# Patient Record
Sex: MALE | Race: WHITE | Employment: OTHER | URBAN - NONMETROPOLITAN AREA
[De-identification: names, ages, dates, MRNs, and addresses within clinical notes are randomized per-mention and may not be internally consistent; named-entity substitution may affect disease eponyms.]

---

## 2017-10-09 ENCOUNTER — OFFICE VISIT (OUTPATIENT)
Dept: CARDIOLOGY CLINIC | Age: 47
End: 2017-10-09

## 2017-10-09 VITALS
SYSTOLIC BLOOD PRESSURE: 134 MMHG | HEIGHT: 71 IN | BODY MASS INDEX: 23.38 KG/M2 | HEART RATE: 76 BPM | DIASTOLIC BLOOD PRESSURE: 80 MMHG | WEIGHT: 167 LBS | RESPIRATION RATE: 16 BRPM

## 2017-10-09 DIAGNOSIS — I10 ESSENTIAL HYPERTENSION: ICD-10-CM

## 2017-10-09 DIAGNOSIS — E78.2 MIXED HYPERLIPIDEMIA: Primary | Chronic | ICD-10-CM

## 2017-10-09 DIAGNOSIS — I25.810 CORONARY ARTERY DISEASE INVOLVING CORONARY BYPASS GRAFT OF NATIVE HEART WITHOUT ANGINA PECTORIS: Chronic | ICD-10-CM

## 2017-10-09 DIAGNOSIS — Z72.0 TOBACCO ABUSE: ICD-10-CM

## 2017-10-09 DIAGNOSIS — I25.2 HISTORY OF MI (MYOCARDIAL INFARCTION): ICD-10-CM

## 2017-10-09 DIAGNOSIS — Z95.1 HISTORY OF CORONARY ARTERY BYPASS GRAFT: ICD-10-CM

## 2017-10-09 DIAGNOSIS — J41.0 SIMPLE CHRONIC BRONCHITIS (HCC): Chronic | ICD-10-CM

## 2017-10-09 PROCEDURE — 99214 OFFICE O/P EST MOD 30 MIN: CPT | Performed by: INTERNAL MEDICINE

## 2017-10-09 RX ORDER — NITROGLYCERIN 0.4 MG/1
0.4 TABLET SUBLINGUAL EVERY 5 MIN PRN
Qty: 25 TABLET | Refills: 3 | Status: ON HOLD | OUTPATIENT
Start: 2017-10-09 | End: 2018-03-05

## 2017-10-09 RX ORDER — ATORVASTATIN CALCIUM 40 MG/1
40 TABLET, FILM COATED ORAL DAILY
Qty: 30 TABLET | Refills: 2 | Status: ON HOLD | OUTPATIENT
Start: 2017-10-09 | End: 2018-03-05

## 2017-10-09 RX ORDER — ACETAMINOPHEN 500 MG
500 TABLET ORAL EVERY 6 HOURS PRN
Status: ON HOLD | COMMUNITY
End: 2018-03-05 | Stop reason: HOSPADM

## 2017-10-09 RX ORDER — ASPIRIN 81 MG/1
81 TABLET ORAL DAILY
Qty: 30 TABLET | Refills: 5 | Status: ON HOLD | OUTPATIENT
Start: 2017-10-09 | End: 2018-03-05 | Stop reason: HOSPADM

## 2017-10-09 NOTE — ASSESSMENT & PLAN NOTE
Counseled to stop smoking. He is not able to afford Chantix and apparently he is medicaid does not cover it.

## 2017-10-09 NOTE — PROGRESS NOTES
20. 00     Types: Cigarettes    Smokeless tobacco: Former User      Comment: Reviewed 10/9/17    Alcohol use No     Family history: family history includes Cancer in his father; Diabetes in his mother; Heart Failure in his father. ALLERGIES:  Review of patient's allergies indicates no known allergies. Prior to Admission medications    Medication Sig Start Date End Date Taking? Authorizing Provider   acetaminophen (TYLENOL) 500 MG tablet Take 500 mg by mouth every 6 hours as needed for Pain   Yes Historical Provider, MD   atorvastatin (LIPITOR) 40 MG tablet Take 1 tablet by mouth daily 10/9/17  Yes Neftali Draper MD   aspirin 81 MG EC tablet Take 1 tablet by mouth daily 10/9/17 11/8/17 Yes Neftali Draper MD   nitroGLYCERIN (NITROSTAT) 0.4 MG SL tablet Place 1 tablet under the tongue every 5 minutes as needed for Chest pain 10/9/17  Yes Neftali Draper MD   budesonide-formoterol [facility]) 80-4.5 MCG/ACT AERO Inhale 2 puffs into the lungs 2 times daily 10/16/16   Kristen Gant MD   albuterol sulfate HFA (VENTOLIN HFA) 108 (90 BASE) MCG/ACT inhaler Inhale 2 puffs into the lungs every 4 hours as needed for Wheezing 7/19/16   Rosalind Silence, CNP     Constitutional:  /80 (Site: Right Arm, Position: Sitting, Cuff Size: Medium Adult)   Pulse 76   Resp 16   Ht 5' 11\" (1.803 m)   Wt 167 lb (75.8 kg)   BMI 23.29 kg/m²    Body mass index is 23.29 kg/m². Wt Readings from Last 3 Encounters:   10/09/17 167 lb (75.8 kg)   08/26/17 180 lb (81.6 kg)   04/17/17 177 lb (80.3 kg)     /80 (Site: Right Arm, Position: Sitting, Cuff Size: Medium Adult)   Pulse 76   Resp 16   Ht 5' 11\" (1.803 m)   Wt 167 lb (75.8 kg)   BMI 23.29 kg/m²     HEENT:  Pupils are equal and react to light and accomodation    Neck:  No jugular veinous distenstion or carotid bruits.     Chest:  Normal in shape and excursion    Cardiac:  Normal first and second heart sounds     Respiratory:  Normal breath sounds without wheezing or exposure. I have offered multiple approaches and support to accomplish this. Appropriate prescriptions if needed on this visit are addressed. After visit summery is provided. Questions answered and patient verbalizes understanding. Follow up in office in 6 months, sooner if needed. Khadijah George MD, 10/9/2017 4:29 PM     Please note this report has been produced using speech recognition software and may contain errors related to that system including errors in grammar, punctuation, and spelling, as well as words and phrases that may be inappropriate.  If there are any questions or concerns please feel free to contact the dictating provider for clarification

## 2017-10-09 NOTE — ASSESSMENT & PLAN NOTE
He has lost significant amount of weight since his last visit. His blood pressure is doing well, I will not resume any of the medications he was on for blood pressure at this point.

## 2017-12-28 ENCOUNTER — HOSPITAL ENCOUNTER (OUTPATIENT)
Dept: OTHER | Age: 47
Discharge: HOME | End: 2017-12-31
Attending: PREVENTIVE MEDICINE | Admitting: PREVENTIVE MEDICINE

## 2017-12-28 PROBLEM — F11.10 OPIATE ABUSE, CONTINUOUS (HCC): Status: ACTIVE | Noted: 2017-12-28

## 2018-01-01 ENCOUNTER — HOSPITAL ENCOUNTER (OUTPATIENT)
Dept: OTHER | Age: 48
Discharge: HOME | End: 2018-01-31
Attending: PREVENTIVE MEDICINE | Admitting: PREVENTIVE MEDICINE

## 2018-02-27 ENCOUNTER — HOSPITAL ENCOUNTER (OUTPATIENT)
Dept: PSYCHIATRY | Age: 48
Discharge: HOME | End: 2018-02-28
Attending: PREVENTIVE MEDICINE | Admitting: PREVENTIVE MEDICINE

## 2018-02-27 ASSESSMENT — LIFESTYLE VARIABLES: HISTORY_ALCOHOL_USE: NO

## 2018-03-01 ENCOUNTER — HOSPITAL ENCOUNTER (OUTPATIENT)
Dept: OTHER | Age: 48
Discharge: HOME | End: 2018-03-31
Attending: PREVENTIVE MEDICINE | Admitting: PREVENTIVE MEDICINE

## 2018-03-02 ENCOUNTER — HOSPITAL ENCOUNTER (OUTPATIENT)
Dept: OTHER | Age: 48
Discharge: HOME | End: 2018-03-02
Attending: INTERNAL MEDICINE | Admitting: INTERNAL MEDICINE

## 2018-03-03 PROBLEM — F11.20 HEROIN DEPENDENCE (HCC): Status: ACTIVE | Noted: 2018-03-03

## 2018-04-09 ENCOUNTER — TELEPHONE (OUTPATIENT)
Dept: CARDIOLOGY CLINIC | Age: 48
End: 2018-04-09

## 2018-07-02 PROBLEM — I21.21 ST ELEVATION MYOCARDIAL INFARCTION INVOLVING LEFT CIRCUMFLEX CORONARY ARTERY (HCC): Status: ACTIVE | Noted: 2018-07-02

## 2018-07-02 PROBLEM — I21.3 STEMI (ST ELEVATION MYOCARDIAL INFARCTION) (HCC): Status: ACTIVE | Noted: 2018-07-02

## 2018-07-06 ENCOUNTER — CARE COORDINATION (OUTPATIENT)
Dept: CASE MANAGEMENT | Age: 48
End: 2018-07-06

## 2018-07-06 ENCOUNTER — OFFICE VISIT (OUTPATIENT)
Dept: CARDIOLOGY CLINIC | Age: 48
End: 2018-07-06

## 2018-07-06 VITALS
SYSTOLIC BLOOD PRESSURE: 118 MMHG | WEIGHT: 173.2 LBS | BODY MASS INDEX: 24.25 KG/M2 | HEART RATE: 80 BPM | HEIGHT: 71 IN | DIASTOLIC BLOOD PRESSURE: 60 MMHG

## 2018-07-06 DIAGNOSIS — F11.10 OPIATE ABUSE, CONTINUOUS (HCC): ICD-10-CM

## 2018-07-06 DIAGNOSIS — F11.20 HEROIN DEPENDENCE (HCC): ICD-10-CM

## 2018-07-06 DIAGNOSIS — E78.2 MIXED HYPERLIPIDEMIA: Chronic | ICD-10-CM

## 2018-07-06 DIAGNOSIS — K21.9 GASTROESOPHAGEAL REFLUX DISEASE WITHOUT ESOPHAGITIS: ICD-10-CM

## 2018-07-06 DIAGNOSIS — I21.21 ST ELEVATION MYOCARDIAL INFARCTION INVOLVING LEFT CIRCUMFLEX CORONARY ARTERY (HCC): ICD-10-CM

## 2018-07-06 DIAGNOSIS — I21.21 ST ELEVATION MYOCARDIAL INFARCTION INVOLVING LEFT CIRCUMFLEX CORONARY ARTERY (HCC): Primary | ICD-10-CM

## 2018-07-06 DIAGNOSIS — I25.810 CORONARY ARTERY DISEASE INVOLVING CORONARY BYPASS GRAFT OF NATIVE HEART WITHOUT ANGINA PECTORIS: Primary | Chronic | ICD-10-CM

## 2018-07-06 DIAGNOSIS — I10 ESSENTIAL HYPERTENSION: ICD-10-CM

## 2018-07-06 DIAGNOSIS — Z72.0 TOBACCO ABUSE: ICD-10-CM

## 2018-07-06 DIAGNOSIS — J41.0 SIMPLE CHRONIC BRONCHITIS (HCC): Chronic | ICD-10-CM

## 2018-07-06 PROCEDURE — 99214 OFFICE O/P EST MOD 30 MIN: CPT | Performed by: INTERNAL MEDICINE

## 2018-07-06 PROCEDURE — G8926 SPIRO NO PERF OR DOC: HCPCS | Performed by: INTERNAL MEDICINE

## 2018-07-06 PROCEDURE — 3023F SPIROM DOC REV: CPT | Performed by: INTERNAL MEDICINE

## 2018-07-06 PROCEDURE — 1111F DSCHRG MED/CURRENT MED MERGE: CPT | Performed by: INTERNAL MEDICINE

## 2018-07-06 PROCEDURE — G8427 DOCREV CUR MEDS BY ELIG CLIN: HCPCS | Performed by: INTERNAL MEDICINE

## 2018-07-06 PROCEDURE — 1111F DSCHRG MED/CURRENT MED MERGE: CPT | Performed by: PREVENTIVE MEDICINE

## 2018-07-06 PROCEDURE — G8598 ASA/ANTIPLAT THER USED: HCPCS | Performed by: INTERNAL MEDICINE

## 2018-07-06 PROCEDURE — G8420 CALC BMI NORM PARAMETERS: HCPCS | Performed by: INTERNAL MEDICINE

## 2018-07-06 PROCEDURE — 4004F PT TOBACCO SCREEN RCVD TLK: CPT | Performed by: INTERNAL MEDICINE

## 2018-07-06 RX ORDER — CLOPIDOGREL BISULFATE 75 MG/1
75 TABLET ORAL DAILY
Qty: 30 TABLET | Refills: 5 | Status: SHIPPED | OUTPATIENT
Start: 2018-07-06 | End: 2018-07-11 | Stop reason: SDUPTHER

## 2018-07-06 RX ORDER — ALBUTEROL SULFATE 90 UG/1
2 AEROSOL, METERED RESPIRATORY (INHALATION) EVERY 4 HOURS PRN
Qty: 1 INHALER | Refills: 0 | Status: ON HOLD | OUTPATIENT
Start: 2018-07-06 | End: 2018-12-12

## 2018-07-06 RX ORDER — FUROSEMIDE 20 MG/1
20 TABLET ORAL DAILY
Qty: 30 TABLET | Refills: 5 | Status: ON HOLD | OUTPATIENT
Start: 2018-07-06 | End: 2018-07-13

## 2018-07-06 NOTE — PROGRESS NOTES
OFFICE PROGRESS NOTES      Dilan Colon is a 52 y.o. male who has    CHIEF COMPLAINT AS FOLLOWS:  CHEST PAIN: Patient denies any C/O chest pains at this time. SOB: Has SOB with exertion. SOB is worsening. C/O PND episodes too. LEG EDEMA: No leg edema   PALPITATIONS: Denies any C/O Palpitations                                  DIZZINESS: No C/O Dizziness                           SYNCOPE: None   OTHER:                                     HPI: Patient is here for F/U on his CAD, HTN & Dyslipidemia problems.                   Sundeep Tirado has the following history recorded in care path:  Patient Active Problem List    Diagnosis Date Noted    ST elevation myocardial infarction involving left circumflex coronary artery ([facility] 07/02/2018     Priority: High    Heroin dependence ([facility] 03/03/2018     Priority: Low    Opiate abuse, continuous 12/28/2017     Priority: Low    Gastroesophageal reflux disease without esophagitis 12/29/2016     Priority: Low    Coronary artery disease involving coronary bypass graft of native heart without angina pectoris 07/26/2016     Priority: Low    Mixed hyperlipidemia 07/26/2016     Priority: Low    Depression 07/26/2016     Priority: Low    Chronic midline low back pain without sciatica 07/26/2016     Priority: Low    Tobacco abuse 07/26/2016     Priority: Low    Midline low back pain without sciatica 09/10/2015     Priority: Low    History of MI (myocardial infarction) 09/10/2015     Priority: Low    Left shoulder pain 08/19/2015     Priority: Low    Crushing injury of right hand 08/19/2015     Priority: Low    Rotator cuff tendinitis 08/19/2015     Priority: Low    Abdominal hernia 06/21/2015     Priority: Low    Paresthesia of left leg 02/20/2015     Priority: Low    Essential hypertension 08/10/2012     Priority: Low    Osteoarthritis 08/10/2012     Priority: Low    COPD (chronic obstructive pulmonary disease) ([facility] 08/10/2012     Priority: Low    STEMI (ST elevation myocardial infarction) ([facility] 07/02/2018     Current Outpatient Prescriptions   Medication Sig Dispense Refill    carvedilol (COREG) 3.125 MG tablet Take 1 tablet by mouth 2 times daily (with meals) 60 tablet 0    lisinopril (PRINIVIL;ZESTRIL) 5 MG tablet Take 1 tablet by mouth daily 30 tablet 3    dicyclomine (BENTYL) 20 MG tablet Take 1 tablet by mouth every 6 hours as needed (Abdominal Cramping) 120 tablet 3    nitroGLYCERIN (NITRODUR) 0.4 MG/HR Place 1 patch onto the skin daily 30 patch 3    naproxen (NAPROSYN) 500 MG tablet Take 1 tablet by mouth 2 times daily as needed for Pain 30 tablet 0    QUEtiapine (SEROQUEL) 50 MG tablet Take 1 tablet by mouth nightly 30 tablet 0    albuterol sulfate HFA (VENTOLIN HFA) 108 (90 Base) MCG/ACT inhaler Inhale 2 puffs into the lungs every 4 hours as needed for Wheezing 1 Inhaler 2    nitroGLYCERIN (NITROSTAT) 0.4 MG SL tablet Place 1 tablet under the tongue every 5 minutes as needed for Chest pain 25 tablet 0    aspirin 81 MG EC tablet Take 1 tablet by mouth daily 30 tablet 3    atorvastatin (LIPITOR) 40 MG tablet Take 1 tablet by mouth daily 30 tablet 0    clopidogrel (PLAVIX) 75 MG tablet Take 1 tablet by mouth daily 30 tablet 0     No current facility-administered medications for this visit. Allergies: Patient has no known allergies.   Past Medical History:   Diagnosis Date    CAD (coronary artery disease)     COPD (chronic obstructive pulmonary disease) ([facility]     Depression     HIGH CHOLESTEROL     Hypertension     MI (myocardial infarction) 2011     Past Surgical History:   Procedure Laterality Date    BYPASS GRAFT  4/10/11    CABG x 4    CARDIAC SURGERY  11/2010     4 stents    LEG SURGERY        As reviewed   Family History   Problem Relation Age of Onset    Cancer Father     Heart Failure Father     Diabetes Mother      Social History   Substance Use Topics    Smoking status: Current Refer to Cardiac Rehab   Office f/u in two months. Primary/secondary prevention is the goal by aggressive risk modification, healthy and therapeutic life style changes for cardiovascular risk reduction. Various goals are discussed and multiple questions answered.

## 2018-07-06 NOTE — CARE COORDINATION
declined. Instructed patient to contact his PCP for any change in condition or worsening symptoms. No further outreach planned.         Future Appointments  Date Time Provider Karey Marks   7/6/2018 2:50 PM Paty Arvizu MD [facility]       Elizabeth Chavez RN

## 2018-07-11 PROBLEM — I50.43 ACUTE ON CHRONIC COMBINED SYSTOLIC AND DIASTOLIC CONGESTIVE HEART FAILURE (HCC): Status: ACTIVE | Noted: 2018-07-11

## 2018-07-11 PROBLEM — I50.23 ACUTE ON CHRONIC SYSTOLIC CONGESTIVE HEART FAILURE (HCC): Status: ACTIVE | Noted: 2018-07-11

## 2018-07-11 PROBLEM — I21.4 NSTEMI (NON-ST ELEVATED MYOCARDIAL INFARCTION) (HCC): Status: ACTIVE | Noted: 2018-07-11

## 2018-07-11 PROBLEM — I50.40 SYSTOLIC AND DIASTOLIC CHF W/REDUCED LV FUNCTION, NYHA CLASS 4 (HCC): Status: ACTIVE | Noted: 2018-07-11

## 2018-07-14 ENCOUNTER — CARE COORDINATION (OUTPATIENT)
Dept: CASE MANAGEMENT | Age: 48
End: 2018-07-14

## 2018-07-14 DIAGNOSIS — I21.21 ST ELEVATION MYOCARDIAL INFARCTION INVOLVING LEFT CIRCUMFLEX CORONARY ARTERY (HCC): Primary | ICD-10-CM

## 2018-07-14 NOTE — CARE COORDINATION
[facility] Transitions Initial Follow Up Call    Call within 2 business days of discharge: Yes    Patient: Jeff Mcghee Patient : 1970   MRN: <[id number]>  Reason for Admission: CHF  Discharge Date: 18 RARS: Readmission Risk Score: 24     Facility: [facility]    1st attempt to reach patient for post 24h discharge care transition call. Message left stating purpose of call, contact information and request for return call.      Future Appointments  Date Time Provider Karey Marks   2018 9:00 AM NANCY Flood - CNP [facility]   2018 3:10 PM Mounika Mcclure MD [facility]       Gaby Casiano RN

## 2018-07-19 ENCOUNTER — CARE COORDINATION (OUTPATIENT)
Dept: CASE MANAGEMENT | Age: 48
End: 2018-07-19

## 2018-07-23 ENCOUNTER — CARE COORDINATION (OUTPATIENT)
Dept: CASE MANAGEMENT | Age: 48
End: 2018-07-23

## 2018-07-23 NOTE — CARE COORDINATION
[facility] Transitions Follow Up Call    2018    Patient: Joni Her  Patient : 1970   MRN: [id number]  Reason for Admission: There are no discharge diagnoses documented for the most recent discharge. Discharge Date: 18 RARS: Readmission Risk Score: 18           Care Transitions Subsequent and Final Call    Subsequent and Final Calls  Care Transitions Interventions  Other Interventions: Follow Up: Attempted to reach patient for Care Transition. No answer to phone. Message left on voicemail with CTC contact information given with request for call back.   .   Future Appointments  Date Time Provider Karey Marks   2018 3:10 PM Paty Arvizu MD [facility]       Elizabeth Chavez RN

## 2018-07-25 ENCOUNTER — CARE COORDINATION (OUTPATIENT)
Dept: CASE MANAGEMENT | Age: 48
End: 2018-07-25

## 2018-08-01 ENCOUNTER — CARE COORDINATION (OUTPATIENT)
Dept: CASE MANAGEMENT | Age: 48
End: 2018-08-01

## 2018-09-11 ENCOUNTER — TELEPHONE (OUTPATIENT)
Dept: CARDIOLOGY CLINIC | Age: 48
End: 2018-09-11

## 2018-09-14 PROBLEM — Z91.199 NONCOMPLIANCE: Status: ACTIVE | Noted: 2018-09-14

## 2018-09-14 PROBLEM — I50.9 HEART FAILURE (HCC): Status: ACTIVE | Noted: 2018-09-14

## 2018-12-07 ENCOUNTER — APPOINTMENT (OUTPATIENT)
Dept: GENERAL RADIOLOGY | Age: 48
End: 2018-12-07
Payer: MEDICARE

## 2018-12-07 ENCOUNTER — HOSPITAL ENCOUNTER (EMERGENCY)
Age: 48
Discharge: ANOTHER HOSPITAL | End: 2018-12-08
Attending: EMERGENCY MEDICINE
Payer: MEDICARE

## 2018-12-07 DIAGNOSIS — J96.01 ACUTE RESPIRATORY FAILURE WITH HYPOXIA AND HYPERCAPNIA (HCC): Primary | ICD-10-CM

## 2018-12-07 DIAGNOSIS — I16.1 HYPERTENSIVE EMERGENCY: ICD-10-CM

## 2018-12-07 DIAGNOSIS — I50.41 ACUTE COMBINED SYSTOLIC AND DIASTOLIC HEART FAILURE (HCC): ICD-10-CM

## 2018-12-07 DIAGNOSIS — J96.02 ACUTE RESPIRATORY FAILURE WITH HYPOXIA AND HYPERCAPNIA (HCC): Primary | ICD-10-CM

## 2018-12-07 LAB
BASE EXCESS MIXED: 9 (ref 0–1.2)
BASE EXCESS: ABNORMAL (ref 0–3.3)
BASOPHILS ABSOLUTE: 0.2 K/CU MM
BASOPHILS RELATIVE PERCENT: 1.2 % (ref 0–1)
CHP ED QC CHECK: YES
CO2: 23 MMOL/L (ref 21–32)
DIFFERENTIAL TYPE: ABNORMAL
EOSINOPHILS ABSOLUTE: 0.5 K/CU MM
EOSINOPHILS RELATIVE PERCENT: 3.4 % (ref 0–3)
GLUCOSE BLD-MCNC: 249 MG/DL
GLUCOSE BLD-MCNC: 249 MG/DL (ref 70–99)
GLUCOSE BLD-MCNC: 284 MG/DL (ref 70–99)
HCO3 ARTERIAL: 20.9 MMOL/L (ref 18–23)
HCT VFR BLD CALC: 45.7 % (ref 42–52)
HCT VFR BLD CALC: 50 % (ref 42–52)
HEMOGLOBIN: 14 GM/DL (ref 13.5–18)
HEMOGLOBIN: 17.1 GM/DL (ref 13.5–18)
IMMATURE NEUTROPHIL %: 0.7 % (ref 0–0.43)
LYMPHOCYTES ABSOLUTE: 4.6 K/CU MM
LYMPHOCYTES RELATIVE PERCENT: 33.5 % (ref 24–44)
MCH RBC QN AUTO: 27.8 PG (ref 27–31)
MCHC RBC AUTO-ENTMCNC: 30.6 % (ref 32–36)
MCV RBC AUTO: 90.7 FL (ref 78–100)
MONOCYTES ABSOLUTE: 0.8 K/CU MM
MONOCYTES RELATIVE PERCENT: 5.9 % (ref 0–4)
O2 SATURATION: 89.7 % (ref 96–97)
PCO2 ARTERIAL: 59.2 MMHG (ref 32–45)
PDW BLD-RTO: 13.9 % (ref 11.7–14.9)
PH BLOOD: 7.16 (ref 7.34–7.45)
PLATELET # BLD: 364 K/CU MM (ref 140–440)
PMV BLD AUTO: 10.3 FL (ref 7.5–11.1)
PO2 ARTERIAL: 75.1 MMHG (ref 75–100)
POC CALCIUM: 1.18 MMOL/L (ref 1.12–1.32)
POC CHLORIDE: 108 MMOL/L (ref 98–109)
POC CREATININE: 1.5 MG/DL (ref 0.9–1.3)
POTASSIUM SERPL-SCNC: 4.1 MMOL/L (ref 3.5–4.5)
RBC # BLD: 5.04 M/CU MM (ref 4.6–6.2)
SEGMENTED NEUTROPHILS ABSOLUTE COUNT: 7.6 K/CU MM
SEGMENTED NEUTROPHILS RELATIVE PERCENT: 55.3 % (ref 36–66)
SODIUM BLD-SCNC: 136 MMOL/L (ref 138–146)
SOURCE, BLOOD GAS: ABNORMAL
TOTAL IMMATURE NEUTOROPHIL: 0.09 K/CU MM
WBC # BLD: 13.7 K/CU MM (ref 4–10.5)

## 2018-12-07 PROCEDURE — 71045 X-RAY EXAM CHEST 1 VIEW: CPT

## 2018-12-07 PROCEDURE — 4500000030 HC CRITICAL CARE PROCEDURE

## 2018-12-07 PROCEDURE — 96374 THER/PROPH/DIAG INJ IV PUSH: CPT

## 2018-12-07 PROCEDURE — 84484 ASSAY OF TROPONIN QUANT: CPT

## 2018-12-07 PROCEDURE — 82962 GLUCOSE BLOOD TEST: CPT

## 2018-12-07 PROCEDURE — 85025 COMPLETE CBC W/AUTO DIFF WBC: CPT

## 2018-12-07 PROCEDURE — 2500000003 HC RX 250 WO HCPCS

## 2018-12-07 PROCEDURE — 99291 CRITICAL CARE FIRST HOUR: CPT

## 2018-12-07 PROCEDURE — 93005 ELECTROCARDIOGRAM TRACING: CPT | Performed by: EMERGENCY MEDICINE

## 2018-12-07 PROCEDURE — 2580000003 HC RX 258: Performed by: EMERGENCY MEDICINE

## 2018-12-07 PROCEDURE — 99292 CRITICAL CARE ADDL 30 MIN: CPT

## 2018-12-07 PROCEDURE — 83605 ASSAY OF LACTIC ACID: CPT

## 2018-12-07 PROCEDURE — 83880 ASSAY OF NATRIURETIC PEPTIDE: CPT

## 2018-12-07 PROCEDURE — 94660 CPAP INITIATION&MGMT: CPT

## 2018-12-07 PROCEDURE — 6370000000 HC RX 637 (ALT 250 FOR IP): Performed by: EMERGENCY MEDICINE

## 2018-12-07 PROCEDURE — 2580000003 HC RX 258: Performed by: FAMILY MEDICINE

## 2018-12-07 RX ORDER — NITROGLYCERIN 20 MG/100ML
150 INJECTION INTRAVENOUS CONTINUOUS
Status: DISCONTINUED | OUTPATIENT
Start: 2018-12-08 | End: 2018-12-08 | Stop reason: HOSPADM

## 2018-12-07 RX ORDER — NITROGLYCERIN 0.4 MG/1
0.8 TABLET SUBLINGUAL ONCE
Status: COMPLETED | OUTPATIENT
Start: 2018-12-08 | End: 2018-12-07

## 2018-12-07 RX ORDER — SODIUM CHLORIDE 0.9 % (FLUSH) 0.9 %
10 SYRINGE (ML) INJECTION PRN
Status: DISCONTINUED | OUTPATIENT
Start: 2018-12-07 | End: 2018-12-08

## 2018-12-07 RX ORDER — KETAMINE HYDROCHLORIDE 10 MG/ML
INJECTION, SOLUTION INTRAMUSCULAR; INTRAVENOUS
Status: COMPLETED
Start: 2018-12-07 | End: 2018-12-07

## 2018-12-07 RX ORDER — KETAMINE HYDROCHLORIDE 10 MG/ML
100 INJECTION, SOLUTION INTRAMUSCULAR; INTRAVENOUS ONCE
Status: COMPLETED | OUTPATIENT
Start: 2018-12-08 | End: 2018-12-07

## 2018-12-07 RX ORDER — NITROGLYCERIN 20 MG/100ML
INJECTION INTRAVENOUS
Status: COMPLETED
Start: 2018-12-07 | End: 2018-12-07

## 2018-12-07 RX ADMIN — KETAMINE HYDROCHLORIDE 100 MG: 10 INJECTION, SOLUTION INTRAMUSCULAR; INTRAVENOUS at 23:24

## 2018-12-07 RX ADMIN — KETAMINE HYDROCHLORIDE 100 MG: 10 INJECTION INTRAMUSCULAR; INTRAVENOUS at 23:24

## 2018-12-07 RX ADMIN — SODIUM CHLORIDE, PRESERVATIVE FREE 10 ML: 5 INJECTION INTRAVENOUS at 23:50

## 2018-12-07 RX ADMIN — NITROGLYCERIN 150 MCG/MIN: 20 INJECTION INTRAVENOUS at 23:38

## 2018-12-07 RX ADMIN — NITROGLYCERIN 0.8 MG: 0.4 TABLET SUBLINGUAL at 23:24

## 2018-12-07 RX ADMIN — SODIUM CHLORIDE, PRESERVATIVE FREE 10 ML: 5 INJECTION INTRAVENOUS at 23:40

## 2018-12-07 ASSESSMENT — PAIN SCALES - GENERAL: PAINLEVEL_OUTOF10: 0

## 2018-12-08 ENCOUNTER — APPOINTMENT (OUTPATIENT)
Dept: GENERAL RADIOLOGY | Age: 48
End: 2018-12-08
Payer: MEDICARE

## 2018-12-08 ENCOUNTER — HOSPITAL ENCOUNTER (INPATIENT)
Age: 48
LOS: 4 days | Discharge: HOME | DRG: 291 | End: 2018-12-12
Attending: INTERNAL MEDICINE | Admitting: INTERNAL MEDICINE
Payer: MEDICARE

## 2018-12-08 ENCOUNTER — APPOINTMENT (OUTPATIENT)
Dept: GENERAL RADIOLOGY | Age: 48
DRG: 291 | End: 2018-12-08
Attending: INTERNAL MEDICINE
Payer: MEDICARE

## 2018-12-08 VITALS
HEIGHT: 71 IN | RESPIRATION RATE: 28 BRPM | WEIGHT: 165 LBS | TEMPERATURE: 96.6 F | DIASTOLIC BLOOD PRESSURE: 95 MMHG | SYSTOLIC BLOOD PRESSURE: 114 MMHG | BODY MASS INDEX: 23.1 KG/M2 | OXYGEN SATURATION: 98 % | HEART RATE: 113 BPM

## 2018-12-08 DIAGNOSIS — J41.0 SIMPLE CHRONIC BRONCHITIS (HCC): Chronic | ICD-10-CM

## 2018-12-08 DIAGNOSIS — Z95.1 HISTORY OF CORONARY ARTERY BYPASS GRAFT: ICD-10-CM

## 2018-12-08 DIAGNOSIS — I25.2 HISTORY OF MI (MYOCARDIAL INFARCTION): ICD-10-CM

## 2018-12-08 DIAGNOSIS — E78.2 MIXED HYPERLIPIDEMIA: Chronic | ICD-10-CM

## 2018-12-08 DIAGNOSIS — I25.810 CORONARY ARTERY DISEASE INVOLVING CORONARY BYPASS GRAFT OF NATIVE HEART WITHOUT ANGINA PECTORIS: Chronic | ICD-10-CM

## 2018-12-08 PROBLEM — J96.01 ACUTE RESPIRATORY FAILURE WITH HYPOXIA AND HYPERCAPNIA (HCC): Status: ACTIVE | Noted: 2018-12-08

## 2018-12-08 PROBLEM — J81.0 PULMONARY EDEMA, ACUTE (HCC): Status: ACTIVE | Noted: 2018-12-08

## 2018-12-08 PROBLEM — N17.9 ACUTE KIDNEY INJURY (HCC): Status: ACTIVE | Noted: 2018-12-08

## 2018-12-08 PROBLEM — J96.02 ACUTE RESPIRATORY FAILURE WITH HYPOXIA AND HYPERCAPNIA (HCC): Status: ACTIVE | Noted: 2018-12-08

## 2018-12-08 PROBLEM — I16.1 HYPERTENSIVE EMERGENCY: Status: ACTIVE | Noted: 2018-12-08

## 2018-12-08 LAB
ALBUMIN SERPL-MCNC: 4.4 GM/DL (ref 3.4–5)
ALP BLD-CCNC: 83 IU/L (ref 40–129)
ALT SERPL-CCNC: 11 U/L (ref 10–40)
ANION GAP SERPL CALCULATED.3IONS-SCNC: 13 MMOL/L (ref 4–16)
ANION GAP SERPL CALCULATED.3IONS-SCNC: 15 MMOL/L (ref 4–16)
AST SERPL-CCNC: 15 IU/L (ref 15–37)
BACTERIA: ABNORMAL /HPF
BASE EXCESS MIXED: 3.1 (ref 0–1.2)
BASE EXCESS MIXED: 3.3 (ref 0–1.2)
BASE EXCESS: 2 (ref 0–3.3)
BASE EXCESS: ABNORMAL (ref 0–3.3)
BASE EXCESS: ABNORMAL (ref 0–3.3)
BILIRUB SERPL-MCNC: 0.3 MG/DL (ref 0–1)
BILIRUBIN URINE: NEGATIVE MG/DL
BLOOD, URINE: NEGATIVE
BUN BLDV-MCNC: 20 MG/DL (ref 6–23)
BUN BLDV-MCNC: 21 MG/DL (ref 6–23)
CALCIUM SERPL-MCNC: 8.5 MG/DL (ref 8.3–10.6)
CALCIUM SERPL-MCNC: 9.1 MG/DL (ref 8.3–10.6)
CARBON MONOXIDE, BLOOD: 1.3 % (ref 0–5)
CAST TYPE: ABNORMAL /HPF
CHLORIDE BLD-SCNC: 100 MMOL/L (ref 99–110)
CHLORIDE BLD-SCNC: 102 MMOL/L (ref 99–110)
CLARITY: CLEAR
CO2 CONTENT: 26.2 MMOL/L (ref 19–24)
CO2 CONTENT: 28.2 MMOL/L (ref 19–24)
CO2 CONTENT: 32.3 MMOL/L (ref 19–24)
CO2: 22 MMOL/L (ref 21–32)
CO2: 22 MMOL/L (ref 21–32)
COLOR: YELLOW
CREAT SERPL-MCNC: 1.4 MG/DL (ref 0.9–1.3)
CREAT SERPL-MCNC: 1.5 MG/DL (ref 0.9–1.3)
CRYSTAL TYPE: ABNORMAL /HPF
EPITHELIAL CELLS, UA: ABNORMAL /HPF
GFR AFRICAN AMERICAN: >60 ML/MIN/1.73M2
GFR AFRICAN AMERICAN: >60 ML/MIN/1.73M2
GFR NON-AFRICAN AMERICAN: 50 ML/MIN/1.73M2
GFR NON-AFRICAN AMERICAN: 54 ML/MIN/1.73M2
GLUCOSE BLD-MCNC: 108 MG/DL (ref 70–99)
GLUCOSE BLD-MCNC: 283 MG/DL (ref 70–99)
GLUCOSE, URINE: NEGATIVE MG/DL
HCO3 ARTERIAL: 24.8 MMOL/L (ref 18–23)
HCO3 ARTERIAL: 26.2 MMOL/L (ref 18–23)
HCO3 ARTERIAL: 29.3 MMOL/L (ref 18–23)
KETONES, URINE: NEGATIVE MG/DL
LACTATE: 3.3 MMOL/L (ref 0.4–2)
LEUKOCYTE ESTERASE, URINE: NEGATIVE
MAGNESIUM: 1.8 MG/DL (ref 1.8–2.4)
METHEMOGLOBIN ARTERIAL: 0.9 %
MUCUS: NEGATIVE HPF
NITRITE URINE, QUANTITATIVE: NEGATIVE
O2 SATURATION: 94.7 % (ref 96–97)
O2 SATURATION: 97.7 % (ref 96–97)
O2 SATURATION: 99.8 % (ref 96–97)
PCO2 ARTERIAL: 47 MMHG (ref 32–45)
PCO2 ARTERIAL: 65.1 MMHG (ref 32–45)
PCO2 ARTERIAL: 95.6 MMHG (ref 32–45)
PH BLOOD: 7.1 (ref 7.34–7.45)
PH BLOOD: 7.21 (ref 7.34–7.45)
PH BLOOD: 7.33 (ref 7.34–7.45)
PH, URINE: 5.5 (ref 5–8)
PO2 ARTERIAL: 217 MMHG (ref 75–100)
PO2 ARTERIAL: 347.2 MMHG (ref 75–100)
PO2 ARTERIAL: 91.2 MMHG (ref 75–100)
POTASSIUM SERPL-SCNC: 4.5 MMOL/L (ref 3.5–5.1)
POTASSIUM SERPL-SCNC: 4.7 MMOL/L (ref 3.5–5.1)
PREALBUMIN: 22 MG/DL (ref 20–40)
PRO-BNP: 3700 PG/ML
PROTEIN UA: 100 MG/DL
RBC URINE: ABNORMAL /HPF (ref 0–3)
SODIUM BLD-SCNC: 137 MMOL/L (ref 135–145)
SODIUM BLD-SCNC: 137 MMOL/L (ref 135–145)
SOURCE, BLOOD GAS: ABNORMAL
SOURCE, BLOOD GAS: ABNORMAL
SPECIFIC GRAVITY UA: 1.03 (ref 1–1.03)
TOTAL PROTEIN: 6.7 GM/DL (ref 6.4–8.2)
TROPONIN T: 0.02 NG/ML
TROPONIN T: <0.01 NG/ML
TROPONIN T: <0.01 NG/ML
TSH HIGH SENSITIVITY: 2.12 UIU/ML (ref 0.27–4.2)
UROBILINOGEN, URINE: 0.2 MG/DL (ref 0.2–1)
VOLUME, (UVOL): 12 ML (ref 10–12)
WBC UA: ABNORMAL /HPF (ref 0–2)

## 2018-12-08 PROCEDURE — C9113 INJ PANTOPRAZOLE SODIUM, VIA: HCPCS | Performed by: INTERNAL MEDICINE

## 2018-12-08 PROCEDURE — 2700000000 HC OXYGEN THERAPY PER DAY

## 2018-12-08 PROCEDURE — 71045 X-RAY EXAM CHEST 1 VIEW: CPT

## 2018-12-08 PROCEDURE — 99291 CRITICAL CARE FIRST HOUR: CPT | Performed by: INTERNAL MEDICINE

## 2018-12-08 PROCEDURE — 6370000000 HC RX 637 (ALT 250 FOR IP): Performed by: INTERNAL MEDICINE

## 2018-12-08 PROCEDURE — 6360000002 HC RX W HCPCS: Performed by: EMERGENCY MEDICINE

## 2018-12-08 PROCEDURE — 84443 ASSAY THYROID STIM HORMONE: CPT

## 2018-12-08 PROCEDURE — 0BH17EZ INSERTION OF ENDOTRACHEAL AIRWAY INTO TRACHEA, VIA NATURAL OR ARTIFICIAL OPENING: ICD-10-PCS | Performed by: INTERNAL MEDICINE

## 2018-12-08 PROCEDURE — 81001 URINALYSIS AUTO W/SCOPE: CPT

## 2018-12-08 PROCEDURE — 89220 SPUTUM SPECIMEN COLLECTION: CPT

## 2018-12-08 PROCEDURE — 99221 1ST HOSP IP/OBS SF/LOW 40: CPT | Performed by: INTERNAL MEDICINE

## 2018-12-08 PROCEDURE — 83735 ASSAY OF MAGNESIUM: CPT

## 2018-12-08 PROCEDURE — 94750 HC PULMONARY COMPLIANCE STUDY: CPT

## 2018-12-08 PROCEDURE — 87070 CULTURE OTHR SPECIMN AEROBIC: CPT

## 2018-12-08 PROCEDURE — 84484 ASSAY OF TROPONIN QUANT: CPT

## 2018-12-08 PROCEDURE — 94640 AIRWAY INHALATION TREATMENT: CPT

## 2018-12-08 PROCEDURE — 93010 ELECTROCARDIOGRAM REPORT: CPT | Performed by: INTERNAL MEDICINE

## 2018-12-08 PROCEDURE — 6360000002 HC RX W HCPCS: Performed by: INTERNAL MEDICINE

## 2018-12-08 PROCEDURE — 2580000003 HC RX 258: Performed by: INTERNAL MEDICINE

## 2018-12-08 PROCEDURE — 2500000003 HC RX 250 WO HCPCS: Performed by: FAMILY MEDICINE

## 2018-12-08 PROCEDURE — 82803 BLOOD GASES ANY COMBINATION: CPT

## 2018-12-08 PROCEDURE — 94761 N-INVAS EAR/PLS OXIMETRY MLT: CPT

## 2018-12-08 PROCEDURE — 80048 BASIC METABOLIC PNL TOTAL CA: CPT

## 2018-12-08 PROCEDURE — 6370000000 HC RX 637 (ALT 250 FOR IP): Performed by: EMERGENCY MEDICINE

## 2018-12-08 PROCEDURE — 2500000003 HC RX 250 WO HCPCS: Performed by: EMERGENCY MEDICINE

## 2018-12-08 PROCEDURE — 36600 WITHDRAWAL OF ARTERIAL BLOOD: CPT

## 2018-12-08 PROCEDURE — 80053 COMPREHEN METABOLIC PANEL: CPT

## 2018-12-08 PROCEDURE — 2500000003 HC RX 250 WO HCPCS: Performed by: INTERNAL MEDICINE

## 2018-12-08 PROCEDURE — 2000000000 HC ICU R&B

## 2018-12-08 PROCEDURE — 6360000002 HC RX W HCPCS: Performed by: FAMILY MEDICINE

## 2018-12-08 PROCEDURE — 2580000003 HC RX 258

## 2018-12-08 PROCEDURE — 94003 VENT MGMT INPAT SUBQ DAY: CPT

## 2018-12-08 PROCEDURE — 94002 VENT MGMT INPAT INIT DAY: CPT

## 2018-12-08 PROCEDURE — 87077 CULTURE AEROBIC IDENTIFY: CPT

## 2018-12-08 PROCEDURE — 84134 ASSAY OF PREALBUMIN: CPT

## 2018-12-08 PROCEDURE — 36415 COLL VENOUS BLD VENIPUNCTURE: CPT

## 2018-12-08 PROCEDURE — 87205 SMEAR GRAM STAIN: CPT

## 2018-12-08 PROCEDURE — 87798 DETECT AGENT NOS DNA AMP: CPT

## 2018-12-08 PROCEDURE — 5A1935Z RESPIRATORY VENTILATION, LESS THAN 24 CONSECUTIVE HOURS: ICD-10-PCS | Performed by: INTERNAL MEDICINE

## 2018-12-08 RX ORDER — ATORVASTATIN CALCIUM 40 MG/1
40 TABLET, FILM COATED ORAL DAILY
Status: DISCONTINUED | OUTPATIENT
Start: 2018-12-08 | End: 2018-12-11

## 2018-12-08 RX ORDER — PANTOPRAZOLE SODIUM 40 MG/10ML
40 INJECTION, POWDER, LYOPHILIZED, FOR SOLUTION INTRAVENOUS DAILY
Status: DISCONTINUED | OUTPATIENT
Start: 2018-12-08 | End: 2018-12-12 | Stop reason: HOSPADM

## 2018-12-08 RX ORDER — SODIUM CHLORIDE 0.9 % (FLUSH) 0.9 %
10 SYRINGE (ML) INJECTION PRN
Status: DISCONTINUED | OUTPATIENT
Start: 2018-12-08 | End: 2018-12-12 | Stop reason: HOSPADM

## 2018-12-08 RX ORDER — IPRATROPIUM BROMIDE AND ALBUTEROL SULFATE 2.5; .5 MG/3ML; MG/3ML
1 SOLUTION RESPIRATORY (INHALATION) EVERY 4 HOURS
Status: DISCONTINUED | OUTPATIENT
Start: 2018-12-08 | End: 2018-12-12 | Stop reason: HOSPADM

## 2018-12-08 RX ORDER — PROPOFOL 10 MG/ML
INJECTION, EMULSION INTRAVENOUS
Status: DISCONTINUED
Start: 2018-12-08 | End: 2018-12-08 | Stop reason: HOSPADM

## 2018-12-08 RX ORDER — POTASSIUM CHLORIDE 7.45 MG/ML
10 INJECTION INTRAVENOUS PRN
Status: DISCONTINUED | OUTPATIENT
Start: 2018-12-08 | End: 2018-12-12 | Stop reason: HOSPADM

## 2018-12-08 RX ORDER — CLOPIDOGREL BISULFATE 75 MG/1
75 TABLET ORAL DAILY
Status: DISCONTINUED | OUTPATIENT
Start: 2018-12-08 | End: 2018-12-12 | Stop reason: HOSPADM

## 2018-12-08 RX ORDER — ASPIRIN 81 MG/1
81 TABLET ORAL DAILY
Status: DISCONTINUED | OUTPATIENT
Start: 2018-12-08 | End: 2018-12-12 | Stop reason: HOSPADM

## 2018-12-08 RX ORDER — PROPOFOL 10 MG/ML
10 INJECTION, EMULSION INTRAVENOUS
Status: DISCONTINUED | OUTPATIENT
Start: 2018-12-08 | End: 2018-12-11

## 2018-12-08 RX ORDER — PROPOFOL 10 MG/ML
10 INJECTION, EMULSION INTRAVENOUS ONCE
Status: COMPLETED | OUTPATIENT
Start: 2018-12-08 | End: 2018-12-08

## 2018-12-08 RX ORDER — MAGNESIUM SULFATE 1 G/100ML
1 INJECTION INTRAVENOUS PRN
Status: DISCONTINUED | OUTPATIENT
Start: 2018-12-08 | End: 2018-12-12 | Stop reason: HOSPADM

## 2018-12-08 RX ORDER — 0.9 % SODIUM CHLORIDE 0.9 %
500 INTRAVENOUS SOLUTION INTRAVENOUS ONCE
Status: COMPLETED | OUTPATIENT
Start: 2018-12-08 | End: 2018-12-08

## 2018-12-08 RX ORDER — ETOMIDATE 2 MG/ML
INJECTION INTRAVENOUS DAILY PRN
Status: DISCONTINUED | OUTPATIENT
Start: 2018-12-08 | End: 2018-12-08

## 2018-12-08 RX ORDER — FENTANYL CITRATE 50 UG/ML
1000 INJECTION, SOLUTION INTRAMUSCULAR; INTRAVENOUS ONCE
Status: DISCONTINUED | OUTPATIENT
Start: 2018-12-08 | End: 2018-12-08 | Stop reason: HOSPADM

## 2018-12-08 RX ORDER — FENTANYL CITRATE 50 UG/ML
100 INJECTION, SOLUTION INTRAMUSCULAR; INTRAVENOUS ONCE
Status: COMPLETED | OUTPATIENT
Start: 2018-12-08 | End: 2018-12-08

## 2018-12-08 RX ORDER — FUROSEMIDE 10 MG/ML
40 INJECTION INTRAMUSCULAR; INTRAVENOUS 2 TIMES DAILY
Status: DISCONTINUED | OUTPATIENT
Start: 2018-12-08 | End: 2018-12-12 | Stop reason: HOSPADM

## 2018-12-08 RX ORDER — PROPOFOL 10 MG/ML
50 INJECTION, EMULSION INTRAVENOUS ONCE
Status: COMPLETED | OUTPATIENT
Start: 2018-12-08 | End: 2018-12-08

## 2018-12-08 RX ORDER — SODIUM CHLORIDE 9 MG/ML
INJECTION, SOLUTION INTRAVENOUS
Status: DISCONTINUED
Start: 2018-12-08 | End: 2018-12-08 | Stop reason: HOSPADM

## 2018-12-08 RX ORDER — ISOSORBIDE DINITRATE 20 MG/1
20 TABLET ORAL 3 TIMES DAILY
Status: DISCONTINUED | OUTPATIENT
Start: 2018-12-08 | End: 2018-12-12 | Stop reason: HOSPADM

## 2018-12-08 RX ORDER — SODIUM CHLORIDE 9 MG/ML
INJECTION, SOLUTION INTRAVENOUS
Status: COMPLETED
Start: 2018-12-08 | End: 2018-12-08

## 2018-12-08 RX ORDER — SUCCINYLCHOLINE CHLORIDE 20 MG/ML
INJECTION INTRAMUSCULAR; INTRAVENOUS DAILY PRN
Status: DISCONTINUED | OUTPATIENT
Start: 2018-12-08 | End: 2018-12-08

## 2018-12-08 RX ORDER — CARVEDILOL 3.12 MG/1
3.12 TABLET ORAL 2 TIMES DAILY WITH MEALS
Status: DISCONTINUED | OUTPATIENT
Start: 2018-12-08 | End: 2018-12-11

## 2018-12-08 RX ORDER — SODIUM CHLORIDE 9 MG/ML
INJECTION, SOLUTION INTRAVENOUS
Status: DISCONTINUED
Start: 2018-12-08 | End: 2018-12-08 | Stop reason: WASHOUT

## 2018-12-08 RX ORDER — IPRATROPIUM BROMIDE AND ALBUTEROL SULFATE 2.5; .5 MG/3ML; MG/3ML
1 SOLUTION RESPIRATORY (INHALATION) ONCE
Status: COMPLETED | OUTPATIENT
Start: 2018-12-08 | End: 2018-12-08

## 2018-12-08 RX ORDER — ONDANSETRON 2 MG/ML
4 INJECTION INTRAMUSCULAR; INTRAVENOUS EVERY 6 HOURS PRN
Status: DISCONTINUED | OUTPATIENT
Start: 2018-12-08 | End: 2018-12-12 | Stop reason: HOSPADM

## 2018-12-08 RX ORDER — SUCCINYLCHOLINE CHLORIDE 20 MG/ML
INJECTION INTRAMUSCULAR; INTRAVENOUS DAILY PRN
Status: COMPLETED | OUTPATIENT
Start: 2018-12-08 | End: 2018-12-08

## 2018-12-08 RX ORDER — ETOMIDATE 2 MG/ML
INJECTION INTRAVENOUS DAILY PRN
Status: COMPLETED | OUTPATIENT
Start: 2018-12-08 | End: 2018-12-08

## 2018-12-08 RX ORDER — SODIUM CHLORIDE 0.9 % (FLUSH) 0.9 %
10 SYRINGE (ML) INJECTION PRN
Status: DISCONTINUED | OUTPATIENT
Start: 2018-12-08 | End: 2018-12-08 | Stop reason: HOSPADM

## 2018-12-08 RX ORDER — METHYLPREDNISOLONE SODIUM SUCCINATE 125 MG/2ML
125 INJECTION, POWDER, LYOPHILIZED, FOR SOLUTION INTRAMUSCULAR; INTRAVENOUS ONCE
Status: COMPLETED | OUTPATIENT
Start: 2018-12-08 | End: 2018-12-08

## 2018-12-08 RX ORDER — SODIUM CHLORIDE 0.9 % (FLUSH) 0.9 %
10 SYRINGE (ML) INJECTION EVERY 12 HOURS SCHEDULED
Status: DISCONTINUED | OUTPATIENT
Start: 2018-12-08 | End: 2018-12-12 | Stop reason: HOSPADM

## 2018-12-08 RX ADMIN — METHYLPREDNISOLONE SODIUM SUCCINATE 125 MG: 125 INJECTION, POWDER, FOR SOLUTION INTRAMUSCULAR; INTRAVENOUS at 00:35

## 2018-12-08 RX ADMIN — Medication 500 ML: at 01:56

## 2018-12-08 RX ADMIN — PROPOFOL 40 MCG/KG/MIN: 10 INJECTION, EMULSION INTRAVENOUS at 18:08

## 2018-12-08 RX ADMIN — PROPOFOL 40 MCG/KG/MIN: 10 INJECTION, EMULSION INTRAVENOUS at 12:39

## 2018-12-08 RX ADMIN — PROPOFOL 40 MCG/KG/MIN: 10 INJECTION, EMULSION INTRAVENOUS at 23:24

## 2018-12-08 RX ADMIN — ISOSORBIDE DINITRATE 20 MG: 20 TABLET ORAL at 15:47

## 2018-12-08 RX ADMIN — ISOSORBIDE DINITRATE 20 MG: 20 TABLET ORAL at 08:16

## 2018-12-08 RX ADMIN — PROPOFOL 10 MCG/KG/MIN: 10 INJECTION, EMULSION INTRAVENOUS at 02:39

## 2018-12-08 RX ADMIN — PROPOFOL 50 MG: 10 INJECTION, EMULSION INTRAVENOUS at 00:36

## 2018-12-08 RX ADMIN — Medication 200 MCG/HR: at 22:36

## 2018-12-08 RX ADMIN — FENTANYL CITRATE 50 MCG: 50 INJECTION, SOLUTION INTRAMUSCULAR; INTRAVENOUS at 01:10

## 2018-12-08 RX ADMIN — Medication 200 MCG/HR: at 16:42

## 2018-12-08 RX ADMIN — ASPIRIN 81 MG: 81 TABLET, COATED ORAL at 08:16

## 2018-12-08 RX ADMIN — SODIUM CHLORIDE 500 ML: 9 INJECTION, SOLUTION INTRAVENOUS at 01:56

## 2018-12-08 RX ADMIN — ISOSORBIDE DINITRATE 20 MG: 20 TABLET ORAL at 20:34

## 2018-12-08 RX ADMIN — FUROSEMIDE 40 MG: 10 INJECTION, SOLUTION INTRAMUSCULAR; INTRAVENOUS at 08:16

## 2018-12-08 RX ADMIN — CARVEDILOL 3.12 MG: 3.12 TABLET, FILM COATED ORAL at 18:08

## 2018-12-08 RX ADMIN — ETOMIDATE 20 MG: 2 INJECTION, SOLUTION INTRAVENOUS at 00:20

## 2018-12-08 RX ADMIN — IPRATROPIUM BROMIDE AND ALBUTEROL SULFATE 1 AMPULE: .5; 3 SOLUTION RESPIRATORY (INHALATION) at 01:35

## 2018-12-08 RX ADMIN — SUCCINYLCHOLINE CHLORIDE 100 MG: 20 INJECTION, SOLUTION INTRAMUSCULAR; INTRAVENOUS at 00:19

## 2018-12-08 RX ADMIN — CARVEDILOL 3.12 MG: 3.12 TABLET, FILM COATED ORAL at 08:16

## 2018-12-08 RX ADMIN — Medication 200 MCG/HR: at 05:01

## 2018-12-08 RX ADMIN — SODIUM CHLORIDE, PRESERVATIVE FREE 10 ML: 5 INJECTION INTRAVENOUS at 08:17

## 2018-12-08 RX ADMIN — IPRATROPIUM BROMIDE AND ALBUTEROL SULFATE 3 ML: .5; 3 SOLUTION RESPIRATORY (INHALATION) at 12:05

## 2018-12-08 RX ADMIN — ENOXAPARIN SODIUM 40 MG: 40 INJECTION SUBCUTANEOUS at 08:17

## 2018-12-08 RX ADMIN — IPRATROPIUM BROMIDE AND ALBUTEROL SULFATE 3 ML: .5; 3 SOLUTION RESPIRATORY (INHALATION) at 23:47

## 2018-12-08 RX ADMIN — Medication 50 MCG/HR: at 01:00

## 2018-12-08 RX ADMIN — IPRATROPIUM BROMIDE AND ALBUTEROL SULFATE 3 ML: .5; 3 SOLUTION RESPIRATORY (INHALATION) at 19:40

## 2018-12-08 RX ADMIN — ATORVASTATIN CALCIUM 40 MG: 40 TABLET, FILM COATED ORAL at 08:16

## 2018-12-08 RX ADMIN — PROPOFOL 50 MCG/KG/MIN: 10 INJECTION, EMULSION INTRAVENOUS at 08:15

## 2018-12-08 RX ADMIN — IPRATROPIUM BROMIDE AND ALBUTEROL SULFATE 3 ML: .5; 3 SOLUTION RESPIRATORY (INHALATION) at 15:31

## 2018-12-08 RX ADMIN — FUROSEMIDE 40 MG: 10 INJECTION, SOLUTION INTRAMUSCULAR; INTRAVENOUS at 20:34

## 2018-12-08 RX ADMIN — PROPOFOL 10 MCG/KG/MIN: 10 INJECTION, EMULSION INTRAVENOUS at 00:35

## 2018-12-08 RX ADMIN — PROPOFOL 50 MCG/KG/MIN: 10 INJECTION, EMULSION INTRAVENOUS at 04:52

## 2018-12-08 RX ADMIN — IPRATROPIUM BROMIDE AND ALBUTEROL SULFATE 3 ML: .5; 3 SOLUTION RESPIRATORY (INHALATION) at 07:52

## 2018-12-08 RX ADMIN — CLOPIDOGREL BISULFATE 75 MG: 75 TABLET ORAL at 08:16

## 2018-12-08 RX ADMIN — PANTOPRAZOLE SODIUM 40 MG: 40 INJECTION, POWDER, FOR SOLUTION INTRAVENOUS at 08:16

## 2018-12-08 RX ADMIN — SODIUM CHLORIDE, PRESERVATIVE FREE 10 ML: 5 INJECTION INTRAVENOUS at 20:34

## 2018-12-08 RX ADMIN — Medication 200 MCG/HR: at 11:13

## 2018-12-08 ASSESSMENT — PULMONARY FUNCTION TESTS
PIF_VALUE: 21
PIF_VALUE: 13
PIF_VALUE: 21
PIF_VALUE: 14
PIF_VALUE: 20
PIF_VALUE: 15
PIF_VALUE: 11
PIF_VALUE: 22
PIF_VALUE: 20
PIF_VALUE: 20
PIF_VALUE: 17
PIF_VALUE: 15

## 2018-12-08 ASSESSMENT — PAIN SCALES - GENERAL
PAINLEVEL_OUTOF10: 0
PAINLEVEL_OUTOF10: 0

## 2018-12-08 NOTE — ED PROVIDER NOTES
Triage Chief Complaint:   Respiratory Distress (arrives via EMS; SOB onset at [phone] at home; arrives with c-pap in place PTA)    [place]:  Chucho Vincent is a 50 y.o. male that presents via EMS for acute onset respiratory distress. This reportedly started around 11 PM all he was sitting at home. EMS reports that his oxygen saturation was in the 70s on room air. He was placed on a nonrebreather than moved to CPAP for respiratory distress. Patient did not tolerate CPAP was switched back to a nonrebreather prior to arrival.  DuoNeb treatment was given by EMS. On arrival, the patient is extremely dyspneic and has a difficult time answering questions. From what can be gathered, the patient had acute onset shortness of breath which she states similar to prior heart failure exacerbations. Denies any fever, cough, chest pain. ROS:  Unable to fully obtain. Past Medical History:   Diagnosis Date    CAD (coronary artery disease)     COPD (chronic obstructive [facility] disease) ([facility]     Depression     HIGH CHOLESTEROL     Hypertension     MI (myocardial infarction) ([facility] 2011     Past Surgical History:   Procedure Laterality Date    BYPASS GRAFT  4/10/11    CABG x 4    CARDIAC SURGERY  11/2010     4 stents    LEG SURGERY       Family History   Problem Relation Age of Onset    Cancer Father     Heart Failure Father     Diabetes Mother      Social History     Social History    Marital status:      Spouse name: N/A    Number of children: N/A    Years of education: N/A     Occupational History    Not on file.      Social History Main Topics    Smoking status: Current Every Day Smoker     Packs/day: 1.00     Years: 20.00     Types: Cigarettes    Smokeless tobacco: Former User      Comment: Reviewed 10/9/17    Alcohol use No    Drug use: No      Comment: march 2018 states he quit    Sexual activity: Not Currently     Other Topics Concern    Not on file     Social History Narrative    No narrative failure, the patient is intubated. Following intubation, the patient's nitroglycerin drip was held as the patient's blood pressure settled around 120/80. Patient's FiO2 was able to be titrated down to 60%. Repeat blood gas is much improved. Patient transferred to [facility] for further care at this time. Procedure Note - Intubation: The benefits, risks, and alternatives of intubation were discussed with the patient and Verbal consent was obtained (or implied if emergent situation dictated) for the procedure. Pre-oxygenation was administered and the appropriate equipment and staff were made available at the bedside. Arcelia Merrill was sedated/paralyzed; please see the chart for the drugs and dosages administered. A Mimi 4 laryngoscope blade was used for a grade 1 view and a 7.5mm endotracheal tube was viewed to pass through the cords on the first attempt. The tube was secured at 24cm to the teeth. Tracheal position was confirmed using a colorimetric end-tidal CO2 detector, tube condensation and chest auscultation/visualization. Respiratory therapy is at the bedside and is assisting with ventilatory management. I directly delivered medical care to this critically ill patient. Timely evaluation and treatment was necessary to address the significant organ system dysfunction present in this patient. The vital organ system(s) involved included: CV, resp    I was involved in the stabilization of this critical patient for 85 minutes. During this time I was physically present at the bedside during my initial exam and for re-examinations at intervals coordinating this patient's care with other physicians, examining radiographs, interpreting electrocardiograms and rhythm strips, reviewing laboratory results, discussing the patient's condition and management with the patient/family. Time billed does not include time for procedures. Clinical Impression:  1.  Acute respiratory failure with hypoxia

## 2018-12-08 NOTE — FLOWSHEET NOTE
Pt arrived from MICU. Skin assessment done at this time with [facility]. Small abrasion found on top of left hand. No other areas noted at this time. Propofol infusing at 30mcg/kg/min and Fentanyl infusing at 200 mcg/h. IV in Left AC infiltrated and new IV to left FA established. Pt able to follow commands, but not tolerating vent. Will increase sedation for per orders. NG tube inserted as well. Will continue to monitor closely.

## 2018-12-08 NOTE — H&P
Cigarettes. He has a 20.00 pack-year smoking history. He has quit using smokeless tobacco. He reports that he does not drink alcohol or use drugs. ?     FAMILY HISTORY:   Family History   Problem Relation Age of Onset    Cancer Father     Heart Failure Father     Diabetes Mother       ?     MEDICATIONS:   Current Facility-Administered Medications   Medication Dose Route Frequency Provider Last Rate Last Dose    aspirin EC tablet 81 mg  81 mg Oral Daily Carly Cohen MD        atorvastatin (LIPITOR) tablet 40 mg  40 mg Oral Daily Carly Cohen MD        carvedilol (COREG) tablet 3.125 mg  3.125 mg Oral BID  Carly Cohen MD        clopidogrel (PLAVIX) tablet 75 mg  75 mg Oral Daily Carly Cohen MD        ipratropium-albuterol (DUONEB) nebulizer solution 3 mL  1 vial Inhalation Q4H Carly Cohen MD        sodium chloride flush 0.9 % injection 10 mL  10 mL Intravenous 2 times per day Carly Cohen MD        sodium chloride flush 0.9 % injection 10 mL  10 mL Intravenous PRN Carly Cohen MD        magnesium hydroxide (MILK OF MAGNESIA) 400 MG/5ML suspension 30 mL  30 mL Oral Daily PRN Carly Cohen MD        ondansetron [facility]) injection 4 mg  4 mg Intravenous Q6H PRN Carly Cohen MD        enoxaparin (LOVENOX) injection 40 mg  40 mg Subcutaneous Daily Carly Cohen MD        potassium chloride 10 mEq/100 mL IVPB (Peripheral Line)  10 mEq Intravenous PRN Carly Cohen MD        magnesium sulfate 1 g in dextrose 5% 100 mL IVPB  1 g Intravenous PRN Carly Cohen MD        furosemide (LASIX) injection 40 mg  40 mg Intravenous BID Carly Cohen MD        isosorbide dinitrate (ISORDIL) tablet 20 mg  20 mg Oral TID Carly Cohen MD        propofol 1000 MG/100ML injection  10 mcg/kg/min Intravenous Titrated Carly Cohen MD 24.5 mL/hr at 12/08/18 0452 50 mcg/kg/min at 12/08/18 0452    pantoprazole (PROTONIX) injection 40 mg  40 mg Intravenous Daily Cristela Perkins

## 2018-12-09 ENCOUNTER — APPOINTMENT (OUTPATIENT)
Dept: GENERAL RADIOLOGY | Age: 48
DRG: 291 | End: 2018-12-09
Attending: INTERNAL MEDICINE
Payer: MEDICARE

## 2018-12-09 LAB
ADENOVIRUS DETECTION BY PCR: NOT DETECTED
ANION GAP SERPL CALCULATED.3IONS-SCNC: 13 MMOL/L (ref 4–16)
BASE EXCESS MIXED: 3.2 (ref 0–1.2)
BORDETELLA PERTUSSIS PCR: NOT DETECTED
BUN BLDV-MCNC: 26 MG/DL (ref 6–23)
CALCIUM SERPL-MCNC: 8.9 MG/DL (ref 8.3–10.6)
CARBON MONOXIDE, BLOOD: 2.1 % (ref 0–5)
CHLAMYDOPHILA PNEUMONIA PCR: NOT DETECTED
CHLORIDE BLD-SCNC: 100 MMOL/L (ref 99–110)
CO2 CONTENT: 31.5 MMOL/L (ref 19–24)
CO2: 28 MMOL/L (ref 21–32)
CORONAVIRUS 229E PCR: NOT DETECTED
CORONAVIRUS HKU1 PCR: NOT DETECTED
CORONAVIRUS NL63 PCR: NOT DETECTED
CORONAVIRUS OC43 PCR: NOT DETECTED
CREAT SERPL-MCNC: 1.2 MG/DL (ref 0.9–1.3)
GFR AFRICAN AMERICAN: >60 ML/MIN/1.73M2
GFR NON-AFRICAN AMERICAN: >60 ML/MIN/1.73M2
GLUCOSE BLD-MCNC: 95 MG/DL (ref 70–99)
HCO3 ARTERIAL: 29.9 MMOL/L (ref 18–23)
HCT VFR BLD CALC: 41.7 % (ref 42–52)
HEMOGLOBIN: 12.2 GM/DL (ref 13.5–18)
HUMAN METAPNEUMOVIRUS PCR: NOT DETECTED
INFLUENZA A BY PCR: NOT DETECTED
INFLUENZA A H1 (2009) PCR: NOT DETECTED
INFLUENZA A H1 PANDEMIC PCR: NOT DETECTED
INFLUENZA A H3 PCR: NOT DETECTED
INFLUENZA B BY PCR: NOT DETECTED
MAGNESIUM: 2.3 MG/DL (ref 1.8–2.4)
MCH RBC QN AUTO: 26.9 PG (ref 27–31)
MCHC RBC AUTO-ENTMCNC: 29.3 % (ref 32–36)
MCV RBC AUTO: 91.9 FL (ref 78–100)
METHEMOGLOBIN ARTERIAL: 0.5 %
MYCOPLASMA PNEUMONIAE PCR: NOT DETECTED
O2 SATURATION: 92.2 % (ref 96–97)
PARAINFLUENZA 1 PCR: NOT DETECTED
PARAINFLUENZA 2 PCR: NOT DETECTED
PARAINFLUENZA 3 PCR: NOT DETECTED
PARAINFLUENZA 4 PCR: NOT DETECTED
PCO2 ARTERIAL: 53 MMHG (ref 32–45)
PDW BLD-RTO: 13.8 % (ref 11.7–14.9)
PH BLOOD: 7.36 (ref 7.34–7.45)
PLATELET # BLD: 276 K/CU MM (ref 140–440)
PMV BLD AUTO: 10.4 FL (ref 7.5–11.1)
PO2 ARTERIAL: 61 MMHG (ref 75–100)
POTASSIUM SERPL-SCNC: 4.7 MMOL/L (ref 3.5–5.1)
RBC # BLD: 4.54 M/CU MM (ref 4.6–6.2)
RHINOVIRUS ENTEROVIRUS PCR: NOT DETECTED
RSV PCR: NOT DETECTED
SODIUM BLD-SCNC: 141 MMOL/L (ref 135–145)
WBC # BLD: 12 K/CU MM (ref 4–10.5)

## 2018-12-09 PROCEDURE — 6370000000 HC RX 637 (ALT 250 FOR IP): Performed by: INTERNAL MEDICINE

## 2018-12-09 PROCEDURE — C9113 INJ PANTOPRAZOLE SODIUM, VIA: HCPCS | Performed by: INTERNAL MEDICINE

## 2018-12-09 PROCEDURE — 94003 VENT MGMT INPAT SUBQ DAY: CPT

## 2018-12-09 PROCEDURE — 6360000002 HC RX W HCPCS: Performed by: INTERNAL MEDICINE

## 2018-12-09 PROCEDURE — 94750 HC PULMONARY COMPLIANCE STUDY: CPT

## 2018-12-09 PROCEDURE — 2580000003 HC RX 258: Performed by: INTERNAL MEDICINE

## 2018-12-09 PROCEDURE — 82803 BLOOD GASES ANY COMBINATION: CPT

## 2018-12-09 PROCEDURE — 71045 X-RAY EXAM CHEST 1 VIEW: CPT

## 2018-12-09 PROCEDURE — 2500000003 HC RX 250 WO HCPCS: Performed by: INTERNAL MEDICINE

## 2018-12-09 PROCEDURE — 2000000000 HC ICU R&B

## 2018-12-09 PROCEDURE — 80048 BASIC METABOLIC PNL TOTAL CA: CPT

## 2018-12-09 PROCEDURE — 83735 ASSAY OF MAGNESIUM: CPT

## 2018-12-09 PROCEDURE — 99291 CRITICAL CARE FIRST HOUR: CPT | Performed by: INTERNAL MEDICINE

## 2018-12-09 PROCEDURE — 99233 SBSQ HOSP IP/OBS HIGH 50: CPT | Performed by: INTERNAL MEDICINE

## 2018-12-09 PROCEDURE — 94761 N-INVAS EAR/PLS OXIMETRY MLT: CPT

## 2018-12-09 PROCEDURE — 85027 COMPLETE CBC AUTOMATED: CPT

## 2018-12-09 PROCEDURE — 2700000000 HC OXYGEN THERAPY PER DAY

## 2018-12-09 PROCEDURE — 36600 WITHDRAWAL OF ARTERIAL BLOOD: CPT

## 2018-12-09 PROCEDURE — 94640 AIRWAY INHALATION TREATMENT: CPT

## 2018-12-09 RX ORDER — DOBUTAMINE HYDROCHLORIDE 200 MG/100ML
5 INJECTION INTRAVENOUS CONTINUOUS
Status: DISCONTINUED | OUTPATIENT
Start: 2018-12-09 | End: 2018-12-11

## 2018-12-09 RX ORDER — ACETAMINOPHEN 500 MG
500 TABLET ORAL EVERY 6 HOURS PRN
Status: DISCONTINUED | OUTPATIENT
Start: 2018-12-09 | End: 2018-12-12 | Stop reason: HOSPADM

## 2018-12-09 RX ADMIN — PROPOFOL 50 MCG/KG/MIN: 10 INJECTION, EMULSION INTRAVENOUS at 11:21

## 2018-12-09 RX ADMIN — ATORVASTATIN CALCIUM 40 MG: 40 TABLET, FILM COATED ORAL at 08:25

## 2018-12-09 RX ADMIN — IPRATROPIUM BROMIDE AND ALBUTEROL SULFATE 3 ML: .5; 3 SOLUTION RESPIRATORY (INHALATION) at 03:39

## 2018-12-09 RX ADMIN — IPRATROPIUM BROMIDE AND ALBUTEROL SULFATE 3 ML: .5; 3 SOLUTION RESPIRATORY (INHALATION) at 08:00

## 2018-12-09 RX ADMIN — ENOXAPARIN SODIUM 40 MG: 40 INJECTION SUBCUTANEOUS at 08:26

## 2018-12-09 RX ADMIN — CLOPIDOGREL BISULFATE 75 MG: 75 TABLET ORAL at 08:25

## 2018-12-09 RX ADMIN — ASPIRIN 81 MG: 81 TABLET, COATED ORAL at 08:25

## 2018-12-09 RX ADMIN — DEXMEDETOMIDINE HYDROCHLORIDE 0.2 MCG/KG/HR: 100 INJECTION, SOLUTION INTRAVENOUS at 16:06

## 2018-12-09 RX ADMIN — PROPOFOL 40 MCG/KG/MIN: 10 INJECTION, EMULSION INTRAVENOUS at 02:45

## 2018-12-09 RX ADMIN — FUROSEMIDE 40 MG: 10 INJECTION, SOLUTION INTRAMUSCULAR; INTRAVENOUS at 21:33

## 2018-12-09 RX ADMIN — Medication 200 MCG/HR: at 02:45

## 2018-12-09 RX ADMIN — Medication 200 MCG/HR: at 15:17

## 2018-12-09 RX ADMIN — PROPOFOL 50 MCG/KG/MIN: 10 INJECTION, EMULSION INTRAVENOUS at 15:20

## 2018-12-09 RX ADMIN — CARVEDILOL 3.12 MG: 3.12 TABLET, FILM COATED ORAL at 16:41

## 2018-12-09 RX ADMIN — IPRATROPIUM BROMIDE AND ALBUTEROL SULFATE 3 ML: .5; 3 SOLUTION RESPIRATORY (INHALATION) at 20:34

## 2018-12-09 RX ADMIN — IPRATROPIUM BROMIDE AND ALBUTEROL SULFATE 3 ML: .5; 3 SOLUTION RESPIRATORY (INHALATION) at 15:45

## 2018-12-09 RX ADMIN — PROPOFOL 50 MCG/KG/MIN: 10 INJECTION, EMULSION INTRAVENOUS at 06:48

## 2018-12-09 RX ADMIN — PANTOPRAZOLE SODIUM 40 MG: 40 INJECTION, POWDER, FOR SOLUTION INTRAVENOUS at 08:27

## 2018-12-09 RX ADMIN — ISOSORBIDE DINITRATE 20 MG: 20 TABLET ORAL at 21:33

## 2018-12-09 RX ADMIN — CARVEDILOL 3.12 MG: 3.12 TABLET, FILM COATED ORAL at 08:25

## 2018-12-09 RX ADMIN — ISOSORBIDE DINITRATE 20 MG: 20 TABLET ORAL at 16:41

## 2018-12-09 RX ADMIN — FUROSEMIDE 40 MG: 10 INJECTION, SOLUTION INTRAMUSCULAR; INTRAVENOUS at 08:27

## 2018-12-09 RX ADMIN — IPRATROPIUM BROMIDE AND ALBUTEROL SULFATE 3 ML: .5; 3 SOLUTION RESPIRATORY (INHALATION) at 12:52

## 2018-12-09 RX ADMIN — DOBUTAMINE IN DEXTROSE 5 MCG/KG/MIN: 200 INJECTION, SOLUTION INTRAVENOUS at 17:23

## 2018-12-09 RX ADMIN — ISOSORBIDE DINITRATE 20 MG: 20 TABLET ORAL at 08:25

## 2018-12-09 RX ADMIN — SODIUM CHLORIDE, PRESERVATIVE FREE 10 ML: 5 INJECTION INTRAVENOUS at 08:27

## 2018-12-09 RX ADMIN — SODIUM CHLORIDE, PRESERVATIVE FREE 10 ML: 5 INJECTION INTRAVENOUS at 21:30

## 2018-12-09 ASSESSMENT — PULMONARY FUNCTION TESTS
PIF_VALUE: 17
PIF_VALUE: 21
PIF_VALUE: 18
PIF_VALUE: 21
PIF_VALUE: 21
PIF_VALUE: 17
PIF_VALUE: 20
PIF_VALUE: 17
PIF_VALUE: 21

## 2018-12-09 ASSESSMENT — PAIN SCALES - GENERAL: PAINLEVEL_OUTOF10: 0

## 2018-12-09 NOTE — PROGRESS NOTES
Hospitalist progress Note:   Date of Service: 12/9/2018   ? CHIEF COMPLAINT:   Shortness of breath    Subjective:   No acute overnight events. Continue to be on the ventilator    On presentation, Blood pressure 105/60, pulse 87, temperature 100.8 °F (38.2 °C), temperature source Rectal, resp. rate 12, height 5' 11\" (1.803 m), weight 175 lb 0.7 oz (79.4 kg), SpO2 93 %.      ?    MEDICATIONS:   Current Facility-Administered Medications   Medication Dose Route Frequency Provider Last Rate Last Dose    dexmedetomidine (PRECEDEX) 400 mcg in sodium chloride 0.9 % 100 mL infusion  0.2 mcg/kg/hr Intravenous Continuous Nandini Guerrier MD 4 mL/hr at 12/09/18 1725 0.2 mcg/kg/hr at 12/09/18 1725    DOBUTamine (DOBUTREX) 500 mg in dextrose 5 % 250 mL infusion  5 mcg/kg/min Intravenous Continuous Wyatt MD Franny 11.9 mL/hr at 12/09/18 1723 5 mcg/kg/min at 12/09/18 1723    aspirin EC tablet 81 mg  81 mg Oral Daily Beth Benoit MD   81 mg at 12/09/18 0825    atorvastatin (LIPITOR) tablet 40 mg  40 mg Oral Daily Beth Benoit MD   40 mg at 12/09/18 0825    carvedilol (COREG) tablet 3.125 mg  3.125 mg Oral BID WC Beth Benoit MD   3.125 mg at 12/09/18 1641    clopidogrel (PLAVIX) tablet 75 mg  75 mg Oral Daily Beth Benoit MD   75 mg at 12/09/18 0825    ipratropium-albuterol (DUONEB) nebulizer solution 3 mL  1 vial Inhalation Q4H Beth Benoit MD   3 mL at 12/09/18 1545    sodium chloride flush 0.9 % injection 10 mL  10 mL Intravenous 2 times per day Beth Benoit MD   10 mL at 12/09/18 0827    sodium chloride flush 0.9 % injection 10 mL  10 mL Intravenous PRN Beth Benoit MD        magnesium hydroxide (MILK OF MAGNESIA) 400 MG/5ML suspension 30 mL  30 mL Oral Daily PRN Beth Benoit MD        [facility]ansetron [facility]) injection 4 mg  4 mg Intravenous Q6H PRN Beth Benoit MD        enoxaparin (LOVENOX) injection 40 mg  40 mg Subcutaneous Daily Beth Benoit MD   40 mg at 12/09/18 6350    potassium chloride 10 mEq/100 mL IVPB (Peripheral Line)  10 mEq Intravenous PRN Erich Weiss MD        magnesium sulfate 1 g in dextrose 5% 100 mL IVPB  1 g Intravenous PRN Erich Weiss MD        furosemide (LASIX) injection 40 mg  40 mg Intravenous BID Erich Weiss MD   40 mg at 12/09/18 0827    isosorbide dinitrate (ISORDIL) tablet 20 mg  20 mg Oral TID Erich Weiss MD   20 mg at 12/09/18 1641    propofol 1000 MG/100ML injection  10 mcg/kg/min Intravenous Titrated Erich Weiss MD   Stopped at 12/09/18 1740    pantoprazole (PROTONIX) injection 40 mg  40 mg Intravenous Daily Erich Weiss MD   40 mg at 12/09/18 0827    fentanyl (SUBLIMAZE) 1,000 mcg in sodium chloride 0.9% 100 mL infusion  25 mcg/hr Intravenous Continuous Erich Weiss MD   Stopped at 12/09/18 1743        PHYSICAL EXAM:   Blood pressure 105/60, pulse 87, temperature 100.8 °F (38.2 °C), temperature source Rectal, resp. rate 12, height 5' 11\" (1.803 m), weight 175 lb 0.7 oz (79.4 kg), SpO2 93 %. . Body mass index is 24.41 kg/m². CONSTITUTIONAL: Sedated and intubated  HENT: NC/AT Ear: normal, patent without effusion Nose: no deformities, nares patent  EYES:Conjunctiva normal. No discharge. NECK: Neck supple,No JVD /Thyromegaly/LAD   RESP: Occasional wheezing and intermittent basilar crackles+  CVS: Regular rate and rhythm. S1 and S2 normal, no murmurs, clicks, gallops or rubs  GI: Soft, ND/NT,No guarding/rebound/mass/organomegaly. Bowel sounds are normal.    MUSCULAR/EXT:  no pedal edema, no clubbing or cyanosis,Pulses 2+ B/L  CNS: Unable to assess while on vent. MOOD/PSYCH: Unable to assess  SKIN: Warm and dry,No rashes    Lab results:   Laboratory and diagnostic data  reviewed      ASSESSMENT/IMPRESSION:      Pulmonary edema, acute (HCC)/Hypertensive emergency/Acute on chronic combined systolic and diastolic CHF:    Currently on ventilator.   Continue heart failure management including Lasix IV, NTG,

## 2018-12-09 NOTE — PROGRESS NOTES
NIF -26, RSBI 50. Extubated per order of Dr Suma Olivia. Extubation went without problem. Placed pt on nasal cannula at 2 lpm O2. No apparent acute respiratory distress to report. BiPAP at bedside if pt needs it / or will wear it. HR 93, RR 18, O2 sat 96%.

## 2018-12-10 LAB
ANION GAP SERPL CALCULATED.3IONS-SCNC: 15 MMOL/L (ref 4–16)
BUN BLDV-MCNC: 25 MG/DL (ref 6–23)
CALCIUM SERPL-MCNC: 8.7 MG/DL (ref 8.3–10.6)
CHLORIDE BLD-SCNC: 94 MMOL/L (ref 99–110)
CO2: 28 MMOL/L (ref 21–32)
CREAT SERPL-MCNC: 1.2 MG/DL (ref 0.9–1.3)
GFR AFRICAN AMERICAN: >60 ML/MIN/1.73M2
GFR NON-AFRICAN AMERICAN: >60 ML/MIN/1.73M2
GLUCOSE BLD-MCNC: 99 MG/DL (ref 70–99)
LV EF: 15 %
LVEF MODALITY: NORMAL
MAGNESIUM: 2.4 MG/DL (ref 1.8–2.4)
POTASSIUM SERPL-SCNC: 3.7 MMOL/L (ref 3.5–5.1)
SODIUM BLD-SCNC: 137 MMOL/L (ref 135–145)

## 2018-12-10 PROCEDURE — 6370000000 HC RX 637 (ALT 250 FOR IP): Performed by: INTERNAL MEDICINE

## 2018-12-10 PROCEDURE — 94664 DEMO&/EVAL PT USE INHALER: CPT

## 2018-12-10 PROCEDURE — 2140000000 HC CCU INTERMEDIATE R&B

## 2018-12-10 PROCEDURE — 80048 BASIC METABOLIC PNL TOTAL CA: CPT

## 2018-12-10 PROCEDURE — 99233 SBSQ HOSP IP/OBS HIGH 50: CPT | Performed by: INTERNAL MEDICINE

## 2018-12-10 PROCEDURE — 93306 TTE W/DOPPLER COMPLETE: CPT

## 2018-12-10 PROCEDURE — 6360000002 HC RX W HCPCS: Performed by: INTERNAL MEDICINE

## 2018-12-10 PROCEDURE — 2580000003 HC RX 258: Performed by: INTERNAL MEDICINE

## 2018-12-10 PROCEDURE — 94640 AIRWAY INHALATION TREATMENT: CPT

## 2018-12-10 PROCEDURE — 94761 N-INVAS EAR/PLS OXIMETRY MLT: CPT

## 2018-12-10 PROCEDURE — 94660 CPAP INITIATION&MGMT: CPT

## 2018-12-10 PROCEDURE — 87081 CULTURE SCREEN ONLY: CPT

## 2018-12-10 PROCEDURE — 2700000000 HC OXYGEN THERAPY PER DAY

## 2018-12-10 PROCEDURE — C9113 INJ PANTOPRAZOLE SODIUM, VIA: HCPCS | Performed by: INTERNAL MEDICINE

## 2018-12-10 PROCEDURE — 6370000000 HC RX 637 (ALT 250 FOR IP): Performed by: HOSPITALIST

## 2018-12-10 PROCEDURE — 83735 ASSAY OF MAGNESIUM: CPT

## 2018-12-10 PROCEDURE — 87040 BLOOD CULTURE FOR BACTERIA: CPT

## 2018-12-10 RX ORDER — LISINOPRIL 2.5 MG/1
2.5 TABLET ORAL DAILY
Status: DISCONTINUED | OUTPATIENT
Start: 2018-12-10 | End: 2018-12-12 | Stop reason: HOSPADM

## 2018-12-10 RX ADMIN — IPRATROPIUM BROMIDE AND ALBUTEROL SULFATE 3 ML: .5; 3 SOLUTION RESPIRATORY (INHALATION) at 11:29

## 2018-12-10 RX ADMIN — IPRATROPIUM BROMIDE AND ALBUTEROL SULFATE 3 ML: .5; 3 SOLUTION RESPIRATORY (INHALATION) at 04:35

## 2018-12-10 RX ADMIN — CARVEDILOL 3.12 MG: 3.12 TABLET, FILM COATED ORAL at 18:11

## 2018-12-10 RX ADMIN — CLOPIDOGREL BISULFATE 75 MG: 75 TABLET ORAL at 07:39

## 2018-12-10 RX ADMIN — IPRATROPIUM BROMIDE AND ALBUTEROL SULFATE 3 ML: .5; 3 SOLUTION RESPIRATORY (INHALATION) at 15:23

## 2018-12-10 RX ADMIN — DOBUTAMINE IN DEXTROSE 5 MCG/KG/MIN: 200 INJECTION, SOLUTION INTRAVENOUS at 15:44

## 2018-12-10 RX ADMIN — ASPIRIN 81 MG: 81 TABLET, COATED ORAL at 07:39

## 2018-12-10 RX ADMIN — IPRATROPIUM BROMIDE AND ALBUTEROL SULFATE 3 ML: .5; 3 SOLUTION RESPIRATORY (INHALATION) at 19:08

## 2018-12-10 RX ADMIN — FUROSEMIDE 40 MG: 10 INJECTION, SOLUTION INTRAMUSCULAR; INTRAVENOUS at 07:39

## 2018-12-10 RX ADMIN — PANTOPRAZOLE SODIUM 40 MG: 40 INJECTION, POWDER, FOR SOLUTION INTRAVENOUS at 07:40

## 2018-12-10 RX ADMIN — CARVEDILOL 3.12 MG: 3.12 TABLET, FILM COATED ORAL at 07:39

## 2018-12-10 RX ADMIN — ISOSORBIDE DINITRATE 20 MG: 20 TABLET ORAL at 14:23

## 2018-12-10 RX ADMIN — ISOSORBIDE DINITRATE 20 MG: 20 TABLET ORAL at 07:39

## 2018-12-10 RX ADMIN — ATORVASTATIN CALCIUM 40 MG: 40 TABLET, FILM COATED ORAL at 07:39

## 2018-12-10 RX ADMIN — LISINOPRIL 2.5 MG: 2.5 TABLET ORAL at 18:12

## 2018-12-10 RX ADMIN — SODIUM CHLORIDE, PRESERVATIVE FREE 10 ML: 5 INJECTION INTRAVENOUS at 21:03

## 2018-12-10 RX ADMIN — SODIUM CHLORIDE, PRESERVATIVE FREE 10 ML: 5 INJECTION INTRAVENOUS at 14:24

## 2018-12-10 RX ADMIN — ENOXAPARIN SODIUM 40 MG: 40 INJECTION SUBCUTANEOUS at 07:40

## 2018-12-10 RX ADMIN — IPRATROPIUM BROMIDE AND ALBUTEROL SULFATE 3 ML: .5; 3 SOLUTION RESPIRATORY (INHALATION) at 00:08

## 2018-12-10 RX ADMIN — FUROSEMIDE 40 MG: 10 INJECTION, SOLUTION INTRAMUSCULAR; INTRAVENOUS at 21:03

## 2018-12-10 RX ADMIN — IPRATROPIUM BROMIDE AND ALBUTEROL SULFATE 3 ML: .5; 3 SOLUTION RESPIRATORY (INHALATION) at 07:08

## 2018-12-10 RX ADMIN — ACETAMINOPHEN 500 MG: 500 TABLET, FILM COATED ORAL at 00:26

## 2018-12-10 RX ADMIN — ISOSORBIDE DINITRATE 20 MG: 20 TABLET ORAL at 21:03

## 2018-12-10 ASSESSMENT — PAIN SCALES - GENERAL
PAINLEVEL_OUTOF10: 0

## 2018-12-10 NOTE — PROGRESS NOTES
[facility] 12/08/2018    Hypertensive emergency 12/08/2018    Heart failure ([facility] 09/14/2018    Noncompliance 09/14/2018    Acute on chronic combined systolic and diastolic congestive heart failure ([facility] 92/99/3280    Systolic and diastolic CHF w/reduced LV function, NYHA class 4 ([facility] 07/11/2018    NSTEMI (non-ST elevated myocardial infarction) ([facility] 07/11/2018    STEMI (ST elevation myocardial infarction) ([facility] 07/02/2018    Heroin dependence ([facility] 03/03/2018    Opiate abuse, continuous ([facility] 12/28/2017    Gastroesophageal reflux disease without esophagitis 12/29/2016    Coronary artery disease involving coronary bypass graft of native heart without angina pectoris 07/26/2016    Mixed hyperlipidemia 07/26/2016    Depression 07/26/2016    Chronic midline low back pain without sciatica 07/26/2016    Tobacco abuse 07/26/2016    Midline low back pain without sciatica 09/10/2015     Overview Note:     Long hx back pain, sees chiropractor was 6'1\" now 7'6\"      History of MI (myocardial infarction) 09/10/2015     Overview Note:     2011 with x CABG and Stents      Left shoulder pain 08/19/2015     Overview Note:     Was R hand 1995 and now does everything with L hand.  Crushing injury of right hand 08/19/2015     Overview Note:     Hx injury hand crushed in gate. Finger tips jus wobble, decreased ROM injury 1995. States he did not seek medical attention.  Rotator cuff tendinitis 08/19/2015     Overview Note:     Left      Abdominal hernia 06/21/2015     Overview Note:     Large hernia slightly R of midline, likely incisonal at end of bypass incision but could be  rectus. - growing non tender. (06/2015)      Paresthesia of left leg 02/20/2015     Overview Note:     For last week waking at night with L leg tingling. Resolves if gets up. Sleeps on L side. Doesn't bother him during day. No pain.  (02/2015)      Essential hypertension 08/10/2012     Overview Note:

## 2018-12-10 NOTE — PROGRESS NOTES
Cardiology Progress Note       Admit Date:  12/8/2018    Consult reason/ Seen today for: CHF- SOB- CAD    Subjective and  Overnight Events: HE got extubated   He is resting in bed. Voice is gruff. He notes he is breathing easier. There is no chest pain. Telemetry  SR. Assessment / Plan / Recommendation:     ASCVD:  Elevated troponin-Not rising- known CAD h/o CABG; recent cath showing only 2 grafts patent;  Continue aspirin plavix statin Imdur and b blocker- aggressive risk factor modification. HFrEF/ HFpEF :Acute on chronic decompensated heart failure. He clinically doing better- negative fluid balance of 4.6 Liters. Continue with IV diuresis-  Continue with dobutamine gtt:  Daily weights,strict Is and Os  Ischemic Cardiomyopathy-EF 10-20% per ECHO  maximize medical regimen- once stable will need BIVICD  Dyslipidemia on statin   DVT prophylaxis if not contraindicated. History of Presenting Illness:    Chief complain on admission : 50 y. o.year old who is admitted forNo chief complaint on file. Past medical history:    has a past medical history of CAD (coronary artery disease); COPD (chronic obstructive pulmonary disease) ([address] Depression; HIGH CHOLESTEROL; Hypertension; and MI (myocardial infarction) ([facility] [address] Past surgical history:   has a past surgical history that includes Cardiac surgery (11/2010); Bypass Graft (4/10/11); and Leg Surgery. Social History:   reports that he has been smoking Cigarettes. He has a 20.00 pack-year smoking history. He has quit using smokeless tobacco. He reports that he does not drink alcohol or use drugs. Family history:  family history includes Cancer in his father; Diabetes in his mother; Heart Failure in his father.     No Known Allergies    Review of Systems:   All 14 systems were reviewed and are negative  Except for the positive findings  which as documented     /63 sulfate    Lab Data:  CBC: Recent Labs      12/07/18   2345  12/07/18   2347  12/09/18   0527   WBC  13.7*   --   12.0*   HGB  14.0  17.1  12.2*   HCT  45.7  50.0  41.7*   MCV  90.7   --   91.9   PLT  364   --   276     BMP: Recent Labs      12/08/18   0420  12/09/18   0527  12/10/18   0613   NA  137  141  137   K  4.7  4.7  3.7   CL  102  100  94*   CO2  22  28  28   BUN  20  26*  25*   CREATININE  1.4*  1.2  1.2     PT/INR: No results for input(s): PROTIME, INR in the last 72 hours. BNP:    Recent Labs      12/07/18 2345   PROBNP  3,700*     TROPONIN:   Recent Labs      12/07/18   2345 12/08/18   0420  12/08/18   1719   TROPONINT  <0.010  0.017*  <0.010        ECHO :   Echocardiogram 12/10/2018  Left ventricular systolic function is abnormal.   Ejection fraction is visually estimated at 10-20%   Left ventricle is dilated.   Inferior wall is akinetic   Mildly dilated left atrium.   Moderate mitral regurgitation is present.   No evidence of any pericardial effusion. [facility]  07/02/2018  1. Three vessel CAD. LAD & Ramus are occluded. Mild to moderate   disease of RCA & Circumflex noted.   2. LIMA is patent.   3. SVBG to Ramus occluded. Ramus receives right to left   Collaterals.   4. SVBG to OM is atretic, receives small collateral from RCA.   5. SVBG to Diagonal is small but patent.   6. [employer] WMA seen with severely reduced LV Systolic   function. LVEF is about 25 to 30 %.  7. Aortic Root angiography Helped in understanding the graft lay   out.     Impression:  Active Problems:    Acute on chronic combined systolic and diastolic congestive heart failure (HCC)    Pulmonary edema, acute (HCC)    Acute kidney injury ([address]    Acute respiratory failure with hypoxia and hypercapnia ([address]    Hypertensive emergency  Resolved Problems:    * No resolved hospital problems.  *       All labs, medications and tests reviewed by myself, continue all other medications of all above medical condition listed as is except for changes mentioned above. Thank you   Please call with questions. Electronically signed by NANCY Boss CNP on 12/10/2018 at 11:37 AM     I have seen ,spoken to  and examined this patient personally, independently of the nurse practitioner. I have reviewed the hospital care given to date and reviewed all pertinent labs and imaging. The plan was developed mutually at the time of the visit with the patient,  Ollen Child  and myself. I have spoken with patient, nursing staff and provided written and verbal instructions . The above note has been reviewed and I agree with the assessment, diagnosis, and treatment plan with changes made by me as follows     CARDIOLOGY ATTENDING ADDENDUM    HPI:  I have reviewed the above HPI  And agree with above   Araceli Oconnor is a 50 y. o.year old who and presents with had no chief complaint listed for this encounter. No chief complaint on file. Interval history:  Got extubated and moved out of ICU    Physical Exam:  General:  Awake, alert, NAD  Head:normal  Eye:normal  Neck:  No JVD   Chest:  Clear to auscultation, respiration easy  Cardiovascular:  RRR S1S2  Abdomen:   nontender  Extremities:  positive edema  Pulses; palpable  Neuro: grossly normal      MEDICAL DECISION MAKING;  SOB   Still being diuresed   I agree with the above plan, which was planned by myself and discussed with NP.           Tiffany Stein MD [facility]

## 2018-12-10 NOTE — PLAN OF CARE
Problem: Risk for Impaired Skin Integrity  Goal: Tissue integrity - skin and mucous membranes  Structural intactness and normal physiological function of skin and  mucous membranes. Outcome: Ongoing      Problem:  Activity:  Goal: Fatigue will decrease  Fatigue will decrease   Outcome: Ongoing      Problem: Cardiac:  Goal: Hemodynamic stability will improve  Hemodynamic stability will improve   Outcome: Ongoing      Problem: Respiratory:  Goal: Ability to maintain a clear airway will improve  Ability to maintain a clear airway will improve   Outcome: Ongoing    Goal: Ability to maintain adequate ventilation will improve  Ability to maintain adequate ventilation will improve   Outcome: Ongoing    Goal: Complications related to the disease process, condition or treatment will be avoided or minimized  Complications related to the disease process, condition or treatment will be avoided or minimized   Outcome: Ongoing      Problem: Skin Integrity:  Goal: Risk for impaired skin integrity will decrease  Risk for impaired skin integrity will decrease   Outcome: Ongoing      Problem: Falls - Risk of:  Goal: Will remain free from falls  Will remain free from falls   Outcome: Ongoing      Problem: Gas Exchange - Impaired:  Goal: Levels of oxygenation will improve  Levels of oxygenation will improve   Outcome: Ongoing      Problem: Discharge Planning:  Goal: Discharged to appropriate level of care  Discharged to appropriate level of care   Outcome: Ongoing

## 2018-12-11 ENCOUNTER — APPOINTMENT (OUTPATIENT)
Dept: GENERAL RADIOLOGY | Age: 48
DRG: 291 | End: 2018-12-11
Attending: INTERNAL MEDICINE
Payer: MEDICARE

## 2018-12-11 LAB
ANION GAP SERPL CALCULATED.3IONS-SCNC: 14 MMOL/L (ref 4–16)
BUN BLDV-MCNC: 24 MG/DL (ref 6–23)
CALCIUM SERPL-MCNC: 9.5 MG/DL (ref 8.3–10.6)
CHLORIDE BLD-SCNC: 93 MMOL/L (ref 99–110)
CO2: 29 MMOL/L (ref 21–32)
CREAT SERPL-MCNC: 1.2 MG/DL (ref 0.9–1.3)
CULTURE: NORMAL
EKG ATRIAL RATE: 138 BPM
EKG DIAGNOSIS: NORMAL
EKG P AXIS: 66 DEGREES
EKG P-R INTERVAL: 138 MS
EKG Q-T INTERVAL: 292 MS
EKG QRS DURATION: 106 MS
EKG QTC CALCULATION (BAZETT): 442 MS
EKG R AXIS: 58 DEGREES
EKG T AXIS: 110 DEGREES
EKG VENTRICULAR RATE: 138 BPM
GFR AFRICAN AMERICAN: >60 ML/MIN/1.73M2
GFR NON-AFRICAN AMERICAN: >60 ML/MIN/1.73M2
GLUCOSE BLD-MCNC: 106 MG/DL (ref 70–99)
GRAM SMEAR: NORMAL
Lab: NORMAL
Lab: NORMAL
MAGNESIUM: 2.3 MG/DL (ref 1.8–2.4)
POTASSIUM SERPL-SCNC: 3.9 MMOL/L (ref 3.5–5.1)
REPORT STATUS: NORMAL
REPORT STATUS: NORMAL
SODIUM BLD-SCNC: 136 MMOL/L (ref 135–145)
SPECIMEN: NORMAL
SPECIMEN: NORMAL

## 2018-12-11 PROCEDURE — 36415 COLL VENOUS BLD VENIPUNCTURE: CPT

## 2018-12-11 PROCEDURE — 71045 X-RAY EXAM CHEST 1 VIEW: CPT

## 2018-12-11 PROCEDURE — 2140000000 HC CCU INTERMEDIATE R&B

## 2018-12-11 PROCEDURE — 83735 ASSAY OF MAGNESIUM: CPT

## 2018-12-11 PROCEDURE — 90670 PCV13 VACCINE IM: CPT | Performed by: INTERNAL MEDICINE

## 2018-12-11 PROCEDURE — 6370000000 HC RX 637 (ALT 250 FOR IP): Performed by: INTERNAL MEDICINE

## 2018-12-11 PROCEDURE — 94640 AIRWAY INHALATION TREATMENT: CPT

## 2018-12-11 PROCEDURE — G0009 ADMIN PNEUMOCOCCAL VACCINE: HCPCS | Performed by: INTERNAL MEDICINE

## 2018-12-11 PROCEDURE — 6360000002 HC RX W HCPCS: Performed by: INTERNAL MEDICINE

## 2018-12-11 PROCEDURE — 6370000000 HC RX 637 (ALT 250 FOR IP): Performed by: NURSE PRACTITIONER

## 2018-12-11 PROCEDURE — 94761 N-INVAS EAR/PLS OXIMETRY MLT: CPT

## 2018-12-11 PROCEDURE — C9113 INJ PANTOPRAZOLE SODIUM, VIA: HCPCS | Performed by: INTERNAL MEDICINE

## 2018-12-11 PROCEDURE — 2580000003 HC RX 258: Performed by: INTERNAL MEDICINE

## 2018-12-11 PROCEDURE — 99233 SBSQ HOSP IP/OBS HIGH 50: CPT | Performed by: INTERNAL MEDICINE

## 2018-12-11 PROCEDURE — 2700000000 HC OXYGEN THERAPY PER DAY

## 2018-12-11 PROCEDURE — 80048 BASIC METABOLIC PNL TOTAL CA: CPT

## 2018-12-11 PROCEDURE — 99232 SBSQ HOSP IP/OBS MODERATE 35: CPT | Performed by: INTERNAL MEDICINE

## 2018-12-11 RX ORDER — CARVEDILOL 6.25 MG/1
6.25 TABLET ORAL 2 TIMES DAILY WITH MEALS
Status: DISCONTINUED | OUTPATIENT
Start: 2018-12-11 | End: 2018-12-12 | Stop reason: HOSPADM

## 2018-12-11 RX ORDER — ATORVASTATIN CALCIUM 40 MG/1
40 TABLET, FILM COATED ORAL NIGHTLY
Status: DISCONTINUED | OUTPATIENT
Start: 2018-12-11 | End: 2018-12-12 | Stop reason: HOSPADM

## 2018-12-11 RX ADMIN — IPRATROPIUM BROMIDE AND ALBUTEROL SULFATE 3 ML: .5; 3 SOLUTION RESPIRATORY (INHALATION) at 08:04

## 2018-12-11 RX ADMIN — LISINOPRIL 2.5 MG: 2.5 TABLET ORAL at 09:32

## 2018-12-11 RX ADMIN — PNEUMOCOCCAL 13-VALENT CONJUGATE VACCINE 0.5 ML: 2.2; 2.2; 2.2; 2.2; 2.2; 4.4; 2.2; 2.2; 2.2; 2.2; 2.2; 2.2; 2.2 INJECTION, SUSPENSION INTRAMUSCULAR at 19:08

## 2018-12-11 RX ADMIN — PANTOPRAZOLE SODIUM 40 MG: 40 INJECTION, POWDER, FOR SOLUTION INTRAVENOUS at 10:08

## 2018-12-11 RX ADMIN — ENOXAPARIN SODIUM 40 MG: 40 INJECTION SUBCUTANEOUS at 09:31

## 2018-12-11 RX ADMIN — ISOSORBIDE DINITRATE 20 MG: 20 TABLET ORAL at 09:32

## 2018-12-11 RX ADMIN — ASPIRIN 81 MG: 81 TABLET, COATED ORAL at 09:32

## 2018-12-11 RX ADMIN — CARVEDILOL 3.12 MG: 3.12 TABLET, FILM COATED ORAL at 09:31

## 2018-12-11 RX ADMIN — FUROSEMIDE 40 MG: 10 INJECTION, SOLUTION INTRAMUSCULAR; INTRAVENOUS at 10:08

## 2018-12-11 RX ADMIN — CARVEDILOL 6.25 MG: 6.25 TABLET, FILM COATED ORAL at 20:13

## 2018-12-11 RX ADMIN — IPRATROPIUM BROMIDE AND ALBUTEROL SULFATE 3 ML: .5; 3 SOLUTION RESPIRATORY (INHALATION) at 00:58

## 2018-12-11 RX ADMIN — SODIUM CHLORIDE, PRESERVATIVE FREE 10 ML: 5 INJECTION INTRAVENOUS at 10:10

## 2018-12-11 RX ADMIN — CLOPIDOGREL BISULFATE 75 MG: 75 TABLET ORAL at 09:32

## 2018-12-11 RX ADMIN — ATORVASTATIN CALCIUM 40 MG: 40 TABLET, FILM COATED ORAL at 20:11

## 2018-12-11 RX ADMIN — SODIUM CHLORIDE, PRESERVATIVE FREE 10 ML: 5 INJECTION INTRAVENOUS at 20:13

## 2018-12-11 RX ADMIN — FUROSEMIDE 40 MG: 10 INJECTION, SOLUTION INTRAMUSCULAR; INTRAVENOUS at 20:11

## 2018-12-11 RX ADMIN — IPRATROPIUM BROMIDE AND ALBUTEROL SULFATE 3 ML: .5; 3 SOLUTION RESPIRATORY (INHALATION) at 17:19

## 2018-12-11 RX ADMIN — IPRATROPIUM BROMIDE AND ALBUTEROL SULFATE 3 ML: .5; 3 SOLUTION RESPIRATORY (INHALATION) at 12:45

## 2018-12-11 RX ADMIN — ISOSORBIDE DINITRATE 20 MG: 20 TABLET ORAL at 14:41

## 2018-12-11 RX ADMIN — IPRATROPIUM BROMIDE AND ALBUTEROL SULFATE 3 ML: .5; 3 SOLUTION RESPIRATORY (INHALATION) at 19:11

## 2018-12-11 RX ADMIN — IPRATROPIUM BROMIDE AND ALBUTEROL SULFATE 3 ML: .5; 3 SOLUTION RESPIRATORY (INHALATION) at 04:00

## 2018-12-11 ASSESSMENT — PAIN SCALES - GENERAL
PAINLEVEL_OUTOF10: 0
PAINLEVEL_OUTOF10: 0

## 2018-12-11 NOTE — PROGRESS NOTES
Hospitalist progress Note:   Date of Service: 12/11/2018   ? CHIEF COMPLAINT:   Shortness of breath    Subjective:   Shelley Flatness denied any new complaints. Improving shortness of breath  No acute overnight events. On presentation, Blood pressure 118/70, pulse 82, temperature 98 °F (36.7 °C), temperature source Oral, resp. rate 23, height 5' 11\" (1.803 m), weight 169 lb 8 oz (76.9 kg), SpO2 93 %.      ?    MEDICATIONS:   Current Facility-Administered Medications   Medication Dose Route Frequency Provider Last Rate Last Dose    atorvastatin (LIPITOR) tablet 40 mg  40 mg Oral Nightly Tanner Monzon MD        carvedilol (COREG) tablet 6.25 mg  6.25 mg Oral BID  NANCY Botello - MESHA        pneumococcal 13-valent conjugate (PREVNAR) injection 0.5 mL  0.5 mL Intramuscular Once Tanner Monzon MD        lisinopril (PRINIVIL;ZESTRIL) tablet 2.5 mg  2.5 mg Oral Daily Tanner Monzon MD   2.5 mg at 12/11/18 0932    DOBUTamine (DOBUTREX) 500 mg in dextrose 5 % 250 mL infusion  5 mcg/kg/min Intravenous Continuous Trav Osorio MD   Stopped at 12/11/18 1506    acetaminophen (TYLENOL) tablet 500 mg  500 mg Oral Q6H PRN Lina Liu MD   500 mg at 12/10/18 0026    aspirin EC tablet 81 mg  81 mg Oral Daily Tanner Monzon MD   81 mg at 12/11/18 0932    clopidogrel (PLAVIX) tablet 75 mg  75 mg Oral Daily Tanner Monzon MD   75 mg at 12/11/18 0932    ipratropium-albuterol (DUONEB) nebulizer solution 3 mL  1 vial Inhalation Q4H Tanner Monzon MD   3 mL at 12/11/18 1245    sodium chloride flush 0.9 % injection 10 mL  10 mL Intravenous 2 times per day Tanner Monzon MD   10 mL at 12/11/18 1010    sodium chloride flush 0.9 % injection 10 mL  10 mL Intravenous PRN Tanner Monzon MD        magnesium hydroxide (MILK OF MAGNESIA) 400 MG/5ML suspension 30 mL  30 mL Oral Daily PRN Tanner Monzon MD        ondansetron [facility]) injection 4 mg  4 mg Intravenous Q6H PRN Tanner Monzon MD       [facility] enoxaparin (LOVENOX) injection 40 mg  40 mg Subcutaneous Daily Natacha Galvan MD   40 mg at 12/11/18 0931    potassium chloride 10 mEq/100 mL IVPB (Peripheral Line)  10 mEq Intravenous PRN Natacha Galvan MD        magnesium sulfate 1 g in dextrose 5% 100 mL IVPB  1 g Intravenous PRN Natacha Galvan MD        furosemide (LASIX) injection 40 mg  40 mg Intravenous BID Natacha Galvan MD   40 mg at 12/11/18 1008    isosorbide dinitrate (ISORDIL) tablet 20 mg  20 mg Oral TID Natacha Galvan MD   20 mg at 12/11/18 1441    pantoprazole (PROTONIX) injection 40 mg  40 mg Intravenous Daily Natacha Galvan MD   40 mg at 12/11/18 1008        PHYSICAL EXAM:   Blood pressure 118/70, pulse 82, temperature 98 °F (36.7 °C), temperature source Oral, resp. rate 23, height 5' 11\" (1.803 m), weight 169 lb 8 oz (76.9 kg), SpO2 93 %. . Body mass index is 23.64 kg/m². CONSTITUTIONAL: Awake and alert  HENT: NC/AT Ear: normal, patent without effusion Nose: no deformities, nares patent  EYES:Conjunctiva normal. No discharge. NECK: Neck supple,No JVD /Thyromegaly/LAD   RESP: Occasional wheezing and intermittent basilar crackles+  CVS: Regular rate and rhythm. S1 and S2 normal, no murmurs, clicks, gallops or rubs  GI: Soft, ND/NT,No guarding/rebound/mass/organomegaly. Bowel sounds are normal.    MUSCULAR/EXT:  no pedal edema, no clubbing or cyanosis,Pulses 2+ B/L  CNS: No focal deficit   SKIN: Warm and dry,No rashes    Lab results:   Laboratory and diagnostic data  reviewed      ASSESSMENT/IMPRESSION:      Pulmonary edema, acute (HCC)/Hypertensive emergency/Acute on chronic combined systolic and diastolic CHF:   Extubated 12/9/2018. Continue heart failure management including Lasix IV, dobutamine, Coreg  And lisinopril. Current echo EF 10% from ischemic cardiomyopathy. Start Aldactone when able. Possibly he may need BIVICD later.  Acute kidney injury [facility]): Creatinine improved with good urine output. Follow BMP.   

## 2018-12-12 VITALS
WEIGHT: 165 LBS | HEIGHT: 71 IN | OXYGEN SATURATION: 93 % | TEMPERATURE: 98.1 F | DIASTOLIC BLOOD PRESSURE: 55 MMHG | BODY MASS INDEX: 23.1 KG/M2 | RESPIRATION RATE: 17 BRPM | SYSTOLIC BLOOD PRESSURE: 100 MMHG | HEART RATE: 65 BPM

## 2018-12-12 PROCEDURE — 6370000000 HC RX 637 (ALT 250 FOR IP): Performed by: INTERNAL MEDICINE

## 2018-12-12 PROCEDURE — 6370000000 HC RX 637 (ALT 250 FOR IP): Performed by: NURSE PRACTITIONER

## 2018-12-12 PROCEDURE — 94761 N-INVAS EAR/PLS OXIMETRY MLT: CPT

## 2018-12-12 PROCEDURE — C9113 INJ PANTOPRAZOLE SODIUM, VIA: HCPCS | Performed by: INTERNAL MEDICINE

## 2018-12-12 PROCEDURE — 99232 SBSQ HOSP IP/OBS MODERATE 35: CPT | Performed by: INTERNAL MEDICINE

## 2018-12-12 PROCEDURE — 6360000002 HC RX W HCPCS: Performed by: INTERNAL MEDICINE

## 2018-12-12 PROCEDURE — 94640 AIRWAY INHALATION TREATMENT: CPT

## 2018-12-12 RX ORDER — SPIRONOLACTONE 25 MG/1
25 TABLET ORAL DAILY
Qty: 30 TABLET | Refills: 3 | Status: ON HOLD | OUTPATIENT
Start: 2018-12-12 | End: 2019-02-06

## 2018-12-12 RX ORDER — CLOPIDOGREL BISULFATE 75 MG/1
75 TABLET ORAL DAILY
Qty: 30 TABLET | Refills: 0 | Status: ON HOLD | OUTPATIENT
Start: 2018-12-12 | End: 2019-02-06

## 2018-12-12 RX ORDER — IPRATROPIUM BROMIDE AND ALBUTEROL SULFATE 2.5; .5 MG/3ML; MG/3ML
1 SOLUTION RESPIRATORY (INHALATION) EVERY 4 HOURS
Qty: 360 ML | Refills: 0 | Status: ON HOLD | OUTPATIENT
Start: 2018-12-12 | End: 2019-02-06

## 2018-12-12 RX ORDER — ALBUTEROL SULFATE 90 UG/1
2 AEROSOL, METERED RESPIRATORY (INHALATION) EVERY 4 HOURS PRN
Qty: 1 INHALER | Refills: 0 | Status: ON HOLD | OUTPATIENT
Start: 2018-12-12 | End: 2019-02-06

## 2018-12-12 RX ORDER — CARVEDILOL 6.25 MG/1
6.25 TABLET ORAL 2 TIMES DAILY WITH MEALS
Qty: 60 TABLET | Refills: 3 | Status: ON HOLD | OUTPATIENT
Start: 2018-12-12 | End: 2019-02-06

## 2018-12-12 RX ORDER — ASPIRIN 81 MG/1
81 TABLET ORAL DAILY
Qty: 30 TABLET | Refills: 3 | Status: ON HOLD | OUTPATIENT
Start: 2018-12-12 | End: 2019-02-06

## 2018-12-12 RX ORDER — LISINOPRIL 2.5 MG/1
2.5 TABLET ORAL DAILY
Qty: 30 TABLET | Refills: 3 | Status: ON HOLD | OUTPATIENT
Start: 2018-12-13 | End: 2019-02-06

## 2018-12-12 RX ORDER — ATORVASTATIN CALCIUM 40 MG/1
40 TABLET, FILM COATED ORAL DAILY
Qty: 30 TABLET | Refills: 0 | Status: ON HOLD | OUTPATIENT
Start: 2018-12-12 | End: 2019-02-06

## 2018-12-12 RX ORDER — DICYCLOMINE HCL 20 MG
20 TABLET ORAL EVERY 6 HOURS PRN
Qty: 120 TABLET | Refills: 3 | Status: ON HOLD | OUTPATIENT
Start: 2018-12-12 | End: 2019-09-17 | Stop reason: SDUPTHER

## 2018-12-12 RX ORDER — NITROGLYCERIN 0.4 MG/1
0.4 TABLET SUBLINGUAL EVERY 5 MIN PRN
Qty: 25 TABLET | Refills: 0 | Status: ON HOLD | OUTPATIENT
Start: 2018-12-12 | End: 2019-02-06

## 2018-12-12 RX ORDER — FUROSEMIDE 40 MG/1
40 TABLET ORAL DAILY
Qty: 30 TABLET | Refills: 0 | Status: ON HOLD | OUTPATIENT
Start: 2018-12-12 | End: 2019-02-06

## 2018-12-12 RX ADMIN — LISINOPRIL 2.5 MG: 2.5 TABLET ORAL at 08:00

## 2018-12-12 RX ADMIN — IPRATROPIUM BROMIDE AND ALBUTEROL SULFATE 3 ML: .5; 3 SOLUTION RESPIRATORY (INHALATION) at 07:20

## 2018-12-12 RX ADMIN — ASPIRIN 81 MG: 81 TABLET, COATED ORAL at 08:00

## 2018-12-12 RX ADMIN — ISOSORBIDE DINITRATE 20 MG: 20 TABLET ORAL at 08:00

## 2018-12-12 RX ADMIN — FUROSEMIDE 40 MG: 10 INJECTION, SOLUTION INTRAMUSCULAR; INTRAVENOUS at 07:59

## 2018-12-12 RX ADMIN — CARVEDILOL 6.25 MG: 6.25 TABLET, FILM COATED ORAL at 08:00

## 2018-12-12 RX ADMIN — IPRATROPIUM BROMIDE AND ALBUTEROL SULFATE 3 ML: .5; 3 SOLUTION RESPIRATORY (INHALATION) at 00:02

## 2018-12-12 RX ADMIN — CLOPIDOGREL BISULFATE 75 MG: 75 TABLET ORAL at 07:59

## 2018-12-12 RX ADMIN — PANTOPRAZOLE SODIUM 40 MG: 40 INJECTION, POWDER, FOR SOLUTION INTRAVENOUS at 07:59

## 2018-12-12 NOTE — PROGRESS NOTES
Cardiology Progress Note       Admit Date:  12/8/2018    Consult reason/ Seen today for: CHF- SOB- CAD    Subjective and  Overnight Events:  He is resting in the bed. He notes he is feeling much better. His SOB is significantly improved    No further NSVT-   Telemetry  SR. Assessment / Plan / Recommendation:     ASCVD:  Elevated troponin-Not rising- known CAD h/o CABG; recent cath showing only 2 grafts patent;  Continue aspirin plavix statin Imdur and b blocker- aggressive risk factor modification. HFrEF/ HFpEF :Acute on chronic decompensated heart failure. He clinically doing better- negative fluid balance of 8 Liters. Continue with po diuresis-   Daily weights,strict Is and Os  Ischemic Cardiomyopathy-EF 10-20% per ECHO  maximize medical regimen- once stable will need BIVICD-  ACEI- coreg- aldactone- lasix   EP consult for BIV ICD   Dyslipidemia on statin   DVT prophylaxis if not contraindicated. NSVT- no further episodes  On blocker       Patient is being discharged home- to follow up in the office- stressed compliance with medications and treatment. There is a consult for EP however patient is not wanting to wait for the evaluation and is asking to be seen as an outpatient. History of Presenting Illness:    Chief complain on admission : 50 y. o.year old who is admitted forNo chief complaint on file. Past medical history:    has a past medical history of CAD (coronary artery disease); COPD (chronic obstructive pulmonary disease) ([address] Depression; HIGH CHOLESTEROL; Hypertension; and MI (myocardial infarction) ([facility] [address] Past surgical history:   has a past surgical history that includes Cardiac surgery (11/2010); Bypass Graft (4/10/11); and Leg Surgery. Social History:   reports that he has been smoking Cigarettes. He has a 20.00 pack-year smoking history.  He has quit using smokeless tobacco. He reports that he does not drink alcohol or use drugs. Family history:  family history includes Cancer in his father; Diabetes in his mother; Heart Failure in his father. No Known Allergies    Review of Systems:   All 14 systems were reviewed and are negative  Except for the positive findings  which as documented     BP (!) 100/55   Pulse 65   Temp 98.1 °F (36.7 °C) (Oral)   Resp 17   Ht 5' 11\" (1.803 m)   Wt 165 lb (74.8 kg)   SpO2 93%   BMI 23.01 kg/m²       Intake/Output Summary (Last 24 hours) at 12/12/18 1040  Last data filed at 12/12/18 0449   Gross per 24 hour   Intake              300 ml   Output             2225 ml   Net            -1925 ml       Physical Exam:  Constitutional:  Well developed, Well nourished, No acute distress  HENT:  Normocephalic, Atraumatic, Bilateral external ears normal, Oropharynx moist, No oral exudates, Nose normal.   Neck-  No tenderness, Supple, No stridor. Eyes:  PERRL, EOMI, Conjunctiva normal, No discharge. Respiratory:  Decreased breath sounds, No respiratory distress, No wheezing, No chest tenderness. Cardiovascular:  Normal heart rate, Normal rhythm, no murmurs appreciated, No rubs appreciated, No gallops appreciated, JVP not elevated  Abdomen/GI:  Bowel sounds normal, Soft, No tenderness, No masses, No pulsatile masses. Musculoskeletal:  Intact distal pulses, no edema, No tenderness, No cyanosis, No clubbing. Integument:  Warm, Dry, No erythema, No rash. Lymphatic:  No lymphadenopathy noted.    Neurologic:  Alert & oriented x 3,   Psychiatric:  Affect and Mood :pleasant     Medications:    atorvastatin  40 mg Oral Nightly    carvedilol  6.25 mg Oral BID WC    lisinopril  2.5 mg Oral Daily    aspirin  81 mg Oral Daily    clopidogrel  75 mg Oral Daily    ipratropium-albuterol  1 vial Inhalation Q4H    sodium chloride flush  10 mL Intravenous 2 times per day    enoxaparin  40 mg Subcutaneous Daily    furosemide  40 mg Intravenous BID    isosorbide dinitrate  20

## 2018-12-12 NOTE — DISCHARGE SUMMARY
Yovana Prude 1970 [id number]  PCP:  Foster Vigil date: 12/8/2018  Admitting Physician: Herminia Villalobos MD    Discharge date: 12/12/2018 Discharge Physician: Herminia Villalobos MD         Discharge Diagnoses. As per below    Hospital Course:  History of present illness at admission: As per H&P  Subsequent Hospital Course:      Pulmonary edema, acute (HCC)/Hypertensive emergency/Acute on chronic combined systolic and diastolic CHF:   Extubated 12/9/2018. Improved on Lasix IV, dobutamine, Coreg  And lisinopril. Current echo EF 10% from ischemic cardiomyopathy. Aldactone upon discharge. Possibly he may need BIVICD but patient would not like to wait in the hospital until he is being seen by Dr. Seble Cobos . I advised him to see and follow-up with Dr. Seble Cobos OP in 2-3 days   Acute kidney injury [facility]): Creatinine improved with good urine output. Follow BMP.  Acute respiratory failure with hypoxia and hypercapnia ([address]): Extubated. Currently doing okay   Tobacco abuse Smoking cessation counseling needs to be given. Nicoderm patch to prevent craving.     ?       DVT prophylaxis: Lovenox    On the day of discharge, pt felt better. No new complaints.     Pertinent Exam Findings on Day of Discharge:  General Appearance:    Alert, cooperative, no distress, appears stated age  Head:    Normocephalic, without obvious abnormality, atraumatic  Eyes:    PERRL, conjunctiva/corneas clear, EOM's intact  Lungs:    Clear to auscultation bilaterally, respirations unlabored   Heart:    Regular rate and rhythm, S1 and S2 normal, no murmur,   rub or gallop  Abdomen:     Soft, non-tender, bowel sounds active, no masses, no organomegaly  Extremities:   Extremities normal, atraumatic, no cyanosis or edema    Consults:  IP CONSULT TO HEART FAILURE NURSE/COORDINATOR  IP CONSULT TO DIETITIAN  IP CONSULT TO CARDIOLOGY  IP CONSULT TO PULMONOLOGY  IP CONSULT TO SMOKING CESSATION PROGRAM  IP CONSULT TO

## 2018-12-14 ENCOUNTER — TELEPHONE (OUTPATIENT)
Dept: CARDIOLOGY CLINIC | Age: 48
End: 2018-12-14

## 2018-12-15 LAB
CULTURE: NORMAL
CULTURE: NORMAL
Lab: NORMAL
Lab: NORMAL
REPORT STATUS: NORMAL
REPORT STATUS: NORMAL
SPECIMEN: NORMAL
SPECIMEN: NORMAL

## 2018-12-17 ENCOUNTER — TELEPHONE (OUTPATIENT)
Dept: FAMILY MEDICINE CLINIC | Age: 48
End: 2018-12-17

## 2018-12-18 ENCOUNTER — TELEPHONE (OUTPATIENT)
Dept: FAMILY MEDICINE CLINIC | Age: 48
End: 2018-12-18

## 2018-12-18 NOTE — TELEPHONE ENCOUNTER
[facility] Transitions Initial Follow Up Call    Outreach made within 2 business days of discharge: yes    Patient: Cassie Sue Patient : 1970   MRN: [id number]  Reason for Admission: There are no discharge diagnoses documented for the most recent discharge.   Discharge Date: 18       Spoke with: Left VM for pt to call the office to schedule TC visit    Discharge department/facility: [place] [id number]

## 2019-02-04 ENCOUNTER — APPOINTMENT (OUTPATIENT)
Dept: GENERAL RADIOLOGY | Age: 49
DRG: 293 | End: 2019-02-04
Payer: MEDICARE

## 2019-02-04 ENCOUNTER — HOSPITAL ENCOUNTER (INPATIENT)
Age: 49
LOS: 2 days | Discharge: HOME | DRG: 293 | End: 2019-02-06
Attending: EMERGENCY MEDICINE | Admitting: HOSPITALIST
Payer: MEDICARE

## 2019-02-04 DIAGNOSIS — E78.2 MIXED HYPERLIPIDEMIA: Chronic | ICD-10-CM

## 2019-02-04 DIAGNOSIS — Z72.0 TOBACCO ABUSE: ICD-10-CM

## 2019-02-04 DIAGNOSIS — I25.810 CORONARY ARTERY DISEASE INVOLVING CORONARY BYPASS GRAFT OF NATIVE HEART WITHOUT ANGINA PECTORIS: Chronic | ICD-10-CM

## 2019-02-04 DIAGNOSIS — J43.1 PANLOBULAR EMPHYSEMA (HCC): Chronic | ICD-10-CM

## 2019-02-04 DIAGNOSIS — Z95.1 HISTORY OF CORONARY ARTERY BYPASS GRAFT: ICD-10-CM

## 2019-02-04 DIAGNOSIS — I50.9 ACUTE ON CHRONIC CONGESTIVE HEART FAILURE, UNSPECIFIED HEART FAILURE TYPE (HCC): Primary | ICD-10-CM

## 2019-02-04 DIAGNOSIS — I25.2 HISTORY OF MI (MYOCARDIAL INFARCTION): ICD-10-CM

## 2019-02-04 DIAGNOSIS — J41.0 SIMPLE CHRONIC BRONCHITIS (HCC): Chronic | ICD-10-CM

## 2019-02-04 PROBLEM — I50.1 ACUTE LEFT-SIDED CHF (CONGESTIVE HEART FAILURE) (HCC): Status: ACTIVE | Noted: 2019-02-04

## 2019-02-04 LAB
ANION GAP SERPL CALCULATED.3IONS-SCNC: 19 MMOL/L (ref 4–16)
BASE EXCESS MIXED: 1.6 (ref 0–1.2)
BASE EXCESS: ABNORMAL (ref 0–3.3)
BASOPHILS ABSOLUTE: 0.1 K/CU MM
BASOPHILS RELATIVE PERCENT: 0.9 % (ref 0–1)
BUN BLDV-MCNC: 15 MG/DL (ref 6–23)
CALCIUM SERPL-MCNC: 9 MG/DL (ref 8.3–10.6)
CHLORIDE BLD-SCNC: 103 MMOL/L (ref 99–110)
CO2 CONTENT: 22.5 MMOL/L (ref 19–24)
CO2: 19 MMOL/L (ref 21–32)
CREAT SERPL-MCNC: 0.9 MG/DL (ref 0.9–1.3)
DIFFERENTIAL TYPE: ABNORMAL
EOSINOPHILS ABSOLUTE: 0.2 K/CU MM
EOSINOPHILS RELATIVE PERCENT: 2.4 % (ref 0–3)
GFR AFRICAN AMERICAN: >60 ML/MIN/1.73M2
GFR NON-AFRICAN AMERICAN: >60 ML/MIN/1.73M2
GLUCOSE BLD-MCNC: 169 MG/DL (ref 70–99)
HCO3 VENOUS: 21.6 MMOL/L (ref 19–25)
HCT VFR BLD CALC: 40.5 % (ref 42–52)
HEMOGLOBIN: 12.9 GM/DL (ref 13.5–18)
IMMATURE NEUTROPHIL %: 0.2 % (ref 0–0.43)
LYMPHOCYTES ABSOLUTE: 2.1 K/CU MM
LYMPHOCYTES RELATIVE PERCENT: 23.7 % (ref 24–44)
MCH RBC QN AUTO: 26.7 PG (ref 27–31)
MCHC RBC AUTO-ENTMCNC: 31.9 % (ref 32–36)
MCV RBC AUTO: 83.9 FL (ref 78–100)
MONOCYTES ABSOLUTE: 0.5 K/CU MM
MONOCYTES RELATIVE PERCENT: 5.3 % (ref 0–4)
O2 SAT, VEN: 94.8 % (ref 50–70)
PCO2, VEN: 31.3 MMHG (ref 38–52)
PDW BLD-RTO: 13.7 % (ref 11.7–14.9)
PH VENOUS: 7.45 (ref 7.32–7.42)
PLATELET # BLD: 314 K/CU MM (ref 140–440)
PMV BLD AUTO: 9.4 FL (ref 7.5–11.1)
PO2, VEN: 70.4 MMHG (ref 28–48)
POTASSIUM SERPL-SCNC: 3.7 MMOL/L (ref 3.5–5.1)
PRO-BNP: 4371 PG/ML
RBC # BLD: 4.83 M/CU MM (ref 4.6–6.2)
SEGMENTED NEUTROPHILS ABSOLUTE COUNT: 6 K/CU MM
SEGMENTED NEUTROPHILS RELATIVE PERCENT: 67.5 % (ref 36–66)
SODIUM BLD-SCNC: 141 MMOL/L (ref 135–145)
SOURCE, BLOOD GAS: ABNORMAL
TOTAL IMMATURE NEUTOROPHIL: 0.02 K/CU MM
TROPONIN T: <0.01 NG/ML
WBC # BLD: 8.9 K/CU MM (ref 4–10.5)

## 2019-02-04 PROCEDURE — 85025 COMPLETE CBC W/AUTO DIFF WBC: CPT

## 2019-02-04 PROCEDURE — 94640 AIRWAY INHALATION TREATMENT: CPT

## 2019-02-04 PROCEDURE — 36415 COLL VENOUS BLD VENIPUNCTURE: CPT

## 2019-02-04 PROCEDURE — 6370000000 HC RX 637 (ALT 250 FOR IP): Performed by: EMERGENCY MEDICINE

## 2019-02-04 PROCEDURE — 2140000000 HC CCU INTERMEDIATE R&B

## 2019-02-04 PROCEDURE — 6360000002 HC RX W HCPCS: Performed by: HOSPITALIST

## 2019-02-04 PROCEDURE — 80048 BASIC METABOLIC PNL TOTAL CA: CPT

## 2019-02-04 PROCEDURE — 84484 ASSAY OF TROPONIN QUANT: CPT

## 2019-02-04 PROCEDURE — 71045 X-RAY EXAM CHEST 1 VIEW: CPT

## 2019-02-04 PROCEDURE — 83880 ASSAY OF NATRIURETIC PEPTIDE: CPT

## 2019-02-04 PROCEDURE — 82805 BLOOD GASES W/O2 SATURATION: CPT

## 2019-02-04 PROCEDURE — 6370000000 HC RX 637 (ALT 250 FOR IP): Performed by: HOSPITALIST

## 2019-02-04 PROCEDURE — 99285 EMERGENCY DEPT VISIT HI MDM: CPT

## 2019-02-04 PROCEDURE — 93005 ELECTROCARDIOGRAM TRACING: CPT | Performed by: EMERGENCY MEDICINE

## 2019-02-04 PROCEDURE — 6360000002 HC RX W HCPCS: Performed by: EMERGENCY MEDICINE

## 2019-02-04 PROCEDURE — 93010 ELECTROCARDIOGRAM REPORT: CPT | Performed by: INTERNAL MEDICINE

## 2019-02-04 PROCEDURE — 2580000003 HC RX 258: Performed by: HOSPITALIST

## 2019-02-04 PROCEDURE — 96374 THER/PROPH/DIAG INJ IV PUSH: CPT

## 2019-02-04 PROCEDURE — 1200000000 HC SEMI PRIVATE

## 2019-02-04 RX ORDER — CLOPIDOGREL BISULFATE 75 MG/1
75 TABLET ORAL DAILY
Status: DISCONTINUED | OUTPATIENT
Start: 2019-02-05 | End: 2019-02-06 | Stop reason: HOSPADM

## 2019-02-04 RX ORDER — ASPIRIN 81 MG/1
81 TABLET ORAL DAILY
Status: DISCONTINUED | OUTPATIENT
Start: 2019-02-05 | End: 2019-02-06 | Stop reason: HOSPADM

## 2019-02-04 RX ORDER — ASPIRIN 325 MG
325 TABLET ORAL ONCE
Status: COMPLETED | OUTPATIENT
Start: 2019-02-04 | End: 2019-02-04

## 2019-02-04 RX ORDER — SODIUM CHLORIDE 0.9 % (FLUSH) 0.9 %
10 SYRINGE (ML) INJECTION EVERY 12 HOURS SCHEDULED
Status: DISCONTINUED | OUTPATIENT
Start: 2019-02-04 | End: 2019-02-06 | Stop reason: HOSPADM

## 2019-02-04 RX ORDER — SODIUM CHLORIDE 0.9 % (FLUSH) 0.9 %
10 SYRINGE (ML) INJECTION PRN
Status: DISCONTINUED | OUTPATIENT
Start: 2019-02-04 | End: 2019-02-06 | Stop reason: HOSPADM

## 2019-02-04 RX ORDER — FUROSEMIDE 10 MG/ML
40 INJECTION INTRAMUSCULAR; INTRAVENOUS ONCE
Status: COMPLETED | OUTPATIENT
Start: 2019-02-04 | End: 2019-02-04

## 2019-02-04 RX ORDER — LISINOPRIL 10 MG/1
5 TABLET ORAL ONCE
Status: COMPLETED | OUTPATIENT
Start: 2019-02-04 | End: 2019-02-04

## 2019-02-04 RX ORDER — SODIUM CHLORIDE 0.9 % (FLUSH) 0.9 %
10 SYRINGE (ML) INJECTION 2 TIMES DAILY
Status: DISCONTINUED | OUTPATIENT
Start: 2019-02-04 | End: 2019-02-04 | Stop reason: SDUPTHER

## 2019-02-04 RX ORDER — DICYCLOMINE HCL 20 MG
20 TABLET ORAL EVERY 6 HOURS PRN
Status: DISCONTINUED | OUTPATIENT
Start: 2019-02-04 | End: 2019-02-06 | Stop reason: HOSPADM

## 2019-02-04 RX ORDER — ONDANSETRON 2 MG/ML
4 INJECTION INTRAMUSCULAR; INTRAVENOUS EVERY 6 HOURS PRN
Status: DISCONTINUED | OUTPATIENT
Start: 2019-02-04 | End: 2019-02-06 | Stop reason: HOSPADM

## 2019-02-04 RX ORDER — LISINOPRIL 2.5 MG/1
2.5 TABLET ORAL DAILY
Status: DISCONTINUED | OUTPATIENT
Start: 2019-02-05 | End: 2019-02-06 | Stop reason: HOSPADM

## 2019-02-04 RX ORDER — ATORVASTATIN CALCIUM 40 MG/1
40 TABLET, FILM COATED ORAL NIGHTLY
Status: DISCONTINUED | OUTPATIENT
Start: 2019-02-04 | End: 2019-02-06 | Stop reason: HOSPADM

## 2019-02-04 RX ORDER — SPIRONOLACTONE 25 MG/1
25 TABLET ORAL DAILY
Status: DISCONTINUED | OUTPATIENT
Start: 2019-02-05 | End: 2019-02-06 | Stop reason: HOSPADM

## 2019-02-04 RX ORDER — IPRATROPIUM BROMIDE AND ALBUTEROL SULFATE 2.5; .5 MG/3ML; MG/3ML
1 SOLUTION RESPIRATORY (INHALATION) EVERY 4 HOURS
Status: DISCONTINUED | OUTPATIENT
Start: 2019-02-04 | End: 2019-02-04 | Stop reason: ALTCHOICE

## 2019-02-04 RX ORDER — NICOTINE 21 MG/24HR
1 PATCH, TRANSDERMAL 24 HOURS TRANSDERMAL DAILY
Status: DISCONTINUED | OUTPATIENT
Start: 2019-02-05 | End: 2019-02-06 | Stop reason: HOSPADM

## 2019-02-04 RX ORDER — CARVEDILOL 6.25 MG/1
6.25 TABLET ORAL 2 TIMES DAILY WITH MEALS
Status: DISCONTINUED | OUTPATIENT
Start: 2019-02-04 | End: 2019-02-06 | Stop reason: HOSPADM

## 2019-02-04 RX ORDER — IPRATROPIUM BROMIDE AND ALBUTEROL SULFATE 2.5; .5 MG/3ML; MG/3ML
1 SOLUTION RESPIRATORY (INHALATION) ONCE
Status: COMPLETED | OUTPATIENT
Start: 2019-02-04 | End: 2019-02-04

## 2019-02-04 RX ORDER — FUROSEMIDE 10 MG/ML
40 INJECTION INTRAMUSCULAR; INTRAVENOUS 2 TIMES DAILY
Status: DISCONTINUED | OUTPATIENT
Start: 2019-02-05 | End: 2019-02-06 | Stop reason: HOSPADM

## 2019-02-04 RX ORDER — IPRATROPIUM BROMIDE AND ALBUTEROL SULFATE 2.5; .5 MG/3ML; MG/3ML
1 SOLUTION RESPIRATORY (INHALATION) EVERY 4 HOURS
Status: DISCONTINUED | OUTPATIENT
Start: 2019-02-04 | End: 2019-02-06 | Stop reason: HOSPADM

## 2019-02-04 RX ORDER — CLOPIDOGREL BISULFATE 75 MG/1
75 TABLET ORAL ONCE
Status: COMPLETED | OUTPATIENT
Start: 2019-02-04 | End: 2019-02-04

## 2019-02-04 RX ORDER — CARVEDILOL 6.25 MG/1
6.25 TABLET ORAL 2 TIMES DAILY WITH MEALS
Status: DISCONTINUED | OUTPATIENT
Start: 2019-02-04 | End: 2019-02-04 | Stop reason: SDUPTHER

## 2019-02-04 RX ORDER — NITROGLYCERIN 0.4 MG/1
0.4 TABLET SUBLINGUAL EVERY 5 MIN PRN
Status: DISCONTINUED | OUTPATIENT
Start: 2019-02-04 | End: 2019-02-06 | Stop reason: HOSPADM

## 2019-02-04 RX ADMIN — LISINOPRIL 5 MG: 10 TABLET ORAL at 14:44

## 2019-02-04 RX ADMIN — IPRATROPIUM BROMIDE AND ALBUTEROL SULFATE 1 AMPULE: .5; 3 SOLUTION RESPIRATORY (INHALATION) at 14:47

## 2019-02-04 RX ADMIN — SODIUM CHLORIDE, PRESERVATIVE FREE 10 ML: 5 INJECTION INTRAVENOUS at 20:56

## 2019-02-04 RX ADMIN — FUROSEMIDE 40 MG: 10 INJECTION, SOLUTION INTRAMUSCULAR; INTRAVENOUS at 14:49

## 2019-02-04 RX ADMIN — ENOXAPARIN SODIUM 40 MG: 40 INJECTION SUBCUTANEOUS at 18:01

## 2019-02-04 RX ADMIN — CARVEDILOL 6.25 MG: 6.25 TABLET, FILM COATED ORAL at 18:01

## 2019-02-04 RX ADMIN — IPRATROPIUM BROMIDE AND ALBUTEROL SULFATE 3 ML: .5; 3 SOLUTION RESPIRATORY (INHALATION) at 19:18

## 2019-02-04 RX ADMIN — ATORVASTATIN CALCIUM 40 MG: 40 TABLET, FILM COATED ORAL at 20:56

## 2019-02-04 RX ADMIN — CLOPIDOGREL BISULFATE 75 MG: 75 TABLET ORAL at 14:44

## 2019-02-04 RX ADMIN — ASPIRIN 325 MG: 325 TABLET ORAL at 14:44

## 2019-02-04 RX ADMIN — IPRATROPIUM BROMIDE AND ALBUTEROL SULFATE 3 ML: .5; 3 SOLUTION RESPIRATORY (INHALATION) at 16:39

## 2019-02-04 ASSESSMENT — PAIN SCALES - GENERAL: PAINLEVEL_OUTOF10: 0

## 2019-02-04 ASSESSMENT — ENCOUNTER SYMPTOMS
SHORTNESS OF BREATH: 1
WHEEZING: 1
COUGH: 1

## 2019-02-05 ENCOUNTER — TELEPHONE (OUTPATIENT)
Dept: CARDIOLOGY CLINIC | Age: 49
End: 2019-02-05

## 2019-02-05 PROBLEM — I25.5 ISCHEMIC CARDIOMYOPATHY: Status: ACTIVE | Noted: 2019-02-05

## 2019-02-05 LAB
ANION GAP SERPL CALCULATED.3IONS-SCNC: 9 MMOL/L (ref 4–16)
BUN BLDV-MCNC: 16 MG/DL (ref 6–23)
CALCIUM SERPL-MCNC: 8.9 MG/DL (ref 8.3–10.6)
CHLORIDE BLD-SCNC: 101 MMOL/L (ref 99–110)
CHOLESTEROL: 174 MG/DL
CO2: 31 MMOL/L (ref 21–32)
CREAT SERPL-MCNC: 1.1 MG/DL (ref 0.9–1.3)
GFR AFRICAN AMERICAN: >60 ML/MIN/1.73M2
GFR NON-AFRICAN AMERICAN: >60 ML/MIN/1.73M2
GLUCOSE BLD-MCNC: 105 MG/DL (ref 70–99)
HCT VFR BLD CALC: 41.3 % (ref 42–52)
HDLC SERPL-MCNC: 38 MG/DL
HEMOGLOBIN: 13.3 GM/DL (ref 13.5–18)
LDL CHOLESTEROL DIRECT: 130 MG/DL
MAGNESIUM: 2 MG/DL (ref 1.8–2.4)
MCH RBC QN AUTO: 26.9 PG (ref 27–31)
MCHC RBC AUTO-ENTMCNC: 32.2 % (ref 32–36)
MCV RBC AUTO: 83.6 FL (ref 78–100)
PDW BLD-RTO: 13.9 % (ref 11.7–14.9)
PLATELET # BLD: 317 K/CU MM (ref 140–440)
PMV BLD AUTO: 9.4 FL (ref 7.5–11.1)
POTASSIUM SERPL-SCNC: 3.6 MMOL/L (ref 3.5–5.1)
RBC # BLD: 4.94 M/CU MM (ref 4.6–6.2)
SODIUM BLD-SCNC: 141 MMOL/L (ref 135–145)
TRIGL SERPL-MCNC: 114 MG/DL
WBC # BLD: 7.1 K/CU MM (ref 4–10.5)

## 2019-02-05 PROCEDURE — 99222 1ST HOSP IP/OBS MODERATE 55: CPT | Performed by: INTERNAL MEDICINE

## 2019-02-05 PROCEDURE — 6370000000 HC RX 637 (ALT 250 FOR IP): Performed by: HOSPITALIST

## 2019-02-05 PROCEDURE — 2580000003 HC RX 258: Performed by: HOSPITALIST

## 2019-02-05 PROCEDURE — 83721 ASSAY OF BLOOD LIPOPROTEIN: CPT

## 2019-02-05 PROCEDURE — 6360000002 HC RX W HCPCS: Performed by: HOSPITALIST

## 2019-02-05 PROCEDURE — 94640 AIRWAY INHALATION TREATMENT: CPT

## 2019-02-05 PROCEDURE — 83735 ASSAY OF MAGNESIUM: CPT

## 2019-02-05 PROCEDURE — 85027 COMPLETE CBC AUTOMATED: CPT

## 2019-02-05 PROCEDURE — 80048 BASIC METABOLIC PNL TOTAL CA: CPT

## 2019-02-05 PROCEDURE — 80061 LIPID PANEL: CPT

## 2019-02-05 PROCEDURE — 2140000000 HC CCU INTERMEDIATE R&B

## 2019-02-05 PROCEDURE — 36415 COLL VENOUS BLD VENIPUNCTURE: CPT

## 2019-02-05 RX ADMIN — SODIUM CHLORIDE, PRESERVATIVE FREE 10 ML: 5 INJECTION INTRAVENOUS at 09:52

## 2019-02-05 RX ADMIN — FUROSEMIDE 40 MG: 10 INJECTION, SOLUTION INTRAMUSCULAR; INTRAVENOUS at 09:54

## 2019-02-05 RX ADMIN — SPIRONOLACTONE 25 MG: 25 TABLET, FILM COATED ORAL at 09:53

## 2019-02-05 RX ADMIN — CARVEDILOL 6.25 MG: 6.25 TABLET, FILM COATED ORAL at 09:53

## 2019-02-05 RX ADMIN — CARVEDILOL 6.25 MG: 6.25 TABLET, FILM COATED ORAL at 17:55

## 2019-02-05 RX ADMIN — ATORVASTATIN CALCIUM 40 MG: 40 TABLET, FILM COATED ORAL at 20:40

## 2019-02-05 RX ADMIN — IPRATROPIUM BROMIDE AND ALBUTEROL SULFATE 3 ML: .5; 3 SOLUTION RESPIRATORY (INHALATION) at 20:49

## 2019-02-05 RX ADMIN — IPRATROPIUM BROMIDE AND ALBUTEROL SULFATE 3 ML: .5; 3 SOLUTION RESPIRATORY (INHALATION) at 11:29

## 2019-02-05 RX ADMIN — IPRATROPIUM BROMIDE AND ALBUTEROL SULFATE 3 ML: .5; 3 SOLUTION RESPIRATORY (INHALATION) at 07:45

## 2019-02-05 RX ADMIN — LISINOPRIL 2.5 MG: 2.5 TABLET ORAL at 09:53

## 2019-02-05 RX ADMIN — ASPIRIN 81 MG: 81 TABLET, COATED ORAL at 09:53

## 2019-02-05 RX ADMIN — CLOPIDOGREL BISULFATE 75 MG: 75 TABLET ORAL at 09:53

## 2019-02-05 RX ADMIN — SODIUM CHLORIDE, PRESERVATIVE FREE 10 ML: 5 INJECTION INTRAVENOUS at 20:39

## 2019-02-05 RX ADMIN — FUROSEMIDE 40 MG: 10 INJECTION, SOLUTION INTRAMUSCULAR; INTRAVENOUS at 17:55

## 2019-02-05 ASSESSMENT — PAIN SCALES - GENERAL
PAINLEVEL_OUTOF10: 10
PAINLEVEL_OUTOF10: 0

## 2019-02-06 VITALS
RESPIRATION RATE: 18 BRPM | HEART RATE: 77 BPM | DIASTOLIC BLOOD PRESSURE: 80 MMHG | TEMPERATURE: 98.3 F | WEIGHT: 166.8 LBS | HEIGHT: 70 IN | OXYGEN SATURATION: 95 % | BODY MASS INDEX: 23.88 KG/M2 | SYSTOLIC BLOOD PRESSURE: 118 MMHG

## 2019-02-06 PROBLEM — I50.1 ACUTE LEFT-SIDED CHF (CONGESTIVE HEART FAILURE) (HCC): Status: RESOLVED | Noted: 2019-02-04 | Resolved: 2019-02-06

## 2019-02-06 LAB
ANION GAP SERPL CALCULATED.3IONS-SCNC: 11 MMOL/L (ref 4–16)
BUN BLDV-MCNC: 17 MG/DL (ref 6–23)
CALCIUM SERPL-MCNC: 9.4 MG/DL (ref 8.3–10.6)
CHLORIDE BLD-SCNC: 99 MMOL/L (ref 99–110)
CO2: 28 MMOL/L (ref 21–32)
CREAT SERPL-MCNC: 1.1 MG/DL (ref 0.9–1.3)
GFR AFRICAN AMERICAN: >60 ML/MIN/1.73M2
GFR NON-AFRICAN AMERICAN: >60 ML/MIN/1.73M2
GLUCOSE BLD-MCNC: 118 MG/DL (ref 70–99)
POTASSIUM SERPL-SCNC: 3.9 MMOL/L (ref 3.5–5.1)
PRO-BNP: 698.6 PG/ML
SODIUM BLD-SCNC: 138 MMOL/L (ref 135–145)

## 2019-02-06 PROCEDURE — 94640 AIRWAY INHALATION TREATMENT: CPT

## 2019-02-06 PROCEDURE — 6370000000 HC RX 637 (ALT 250 FOR IP): Performed by: HOSPITALIST

## 2019-02-06 PROCEDURE — 6360000002 HC RX W HCPCS: Performed by: HOSPITALIST

## 2019-02-06 PROCEDURE — 80048 BASIC METABOLIC PNL TOTAL CA: CPT

## 2019-02-06 PROCEDURE — 36415 COLL VENOUS BLD VENIPUNCTURE: CPT

## 2019-02-06 PROCEDURE — 2580000003 HC RX 258: Performed by: HOSPITALIST

## 2019-02-06 PROCEDURE — 83880 ASSAY OF NATRIURETIC PEPTIDE: CPT

## 2019-02-06 RX ORDER — CARVEDILOL 6.25 MG/1
6.25 TABLET ORAL 2 TIMES DAILY WITH MEALS
Qty: 180 TABLET | Refills: 0 | Status: ON HOLD | OUTPATIENT
Start: 2019-02-06 | End: 2019-09-17 | Stop reason: SDUPTHER

## 2019-02-06 RX ORDER — NITROGLYCERIN 0.4 MG/1
0.4 TABLET SUBLINGUAL EVERY 5 MIN PRN
Qty: 25 TABLET | Refills: 0 | Status: ON HOLD | OUTPATIENT
Start: 2019-02-06 | End: 2019-09-17 | Stop reason: SDUPTHER

## 2019-02-06 RX ORDER — LISINOPRIL 2.5 MG/1
2.5 TABLET ORAL DAILY
Qty: 90 TABLET | Refills: 0 | Status: ON HOLD | OUTPATIENT
Start: 2019-02-06 | End: 2019-09-17 | Stop reason: SDUPTHER

## 2019-02-06 RX ORDER — FUROSEMIDE 40 MG/1
40 TABLET ORAL 2 TIMES DAILY
Qty: 180 TABLET | Refills: 0 | Status: ON HOLD | OUTPATIENT
Start: 2019-02-06 | End: 2019-09-17

## 2019-02-06 RX ORDER — IPRATROPIUM BROMIDE AND ALBUTEROL SULFATE 2.5; .5 MG/3ML; MG/3ML
1 SOLUTION RESPIRATORY (INHALATION) 4 TIMES DAILY
Qty: 360 ML | Refills: 0 | Status: SHIPPED | OUTPATIENT
Start: 2019-02-06 | End: 2019-02-10

## 2019-02-06 RX ORDER — ASPIRIN 81 MG/1
81 TABLET ORAL DAILY
Qty: 90 TABLET | Refills: 0 | Status: ON HOLD | OUTPATIENT
Start: 2019-02-06 | End: 2019-09-17 | Stop reason: SDUPTHER

## 2019-02-06 RX ORDER — ATORVASTATIN CALCIUM 40 MG/1
40 TABLET, FILM COATED ORAL DAILY
Qty: 90 TABLET | Refills: 0 | Status: ON HOLD | OUTPATIENT
Start: 2019-02-06 | End: 2019-09-17 | Stop reason: SDUPTHER

## 2019-02-06 RX ORDER — ALBUTEROL SULFATE 90 UG/1
2 AEROSOL, METERED RESPIRATORY (INHALATION) EVERY 4 HOURS PRN
Qty: 1 INHALER | Refills: 0 | Status: ON HOLD | OUTPATIENT
Start: 2019-02-06 | End: 2019-09-17 | Stop reason: SDUPTHER

## 2019-02-06 RX ORDER — SPIRONOLACTONE 25 MG/1
25 TABLET ORAL DAILY
Qty: 90 TABLET | Refills: 0 | Status: ON HOLD | OUTPATIENT
Start: 2019-02-06 | End: 2019-09-17 | Stop reason: SDUPTHER

## 2019-02-06 RX ORDER — CLOPIDOGREL BISULFATE 75 MG/1
75 TABLET ORAL DAILY
Qty: 90 TABLET | Refills: 0 | Status: ON HOLD | OUTPATIENT
Start: 2019-02-06 | End: 2019-09-17 | Stop reason: SDUPTHER

## 2019-02-06 RX ADMIN — CLOPIDOGREL BISULFATE 75 MG: 75 TABLET ORAL at 08:26

## 2019-02-06 RX ADMIN — ASPIRIN 81 MG: 81 TABLET, COATED ORAL at 08:26

## 2019-02-06 RX ADMIN — CARVEDILOL 6.25 MG: 6.25 TABLET, FILM COATED ORAL at 08:26

## 2019-02-06 RX ADMIN — IPRATROPIUM BROMIDE AND ALBUTEROL SULFATE 3 ML: .5; 3 SOLUTION RESPIRATORY (INHALATION) at 07:30

## 2019-02-06 RX ADMIN — LISINOPRIL 2.5 MG: 2.5 TABLET ORAL at 08:26

## 2019-02-06 RX ADMIN — FUROSEMIDE 40 MG: 10 INJECTION, SOLUTION INTRAMUSCULAR; INTRAVENOUS at 08:25

## 2019-02-06 RX ADMIN — SODIUM CHLORIDE, PRESERVATIVE FREE 10 ML: 5 INJECTION INTRAVENOUS at 08:26

## 2019-02-06 RX ADMIN — SPIRONOLACTONE 25 MG: 25 TABLET, FILM COATED ORAL at 08:26

## 2019-02-06 ASSESSMENT — PAIN SCALES - GENERAL: PAINLEVEL_OUTOF10: 0

## 2019-02-07 LAB
EKG ATRIAL RATE: 106 BPM
EKG DIAGNOSIS: NORMAL
EKG P AXIS: 71 DEGREES
EKG P-R INTERVAL: 138 MS
EKG Q-T INTERVAL: 356 MS
EKG QRS DURATION: 112 MS
EKG QTC CALCULATION (BAZETT): 472 MS
EKG R AXIS: 71 DEGREES
EKG T AXIS: 113 DEGREES
EKG VENTRICULAR RATE: 106 BPM

## 2019-02-10 RX ORDER — IPRATROPIUM BROMIDE AND ALBUTEROL SULFATE 2.5; .5 MG/3ML; MG/3ML
1 SOLUTION RESPIRATORY (INHALATION) 4 TIMES DAILY
Qty: 360 ML | Refills: 0 | Status: ON HOLD | OUTPATIENT
Start: 2019-02-10 | End: 2019-09-17 | Stop reason: SDUPTHER

## 2019-04-18 ENCOUNTER — HOSPITAL ENCOUNTER (EMERGENCY)
Age: 49
Discharge: HOME | End: 2019-04-18
Attending: EMERGENCY MEDICINE
Payer: MEDICARE

## 2019-04-18 ENCOUNTER — APPOINTMENT (OUTPATIENT)
Dept: GENERAL RADIOLOGY | Age: 49
End: 2019-04-18
Payer: MEDICARE

## 2019-04-18 VITALS
RESPIRATION RATE: 21 BRPM | WEIGHT: 165 LBS | HEART RATE: 98 BPM | HEIGHT: 68 IN | DIASTOLIC BLOOD PRESSURE: 96 MMHG | SYSTOLIC BLOOD PRESSURE: 132 MMHG | TEMPERATURE: 96.2 F | BODY MASS INDEX: 25.01 KG/M2 | OXYGEN SATURATION: 91 %

## 2019-04-18 DIAGNOSIS — J96.21 ACUTE ON CHRONIC RESPIRATORY FAILURE WITH HYPOXIA (HCC): Primary | ICD-10-CM

## 2019-04-18 DIAGNOSIS — J44.1 COPD EXACERBATION (HCC): ICD-10-CM

## 2019-04-18 DIAGNOSIS — I50.33 ACUTE ON CHRONIC DIASTOLIC CONGESTIVE HEART FAILURE (HCC): ICD-10-CM

## 2019-04-18 LAB
ALBUMIN SERPL-MCNC: 4.7 GM/DL (ref 3.4–5)
ALP BLD-CCNC: 83 IU/L (ref 40–129)
ALT SERPL-CCNC: 12 U/L (ref 10–40)
ANION GAP SERPL CALCULATED.3IONS-SCNC: 17 MMOL/L (ref 4–16)
AST SERPL-CCNC: 17 IU/L (ref 15–37)
BASE EXCESS MIXED: 2.5 (ref 0–1.2)
BASE EXCESS: ABNORMAL (ref 0–3.3)
BASOPHILS ABSOLUTE: 0.1 K/CU MM
BASOPHILS RELATIVE PERCENT: 1.1 % (ref 0–1)
BILIRUB SERPL-MCNC: 0.3 MG/DL (ref 0–1)
BUN BLDV-MCNC: 11 MG/DL (ref 6–23)
CALCIUM SERPL-MCNC: 9.3 MG/DL (ref 8.3–10.6)
CHLORIDE BLD-SCNC: 102 MMOL/L (ref 99–110)
CO2 CONTENT: 25.7 MMOL/L (ref 19–24)
CO2: 23 MMOL/L (ref 21–32)
CREAT SERPL-MCNC: 1.4 MG/DL (ref 0.9–1.3)
DIFFERENTIAL TYPE: ABNORMAL
EKG ATRIAL RATE: 128 BPM
EKG DIAGNOSIS: NORMAL
EKG P AXIS: 67 DEGREES
EKG P-R INTERVAL: 140 MS
EKG Q-T INTERVAL: 332 MS
EKG QRS DURATION: 110 MS
EKG QTC CALCULATION (BAZETT): 484 MS
EKG R AXIS: 69 DEGREES
EKG T AXIS: 100 DEGREES
EKG VENTRICULAR RATE: 128 BPM
EOSINOPHILS ABSOLUTE: 0.3 K/CU MM
EOSINOPHILS RELATIVE PERCENT: 3.4 % (ref 0–3)
GFR AFRICAN AMERICAN: >60 ML/MIN/1.73M2
GFR NON-AFRICAN AMERICAN: 54 ML/MIN/1.73M2
GLUCOSE BLD-MCNC: 240 MG/DL (ref 70–99)
HCO3 ARTERIAL: 24.2 MMOL/L (ref 18–23)
HCT VFR BLD CALC: 45.6 % (ref 42–52)
HEMOGLOBIN: 14.4 GM/DL (ref 13.5–18)
IMMATURE NEUTROPHIL %: 0.7 % (ref 0–0.43)
LACTATE: ABNORMAL MMOL/L (ref 0.4–2)
LYMPHOCYTES ABSOLUTE: 3.8 K/CU MM
LYMPHOCYTES RELATIVE PERCENT: 39.4 % (ref 24–44)
MCH RBC QN AUTO: 27 PG (ref 27–31)
MCHC RBC AUTO-ENTMCNC: 31.6 % (ref 32–36)
MCV RBC AUTO: 85.6 FL (ref 78–100)
MONOCYTES ABSOLUTE: 0.5 K/CU MM
MONOCYTES RELATIVE PERCENT: 5.6 % (ref 0–4)
O2 SATURATION: 99.9 % (ref 96–97)
PCO2 ARTERIAL: 47.8 MMHG (ref 32–45)
PDW BLD-RTO: 14.6 % (ref 11.7–14.9)
PH BLOOD: 7.31 (ref 7.34–7.45)
PLATELET # BLD: 354 K/CU MM (ref 140–440)
PMV BLD AUTO: 10.2 FL (ref 7.5–11.1)
PO2 ARTERIAL: 303.5 MMHG (ref 75–100)
POTASSIUM SERPL-SCNC: 4.1 MMOL/L (ref 3.5–5.1)
PRO-BNP: 3351 PG/ML
RBC # BLD: 5.33 M/CU MM (ref 4.6–6.2)
SEGMENTED NEUTROPHILS ABSOLUTE COUNT: 4.8 K/CU MM
SEGMENTED NEUTROPHILS RELATIVE PERCENT: 49.8 % (ref 36–66)
SODIUM BLD-SCNC: 142 MMOL/L (ref 135–145)
SOURCE, BLOOD GAS: ABNORMAL
TOTAL IMMATURE NEUTOROPHIL: 0.07 K/CU MM
TOTAL PROTEIN: 8.1 GM/DL (ref 6.4–8.2)
TROPONIN T: <0.01 NG/ML
WBC # BLD: 9.6 K/CU MM (ref 4–10.5)

## 2019-04-18 PROCEDURE — 71045 X-RAY EXAM CHEST 1 VIEW: CPT

## 2019-04-18 PROCEDURE — 96375 TX/PRO/DX INJ NEW DRUG ADDON: CPT

## 2019-04-18 PROCEDURE — 80053 COMPREHEN METABOLIC PANEL: CPT

## 2019-04-18 PROCEDURE — 85025 COMPLETE CBC W/AUTO DIFF WBC: CPT

## 2019-04-18 PROCEDURE — 6360000002 HC RX W HCPCS: Performed by: EMERGENCY MEDICINE

## 2019-04-18 PROCEDURE — 82803 BLOOD GASES ANY COMBINATION: CPT

## 2019-04-18 PROCEDURE — 93005 ELECTROCARDIOGRAM TRACING: CPT | Performed by: EMERGENCY MEDICINE

## 2019-04-18 PROCEDURE — 84484 ASSAY OF TROPONIN QUANT: CPT

## 2019-04-18 PROCEDURE — 94660 CPAP INITIATION&MGMT: CPT

## 2019-04-18 PROCEDURE — 83605 ASSAY OF LACTIC ACID: CPT

## 2019-04-18 PROCEDURE — 96374 THER/PROPH/DIAG INJ IV PUSH: CPT

## 2019-04-18 PROCEDURE — 93010 ELECTROCARDIOGRAM REPORT: CPT | Performed by: INTERNAL MEDICINE

## 2019-04-18 PROCEDURE — 6370000000 HC RX 637 (ALT 250 FOR IP): Performed by: EMERGENCY MEDICINE

## 2019-04-18 PROCEDURE — 94640 AIRWAY INHALATION TREATMENT: CPT

## 2019-04-18 PROCEDURE — 83880 ASSAY OF NATRIURETIC PEPTIDE: CPT

## 2019-04-18 PROCEDURE — 36600 WITHDRAWAL OF ARTERIAL BLOOD: CPT

## 2019-04-18 PROCEDURE — 99285 EMERGENCY DEPT VISIT HI MDM: CPT

## 2019-04-18 RX ORDER — PREDNISONE 50 MG/1
50 TABLET ORAL DAILY
Qty: 5 TABLET | Refills: 0 | Status: SHIPPED | OUTPATIENT
Start: 2019-04-18 | End: 2019-04-23

## 2019-04-18 RX ORDER — FUROSEMIDE 10 MG/ML
80 INJECTION INTRAMUSCULAR; INTRAVENOUS ONCE
Status: COMPLETED | OUTPATIENT
Start: 2019-04-18 | End: 2019-04-18

## 2019-04-18 RX ORDER — IPRATROPIUM BROMIDE AND ALBUTEROL SULFATE 2.5; .5 MG/3ML; MG/3ML
1 SOLUTION RESPIRATORY (INHALATION) ONCE
Status: COMPLETED | OUTPATIENT
Start: 2019-04-18 | End: 2019-04-18

## 2019-04-18 RX ORDER — AZITHROMYCIN 250 MG/1
TABLET, FILM COATED ORAL
Qty: 1 PACKET | Refills: 0 | Status: SHIPPED | OUTPATIENT
Start: 2019-04-18 | End: 2019-04-28

## 2019-04-18 RX ORDER — METHYLPREDNISOLONE SODIUM SUCCINATE 125 MG/2ML
125 INJECTION, POWDER, LYOPHILIZED, FOR SOLUTION INTRAMUSCULAR; INTRAVENOUS ONCE
Status: COMPLETED | OUTPATIENT
Start: 2019-04-18 | End: 2019-04-18

## 2019-04-18 RX ADMIN — FUROSEMIDE 80 MG: 10 INJECTION, SOLUTION INTRAVENOUS at 01:29

## 2019-04-18 RX ADMIN — IPRATROPIUM BROMIDE AND ALBUTEROL SULFATE 1 AMPULE: .5; 3 SOLUTION RESPIRATORY (INHALATION) at 00:34

## 2019-04-18 RX ADMIN — METHYLPREDNISOLONE SODIUM SUCCINATE 125 MG: 125 INJECTION, POWDER, FOR SOLUTION INTRAMUSCULAR; INTRAVENOUS at 00:30

## 2019-04-18 NOTE — ED PROVIDER NOTES
Triage Chief Complaint:   Shortness of Breath (Sudden onset of SOB without relief from breathing treatment at home. Arrives via EMS on cpap and 20 iv lock)    [place]:  Brynn Burkett is a 50 y.o. male that presents to the emergency Department today with shortness of breath which he believes is most likely from a COPD exacerbation. This patient does have advanced COPD as well as CHF. The patient states he tried to take a breathing treatment at home but it did not help. She has been admitted for these concerns multiple times. He denies any chest pains today. He denies any fevers or chills. He states he has not had any other upper respiratory symptoms that would suggest he is exhibiting any kind of infection at this time.     ROS:  General:  No fevers, no chills, no weakness  Eyes:  No recent vison changes, no discharge  ENT:  No sore throat, no nasal congestion, no hearing changes  Cardiovascular:  No chest pain, no palpitations  Respiratory:  + shortness of breath, no cough, no wheezing  Gastrointestinal:  No pain, no nausea, no vomiting, no diarrhea  Musculoskeletal:  No muscle pain, no joint pain  Skin:  No rash, no pruritis, no easy bruising  Neurologic:  No speech problems, no headache, no extremity numbness, no extremity tingling, no extremity weakness  Psychiatric:  No anxiety  Genitourinary:  No dysuria, no hematuria  Endocrine:  No unexpected weight gain, no unexpected weight loss  Extremities:  no edema, no pain    Past Medical History:   Diagnosis Date    CAD (coronary artery disease)     [facility] [address]     Depression     HIGH CHOLESTEROL     Hypertension     MI (myocardial infarction) ([facility] [address] 2011    S/P coronary artery bypass graft x 4 2011    CABG x 4 Dr Xiomy Tiwari     Past Surgical History:   Procedure Laterality Date    BYPASS GRAFT  04/10/2011    CABG x 4 Dr Kaitlin Rod  11/2010     4 stents    LEG SURGERY       Family History   Problem Relation Age of Onset    Cancer Father     Heart Failure Father     Diabetes Mother      Social History     Socioeconomic History    Marital status:      Spouse name: Not on file    Number of children: Not on file    Years of education: Not on file    Highest education level: Not on file   Occupational History    Not on file   Social Needs    Financial resource strain: Not on file    Food insecurity:     Worry: Not on file     Inability: Not on file    Transportation needs:     Medical: Not on file     Non-medical: Not on file   Tobacco Use    Smoking status: Current Every Day Smoker     Packs/day: 1.00     Years: 20.00     Pack years: 20.00     Types: Cigarettes    Smokeless tobacco: Former User    Tobacco comment: Reviewed 10/9/17   Substance and Sexual Activity    Alcohol use: No     Alcohol/week: 0.0 oz    Drug use: No     Comment: march 2018 states he quit    Sexual activity: Not Currently   Lifestyle    Physical activity:     Days per week: Not on file     Minutes per session: Not on file    Stress: Not on file   Relationships    Social connections:     Talks on phone: Not on file     Gets together: Not on file     Attends Muslim service: Not on file     Active member of club or organization: Not on file     Attends meetings of clubs or organizations: Not on file     Relationship status: Not on file    Intimate partner violence:     Fear of current or ex partner: Not on file     Emotionally abused: Not on file     Physically abused: Not on file     Forced sexual activity: Not on file   Other Topics Concern    Not on file   Social History Narrative    Not on file     No current facility-administered medications for this encounter.       Current Outpatient Medications   Medication Sig Dispense Refill    predniSONE (DELTASONE) 50 MG tablet Take 1 tablet by mouth daily for 5 days 5 tablet 0    azithromycin (ZITHROMAX) 250 MG tablet Take 2 tablets (500 mg) on Day 1, followed by 1 tablet (250 mg) once daily on Days 2 through 5. 1 packet 0    ipratropium-albuterol (DUONEB) 0.5-2.5 (3) MG/3ML SOLN nebulizer solution Inhale 3 mLs into the lungs 4 times daily 360 mL 0    aspirin 81 MG EC tablet Take 1 tablet by mouth daily 90 tablet 0    nitroGLYCERIN (NITROSTAT) 0.4 MG SL tablet Place 1 tablet under the tongue every 5 minutes as needed for Chest pain 25 tablet 0    albuterol sulfate HFA (VENTOLIN HFA) 108 (90 Base) MCG/ACT inhaler Inhale 2 puffs into the lungs every 4 hours as needed for Wheezing 1 Inhaler 0    atorvastatin (LIPITOR) 40 MG tablet Take 1 tablet by mouth daily 90 tablet 0    lisinopril (PRINIVIL;ZESTRIL) 2.5 MG tablet Take 1 tablet by mouth daily 90 tablet 0    carvedilol (COREG) 6.25 MG tablet Take 1 tablet by mouth 2 times daily (with meals) 180 tablet 0    furosemide (LASIX) 40 MG tablet Take 1 tablet by mouth 2 times daily 180 tablet 0    spironolactone (ALDACTONE) 25 MG tablet Take 1 tablet by mouth daily 90 tablet 0    clopidogrel (PLAVIX) 75 MG tablet Take 1 tablet by mouth daily 90 tablet 0    dicyclomine (BENTYL) 20 MG tablet Take 1 tablet by mouth every 6 hours as needed (Abdominal Cramping) 120 tablet 3    Respiratory Therapy Supplies (NEBULIZER COMPRESSOR) KIT 1 kit by Does not apply route once for 1 dose Diagnosis: COPD 1 kit 0     No Known Allergies    Nursing Notes Reviewed    Physical Exam:  ED Triage Vitals   Enc Vitals Group      BP       Pulse       Resp       Temp       Temp src       SpO2       Weight       Height       Head Circumference       Peak Flow       Pain Score       Pain Loc       Pain Edu? Excl. in [address]? My pulse ox interpretation is - normal    General appearance: Mild respiratory distress. Skin:  Warm. Dry. Eye:  Extraocular movements intact. Ears, nose, mouth and throat:  Oral mucosa moist   Neck:  Trachea midline. Extremity:  No swelling.   Normal ROM     Heart:  tachycardic, normal S1 & S2, no extra heart Natriuretic Peptide   Result Value Ref Range    Pro-BNP 3,351 (H) <300 PG/ML   Troponin   Result Value Ref Range    Troponin T <0.010 <0.01 NG/ML   Lactic Acid, Plasma   Result Value Ref Range    Lactate (HH) 0.4 - 2.0 mMOL/L     2.5  CALLED TO EDWARD MAYO RN ER AT 0123 04/18/19 REYES MLT RB     POCT blood gases   Result Value Ref Range    pH, Bld 7.31 (L) 7.34 - 7.45    pCO2, Arterial 47.8 (H) 32 - 45 MMHG    pO2, Arterial 303.5 (H) 75 - 100 MMHG    Base Exc, Mixed 2.5 (H) 0 - 1.2    Base Excess MINUS 0 - 3.3    HCO3, Arterial 24.2 (H) 18 - 23 MMOL/L    CO2 Content 25.7 (H) 19 - 24 MMOL/L    O2 Sat 99.9 (H) 96 - 97 %    Source: Arterial       Radiographs (if obtained):  [] The following radiograph was interpreted by myself in the absence of a radiologist:   [] Radiologist's Report Reviewed:  XR CHEST PORTABLE   Final Result   1. Interstitial opacities suspected to represent edema. Interstitial   pneumonia could appear similar. 2. Patchy bibasilar airspace opacities most likely representing atelectasis   or alveolar edema. Pneumonia could appear similar. 3. Trace partially loculated right pleural effusion. EKG (if obtained): (All EKG's are interpreted by myself in the absence of a cardiologist)    EKG today shows sinus tachycardia with some PACs. There is biatrial enlargement. This is an abnormal EKG and does appear to have some Q waves in the anterolateral leads. Ventricular rate is 128  QTc 44  Chart review shows recent radiographs:  No results found. MDM:  Patient was seen and examined. The patient was given breathing treatments and was on CPAP upon arrival.  He was placed on the BiPAP after he was transitioned to the bed. EKG was performed. X-ray was performed. Medications were given. Patient was treated here in the emergency department.     I estimate there is LOW risk for (including but not limited to) ACUTE CORONARY SYNDROME, PULMONARY EMBOLISM, PNEUMOTHORAX, RUPTURED ESOPHAGUS OR THORACIC AORTIC DISSECTION, thus I consider the discharge disposition reasonable. Pema Oliveira (or their surrogate) and I have discussed the diagnosis and risks, and we agree with discharging home with close follow-up. We also discussed returning to the Emergency Department immediately if new or worsening symptoms occur. We have discussed the symptoms which are most concerning that necessitate immediate return. Total critical care time today provided was at least 60 minutes. This excludes seperately billable procedure. Critical care time provided for respiratory failure with hypoxia that required close evaluation and/or intervention with concern for patient decompensation. Sent does have findings consistent with possible CHF exacerbation given his elevated BNP and chest x-ray. He was quickly migrated from BiPAP to nasal cannula and then to room air and did well. He was given some Lasix here in the emergency department. We will start him on steroids and antibiotics. Patient was adamant that he did not want to stay in the hospital.  I advised that I was concerned given his findings that he should stay but he states that he will not stay. We did discuss warning signs and symptoms that would indicate his need to return to the emergency department. He did not have any additional questions or concerns today and he will be discharged to home at his own request.  We shared and this decision and he states that he will return if he needs. Clinical Impression:  1. Acute on chronic respiratory failure with hypoxia (HCC)    2. COPD exacerbation ([facility] [address]    3. Acute on chronic diastolic congestive heart failure ([facility] [address]      Disposition referral (if applicable):   [address] [place] [address]  [phone]    Call in 1 day  For reevaluation    [facility] Emergency Department  [place] [facility]  [address] [facility]  [phone]  Go to   As needed, If symptoms worsen    Disposition medications (if applicable):  New Prescriptions    AZITHROMYCIN (ZITHROMAX) 250 MG TABLET    Take 2 tablets (500 mg) on Day 1, followed by 1 tablet (250 mg) once daily on Days 2 through 5. PREDNISONE (DELTASONE) 50 MG TABLET    Take 1 tablet by mouth daily for 5 days       Comment: Please note this report has been produced using speech recognition software and may contain errors related to that system including errors in grammar, punctuation, and spelling, as well as words and phrases that may be inappropriate. If there are any questions or concerns please feel free to contact the dictating provider for clarification.      Johan Hill,   04/18/19 0086

## 2019-04-18 NOTE — ED NOTES
Patient ambulated in hallway on room air without difficulty.  O2 sat 95%     Bard Adria RN  04/18/19 9545

## 2019-04-18 NOTE — ED TRIAGE NOTES
Sudden onset of SOB without relief from breathing treatment at home.  Arrives via EMS on cpap and 20 iv lock

## 2019-09-15 ENCOUNTER — APPOINTMENT (OUTPATIENT)
Dept: GENERAL RADIOLOGY | Age: 49
DRG: 291 | End: 2019-09-15
Payer: MEDICARE

## 2019-09-15 ENCOUNTER — HOSPITAL ENCOUNTER (INPATIENT)
Age: 49
LOS: 2 days | Discharge: HOME | DRG: 291 | End: 2019-09-17
Attending: EMERGENCY MEDICINE | Admitting: HOSPITALIST
Payer: MEDICARE

## 2019-09-15 DIAGNOSIS — J41.0 SIMPLE CHRONIC BRONCHITIS (HCC): Chronic | ICD-10-CM

## 2019-09-15 DIAGNOSIS — J43.1 PANLOBULAR EMPHYSEMA (HCC): Chronic | ICD-10-CM

## 2019-09-15 DIAGNOSIS — R06.02 SOB (SHORTNESS OF BREATH): ICD-10-CM

## 2019-09-15 DIAGNOSIS — E78.2 MIXED HYPERLIPIDEMIA: Chronic | ICD-10-CM

## 2019-09-15 DIAGNOSIS — I25.810 CORONARY ARTERY DISEASE INVOLVING CORONARY BYPASS GRAFT OF NATIVE HEART WITHOUT ANGINA PECTORIS: Chronic | ICD-10-CM

## 2019-09-15 DIAGNOSIS — Z95.1 HISTORY OF CORONARY ARTERY BYPASS GRAFT: ICD-10-CM

## 2019-09-15 DIAGNOSIS — I25.2 HISTORY OF MI (MYOCARDIAL INFARCTION): ICD-10-CM

## 2019-09-15 DIAGNOSIS — Z72.0 TOBACCO ABUSE: ICD-10-CM

## 2019-09-15 DIAGNOSIS — J44.1 COPD WITH ACUTE EXACERBATION (HCC): Primary | ICD-10-CM

## 2019-09-15 DIAGNOSIS — I50.9 ACUTE CONGESTIVE HEART FAILURE, UNSPECIFIED HEART FAILURE TYPE (HCC): ICD-10-CM

## 2019-09-15 LAB
ALBUMIN SERPL-MCNC: 4.4 GM/DL (ref 3.4–5)
ALP BLD-CCNC: 92 IU/L (ref 40–129)
ALT SERPL-CCNC: 25 U/L (ref 10–40)
ANION GAP SERPL CALCULATED.3IONS-SCNC: 18 MMOL/L (ref 4–16)
AST SERPL-CCNC: 33 IU/L (ref 15–37)
BASE EXCESS MIXED: 2.9 (ref 0–1.2)
BASE EXCESS: ABNORMAL (ref 0–3.3)
BASOPHILS ABSOLUTE: 0.1 K/CU MM
BASOPHILS RELATIVE PERCENT: 0.8 % (ref 0–1)
BILIRUB SERPL-MCNC: 0.4 MG/DL (ref 0–1)
BILIRUBIN DIRECT: 0.2 MG/DL (ref 0–0.3)
BILIRUBIN, INDIRECT: 0.2 MG/DL (ref 0–0.7)
BUN BLDV-MCNC: 15 MG/DL (ref 6–23)
CALCIUM SERPL-MCNC: 9.4 MG/DL (ref 8.3–10.6)
CHLORIDE BLD-SCNC: 103 MMOL/L (ref 99–110)
CO2 CONTENT: 23.5 MMOL/L (ref 19–24)
CO2: 19 MMOL/L (ref 21–32)
CREAT SERPL-MCNC: 1.3 MG/DL (ref 0.9–1.3)
DIFFERENTIAL TYPE: ABNORMAL
EKG ATRIAL RATE: 104 BPM
EKG ATRIAL RATE: 112 BPM
EKG DIAGNOSIS: NORMAL
EKG DIAGNOSIS: NORMAL
EKG P AXIS: 65 DEGREES
EKG P AXIS: 72 DEGREES
EKG P-R INTERVAL: 146 MS
EKG P-R INTERVAL: 150 MS
EKG Q-T INTERVAL: 344 MS
EKG Q-T INTERVAL: 360 MS
EKG QRS DURATION: 112 MS
EKG QRS DURATION: 114 MS
EKG QTC CALCULATION (BAZETT): 469 MS
EKG QTC CALCULATION (BAZETT): 473 MS
EKG R AXIS: 68 DEGREES
EKG R AXIS: 83 DEGREES
EKG T AXIS: 109 DEGREES
EKG T AXIS: 96 DEGREES
EKG VENTRICULAR RATE: 104 BPM
EKG VENTRICULAR RATE: 112 BPM
EOSINOPHILS ABSOLUTE: 0.5 K/CU MM
EOSINOPHILS RELATIVE PERCENT: 7.1 % (ref 0–3)
GFR AFRICAN AMERICAN: >60 ML/MIN/1.73M2
GFR NON-AFRICAN AMERICAN: 59 ML/MIN/1.73M2
GLUCOSE BLD-MCNC: 205 MG/DL (ref 70–99)
HCO3 VENOUS: 22.3 MMOL/L (ref 19–25)
HCT VFR BLD CALC: 38.7 % (ref 42–52)
HEMOGLOBIN: 12 GM/DL (ref 13.5–18)
IMMATURE NEUTROPHIL %: 0.4 % (ref 0–0.43)
LACTATE: 1.6 MMOL/L (ref 0.4–2)
LYMPHOCYTES ABSOLUTE: 2.3 K/CU MM
LYMPHOCYTES RELATIVE PERCENT: 30.5 % (ref 24–44)
MCH RBC QN AUTO: 26.2 PG (ref 27–31)
MCHC RBC AUTO-ENTMCNC: 31 % (ref 32–36)
MCV RBC AUTO: 84.5 FL (ref 78–100)
MONOCYTES ABSOLUTE: 0.4 K/CU MM
MONOCYTES RELATIVE PERCENT: 5.8 % (ref 0–4)
O2 SAT, VEN: 92.2 % (ref 50–70)
PCO2, VEN: 39.6 MMHG (ref 38–52)
PDW BLD-RTO: 15.9 % (ref 11.7–14.9)
PH VENOUS: 7.36 (ref 7.32–7.42)
PLATELET # BLD: 266 K/CU MM (ref 140–440)
PMV BLD AUTO: 10.1 FL (ref 7.5–11.1)
PO2, VEN: 66.7 MMHG (ref 28–48)
POTASSIUM SERPL-SCNC: 4.6 MMOL/L (ref 3.5–5.1)
PROCALCITONIN: 0.05
RBC # BLD: 4.58 M/CU MM (ref 4.6–6.2)
SEGMENTED NEUTROPHILS ABSOLUTE COUNT: 4.1 K/CU MM
SEGMENTED NEUTROPHILS RELATIVE PERCENT: 55.4 % (ref 36–66)
SODIUM BLD-SCNC: 140 MMOL/L (ref 135–145)
SOURCE, BLOOD GAS: ABNORMAL
TOTAL IMMATURE NEUTOROPHIL: 0.03 K/CU MM
TOTAL PROTEIN: 7.6 GM/DL (ref 6.4–8.2)
TOTAL RETICULOCYTE COUNT: 0.09 K/CU MM
TROPONIN T: <0.01 NG/ML
TROPONIN T: <0.01 NG/ML
WBC # BLD: 7.5 K/CU MM (ref 4–10.5)

## 2019-09-15 PROCEDURE — 99285 EMERGENCY DEPT VISIT HI MDM: CPT

## 2019-09-15 PROCEDURE — 87040 BLOOD CULTURE FOR BACTERIA: CPT

## 2019-09-15 PROCEDURE — 94660 CPAP INITIATION&MGMT: CPT

## 2019-09-15 PROCEDURE — 84484 ASSAY OF TROPONIN QUANT: CPT

## 2019-09-15 PROCEDURE — 93010 ELECTROCARDIOGRAM REPORT: CPT | Performed by: INTERNAL MEDICINE

## 2019-09-15 PROCEDURE — 82248 BILIRUBIN DIRECT: CPT

## 2019-09-15 PROCEDURE — 93005 ELECTROCARDIOGRAM TRACING: CPT | Performed by: EMERGENCY MEDICINE

## 2019-09-15 PROCEDURE — 6370000000 HC RX 637 (ALT 250 FOR IP): Performed by: HOSPITALIST

## 2019-09-15 PROCEDURE — 71046 X-RAY EXAM CHEST 2 VIEWS: CPT

## 2019-09-15 PROCEDURE — 87449 NOS EACH ORGANISM AG IA: CPT

## 2019-09-15 PROCEDURE — 83605 ASSAY OF LACTIC ACID: CPT

## 2019-09-15 PROCEDURE — 84145 PROCALCITONIN (PCT): CPT

## 2019-09-15 PROCEDURE — 87486 CHLMYD PNEUM DNA AMP PROBE: CPT

## 2019-09-15 PROCEDURE — 87581 M.PNEUMON DNA AMP PROBE: CPT

## 2019-09-15 PROCEDURE — 94640 AIRWAY INHALATION TREATMENT: CPT

## 2019-09-15 PROCEDURE — 6360000002 HC RX W HCPCS: Performed by: HOSPITALIST

## 2019-09-15 PROCEDURE — 2140000000 HC CCU INTERMEDIATE R&B

## 2019-09-15 PROCEDURE — 2580000003 HC RX 258: Performed by: HOSPITALIST

## 2019-09-15 PROCEDURE — 36415 COLL VENOUS BLD VENIPUNCTURE: CPT

## 2019-09-15 PROCEDURE — 2700000000 HC OXYGEN THERAPY PER DAY

## 2019-09-15 PROCEDURE — 87633 RESP VIRUS 12-25 TARGETS: CPT

## 2019-09-15 PROCEDURE — 85025 COMPLETE CBC W/AUTO DIFF WBC: CPT

## 2019-09-15 PROCEDURE — 87798 DETECT AGENT NOS DNA AMP: CPT

## 2019-09-15 PROCEDURE — 80053 COMPREHEN METABOLIC PANEL: CPT

## 2019-09-15 PROCEDURE — 87899 AGENT NOS ASSAY W/OPTIC: CPT

## 2019-09-15 RX ORDER — SODIUM CHLORIDE 0.9 % (FLUSH) 0.9 %
10 SYRINGE (ML) INJECTION PRN
Status: DISCONTINUED | OUTPATIENT
Start: 2019-09-15 | End: 2019-09-17 | Stop reason: HOSPADM

## 2019-09-15 RX ORDER — ACETAMINOPHEN 325 MG/1
650 TABLET ORAL EVERY 4 HOURS PRN
Status: DISCONTINUED | OUTPATIENT
Start: 2019-09-15 | End: 2019-09-17 | Stop reason: HOSPADM

## 2019-09-15 RX ORDER — SODIUM CHLORIDE 0.9 % (FLUSH) 0.9 %
10 SYRINGE (ML) INJECTION EVERY 12 HOURS SCHEDULED
Status: DISCONTINUED | OUTPATIENT
Start: 2019-09-15 | End: 2019-09-17 | Stop reason: HOSPADM

## 2019-09-15 RX ORDER — IPRATROPIUM BROMIDE AND ALBUTEROL SULFATE 2.5; .5 MG/3ML; MG/3ML
1 SOLUTION RESPIRATORY (INHALATION)
Status: DISCONTINUED | OUTPATIENT
Start: 2019-09-15 | End: 2019-09-17 | Stop reason: HOSPADM

## 2019-09-15 RX ORDER — CARVEDILOL 6.25 MG/1
6.25 TABLET ORAL 2 TIMES DAILY WITH MEALS
Status: DISCONTINUED | OUTPATIENT
Start: 2019-09-15 | End: 2019-09-17 | Stop reason: HOSPADM

## 2019-09-15 RX ORDER — PREDNISONE 20 MG/1
40 TABLET ORAL DAILY
Status: DISCONTINUED | OUTPATIENT
Start: 2019-09-17 | End: 2019-09-17 | Stop reason: HOSPADM

## 2019-09-15 RX ORDER — ATORVASTATIN CALCIUM 40 MG/1
40 TABLET, FILM COATED ORAL DAILY
Status: DISCONTINUED | OUTPATIENT
Start: 2019-09-15 | End: 2019-09-17 | Stop reason: HOSPADM

## 2019-09-15 RX ORDER — ONDANSETRON 2 MG/ML
4 INJECTION INTRAMUSCULAR; INTRAVENOUS EVERY 6 HOURS PRN
Status: DISCONTINUED | OUTPATIENT
Start: 2019-09-15 | End: 2019-09-17 | Stop reason: HOSPADM

## 2019-09-15 RX ORDER — LISINOPRIL 2.5 MG/1
2.5 TABLET ORAL DAILY
Status: DISCONTINUED | OUTPATIENT
Start: 2019-09-15 | End: 2019-09-17 | Stop reason: HOSPADM

## 2019-09-15 RX ORDER — METHYLPREDNISOLONE SODIUM SUCCINATE 40 MG/ML
40 INJECTION, POWDER, LYOPHILIZED, FOR SOLUTION INTRAMUSCULAR; INTRAVENOUS EVERY 6 HOURS
Status: COMPLETED | OUTPATIENT
Start: 2019-09-15 | End: 2019-09-17

## 2019-09-15 RX ORDER — ASPIRIN 81 MG/1
81 TABLET ORAL DAILY
Status: DISCONTINUED | OUTPATIENT
Start: 2019-09-15 | End: 2019-09-17 | Stop reason: HOSPADM

## 2019-09-15 RX ORDER — FUROSEMIDE 10 MG/ML
40 INJECTION INTRAMUSCULAR; INTRAVENOUS 2 TIMES DAILY
Status: DISCONTINUED | OUTPATIENT
Start: 2019-09-15 | End: 2019-09-17 | Stop reason: HOSPADM

## 2019-09-15 RX ORDER — CLOPIDOGREL BISULFATE 75 MG/1
75 TABLET ORAL DAILY
Status: DISCONTINUED | OUTPATIENT
Start: 2019-09-15 | End: 2019-09-17 | Stop reason: HOSPADM

## 2019-09-15 RX ADMIN — IPRATROPIUM BROMIDE AND ALBUTEROL SULFATE 1 AMPULE: .5; 3 SOLUTION RESPIRATORY (INHALATION) at 11:04

## 2019-09-15 RX ADMIN — FUROSEMIDE 40 MG: 10 INJECTION, SOLUTION INTRAMUSCULAR; INTRAVENOUS at 17:19

## 2019-09-15 RX ADMIN — SODIUM CHLORIDE, PRESERVATIVE FREE 10 ML: 5 INJECTION INTRAVENOUS at 23:48

## 2019-09-15 RX ADMIN — CARVEDILOL 6.25 MG: 6.25 TABLET, FILM COATED ORAL at 12:57

## 2019-09-15 RX ADMIN — LISINOPRIL 2.5 MG: 2.5 TABLET ORAL at 12:57

## 2019-09-15 RX ADMIN — ATORVASTATIN CALCIUM 40 MG: 40 TABLET, FILM COATED ORAL at 12:57

## 2019-09-15 RX ADMIN — IPRATROPIUM BROMIDE AND ALBUTEROL SULFATE 1 AMPULE: .5; 3 SOLUTION RESPIRATORY (INHALATION) at 19:11

## 2019-09-15 RX ADMIN — CLOPIDOGREL BISULFATE 75 MG: 75 TABLET ORAL at 12:57

## 2019-09-15 RX ADMIN — METHYLPREDNISOLONE SODIUM SUCCINATE 40 MG: 40 INJECTION, POWDER, FOR SOLUTION INTRAMUSCULAR; INTRAVENOUS at 17:18

## 2019-09-15 RX ADMIN — AZITHROMYCIN DIHYDRATE 500 MG: 500 INJECTION, POWDER, LYOPHILIZED, FOR SOLUTION INTRAVENOUS at 11:45

## 2019-09-15 RX ADMIN — CARVEDILOL 6.25 MG: 6.25 TABLET, FILM COATED ORAL at 17:19

## 2019-09-15 RX ADMIN — METHYLPREDNISOLONE SODIUM SUCCINATE 40 MG: 40 INJECTION, POWDER, FOR SOLUTION INTRAMUSCULAR; INTRAVENOUS at 23:48

## 2019-09-15 RX ADMIN — SODIUM CHLORIDE, PRESERVATIVE FREE 10 ML: 5 INJECTION INTRAVENOUS at 11:46

## 2019-09-15 RX ADMIN — ASPIRIN 81 MG: 81 TABLET, COATED ORAL at 12:57

## 2019-09-15 RX ADMIN — METHYLPREDNISOLONE SODIUM SUCCINATE 40 MG: 40 INJECTION, POWDER, FOR SOLUTION INTRAMUSCULAR; INTRAVENOUS at 11:45

## 2019-09-15 RX ADMIN — CEFTRIAXONE SODIUM 1 G: 1 INJECTION, POWDER, FOR SOLUTION INTRAMUSCULAR; INTRAVENOUS at 12:57

## 2019-09-15 RX ADMIN — IPRATROPIUM BROMIDE AND ALBUTEROL SULFATE 1 AMPULE: .5; 3 SOLUTION RESPIRATORY (INHALATION) at 14:41

## 2019-09-15 RX ADMIN — ENOXAPARIN SODIUM 40 MG: 40 INJECTION SUBCUTANEOUS at 11:45

## 2019-09-15 ASSESSMENT — ENCOUNTER SYMPTOMS
EYE ITCHING: 0
DIARRHEA: 0
VOMITING: 0
RHINORRHEA: 0
SORE THROAT: 0
EYE DISCHARGE: 0
ABDOMINAL PAIN: 0
SHORTNESS OF BREATH: 1
WHEEZING: 1
COUGH: 1

## 2019-09-15 ASSESSMENT — PAIN SCALES - GENERAL
PAINLEVEL_OUTOF10: 0

## 2019-09-15 NOTE — H&P
intubation and mechanical ventilation.  Patient does not want any resuscitation  POA: Stephon Murphy MD

## 2019-09-15 NOTE — PROGRESS NOTES
Skin assessment completed with Mark Merrill RN from ED. No skin issues noted. Pt has shortened fingers on right hand from old crush injury.

## 2019-09-16 LAB
ADENOVIRUS DETECTION BY PCR: NOT DETECTED
ANION GAP SERPL CALCULATED.3IONS-SCNC: 15 MMOL/L (ref 4–16)
BORDETELLA PERTUSSIS PCR: NOT DETECTED
BUN BLDV-MCNC: 19 MG/DL (ref 6–23)
CALCIUM SERPL-MCNC: 9.7 MG/DL (ref 8.3–10.6)
CHLAMYDOPHILA PNEUMONIA PCR: NOT DETECTED
CHLORIDE BLD-SCNC: 101 MMOL/L (ref 99–110)
CO2: 22 MMOL/L (ref 21–32)
CORONAVIRUS 229E PCR: NOT DETECTED
CORONAVIRUS HKU1 PCR: NOT DETECTED
CORONAVIRUS NL63 PCR: NOT DETECTED
CORONAVIRUS OC43 PCR: NOT DETECTED
CREAT SERPL-MCNC: 1.2 MG/DL (ref 0.9–1.3)
GFR AFRICAN AMERICAN: >60 ML/MIN/1.73M2
GFR NON-AFRICAN AMERICAN: >60 ML/MIN/1.73M2
GLUCOSE BLD-MCNC: 170 MG/DL (ref 70–99)
HCT VFR BLD CALC: 38.1 % (ref 42–52)
HEMOGLOBIN: 11.9 GM/DL (ref 13.5–18)
HUMAN METAPNEUMOVIRUS PCR: NOT DETECTED
INFLUENZA A BY PCR: NOT DETECTED
INFLUENZA A H1 (2009) PCR: NOT DETECTED
INFLUENZA A H1 PANDEMIC PCR: NOT DETECTED
INFLUENZA A H3 PCR: NOT DETECTED
INFLUENZA B BY PCR: NOT DETECTED
LEGIONELLA URINARY AG: NEGATIVE
MCH RBC QN AUTO: 26.1 PG (ref 27–31)
MCHC RBC AUTO-ENTMCNC: 31.2 % (ref 32–36)
MCV RBC AUTO: 83.6 FL (ref 78–100)
MYCOPLASMA PNEUMONIAE PCR: NOT DETECTED
PARAINFLUENZA 1 PCR: NOT DETECTED
PARAINFLUENZA 2 PCR: NOT DETECTED
PARAINFLUENZA 3 PCR: NOT DETECTED
PARAINFLUENZA 4 PCR: NOT DETECTED
PDW BLD-RTO: 15.8 % (ref 11.7–14.9)
PLATELET # BLD: 270 K/CU MM (ref 140–440)
PMV BLD AUTO: 10.1 FL (ref 7.5–11.1)
POTASSIUM SERPL-SCNC: 4.5 MMOL/L (ref 3.5–5.1)
PRO-BNP: 3364 PG/ML
RBC # BLD: 4.56 M/CU MM (ref 4.6–6.2)
RHINOVIRUS ENTEROVIRUS PCR: NOT DETECTED
RSV PCR: NOT DETECTED
SODIUM BLD-SCNC: 138 MMOL/L (ref 135–145)
STREP PNEUMONIAE ANTIGEN: NORMAL
WBC # BLD: 14.2 K/CU MM (ref 4–10.5)

## 2019-09-16 PROCEDURE — 2580000003 HC RX 258: Performed by: HOSPITALIST

## 2019-09-16 PROCEDURE — 85027 COMPLETE CBC AUTOMATED: CPT

## 2019-09-16 PROCEDURE — 2140000000 HC CCU INTERMEDIATE R&B

## 2019-09-16 PROCEDURE — 80048 BASIC METABOLIC PNL TOTAL CA: CPT

## 2019-09-16 PROCEDURE — 2700000000 HC OXYGEN THERAPY PER DAY

## 2019-09-16 PROCEDURE — 6370000000 HC RX 637 (ALT 250 FOR IP): Performed by: HOSPITALIST

## 2019-09-16 PROCEDURE — 83880 ASSAY OF NATRIURETIC PEPTIDE: CPT

## 2019-09-16 PROCEDURE — 6360000002 HC RX W HCPCS: Performed by: HOSPITALIST

## 2019-09-16 PROCEDURE — 94640 AIRWAY INHALATION TREATMENT: CPT

## 2019-09-16 PROCEDURE — 36415 COLL VENOUS BLD VENIPUNCTURE: CPT

## 2019-09-16 RX ADMIN — FUROSEMIDE 40 MG: 10 INJECTION, SOLUTION INTRAMUSCULAR; INTRAVENOUS at 09:46

## 2019-09-16 RX ADMIN — IPRATROPIUM BROMIDE AND ALBUTEROL SULFATE 1 AMPULE: .5; 3 SOLUTION RESPIRATORY (INHALATION) at 10:51

## 2019-09-16 RX ADMIN — METHYLPREDNISOLONE SODIUM SUCCINATE 40 MG: 40 INJECTION, POWDER, FOR SOLUTION INTRAMUSCULAR; INTRAVENOUS at 18:12

## 2019-09-16 RX ADMIN — CEFTRIAXONE SODIUM 1 G: 1 INJECTION, POWDER, FOR SOLUTION INTRAMUSCULAR; INTRAVENOUS at 13:16

## 2019-09-16 RX ADMIN — SODIUM CHLORIDE, PRESERVATIVE FREE 10 ML: 5 INJECTION INTRAVENOUS at 23:28

## 2019-09-16 RX ADMIN — CARVEDILOL 6.25 MG: 6.25 TABLET, FILM COATED ORAL at 18:12

## 2019-09-16 RX ADMIN — IPRATROPIUM BROMIDE AND ALBUTEROL SULFATE 1 AMPULE: .5; 3 SOLUTION RESPIRATORY (INHALATION) at 14:21

## 2019-09-16 RX ADMIN — SODIUM CHLORIDE, PRESERVATIVE FREE 10 ML: 5 INJECTION INTRAVENOUS at 06:17

## 2019-09-16 RX ADMIN — SODIUM CHLORIDE, PRESERVATIVE FREE 10 ML: 5 INJECTION INTRAVENOUS at 13:16

## 2019-09-16 RX ADMIN — ENOXAPARIN SODIUM 40 MG: 40 INJECTION SUBCUTANEOUS at 09:47

## 2019-09-16 RX ADMIN — SODIUM CHLORIDE, PRESERVATIVE FREE 10 ML: 5 INJECTION INTRAVENOUS at 09:48

## 2019-09-16 RX ADMIN — CARVEDILOL 6.25 MG: 6.25 TABLET, FILM COATED ORAL at 09:46

## 2019-09-16 RX ADMIN — IPRATROPIUM BROMIDE AND ALBUTEROL SULFATE 1 AMPULE: .5; 3 SOLUTION RESPIRATORY (INHALATION) at 07:05

## 2019-09-16 RX ADMIN — ASPIRIN 81 MG: 81 TABLET, COATED ORAL at 09:46

## 2019-09-16 RX ADMIN — CLOPIDOGREL BISULFATE 75 MG: 75 TABLET ORAL at 09:46

## 2019-09-16 RX ADMIN — METHYLPREDNISOLONE SODIUM SUCCINATE 40 MG: 40 INJECTION, POWDER, FOR SOLUTION INTRAMUSCULAR; INTRAVENOUS at 13:16

## 2019-09-16 RX ADMIN — IPRATROPIUM BROMIDE AND ALBUTEROL SULFATE 1 AMPULE: .5; 3 SOLUTION RESPIRATORY (INHALATION) at 19:12

## 2019-09-16 RX ADMIN — METHYLPREDNISOLONE SODIUM SUCCINATE 40 MG: 40 INJECTION, POWDER, FOR SOLUTION INTRAMUSCULAR; INTRAVENOUS at 06:17

## 2019-09-16 RX ADMIN — SODIUM CHLORIDE, PRESERVATIVE FREE 10 ML: 5 INJECTION INTRAVENOUS at 18:12

## 2019-09-16 RX ADMIN — SODIUM CHLORIDE, PRESERVATIVE FREE 10 ML: 5 INJECTION INTRAVENOUS at 13:49

## 2019-09-16 RX ADMIN — LISINOPRIL 2.5 MG: 2.5 TABLET ORAL at 09:45

## 2019-09-16 RX ADMIN — AZITHROMYCIN DIHYDRATE 500 MG: 500 INJECTION, POWDER, LYOPHILIZED, FOR SOLUTION INTRAVENOUS at 09:47

## 2019-09-16 RX ADMIN — METHYLPREDNISOLONE SODIUM SUCCINATE 40 MG: 40 INJECTION, POWDER, FOR SOLUTION INTRAMUSCULAR; INTRAVENOUS at 23:28

## 2019-09-16 RX ADMIN — FUROSEMIDE 40 MG: 10 INJECTION, SOLUTION INTRAMUSCULAR; INTRAVENOUS at 18:12

## 2019-09-16 RX ADMIN — ATORVASTATIN CALCIUM 40 MG: 40 TABLET, FILM COATED ORAL at 09:45

## 2019-09-16 ASSESSMENT — PAIN SCALES - GENERAL
PAINLEVEL_OUTOF10: 0

## 2019-09-16 NOTE — PLAN OF CARE
RN  Outcome: Ongoing  9/15/2019 1600 by Margy Cote RN  Outcome: Met This Shift     Problem: Discharge Planning:  Goal: Discharged to appropriate level of care  Description  Discharged to appropriate level of care  9/16/2019 0336 by June Epps RN  Outcome: Ongoing  9/15/2019 1600 by Margy Cote RN  Outcome: Ongoing

## 2019-09-17 VITALS
OXYGEN SATURATION: 95 % | WEIGHT: 174 LBS | RESPIRATION RATE: 22 BRPM | BODY MASS INDEX: 26.37 KG/M2 | SYSTOLIC BLOOD PRESSURE: 95 MMHG | DIASTOLIC BLOOD PRESSURE: 62 MMHG | TEMPERATURE: 97.6 F | HEART RATE: 91 BPM | HEIGHT: 68 IN

## 2019-09-17 PROBLEM — J44.1 COPD EXACERBATION (HCC): Status: RESOLVED | Noted: 2019-09-15 | Resolved: 2019-09-17

## 2019-09-17 LAB
ANION GAP SERPL CALCULATED.3IONS-SCNC: 12 MMOL/L (ref 4–16)
BUN BLDV-MCNC: 22 MG/DL (ref 6–23)
CALCIUM SERPL-MCNC: 9.5 MG/DL (ref 8.3–10.6)
CHLORIDE BLD-SCNC: 99 MMOL/L (ref 99–110)
CO2: 28 MMOL/L (ref 21–32)
CREAT SERPL-MCNC: 1.1 MG/DL (ref 0.9–1.3)
ESTIMATED AVERAGE GLUCOSE: 131 MG/DL
GFR AFRICAN AMERICAN: >60 ML/MIN/1.73M2
GFR NON-AFRICAN AMERICAN: >60 ML/MIN/1.73M2
GLUCOSE BLD-MCNC: 169 MG/DL (ref 70–99)
HBA1C MFR BLD: 6.2 % (ref 4.2–6.3)
HCT VFR BLD CALC: 37.7 % (ref 42–52)
HEMOGLOBIN: 11.6 GM/DL (ref 13.5–18)
MCH RBC QN AUTO: 25.5 PG (ref 27–31)
MCHC RBC AUTO-ENTMCNC: 30.8 % (ref 32–36)
MCV RBC AUTO: 82.9 FL (ref 78–100)
PDW BLD-RTO: 16 % (ref 11.7–14.9)
PLATELET # BLD: 285 K/CU MM (ref 140–440)
PMV BLD AUTO: 10 FL (ref 7.5–11.1)
POTASSIUM SERPL-SCNC: 4.2 MMOL/L (ref 3.5–5.1)
RBC # BLD: 4.55 M/CU MM (ref 4.6–6.2)
SODIUM BLD-SCNC: 139 MMOL/L (ref 135–145)
WBC # BLD: 17.5 K/CU MM (ref 4–10.5)

## 2019-09-17 PROCEDURE — 83036 HEMOGLOBIN GLYCOSYLATED A1C: CPT

## 2019-09-17 PROCEDURE — 2580000003 HC RX 258: Performed by: HOSPITALIST

## 2019-09-17 PROCEDURE — 6360000002 HC RX W HCPCS: Performed by: HOSPITALIST

## 2019-09-17 PROCEDURE — 6370000000 HC RX 637 (ALT 250 FOR IP): Performed by: HOSPITALIST

## 2019-09-17 PROCEDURE — 94640 AIRWAY INHALATION TREATMENT: CPT

## 2019-09-17 PROCEDURE — 85027 COMPLETE CBC AUTOMATED: CPT

## 2019-09-17 PROCEDURE — 36415 COLL VENOUS BLD VENIPUNCTURE: CPT

## 2019-09-17 PROCEDURE — 80048 BASIC METABOLIC PNL TOTAL CA: CPT

## 2019-09-17 RX ORDER — IPRATROPIUM BROMIDE AND ALBUTEROL SULFATE 2.5; .5 MG/3ML; MG/3ML
1 SOLUTION RESPIRATORY (INHALATION) 4 TIMES DAILY
Qty: 360 ML | Refills: 2 | Status: ON HOLD | OUTPATIENT
Start: 2019-09-17 | End: 2020-01-28 | Stop reason: SDUPTHER

## 2019-09-17 RX ORDER — ATORVASTATIN CALCIUM 40 MG/1
40 TABLET, FILM COATED ORAL DAILY
Qty: 90 TABLET | Refills: 0 | Status: ON HOLD | OUTPATIENT
Start: 2019-09-17 | End: 2020-01-28 | Stop reason: SDUPTHER

## 2019-09-17 RX ORDER — CARVEDILOL 6.25 MG/1
6.25 TABLET ORAL 2 TIMES DAILY WITH MEALS
Qty: 180 TABLET | Refills: 0 | Status: ON HOLD | OUTPATIENT
Start: 2019-09-17 | End: 2020-01-28 | Stop reason: HOSPADM

## 2019-09-17 RX ORDER — NITROGLYCERIN 0.4 MG/1
0.4 TABLET SUBLINGUAL EVERY 5 MIN PRN
Qty: 25 TABLET | Refills: 0 | Status: ON HOLD | OUTPATIENT
Start: 2019-09-17 | End: 2020-05-22 | Stop reason: HOSPADM

## 2019-09-17 RX ORDER — SPIRONOLACTONE 25 MG/1
25 TABLET ORAL DAILY
Qty: 90 TABLET | Refills: 0 | Status: ON HOLD | OUTPATIENT
Start: 2019-09-17 | End: 2020-01-28 | Stop reason: SDUPTHER

## 2019-09-17 RX ORDER — DICYCLOMINE HCL 20 MG
20 TABLET ORAL EVERY 6 HOURS PRN
Qty: 120 TABLET | Refills: 0 | Status: SHIPPED | OUTPATIENT
Start: 2019-09-17 | End: 2020-01-23

## 2019-09-17 RX ORDER — ASPIRIN 81 MG/1
81 TABLET ORAL DAILY
Qty: 90 TABLET | Refills: 0 | Status: ON HOLD | OUTPATIENT
Start: 2019-09-17 | End: 2020-01-28 | Stop reason: SDUPTHER

## 2019-09-17 RX ORDER — ALBUTEROL SULFATE 90 UG/1
2 AEROSOL, METERED RESPIRATORY (INHALATION) EVERY 4 HOURS PRN
Qty: 1 INHALER | Refills: 2 | Status: ON HOLD | OUTPATIENT
Start: 2019-09-17 | End: 2020-01-28 | Stop reason: SDUPTHER

## 2019-09-17 RX ORDER — FUROSEMIDE 40 MG/1
40 TABLET ORAL 2 TIMES DAILY
Qty: 180 TABLET | Refills: 0 | Status: ON HOLD | OUTPATIENT
Start: 2019-09-17 | End: 2020-01-28

## 2019-09-17 RX ORDER — PREDNISONE 20 MG/1
40 TABLET ORAL DAILY
Qty: 6 TABLET | Refills: 0 | Status: SHIPPED | OUTPATIENT
Start: 2019-09-17 | End: 2019-09-20

## 2019-09-17 RX ORDER — CLOPIDOGREL BISULFATE 75 MG/1
75 TABLET ORAL DAILY
Qty: 90 TABLET | Refills: 0 | Status: ON HOLD | OUTPATIENT
Start: 2019-09-17 | End: 2020-01-28 | Stop reason: HOSPADM

## 2019-09-17 RX ORDER — LISINOPRIL 2.5 MG/1
2.5 TABLET ORAL DAILY
Qty: 90 TABLET | Refills: 0 | Status: ON HOLD | OUTPATIENT
Start: 2019-09-17 | End: 2020-01-28 | Stop reason: HOSPADM

## 2019-09-17 RX ADMIN — ATORVASTATIN CALCIUM 40 MG: 40 TABLET, FILM COATED ORAL at 08:50

## 2019-09-17 RX ADMIN — SODIUM CHLORIDE, PRESERVATIVE FREE 10 ML: 5 INJECTION INTRAVENOUS at 08:48

## 2019-09-17 RX ADMIN — METHYLPREDNISOLONE SODIUM SUCCINATE 40 MG: 40 INJECTION, POWDER, FOR SOLUTION INTRAMUSCULAR; INTRAVENOUS at 06:26

## 2019-09-17 RX ADMIN — LISINOPRIL 2.5 MG: 2.5 TABLET ORAL at 08:48

## 2019-09-17 RX ADMIN — ASPIRIN 81 MG: 81 TABLET, COATED ORAL at 08:48

## 2019-09-17 RX ADMIN — IPRATROPIUM BROMIDE AND ALBUTEROL SULFATE 1 AMPULE: .5; 3 SOLUTION RESPIRATORY (INHALATION) at 07:09

## 2019-09-17 RX ADMIN — ENOXAPARIN SODIUM 40 MG: 40 INJECTION SUBCUTANEOUS at 08:47

## 2019-09-17 RX ADMIN — FUROSEMIDE 40 MG: 10 INJECTION, SOLUTION INTRAMUSCULAR; INTRAVENOUS at 08:47

## 2019-09-17 RX ADMIN — CLOPIDOGREL BISULFATE 75 MG: 75 TABLET ORAL at 08:48

## 2019-09-17 RX ADMIN — CARVEDILOL 6.25 MG: 6.25 TABLET, FILM COATED ORAL at 08:49

## 2019-09-17 ASSESSMENT — PAIN SCALES - GENERAL
PAINLEVEL_OUTOF10: 0

## 2019-09-17 NOTE — PROGRESS NOTES
Skin assessment completed by this RN and My Sinclair RN during shift handoff, patient has not skin issues noted at this time.

## 2019-09-17 NOTE — PLAN OF CARE
Noma Fleischer, RN  Outcome: Ongoing     Problem: Discharge Planning:  Goal: Discharged to appropriate level of care  Description  Discharged to appropriate level of care  9/16/2019 2159 by Cathryn Shirley RN  Outcome: Ongoing  9/16/2019 1614 by Bren Grimm RN  Outcome: Ongoing

## 2019-09-20 LAB
CULTURE: NORMAL
CULTURE: NORMAL
Lab: NORMAL
Lab: NORMAL
SPECIMEN: NORMAL
SPECIMEN: NORMAL

## 2019-09-23 NOTE — ED PROVIDER NOTES
180 tablet 0    ipratropium-albuterol (DUONEB) 0.5-2.5 (3) MG/3ML SOLN nebulizer solution Inhale 3 mLs into the lungs 4 times daily 360 mL 2    lisinopril (PRINIVIL;ZESTRIL) 2.5 MG tablet Take 1 tablet by mouth daily 90 tablet 0    nitroGLYCERIN (NITROSTAT) 0.4 MG SL tablet Place 1 tablet under the tongue every 5 minutes as needed for Chest pain 25 tablet 0    Respiratory Therapy Supplies (NEBULIZER COMPRESSOR) KIT 1 kit by Does not apply route once for 1 dose Diagnosis: COPD 1 kit 0    spironolactone (ALDACTONE) 25 MG tablet Take 1 tablet by mouth daily 90 tablet 0     No Known Allergies    Nursing Notes Reviewed    Physical Exam:  ED Triage Vitals   Enc Vitals Group      BP 09/15/19 0947 (!) 132/92      Pulse 09/15/19 0935 82      Resp 09/15/19 0330 28      Temp 09/15/19 1101 97.5 °F (36.4 °C)      Temp Source 09/15/19 1101 Temporal      SpO2 09/15/19 0330 99 %      Weight 09/15/19 1114 164 lb 14.5 oz (74.8 kg)      Height 09/15/19 1114 5' 7.99\" (1.727 m)      Head Circumference --       Peak Flow --       Pain Score --       Pain Loc --       Pain Edu? --       Excl. in [address]? --      GENERAL APPEARANCE: Awake and alert. Cooperative. Appears a bit uncomfortable. HEAD: Normocephalic. Atraumatic. EYES: EOM's grossly intact. Sclera anicteric. ENT: Tolerates saliva. No trismus. NECK: Supple. Trachea midline. CARDIO: RRR. Radial pulse 2+. LUNGS: Respirations mildly labored. Wheezing throughout. Diminished in the bases. ABDOMEN: Soft. Non-distended. Non-tender. EXTREMITIES: No acute deformities. Mild lower extremity edema. Symmetric and nontender. SKIN: Warm and dry. NEUROLOGICAL: No gross facial drooping. Moves all 4 extremities spontaneously. PSYCHIATRIC: Normal mood.      Labs:  Results for orders placed or performed during the hospital encounter of 09/15/19   Legionella antigen, urine   Result Value Ref Range    Legionella Urinary Ag NEGATIVE NEGATIVE   Strep Pneumoniae Antigen 78 - 100 FL    MCH 26.1 (L) 27 - 31 PG    MCHC 31.2 (L) 32.0 - 36.0 %    RDW 15.8 (H) 11.7 - 14.9 %    Platelets 385 182 - 696 K/CU MM    MPV 10.1 7.5 - 11.1 FL   Basic Metabolic Panel w/ Reflex to MG   Result Value Ref Range    Sodium 138 135 - 145 MMOL/L    Potassium 4.5 3.5 - 5.1 MMOL/L    Chloride 101 99 - 110 mMol/L    CO2 22 21 - 32 MMOL/L    Anion Gap 15 4 - 16    BUN 19 6 - 23 MG/DL    CREATININE 1.2 0.9 - 1.3 MG/DL    Glucose 170 (H) 70 - 99 MG/DL    Calcium 9.7 8.3 - 10.6 MG/DL    GFR Non-African American >60 >60 mL/min/1.73m2    GFR African American >60 >60 mL/min/1.73m2   Brain Natriuretic Peptide   Result Value Ref Range    Pro-BNP 3,364 (H) <300 PG/ML   Basic Metabolic Panel   Result Value Ref Range    Sodium 139 135 - 145 MMOL/L    Potassium 4.2 3.5 - 5.1 MMOL/L    Chloride 99 99 - 110 mMol/L    CO2 28 21 - 32 MMOL/L    Anion Gap 12 4 - 16    BUN 22 6 - 23 MG/DL    CREATININE 1.1 0.9 - 1.3 MG/DL    Glucose 169 (H) 70 - 99 MG/DL    Calcium 9.5 8.3 - 10.6 MG/DL    GFR Non-African American >60 >60 mL/min/1.73m2    GFR African American >60 >60 mL/min/1.73m2   CBC   Result Value Ref Range    WBC 17.5 (H) 4.0 - 10.5 K/CU MM    RBC 4.55 (L) 4.6 - 6.2 M/CU MM    Hemoglobin 11.6 (L) 13.5 - 18.0 GM/DL    Hematocrit 37.7 (L) 42 - 52 %    MCV 82.9 78 - 100 FL    MCH 25.5 (L) 27 - 31 PG    MCHC 30.8 (L) 32.0 - 36.0 %    RDW 16.0 (H) 11.7 - 14.9 %    Platelets 576 021 - 542 K/CU MM    MPV 10.0 7.5 - 11.1 FL   Hemoglobin A1c   Result Value Ref Range    Hemoglobin A1C 6.2 4.2 - 6.3 %    eAG 131 mg/dL   POCT Venous   Result Value Ref Range    pH, Sukumar 7.36 7.32 - 7.42    pCO2, Sukumar 39.6 38 - 52 mmHG    pO2, Sukumar 66.7 (H) 28 - 48 mmHG    Base Exc, Mixed 2.9 (H) 0 - 1.2    Base Excess MINUS 0 - 3.3    HCO3, Venous 22.3 19 - 25 MMOL/L    O2 Sat, Sukumar 92.2 (H) 50 - 70 %    CO2 Content 23.5 19 - 24 MMOL/L    Source: Venous    EKG 12 Lead   Result Value Ref Range    Ventricular Rate 104 BPM    Atrial Rate 104 BPM    P-R

## 2020-01-23 ENCOUNTER — APPOINTMENT (OUTPATIENT)
Dept: ULTRASOUND IMAGING | Age: 50
DRG: 291 | End: 2020-01-23
Attending: INTERNAL MEDICINE
Payer: MEDICARE

## 2020-01-23 ENCOUNTER — APPOINTMENT (OUTPATIENT)
Dept: GENERAL RADIOLOGY | Age: 50
End: 2020-01-23
Payer: MEDICARE

## 2020-01-23 ENCOUNTER — HOSPITAL ENCOUNTER (INPATIENT)
Age: 50
LOS: 5 days | Discharge: HOME | DRG: 291 | End: 2020-01-28
Attending: INTERNAL MEDICINE | Admitting: INTERNAL MEDICINE
Payer: MEDICARE

## 2020-01-23 ENCOUNTER — HOSPITAL ENCOUNTER (EMERGENCY)
Age: 50
Discharge: ANOTHER HOSPITAL | End: 2020-01-23
Attending: EMERGENCY MEDICINE
Payer: MEDICARE

## 2020-01-23 VITALS
SYSTOLIC BLOOD PRESSURE: 108 MMHG | HEART RATE: 104 BPM | RESPIRATION RATE: 29 BRPM | DIASTOLIC BLOOD PRESSURE: 81 MMHG | HEIGHT: 71 IN | TEMPERATURE: 97.9 F | WEIGHT: 174 LBS | OXYGEN SATURATION: 100 % | BODY MASS INDEX: 24.36 KG/M2

## 2020-01-23 PROBLEM — I50.9 HEART FAILURE (HCC): Status: ACTIVE | Noted: 2020-01-23

## 2020-01-23 LAB
ALBUMIN SERPL-MCNC: 4.4 GM/DL (ref 3.4–5)
ALP BLD-CCNC: 140 IU/L (ref 40–129)
ALT SERPL-CCNC: 321 U/L (ref 10–40)
ANION GAP SERPL CALCULATED.3IONS-SCNC: 14 MMOL/L (ref 4–16)
AST SERPL-CCNC: 193 IU/L (ref 15–37)
BASE EXCESS: ABNORMAL (ref 0–3.3)
BASOPHILS ABSOLUTE: 0.1 K/CU MM
BASOPHILS RELATIVE PERCENT: 1.2 % (ref 0–1)
BILIRUB SERPL-MCNC: 1.1 MG/DL (ref 0–1)
BUN BLDV-MCNC: 34 MG/DL (ref 6–23)
CALCIUM SERPL-MCNC: 9.3 MG/DL (ref 8.3–10.6)
CARBON MONOXIDE, BLOOD: 2.1 % (ref 0–5)
CHLORIDE BLD-SCNC: 96 MMOL/L (ref 99–110)
CO2 CONTENT: 21.5 MMOL/L (ref 19–24)
CO2: 25 MMOL/L (ref 21–32)
COMMENT: ABNORMAL
CREAT SERPL-MCNC: 1.5 MG/DL (ref 0.9–1.3)
DIFFERENTIAL TYPE: ABNORMAL
EOSINOPHILS ABSOLUTE: 0.3 K/CU MM
EOSINOPHILS RELATIVE PERCENT: 3.3 % (ref 0–3)
GFR AFRICAN AMERICAN: >60 ML/MIN/1.73M2
GFR NON-AFRICAN AMERICAN: 50 ML/MIN/1.73M2
GLUCOSE BLD-MCNC: 107 MG/DL (ref 70–99)
GLUCOSE BLD-MCNC: 118 MG/DL (ref 70–99)
HCO3 ARTERIAL: 20.4 MMOL/L (ref 18–23)
HCT VFR BLD CALC: 39.1 % (ref 42–52)
HEMOGLOBIN: 11.6 GM/DL (ref 13.5–18)
IMMATURE NEUTROPHIL %: 0.7 % (ref 0–0.43)
LIPASE: 19 IU/L (ref 13–60)
LYMPHOCYTES ABSOLUTE: 1.5 K/CU MM
LYMPHOCYTES RELATIVE PERCENT: 20.2 % (ref 24–44)
MCH RBC QN AUTO: 24.3 PG (ref 27–31)
MCHC RBC AUTO-ENTMCNC: 29.7 % (ref 32–36)
MCV RBC AUTO: 82 FL (ref 78–100)
METHEMOGLOBIN ARTERIAL: 0.9 %
MONOCYTES ABSOLUTE: 0.6 K/CU MM
MONOCYTES RELATIVE PERCENT: 7.4 % (ref 0–4)
O2 SATURATION: 97.4 % (ref 96–97)
PCO2 ARTERIAL: 37 MMHG (ref 32–45)
PDW BLD-RTO: 16.9 % (ref 11.7–14.9)
PH BLOOD: 7.35 (ref 7.34–7.45)
PLATELET # BLD: 332 K/CU MM (ref 140–440)
PMV BLD AUTO: 10.4 FL (ref 7.5–11.1)
PO2 ARTERIAL: 538 MMHG (ref 75–100)
POTASSIUM SERPL-SCNC: 4.8 MMOL/L (ref 3.5–5.1)
PRO-BNP: 5394 PG/ML
PROCALCITONIN: 0.15
RAPID INFLUENZA  B AGN: NEGATIVE
RAPID INFLUENZA A AGN: NEGATIVE
RBC # BLD: 4.77 M/CU MM (ref 4.6–6.2)
SEGMENTED NEUTROPHILS ABSOLUTE COUNT: 5.1 K/CU MM
SEGMENTED NEUTROPHILS RELATIVE PERCENT: 67.2 % (ref 36–66)
SODIUM BLD-SCNC: 135 MMOL/L (ref 135–145)
TOTAL IMMATURE NEUTOROPHIL: 0.05 K/CU MM
TOTAL PROTEIN: 7.3 GM/DL (ref 6.4–8.2)
TROPONIN T: 0.07 NG/ML
TROPONIN T: 0.12 NG/ML
TSH HIGH SENSITIVITY: 2.81 UIU/ML (ref 0.27–4.2)
WBC # BLD: 7.6 K/CU MM (ref 4–10.5)

## 2020-01-23 PROCEDURE — 71046 X-RAY EXAM CHEST 2 VIEWS: CPT

## 2020-01-23 PROCEDURE — 6370000000 HC RX 637 (ALT 250 FOR IP): Performed by: INTERNAL MEDICINE

## 2020-01-23 PROCEDURE — 85025 COMPLETE CBC W/AUTO DIFF WBC: CPT

## 2020-01-23 PROCEDURE — 2140000000 HC CCU INTERMEDIATE R&B

## 2020-01-23 PROCEDURE — 94660 CPAP INITIATION&MGMT: CPT

## 2020-01-23 PROCEDURE — 99222 1ST HOSP IP/OBS MODERATE 55: CPT | Performed by: INTERNAL MEDICINE

## 2020-01-23 PROCEDURE — 96374 THER/PROPH/DIAG INJ IV PUSH: CPT

## 2020-01-23 PROCEDURE — 36600 WITHDRAWAL OF ARTERIAL BLOOD: CPT

## 2020-01-23 PROCEDURE — 82962 GLUCOSE BLOOD TEST: CPT

## 2020-01-23 PROCEDURE — 76705 ECHO EXAM OF ABDOMEN: CPT

## 2020-01-23 PROCEDURE — 6360000002 HC RX W HCPCS: Performed by: PHYSICIAN ASSISTANT

## 2020-01-23 PROCEDURE — 84484 ASSAY OF TROPONIN QUANT: CPT

## 2020-01-23 PROCEDURE — 6360000002 HC RX W HCPCS: Performed by: EMERGENCY MEDICINE

## 2020-01-23 PROCEDURE — 93005 ELECTROCARDIOGRAM TRACING: CPT | Performed by: EMERGENCY MEDICINE

## 2020-01-23 PROCEDURE — 82803 BLOOD GASES ANY COMBINATION: CPT

## 2020-01-23 PROCEDURE — 6360000002 HC RX W HCPCS: Performed by: INTERNAL MEDICINE

## 2020-01-23 PROCEDURE — 83690 ASSAY OF LIPASE: CPT

## 2020-01-23 PROCEDURE — 84145 PROCALCITONIN (PCT): CPT

## 2020-01-23 PROCEDURE — 80053 COMPREHEN METABOLIC PANEL: CPT

## 2020-01-23 PROCEDURE — 2580000003 HC RX 258: Performed by: PHYSICIAN ASSISTANT

## 2020-01-23 PROCEDURE — 83880 ASSAY OF NATRIURETIC PEPTIDE: CPT

## 2020-01-23 PROCEDURE — 93005 ELECTROCARDIOGRAM TRACING: CPT | Performed by: PHYSICIAN ASSISTANT

## 2020-01-23 PROCEDURE — 87804 INFLUENZA ASSAY W/OPTIC: CPT

## 2020-01-23 PROCEDURE — 99285 EMERGENCY DEPT VISIT HI MDM: CPT

## 2020-01-23 PROCEDURE — 2580000003 HC RX 258: Performed by: INTERNAL MEDICINE

## 2020-01-23 PROCEDURE — 84443 ASSAY THYROID STIM HORMONE: CPT

## 2020-01-23 RX ORDER — HYDROXYZINE HYDROCHLORIDE 50 MG/ML
50 INJECTION, SOLUTION INTRAMUSCULAR EVERY 6 HOURS PRN
Status: DISCONTINUED | OUTPATIENT
Start: 2020-01-23 | End: 2020-01-28 | Stop reason: HOSPADM

## 2020-01-23 RX ORDER — SODIUM CHLORIDE 0.9 % (FLUSH) 0.9 %
10 SYRINGE (ML) INJECTION PRN
Status: DISCONTINUED | OUTPATIENT
Start: 2020-01-23 | End: 2020-01-28 | Stop reason: HOSPADM

## 2020-01-23 RX ORDER — FUROSEMIDE 10 MG/ML
40 INJECTION INTRAMUSCULAR; INTRAVENOUS 2 TIMES DAILY
Status: DISCONTINUED | OUTPATIENT
Start: 2020-01-23 | End: 2020-01-25

## 2020-01-23 RX ORDER — ASPIRIN 81 MG/1
81 TABLET ORAL DAILY
Status: DISCONTINUED | OUTPATIENT
Start: 2020-01-23 | End: 2020-01-28 | Stop reason: HOSPADM

## 2020-01-23 RX ORDER — NICOTINE POLACRILEX 4 MG
15 LOZENGE BUCCAL PRN
Status: DISCONTINUED | OUTPATIENT
Start: 2020-01-23 | End: 2020-01-28 | Stop reason: HOSPADM

## 2020-01-23 RX ORDER — DEXTROSE MONOHYDRATE 50 MG/ML
100 INJECTION, SOLUTION INTRAVENOUS PRN
Status: DISCONTINUED | OUTPATIENT
Start: 2020-01-23 | End: 2020-01-28 | Stop reason: HOSPADM

## 2020-01-23 RX ORDER — SODIUM CHLORIDE 0.9 % (FLUSH) 0.9 %
10 SYRINGE (ML) INJECTION EVERY 12 HOURS SCHEDULED
Status: DISCONTINUED | OUTPATIENT
Start: 2020-01-23 | End: 2020-01-28 | Stop reason: HOSPADM

## 2020-01-23 RX ORDER — POLYETHYLENE GLYCOL 3350 17 G/17G
17 POWDER, FOR SOLUTION ORAL DAILY PRN
Status: DISCONTINUED | OUTPATIENT
Start: 2020-01-23 | End: 2020-01-28 | Stop reason: HOSPADM

## 2020-01-23 RX ORDER — CLOPIDOGREL BISULFATE 75 MG/1
75 TABLET ORAL DAILY
Status: DISCONTINUED | OUTPATIENT
Start: 2020-01-23 | End: 2020-01-28 | Stop reason: HOSPADM

## 2020-01-23 RX ORDER — ALBUTEROL SULFATE 90 UG/1
2 AEROSOL, METERED RESPIRATORY (INHALATION) EVERY 4 HOURS PRN
Status: DISCONTINUED | OUTPATIENT
Start: 2020-01-23 | End: 2020-01-28 | Stop reason: HOSPADM

## 2020-01-23 RX ORDER — METHYLPREDNISOLONE SODIUM SUCCINATE 125 MG/2ML
125 INJECTION, POWDER, LYOPHILIZED, FOR SOLUTION INTRAMUSCULAR; INTRAVENOUS ONCE
Status: COMPLETED | OUTPATIENT
Start: 2020-01-23 | End: 2020-01-23

## 2020-01-23 RX ORDER — ONDANSETRON 2 MG/ML
4 INJECTION INTRAMUSCULAR; INTRAVENOUS EVERY 6 HOURS PRN
Status: DISCONTINUED | OUTPATIENT
Start: 2020-01-23 | End: 2020-01-28 | Stop reason: HOSPADM

## 2020-01-23 RX ORDER — LISINOPRIL 2.5 MG/1
2.5 TABLET ORAL DAILY
Status: DISCONTINUED | OUTPATIENT
Start: 2020-01-23 | End: 2020-01-27

## 2020-01-23 RX ORDER — SPIRONOLACTONE 25 MG/1
25 TABLET ORAL DAILY
Status: DISCONTINUED | OUTPATIENT
Start: 2020-01-23 | End: 2020-01-28 | Stop reason: HOSPADM

## 2020-01-23 RX ORDER — FUROSEMIDE 10 MG/ML
60 INJECTION INTRAMUSCULAR; INTRAVENOUS ONCE
Status: COMPLETED | OUTPATIENT
Start: 2020-01-23 | End: 2020-01-23

## 2020-01-23 RX ORDER — IPRATROPIUM BROMIDE AND ALBUTEROL SULFATE 2.5; .5 MG/3ML; MG/3ML
1 SOLUTION RESPIRATORY (INHALATION) 4 TIMES DAILY
Status: DISCONTINUED | OUTPATIENT
Start: 2020-01-23 | End: 2020-01-28 | Stop reason: HOSPADM

## 2020-01-23 RX ORDER — LIDOCAINE HYDROCHLORIDE 10 MG/ML
5 INJECTION, SOLUTION EPIDURAL; INFILTRATION; INTRACAUDAL; PERINEURAL ONCE
Status: DISCONTINUED | OUTPATIENT
Start: 2020-01-23 | End: 2020-01-28 | Stop reason: HOSPADM

## 2020-01-23 RX ORDER — DEXTROSE MONOHYDRATE 25 G/50ML
12.5 INJECTION, SOLUTION INTRAVENOUS PRN
Status: DISCONTINUED | OUTPATIENT
Start: 2020-01-23 | End: 2020-01-28 | Stop reason: HOSPADM

## 2020-01-23 RX ORDER — ATORVASTATIN CALCIUM 40 MG/1
40 TABLET, FILM COATED ORAL NIGHTLY
Status: DISCONTINUED | OUTPATIENT
Start: 2020-01-23 | End: 2020-01-28 | Stop reason: HOSPADM

## 2020-01-23 RX ORDER — LORAZEPAM 2 MG/ML
0.25 INJECTION INTRAMUSCULAR ONCE
Status: COMPLETED | OUTPATIENT
Start: 2020-01-23 | End: 2020-01-23

## 2020-01-23 RX ORDER — CARVEDILOL 6.25 MG/1
6.25 TABLET ORAL 2 TIMES DAILY WITH MEALS
Status: DISCONTINUED | OUTPATIENT
Start: 2020-01-23 | End: 2020-01-28

## 2020-01-23 RX ADMIN — FUROSEMIDE 40 MG: 10 INJECTION, SOLUTION INTRAMUSCULAR; INTRAVENOUS at 21:04

## 2020-01-23 RX ADMIN — SODIUM CHLORIDE, PRESERVATIVE FREE 10 ML: 5 INJECTION INTRAVENOUS at 21:05

## 2020-01-23 RX ADMIN — SODIUM CHLORIDE, PRESERVATIVE FREE 10 ML: 5 INJECTION INTRAVENOUS at 20:53

## 2020-01-23 RX ADMIN — SPIRONOLACTONE 25 MG: 25 TABLET ORAL at 21:05

## 2020-01-23 RX ADMIN — LORAZEPAM 0.25 MG: 2 INJECTION INTRAMUSCULAR; INTRAVENOUS at 22:21

## 2020-01-23 RX ADMIN — LISINOPRIL 2.5 MG: 2.5 TABLET ORAL at 21:05

## 2020-01-23 RX ADMIN — SODIUM CHLORIDE, PRESERVATIVE FREE 10 ML: 5 INJECTION INTRAVENOUS at 22:22

## 2020-01-23 RX ADMIN — ATORVASTATIN CALCIUM 40 MG: 40 TABLET, FILM COATED ORAL at 20:52

## 2020-01-23 RX ADMIN — ENOXAPARIN SODIUM 80 MG: 80 INJECTION SUBCUTANEOUS at 22:21

## 2020-01-23 RX ADMIN — FUROSEMIDE 60 MG: 10 INJECTION, SOLUTION INTRAMUSCULAR; INTRAVENOUS at 16:58

## 2020-01-23 RX ADMIN — ASPIRIN 81 MG: 81 TABLET, COATED ORAL at 21:05

## 2020-01-23 RX ADMIN — CARVEDILOL 6.25 MG: 6.25 TABLET, FILM COATED ORAL at 20:52

## 2020-01-23 RX ADMIN — METHYLPREDNISOLONE SODIUM SUCCINATE 125 MG: 125 INJECTION, POWDER, FOR SOLUTION INTRAMUSCULAR; INTRAVENOUS at 20:52

## 2020-01-23 RX ADMIN — CLOPIDOGREL BISULFATE 75 MG: 75 TABLET ORAL at 21:05

## 2020-01-23 NOTE — ED NOTES
This nurse to resume care. EKG completed and given to Dr. Tresa Mccall. While completing EKG patient removed BIPAP mask and states \"Im not wearing this damn thing anymore. \" Patient O2 sat 99% on room air, appears not to be in distress. Dr. Tresa Mccall notified, states BIPAP can remain off at this time. Patient provided water and chap stick.       Marisel Harrell RN  01/23/20 3209

## 2020-01-23 NOTE — PROGRESS NOTES
Pt arrive to unit bed 3118. Pt alert and oriented x4. Pt very anxious stating he cannot breath. Pt is on a non-rebreather at 15L and sat at 100%. Skin assessment completed. Pt skin is clean and dry. Scattered scabs and bruising. Pt right fingers are deformed. Pt belongings include cell phone, pants, shirt, underwear, shoes, belt, keys hat,  and jacket. Pt oriented to room. Pt asking for fan. Call light in reach and bed alarm on. Perfect serve sent to Dr. Mayra Lewis to notify of pt arrival to [facility]. States that Dr. Maurita Crigler is taking over. Perfect serve sent to Dr. Maurita Crigler.

## 2020-01-23 NOTE — ED NOTES
Patient lying in bed watching tv, respirations deep and 24 per/minute on 3L NC at 100%. Patient requesting to go back on BIPAP. Dr. Lisa Ling notified, notified respiratory. Patient placed back on BIPAP.      Max Martinez RN  01/23/20 7579

## 2020-01-23 NOTE — H&P
change bowel habits. Ten point ROS reviewed negative, unless as noted above    Objective:   No intake or output data in the 24 hours ending 01/23/20 1845   Vitals:   Vitals:    01/23/20 1841   BP: (!) 178/128   Pulse: 119   Resp: (!) 32   Temp: 100.2 °F (37.9 °C)   SpO2: 100%     Physical Exam:   GEN alert and in moderate respiratory distress short of breath upon talking  EYES Pupils are equally round. No scleral erythema, discharge, or conjunctivitis. HENT Mucous membranes are moist. Oral pharynx without exudates, no evidence of thrush. NECK Supple, no apparent thyromegaly or masses. RESP Clear to auscultation, no wheezes, rales or rhonchi. Symmetric chest movement while on room air. CARDIO/VASC S1/S2 auscultated. Regular rate without appreciable murmurs, rubs, or gallops. No JVD or carotid bruits. Peripheral pulses equal bilaterally and palpable. No peripheral edema. GI Abdomen is soft without significant tenderness, masses, or guarding. Bowel sounds are normoactive. Rectal exam deferred.  No costovertebral angle tenderness. Hansen catheter is not present. HEME/LYMPH No palpable cervical lymphadenopathy and no hepatosplenomegaly. No petechiae or ecchymoses. MSK No gross joint deformities. SKIN Normal coloration, warm, dry. NEURO Cranial nerves appear grossly intact, normal speech, no lateralizing weakness. PSYCH Awake, alert, oriented x 4. Affect appropriate. Past Medical History:      Past Medical History:   Diagnosis Date    CAD (coronary artery disease)     [facility] [address]     Depression     HIGH CHOLESTEROL     Hypertension     MI (myocardial infarction) ([facility] [address] 2011    S/P coronary artery bypass graft x 4 2011    CABG x 4 Dr Teran Res:  has a past surgical history that includes Cardiac surgery (11/2010); Bypass Graft (04/10/2011); and Leg Surgery.     Allergies: No Known Allergies    FAM HX: family history includes Cancer in his father; mg Oral BID WC    clopidogrel  75 mg Oral Daily    ipratropium-albuterol  1 vial Inhalation 4x Daily    spironolactone  25 mg Oral Daily      Infusions:   PRN Meds: sodium chloride flush, 10 mL, PRN  ondansetron, 4 mg, Q6H PRN  polyethylene glycol, 17 g, Daily PRN  albuterol sulfate HFA, 2 puff, Q4H PRN          Electronically signed by Matt Willett MD on 1/23/2020 at 6:45 PM

## 2020-01-23 NOTE — ED PROVIDER NOTES
Details   carvedilol (COREG) 6.25 MG tablet Take 1 tablet by mouth 2 times daily (with meals), Disp-180 tablet, R-0Print      furosemide (LASIX) 40 MG tablet Take 1 tablet by mouth 2 times daily, Disp-180 tablet, R-0Print      albuterol sulfate HFA (VENTOLIN HFA) 108 (90 Base) MCG/ACT inhaler Inhale 2 puffs into the lungs every 4 hours as needed for Wheezing, Disp-1 Inhaler, R-2Print      aspirin 81 MG EC tablet Take 1 tablet by mouth daily, Disp-90 tablet, R-0Print      atorvastatin (LIPITOR) 40 MG tablet Take 1 tablet by mouth daily, Disp-90 tablet, R-0Print      clopidogrel (PLAVIX) 75 MG tablet Take 1 tablet by mouth daily, Disp-90 tablet, R-0Print      ipratropium-albuterol (DUONEB) 0.5-2.5 (3) MG/3ML SOLN nebulizer solution Inhale 3 mLs into the lungs 4 times daily, Disp-360 mL, R-2Print      lisinopril (PRINIVIL;ZESTRIL) 2.5 MG tablet Take 1 tablet by mouth daily, Disp-90 tablet, R-0Print      nitroGLYCERIN (NITROSTAT) 0.4 MG SL tablet Place 1 tablet under the tongue every 5 minutes as needed for Chest pain, Disp-25 tablet, R-0Print      Respiratory Therapy Supplies (NEBULIZER COMPRESSOR) KIT ONCE Starting Tue 9/17/2019, For 1 dose, Disp-1 kit, R-0, PrintDiagnosis: COPD      spironolactone (ALDACTONE) 25 MG tablet Take 1 tablet by mouth daily, Disp-90 tablet, R-0Print             ALLERGIES     Patient has no known allergies.     FAMILY HISTORY       Family History   Problem Relation Age of Onset    Cancer Father     Heart Failure Father     Heart Disease Father     Diabetes Mother     Heart Disease Mother           SOCIAL HISTORY       Social History     Socioeconomic History    Marital status:      Spouse name: None    Number of children: None    Years of education: None    Highest education level: None   Occupational History    None   Social Needs    Financial resource strain: None    Food insecurity:     Worry: None     Inability: None    Transportation needs:     Medical: None 97 QT QTc 400-5 08 no ischemic changes. RADIOLOGY:   Non-plain film images such as CT, Ultrasound and MRI are read by the radiologist. Shelby Wrighthouse images are visualized and preliminarily interpreted by the  ED Provider with the belowfindings:        Interpretation per the Radiologist below, if available at the time of this note:    XR CHEST STANDARD (2 VW)   Final Result   1. Persistent ovoid opacity at the right lung base which may represent   loculated fluid. Given persistence since September 2019, further evaluation   with CT chest is recommended. PROCEDURES   Unless otherwise noted below, none     Procedures    CRITICAL CARE TIME   45 minutes critical care time not including separately billable procedures    CONSULTS:  None    EMERGENCY DEPARTMENT COURSE and DIFFERENTIAL DIAGNOSIS/MDM:   Vitals:    Vitals:    01/23/20 1632 01/23/20 1647 01/23/20 1701 01/23/20 1713   BP: 101/60 (!) 111/92 108/81    Pulse: 101 98 101 104   Resp: 16 22 22 29   Temp:       TempSrc:       SpO2: 100% 100% 100% 100%   Weight:       Height:           Patient was given the following medications:  Medications   furosemide (LASIX) injection 60 mg (60 mg Intravenous Given 1/23/20 1658)       Patient presents ED with shortness of breath for past 2 days patient also noted to having left leg swelling and some abdominal distention patient's abdomen is soft nontender fluid wave noted, left leg pitting edema, and lungs notable for crackles patient speaking in short sentences and has slightly labored breathing. Patient is a history of CHF and states this happened to him in the past.  Will get BiPAP, CBC BMP check for leukocytosis lectured abnormalities BNP troponin for any heart failure cardiac ischemia will reassess patient like require hospitalization further management.   Patient's laboratory work-up notable for hepatitis, elevated troponin, elevated BNP, patient's x-ray notable for cardiomegaly currently and chest

## 2020-01-23 NOTE — ED NOTES
Patient on call light, requesting \"some oxygen. Im having some trouble breathing. \" O2 sat 96% on room air. Patient placed in 3L NC, O2 sat increased to 99%. Dr Anthony Persons notified.       Queen Isaac RN  01/23/20 5648

## 2020-01-24 ENCOUNTER — APPOINTMENT (OUTPATIENT)
Dept: NUCLEAR MEDICINE | Age: 50
DRG: 291 | End: 2020-01-24
Attending: INTERNAL MEDICINE
Payer: MEDICARE

## 2020-01-24 ENCOUNTER — APPOINTMENT (OUTPATIENT)
Dept: ULTRASOUND IMAGING | Age: 50
DRG: 291 | End: 2020-01-24
Attending: INTERNAL MEDICINE
Payer: MEDICARE

## 2020-01-24 LAB
ALBUMIN SERPL-MCNC: 3.8 GM/DL (ref 3.4–5)
ALP BLD-CCNC: 130 IU/L (ref 40–129)
ALT SERPL-CCNC: 430 U/L (ref 10–40)
ANION GAP SERPL CALCULATED.3IONS-SCNC: 17 MMOL/L (ref 4–16)
AST SERPL-CCNC: 312 IU/L (ref 15–37)
BASOPHILS ABSOLUTE: 0 K/CU MM
BASOPHILS RELATIVE PERCENT: 0.2 % (ref 0–1)
BILIRUB SERPL-MCNC: 1.2 MG/DL (ref 0–1)
BILIRUBIN DIRECT: 0.6 MG/DL (ref 0–0.3)
BILIRUBIN, INDIRECT: 0.6 MG/DL (ref 0–0.7)
BUN BLDV-MCNC: 32 MG/DL (ref 6–23)
CALCIUM SERPL-MCNC: 8.9 MG/DL (ref 8.3–10.6)
CHLORIDE BLD-SCNC: 94 MMOL/L (ref 99–110)
CHOLESTEROL: 90 MG/DL
CO2: 24 MMOL/L (ref 21–32)
CREAT SERPL-MCNC: 1.4 MG/DL (ref 0.9–1.3)
D DIMER: 3279 NG/ML(DDU)
DIFFERENTIAL TYPE: ABNORMAL
EKG ATRIAL RATE: 95 BPM
EKG ATRIAL RATE: 97 BPM
EKG DIAGNOSIS: NORMAL
EKG DIAGNOSIS: NORMAL
EKG P AXIS: 75 DEGREES
EKG P AXIS: 76 DEGREES
EKG P-R INTERVAL: 152 MS
EKG P-R INTERVAL: 154 MS
EKG Q-T INTERVAL: 378 MS
EKG Q-T INTERVAL: 400 MS
EKG QRS DURATION: 114 MS
EKG QRS DURATION: 124 MS
EKG QTC CALCULATION (BAZETT): 475 MS
EKG QTC CALCULATION (BAZETT): 508 MS
EKG R AXIS: 70 DEGREES
EKG R AXIS: 85 DEGREES
EKG T AXIS: 136 DEGREES
EKG T AXIS: 64 DEGREES
EKG VENTRICULAR RATE: 95 BPM
EKG VENTRICULAR RATE: 97 BPM
EOSINOPHILS ABSOLUTE: 0 K/CU MM
EOSINOPHILS RELATIVE PERCENT: 0 % (ref 0–3)
ESTIMATED AVERAGE GLUCOSE: 143 MG/DL
GFR AFRICAN AMERICAN: >60 ML/MIN/1.73M2
GFR NON-AFRICAN AMERICAN: 54 ML/MIN/1.73M2
GLUCOSE BLD-MCNC: 132 MG/DL (ref 70–99)
GLUCOSE BLD-MCNC: 139 MG/DL (ref 70–99)
GLUCOSE BLD-MCNC: 161 MG/DL (ref 70–99)
GLUCOSE BLD-MCNC: 202 MG/DL (ref 70–99)
GLUCOSE BLD-MCNC: 246 MG/DL (ref 70–99)
HAV IGM SER IA-ACNC: NON REACTIVE
HBA1C MFR BLD: 6.6 % (ref 4.2–6.3)
HCT VFR BLD CALC: 38.9 % (ref 42–52)
HDLC SERPL-MCNC: 24 MG/DL
HEMOGLOBIN: 11.7 GM/DL (ref 13.5–18)
HEPATITIS B CORE IGM ANTIBODY: NON REACTIVE
HEPATITIS B SURFACE ANTIGEN: NON REACTIVE
HEPATITIS C ANTIBODY: NON REACTIVE
IMMATURE NEUTROPHIL %: 0.7 % (ref 0–0.43)
LDL CHOLESTEROL DIRECT: 60 MG/DL
LV EF: 20 %
LVEF MODALITY: NORMAL
LYMPHOCYTES ABSOLUTE: 0.6 K/CU MM
LYMPHOCYTES RELATIVE PERCENT: 11.7 % (ref 24–44)
MAGNESIUM: 1.9 MG/DL (ref 1.8–2.4)
MCH RBC QN AUTO: 24 PG (ref 27–31)
MCHC RBC AUTO-ENTMCNC: 30.1 % (ref 32–36)
MCV RBC AUTO: 79.7 FL (ref 78–100)
MONOCYTES ABSOLUTE: 0.1 K/CU MM
MONOCYTES RELATIVE PERCENT: 2.2 % (ref 0–4)
NUCLEATED RBC %: 0.7 %
PDW BLD-RTO: 16.6 % (ref 11.7–14.9)
PLATELET # BLD: 313 K/CU MM (ref 140–440)
PMV BLD AUTO: 10.1 FL (ref 7.5–11.1)
POTASSIUM SERPL-SCNC: 3.8 MMOL/L (ref 3.5–5.1)
RBC # BLD: 4.88 M/CU MM (ref 4.6–6.2)
SEGMENTED NEUTROPHILS ABSOLUTE COUNT: 4.6 K/CU MM
SEGMENTED NEUTROPHILS RELATIVE PERCENT: 85.2 % (ref 36–66)
SODIUM BLD-SCNC: 135 MMOL/L (ref 135–145)
TOTAL IMMATURE NEUTOROPHIL: 0.04 K/CU MM
TOTAL NUCLEATED RBC: 0 K/CU MM
TOTAL PROTEIN: 7.1 GM/DL (ref 6.4–8.2)
TOTAL RETICULOCYTE COUNT: 0.19 K/CU MM
TRIGL SERPL-MCNC: 61 MG/DL
TROPONIN T: 0.12 NG/ML
WBC # BLD: 5.5 K/CU MM (ref 4–10.5)

## 2020-01-24 PROCEDURE — 78582 LUNG VENTILAT&PERFUS IMAGING: CPT

## 2020-01-24 PROCEDURE — 83721 ASSAY OF BLOOD LIPOPROTEIN: CPT

## 2020-01-24 PROCEDURE — A9539 TC99M PENTETATE: HCPCS | Performed by: INTERNAL MEDICINE

## 2020-01-24 PROCEDURE — 82962 GLUCOSE BLOOD TEST: CPT

## 2020-01-24 PROCEDURE — 6370000000 HC RX 637 (ALT 250 FOR IP): Performed by: INTERNAL MEDICINE

## 2020-01-24 PROCEDURE — 2700000000 HC OXYGEN THERAPY PER DAY

## 2020-01-24 PROCEDURE — 94640 AIRWAY INHALATION TREATMENT: CPT

## 2020-01-24 PROCEDURE — 80076 HEPATIC FUNCTION PANEL: CPT

## 2020-01-24 PROCEDURE — 93010 ELECTROCARDIOGRAM REPORT: CPT | Performed by: INTERNAL MEDICINE

## 2020-01-24 PROCEDURE — 85025 COMPLETE CBC W/AUTO DIFF WBC: CPT

## 2020-01-24 PROCEDURE — 83036 HEMOGLOBIN GLYCOSYLATED A1C: CPT

## 2020-01-24 PROCEDURE — 2580000003 HC RX 258: Performed by: PHYSICIAN ASSISTANT

## 2020-01-24 PROCEDURE — 2580000003 HC RX 258: Performed by: INTERNAL MEDICINE

## 2020-01-24 PROCEDURE — 6360000002 HC RX W HCPCS: Performed by: INTERNAL MEDICINE

## 2020-01-24 PROCEDURE — 94664 DEMO&/EVAL PT USE INHALER: CPT

## 2020-01-24 PROCEDURE — C1751 CATH, INF, PER/CENT/MIDLINE: HCPCS

## 2020-01-24 PROCEDURE — 93306 TTE W/DOPPLER COMPLETE: CPT

## 2020-01-24 PROCEDURE — 3430000000 HC RX DIAGNOSTIC RADIOPHARMACEUTICAL: Performed by: INTERNAL MEDICINE

## 2020-01-24 PROCEDURE — 84484 ASSAY OF TROPONIN QUANT: CPT

## 2020-01-24 PROCEDURE — 85379 FIBRIN DEGRADATION QUANT: CPT

## 2020-01-24 PROCEDURE — 80048 BASIC METABOLIC PNL TOTAL CA: CPT

## 2020-01-24 PROCEDURE — 93970 EXTREMITY STUDY: CPT

## 2020-01-24 PROCEDURE — 83735 ASSAY OF MAGNESIUM: CPT

## 2020-01-24 PROCEDURE — A9540 TC99M MAA: HCPCS | Performed by: INTERNAL MEDICINE

## 2020-01-24 PROCEDURE — 80074 ACUTE HEPATITIS PANEL: CPT

## 2020-01-24 PROCEDURE — 6360000002 HC RX W HCPCS: Performed by: PHYSICIAN ASSISTANT

## 2020-01-24 PROCEDURE — 76937 US GUIDE VASCULAR ACCESS: CPT

## 2020-01-24 PROCEDURE — 80061 LIPID PANEL: CPT

## 2020-01-24 PROCEDURE — 2140000000 HC CCU INTERMEDIATE R&B

## 2020-01-24 RX ORDER — PANTOPRAZOLE SODIUM 40 MG/10ML
40 INJECTION, POWDER, LYOPHILIZED, FOR SOLUTION INTRAVENOUS DAILY
Status: DISCONTINUED | OUTPATIENT
Start: 2020-01-24 | End: 2020-01-28 | Stop reason: HOSPADM

## 2020-01-24 RX ORDER — KIT FOR THE PREPARATION OF TECHNETIUM TC 99M PENTETATE 20 MG/1
3 INJECTION, POWDER, LYOPHILIZED, FOR SOLUTION INTRAVENOUS; RESPIRATORY (INHALATION)
Status: COMPLETED | OUTPATIENT
Start: 2020-01-24 | End: 2020-01-24

## 2020-01-24 RX ADMIN — SODIUM CHLORIDE, PRESERVATIVE FREE 10 ML: 5 INJECTION INTRAVENOUS at 21:50

## 2020-01-24 RX ADMIN — CARVEDILOL 6.25 MG: 6.25 TABLET, FILM COATED ORAL at 10:58

## 2020-01-24 RX ADMIN — CLOPIDOGREL BISULFATE 75 MG: 75 TABLET ORAL at 10:59

## 2020-01-24 RX ADMIN — IPRATROPIUM BROMIDE AND ALBUTEROL SULFATE 3 ML: .5; 3 SOLUTION RESPIRATORY (INHALATION) at 11:26

## 2020-01-24 RX ADMIN — FUROSEMIDE 40 MG: 10 INJECTION, SOLUTION INTRAMUSCULAR; INTRAVENOUS at 10:58

## 2020-01-24 RX ADMIN — ENOXAPARIN SODIUM 80 MG: 80 INJECTION SUBCUTANEOUS at 21:50

## 2020-01-24 RX ADMIN — SODIUM CHLORIDE, PRESERVATIVE FREE 10 ML: 5 INJECTION INTRAVENOUS at 11:00

## 2020-01-24 RX ADMIN — INSULIN LISPRO 1 UNITS: 100 INJECTION, SOLUTION INTRAVENOUS; SUBCUTANEOUS at 17:28

## 2020-01-24 RX ADMIN — ATORVASTATIN CALCIUM 40 MG: 40 TABLET, FILM COATED ORAL at 21:53

## 2020-01-24 RX ADMIN — CARVEDILOL 6.25 MG: 6.25 TABLET, FILM COATED ORAL at 17:26

## 2020-01-24 RX ADMIN — SODIUM CHLORIDE, PRESERVATIVE FREE 10 ML: 5 INJECTION INTRAVENOUS at 11:08

## 2020-01-24 RX ADMIN — SODIUM CHLORIDE, PRESERVATIVE FREE 10 ML: 5 INJECTION INTRAVENOUS at 21:55

## 2020-01-24 RX ADMIN — INSULIN LISPRO 2 UNITS: 100 INJECTION, SOLUTION INTRAVENOUS; SUBCUTANEOUS at 12:23

## 2020-01-24 RX ADMIN — IPRATROPIUM BROMIDE AND ALBUTEROL SULFATE 3 ML: .5; 3 SOLUTION RESPIRATORY (INHALATION) at 15:00

## 2020-01-24 RX ADMIN — IPRATROPIUM BROMIDE AND ALBUTEROL SULFATE 3 ML: .5; 3 SOLUTION RESPIRATORY (INHALATION) at 20:52

## 2020-01-24 RX ADMIN — Medication 6 MILLICURIE: at 01:37

## 2020-01-24 RX ADMIN — ENOXAPARIN SODIUM 80 MG: 80 INJECTION SUBCUTANEOUS at 11:00

## 2020-01-24 RX ADMIN — FUROSEMIDE 40 MG: 10 INJECTION, SOLUTION INTRAMUSCULAR; INTRAVENOUS at 17:27

## 2020-01-24 RX ADMIN — IPRATROPIUM BROMIDE AND ALBUTEROL SULFATE 3 ML: .5; 3 SOLUTION RESPIRATORY (INHALATION) at 07:27

## 2020-01-24 RX ADMIN — ASPIRIN 81 MG: 81 TABLET, COATED ORAL at 10:58

## 2020-01-24 RX ADMIN — KIT FOR THE PREPARATION OF TECHNETIUM TC 99M PENTETATE 3 MILLICURIE: 20 INJECTION, POWDER, LYOPHILIZED, FOR SOLUTION INTRAVENOUS; RESPIRATORY (INHALATION) at 01:37

## 2020-01-24 RX ADMIN — SPIRONOLACTONE 25 MG: 25 TABLET ORAL at 10:59

## 2020-01-24 RX ADMIN — LISINOPRIL 2.5 MG: 2.5 TABLET ORAL at 10:59

## 2020-01-24 ASSESSMENT — PAIN SCALES - GENERAL
PAINLEVEL_OUTOF10: 0

## 2020-01-24 NOTE — CONSULTS
Name:  Mike Welch /Age/Sex: 1970  (52 y.o. male)   MRN & CSN:  [id number] & [id number] Admission Date/Time: 2020  6:04 PM   Location:  3118/3118-A PCP: Joe Dixon Day: 1          Referring physician:  Earl Street MD         Reason for consultation:  CHF        Thanks for referral.    Information source: patient    CC;  SOB      HPI:   Thank you for involving me in taking  care of Mike Welch who  is a 52 y. o.year  Old male  Presents with  H/o CAD, CABG, EF 10% now with worsening moderate recurrent exertional SOB no cough for a few days, trop elev , has no CP . Has h/o knowm CM                  Past medical history:    has a past medical history of CAD (coronary artery disease), COPD (chronic obstructive pulmonary disease) ([facility] [address] Depression, HIGH CHOLESTEROL, Hypertension, MI (myocardial infarction) ([facility] [address] and S/P coronary artery bypass graft x 4. Past surgical history:   has a past surgical history that includes Cardiac surgery (2010); Bypass Graft (04/10/2011); and Leg Surgery. Social History:   reports that he has been smoking cigarettes. He has a 20.00 pack-year smoking history. He has quit using smokeless tobacco. He reports that he does not drink alcohol or use drugs. Family history:  family history includes Cancer in his father; Diabetes in his mother; Heart Disease in his father and mother; Heart Failure in his father.     No Known Allergies    sodium chloride flush 0.9 % injection 10 mL, 2 times per day  sodium chloride flush 0.9 % injection 10 mL, PRN  ondansetron (ZOFRAN) injection 4 mg, Q6H PRN  polyethylene glycol (GLYCOLAX) packet 17 g, Daily PRN  enoxaparin (LOVENOX) injection 40 mg, Nightly  lisinopril (PRINIVIL;ZESTRIL) tablet 2.5 mg, Daily  furosemide (LASIX) injection 40 mg, BID  albuterol sulfate  (90 Base) MCG/ACT inhaler 2 puff, Q4H PRN  aspirin EC tablet 81 mg, Daily  atorvastatin (LIPITOR) tablet 40 mg,

## 2020-01-24 NOTE — CONSULTS
[address] [place] [address], [facility]                                  CONSULTATION    PATIENT NAME: Pa Palafox                       :        1970  MED REC NO:   [id number]                          ROOM:       3118  ACCOUNT NO:   [de-identified]                           ADMIT DATE: 2020  PROVIDER:     Juan Anton MD    CONSULT DATE:  2020    PRIMARY CARE PROVIDER:  Miranda Perry MD    LOCATION:  The patient is in room 3118. CHIEF COMPLAINT:  Abnormal LFTs, etiology to be determined. HISTORY OF PRESENT ILLNESS:  The patient is a 54-year-old white  gentleman with past medical history significant for hypertension,  coronary artery disease status post CABG, congestive heart failure,  COPD, depression, who was admitted to the hospital on 2020 with  shortness of breath. The patient was noted to be in congestive heart  failure. The LFTs upon admission were abnormal with an AST of 193, ALT  of 321, alkaline phosphatase 114, and total bilirubin of 1.1. Ultrasound of the right upper quadrant was done and was negative for  gallstone or extrahepatic obstruction with a common bile duct measuring  3 mm. The patient complains of mild epigastric discomfort, but no vomiting,  hematemesis, melena, or hematochezia. The patient has not had an EGD or  colonoscopy done in the past.  The hepatitis A, B, and C serology is  negative. REVIEW OF SYSTEMS:  CENTRAL NERVOUS SYSTEM:  The patient denies headache or focal  sensorimotor symptoms. CARDIOVASCULAR SYSTEM:  The patient complains of shortness of breath and  leg swelling, but no chest pain. GENITOURINARY SYSTEM:  No history of dysuria, pyuria, or hematuria. MUSCULOSKELETAL SYSTEM:  The patient complains of generalized weakness. RESPIRATORY SYSTEM:  The patient complains of cough and shortness of  breath, but no hemoptysis, fever or chills.     PAST MEDICAL

## 2020-01-24 NOTE — PROGRESS NOTES
01/24/20 0215   NIV Type   NIV Started/Stopped On   Equipment Type v60   Mode Bilevel   Mask Type Full face mask   Mask Size Medium   Bonnet size Medium   Settings/Measurements   IPAP 12 cmH20   CPAP/EPAP 6 cmH2O   Resp 25   FiO2  50 %   I Time/ I Time % 1.2 s   Vt Exhaled 553 mL   Minute Volume 14.5 Liters   Mask Leak (lpm) 0 lpm   Comfort Level Good   Using Accessory Muscles No   SpO2 97   Patient Observation   Observations Pt sleeping,    Alarm Settings   Alarms On Y   Press Low Alarm 5 cmH2O   High Pressure Alarm 25 cmH2O   Delay Alarm 20 sec(s)   Apnea (secs) 20 secs   Resp Rate Low Alarm 10   High Respiratory Rate 40 br/min

## 2020-01-24 NOTE — PROGRESS NOTES
RRT gave pt a break from the BIPAP machine. RRT placed pt on a 9 lpm HFNC. SaO2 95% on the 9lpm HFNC. Pt tolerating well.

## 2020-01-24 NOTE — DISCHARGE INSTR - OTHER ORDERS
Follow up at the [address] [place] [address] on Monday 2/3/20 at 1:00  [address]  1561

## 2020-01-24 NOTE — PROGRESS NOTES
ipratropium-albuterol  1 vial Inhalation 4x Daily    spironolactone  25 mg Oral Daily    insulin lispro  0-6 Units Subcutaneous TID WC    insulin lispro  0-3 Units Subcutaneous Nightly    insulin lispro  0-6 Units Subcutaneous Q4H    lidocaine PF  5 mL Intradermal Once    sodium chloride flush  10 mL Intravenous 2 times per day      Infusions:    dextrose       PRN Meds: sodium chloride flush, 10 mL, PRN  ondansetron, 4 mg, Q6H PRN  polyethylene glycol, 17 g, Daily PRN  albuterol sulfate HFA, 2 puff, Q4H PRN  glucose, 15 g, PRN  dextrose, 12.5 g, PRN  glucagon (rDNA), 1 mg, PRN  dextrose, 100 mL/hr, PRN  hydrOXYzine, 50 mg, Q6H PRN  sodium chloride flush, 10 mL, PRN            Pertinent New Labs & Imaging Studies     CBC:   Lab Results   Component Value Date    WBC 5.5 01/24/2020    RBC 4.88 01/24/2020    HGB 11.7 01/24/2020    HCT 38.9 01/24/2020    MCV 79.7 01/24/2020    MCH 24.0 01/24/2020    MCHC 30.1 01/24/2020    RDW 16.6 01/24/2020     01/24/2020    MPV 10.1 01/24/2020     BMP:    Lab Results   Component Value Date     01/24/2020    K 3.8 01/24/2020    CL 94 01/24/2020    CO2 24 01/24/2020    BUN 32 01/24/2020    LABALBU 3.8 01/24/2020    CREATININE 1.4 01/24/2020    CALCIUM 8.9 01/24/2020    GFRAA >60 01/24/2020    LABGLOM 54 01/24/2020    GLUCOSE 139 01/24/2020     Xr Chest Standard (2 Vw)    Result Date: 1/23/2020  EXAMINATION: TWO XRAY VIEWS OF THE CHEST 1/23/2020 12:03 pm COMPARISON: September 15, 2019 HISTORY: ORDERING SYSTEM PROVIDED HISTORY: eval for dyspnea TECHNOLOGIST PROVIDED HISTORY: Reason for exam:->eval for dyspnea Acuity: Acute Type of Exam: Initial Additional signs and symptoms: sob, eval for dypnea, hx COPD, HTN, sx CABG FINDINGS: Sternotomy wires are noted. Stable cardiomediastinal silhouette is noted. Chronic interstitial changes are noted. Similar appearance of ovoid opacity at the right lung base which may represent loculated fluid. There is no pneumothorax.   The visualized veins of the bilateral lower extremities are patent and free of echogenic thrombus. The veins are normally compressible and have normal phasic flow. No evidence of DVT in either lower extremity. Us Abdomen Limited Specify Organ? Liver, Gallbladder    Result Date: 1/23/2020  EXAMINATION: RIGHT UPPER QUADRANT ULTRASOUND 1/23/2020 7:52 pm COMPARISON: None. HISTORY: ORDERING SYSTEM PROVIDED HISTORY: elevated LFTs TECHNOLOGIST PROVIDED HISTORY: Reason for exam:->elevated LFTs Specify organ?->LIVER Specify organ?->GALLBLADDER Reason for Exam: Elevated liver enzymes Acuity: Acute Type of Exam: Initial FINDINGS: LIVER:  Liver is mildly enlarged, measuring 18 cm in length. No focal masses. BILIARY SYSTEM:  The gallbladder is under distended. Gallbladder wall measures 8 mm. No cholelithiasis. No pericholecystic fluid. Common bile duct is within normal limits measuring 3 mm. RIGHT KIDNEY: The right kidney is grossly unremarkable without evidence of hydronephrosis. Right kidney measures 9.2 cm. PANCREAS:  Visualized portions of the pancreas are unremarkable. OTHER: No evidence of right upper quadrant ascites. 1. Gallbladder is contracted with nonspecific wall thickening up to 8 mm. No cholelithiasis. Normal common bile duct. 2. Mild hepatomegaly.        Assessment and Plan:   Melanie Scott is a 52 y.o.  male  who presents with Shortness of breath    Acute hypoxemic respiratory failure secondary to CHF exacerbation versus PE  Continue nasal cannula oxygen, wean off as tolerated   VQ scan with intermediate probability of having pulmonary embolus  On therapeutic Lovenox   BiPAP as needed    Acute on chronic systolic heart failure  repeat echo with 20% EF  Continue Lasix  Cardiology following  Continue beta-blockers  Continue Aldactone  Monitor strict I/O's   Monitor daily weights  Monitor BMP  BNP alternate days   Continue aspirin    Type 2 diabetes   Continue sliding-scale insulin  Hypoglycemia precautions    Coronary artery disease status post CABG   continue aspirin and Plavix  Cardiology consulted     Hypertension, uncontrolled  Likely 2/2 non compliance    Transaminitis  Likely secondary to congestive heart failure  GI consulted overnight, said likely from CHF    JAIME on CKD stage III  Slightly improved creatinine  Continue diuresis  Likely  hemodynamically mediated from heart failure     Diet DIET LOW SODIUM 2 GM;   DVT Prophylaxis [x] Lovenox, []  Heparin, [] SCDs, [] Ambulation   GI Prophylaxis [x] PPI,  [] H2 Blocker,  [] Carafate,  [] Diet/Tube Feeds   Code Status Limited   Disposition Patient requires continued admission due to congestive heart failure and possible PE   MDM [] Low, [x] Moderate,[]  High     Electronically signed by Annie Dickson MD on 1/24/2020 at 2:24 PM

## 2020-01-25 LAB
ANION GAP SERPL CALCULATED.3IONS-SCNC: 13 MMOL/L (ref 4–16)
ANION GAP SERPL CALCULATED.3IONS-SCNC: 16 MMOL/L (ref 4–16)
BUN BLDV-MCNC: 35 MG/DL (ref 6–23)
BUN BLDV-MCNC: 36 MG/DL (ref 6–23)
CALCIUM SERPL-MCNC: 8.5 MG/DL (ref 8.3–10.6)
CALCIUM SERPL-MCNC: 8.7 MG/DL (ref 8.3–10.6)
CHLORIDE BLD-SCNC: 88 MMOL/L (ref 99–110)
CHLORIDE BLD-SCNC: 91 MMOL/L (ref 99–110)
CO2: 22 MMOL/L (ref 21–32)
CO2: 24 MMOL/L (ref 21–32)
CREAT SERPL-MCNC: 1.5 MG/DL (ref 0.9–1.3)
CREAT SERPL-MCNC: 1.5 MG/DL (ref 0.9–1.3)
GFR AFRICAN AMERICAN: >60 ML/MIN/1.73M2
GFR AFRICAN AMERICAN: >60 ML/MIN/1.73M2
GFR NON-AFRICAN AMERICAN: 50 ML/MIN/1.73M2
GFR NON-AFRICAN AMERICAN: 50 ML/MIN/1.73M2
GLUCOSE BLD-MCNC: 115 MG/DL (ref 70–99)
GLUCOSE BLD-MCNC: 119 MG/DL (ref 70–99)
GLUCOSE BLD-MCNC: 133 MG/DL (ref 70–99)
GLUCOSE BLD-MCNC: 136 MG/DL (ref 70–99)
GLUCOSE BLD-MCNC: 138 MG/DL (ref 70–99)
GLUCOSE BLD-MCNC: 140 MG/DL (ref 70–99)
GLUCOSE BLD-MCNC: 152 MG/DL (ref 70–99)
GLUCOSE BLD-MCNC: 171 MG/DL (ref 70–99)
MAGNESIUM: 2.2 MG/DL (ref 1.8–2.4)
POTASSIUM SERPL-SCNC: 4.2 MMOL/L (ref 3.5–5.1)
POTASSIUM SERPL-SCNC: 4.8 MMOL/L (ref 3.5–5.1)
SODIUM BLD-SCNC: 125 MMOL/L (ref 135–145)
SODIUM BLD-SCNC: 129 MMOL/L (ref 135–145)

## 2020-01-25 PROCEDURE — 99232 SBSQ HOSP IP/OBS MODERATE 35: CPT | Performed by: INTERNAL MEDICINE

## 2020-01-25 PROCEDURE — 2140000000 HC CCU INTERMEDIATE R&B

## 2020-01-25 PROCEDURE — 6370000000 HC RX 637 (ALT 250 FOR IP): Performed by: INTERNAL MEDICINE

## 2020-01-25 PROCEDURE — 83735 ASSAY OF MAGNESIUM: CPT

## 2020-01-25 PROCEDURE — 2700000000 HC OXYGEN THERAPY PER DAY

## 2020-01-25 PROCEDURE — 94640 AIRWAY INHALATION TREATMENT: CPT

## 2020-01-25 PROCEDURE — 80048 BASIC METABOLIC PNL TOTAL CA: CPT

## 2020-01-25 PROCEDURE — 94761 N-INVAS EAR/PLS OXIMETRY MLT: CPT

## 2020-01-25 PROCEDURE — 2580000003 HC RX 258: Performed by: PHYSICIAN ASSISTANT

## 2020-01-25 PROCEDURE — 82962 GLUCOSE BLOOD TEST: CPT

## 2020-01-25 PROCEDURE — 6360000002 HC RX W HCPCS: Performed by: PHYSICIAN ASSISTANT

## 2020-01-25 PROCEDURE — 6360000002 HC RX W HCPCS: Performed by: INTERNAL MEDICINE

## 2020-01-25 PROCEDURE — 2580000003 HC RX 258: Performed by: INTERNAL MEDICINE

## 2020-01-25 PROCEDURE — C9113 INJ PANTOPRAZOLE SODIUM, VIA: HCPCS | Performed by: INTERNAL MEDICINE

## 2020-01-25 RX ORDER — FUROSEMIDE 10 MG/ML
40 INJECTION INTRAMUSCULAR; INTRAVENOUS 3 TIMES DAILY
Status: DISCONTINUED | OUTPATIENT
Start: 2020-01-25 | End: 2020-01-26

## 2020-01-25 RX ADMIN — FUROSEMIDE 40 MG: 10 INJECTION, SOLUTION INTRAMUSCULAR; INTRAVENOUS at 21:42

## 2020-01-25 RX ADMIN — CLOPIDOGREL BISULFATE 75 MG: 75 TABLET ORAL at 08:32

## 2020-01-25 RX ADMIN — CARVEDILOL 6.25 MG: 6.25 TABLET, FILM COATED ORAL at 18:05

## 2020-01-25 RX ADMIN — ASPIRIN 81 MG: 81 TABLET, COATED ORAL at 08:31

## 2020-01-25 RX ADMIN — IPRATROPIUM BROMIDE AND ALBUTEROL SULFATE 3 ML: .5; 3 SOLUTION RESPIRATORY (INHALATION) at 20:14

## 2020-01-25 RX ADMIN — PANTOPRAZOLE SODIUM 40 MG: 40 INJECTION, POWDER, FOR SOLUTION INTRAVENOUS at 08:34

## 2020-01-25 RX ADMIN — FUROSEMIDE 40 MG: 10 INJECTION, SOLUTION INTRAMUSCULAR; INTRAVENOUS at 14:30

## 2020-01-25 RX ADMIN — ATORVASTATIN CALCIUM 40 MG: 40 TABLET, FILM COATED ORAL at 21:42

## 2020-01-25 RX ADMIN — SODIUM CHLORIDE, PRESERVATIVE FREE 10 ML: 5 INJECTION INTRAVENOUS at 08:43

## 2020-01-25 RX ADMIN — ENOXAPARIN SODIUM 80 MG: 80 INJECTION SUBCUTANEOUS at 08:32

## 2020-01-25 RX ADMIN — INSULIN LISPRO 1 UNITS: 100 INJECTION, SOLUTION INTRAVENOUS; SUBCUTANEOUS at 11:56

## 2020-01-25 RX ADMIN — IPRATROPIUM BROMIDE AND ALBUTEROL SULFATE 3 ML: .5; 3 SOLUTION RESPIRATORY (INHALATION) at 12:10

## 2020-01-25 RX ADMIN — SODIUM CHLORIDE, PRESERVATIVE FREE 10 ML: 5 INJECTION INTRAVENOUS at 21:44

## 2020-01-25 RX ADMIN — SODIUM CHLORIDE, PRESERVATIVE FREE 10 ML: 5 INJECTION INTRAVENOUS at 21:43

## 2020-01-25 RX ADMIN — CARVEDILOL 6.25 MG: 6.25 TABLET, FILM COATED ORAL at 08:31

## 2020-01-25 RX ADMIN — SPIRONOLACTONE 25 MG: 25 TABLET ORAL at 08:32

## 2020-01-25 RX ADMIN — IPRATROPIUM BROMIDE AND ALBUTEROL SULFATE 3 ML: .5; 3 SOLUTION RESPIRATORY (INHALATION) at 08:15

## 2020-01-25 RX ADMIN — IPRATROPIUM BROMIDE AND ALBUTEROL SULFATE 3 ML: .5; 3 SOLUTION RESPIRATORY (INHALATION) at 16:27

## 2020-01-25 RX ADMIN — ENOXAPARIN SODIUM 80 MG: 80 INJECTION SUBCUTANEOUS at 21:43

## 2020-01-25 RX ADMIN — FUROSEMIDE 40 MG: 10 INJECTION, SOLUTION INTRAMUSCULAR; INTRAVENOUS at 08:34

## 2020-01-25 RX ADMIN — LISINOPRIL 2.5 MG: 2.5 TABLET ORAL at 08:32

## 2020-01-25 ASSESSMENT — PAIN SCALES - GENERAL
PAINLEVEL_OUTOF10: 0

## 2020-01-25 NOTE — PROGRESS NOTES
CARDIOLOGY  NOTE        Name:  Orlando Henriquez /Age/Sex: 1970  (52 y.o. male)   MRN & CSN:  [id number] & [id number] Admission Date/Time: 2020  6:04 PM   Location:  3118/3118-RAJINDER PCP: Tosin Carroll Day: 3        PLAN FROM CARDIOLOGY FOR TODAY:   Diurese      - cardiology consult is for:  CHF    -  Interval history:  Feel better    · ASSESSMENT/ PLAN:  1.     HFrEF diurese, on ACE I  2. CAD last [facility] rev medical therapy was advised  3. Risk factor modification  4. High creat  5. EF unchanged  6. Need of ICD as OP, has been DW pt          Subjective: Todays complain: Patient  Mild SOB    HPI:  Emma Chicas is a 52 y. o.year old who and presents with mild SOB          Objective: Temperature:  Current - Temp: 97.4 °F (36.3 °C); Max - Temp  Av.8 °F (36.6 °C)  Min: 97.4 °F (36.3 °C)  Max: 98 °F (36.7 °C)    Respiratory Rate : Resp  Av.9  Min: 12  Max: 26    Pulse Range: Pulse  Av.1  Min: 89  Max: 102    Blood Presuure Range:  Systolic (08XKB), OWN:839 , Min:97 , SPZ:087   ; Diastolic (98WOK), QKW:52, Min:66, Max:84      Pulse ox Range: SpO2  Av.1 %  Min: 95 %  Max: 100 %    24hr I & O:      Intake/Output Summary (Last 24 hours) at 2020 1523  Last data filed at 2020 1517  Gross per 24 hour   Intake 800 ml   Output 3695 ml   Net -2895 ml         /75   Pulse 92   Temp 97.4 °F (36.3 °C) (Oral)   Resp 26   Ht 5' 11\" (1.803 m)   Wt 181 lb (82.1 kg)   SpO2 98%   BMI 25.24 kg/m²           Review of Systems:  All 14 systems reviewed, all negative except for  SOB      TELEMETRY: Sinus    has a past medical history of CAD (coronary artery disease), COPD (chronic obstructive pulmonary disease) ([facility]), Depression, HIGH CHOLESTEROL, Hypertension, MI (myocardial infarction) ([address] and S/P coronary artery bypass graft x 4.   has a past surgical history that includes Cardiac surgery (2010);  Bypass Graft (04/10/2011); and Leg Surgery. Physical Exam:  General:  Awake, alert, NAD  Head:normal  Eye:normal  Neck:  No JVD   Chest:  Clear to auscultation, respiration easy  Cardiovascular:  RRR S1S2  Abdomen:   nontender  Extremities:  tr edema  Pulses; palpable  Neuro: grossly normal    Medications:    furosemide  40 mg Intravenous TID    enoxaparin  1 mg/kg Subcutaneous BID    pantoprazole  40 mg Intravenous Daily    sodium chloride flush  10 mL Intravenous 2 times per day    lisinopril  2.5 mg Oral Daily    aspirin  81 mg Oral Daily    atorvastatin  40 mg Oral Nightly    carvedilol  6.25 mg Oral BID WC    clopidogrel  75 mg Oral Daily    ipratropium-albuterol  1 vial Inhalation 4x Daily    spironolactone  25 mg Oral Daily    insulin lispro  0-6 Units Subcutaneous TID WC    insulin lispro  0-3 Units Subcutaneous Nightly    lidocaine PF  5 mL Intradermal Once    sodium chloride flush  10 mL Intravenous 2 times per day      dextrose       sodium chloride flush, ondansetron, polyethylene glycol, albuterol sulfate HFA, glucose, dextrose, glucagon (rDNA), dextrose, hydrOXYzine, sodium chloride flush    Lab Data:  CBC:   Recent Labs     01/23/20  1205 01/24/20  0600   WBC 7.6 5.5   HGB 11.6* 11.7*   HCT 39.1* 38.9*   MCV 82.0 79.7    313     BMP:   Recent Labs     01/23/20  1205 01/24/20  0600 01/25/20  0532    135 129*   K 4.8 3.8 4.2   CL 96* 94* 91*   CO2 25 24 22   BUN 34* 32* 35*   CREATININE 1.5* 1.4* 1.5*     LIVER PROFILE:   Recent Labs     01/23/20  1205 01/24/20  0735   * 312*   * 430*   LIPASE 19  --    BILIDIR  --  0.6*   BILITOT 1.1* 1.2*   ALKPHOS 140* 130*     PT/INR: No results for input(s): PROTIME, INR in the last 72 hours. APTT: No results for input(s): APTT in the last 72 hours. BNP:  No results for input(s): BNP in the last 72 hours.   TROPONIN: @TROPONINI:3@  Labs, consult, tests reviewed    Assessment/ PLAN:    As above                  Anastacio Cortez MD

## 2020-01-25 NOTE — PLAN OF CARE
Problem: Risk for Impaired Skin Integrity  Goal: Tissue integrity - skin and mucous membranes  Description  Structural intactness and normal physiological function of skin and  mucous membranes.   Outcome: Ongoing     Problem: Falls - Risk of:  Goal: Will remain free from falls  Description  Will remain free from falls  Outcome: Ongoing  Goal: Absence of physical injury  Description  Absence of physical injury  Outcome: Ongoing     Problem: FLUID AND ELECTROLYTE IMBALANCE  Goal: Fluid and electrolyte balance are achieved/maintained  Outcome: Ongoing

## 2020-01-25 NOTE — PROGRESS NOTES
BP 95/63, collaborated with floor nurse, Cindy, about parameters due to low BP for Lasix. Nurse felt ok to give since doctor aware.

## 2020-01-25 NOTE — PROGRESS NOTES
visualized veins of the bilateral lower extremities are patent and free of echogenic thrombus. The veins are normally compressible and have normal phasic flow. No evidence of DVT in either lower extremity. Us Abdomen Limited Specify Organ? Liver, Gallbladder    Result Date: 1/23/2020  EXAMINATION: RIGHT UPPER QUADRANT ULTRASOUND 1/23/2020 7:52 pm COMPARISON: None. HISTORY: ORDERING SYSTEM PROVIDED HISTORY: elevated LFTs TECHNOLOGIST PROVIDED HISTORY: Reason for exam:->elevated LFTs Specify organ?->LIVER Specify organ?->GALLBLADDER Reason for Exam: Elevated liver enzymes Acuity: Acute Type of Exam: Initial FINDINGS: LIVER:  Liver is mildly enlarged, measuring 18 cm in length. No focal masses. BILIARY SYSTEM:  The gallbladder is under distended. Gallbladder wall measures 8 mm. No cholelithiasis. No pericholecystic fluid. Common bile duct is within normal limits measuring 3 mm. RIGHT KIDNEY: The right kidney is grossly unremarkable without evidence of hydronephrosis. Right kidney measures 9.2 cm. PANCREAS:  Visualized portions of the pancreas are unremarkable. OTHER: No evidence of right upper quadrant ascites. 1. Gallbladder is contracted with nonspecific wall thickening up to 8 mm. No cholelithiasis. Normal common bile duct. 2. Mild hepatomegaly.        Assessment and Plan:   Justin De is a 52 y.o.  male  who presents with Shortness of breath    Acute hypoxemic respiratory failure secondary to CHF exacerbation versus PE- Improved  Continue nasal cannula oxygen, wean off as tolerated   VQ scan with intermediate probability of having pulmonary embolus  On therapeutic Lovenox   BiPAP as needed    Acute on chronic systolic heart failure  repeat echo with 20% EF  Continue Lasix  Cardiology following  Continue beta-blockers  Continue Aldactone  Monitor strict I/O's   Monitor daily weights  Monitor BMP  BNP alternate days   Continue aspirin    Type 2 diabetes   Continue sliding-scale

## 2020-01-26 LAB
ANION GAP SERPL CALCULATED.3IONS-SCNC: 13 MMOL/L (ref 4–16)
ANION GAP SERPL CALCULATED.3IONS-SCNC: 14 MMOL/L (ref 4–16)
BUN BLDV-MCNC: 34 MG/DL (ref 6–23)
BUN BLDV-MCNC: 37 MG/DL (ref 6–23)
CALCIUM SERPL-MCNC: 8.5 MG/DL (ref 8.3–10.6)
CALCIUM SERPL-MCNC: 8.6 MG/DL (ref 8.3–10.6)
CHLORIDE BLD-SCNC: 89 MMOL/L (ref 99–110)
CHLORIDE BLD-SCNC: 89 MMOL/L (ref 99–110)
CO2: 26 MMOL/L (ref 21–32)
CO2: 28 MMOL/L (ref 21–32)
CREAT SERPL-MCNC: 1.4 MG/DL (ref 0.9–1.3)
CREAT SERPL-MCNC: 1.5 MG/DL (ref 0.9–1.3)
GFR AFRICAN AMERICAN: >60 ML/MIN/1.73M2
GFR AFRICAN AMERICAN: >60 ML/MIN/1.73M2
GFR NON-AFRICAN AMERICAN: 50 ML/MIN/1.73M2
GFR NON-AFRICAN AMERICAN: 54 ML/MIN/1.73M2
GLUCOSE BLD-MCNC: 110 MG/DL (ref 70–99)
GLUCOSE BLD-MCNC: 125 MG/DL (ref 70–99)
GLUCOSE BLD-MCNC: 143 MG/DL (ref 70–99)
GLUCOSE BLD-MCNC: 180 MG/DL (ref 70–99)
GLUCOSE BLD-MCNC: 182 MG/DL (ref 70–99)
GLUCOSE BLD-MCNC: 206 MG/DL (ref 70–99)
MAGNESIUM: 2 MG/DL (ref 1.8–2.4)
PHOSPHORUS: 3 MG/DL (ref 2.5–4.9)
POTASSIUM SERPL-SCNC: 4.2 MMOL/L (ref 3.5–5.1)
POTASSIUM SERPL-SCNC: 5 MMOL/L (ref 3.5–5.1)
PRO-BNP: 2397 PG/ML
REASON FOR REJECTION: NORMAL
REASON FOR REJECTION: NORMAL
REJECTED TEST: NORMAL
SODIUM BLD-SCNC: 128 MMOL/L (ref 135–145)
SODIUM BLD-SCNC: 131 MMOL/L (ref 135–145)

## 2020-01-26 PROCEDURE — 99232 SBSQ HOSP IP/OBS MODERATE 35: CPT | Performed by: INTERNAL MEDICINE

## 2020-01-26 PROCEDURE — 82962 GLUCOSE BLOOD TEST: CPT

## 2020-01-26 PROCEDURE — 6360000002 HC RX W HCPCS: Performed by: PHYSICIAN ASSISTANT

## 2020-01-26 PROCEDURE — 6360000002 HC RX W HCPCS: Performed by: INTERNAL MEDICINE

## 2020-01-26 PROCEDURE — 2580000003 HC RX 258: Performed by: PHYSICIAN ASSISTANT

## 2020-01-26 PROCEDURE — 83735 ASSAY OF MAGNESIUM: CPT

## 2020-01-26 PROCEDURE — 2140000000 HC CCU INTERMEDIATE R&B

## 2020-01-26 PROCEDURE — 6370000000 HC RX 637 (ALT 250 FOR IP): Performed by: INTERNAL MEDICINE

## 2020-01-26 PROCEDURE — 84100 ASSAY OF PHOSPHORUS: CPT

## 2020-01-26 PROCEDURE — C9113 INJ PANTOPRAZOLE SODIUM, VIA: HCPCS | Performed by: INTERNAL MEDICINE

## 2020-01-26 PROCEDURE — 2580000003 HC RX 258: Performed by: INTERNAL MEDICINE

## 2020-01-26 PROCEDURE — 83880 ASSAY OF NATRIURETIC PEPTIDE: CPT

## 2020-01-26 PROCEDURE — 80048 BASIC METABOLIC PNL TOTAL CA: CPT

## 2020-01-26 PROCEDURE — 94761 N-INVAS EAR/PLS OXIMETRY MLT: CPT

## 2020-01-26 PROCEDURE — 94640 AIRWAY INHALATION TREATMENT: CPT

## 2020-01-26 RX ORDER — FUROSEMIDE 10 MG/ML
40 INJECTION INTRAMUSCULAR; INTRAVENOUS 2 TIMES DAILY
Status: DISCONTINUED | OUTPATIENT
Start: 2020-01-26 | End: 2020-01-26

## 2020-01-26 RX ORDER — FUROSEMIDE 10 MG/ML
40 INJECTION INTRAMUSCULAR; INTRAVENOUS DAILY
Status: DISCONTINUED | OUTPATIENT
Start: 2020-01-26 | End: 2020-01-28 | Stop reason: HOSPADM

## 2020-01-26 RX ADMIN — INSULIN LISPRO 2 UNITS: 100 INJECTION, SOLUTION INTRAVENOUS; SUBCUTANEOUS at 16:05

## 2020-01-26 RX ADMIN — INSULIN LISPRO 1 UNITS: 100 INJECTION, SOLUTION INTRAVENOUS; SUBCUTANEOUS at 12:19

## 2020-01-26 RX ADMIN — LISINOPRIL 2.5 MG: 2.5 TABLET ORAL at 08:50

## 2020-01-26 RX ADMIN — ENOXAPARIN SODIUM 80 MG: 80 INJECTION SUBCUTANEOUS at 08:51

## 2020-01-26 RX ADMIN — CARVEDILOL 6.25 MG: 6.25 TABLET, FILM COATED ORAL at 08:50

## 2020-01-26 RX ADMIN — IPRATROPIUM BROMIDE AND ALBUTEROL SULFATE 3 ML: .5; 3 SOLUTION RESPIRATORY (INHALATION) at 11:41

## 2020-01-26 RX ADMIN — PANTOPRAZOLE SODIUM 40 MG: 40 INJECTION, POWDER, FOR SOLUTION INTRAVENOUS at 08:50

## 2020-01-26 RX ADMIN — ENOXAPARIN SODIUM 80 MG: 80 INJECTION SUBCUTANEOUS at 21:06

## 2020-01-26 RX ADMIN — SPIRONOLACTONE 25 MG: 25 TABLET ORAL at 08:50

## 2020-01-26 RX ADMIN — IPRATROPIUM BROMIDE AND ALBUTEROL SULFATE 3 ML: .5; 3 SOLUTION RESPIRATORY (INHALATION) at 21:09

## 2020-01-26 RX ADMIN — FUROSEMIDE 40 MG: 10 INJECTION, SOLUTION INTRAMUSCULAR; INTRAVENOUS at 10:20

## 2020-01-26 RX ADMIN — CARVEDILOL 6.25 MG: 6.25 TABLET, FILM COATED ORAL at 16:01

## 2020-01-26 RX ADMIN — ATORVASTATIN CALCIUM 40 MG: 40 TABLET, FILM COATED ORAL at 21:06

## 2020-01-26 RX ADMIN — SODIUM CHLORIDE, PRESERVATIVE FREE 10 ML: 5 INJECTION INTRAVENOUS at 08:51

## 2020-01-26 RX ADMIN — CLOPIDOGREL BISULFATE 75 MG: 75 TABLET ORAL at 08:50

## 2020-01-26 RX ADMIN — ASPIRIN 81 MG: 81 TABLET, COATED ORAL at 08:50

## 2020-01-26 RX ADMIN — SODIUM CHLORIDE, PRESERVATIVE FREE 10 ML: 5 INJECTION INTRAVENOUS at 21:06

## 2020-01-26 RX ADMIN — IPRATROPIUM BROMIDE AND ALBUTEROL SULFATE 3 ML: .5; 3 SOLUTION RESPIRATORY (INHALATION) at 08:11

## 2020-01-26 RX ADMIN — IPRATROPIUM BROMIDE AND ALBUTEROL SULFATE 3 ML: .5; 3 SOLUTION RESPIRATORY (INHALATION) at 15:45

## 2020-01-26 ASSESSMENT — PAIN SCALES - GENERAL
PAINLEVEL_OUTOF10: 0
PAINLEVEL_OUTOF10: 0

## 2020-01-26 NOTE — PROGRESS NOTES
cardiology  Continue beta-blockers  Continue Aldactone  Monitor strict I/O's   Monitor daily weights  Monitor BMP  BNP alternate days   Continue aspirin    Type 2 diabetes   Continue sliding-scale insulin  Hypoglycemia precautions    Coronary artery disease status post CABG   continue aspirin and Plavix  Cardiology consulted     Hypertension, uncontrolled  Likely 2/2 non compliance    Transaminitis  Likely secondary to congestive heart failure  GI consulted overnight, said likely from CHF    Hyponatremia  likely from decreased GFR versus diuresis versus low intake    JAIME on CKD stage III  Slightly improved creatinine  Continue diuresis  Likely  hemodynamically mediated from heart failure     Diet DIET LOW SODIUM 2 GM;   DVT Prophylaxis [x] Lovenox, []  Heparin, [] SCDs, [] Ambulation   GI Prophylaxis [x] PPI,  [] H2 Blocker,  [] Carafate,  [] Diet/Tube Feeds   Code Status Limited   Disposition Patient requires continued admission due to congestive heart failure and possible PE. Likely to be discharged in 1-2 days. EP consulted by cardiology for placement of defibrillator.    MDM [] Low, [x] Moderate,[]  High     Electronically signed by Luis Ponce MD on 1/26/2020 at 3:08 PM

## 2020-01-26 NOTE — PROGRESS NOTES
CARDIOLOGY  NOTE        Name:  Rebeka Johnson /Age/Sex: 1970  (52 y.o. male)   MRN & CSN:  [id number] & [id number] Admission Date/Time: 2020  6:04 PM   Location:  3118/3118-[facility]JINDER PCP: Sherryle Shear Day: 4        PLAN FROM CARDIOLOGY FOR TODAY:  EP cons      - cardiology consult is for:  CHF    -  Interval history:  Had VT    · ASSESSMENT/ PLAN:  1.     HFrEF diurese, on ACE I, neg balance  2. CAD last [facility] rev medical therapy only  3. Risk factor modification  4. High creat  5. EF unchanged  6. EP cons for ICD          Subjective: Todays complain: Patient  Mild SOB    HPI:  Octaviano Darden is a 52 y. o.year old who and presents with mild SOB          Objective: Temperature:  Current - Temp: 98 °F (36.7 °C); Max - Temp  Av.7 °F (36.5 °C)  Min: 97.4 °F (36.3 °C)  Max: 98 °F (36.7 °C)    Respiratory Rate : Resp  Av  Min: 12  Max: 27    Pulse Range: Pulse  Av.8  Min: 87  Max: 102    Blood Presuure Range:  Systolic (97LOB), LUB:813 , Min:86 , YVL:461   ; Diastolic (55KWJ), QDF:86, Min:66, Max:84      Pulse ox Range: SpO2  Av.6 %  Min: 95 %  Max: 100 %    24hr I & O:      Intake/Output Summary (Last 24 hours) at 2020 0731  Last data filed at 2020 7011  Gross per 24 hour   Intake 1560 ml   Output 6475 ml   Net -4915 ml         BP 95/66   Pulse 87   Temp 98 °F (36.7 °C) (Oral)   Resp 21   Ht 5' 11\" (1.803 m)   Wt 180 lb 6.4 oz (81.8 kg)   SpO2 99%   BMI 25.16 kg/m²           Review of Systems:  All 14 systems reviewed, all negative except for  SOB      TELEMETRY: Mirela    has a past medical history of CAD (coronary artery disease), COPD (chronic obstructive pulmonary disease) ([facility]), Depression, HIGH CHOLESTEROL, Hypertension, MI (myocardial infarction) ([facility] [address] and S/P coronary artery bypass graft x 4.   has a past surgical history that includes Cardiac surgery (2010);  Bypass Graft (04/10/2011); and Leg Surgery. Physical Exam:  General:  Awake, alert, NAD  Head:normal  Eye:normal  Neck:  No JVD   Chest:  Clear to auscultation, respiration easy  Cardiovascular:  RRR S1S2  Abdomen:   nontender  Extremities:  tr edema  Pulses; palpable  Neuro: grossly normal    Medications:    furosemide  40 mg Intravenous TID    enoxaparin  1 mg/kg Subcutaneous BID    pantoprazole  40 mg Intravenous Daily    sodium chloride flush  10 mL Intravenous 2 times per day    lisinopril  2.5 mg Oral Daily    aspirin  81 mg Oral Daily    atorvastatin  40 mg Oral Nightly    carvedilol  6.25 mg Oral BID WC    clopidogrel  75 mg Oral Daily    ipratropium-albuterol  1 vial Inhalation 4x Daily    spironolactone  25 mg Oral Daily    insulin lispro  0-6 Units Subcutaneous TID WC    insulin lispro  0-3 Units Subcutaneous Nightly    lidocaine PF  5 mL Intradermal Once    sodium chloride flush  10 mL Intravenous 2 times per day      dextrose       sodium chloride flush, ondansetron, polyethylene glycol, albuterol sulfate HFA, glucose, dextrose, glucagon (rDNA), dextrose, hydrOXYzine, sodium chloride flush    Lab Data:  CBC:   Recent Labs     01/23/20  1205 01/24/20  0600   WBC 7.6 5.5   HGB 11.6* 11.7*   HCT 39.1* 38.9*   MCV 82.0 79.7    313     BMP:   Recent Labs     01/25/20  0532 01/25/20  1631 01/26/20  0100   * 125* 131*   K 4.2 4.8 5.0   CL 91* 88* 89*   CO2 22 24 28   BUN 35* 36* 37*   CREATININE 1.5* 1.5* 1.5*     LIVER PROFILE:   Recent Labs     01/23/20  1205 01/24/20  0735   * 312*   * 430*   LIPASE 19  --    BILIDIR  --  0.6*   BILITOT 1.1* 1.2*   ALKPHOS 140* 130*     PT/INR: No results for input(s): PROTIME, INR in the last 72 hours. APTT: No results for input(s): APTT in the last 72 hours. BNP:  No results for input(s): BNP in the last 72 hours.   TROPONIN: @TROPONINI:3@  Labs, consult, tests reviewed    Assessment/ PLAN:    As above                  Munira Buckley MD 1/26/2020 7:31 AM

## 2020-01-27 LAB
ALBUMIN SERPL-MCNC: 3.8 GM/DL (ref 3.4–5)
ALP BLD-CCNC: 105 IU/L (ref 40–129)
ALT SERPL-CCNC: 183 U/L (ref 10–40)
ANION GAP SERPL CALCULATED.3IONS-SCNC: 12 MMOL/L (ref 4–16)
AST SERPL-CCNC: 56 IU/L (ref 15–37)
BILIRUB SERPL-MCNC: 0.6 MG/DL (ref 0–1)
BILIRUBIN DIRECT: 0.3 MG/DL (ref 0–0.3)
BILIRUBIN, INDIRECT: 0.3 MG/DL (ref 0–0.7)
BUN BLDV-MCNC: 34 MG/DL (ref 6–23)
CALCIUM SERPL-MCNC: 9.1 MG/DL (ref 8.3–10.6)
CHLORIDE BLD-SCNC: 88 MMOL/L (ref 99–110)
CO2: 28 MMOL/L (ref 21–32)
CREAT SERPL-MCNC: 1.3 MG/DL (ref 0.9–1.3)
GFR AFRICAN AMERICAN: >60 ML/MIN/1.73M2
GFR NON-AFRICAN AMERICAN: 59 ML/MIN/1.73M2
GLUCOSE BLD-MCNC: 114 MG/DL (ref 70–99)
GLUCOSE BLD-MCNC: 132 MG/DL (ref 70–99)
GLUCOSE BLD-MCNC: 141 MG/DL (ref 70–99)
GLUCOSE BLD-MCNC: 149 MG/DL (ref 70–99)
GLUCOSE BLD-MCNC: 168 MG/DL (ref 70–99)
MAGNESIUM: 2.2 MG/DL (ref 1.8–2.4)
POTASSIUM SERPL-SCNC: 4.6 MMOL/L (ref 3.5–5.1)
SODIUM BLD-SCNC: 128 MMOL/L (ref 135–145)
TOTAL PROTEIN: 6.4 GM/DL (ref 6.4–8.2)

## 2020-01-27 PROCEDURE — 2580000003 HC RX 258: Performed by: PHYSICIAN ASSISTANT

## 2020-01-27 PROCEDURE — C9113 INJ PANTOPRAZOLE SODIUM, VIA: HCPCS | Performed by: INTERNAL MEDICINE

## 2020-01-27 PROCEDURE — 2140000000 HC CCU INTERMEDIATE R&B

## 2020-01-27 PROCEDURE — 6370000000 HC RX 637 (ALT 250 FOR IP): Performed by: INTERNAL MEDICINE

## 2020-01-27 PROCEDURE — 94640 AIRWAY INHALATION TREATMENT: CPT

## 2020-01-27 PROCEDURE — 94761 N-INVAS EAR/PLS OXIMETRY MLT: CPT

## 2020-01-27 PROCEDURE — APPSS60 APP SPLIT SHARED TIME 46-60 MINUTES: Performed by: NURSE PRACTITIONER

## 2020-01-27 PROCEDURE — 94660 CPAP INITIATION&MGMT: CPT

## 2020-01-27 PROCEDURE — APPNB45 APP NON BILLABLE 31-45 MINUTES: Performed by: NURSE PRACTITIONER

## 2020-01-27 PROCEDURE — 6360000002 HC RX W HCPCS: Performed by: PHYSICIAN ASSISTANT

## 2020-01-27 PROCEDURE — 99232 SBSQ HOSP IP/OBS MODERATE 35: CPT | Performed by: INTERNAL MEDICINE

## 2020-01-27 PROCEDURE — 83735 ASSAY OF MAGNESIUM: CPT

## 2020-01-27 PROCEDURE — 80076 HEPATIC FUNCTION PANEL: CPT

## 2020-01-27 PROCEDURE — 99221 1ST HOSP IP/OBS SF/LOW 40: CPT | Performed by: INTERNAL MEDICINE

## 2020-01-27 PROCEDURE — 82962 GLUCOSE BLOOD TEST: CPT

## 2020-01-27 PROCEDURE — 80048 BASIC METABOLIC PNL TOTAL CA: CPT

## 2020-01-27 PROCEDURE — 6360000002 HC RX W HCPCS: Performed by: INTERNAL MEDICINE

## 2020-01-27 PROCEDURE — 2580000003 HC RX 258: Performed by: INTERNAL MEDICINE

## 2020-01-27 PROCEDURE — 6370000000 HC RX 637 (ALT 250 FOR IP): Performed by: NURSE PRACTITIONER

## 2020-01-27 RX ORDER — LISINOPRIL 5 MG/1
5 TABLET ORAL DAILY
Status: DISCONTINUED | OUTPATIENT
Start: 2020-01-27 | End: 2020-01-28 | Stop reason: HOSPADM

## 2020-01-27 RX ORDER — NICOTINE 21 MG/24HR
1 PATCH, TRANSDERMAL 24 HOURS TRANSDERMAL DAILY
Status: DISCONTINUED | OUTPATIENT
Start: 2020-01-27 | End: 2020-01-28 | Stop reason: HOSPADM

## 2020-01-27 RX ADMIN — INSULIN LISPRO 1 UNITS: 100 INJECTION, SOLUTION INTRAVENOUS; SUBCUTANEOUS at 09:23

## 2020-01-27 RX ADMIN — ASPIRIN 81 MG: 81 TABLET, COATED ORAL at 09:21

## 2020-01-27 RX ADMIN — CARVEDILOL 6.25 MG: 6.25 TABLET, FILM COATED ORAL at 09:21

## 2020-01-27 RX ADMIN — IPRATROPIUM BROMIDE AND ALBUTEROL SULFATE 3 ML: .5; 3 SOLUTION RESPIRATORY (INHALATION) at 20:43

## 2020-01-27 RX ADMIN — INSULIN LISPRO 1 UNITS: 100 INJECTION, SOLUTION INTRAVENOUS; SUBCUTANEOUS at 16:33

## 2020-01-27 RX ADMIN — ATORVASTATIN CALCIUM 40 MG: 40 TABLET, FILM COATED ORAL at 21:19

## 2020-01-27 RX ADMIN — SODIUM CHLORIDE, PRESERVATIVE FREE 10 ML: 5 INJECTION INTRAVENOUS at 09:23

## 2020-01-27 RX ADMIN — SODIUM CHLORIDE, PRESERVATIVE FREE 10 ML: 5 INJECTION INTRAVENOUS at 21:20

## 2020-01-27 RX ADMIN — FUROSEMIDE 40 MG: 10 INJECTION, SOLUTION INTRAMUSCULAR; INTRAVENOUS at 09:22

## 2020-01-27 RX ADMIN — IPRATROPIUM BROMIDE AND ALBUTEROL SULFATE 3 ML: .5; 3 SOLUTION RESPIRATORY (INHALATION) at 08:18

## 2020-01-27 RX ADMIN — ENOXAPARIN SODIUM 80 MG: 80 INJECTION SUBCUTANEOUS at 21:19

## 2020-01-27 RX ADMIN — LISINOPRIL 5 MG: 5 TABLET ORAL at 09:21

## 2020-01-27 RX ADMIN — SODIUM CHLORIDE, PRESERVATIVE FREE 10 ML: 5 INJECTION INTRAVENOUS at 09:22

## 2020-01-27 RX ADMIN — PANTOPRAZOLE SODIUM 40 MG: 40 INJECTION, POWDER, FOR SOLUTION INTRAVENOUS at 09:22

## 2020-01-27 RX ADMIN — IPRATROPIUM BROMIDE AND ALBUTEROL SULFATE 3 ML: .5; 3 SOLUTION RESPIRATORY (INHALATION) at 11:30

## 2020-01-27 RX ADMIN — ENOXAPARIN SODIUM 80 MG: 80 INJECTION SUBCUTANEOUS at 09:23

## 2020-01-27 RX ADMIN — CLOPIDOGREL BISULFATE 75 MG: 75 TABLET ORAL at 09:21

## 2020-01-27 RX ADMIN — CARVEDILOL 6.25 MG: 6.25 TABLET, FILM COATED ORAL at 18:22

## 2020-01-27 RX ADMIN — SPIRONOLACTONE 25 MG: 25 TABLET ORAL at 09:50

## 2020-01-27 ASSESSMENT — ENCOUNTER SYMPTOMS
SHORTNESS OF BREATH: 1
ABDOMINAL DISTENTION: 1
DIARRHEA: 0
COLOR CHANGE: 0
CONSTIPATION: 0
CHEST TIGHTNESS: 0
BLOOD IN STOOL: 0
NAUSEA: 0
EYE PAIN: 0
ABDOMINAL PAIN: 0
PHOTOPHOBIA: 0
WHEEZING: 0
BACK PAIN: 0
COUGH: 0
VOMITING: 0

## 2020-01-27 ASSESSMENT — PAIN SCALES - GENERAL
PAINLEVEL_OUTOF10: 0
PAINLEVEL_OUTOF10: 0

## 2020-01-27 NOTE — PROGRESS NOTES
flush    Lab Data:  CBC: No results for input(s): WBC, HGB, HCT, MCV, PLT in the last 72 hours. BMP:   Recent Labs     01/26/20  0100 01/26/20  0800 01/27/20  0100   * 128* 128*   K 5.0 4.2 4.6   CL 89* 89* 88*   CO2 28 26 28   PHOS  --  3.0  --    BUN 37* 34* 34*   CREATININE 1.5* 1.4* 1.3     PT/INR: No results for input(s): PROTIME, INR in the last 72 hours. BNP:    Recent Labs     01/26/20  1015   PROBNP 2,397*     TROPONIN: No results for input(s): TROPONINT in the last 72 hours. ECHO : 1/24/20   Left ventricular systolic function is abnormal.   Ejection fraction is visually estimated at 20%. Dilated left ventricle. Grade III diastolic dysfunction. Moderately dilated left atrium. Severe mitral regurgitation is present. Moderate tricuspid regurgitation is present. RVSP is 49 mmHg. No evidence of any pericardial effusion. OLD FINDINGS    Cath: 7/2/18  Three vessel CAD. LAD & Ramus are occluded. Mild to moderate   disease of RCA & Circumflex noted. 2. LIMA is patent. 3. SVBG to Ramus occluded. Ramus receives right to left   Collaterals. 4. SVBG to OM is atretic, receives small collateral from RCA. 5. SVBG to Diagonal is small but patent. 6. [employer] WMA seen with severely reduced LV Systolic   function. LVEF is about 25 to 30 %. 7. Aortic Root angiography Helped in understanding the graft lay   out. Impression:  Active Problems:    Heart failure ([address]  Resolved Problems:    * No resolved hospital problems. *       All labs, medications and tests reviewed by myself, continue all other medications of all above medical condition listed as is except for changes mentioned above. Thank you   Please call with questions. Electronically signed by Sandra Pearce. NANCY Harmon CNP on 1/27/2020 at 9:25 AM   I have seen ,spoken to  and examined this patient personally, independently of the nurse practitioner.  I have reviewed the hospital care given to date and reviewed all

## 2020-01-27 NOTE — PROGRESS NOTES
Hospitalist Progress Note      Name:  J Carlos Mckeon /Age/Sex: 1970  (52 y.o. male)   MRN & CSN:  [id number] & [id number] Admission Date/Time: 2020  6:04 PM   Location:  [facility]8/3118-A PCP: Zonia Ramirez Day: 5    History of Present Illness:     Chief Complaint: J Carlos Mckeon is a 52 y.o.  male  who presents with SOB    Patient seen and examined at bedside. He is currently off oxygen and says his breathing is better. He denies having any chest pain. Ten point ROS reviewed negative, unless as noted above    Objective: Intake/Output Summary (Last 24 hours) at 2020 1741  Last data filed at 2020 1638  Gross per 24 hour   Intake 1660 ml   Output 3945 ml   Net -2285 ml      Vitals:   Vitals:    20 1600   BP: 89/66   Pulse: 78   Resp: 25   Temp:    SpO2: 94%     Physical Exam:   General Appearance: alert and oriented to person, place and time, in no acute distress  Cardiovascular: normal rate, regular rhythm, normal S1 and S2, trace pedal edema. Pulmonary/Chest: Decreased breath sounds bilaterally. Coarse breath sounds with occasional crackles.   Abdomen: soft, non-tender, non-distended, normal bowel sounds, no masses   Extremities: no cyanosis, clubbing or edema, pulse   Skin: warm and dry, no rash or erythema  Head: normocephalic and atraumatic  Eyes: pupils equal, round, and reactive to light  Neck: supple and non-tender without mass, no thyromegaly   Musculoskeletal: normal range of motion, no joint swelling, deformity or tenderness  Neurological: alert, oriented, normal speech, no focal findings or movement disorder noted    Medications:   Medications:    lisinopril  5 mg Oral Daily    nicotine  1 patch Transdermal Daily    furosemide  40 mg Intravenous Daily    enoxaparin  1 mg/kg Subcutaneous BID    pantoprazole  40 mg Intravenous Daily    sodium chloride flush  10 mL Intravenous 2 times per day    aspirin  81 mg Oral Daily    atorvastatin 40 mg Oral Nightly    carvedilol  6.25 mg Oral BID WC    clopidogrel  75 mg Oral Daily    ipratropium-albuterol  1 vial Inhalation 4x Daily    spironolactone  25 mg Oral Daily    insulin lispro  0-6 Units Subcutaneous TID WC    insulin lispro  0-3 Units Subcutaneous Nightly    lidocaine PF  5 mL Intradermal Once    sodium chloride flush  10 mL Intravenous 2 times per day      Infusions:    dextrose       PRN Meds: sodium chloride flush, 10 mL, PRN  ondansetron, 4 mg, Q6H PRN  polyethylene glycol, 17 g, Daily PRN  albuterol sulfate HFA, 2 puff, Q4H PRN  glucose, 15 g, PRN  dextrose, 12.5 g, PRN  glucagon (rDNA), 1 mg, PRN  dextrose, 100 mL/hr, PRN  hydrOXYzine, 50 mg, Q6H PRN  sodium chloride flush, 10 mL, PRN            Pertinent New Labs & Imaging Studies     CBC:   Lab Results   Component Value Date    WBC 5.5 01/24/2020    RBC 4.88 01/24/2020    HGB 11.7 01/24/2020    HCT 38.9 01/24/2020    MCV 79.7 01/24/2020    MCH 24.0 01/24/2020    MCHC 30.1 01/24/2020    RDW 16.6 01/24/2020     01/24/2020    MPV 10.1 01/24/2020     BMP:    Lab Results   Component Value Date     01/27/2020    K 4.6 01/27/2020    CL 88 01/27/2020    CO2 28 01/27/2020    BUN 34 01/27/2020    LABALBU 3.8 01/27/2020    CREATININE 1.3 01/27/2020    CALCIUM 9.1 01/27/2020    GFRAA >60 01/27/2020    LABGLOM 59 01/27/2020    GLUCOSE 132 01/27/2020     Xr Chest Standard (2 Vw)    Result Date: 1/23/2020  EXAMINATION: TWO XRAY VIEWS OF THE CHEST 1/23/2020 12:03 pm COMPARISON: September 15, 2019 HISTORY: ORDERING SYSTEM PROVIDED HISTORY: eval for dyspnea TECHNOLOGIST PROVIDED HISTORY: Reason for exam:->eval for dyspnea Acuity: Acute Type of Exam: Initial Additional signs and symptoms: sob, eval for dypnea, hx COPD, HTN, sx CABG FINDINGS: Sternotomy wires are noted. Stable cardiomediastinal silhouette is noted. Chronic interstitial changes are noted.   Similar appearance of ovoid opacity at the right lung base which may represent scan with intermediate probability of having pulmonary embolus  On therapeutic Lovenox, will add oral anti coagulant tomorrow after the procedure  BiPAP as needed  Currently off oxygen    Acute on chronic systolic heart failure  repeat echo with 20% EF  Continue Lasix  Cardiology following, appreciate recommendations  Planning for defibrillator tomorrow, EP consulted by cardiology, said will need further optimization of medications as patient low doses of medications. Continue beta-blockers  Continue Aldactone  Monitor strict I/O's   Monitor daily weights  Monitor BMP  BNP alternate days   Continue aspirin    Type 2 diabetes   Continue sliding-scale insulin  Hypoglycemia precautions    Coronary artery disease status post CABG   continue aspirin and Plavix  Cardiology consulted     Hypertension, uncontrolled  Likely 2/2 non compliance    Transaminitis  Likely secondary to congestive heart failure  GI consulted overnight, said likely from CHF    Hyponatremia- Stable  likely from decreased GFR versus diuresis versus low intake    JAIME on CKD stage III  Slightly improved creatinine  Continue diuresis  Likely  hemodynamically mediated from heart failure     Diet DIET LOW SODIUM 2 GM;   DVT Prophylaxis [x] Lovenox, []  Heparin, [] SCDs, [] Ambulation   GI Prophylaxis [x] PPI,  [] H2 Blocker,  [] Carafate,  [] Diet/Tube Feeds   Code Status Limited   Disposition Patient requires continued admission due to congestive heart failure and possible PE. Likely to be discharged Tomorrow. EP consulted by cardiology for placement of defibrillator, said will need to further optimization of treatment.    MDM [] Low, [x] Moderate,[]  High     Electronically signed by Dexter Payne MD on 1/27/2020 at 5:41 PM

## 2020-01-27 NOTE — CONSULTS
Electrophysiology Consult Note      Reason for consultation:  ICD    Chief complaint :  Shortness of breath and edema    Referring physician: Magalis Poe      Primary care physician: Andres Gaytan      History of Present Illness:     Patient is a 45-year-old male with a history of coronary artery disease status post CABG performed in April 2011, hypertension, hyperlipidemia, left ventricular systolic dysfunction presents with complaint of edema and shortness of breath. Patient reports symptoms started 3 days prior to coming to the emergency room. He reports that symptoms gradually became worse. He states that he was short of breath with and without exertion. He reports that he has bilateral lower leg edema and abdominal swelling. Patient reports that he does takes his medications as prescribed however ran out of medication few days prior to having symptoms. Patient denied any chest pain, palpitations, dizziness or syncope    On admission ProBNP was 5394  Echo LVEF is 20% with grade 3 diastolic dysfunction moderate dilated left atrium and severe MR  Last left heart catheterization 2018 showed three-vessel coronary artery disease -  LAD and ramus were occluded, mild to moderate disease of RCA and circumflex noted LIMA is patent SVG to ramus is occluded ramus receives right to left collaterals SVG to OM is atretic receive small collaterals from RCA.   Due to diagonal is small but patent      Pastmedical history:   Past Medical History:   Diagnosis Date    CAD (coronary artery disease)     [facility] [address]     Depression     HIGH CHOLESTEROL     Hypertension     MI (myocardial infarction) ([facility] [address] 2011    S/P coronary artery bypass graft x 4 2011    CABG x 4 Dr Roberto Schaffer       Surgical history :   Past Surgical History:   Procedure Laterality Date    BYPASS GRAFT  04/10/2011    CABG x 4 Dr Abril Mata  11/2010 medical therapy over the next few months and if LVEF still does not improve then will consider for BIVICD at that time    Discussed with the patient patient agrees with the plan      Thanks again for allowing me to participate in care of this patient. Please call me if you have any questions. With best regards. Olga Najera MD, 1/27/2020 5:08 PM     Please note this report has been partially produced using speech recognition software and may contain errors related to that system including errors in grammar, punctuation, and spelling, as well as words and phrases that may be inappropriate. If there are any questions or concerns please feel free to contact the dictating provider for clarification.

## 2020-01-27 NOTE — PROGRESS NOTES
I met with patient on 1/27/20 for bedside tobacco rounding. Gabrielle Joiner stated that he smokes about 20 cigarettes per day. I explained that [facility] cares about his health and advised him to quit. Gabrielle Joiner stated that he would like to quit and has been trying. Gabrielle Joiner is using the 14 mg nicotine patch and not having intense cravings. We discussed health concerns, reported by the patient-COPD, CHF-and the benefits of quitting. We discussed building a quit plan, identifying triggers, ways to fight cravings, modifying behaviors and building a quit kit to assist. I explained the REACH cessation program, provided educational information, and strongly encouraged Gabrielle Joiner to contact me for outpatient services. I also explained the [address]. Gabrielle Joiner stated that he quit for 2 months in the past using Chantix, but it is too expensive now. He again stated that he really wants to quit.       Keshav Prater, Certified Tobacco Treatment Specialist, LCPR III

## 2020-01-28 VITALS
WEIGHT: 179.7 LBS | RESPIRATION RATE: 12 BRPM | TEMPERATURE: 97.6 F | SYSTOLIC BLOOD PRESSURE: 97 MMHG | HEART RATE: 86 BPM | HEIGHT: 71 IN | OXYGEN SATURATION: 96 % | BODY MASS INDEX: 25.16 KG/M2 | DIASTOLIC BLOOD PRESSURE: 66 MMHG

## 2020-01-28 LAB
GLUCOSE BLD-MCNC: 119 MG/DL (ref 70–99)
GLUCOSE BLD-MCNC: 137 MG/DL (ref 70–99)
GLUCOSE BLD-MCNC: 152 MG/DL (ref 70–99)
MAGNESIUM: 2.1 MG/DL (ref 1.8–2.4)

## 2020-01-28 PROCEDURE — APPSS45 APP SPLIT SHARED TIME 31-45 MINUTES: Performed by: NURSE PRACTITIONER

## 2020-01-28 PROCEDURE — 2580000003 HC RX 258: Performed by: INTERNAL MEDICINE

## 2020-01-28 PROCEDURE — 6370000000 HC RX 637 (ALT 250 FOR IP): Performed by: NURSE PRACTITIONER

## 2020-01-28 PROCEDURE — 94640 AIRWAY INHALATION TREATMENT: CPT

## 2020-01-28 PROCEDURE — C9113 INJ PANTOPRAZOLE SODIUM, VIA: HCPCS | Performed by: INTERNAL MEDICINE

## 2020-01-28 PROCEDURE — 6370000000 HC RX 637 (ALT 250 FOR IP): Performed by: INTERNAL MEDICINE

## 2020-01-28 PROCEDURE — 99233 SBSQ HOSP IP/OBS HIGH 50: CPT | Performed by: INTERNAL MEDICINE

## 2020-01-28 PROCEDURE — 82962 GLUCOSE BLOOD TEST: CPT

## 2020-01-28 PROCEDURE — 94761 N-INVAS EAR/PLS OXIMETRY MLT: CPT

## 2020-01-28 PROCEDURE — 6360000002 HC RX W HCPCS: Performed by: PHYSICIAN ASSISTANT

## 2020-01-28 PROCEDURE — 2580000003 HC RX 258: Performed by: PHYSICIAN ASSISTANT

## 2020-01-28 PROCEDURE — 6360000002 HC RX W HCPCS: Performed by: INTERNAL MEDICINE

## 2020-01-28 PROCEDURE — 83735 ASSAY OF MAGNESIUM: CPT

## 2020-01-28 RX ORDER — CARVEDILOL 12.5 MG/1
12.5 TABLET ORAL 2 TIMES DAILY WITH MEALS
Qty: 60 TABLET | Refills: 0 | Status: ON HOLD | OUTPATIENT
Start: 2020-01-29 | End: 2020-05-16 | Stop reason: HOSPADM

## 2020-01-28 RX ORDER — CARVEDILOL 12.5 MG/1
12.5 TABLET ORAL 2 TIMES DAILY WITH MEALS
Status: DISCONTINUED | OUTPATIENT
Start: 2020-01-28 | End: 2020-01-28 | Stop reason: HOSPADM

## 2020-01-28 RX ORDER — ASPIRIN 81 MG/1
81 TABLET ORAL DAILY
Qty: 30 TABLET | Refills: 0 | Status: ON HOLD | OUTPATIENT
Start: 2020-01-28 | End: 2020-05-22 | Stop reason: SDUPTHER

## 2020-01-28 RX ORDER — ATORVASTATIN CALCIUM 40 MG/1
40 TABLET, FILM COATED ORAL DAILY
Qty: 30 TABLET | Refills: 0 | Status: ON HOLD | OUTPATIENT
Start: 2020-01-28 | End: 2020-05-22 | Stop reason: SDUPTHER

## 2020-01-28 RX ORDER — LISINOPRIL 5 MG/1
5 TABLET ORAL DAILY
Qty: 30 TABLET | Refills: 0 | Status: ON HOLD | OUTPATIENT
Start: 2020-01-29 | End: 2020-02-12 | Stop reason: HOSPADM

## 2020-01-28 RX ORDER — IPRATROPIUM BROMIDE AND ALBUTEROL SULFATE 2.5; .5 MG/3ML; MG/3ML
1 SOLUTION RESPIRATORY (INHALATION) 4 TIMES DAILY
Qty: 360 ML | Refills: 0 | Status: ON HOLD | OUTPATIENT
Start: 2020-01-28 | End: 2020-03-15 | Stop reason: SDUPTHER

## 2020-01-28 RX ORDER — ALBUTEROL SULFATE 90 UG/1
2 AEROSOL, METERED RESPIRATORY (INHALATION) EVERY 4 HOURS PRN
Qty: 1 INHALER | Refills: 0 | Status: ON HOLD | OUTPATIENT
Start: 2020-01-28 | End: 2020-02-12 | Stop reason: SDUPTHER

## 2020-01-28 RX ORDER — SPIRONOLACTONE 25 MG/1
25 TABLET ORAL DAILY
Qty: 30 TABLET | Refills: 0 | Status: ON HOLD | OUTPATIENT
Start: 2020-01-28 | End: 2020-02-12 | Stop reason: HOSPADM

## 2020-01-28 RX ORDER — FUROSEMIDE 40 MG/1
40 TABLET ORAL 2 TIMES DAILY
Qty: 60 TABLET | Refills: 0 | Status: ON HOLD | OUTPATIENT
Start: 2020-01-28 | End: 2020-02-12 | Stop reason: HOSPADM

## 2020-01-28 RX ADMIN — FUROSEMIDE 40 MG: 10 INJECTION, SOLUTION INTRAMUSCULAR; INTRAVENOUS at 08:38

## 2020-01-28 RX ADMIN — IPRATROPIUM BROMIDE AND ALBUTEROL SULFATE 3 ML: .5; 3 SOLUTION RESPIRATORY (INHALATION) at 11:40

## 2020-01-28 RX ADMIN — SPIRONOLACTONE 25 MG: 25 TABLET ORAL at 08:39

## 2020-01-28 RX ADMIN — LISINOPRIL 5 MG: 5 TABLET ORAL at 08:39

## 2020-01-28 RX ADMIN — SODIUM CHLORIDE, PRESERVATIVE FREE 10 ML: 5 INJECTION INTRAVENOUS at 08:41

## 2020-01-28 RX ADMIN — CARVEDILOL 12.5 MG: 12.5 TABLET, FILM COATED ORAL at 17:01

## 2020-01-28 RX ADMIN — IPRATROPIUM BROMIDE AND ALBUTEROL SULFATE 3 ML: .5; 3 SOLUTION RESPIRATORY (INHALATION) at 15:24

## 2020-01-28 RX ADMIN — ENOXAPARIN SODIUM 80 MG: 80 INJECTION SUBCUTANEOUS at 08:38

## 2020-01-28 RX ADMIN — PANTOPRAZOLE SODIUM 40 MG: 40 INJECTION, POWDER, FOR SOLUTION INTRAVENOUS at 08:38

## 2020-01-28 RX ADMIN — INSULIN LISPRO 1 UNITS: 100 INJECTION, SOLUTION INTRAVENOUS; SUBCUTANEOUS at 08:36

## 2020-01-28 RX ADMIN — CARVEDILOL 6.25 MG: 6.25 TABLET, FILM COATED ORAL at 08:39

## 2020-01-28 RX ADMIN — SODIUM CHLORIDE, PRESERVATIVE FREE 10 ML: 5 INJECTION INTRAVENOUS at 08:38

## 2020-01-28 RX ADMIN — IPRATROPIUM BROMIDE AND ALBUTEROL SULFATE 3 ML: .5; 3 SOLUTION RESPIRATORY (INHALATION) at 07:38

## 2020-01-28 RX ADMIN — ASPIRIN 81 MG: 81 TABLET, COATED ORAL at 08:39

## 2020-01-28 RX ADMIN — CLOPIDOGREL BISULFATE 75 MG: 75 TABLET ORAL at 08:38

## 2020-01-28 ASSESSMENT — PAIN SCALES - GENERAL
PAINLEVEL_OUTOF10: 0
PAINLEVEL_OUTOF10: 0

## 2020-01-28 NOTE — PROGRESS NOTES
Cardiology Progress Note     Today's Plan: medical management    Admit Date:  1/23/2020    Consult reason/ Seen today for: CHF    Subjective and  Overnight Events:  Patient denies any complaints of chest pain, shortness of breath, dizziness, palpitations, or syncope. Assessment / Plan / Recommendation:     1. HFrEF :Acute on chronic decompensated heart failure. Fluid status is -11.7 L this hospitalization. Agree with diuresis. We can titrate diuretics accordingly. Please continue Gudelines recommended medical thearpy including b- blocker, ACEI and Aldactone daily. Daily weights, strict Is and Os. EP recommends medication optimization and re evaluation of EF as an outpatient. 2. ASCVD: Continue aspirin and plavix, continue statins, ACEI, b-blockers   3. HTN:controlled, continue (Lisinopril, coreg) can titrate accordingly   4. Dyslipidemia: continue statins  5. JAIME/ CKD nephrology following   6. DVT prophylaxis if not contraindicated. History of Presenting Illness:    Chief complain on admission : 52 y. o.year old who is admitted for shortness of breath. Past medical history:    has a past medical history of CAD (coronary artery disease), COPD (chronic obstructive pulmonary disease) ([facility] [address] Depression, HIGH CHOLESTEROL, Hypertension, MI (myocardial infarction) ([facility] [address] and S/P coronary artery bypass graft x 4. Past surgical history:   has a past surgical history that includes Cardiac surgery (11/2010); Bypass Graft (04/10/2011); and Leg Surgery. Social History:   reports that he has been smoking cigarettes. He has a 20.00 pack-year smoking history. He has quit using smokeless tobacco. He reports that he does not drink alcohol or use drugs. Family history:  family history includes Cancer in his father; Diabetes in his mother; Heart Disease in his father and mother; Heart Failure in his father.     No Known Allergies    Review of Systems:   All 14 systems were reviewed and are negative  Except for the positive findings which are documented     /75   Pulse 88   Temp 98.3 °F (36.8 °C) (Oral)   Resp 13   Ht 5' 11\" (1.803 m)   Wt 179 lb 11.2 oz (81.5 kg)   SpO2 98%   BMI 25.06 kg/m²       Intake/Output Summary (Last 24 hours) at 1/28/2020 1353  Last data filed at 1/28/2020 1342  Gross per 24 hour   Intake 2790 ml   Output 2750 ml   Net 40 ml       Physical Exam:  Constitutional:  Well developed, Well nourished, No acute distress  HENT:  Normocephalic, Atraumatic, Bilateral external ears normal,  Nose normal.   Neck- trachea is midline, No carotid bruit noted  Eyes:  PERRL, Conjunctiva normal  Respiratory:  Normal breath sounds, No respiratory distress, No wheezing, No chest tenderness. On room air  Cardiovascular:  Normal heart rate, Normal rhythm, no murmurs appreciated, No rubs appreciated, No gallops appreciated, JVP not elevated  Abdomen/GI:  Soft, No tenderness  Musculoskeletal:  Intact distal pulses, trace edema, No tenderness, No cyanosis, No clubbing. Integument:  Warm, Dry  Lymphatic:  No lymphadenopathy noted.    Neurologic:  Alert & oriented  Psychiatric:  Affect and Mood :pleasant     Medications:    carvedilol  12.5 mg Oral BID WC    lisinopril  5 mg Oral Daily    nicotine  1 patch Transdermal Daily    furosemide  40 mg Intravenous Daily    enoxaparin  1 mg/kg Subcutaneous BID    pantoprazole  40 mg Intravenous Daily    sodium chloride flush  10 mL Intravenous 2 times per day    aspirin  81 mg Oral Daily    atorvastatin  40 mg Oral Nightly    clopidogrel  75 mg Oral Daily    ipratropium-albuterol  1 vial Inhalation 4x Daily    spironolactone  25 mg Oral Daily    insulin lispro  0-6 Units Subcutaneous TID WC    insulin lispro  0-3 Units Subcutaneous Nightly    lidocaine PF  5 mL Intradermal Once    sodium chloride flush  10 mL Intravenous 2 times per day      dextrose       sodium you   Please call with questions. Electronically signed by NANCY Lo CNP on 1/28/2020 at 1:53 PM   I have seen ,spoken to  and examined this patient personally, independently of the nurse practitioner. I have reviewed the hospital care given to date and reviewed all pertinent labs and imaging. The plan was developed mutually at the time of the visit with the patient, Clinical Nurse Practitioner  and myself. I have spoken with patient, nursing staff and provided written and verbal instructions . The above note has been reviewed and I agree with the assessment, diagnosis, and treatment plan with changes made by me as follows     CARDIOLOGY ATTENDING ADDENDUM    HPI:  I have reviewed the above HPI  And agree with above   Samuel Matt is a 52 y. o.year old who and presents with had no chief complaint listed for this encounter. No chief complaint on file. Interval history:  Clinically better    Physical Exam:  General:  Awake, alert, NAD  Head:normal  Eye:normal  Neck:  No JVD   Chest:  Clear to auscultation, respiration easy  Cardiovascular:  RRR S1S2  Abdomen:   nontender  Extremities:  no edema  Pulses; palpable  Neuro: grossly normal      MEDICAL DECISION MAKING;    I agree with the above plan, which was planned by myself and discussed with NP.     Valarie Joiner MD [facility]

## 2020-01-28 NOTE — PROGRESS NOTES
Toward the end of our walk PT's O2 saturation dropped to 85 90 percent, once back in bed returned to normal range  Per Magdaleno Lockett

## 2020-01-29 NOTE — CARE COORDINATION
.CM met with pt for d/c planning. Introduced self and updated white board. Pt lives with his son and is independent with ADL's. Pt drives,  has insurance, and is able to afford medication. Pt does not have a PCP. PCP list and Reji Torres in Clinic info provided. Pt states that he has a Med [place]. Pt denies any other needs at this time. D/c plan is home with son, no needs. CM will continue to follow.   TE
Patient discharged after office hours, no call received for assistance. Application was placed in mail today.
patient does not have current medicaid. It is pending. He is eligible for COPD/ CHF kit donation. CN will follow. Date: 1/28/20        Time: 2:30  15 minutes of education provided. Provided the patient with a med nanci machine, BP cuff and SPO2 monitor. Instruction for use of med neb explained by My Pacheco RN. Instruction for use of the BP cuff/ O2 sat explained by me. All questions verbalized were answered. CN will follow. Date: 1/29/20     Time:10:00  Patient was discharged home on the evening of 1/28. Chart review done. I called [facility] regarding medications and the patient had not picked them up yet. Staff at the pharmacy state the patient medications are 65.00 but the eliquis is over 400.00. I called our pharmacy so an eliquis coupon could be called in to [facility]. Cha Cohen will take care of this. Eliquis should be free this month after the coupon is used. I also reached out to the [facility] about the expense of this drug. They will refer the patient to Carol Feldman at the [place] [address] for additional financial assistance. Message left with the patient about the above. I called but there was no answer. CN will follow. Date: 1/29/20      Time: 2:45  Cha Cohen called coupon in to [facility] for eliquis. I attempted to reach the patient but there was no answer. CN will follow. Date:1/30/20       Time: 10:00  Patient called me back. States [facility] told him this morning that his eliquis is still 450.00. I called our pharmacy and the coupon was called in by Cha Cohen. Patient does have carvedilol and lasix at home currently. He is taking  these two medications. I called [facility] and called in a new coupon for eliquis. She ran it and now the co pay is 0.00. I called the patient back and he did not answer so I left a message on VM. CN will follow. Date: 1/30/20      Time: 12:27  I called [facility] to see if we had any additional resources available to help with medications.    Lori Irene will assess

## 2020-01-29 NOTE — DISCHARGE SUMMARY
Hospital Medicine  DISCHARGE SUMMARY  Date: 1/28/2020  Name: Blake Cagle  MRN: [id number]  YOB: 1970     Patient's PCP: Yumiko Blum Date: 1/23/2020   Discharge Date: 1/28/2020  Admitting Physician: Fannie Solo MD  Discharge Physician: Tova Menard MD      Discharge Diagnoses:  Acute hypoxemic respiratory failure secondary to CHF exacerbation versus PE  Acute on chronic systolic heart failure   Type 2 diabetes   NSTEMI  Coronary artery disease status post CABG   Hypertension, uncontrolled  Transaminitis  Hyponatremia  JAIME on CKD stage III      HPI:    Chief Complaint: Shortness of breath  Blake Cagle is a 52 y.o.  male  who presents with shortness of breath. Patient presented to [facility] ER for worsening shortness of breath over the past 3 days. Patient is not taking any of his medications including insulin. No indications taking his Lasix. He said ran out of his medications. He denies chest pain, cough, fever, chills, wheezes, palpitations, urinary symptoms or change bowel habits. Hospital Course  Patient came in with shortness of breath. It was felt to be due to CHF exacerbation versus PE.  VQ scan showed indeterminate probability of having pulmonary embolus. Patient was placed on therapeutic Lovenox. He was given BiPAP as needed. He was on oxygen and weaned off during the course of his stay. Patient was discharged on Eliquis. Patient had acute on chronic systolic heart failure. Echo showed an EF of 20%. Patient was placed on Lasix. He was also on beta-blockers, a low-dose ACE inhibitor, and on Aldactone. He appeared to diurese during his stay. He was seen by EP for evaluation for ICD. EP said patient will need further optimization of medication before ICD placement. Patient's breathing appeared to improve during the course of his stay. Patient had an NSTEMI. He was not having chest pain.   Cardiology felt elevated troponins were due to CHF and acute kidney injury. Patient has coronary artery disease. He has had a CABG in the past.  He was on aspirin, Plavix,  beta-blocker, and statin. Patient was taken off of his Plavix once Eliquis was started. Patient had transaminitis, which may have been due to CHF. LFTs were trending down. He will need outpatient lab work in a week for a hepatic function panel. Patient had hyponatremia. It was felt to be stable and likely from diuresis. Patient will need outpatient lab work for a sodium level. Patient had acute kidney injury on CKD 3. It was likely due to CHF. It improved with diuresis. Patient was stable. His breathing had improved. He was off oxygen. He was discharged to home. He was given a DME order for nebulizer to help with his COPD. Physical Exam:  /79   Pulse 96   Temp 98.3 °F (36.8 °C) (Oral)   Resp 13   Ht 5' 11\" (1.803 m)   Wt 179 lb 11.2 oz (81.5 kg)   SpO2 96%   BMI 25.06 kg/m²   General Appearance: alert and oriented to person, place and time, in no acute distress  Cardiovascular: normal rate, regular rhythm, normal S1 and S2, no pitting edema  Pulmonary/Chest: cta b/l bs+  Abdomen: soft, non-tender, non-distended, normal bowel sounds, no masses   Extremities: no cyanosis, clubbing or edema, pulse   Skin: warm and dry, no rash or erythema  Head: normocephalic and atraumatic  Neck: supple and non-tender without mass, no thyromegaly   Musculoskeletal: normal range of motion, no joint swelling, deformity or tenderness  Neurological: alert, oriented, normal speech, no focal findings or movement disorder noted    Consultations  IP CONSULT TO HEART FAILURE NURSE/COORDINATOR  IP CONSULT TO DIETITIAN  IP CONSULT TO CARDIOLOGY  IP CONSULT TO GI  IP CONSULT TO SMOKING CESSATION PROGRAM  IP CONSULT TO ELECTROPHYSIOLOGY    Invasive procedures:   none    Significant Diagnostic Studies:    Imaging  Xr Chest Standard (2 Vw)  Result Date: 1/23/2020  1.   Persistent ovoid opacity at the right lung base which may represent loculated fluid. Given persistence since September 2019, further evaluation with CT chest is recommended. Nm Lung Vent/perfusion (vq)  Result Date: 1/24/2020  Given a lower lung zone triple matched defect this is an intermediate probability scan pattern for pulmonary embolus. Vl Dup Lower Extremity Venous Bilateral  Result Date: 1/24/2020  No evidence of DVT in either lower extremity. Us Abdomen Limited Specify Organ? Liver, Gallbladder  Result Date: 1/23/2020  1. Gallbladder is contracted with nonspecific wall thickening up to 8 mm. No cholelithiasis. Normal common bile duct. 2. Mild hepatomegaly. Code Status:  Limited      Discharge Medications:     Medication List      START taking these medications    apixaban 5 MG Tabs tablet  Commonly known as:  Eliquis Starter Pack  Take 10 mg (2 tablets) orally twice daily for 7 days, then take 5 mg (1 tablet) orally twice daily thereafter.         CHANGE how you take these medications    albuterol sulfate  (90 Base) MCG/ACT inhaler  Commonly known as:  Ventolin HFA  Inhale 2 puffs into the lungs every 4 hours as needed for Wheezing or Shortness of Breath  What changed:  reasons to take this     carvedilol 12.5 MG tablet  Commonly known as:  COREG  Take 1 tablet by mouth 2 times daily (with meals)  Start taking on:  January 29, 2020  What changed:    · medication strength  · how much to take     lisinopril 5 MG tablet  Commonly known as:  PRINIVIL;ZESTRIL  Take 1 tablet by mouth daily  Start taking on:  January 29, 2020  What changed:    · medication strength  · how much to take        CONTINUE taking these medications    aspirin 81 MG EC tablet  Take 1 tablet by mouth daily     atorvastatin 40 MG tablet  Commonly known as:  LIPITOR  Take 1 tablet by mouth daily     furosemide 40 MG tablet  Commonly known as:  LASIX  Take 1 tablet by mouth 2 times daily     ipratropium-albuterol 0.5-2.5 (3) MG/3ML Soln nebulizer needed  Disposition: home  Discharged Condition: Stable       The patient was seen and examined on day of discharge and this discharge summary is in conjunction with any daily progress note from day of discharge. Time spent on discharge is 26 minutes in the examination, evaluation, counseling and review of medications and discharge plan.     Damian Wu MD

## 2020-01-30 ENCOUNTER — CARE COORDINATION (OUTPATIENT)
Dept: CASE MANAGEMENT | Age: 50
End: 2020-01-30

## 2020-01-31 ENCOUNTER — CARE COORDINATION (OUTPATIENT)
Dept: CASE MANAGEMENT | Age: 50
End: 2020-01-31

## 2020-01-31 NOTE — CARE COORDINATION
Franc[facility] Transitions Initial Follow Up Call    Call within 2 business days of discharge: Yes    Patient: Sabas Rollins Patient : 1970   MRN: [id number]  Reason for Admission:   SOB  Discharge Date: 20 RARS: Readmission Risk Score: 24      Last Discharge [facility]       Complaint Diagnosis Description Type Department Provider    20  PE (pulmonary thromboembolism) ([facility] [address] . .. Admission (Discharged) [facility] [address] Dejah Green MD    20 Shortness of Breath Elevated troponin . .. ED (TRANSFER) [address] [place] [address] ED Timbo Talamantes DO               Facility:   [facility]        Follow Up: Attempted to reach patient for Care Transition follow up/ Second attempt. No answer to phone. Message left with CTN contact information and request for call back. DRG. Does not qualify for BPCI. CTN to make third and final attempt at contact next week.    Future Appointments   Date Time Provider Karey Marks   2/3/2020  1:00 PM [facility] Lennox Reins, RN

## 2020-02-07 ENCOUNTER — APPOINTMENT (OUTPATIENT)
Dept: CT IMAGING | Age: 50
DRG: 291 | End: 2020-02-07
Payer: MEDICARE

## 2020-02-07 ENCOUNTER — APPOINTMENT (OUTPATIENT)
Dept: GENERAL RADIOLOGY | Age: 50
DRG: 291 | End: 2020-02-07
Payer: MEDICARE

## 2020-02-07 ENCOUNTER — HOSPITAL ENCOUNTER (INPATIENT)
Age: 50
LOS: 5 days | Discharge: HOME | DRG: 291 | End: 2020-02-12
Attending: EMERGENCY MEDICINE | Admitting: INTERNAL MEDICINE
Payer: MEDICARE

## 2020-02-07 ENCOUNTER — APPOINTMENT (OUTPATIENT)
Dept: ULTRASOUND IMAGING | Age: 50
DRG: 291 | End: 2020-02-07
Payer: MEDICARE

## 2020-02-07 PROBLEM — I50.21 ACUTE SYSTOLIC CONGESTIVE HEART FAILURE (HCC): Status: ACTIVE | Noted: 2020-02-07

## 2020-02-07 LAB
ALBUMIN SERPL-MCNC: 3.9 GM/DL (ref 3.4–5)
ALP BLD-CCNC: 91 IU/L (ref 40–129)
ALT SERPL-CCNC: 35 U/L (ref 10–40)
ANION GAP SERPL CALCULATED.3IONS-SCNC: 15 MMOL/L (ref 4–16)
AST SERPL-CCNC: 22 IU/L (ref 15–37)
BASOPHILS ABSOLUTE: 0.1 K/CU MM
BASOPHILS RELATIVE PERCENT: 1 % (ref 0–1)
BILIRUB SERPL-MCNC: 0.5 MG/DL (ref 0–1)
BUN BLDV-MCNC: 23 MG/DL (ref 6–23)
CALCIUM SERPL-MCNC: 9.4 MG/DL (ref 8.3–10.6)
CHLORIDE BLD-SCNC: 98 MMOL/L (ref 99–110)
CO2: 21 MMOL/L (ref 21–32)
CREAT SERPL-MCNC: 1.2 MG/DL (ref 0.9–1.3)
DIFFERENTIAL TYPE: ABNORMAL
EOSINOPHILS ABSOLUTE: 0.2 K/CU MM
EOSINOPHILS RELATIVE PERCENT: 1.3 % (ref 0–3)
GFR AFRICAN AMERICAN: >60 ML/MIN/1.73M2
GFR NON-AFRICAN AMERICAN: >60 ML/MIN/1.73M2
GLUCOSE BLD-MCNC: 120 MG/DL (ref 70–99)
GLUCOSE BLD-MCNC: 123 MG/DL (ref 70–99)
HCT VFR BLD CALC: 38.1 % (ref 42–52)
HEMOGLOBIN: 11.1 GM/DL (ref 13.5–18)
IMMATURE NEUTROPHIL %: 0.6 % (ref 0–0.43)
LACTATE: 1.2 MMOL/L (ref 0.4–2)
LYMPHOCYTES ABSOLUTE: 1.5 K/CU MM
LYMPHOCYTES RELATIVE PERCENT: 12.5 % (ref 24–44)
MCH RBC QN AUTO: 23.2 PG (ref 27–31)
MCHC RBC AUTO-ENTMCNC: 29.1 % (ref 32–36)
MCV RBC AUTO: 79.7 FL (ref 78–100)
MONOCYTES ABSOLUTE: 0.7 K/CU MM
MONOCYTES RELATIVE PERCENT: 5.8 % (ref 0–4)
NUCLEATED RBC %: 0.3 %
PDW BLD-RTO: 17.2 % (ref 11.7–14.9)
PLATELET # BLD: 329 K/CU MM (ref 140–440)
PMV BLD AUTO: 9.5 FL (ref 7.5–11.1)
POTASSIUM SERPL-SCNC: 3.9 MMOL/L (ref 3.5–5.1)
PRO-BNP: 4780 PG/ML
RBC # BLD: 4.78 M/CU MM (ref 4.6–6.2)
SEGMENTED NEUTROPHILS ABSOLUTE COUNT: 9.3 K/CU MM
SEGMENTED NEUTROPHILS RELATIVE PERCENT: 78.8 % (ref 36–66)
SODIUM BLD-SCNC: 134 MMOL/L (ref 135–145)
TOTAL CK: 89 IU/L (ref 38–174)
TOTAL IMMATURE NEUTOROPHIL: 0.07 K/CU MM
TOTAL NUCLEATED RBC: 0 K/CU MM
TOTAL PROTEIN: 7.1 GM/DL (ref 6.4–8.2)
TROPONIN T: 0.02 NG/ML
TSH HIGH SENSITIVITY: 3.99 UIU/ML (ref 0.27–4.2)
WBC # BLD: 11.8 K/CU MM (ref 4–10.5)

## 2020-02-07 PROCEDURE — 82550 ASSAY OF CK (CPK): CPT

## 2020-02-07 PROCEDURE — 82962 GLUCOSE BLOOD TEST: CPT

## 2020-02-07 PROCEDURE — 96374 THER/PROPH/DIAG INJ IV PUSH: CPT

## 2020-02-07 PROCEDURE — 6370000000 HC RX 637 (ALT 250 FOR IP): Performed by: EMERGENCY MEDICINE

## 2020-02-07 PROCEDURE — 6360000004 HC RX CONTRAST MEDICATION: Performed by: EMERGENCY MEDICINE

## 2020-02-07 PROCEDURE — 84443 ASSAY THYROID STIM HORMONE: CPT

## 2020-02-07 PROCEDURE — 6370000000 HC RX 637 (ALT 250 FOR IP): Performed by: INTERNAL MEDICINE

## 2020-02-07 PROCEDURE — 36415 COLL VENOUS BLD VENIPUNCTURE: CPT

## 2020-02-07 PROCEDURE — 85025 COMPLETE CBC W/AUTO DIFF WBC: CPT

## 2020-02-07 PROCEDURE — 71046 X-RAY EXAM CHEST 2 VIEWS: CPT

## 2020-02-07 PROCEDURE — 93970 EXTREMITY STUDY: CPT

## 2020-02-07 PROCEDURE — 1200000000 HC SEMI PRIVATE

## 2020-02-07 PROCEDURE — 6360000002 HC RX W HCPCS: Performed by: EMERGENCY MEDICINE

## 2020-02-07 PROCEDURE — 71275 CT ANGIOGRAPHY CHEST: CPT

## 2020-02-07 PROCEDURE — 99285 EMERGENCY DEPT VISIT HI MDM: CPT

## 2020-02-07 PROCEDURE — 93005 ELECTROCARDIOGRAM TRACING: CPT | Performed by: EMERGENCY MEDICINE

## 2020-02-07 PROCEDURE — 83880 ASSAY OF NATRIURETIC PEPTIDE: CPT

## 2020-02-07 PROCEDURE — 84484 ASSAY OF TROPONIN QUANT: CPT

## 2020-02-07 PROCEDURE — 80053 COMPREHEN METABOLIC PANEL: CPT

## 2020-02-07 PROCEDURE — 2580000003 HC RX 258: Performed by: INTERNAL MEDICINE

## 2020-02-07 PROCEDURE — 94640 AIRWAY INHALATION TREATMENT: CPT

## 2020-02-07 PROCEDURE — 93010 ELECTROCARDIOGRAM REPORT: CPT | Performed by: INTERNAL MEDICINE

## 2020-02-07 PROCEDURE — 94761 N-INVAS EAR/PLS OXIMETRY MLT: CPT

## 2020-02-07 PROCEDURE — 94660 CPAP INITIATION&MGMT: CPT

## 2020-02-07 PROCEDURE — 6360000002 HC RX W HCPCS: Performed by: INTERNAL MEDICINE

## 2020-02-07 PROCEDURE — 6370000000 HC RX 637 (ALT 250 FOR IP): Performed by: NURSE PRACTITIONER

## 2020-02-07 PROCEDURE — 2700000000 HC OXYGEN THERAPY PER DAY

## 2020-02-07 PROCEDURE — 83605 ASSAY OF LACTIC ACID: CPT

## 2020-02-07 RX ORDER — ATORVASTATIN CALCIUM 40 MG/1
40 TABLET, FILM COATED ORAL DAILY
Status: DISCONTINUED | OUTPATIENT
Start: 2020-02-07 | End: 2020-02-12 | Stop reason: HOSPADM

## 2020-02-07 RX ORDER — NICOTINE 21 MG/24HR
1 PATCH, TRANSDERMAL 24 HOURS TRANSDERMAL DAILY
Status: DISCONTINUED | OUTPATIENT
Start: 2020-02-07 | End: 2020-02-12 | Stop reason: HOSPADM

## 2020-02-07 RX ORDER — 0.9 % SODIUM CHLORIDE 0.9 %
1000 INTRAVENOUS SOLUTION INTRAVENOUS ONCE
Status: DISCONTINUED | OUTPATIENT
Start: 2020-02-07 | End: 2020-02-07

## 2020-02-07 RX ORDER — SPIRONOLACTONE 25 MG/1
25 TABLET ORAL DAILY
Status: DISCONTINUED | OUTPATIENT
Start: 2020-02-08 | End: 2020-02-10

## 2020-02-07 RX ORDER — SODIUM CHLORIDE 0.9 % (FLUSH) 0.9 %
10 SYRINGE (ML) INJECTION PRN
Status: DISCONTINUED | OUTPATIENT
Start: 2020-02-07 | End: 2020-02-12 | Stop reason: HOSPADM

## 2020-02-07 RX ORDER — IPRATROPIUM BROMIDE AND ALBUTEROL SULFATE 2.5; .5 MG/3ML; MG/3ML
1 SOLUTION RESPIRATORY (INHALATION) ONCE
Status: COMPLETED | OUTPATIENT
Start: 2020-02-07 | End: 2020-02-07

## 2020-02-07 RX ORDER — ONDANSETRON 2 MG/ML
4 INJECTION INTRAMUSCULAR; INTRAVENOUS EVERY 6 HOURS PRN
Status: DISCONTINUED | OUTPATIENT
Start: 2020-02-07 | End: 2020-02-12 | Stop reason: HOSPADM

## 2020-02-07 RX ORDER — DEXTROSE MONOHYDRATE 50 MG/ML
100 INJECTION, SOLUTION INTRAVENOUS PRN
Status: DISCONTINUED | OUTPATIENT
Start: 2020-02-07 | End: 2020-02-12 | Stop reason: HOSPADM

## 2020-02-07 RX ORDER — FUROSEMIDE 10 MG/ML
40 INJECTION INTRAMUSCULAR; INTRAVENOUS 2 TIMES DAILY
Status: DISCONTINUED | OUTPATIENT
Start: 2020-02-07 | End: 2020-02-09

## 2020-02-07 RX ORDER — LISINOPRIL 10 MG/1
10 TABLET ORAL DAILY
Status: DISCONTINUED | OUTPATIENT
Start: 2020-02-08 | End: 2020-02-10

## 2020-02-07 RX ORDER — CARVEDILOL 12.5 MG/1
12.5 TABLET ORAL 2 TIMES DAILY WITH MEALS
Status: DISCONTINUED | OUTPATIENT
Start: 2020-02-07 | End: 2020-02-12 | Stop reason: HOSPADM

## 2020-02-07 RX ORDER — FUROSEMIDE 10 MG/ML
20 INJECTION INTRAMUSCULAR; INTRAVENOUS ONCE
Status: COMPLETED | OUTPATIENT
Start: 2020-02-07 | End: 2020-02-07

## 2020-02-07 RX ORDER — NICOTINE POLACRILEX 4 MG
15 LOZENGE BUCCAL PRN
Status: DISCONTINUED | OUTPATIENT
Start: 2020-02-07 | End: 2020-02-12 | Stop reason: HOSPADM

## 2020-02-07 RX ORDER — ASPIRIN 81 MG/1
81 TABLET ORAL DAILY
Status: DISCONTINUED | OUTPATIENT
Start: 2020-02-07 | End: 2020-02-12 | Stop reason: HOSPADM

## 2020-02-07 RX ORDER — SODIUM CHLORIDE 0.9 % (FLUSH) 0.9 %
10 SYRINGE (ML) INJECTION EVERY 12 HOURS SCHEDULED
Status: DISCONTINUED | OUTPATIENT
Start: 2020-02-07 | End: 2020-02-12 | Stop reason: HOSPADM

## 2020-02-07 RX ORDER — DEXTROSE MONOHYDRATE 25 G/50ML
12.5 INJECTION, SOLUTION INTRAVENOUS PRN
Status: DISCONTINUED | OUTPATIENT
Start: 2020-02-07 | End: 2020-02-12 | Stop reason: HOSPADM

## 2020-02-07 RX ADMIN — ASPIRIN 81 MG: 81 TABLET, COATED ORAL at 22:13

## 2020-02-07 RX ADMIN — CARVEDILOL 12.5 MG: 12.5 TABLET, FILM COATED ORAL at 22:13

## 2020-02-07 RX ADMIN — APIXABAN 5 MG: 5 TABLET, FILM COATED ORAL at 22:13

## 2020-02-07 RX ADMIN — FUROSEMIDE 40 MG: 10 INJECTION, SOLUTION INTRAMUSCULAR; INTRAVENOUS at 22:14

## 2020-02-07 RX ADMIN — SODIUM CHLORIDE, PRESERVATIVE FREE 10 ML: 5 INJECTION INTRAVENOUS at 22:19

## 2020-02-07 RX ADMIN — FUROSEMIDE 20 MG: 10 INJECTION, SOLUTION INTRAMUSCULAR; INTRAVENOUS at 15:41

## 2020-02-07 RX ADMIN — IPRATROPIUM BROMIDE AND ALBUTEROL SULFATE 1 AMPULE: .5; 3 SOLUTION RESPIRATORY (INHALATION) at 15:53

## 2020-02-07 RX ADMIN — ATORVASTATIN CALCIUM 40 MG: 40 TABLET, FILM COATED ORAL at 22:13

## 2020-02-07 RX ADMIN — IOPAMIDOL 75 ML: 755 INJECTION, SOLUTION INTRAVENOUS at 15:00

## 2020-02-07 ASSESSMENT — ENCOUNTER SYMPTOMS
CHEST TIGHTNESS: 1
GASTROINTESTINAL NEGATIVE: 1
EYES NEGATIVE: 1
SHORTNESS OF BREATH: 1
ALLERGIC/IMMUNOLOGIC NEGATIVE: 1

## 2020-02-07 ASSESSMENT — PAIN SCALES - GENERAL
PAINLEVEL_OUTOF10: 0
PAINLEVEL_OUTOF10: 0

## 2020-02-07 NOTE — PROGRESS NOTES
Medication History  [facility]    Patient Name: Alec Galeano 1970     Medication history has been completed by: Kristi Phillips CPhT    Source(s) of information: Patient and Insurance claims    Primary Care Physician: [address] [facility]: Carl Lauren    Allergies as of 02/07/2020    (No Known Allergies)        Prior to Admission medications    Medication Sig Start Date End Date Taking? Authorizing Provider   aspirin 81 MG EC tablet Take 1 tablet by mouth daily 1/28/20  Yes Ken Canada MD   atorvastatin (LIPITOR) 40 MG tablet Take 1 tablet by mouth daily 1/28/20  Yes Ken Canada MD   carvedilol (COREG) 12.5 MG tablet Take 1 tablet by mouth 2 times daily (with meals) 1/29/20  Yes Ken Canada MD   furosemide (LASIX) 40 MG tablet Take 1 tablet by mouth 2 times daily 1/28/20 2/27/20 Yes Ken Canada MD   lisinopril (PRINIVIL;ZESTRIL) 5 MG tablet Take 1 tablet by mouth daily 1/29/20  Yes Ken Canada MD   spironolactone (ALDACTONE) 25 MG tablet Take 1 tablet by mouth daily 1/28/20  Yes Ken Canada MD   apixaban (ELIQUIS STARTER PACK) 5 MG TABS tablet Take 10 mg (2 tablets) orally twice daily for 7 days, then take 5 mg (1 tablet) orally twice daily thereafter.  1/28/20  Yes Ken Canada MD   albuterol sulfate HFA (VENTOLIN HFA) 108 (90 Base) MCG/ACT inhaler Inhale 2 puffs into the lungs every 4 hours as needed for Wheezing or Shortness of Breath 1/28/20  Yes Ken Canada MD   ipratropium-albuterol (DUONEB) 0.5-2.5 (3) MG/3ML SOLN nebulizer solution Inhale 3 mLs into the lungs 4 times daily 1/28/20  Yes Ken Canada MD   apixaban (ELIQUIS) 5 MG TABS tablet Take 1 tablet by mouth 2 times daily 1/29/20 2/28/20  Roxana Elliott MD   nitroGLYCERIN (NITROSTAT) 0.4 MG SL tablet Place 1 tablet under the tongue every 5 minutes as needed for Chest pain 9/17/19   Ivevi Villegas MD       Medications removed from list (include reason, ex. noncompliance, medication cost,

## 2020-02-07 NOTE — ED PROVIDER NOTES
tablet Take 1 tablet by mouth daily 30 tablet 0    carvedilol (COREG) 12.5 MG tablet Take 1 tablet by mouth 2 times daily (with meals) 60 tablet 0    furosemide (LASIX) 40 MG tablet Take 1 tablet by mouth 2 times daily 60 tablet 0    lisinopril (PRINIVIL;ZESTRIL) 5 MG tablet Take 1 tablet by mouth daily 30 tablet 0    spironolactone (ALDACTONE) 25 MG tablet Take 1 tablet by mouth daily 30 tablet 0    apixaban (ELIQUIS STARTER PACK) 5 MG TABS tablet Take 10 mg (2 tablets) orally twice daily for 7 days, then take 5 mg (1 tablet) orally twice daily thereafter. 74 tablet 0    albuterol sulfate HFA (VENTOLIN HFA) 108 (90 Base) MCG/ACT inhaler Inhale 2 puffs into the lungs every 4 hours as needed for Wheezing or Shortness of Breath 1 Inhaler 0    ipratropium-albuterol (DUONEB) 0.5-2.5 (3) MG/3ML SOLN nebulizer solution Inhale 3 mLs into the lungs 4 times daily 360 mL 0    apixaban (ELIQUIS) 5 MG TABS tablet Take 1 tablet by mouth 2 times daily 42 tablet 0    nitroGLYCERIN (NITROSTAT) 0.4 MG SL tablet Place 1 tablet under the tongue every 5 minutes as needed for Chest pain 25 tablet 0      No Known Allergies  No current facility-administered medications for this encounter. Current Outpatient Medications   Medication Sig Dispense Refill    aspirin 81 MG EC tablet Take 1 tablet by mouth daily 30 tablet 0    atorvastatin (LIPITOR) 40 MG tablet Take 1 tablet by mouth daily 30 tablet 0    carvedilol (COREG) 12.5 MG tablet Take 1 tablet by mouth 2 times daily (with meals) 60 tablet 0    furosemide (LASIX) 40 MG tablet Take 1 tablet by mouth 2 times daily 60 tablet 0    lisinopril (PRINIVIL;ZESTRIL) 5 MG tablet Take 1 tablet by mouth daily 30 tablet 0    spironolactone (ALDACTONE) 25 MG tablet Take 1 tablet by mouth daily 30 tablet 0    apixaban (ELIQUIS STARTER PACK) 5 MG TABS tablet Take 10 mg (2 tablets) orally twice daily for 7 days, then take 5 mg (1 tablet) orally twice daily thereafter.  74 tablet 0    Phonation normal.   Cardiovascular:      Rate and Rhythm: Regular rhythm. Tachycardia present. Pulses: Normal pulses. Heart sounds: Normal heart sounds. No murmur. No friction rub. No gallop. Pulmonary:      Effort: Pulmonary effort is normal. No respiratory distress. Breath sounds: No stridor. Wheezing, rhonchi and rales present. Abdominal:      General: Bowel sounds are normal. There is no distension. Palpations: Abdomen is soft. There is no mass. Tenderness: There is no abdominal tenderness. There is no guarding or rebound. Hernia: No hernia is present. Musculoskeletal: Normal range of motion. General: Swelling and tenderness present. No deformity or signs of injury. Right lower leg: Edema present. Left lower leg: Edema present. Skin:     General: Skin is warm. Coloration: Skin is not jaundiced or pale. Findings: No bruising, erythema, lesion or rash. Neurological:      General: No focal deficit present. Mental Status: He is alert and oriented to person, place, and time. GCS: GCS eye subscore is 4. GCS verbal subscore is 5. GCS motor subscore is 6. Cranial Nerves: Cranial nerves are intact. No cranial nerve deficit, dysarthria or facial asymmetry. Sensory: Sensation is intact. No sensory deficit. Motor: Motor function is intact. No weakness, tremor, atrophy, abnormal muscle tone or seizure activity. Coordination: Coordination is intact. Coordination normal.   Psychiatric:         Mood and Affect: Mood normal.         Behavior: Behavior is cooperative. Thought Content:  Thought content normal.         Judgment: Judgment normal.           I have reviewed andinterpreted all of the currently available lab results from this visit (if applicable):    Results for orders placed or performed during the hospital encounter of 02/07/20   CBC auto diff   Result Value Ref Range    WBC 11.8 (H) 4.0 - 10.5 K/CU MM    RBC 4. 78 4.6 - 6.2 M/CU MM    Hemoglobin 11.1 (L) 13.5 - 18.0 GM/DL    Hematocrit 38.1 (L) 42 - 52 %    MCV 79.7 78 - 100 FL    MCH 23.2 (L) 27 - 31 PG    MCHC 29.1 (L) 32.0 - 36.0 %    RDW 17.2 (H) 11.7 - 14.9 %    Platelets 763 300 - 803 K/CU MM    MPV 9.5 7.5 - 11.1 FL    Differential Type AUTOMATED DIFFERENTIAL     Segs Relative 78.8 (H) 36 - 66 %    Lymphocytes % 12.5 (L) 24 - 44 %    Monocytes % 5.8 (H) 0 - 4 %    Eosinophils % 1.3 0 - 3 %    Basophils % 1.0 0 - 1 %    Segs Absolute 9.3 K/CU MM    Lymphocytes Absolute 1.5 K/CU MM    Monocytes Absolute 0.7 K/CU MM    Eosinophils Absolute 0.2 K/CU MM    Basophils Absolute 0.1 K/CU MM    Nucleated RBC % 0.3 %    Total Nucleated RBC 0.0 K/CU MM    Total Immature Neutrophil 0.07 K/CU MM    Immature Neutrophil % 0.6 (H) 0 - 0.43 %   Comprehensive Metabolic Panel   Result Value Ref Range    Sodium 134 (L) 135 - 145 MMOL/L    Potassium 3.9 3.5 - 5.1 MMOL/L    Chloride 98 (L) 99 - 110 mMol/L    CO2 21 21 - 32 MMOL/L    BUN 23 6 - 23 MG/DL    CREATININE 1.2 0.9 - 1.3 MG/DL    Glucose 123 (H) 70 - 99 MG/DL    Calcium 9.4 8.3 - 10.6 MG/DL    Alb 3.9 3.4 - 5.0 GM/DL    Total Protein 7.1 6.4 - 8.2 GM/DL    Total Bilirubin 0.5 0.0 - 1.0 MG/DL    ALT 35 10 - 40 U/L    AST 22 15 - 37 IU/L    Alkaline Phosphatase 91 40 - 129 IU/L    GFR Non-African American >60 >60 mL/min/1.73m2    GFR African American >60 >60 mL/min/1.73m2    Anion Gap 15 4 - 16   Troponin   Result Value Ref Range    Troponin T 0.024 (H) <0.01 NG/ML   CK   Result Value Ref Range    Total CK 89 38 - 174 IU/L   Brain Natriuretic Peptide   Result Value Ref Range    Pro-BNP 4,780 (H) <300 PG/ML   TSH without Reflex   Result Value Ref Range    TSH, High Sensitivity 3.990 0.270 - 4.20 uIu/ml   EKG 12 Lead   Result Value Ref Range    Ventricular Rate 121 BPM    Atrial Rate 121 BPM    P-R Interval 148 ms    QRS Duration 116 ms    Q-T Interval 316 ms    QTc Calculation (Bazett) 448 ms    P Axis 70 degrees    R Axis 68 grossly unremarkable without evidence of hydronephrosis. Right kidney measures 9.2 cm. PANCREAS:  Visualized portions of the pancreas are unremarkable. OTHER: No evidence of right upper quadrant ascites. 1. Gallbladder is contracted with nonspecific wall thickening up to 8 mm. No cholelithiasis. Normal common bile duct. 2. Mild hepatomegaly. EKG (if obtained): (All EKG's are interpreted by myself in the absence of a cardiologist)    12 lead EKG per my interpretation:  Sinus Tachycardia 121  Axis is   Normal  QTc is  448  There is no specific T wave changes appreciated. There is no specific ST wave changes appreciated. Prior EKG to compare with was not available     Total critical care time today provided was at least 30 minutes. This excludes seperately billable procedure. Critical care time provided for reviewing labs, images giving Lasix giving BiPAP, breathing treatments, oxygen, consulting medicine,  that required close evaluation and/or intervention with concern for patient decompensation. MDM:    Patient here with increased shortness of breath, bilateral leg swelling, palpitations, chest pain. History of CHF, COPD, smoking. He has been noncompliant he still smokes he still eats a lot of salt he stopped taking his medication a day because it was making him feel worse including his blood thinner, Eliquis for PE. He states he has a PE on arrival he is tachycardic chest pain shortness of breath given pain medicine nausea medicine holding off on IV fluids he does appear fluid overloaded. I did give him Lasix, BiPAP, breathing treatment. Work-up does show elevated BNP, troponin EKG is negative CT PE study is negative ultrasound of his legs is negative patient otherwise stable admitted. I did talk to hospital medicine.     CLINICAL IMPRESSION:  Final diagnoses:   Leg swelling   Dyspnea and respiratory abnormalities   Pleural effusion   Lung mass   Troponin level elevated   Elevated brain natriuretic peptide (BNP) level       (Please note that portions of this note may have been completed with a voice recognition program. Efforts were made to edit the dictations but occasionally words aremis-transcribed.)    DISPOSITION REFERRAL (if applicable):   [address] [place] [address]  [phone]            DISPOSITION MEDICATIONS (if applicable):  New Prescriptions    No medications on file          Konnie Najjar, [facility],   02/07/20 5951

## 2020-02-07 NOTE — ED NOTES
Verbal order from Dr. Petros Schroeder to d/c NS Bolus d/t CHF dx.      Jacob Moore, MARIO  02/07/20 9563

## 2020-02-07 NOTE — H&P
[facility]     Depression     HIGH CHOLESTEROL     Hypertension     [facility] 2011    S/P coronary artery bypass graft x 4 2011    CABG x 4 Dr Isidra Bullock       Past Surgical  History:    has a past surgical history that includes Cardiac surgery (11/2010); Bypass Graft (04/10/2011); and Leg Surgery. Social History:    FAM HX: Reviewed  family history includes Cancer in his father; Diabetes in his mother; Heart Disease in his father and mother; Heart Failure in his father.     Soc HX:   Social History     Socioeconomic History    Marital status:      Spouse name: None    Number of children: None    Years of education: None    Highest education level: None   Occupational History    None   Social Needs    Financial resource strain: None    Food insecurity:     Worry: None     Inability: None    Transportation needs:     Medical: None     Non-medical: None   Tobacco Use    Smoking status: Current Every Day Smoker     Packs/day: 1.00     Years: 20.00     Pack years: 20.00     Types: Cigarettes    Smokeless tobacco: Former User    Tobacco comment: Reviewed 10/9/17   Substance and Sexual Activity    Alcohol use: No     Alcohol/week: 0.0 standard drinks    Drug use: No     Comment: march 2018 states he quit    Sexual activity: Not Currently   Lifestyle    Physical activity:     Days per week: None     Minutes per session: None    Stress: None   Relationships    Social connections:     Talks on phone: None     Gets together: None     Attends Quaker service: None     Active member of club or organization: None     Attends meetings of clubs or organizations: None     Relationship status: None    Intimate partner violence:     Fear of current or ex partner: None     Emotionally abused: None     Physically abused: None     Forced sexual activity: None   Other Topics Concern    None   Social History Narrative    None     TOBACCO:   reports that he has been smoking cigarettes. He has a 20.00 pack-year smoking history. He has quit using smokeless tobacco.  ETOH:   reports no history of alcohol use. Drugs:  reports no history of drug use. Allergies: No Known Allergies    Home Medications:     Prior to Admission medications    Medication Sig Start Date End Date Taking? Authorizing Provider   aspirin 81 MG EC tablet Take 1 tablet by mouth daily 1/28/20  Yes Feliciano Montaño MD   atorvastatin (LIPITOR) 40 MG tablet Take 1 tablet by mouth daily 1/28/20  Yes Feliciano Montaño MD   carvedilol (COREG) 12.5 MG tablet Take 1 tablet by mouth 2 times daily (with meals) 1/29/20  Yes Feliciano Montaño MD   furosemide (LASIX) 40 MG tablet Take 1 tablet by mouth 2 times daily 1/28/20 2/27/20 Yes Feliciano Montaño MD   lisinopril (PRINIVIL;ZESTRIL) 5 MG tablet Take 1 tablet by mouth daily 1/29/20  Yes Feliciano Montaño MD   spironolactone (ALDACTONE) 25 MG tablet Take 1 tablet by mouth daily 1/28/20  Yes Feliciano Montaño MD   apixaban (ELIQUIS STARTER PACK) 5 MG TABS tablet Take 10 mg (2 tablets) orally twice daily for 7 days, then take 5 mg (1 tablet) orally twice daily thereafter.  1/28/20  Yes Feliciano Montaño MD   albuterol sulfate HFA (VENTOLIN HFA) 108 (90 Base) MCG/ACT inhaler Inhale 2 puffs into the lungs every 4 hours as needed for Wheezing or Shortness of Breath 1/28/20  Yes Feliciano Montaño MD   ipratropium-albuterol (DUONEB) 0.5-2.5 (3) MG/3ML SOLN nebulizer solution Inhale 3 mLs into the lungs 4 times daily 1/28/20  Yes Feliciano Montaño MD   apixaban (ELIQUIS) 5 MG TABS tablet Take 1 tablet by mouth 2 times daily 1/29/20 2/28/20  Eder Au MD   nitroGLYCERIN (NITROSTAT) 0.4 MG SL tablet Place 1 tablet under the tongue every 5 minutes as needed for Chest pain 9/17/19   Raheel Bashir MD         Medications:   Medications:    Infusions:   PRN Meds:     Data:     Laboratory this visit:  Reviewed  Recent Labs     02/07/20  1251   WBC 11.8*   HGB 11.1*   HCT 38.1*         Recent Labs     02/07/20  1251   *   K 3.9   CL 98*   CO2 21   BUN 23   CREATININE 1.2     Recent Labs     02/07/20  1251   AST 22   ALT 35   BILITOT 0.5   ALKPHOS 91     No results for input(s): INR in the last 72 hours. Radiology this visit:  Reviewed. Xr Chest Standard (2 Vw)    Result Date: 2/7/2020  EXAMINATION: TWO XRAY VIEWS OF THE CHEST 2/7/2020 1:15 pm COMPARISON: 01/23/2020. HISTORY: ORDERING SYSTEM PROVIDED HISTORY: shortness of breath TECHNOLOGIST PROVIDED HISTORY: Reason for exam:->shortness of breath Reason for Exam: sob Acuity: Unknown Type of Exam: Unknown Relevant Medical/Surgical History: MI, CAD & copd FINDINGS: There are postsurgical changes of median sternotomy. The lungs are hyperinflated with flattening of the diaphragm and increase in the AP diameter of the chest.  The heart size is enlarged but unchanged. The pulmonary vasculature is within normal limits. Redemonstrated is an ovoid masslike density involving the right lower lobe measuring 6.7 x 4.6 cm. There is blunting of the right costophrenic angle. These findings have not changed compared to the prior exam.  No new airspace disease is seen. 1. No active pulmonary disease. 2. COPD. 3. Stable cardiomegaly without overt failure. 4. Stable ovoid masslike density in the right lung base with associated pleural effusion. This has been stable dating back to 09/15/2019. This likely represents pleural effusion with trapped fluid in the major fissure. CT scan chest would be helpful for further evaluation. Xr Chest Standard (2 Vw)    Result Date: 1/23/2020  EXAMINATION: TWO XRAY VIEWS OF THE CHEST 1/23/2020 12:03 pm COMPARISON: September 15, 2019 HISTORY: ORDERING SYSTEM PROVIDED HISTORY: eval for dyspnea TECHNOLOGIST PROVIDED HISTORY: Reason for exam:->eval for dyspnea Acuity: Acute Type of Exam: Initial Additional signs and symptoms: sob, eval for dypnea, hx COPD, HTN, sx CABG FINDINGS: Sternotomy wires are noted.   Stable states he is full of luid and is having a hard time breathing FINDINGS: Pulmonary Arteries: Pulmonary arteries are adequately opacified for evaluation. No evidence of intraluminal filling defect to suggest pulmonary embolism. Main pulmonary artery is normal in caliber. Mediastinum: There is new mediastinal and hilar lymphadenopathy. For example, there is a bulky right paratracheal lymph node mass measuring approximately 2.9 x 3.5 cm. A right hilar lymph node measures 2.0 x 2.6 cm. A left AP window lymph node measures 1.5 x 2.0 cm. There are many subcentimeter axillary and mediastinal lymph nodes. No pericardial effusion. The thoracic aorta is normal in caliber. Lungs/pleura: The central airways are patent. Small bilateral pleural effusions with lobular pleural fluid seen along the right major fissure. Mild emphysematous changes are seen in the lung apices. Subsegmental atelectasis is seen in the lung bases. A calcified granuloma is seen in the left lower lobe. Upper Abdomen: There is at least small volume ascites in the visualized upper abdomen. Soft Tissues/Bones: No acute bony abnormality is identified. Median sternotomy wires are noted. No pulmonary embolism identified. Small bilateral pleural effusions with lobular pleural fluid located along the right major fissure. Increased mediastinal and hilar lymphadenopathy of unknown etiology, including a bulky right paratracheal lymph node mass measuring 2.9 x 3.5 cm. Recommend follow-up chest CT in 3-6 months and/or further evaluation with PET-CT. At least small volume ascites in the visualized upper abdomen. Us Abdomen Limited Specify Organ? Liver, Gallbladder    Result Date: 1/23/2020  EXAMINATION: RIGHT UPPER QUADRANT ULTRASOUND 1/23/2020 7:52 pm COMPARISON: None.  HISTORY: ORDERING SYSTEM PROVIDED HISTORY: elevated LFTs TECHNOLOGIST PROVIDED HISTORY: Reason for exam:->elevated LFTs Specify organ?->LIVER Specify organ?->GALLBLADDER Reason for Exam:

## 2020-02-07 NOTE — ED TRIAGE NOTES
Pt presents to ED for c/o BLE edema, hx of CHF. Also c/o SOB. Pt a&ox4 speaking clear complete sentences.

## 2020-02-07 NOTE — ED NOTES
Pt unable to tolerate BIPAP, requesting a break, Dr Alena Akers notified      Antoni Freeman, RN  02/07/20 0516

## 2020-02-08 LAB
ANION GAP SERPL CALCULATED.3IONS-SCNC: 14 MMOL/L (ref 4–16)
BUN BLDV-MCNC: 24 MG/DL (ref 6–23)
CALCIUM SERPL-MCNC: 9 MG/DL (ref 8.3–10.6)
CHLORIDE BLD-SCNC: 98 MMOL/L (ref 99–110)
CO2: 25 MMOL/L (ref 21–32)
CREAT SERPL-MCNC: 1.4 MG/DL (ref 0.9–1.3)
GFR AFRICAN AMERICAN: >60 ML/MIN/1.73M2
GFR NON-AFRICAN AMERICAN: 54 ML/MIN/1.73M2
GLUCOSE BLD-MCNC: 108 MG/DL (ref 70–99)
GLUCOSE BLD-MCNC: 120 MG/DL (ref 70–99)
GLUCOSE BLD-MCNC: 122 MG/DL (ref 70–99)
GLUCOSE BLD-MCNC: 131 MG/DL (ref 70–99)
GLUCOSE BLD-MCNC: 161 MG/DL (ref 70–99)
MAGNESIUM: 1.9 MG/DL (ref 1.8–2.4)
PHOSPHORUS: 4.2 MG/DL (ref 2.5–4.9)
POTASSIUM SERPL-SCNC: 4.7 MMOL/L (ref 3.5–5.1)
SODIUM BLD-SCNC: 137 MMOL/L (ref 135–145)

## 2020-02-08 PROCEDURE — 6370000000 HC RX 637 (ALT 250 FOR IP): Performed by: NURSE PRACTITIONER

## 2020-02-08 PROCEDURE — 82962 GLUCOSE BLOOD TEST: CPT

## 2020-02-08 PROCEDURE — 94761 N-INVAS EAR/PLS OXIMETRY MLT: CPT

## 2020-02-08 PROCEDURE — 80048 BASIC METABOLIC PNL TOTAL CA: CPT

## 2020-02-08 PROCEDURE — 2700000000 HC OXYGEN THERAPY PER DAY

## 2020-02-08 PROCEDURE — 6370000000 HC RX 637 (ALT 250 FOR IP): Performed by: INTERNAL MEDICINE

## 2020-02-08 PROCEDURE — 94640 AIRWAY INHALATION TREATMENT: CPT

## 2020-02-08 PROCEDURE — 6360000002 HC RX W HCPCS: Performed by: INTERNAL MEDICINE

## 2020-02-08 PROCEDURE — 84100 ASSAY OF PHOSPHORUS: CPT

## 2020-02-08 PROCEDURE — 2580000003 HC RX 258: Performed by: INTERNAL MEDICINE

## 2020-02-08 PROCEDURE — 1200000000 HC SEMI PRIVATE

## 2020-02-08 PROCEDURE — 6370000000 HC RX 637 (ALT 250 FOR IP): Performed by: PHYSICIAN ASSISTANT

## 2020-02-08 PROCEDURE — 83735 ASSAY OF MAGNESIUM: CPT

## 2020-02-08 RX ORDER — IPRATROPIUM BROMIDE AND ALBUTEROL SULFATE 2.5; .5 MG/3ML; MG/3ML
1 SOLUTION RESPIRATORY (INHALATION)
Status: DISCONTINUED | OUTPATIENT
Start: 2020-02-08 | End: 2020-02-12 | Stop reason: HOSPADM

## 2020-02-08 RX ADMIN — IPRATROPIUM BROMIDE AND ALBUTEROL SULFATE 1 AMPULE: .5; 3 SOLUTION RESPIRATORY (INHALATION) at 20:38

## 2020-02-08 RX ADMIN — IPRATROPIUM BROMIDE AND ALBUTEROL SULFATE 1 AMPULE: .5; 3 SOLUTION RESPIRATORY (INHALATION) at 11:21

## 2020-02-08 RX ADMIN — IPRATROPIUM BROMIDE AND ALBUTEROL SULFATE 1 AMPULE: .5; 3 SOLUTION RESPIRATORY (INHALATION) at 07:58

## 2020-02-08 RX ADMIN — CARVEDILOL 12.5 MG: 12.5 TABLET, FILM COATED ORAL at 17:32

## 2020-02-08 RX ADMIN — FUROSEMIDE 40 MG: 10 INJECTION, SOLUTION INTRAMUSCULAR; INTRAVENOUS at 17:32

## 2020-02-08 RX ADMIN — SODIUM CHLORIDE, PRESERVATIVE FREE 10 ML: 5 INJECTION INTRAVENOUS at 09:18

## 2020-02-08 RX ADMIN — CARVEDILOL 12.5 MG: 12.5 TABLET, FILM COATED ORAL at 09:16

## 2020-02-08 RX ADMIN — ATORVASTATIN CALCIUM 40 MG: 40 TABLET, FILM COATED ORAL at 09:16

## 2020-02-08 RX ADMIN — APIXABAN 5 MG: 5 TABLET, FILM COATED ORAL at 20:20

## 2020-02-08 RX ADMIN — ASPIRIN 81 MG: 81 TABLET, COATED ORAL at 09:16

## 2020-02-08 RX ADMIN — SODIUM CHLORIDE, PRESERVATIVE FREE 10 ML: 5 INJECTION INTRAVENOUS at 20:20

## 2020-02-08 RX ADMIN — IPRATROPIUM BROMIDE AND ALBUTEROL SULFATE 1 AMPULE: .5; 3 SOLUTION RESPIRATORY (INHALATION) at 02:50

## 2020-02-08 RX ADMIN — IPRATROPIUM BROMIDE AND ALBUTEROL SULFATE 1 AMPULE: .5; 3 SOLUTION RESPIRATORY (INHALATION) at 16:01

## 2020-02-08 RX ADMIN — INSULIN LISPRO 1 UNITS: 100 INJECTION, SOLUTION INTRAVENOUS; SUBCUTANEOUS at 09:18

## 2020-02-08 RX ADMIN — APIXABAN 5 MG: 5 TABLET, FILM COATED ORAL at 09:16

## 2020-02-08 RX ADMIN — FUROSEMIDE 40 MG: 10 INJECTION, SOLUTION INTRAMUSCULAR; INTRAVENOUS at 09:16

## 2020-02-08 ASSESSMENT — PAIN SCALES - GENERAL
PAINLEVEL_OUTOF10: 0
PAINLEVEL_OUTOF10: 0

## 2020-02-08 NOTE — PLAN OF CARE
Problem: Discharge Planning:  Goal: Discharged to appropriate level of care  Description  Discharged to appropriate level of care  2/8/2020 0436 by Carlos Din  Outcome: Ongoing  2/8/2020 0403 by Carlos Din  Outcome: Ongoing     Problem:  Activity Intolerance:  Goal: Ability to tolerate increased activity will improve  Description  Ability to tolerate increased activity will improve  2/8/2020 0436 by Carlos Din  Outcome: Ongoing  2/8/2020 0403 by Carlos Din  Outcome: Ongoing     Problem: Cardiac Output - Decreased:  Goal: Hemodynamic stability will improve  Description  Hemodynamic stability will improve  2/8/2020 0436 by Carlos Din  Outcome: Ongoing  2/8/2020 0403 by Carlos Din  Outcome: Ongoing     Problem: Fluid Volume - Excess:  Goal: Control of fluid volume excess will improve  Description  Control of fluid volume excess will improve  2/8/2020 0436 by Carlos Din  Outcome: Ongoing  2/8/2020 0403 by Carlos Din  Outcome: Ongoing     Problem: Gas Exchange - Impaired:  Goal: Levels of oxygenation will improve  Description  Levels of oxygenation will improve  2/8/2020 0436 by Carlos Din  Outcome: Ongoing  2/8/2020 0403 by Carlos Din  Outcome: Ongoing     Problem: Mood - Altered:  Goal: Mood stable  Description  Mood stable  2/8/2020 0436 by Carlos Din  Outcome: Ongoing  2/8/2020 0403 by Carlos Din  Outcome: Ongoing     Problem: Tissue Perfusion - Cardiopulmonary, Altered:  Goal: Absence of angina  Description  Absence of angina  2/8/2020 0436 by Carlos Din  Outcome: Ongoing  2/8/2020 0403 by Carlos Din  Outcome: Ongoing  Goal: Circulation will improve to fullest extent possible  Description  Circulation will improve to fullest extent possible  2/8/2020 0436 by Carlos Din  Outcome: Ongoing  2/8/2020 0403 by

## 2020-02-08 NOTE — PLAN OF CARE
Pt admitted to [facility]. A&Ox4, ambulatory in room. Fine crackles t/o lung fields and +2 BLE pitting edema present. Noted present weight 191lbs.

## 2020-02-08 NOTE — PLAN OF CARE
Problem: Discharge Planning:  Goal: Discharged to appropriate level of care  Description  Discharged to appropriate level of care  2/8/2020 0437 by Peola Lies  Outcome: Ongoing  2/8/2020 0436 by Peola Lies  Outcome: Ongoing  2/8/2020 0403 by Peola Lies  Outcome: Ongoing     Problem:  Activity Intolerance:  Goal: Ability to tolerate increased activity will improve  Description  Ability to tolerate increased activity will improve  2/8/2020 0437 by Peola Lies  Outcome: Ongoing  2/8/2020 0436 by Peola Lies  Outcome: Ongoing  2/8/2020 0403 by Peola Lies  Outcome: Ongoing     Problem: Cardiac Output - Decreased:  Goal: Hemodynamic stability will improve  Description  Hemodynamic stability will improve  2/8/2020 0437 by Peola Lies  Outcome: Ongoing  2/8/2020 0436 by Peola Lies  Outcome: Ongoing  2/8/2020 0403 by Peola Lies  Outcome: Ongoing     Problem: Fluid Volume - Excess:  Goal: Control of fluid volume excess will improve  Description  Control of fluid volume excess will improve  2/8/2020 0437 by Peola Lies  Outcome: Ongoing  2/8/2020 0436 by Peola Lies  Outcome: Ongoing  2/8/2020 0403 by Peola Lies  Outcome: Ongoing     Problem: Gas Exchange - Impaired:  Goal: Levels of oxygenation will improve  Description  Levels of oxygenation will improve  2/8/2020 0437 by Peola Lies  Outcome: Ongoing  2/8/2020 0436 by Peola Lies  Outcome: Ongoing  2/8/2020 0403 by Peola Lies  Outcome: Ongoing     Problem: Mood - Altered:  Goal: Mood stable  Description  Mood stable  2/8/2020 0437 by Peola Lies  Outcome: Ongoing  2/8/2020 0436 by Peola Lies  Outcome: Ongoing  2/8/2020 0403 by Peola Lies  Outcome: Ongoing     Problem: Tissue Perfusion - Cardiopulmonary, Altered:  Goal: Absence of Kapil Don  Outcome: Ongoing  2/8/2020 0436 by Kapil Don  Outcome: Ongoing  Goal: Absence of physical injury  Description  Absence of physical injury  2/8/2020 0437 by Kapil Don  Outcome: Ongoing  2/8/2020 0436 by Kapil Don  Outcome: Ongoing

## 2020-02-09 LAB
ANION GAP SERPL CALCULATED.3IONS-SCNC: 15 MMOL/L (ref 4–16)
BUN BLDV-MCNC: 30 MG/DL (ref 6–23)
CALCIUM SERPL-MCNC: 9.1 MG/DL (ref 8.3–10.6)
CHLORIDE BLD-SCNC: 95 MMOL/L (ref 99–110)
CO2: 26 MMOL/L (ref 21–32)
CREAT SERPL-MCNC: 1.5 MG/DL (ref 0.9–1.3)
GFR AFRICAN AMERICAN: >60 ML/MIN/1.73M2
GFR NON-AFRICAN AMERICAN: 50 ML/MIN/1.73M2
GLUCOSE BLD-MCNC: 103 MG/DL (ref 70–99)
GLUCOSE BLD-MCNC: 114 MG/DL (ref 70–99)
GLUCOSE BLD-MCNC: 119 MG/DL (ref 70–99)
GLUCOSE BLD-MCNC: 131 MG/DL (ref 70–99)
GLUCOSE BLD-MCNC: 133 MG/DL (ref 70–99)
MAGNESIUM: 2.1 MG/DL (ref 1.8–2.4)
PHOSPHORUS: 4.7 MG/DL (ref 2.5–4.9)
POTASSIUM SERPL-SCNC: 4.2 MMOL/L (ref 3.5–5.1)
SODIUM BLD-SCNC: 136 MMOL/L (ref 135–145)

## 2020-02-09 PROCEDURE — 6370000000 HC RX 637 (ALT 250 FOR IP): Performed by: PHYSICIAN ASSISTANT

## 2020-02-09 PROCEDURE — 6370000000 HC RX 637 (ALT 250 FOR IP): Performed by: INTERNAL MEDICINE

## 2020-02-09 PROCEDURE — 2580000003 HC RX 258: Performed by: INTERNAL MEDICINE

## 2020-02-09 PROCEDURE — 36415 COLL VENOUS BLD VENIPUNCTURE: CPT

## 2020-02-09 PROCEDURE — 94640 AIRWAY INHALATION TREATMENT: CPT

## 2020-02-09 PROCEDURE — 94762 N-INVAS EAR/PLS OXIMTRY CONT: CPT

## 2020-02-09 PROCEDURE — 84100 ASSAY OF PHOSPHORUS: CPT

## 2020-02-09 PROCEDURE — 6360000002 HC RX W HCPCS: Performed by: INTERNAL MEDICINE

## 2020-02-09 PROCEDURE — 82962 GLUCOSE BLOOD TEST: CPT

## 2020-02-09 PROCEDURE — 6370000000 HC RX 637 (ALT 250 FOR IP): Performed by: NURSE PRACTITIONER

## 2020-02-09 PROCEDURE — 2700000000 HC OXYGEN THERAPY PER DAY

## 2020-02-09 PROCEDURE — 80048 BASIC METABOLIC PNL TOTAL CA: CPT

## 2020-02-09 PROCEDURE — 1200000000 HC SEMI PRIVATE

## 2020-02-09 PROCEDURE — 83735 ASSAY OF MAGNESIUM: CPT

## 2020-02-09 RX ORDER — DIPHENHYDRAMINE HCL 25 MG
50 TABLET ORAL ONCE
Status: COMPLETED | OUTPATIENT
Start: 2020-02-09 | End: 2020-02-09

## 2020-02-09 RX ORDER — FUROSEMIDE 40 MG/1
40 TABLET ORAL 2 TIMES DAILY
Status: DISCONTINUED | OUTPATIENT
Start: 2020-02-09 | End: 2020-02-10

## 2020-02-09 RX ORDER — LANOLIN ALCOHOL/MO/W.PET/CERES
6 CREAM (GRAM) TOPICAL NIGHTLY PRN
Status: DISCONTINUED | OUTPATIENT
Start: 2020-02-09 | End: 2020-02-12 | Stop reason: HOSPADM

## 2020-02-09 RX ADMIN — LISINOPRIL 10 MG: 10 TABLET ORAL at 08:45

## 2020-02-09 RX ADMIN — FUROSEMIDE 40 MG: 40 TABLET ORAL at 17:37

## 2020-02-09 RX ADMIN — IPRATROPIUM BROMIDE AND ALBUTEROL SULFATE 1 AMPULE: .5; 3 SOLUTION RESPIRATORY (INHALATION) at 12:27

## 2020-02-09 RX ADMIN — CARVEDILOL 12.5 MG: 12.5 TABLET, FILM COATED ORAL at 08:45

## 2020-02-09 RX ADMIN — IPRATROPIUM BROMIDE AND ALBUTEROL SULFATE 1 AMPULE: .5; 3 SOLUTION RESPIRATORY (INHALATION) at 22:33

## 2020-02-09 RX ADMIN — FUROSEMIDE 40 MG: 10 INJECTION, SOLUTION INTRAMUSCULAR; INTRAVENOUS at 08:45

## 2020-02-09 RX ADMIN — CARVEDILOL 12.5 MG: 12.5 TABLET, FILM COATED ORAL at 17:37

## 2020-02-09 RX ADMIN — SODIUM CHLORIDE, PRESERVATIVE FREE 10 ML: 5 INJECTION INTRAVENOUS at 08:46

## 2020-02-09 RX ADMIN — APIXABAN 5 MG: 5 TABLET, FILM COATED ORAL at 20:27

## 2020-02-09 RX ADMIN — ASPIRIN 81 MG: 81 TABLET, COATED ORAL at 08:45

## 2020-02-09 RX ADMIN — ATORVASTATIN CALCIUM 40 MG: 40 TABLET, FILM COATED ORAL at 08:45

## 2020-02-09 RX ADMIN — IPRATROPIUM BROMIDE AND ALBUTEROL SULFATE 1 AMPULE: .5; 3 SOLUTION RESPIRATORY (INHALATION) at 07:36

## 2020-02-09 RX ADMIN — SPIRONOLACTONE 25 MG: 25 TABLET ORAL at 08:45

## 2020-02-09 RX ADMIN — APIXABAN 5 MG: 5 TABLET, FILM COATED ORAL at 08:45

## 2020-02-09 RX ADMIN — MELATONIN 6 MG: at 20:27

## 2020-02-09 RX ADMIN — IPRATROPIUM BROMIDE AND ALBUTEROL SULFATE 1 AMPULE: .5; 3 SOLUTION RESPIRATORY (INHALATION) at 16:20

## 2020-02-09 RX ADMIN — SODIUM CHLORIDE, PRESERVATIVE FREE 10 ML: 5 INJECTION INTRAVENOUS at 20:28

## 2020-02-09 RX ADMIN — DIPHENHYDRAMINE HYDROCHLORIDE 50 MG: 25 TABLET ORAL at 22:40

## 2020-02-09 ASSESSMENT — PAIN SCALES - GENERAL
PAINLEVEL_OUTOF10: 0
PAINLEVEL_OUTOF10: 0

## 2020-02-09 NOTE — PROGRESS NOTES
Hospitalist Progress Note      Name:  Cherrie Senior /Age/Sex: 1970  (52 y.o. male)   MRN & CSN:  [id number] & [id number] Admission Date/Time: 2020  1:28 PM   Location:  [address] [place] [address] PCP: Andrews Mccord Day: 3    ASSESSMENT & PLAN:   Cherrie Senior is a 52 y.o.  male with past medical history of a combined systolic diastolic congestive heart failure who presented with a complaint of shortness of breath, worsening lower extremity edema. He was admitted to the hospital for acute on chronic systolic and diastolic CHF exacerbation. 1.  Acute on chronic systolic/diastolic CHF exacerbation-- he is on Lasix 40 twice daily and Aldactone 25. He is not fully compliant with diuretics. He is already feeling much better with regards to subjective shortness of breath. Oxygen saturations are normal.  -Lasix 40 p.o. twice daily as of   -Continue Aldactone  -Continue Coreg 12.5 twice daily    2. Probable/possible pulmonary embolism-- patient had a VQ scan on  and showed intermediate probability for PE. Additionally,  d-dimer was elevated at 3200. He was subsequently discharged home with Eliquis. This admission, patient underwent CTA chest.  CTA chest without evidence of pulmonary embolism at this point.  - Continue Eliquis      3. Mediastinal lymphadenopathy-- appears to have increased in size. Pulmonary recommending CT surgery consultation for biopsy. -CT surgery consultation    4. CAD--s/p three-vessel CABG in the past  -Continue Lipitor/Coreg/aspirin    5. Tobacco use disorder- active, 1 PPD. Cessation counseling provided. Chantix prescribed in the past but could not afford because of the cost.      MEDICAL DECISION MAKING:  Labs reviewed  No new imaging  Level of risk moderate  Diet DIET CARDIAC; Low Sodium (2 GM);  Daily Fluid Restriction: 1800 ml   DVT Prophylaxis [x] Eliquis, []  Heparin, [] SCDs, [] Ambulation   GI Prophylaxis [] PPI,  [] H2 Blocker,  [] Carafate, signed by Siddharth Lock MD on 2/9/2020 at 1:28 PM

## 2020-02-09 NOTE — CONSULTS
respiratory  distress. VITAL SIGNS:  His blood pressure is 101/65 mmHg, pulse of 93 per minute  and respiratory rate of 20 per minute. He is afebrile. His saturation  is 97% on 2 liters nasal canula. HEENT:  Essentially unremarkable. NECK:  There is mild JVD. No lymphadenopathy. The neck is supple. LUNGS:  Revealed diminished breath sounds. Occasional basilar crackles. HEART:  Showed normal S1, S2. There was no S3 or S4 noted. ABDOMEN:  Benign. There is no evidence of any organomegaly. The bowel  sounds are present. NEUROLOGIC:  Reveals that he is awake and responsive, answering  questions appropriately and moving his extremities. LABORATORY DATA:  His electrolytes showed a sodium of 136, potassium  4.2, chloride 95, carbon dioxide 26, BUN 30, creatinine 1.5. His CBC  showed a white count of 11.8, hemoglobin 11.1 and hematocrit of 38.1. His CAT scan of the chest showed no evidence of PE, small bilateral  pleural effusion, fluid in the fissure, increased mediastinal and hilar  lymphadenopathy, and large right paratracheal lymph node. His proBNP  level was 4780. IMPRESSION:  1. Large paratracheal lymph node and mediastinal and hilar  lymphadenopathy, etiology unknown. 2.  Decompensated congestive heart failure. 3.  COPD. PLAN:  1. Consult Cardiothoracic Surgery for possible mediastinoscopy. 2.  Decongestive therapy. 3.  DuoNeb q.4 h. while awake. 4.  We will check fungal serology. 5.  We will check serum angiotensin converting enzymes to rule out  sarcoidosis. 6.  Check procalcitonin level. 7.  As per orders.         Chato Yeager MD    D: 02/09/2020 11:39:31       T: 02/09/2020 12:34:41     [email or web address]  Job#: [id number]     Doc#: [id number]    CC:

## 2020-02-10 LAB
ANION GAP SERPL CALCULATED.3IONS-SCNC: 14 MMOL/L (ref 4–16)
BASOPHILS ABSOLUTE: 0.1 K/CU MM
BASOPHILS RELATIVE PERCENT: 1.3 % (ref 0–1)
BUN BLDV-MCNC: 32 MG/DL (ref 6–23)
CALCIUM SERPL-MCNC: 8.9 MG/DL (ref 8.3–10.6)
CHLORIDE BLD-SCNC: 96 MMOL/L (ref 99–110)
CO2: 23 MMOL/L (ref 21–32)
CREAT SERPL-MCNC: 1.4 MG/DL (ref 0.9–1.3)
DIFFERENTIAL TYPE: ABNORMAL
EOSINOPHILS ABSOLUTE: 0.3 K/CU MM
EOSINOPHILS RELATIVE PERCENT: 3.6 % (ref 0–3)
GFR AFRICAN AMERICAN: >60 ML/MIN/1.73M2
GFR NON-AFRICAN AMERICAN: 54 ML/MIN/1.73M2
GLUCOSE BLD-MCNC: 107 MG/DL (ref 70–99)
GLUCOSE BLD-MCNC: 112 MG/DL (ref 70–99)
GLUCOSE BLD-MCNC: 117 MG/DL (ref 70–99)
GLUCOSE BLD-MCNC: 127 MG/DL (ref 70–99)
GLUCOSE BLD-MCNC: 132 MG/DL (ref 70–99)
HCT VFR BLD CALC: 33.7 % (ref 42–52)
HEMOGLOBIN: 9.8 GM/DL (ref 13.5–18)
IMMATURE NEUTROPHIL %: 0.7 % (ref 0–0.43)
LYMPHOCYTES ABSOLUTE: 1.4 K/CU MM
LYMPHOCYTES RELATIVE PERCENT: 18.9 % (ref 24–44)
MAGNESIUM: 2.2 MG/DL (ref 1.8–2.4)
MCH RBC QN AUTO: 23.1 PG (ref 27–31)
MCHC RBC AUTO-ENTMCNC: 29.1 % (ref 32–36)
MCV RBC AUTO: 79.3 FL (ref 78–100)
MONOCYTES ABSOLUTE: 0.5 K/CU MM
MONOCYTES RELATIVE PERCENT: 7.1 % (ref 0–4)
NUCLEATED RBC %: 0.4 %
PDW BLD-RTO: 17.2 % (ref 11.7–14.9)
PHOSPHORUS: 4.8 MG/DL (ref 2.5–4.9)
PLATELET # BLD: 260 K/CU MM (ref 140–440)
PMV BLD AUTO: 10.2 FL (ref 7.5–11.1)
POTASSIUM SERPL-SCNC: 4.3 MMOL/L (ref 3.5–5.1)
PROCALCITONIN: 0.05
RBC # BLD: 4.25 M/CU MM (ref 4.6–6.2)
SEGMENTED NEUTROPHILS ABSOLUTE COUNT: 5.2 K/CU MM
SEGMENTED NEUTROPHILS RELATIVE PERCENT: 68.4 % (ref 36–66)
SODIUM BLD-SCNC: 133 MMOL/L (ref 135–145)
TOTAL IMMATURE NEUTOROPHIL: 0.05 K/CU MM
TOTAL NUCLEATED RBC: 0 K/CU MM
TROPONIN T: 0.01 NG/ML
WBC # BLD: 7.6 K/CU MM (ref 4–10.5)

## 2020-02-10 PROCEDURE — 2580000003 HC RX 258: Performed by: INTERNAL MEDICINE

## 2020-02-10 PROCEDURE — 86612 BLASTOMYCES ANTIBODY: CPT

## 2020-02-10 PROCEDURE — 6370000000 HC RX 637 (ALT 250 FOR IP): Performed by: INTERNAL MEDICINE

## 2020-02-10 PROCEDURE — 86698 HISTOPLASMA ANTIBODY: CPT

## 2020-02-10 PROCEDURE — 1200000000 HC SEMI PRIVATE

## 2020-02-10 PROCEDURE — 84145 PROCALCITONIN (PCT): CPT

## 2020-02-10 PROCEDURE — 2500000003 HC RX 250 WO HCPCS: Performed by: INTERNAL MEDICINE

## 2020-02-10 PROCEDURE — 6370000000 HC RX 637 (ALT 250 FOR IP): Performed by: NURSE PRACTITIONER

## 2020-02-10 PROCEDURE — 83735 ASSAY OF MAGNESIUM: CPT

## 2020-02-10 PROCEDURE — 82164 ANGIOTENSIN I ENZYME TEST: CPT

## 2020-02-10 PROCEDURE — 2700000000 HC OXYGEN THERAPY PER DAY

## 2020-02-10 PROCEDURE — 84100 ASSAY OF PHOSPHORUS: CPT

## 2020-02-10 PROCEDURE — 82962 GLUCOSE BLOOD TEST: CPT

## 2020-02-10 PROCEDURE — 86635 COCCIDIOIDES ANTIBODY: CPT

## 2020-02-10 PROCEDURE — 84484 ASSAY OF TROPONIN QUANT: CPT

## 2020-02-10 PROCEDURE — 86606 ASPERGILLUS ANTIBODY: CPT

## 2020-02-10 PROCEDURE — 94761 N-INVAS EAR/PLS OXIMETRY MLT: CPT

## 2020-02-10 PROCEDURE — 6370000000 HC RX 637 (ALT 250 FOR IP): Performed by: PHYSICIAN ASSISTANT

## 2020-02-10 PROCEDURE — 85025 COMPLETE CBC W/AUTO DIFF WBC: CPT

## 2020-02-10 PROCEDURE — 80048 BASIC METABOLIC PNL TOTAL CA: CPT

## 2020-02-10 PROCEDURE — 36415 COLL VENOUS BLD VENIPUNCTURE: CPT

## 2020-02-10 PROCEDURE — 94640 AIRWAY INHALATION TREATMENT: CPT

## 2020-02-10 RX ORDER — BUMETANIDE 0.25 MG/ML
1 INJECTION, SOLUTION INTRAMUSCULAR; INTRAVENOUS 2 TIMES DAILY
Status: DISCONTINUED | OUTPATIENT
Start: 2020-02-10 | End: 2020-02-10

## 2020-02-10 RX ORDER — BUMETANIDE 0.25 MG/ML
2 INJECTION, SOLUTION INTRAMUSCULAR; INTRAVENOUS ONCE
Status: COMPLETED | OUTPATIENT
Start: 2020-02-10 | End: 2020-02-10

## 2020-02-10 RX ORDER — SPIRONOLACTONE 50 MG/1
50 TABLET, FILM COATED ORAL DAILY
Status: DISCONTINUED | OUTPATIENT
Start: 2020-02-11 | End: 2020-02-10

## 2020-02-10 RX ORDER — LISINOPRIL 5 MG/1
5 TABLET ORAL DAILY
Status: DISCONTINUED | OUTPATIENT
Start: 2020-02-11 | End: 2020-02-11

## 2020-02-10 RX ORDER — SPIRONOLACTONE 25 MG/1
12.5 TABLET ORAL DAILY
Status: DISCONTINUED | OUTPATIENT
Start: 2020-02-11 | End: 2020-02-12 | Stop reason: HOSPADM

## 2020-02-10 RX ORDER — SPIRONOLACTONE 25 MG/1
25 TABLET ORAL DAILY
Status: DISCONTINUED | OUTPATIENT
Start: 2020-02-11 | End: 2020-02-10

## 2020-02-10 RX ORDER — METOLAZONE 5 MG/1
5 TABLET ORAL DAILY
Status: DISCONTINUED | OUTPATIENT
Start: 2020-02-11 | End: 2020-02-10

## 2020-02-10 RX ORDER — METOLAZONE 5 MG/1
2.5 TABLET ORAL 2 TIMES DAILY
Status: DISCONTINUED | OUTPATIENT
Start: 2020-02-10 | End: 2020-02-10

## 2020-02-10 RX ADMIN — ASPIRIN 81 MG: 81 TABLET, COATED ORAL at 09:03

## 2020-02-10 RX ADMIN — FUROSEMIDE 40 MG: 40 TABLET ORAL at 09:03

## 2020-02-10 RX ADMIN — IPRATROPIUM BROMIDE AND ALBUTEROL SULFATE 1 AMPULE: .5; 3 SOLUTION RESPIRATORY (INHALATION) at 07:38

## 2020-02-10 RX ADMIN — BUMETANIDE 2 MG: 0.25 INJECTION INTRAMUSCULAR; INTRAVENOUS at 20:25

## 2020-02-10 RX ADMIN — BUMETANIDE 2 MG/HR: 0.25 INJECTION INTRAMUSCULAR; INTRAVENOUS at 20:25

## 2020-02-10 RX ADMIN — APIXABAN 5 MG: 5 TABLET, FILM COATED ORAL at 09:04

## 2020-02-10 RX ADMIN — SODIUM CHLORIDE, PRESERVATIVE FREE 10 ML: 5 INJECTION INTRAVENOUS at 09:04

## 2020-02-10 RX ADMIN — LISINOPRIL 10 MG: 10 TABLET ORAL at 09:03

## 2020-02-10 RX ADMIN — CARVEDILOL 12.5 MG: 12.5 TABLET, FILM COATED ORAL at 09:03

## 2020-02-10 RX ADMIN — IPRATROPIUM BROMIDE AND ALBUTEROL SULFATE 1 AMPULE: .5; 3 SOLUTION RESPIRATORY (INHALATION) at 22:17

## 2020-02-10 RX ADMIN — ATORVASTATIN CALCIUM 40 MG: 40 TABLET, FILM COATED ORAL at 09:03

## 2020-02-10 RX ADMIN — SPIRONOLACTONE 25 MG: 25 TABLET ORAL at 09:03

## 2020-02-10 RX ADMIN — IPRATROPIUM BROMIDE AND ALBUTEROL SULFATE 1 AMPULE: .5; 3 SOLUTION RESPIRATORY (INHALATION) at 11:51

## 2020-02-10 RX ADMIN — IPRATROPIUM BROMIDE AND ALBUTEROL SULFATE 1 AMPULE: .5; 3 SOLUTION RESPIRATORY (INHALATION) at 15:15

## 2020-02-10 RX ADMIN — APIXABAN 5 MG: 5 TABLET, FILM COATED ORAL at 21:19

## 2020-02-10 ASSESSMENT — PAIN SCALES - GENERAL
PAINLEVEL_OUTOF10: 0
PAINLEVEL_OUTOF10: 0

## 2020-02-10 NOTE — PROGRESS NOTES
Pulmonary and Critical Care  Progress Note    Subjective: The patient has improved  Shortness of breath has improved  Chest pain none  Addressing respiratory complaints Patient is negative for  hemoptysis and cyanosis  CONSTITUTIONAL:  negative for fevers and chills      Past Medical History:     has a past medical history of CAD (coronary artery disease), CHF (congestive heart failure) ([facility] [address] COPD (chronic obstructive pulmonary disease) ([facility] Depression, HIGH CHOLESTEROL, Hypertension, MI (myocardial infarction) ([facility] and S/P coronary artery bypass graft x 4.   has a past surgical history that includes Cardiac surgery (11/2010); Bypass Graft (04/10/2011); and Leg Surgery. reports that he has been smoking cigarettes. He has a 20.00 pack-year smoking history. He has quit using smokeless tobacco. He reports that he does not drink alcohol or use drugs. Family history:  family history includes Cancer in his father; Diabetes in his mother; Heart Disease in his father and mother; Heart Failure in his father. No Known Allergies  Social History:    Reviewed; no changes    Objective:   PHYSICAL EXAM:        VITALS:  /82   Pulse 77   Temp 97.4 °F (36.3 °C) (Oral)   Resp 22   Ht 5' 11\" (1.803 m)   Wt 193 lb 14.4 oz (88 kg)   SpO2 98%   BMI 27.04 kg/m²     24HR INTAKE/OUTPUT:      Intake/Output Summary (Last 24 hours) at 2/10/2020 1143  Last data filed at 2/10/2020 0577  Gross per 24 hour   Intake 490 ml   Output 300 ml   Net 190 ml       CONSTITUTIONAL:  awake, alert, cooperative, no apparent distress, and appears stated age  LUNGS:  decreased breath sounds, occ basilar crackles. CARDIOVASCULAR:  normal S1 and S2 and positive JVD  ABD:Abdomen soft, non-tender. BS normal. No masses,  No organomegaly  NEURO:Alert and oriented x3. Gait normal. Reflexes and motor strength normal and symmetric. Cranial nerves 2-12 and sensation grossly intact.   DATA:    CBC:  Recent Labs     02/07/20  1251 02/10/20  6538 WBC 11.8* 7.6   RBC 4.78 4.25*   HGB 11.1* 9.8*   HCT 38.1* 33.7*    260   MCV 79.7 79.3   MCH 23.2* 23.1*   MCHC 29.1* 29.1*   RDW 17.2* 17.2*   SEGSPCT 78.8* 68.4*      BMP:  Recent Labs     02/08/20  0340 02/09/20  0451 02/10/20  0512    136 133*   K 4.7 4.2 4.3   CL 98* 95* 96*   CO2 25 26 23   BUN 24* 30* 32*   CREATININE 1.4* 1.5* 1.4*   CALCIUM 9.0 9.1 8.9   GLUCOSE 122* 103* 117*      ABG:  No results for input(s): PH, PO2ART, UZT9EDL, HCO3, BEART, O2SAT in the last 72 hours. Lab Results   Component Value Date    PROBNP 4,780 (H) 02/07/2020    PROBNP 2,397 (H) 01/26/2020    PROBNP 5,394 (H) 01/23/2020     No results found for: [facility]    Radiology Review:  Pertinent images / reports were reviewed as a part of this visit.     Assessment:     Patient Active Problem List   Diagnosis    Essential hypertension    Osteoarthritis    [facility]) [facility]    Paresthesia of left leg    Abdominal hernia    Left shoulder pain    Crushing injury of right hand    Rotator cuff tendinitis    Midline low back pain without sciatica    History of MI (myocardial infarction)    Coronary artery disease involving coronary bypass graft of native heart without angina pectoris    Mixed hyperlipidemia    Depression    Chronic midline low back pain without sciatica    Tobacco abuse    Gastroesophageal reflux disease without esophagitis    Opiate abuse, continuous ([address]    Heroin dependence ([address]    ST elevation myocardial infarction involving left circumflex coronary artery ([address]    STEMI (ST elevation myocardial infarction) ([address]    Acute on chronic combined systolic (congestive) and diastolic (congestive) heart failure (HCC)    Systolic and diastolic CHF w/reduced LV function, NYHA class 4 ([address]    NSTEMI (non-ST elevated myocardial infarction) ([address]    Acute on chronic congestive heart failure ([address]    Noncompliance    Pulmonary edema, acute ([address]    Acute

## 2020-02-10 NOTE — PROGRESS NOTES
income. He had a total of 1 month supply of Eliquis. Since the patient the patient is unable to afford the medications and evidence of pulmonary embolism is not a firm diagnosis, then it will be reasonable not to pursue further anticoagulation.   - Continue Eliquis for now    3. Mediastinal lymphadenopathy-- appears to have increased in size. Pulmonary recommending CT surgery consultation for biopsy. -CT surgery consultation    4. CAD--s/p 4-vessel CABG in the past  -Continue Lipitor/Coreg/aspirin    5. Tobacco use disorder- active, 1 PPD. Cessation counseling provided. Chantix prescribed in the past but could not afford because of the cost.    6.  CKD 2--CR at baseline      MEDICAL DECISION MAKING:  Labs reviewed  No new imaging  Level of risk moderate  Diet DIET CARDIAC; Low Sodium (2 GM); Daily Fluid Restriction: 1800 ml   DVT Prophylaxis [x] Eliquis, []  Heparin, [] SCDs, [] Ambulation   GI Prophylaxis [] PPI,  [] H2 Blocker,  [] Carafate,  [] Diet/Tube Feeds   Code Status Full Code   Disposition  Home   MDM [] Low, [x] Moderate,[]  High     Chief complaint/Interval History/ROS     Chief Complaint: CHF exacerbation      INTERVAL HISTORY: Awaiting CT surgery evaluation       ROS:  No nausea. No vomiting. No fevers. No chills. Objective: Intake/Output Summary (Last 24 hours) at 2/10/2020 1243  Last data filed at 2/10/2020 0903  Gross per 24 hour   Intake 490 ml   Output 300 ml   Net 190 ml      Vitals:   Vitals:    02/10/20 1152   BP:    Pulse:    Resp:    Temp:    SpO2: 98%     Physical Exam:   GEN: Awake male, no acute distress. Answers questions appropriately. EYES: No conjunctival erythema  HENT: Moist membranes. No nasal discharge  NECK: Supple,  RESP: CTAB   CV:   RRR. No murmur. 1+ bilateral pitting edema. GI: Soft abdomen. Nontender. Nondistended. :  Hansen catheter is not present. MSK: No bony fractures. No gross deformities.   SKIN: warm, dry, no rashes, no pressure

## 2020-02-11 LAB
ANGIOTENSIN CONVERTING ENZYME: <5 U/L (ref 9–67)
ANGIOTENSIN CONVERTING ENZYME: ABNORMAL U/L (ref 9–67)
ANION GAP SERPL CALCULATED.3IONS-SCNC: 14 MMOL/L (ref 4–16)
BUN BLDV-MCNC: 29 MG/DL (ref 6–23)
CALCIUM SERPL-MCNC: 8.8 MG/DL (ref 8.3–10.6)
CHLORIDE BLD-SCNC: 92 MMOL/L (ref 99–110)
CO2: 27 MMOL/L (ref 21–32)
CREAT SERPL-MCNC: 1.3 MG/DL (ref 0.9–1.3)
GFR AFRICAN AMERICAN: >60 ML/MIN/1.73M2
GFR NON-AFRICAN AMERICAN: 59 ML/MIN/1.73M2
GLUCOSE BLD-MCNC: 113 MG/DL (ref 70–99)
GLUCOSE BLD-MCNC: 117 MG/DL (ref 70–99)
GLUCOSE BLD-MCNC: 131 MG/DL (ref 70–99)
GLUCOSE BLD-MCNC: 150 MG/DL (ref 70–99)
GLUCOSE BLD-MCNC: 172 MG/DL (ref 70–99)
MAGNESIUM: 1.9 MG/DL (ref 1.8–2.4)
PHOSPHORUS: 3.8 MG/DL (ref 2.5–4.9)
POTASSIUM SERPL-SCNC: 4.1 MMOL/L (ref 3.5–5.1)
SODIUM BLD-SCNC: 133 MMOL/L (ref 135–145)

## 2020-02-11 PROCEDURE — 6370000000 HC RX 637 (ALT 250 FOR IP): Performed by: INTERNAL MEDICINE

## 2020-02-11 PROCEDURE — 90686 IIV4 VACC NO PRSV 0.5 ML IM: CPT | Performed by: INTERNAL MEDICINE

## 2020-02-11 PROCEDURE — 2700000000 HC OXYGEN THERAPY PER DAY

## 2020-02-11 PROCEDURE — 6370000000 HC RX 637 (ALT 250 FOR IP): Performed by: NURSE PRACTITIONER

## 2020-02-11 PROCEDURE — 94640 AIRWAY INHALATION TREATMENT: CPT

## 2020-02-11 PROCEDURE — 2580000003 HC RX 258: Performed by: INTERNAL MEDICINE

## 2020-02-11 PROCEDURE — 2500000003 HC RX 250 WO HCPCS: Performed by: INTERNAL MEDICINE

## 2020-02-11 PROCEDURE — 6360000002 HC RX W HCPCS: Performed by: INTERNAL MEDICINE

## 2020-02-11 PROCEDURE — G0008 ADMIN INFLUENZA VIRUS VAC: HCPCS | Performed by: INTERNAL MEDICINE

## 2020-02-11 PROCEDURE — 84100 ASSAY OF PHOSPHORUS: CPT

## 2020-02-11 PROCEDURE — 1200000000 HC SEMI PRIVATE

## 2020-02-11 PROCEDURE — 94761 N-INVAS EAR/PLS OXIMETRY MLT: CPT

## 2020-02-11 PROCEDURE — 83735 ASSAY OF MAGNESIUM: CPT

## 2020-02-11 PROCEDURE — 80048 BASIC METABOLIC PNL TOTAL CA: CPT

## 2020-02-11 PROCEDURE — 6370000000 HC RX 637 (ALT 250 FOR IP): Performed by: PHYSICIAN ASSISTANT

## 2020-02-11 RX ORDER — LISINOPRIL 2.5 MG/1
2.5 TABLET ORAL DAILY
Status: DISCONTINUED | OUTPATIENT
Start: 2020-02-12 | End: 2020-02-12 | Stop reason: HOSPADM

## 2020-02-11 RX ADMIN — IPRATROPIUM BROMIDE AND ALBUTEROL SULFATE 1 AMPULE: .5; 3 SOLUTION RESPIRATORY (INHALATION) at 15:50

## 2020-02-11 RX ADMIN — APIXABAN 5 MG: 5 TABLET, FILM COATED ORAL at 21:46

## 2020-02-11 RX ADMIN — CARVEDILOL 12.5 MG: 12.5 TABLET, FILM COATED ORAL at 17:11

## 2020-02-11 RX ADMIN — SODIUM CHLORIDE, PRESERVATIVE FREE 10 ML: 5 INJECTION INTRAVENOUS at 08:59

## 2020-02-11 RX ADMIN — IPRATROPIUM BROMIDE AND ALBUTEROL SULFATE 1 AMPULE: .5; 3 SOLUTION RESPIRATORY (INHALATION) at 04:17

## 2020-02-11 RX ADMIN — INFLUENZA A VIRUS A/BRISBANE/02/2018 IVR-190 (H1N1) ANTIGEN (PROPIOLACTONE INACTIVATED), INFLUENZA A VIRUS A/KANSAS/14/2017 X-327 (H3N2) ANTIGEN (PROPIOLACTONE INACTIVATED), INFLUENZA B VIRUS B/MARYLAND/15/2016 ANTIGEN (PROPIOLACTONE INACTIVATED), INFLUENZA B VIRUS B/PHUKET/3073/2013 BVR-1B ANTIGEN (PROPIOLACTONE INACTIVATED) 0.5 ML: 15; 15; 15; 15 INJECTION, SUSPENSION INTRAMUSCULAR at 09:05

## 2020-02-11 RX ADMIN — INSULIN LISPRO 1 UNITS: 100 INJECTION, SOLUTION INTRAVENOUS; SUBCUTANEOUS at 17:12

## 2020-02-11 RX ADMIN — BUMETANIDE 2 MG/HR: 0.25 INJECTION INTRAMUSCULAR; INTRAVENOUS at 04:03

## 2020-02-11 RX ADMIN — ATORVASTATIN CALCIUM 40 MG: 40 TABLET, FILM COATED ORAL at 08:57

## 2020-02-11 RX ADMIN — APIXABAN 5 MG: 5 TABLET, FILM COATED ORAL at 08:57

## 2020-02-11 RX ADMIN — SODIUM CHLORIDE, PRESERVATIVE FREE 10 ML: 5 INJECTION INTRAVENOUS at 21:46

## 2020-02-11 RX ADMIN — INSULIN LISPRO 1 UNITS: 100 INJECTION, SOLUTION INTRAVENOUS; SUBCUTANEOUS at 12:28

## 2020-02-11 RX ADMIN — ASPIRIN 81 MG: 81 TABLET, COATED ORAL at 08:57

## 2020-02-11 RX ADMIN — IPRATROPIUM BROMIDE AND ALBUTEROL SULFATE 1 AMPULE: .5; 3 SOLUTION RESPIRATORY (INHALATION) at 23:24

## 2020-02-11 RX ADMIN — SPIRONOLACTONE 12.5 MG: 25 TABLET ORAL at 08:58

## 2020-02-11 RX ADMIN — IPRATROPIUM BROMIDE AND ALBUTEROL SULFATE 1 AMPULE: .5; 3 SOLUTION RESPIRATORY (INHALATION) at 11:49

## 2020-02-11 ASSESSMENT — PAIN SCALES - GENERAL
PAINLEVEL_OUTOF10: 0
PAINLEVEL_OUTOF10: 0

## 2020-02-11 NOTE — PROGRESS NOTES
RBC 4.25*   HGB 9.8*   HCT 33.7*      MCV 79.3   MCH 23.1*   MCHC 29.1*   RDW 17.2*   SEGSPCT 68.4*      BMP:  Recent Labs     02/09/20  0451 02/10/20  0512 02/11/20  0226    133* 133*   K 4.2 4.3 4.1   CL 95* 96* 92*   CO2 26 23 27   BUN 30* 32* 29*   CREATININE 1.5* 1.4* 1.3   CALCIUM 9.1 8.9 8.8   GLUCOSE 103* 117* 117*      ABG:  No results for input(s): PH, PO2ART, VUU7TXP, HCO3, BEART, O2SAT in the last 72 hours. Lab Results   Component Value Date    PROBNP 4,780 (H) 02/07/2020    PROBNP 2,397 (H) 01/26/2020    PROBNP 5,394 (H) 01/23/2020     No results found for: [facility]    Radiology Review:  Pertinent images / reports were reviewed as a part of this visit.     Assessment:     Patient Active Problem List   Diagnosis    Essential hypertension    Osteoarthritis    [facility]    Paresthesia of left leg    Abdominal hernia    Left shoulder pain    Crushing injury of right hand    Rotator cuff tendinitis    Midline low back pain without sciatica    History of MI (myocardial infarction)    Coronary artery disease involving coronary bypass graft of native heart without angina pectoris    Mixed hyperlipidemia    Depression    Chronic midline low back pain without sciatica    Tobacco abuse    Gastroesophageal reflux disease without esophagitis    Opiate abuse, continuous ([address]    Heroin dependence ([address]    ST elevation myocardial infarction involving left circumflex coronary artery ([address]    STEMI (ST elevation myocardial infarction) ([facility]    Acute on chronic combined systolic (congestive) and diastolic (congestive) heart failure ([facility])    Systolic and diastolic CHF w/reduced LV function, NYHA class 4 ([address]    NSTEMI (non-ST elevated myocardial infarction) ([address]    Acute on chronic congestive heart failure ([address]    Noncompliance    Pulmonary edema, acute ([facility])    Acute kidney injury ([address]    Acute respiratory failure with hypoxia and hypercapnia ([address]    Hypertensive emergency    Ischemic cardiomyopathy    Heart failure ([address]    Left ventricular systolic dysfunction    Acute systolic congestive heart failure ([address]       Plan:   1. Overall the patient has improved. 2. CT surg consult pending.     Ira Feldman MD  2/11/2020  11:20 AM

## 2020-02-11 NOTE — CARE COORDINATION
Date: 2/11/20      Time: 3:00  20 minutes of education provided  CN visit done. Patient known to me from a previous admission. He was last educated on 1/24/and 1/28. Patient states he was taking his medication up until last Thursday when he started feeling bad. States he felt like he was having a reaction to his medications. His symptoms were swelling and SOB. I reminded him of the CHF zone worksheet and explained top him that he was in the yellow zone. I also explained to him that this is when he needs to call the dr. I explained to him that early intervention for heart failure exacerbations will keep him from being readmitted. He states he did not start urinating until IV diuretics were given last night. I explained that once he is in advanced exacerbation it will require IV diuretics so early intervention is always best.  Patient again tells me he has trust issues with doctors. He is agreeable to going to the [facility]. Appointment made. We also discussed the low salt diet and fluid restriction needed once home. He is doing better with fluids but is still struggling with sodium intake. I explained to him he will continue to get in trouble with his CHF is he continues to eat high sodium foods. Education reviewed:  -Heart Failure zones. Calling first day of being in the yellow zone.  -Low sodium diet.  -Fluid restriction.  -Weighing daily in the am.  All questions verbalized were answered. CN will follow. Date: 2/12/20      Time: 1:00  Patient is being discharged today. CN visit done. Educational information provided. Low salt diet, fluid restriction, weighing daily and CHF zones rediscussed. We also discussed follow up with Dr Jacquelyn Mcallister and the patient plans to call for an appointment. Follow up made at the [facility]. Patient aware of date and time. No further questions or needs verbalized. CN will follow. Date: 2/13/20        Time: 3:11  Attempted follow up call but there was no answer. Message left.    Will

## 2020-02-11 NOTE — PROGRESS NOTES
for input(s): TROPONINI in the last 72 hours. BNP: No results for input(s): BNP in the last 72 hours. Lipids: No results for input(s): CHOL, HDL in the last 72 hours. Invalid input(s): LDLCALCU  ABGs:   Lab Results   Component Value Date    PO2ART 538 01/23/2020    LEO4HEB 37.0 01/23/2020     INR: No results for input(s): INR in the last 72 hours.     Objective:   Vitals: BP (!) 91/49   Pulse 93   Temp 97.8 °F (36.6 °C) (Oral)   Resp 24   Ht 5' 11\" (1.803 m)   Wt 193 lb 14.4 oz (88 kg)   SpO2 98%   BMI 27.04 kg/m²   General appearance: alert and cooperative with exam, in no acute distress  HEENT: normocephalic, atraumatic,   Neck: supple, trachea midline  Lungs: , breathing comfortably  Heart[de-identified] regular rate and rhythm,   Abdomen:non distended  Extremities: ble edema appears better than yesterday  Neurologic: alert, oriented, follows commands, interactive    Assessment and Plan:     - JAIME on CKD3: cr 1.4//ddx: cardiorenal vs atn vs ckd progression  - Acute on chronic systolic chf ef 41%  - hyponatremia  - ble edema: no dvt on ble duplex  - Hilar lymphadenopathy  - ringing in the ears: one typically has to be on high doses of lasix 200mg and higher for this to be caused by lasix     Plan:  - cr down to 1.3, serum sodium is stable  - stop bumex drip today and allow fluid reequilibration  - decrease lisinopril to 2.5mg po daily as BP is borderline  - continue oral fluid restriction                  Electronically signed by Eve Bartholomew DO on 2/11/2020 at 9:47 AM    [facility]  MD Uzma Andres DO  [facility] [address]  [place], [place] [address]  PHONE: [phone]  FAX: [phone]

## 2020-02-11 NOTE — CONSULTS
Nephrology Service Consultation        [address], [address] [address]  Phone: [phone]  Office Hours: 8:30AM - 4:30PM  Monday - Friday           Patient:  Irma Campbell  MRN: [id number]  Consulting physician:  Nba iGlbert MD  Reason for Consult: not diuresing on iv lasix      PCP: [address] [place] [address]:   The patient is a 52 y.o. male with ef 205 presented with BLE edema and soa. He reports weight in the 170s at his recent dc and weight of 190 when he was readmitted few days ago. Renal consult due to not diuresing on lasix (unclear how accurate UOP has been). He also reports ringing in the ear that has not been evaluated as outpt. He reports continued soa and leg swelling. He denies bph, straining with urination. He is hemodynamically stable  Per lab review, cr has been above 1 since 2019    REVIEW OF SYSTEMS:  14 point ROS is Negative. See positive ROS per HPI    Past Medical History:        Diagnosis Date    CAD (coronary artery disease)     CHF (congestive heart failure) (HCC)     [facility]     Depression     HIGH CHOLESTEROL     Hypertension     MI (myocardial infarction) ([facility] [address] [facility] 2011    S/P coronary artery bypass graft x 4 2011    CABG x 4 Dr Leroy Mckeon       Past Surgical History:        Procedure Laterality Date    BYPASS GRAFT  04/10/2011    CABG x 4 Dr Pelon Griffin  11/2010     4 stents    LEG SURGERY         Medications:   Prior to Admission medications    Medication Sig Start Date End Date Taking?  Authorizing Provider   aspirin 81 MG EC tablet Take 1 tablet by mouth daily 1/28/20  Yes Nick Downs MD   atorvastatin (LIPITOR) 40 MG tablet Take 1 tablet by mouth daily 1/28/20  Yes Nick Downs MD   carvedilol (COREG) 12.5 MG tablet Take 1 tablet by mouth 2 times daily (with meals) 1/29/20  Yes Nick Downs MD   furosemide (LASIX) 40 MG tablet Take 1 tablet by mouth 2 times daily allow higher bp and better renal perfusion    Thank you      Electronically signed by Sunita Reynoso DO on 2/10/2020 at 7:09 PM    [address]  [place], [place] [address]  PHONE: [phone]  FAX: [phone]

## 2020-02-11 NOTE — CARE COORDINATION
Chart reviewed, in to see pt for dc planning. Introduced self and board updated. Pt was admitted for CHF exac. States he lives at home and his 27 yo son and his girlfriend are now living with him to help with the cost of bills and so he has support at home. He typically gets around well and declined needing any DME at discharge though may go home with new oxygen today. Explained he follows with PCP through the CHF clinic and I provided him with the PCP list with the walk in clinic options. He has insurance with affordable RX co-pays and reliable transportation per himself. Discussed HHC and pt declined [address] need nor any other discharge needs. CM did provide him with community resource booklet and handouts to help him with utility bills and miscellaneous costs which are rising. CM remains available if needs arise.

## 2020-02-12 VITALS
TEMPERATURE: 98.2 F | SYSTOLIC BLOOD PRESSURE: 118 MMHG | OXYGEN SATURATION: 98 % | DIASTOLIC BLOOD PRESSURE: 76 MMHG | WEIGHT: 180.5 LBS | RESPIRATION RATE: 19 BRPM | HEART RATE: 97 BPM | BODY MASS INDEX: 25.27 KG/M2 | HEIGHT: 71 IN

## 2020-02-12 LAB
ANION GAP SERPL CALCULATED.3IONS-SCNC: 14 MMOL/L (ref 4–16)
BUN BLDV-MCNC: 30 MG/DL (ref 6–23)
CALCIUM SERPL-MCNC: 9.4 MG/DL (ref 8.3–10.6)
CHLORIDE BLD-SCNC: 87 MMOL/L (ref 99–110)
CO2: 29 MMOL/L (ref 21–32)
CREAT SERPL-MCNC: 1.4 MG/DL (ref 0.9–1.3)
GFR AFRICAN AMERICAN: >60 ML/MIN/1.73M2
GFR NON-AFRICAN AMERICAN: 54 ML/MIN/1.73M2
GLUCOSE BLD-MCNC: 116 MG/DL (ref 70–99)
GLUCOSE BLD-MCNC: 119 MG/DL (ref 70–99)
GLUCOSE BLD-MCNC: 149 MG/DL (ref 70–99)
MAGNESIUM: 2.1 MG/DL (ref 1.8–2.4)
PHOSPHORUS: 4.1 MG/DL (ref 2.5–4.9)
POTASSIUM SERPL-SCNC: 3.9 MMOL/L (ref 3.5–5.1)
SODIUM BLD-SCNC: 130 MMOL/L (ref 135–145)

## 2020-02-12 PROCEDURE — 84100 ASSAY OF PHOSPHORUS: CPT

## 2020-02-12 PROCEDURE — 6370000000 HC RX 637 (ALT 250 FOR IP): Performed by: PHYSICIAN ASSISTANT

## 2020-02-12 PROCEDURE — 83735 ASSAY OF MAGNESIUM: CPT

## 2020-02-12 PROCEDURE — 94761 N-INVAS EAR/PLS OXIMETRY MLT: CPT

## 2020-02-12 PROCEDURE — 2580000003 HC RX 258: Performed by: INTERNAL MEDICINE

## 2020-02-12 PROCEDURE — 80048 BASIC METABOLIC PNL TOTAL CA: CPT

## 2020-02-12 PROCEDURE — 6370000000 HC RX 637 (ALT 250 FOR IP): Performed by: INTERNAL MEDICINE

## 2020-02-12 PROCEDURE — 94640 AIRWAY INHALATION TREATMENT: CPT

## 2020-02-12 PROCEDURE — 82962 GLUCOSE BLOOD TEST: CPT

## 2020-02-12 PROCEDURE — 6370000000 HC RX 637 (ALT 250 FOR IP): Performed by: NURSE PRACTITIONER

## 2020-02-12 RX ORDER — SPIRONOLACTONE 25 MG/1
12.5 TABLET ORAL DAILY
Qty: 30 TABLET | Refills: 3 | Status: SHIPPED | OUTPATIENT
Start: 2020-02-13 | End: 2020-02-12

## 2020-02-12 RX ORDER — ALBUTEROL SULFATE 90 UG/1
2 AEROSOL, METERED RESPIRATORY (INHALATION) EVERY 4 HOURS PRN
Qty: 1 INHALER | Refills: 0 | Status: ON HOLD | OUTPATIENT
Start: 2020-02-12 | End: 2020-03-15 | Stop reason: SDUPTHER

## 2020-02-12 RX ORDER — BUMETANIDE 1 MG/1
1 TABLET ORAL 2 TIMES DAILY
Qty: 30 TABLET | Refills: 1 | Status: SHIPPED | OUTPATIENT
Start: 2020-02-13 | End: 2020-02-12

## 2020-02-12 RX ORDER — BUMETANIDE 1 MG/1
1 TABLET ORAL 2 TIMES DAILY
Qty: 30 TABLET | Refills: 1 | Status: ON HOLD | OUTPATIENT
Start: 2020-02-13 | End: 2020-03-15 | Stop reason: SDUPTHER

## 2020-02-12 RX ORDER — LISINOPRIL 2.5 MG/1
2.5 TABLET ORAL DAILY
Qty: 30 TABLET | Refills: 1 | Status: ON HOLD | OUTPATIENT
Start: 2020-02-13 | End: 2020-03-15 | Stop reason: SDUPTHER

## 2020-02-12 RX ORDER — SPIRONOLACTONE 25 MG/1
12.5 TABLET ORAL DAILY
Qty: 30 TABLET | Refills: 3 | Status: SHIPPED | OUTPATIENT
Start: 2020-02-13 | End: 2020-05-10

## 2020-02-12 RX ORDER — BUMETANIDE 1 MG/1
1 TABLET ORAL 2 TIMES DAILY
Status: DISCONTINUED | OUTPATIENT
Start: 2020-02-13 | End: 2020-02-12 | Stop reason: HOSPADM

## 2020-02-12 RX ORDER — ALBUTEROL SULFATE 90 UG/1
2 AEROSOL, METERED RESPIRATORY (INHALATION) EVERY 4 HOURS PRN
Qty: 1 INHALER | Refills: 0 | Status: SHIPPED | OUTPATIENT
Start: 2020-02-12 | End: 2020-02-12 | Stop reason: SDUPTHER

## 2020-02-12 RX ORDER — NICOTINE 21 MG/24HR
1 PATCH, TRANSDERMAL 24 HOURS TRANSDERMAL DAILY
Qty: 30 PATCH | Refills: 3 | Status: SHIPPED | OUTPATIENT
Start: 2020-02-13 | End: 2020-02-12

## 2020-02-12 RX ORDER — LISINOPRIL 2.5 MG/1
2.5 TABLET ORAL DAILY
Qty: 30 TABLET | Refills: 1 | Status: SHIPPED | OUTPATIENT
Start: 2020-02-13 | End: 2020-02-12

## 2020-02-12 RX ORDER — NICOTINE 21 MG/24HR
1 PATCH, TRANSDERMAL 24 HOURS TRANSDERMAL DAILY
Qty: 30 PATCH | Refills: 3 | Status: ON HOLD | OUTPATIENT
Start: 2020-02-13 | End: 2020-05-13

## 2020-02-12 RX ADMIN — APIXABAN 5 MG: 5 TABLET, FILM COATED ORAL at 08:30

## 2020-02-12 RX ADMIN — INSULIN LISPRO 1 UNITS: 100 INJECTION, SOLUTION INTRAVENOUS; SUBCUTANEOUS at 08:29

## 2020-02-12 RX ADMIN — CARVEDILOL 12.5 MG: 12.5 TABLET, FILM COATED ORAL at 08:30

## 2020-02-12 RX ADMIN — ATORVASTATIN CALCIUM 40 MG: 40 TABLET, FILM COATED ORAL at 08:30

## 2020-02-12 RX ADMIN — IPRATROPIUM BROMIDE AND ALBUTEROL SULFATE 1 AMPULE: .5; 3 SOLUTION RESPIRATORY (INHALATION) at 05:09

## 2020-02-12 RX ADMIN — IPRATROPIUM BROMIDE AND ALBUTEROL SULFATE 1 AMPULE: .5; 3 SOLUTION RESPIRATORY (INHALATION) at 12:01

## 2020-02-12 RX ADMIN — SPIRONOLACTONE 12.5 MG: 25 TABLET ORAL at 08:30

## 2020-02-12 RX ADMIN — SODIUM CHLORIDE, PRESERVATIVE FREE 10 ML: 5 INJECTION INTRAVENOUS at 08:32

## 2020-02-12 RX ADMIN — IPRATROPIUM BROMIDE AND ALBUTEROL SULFATE 1 AMPULE: .5; 3 SOLUTION RESPIRATORY (INHALATION) at 08:13

## 2020-02-12 RX ADMIN — ASPIRIN 81 MG: 81 TABLET, COATED ORAL at 08:30

## 2020-02-12 RX ADMIN — LISINOPRIL 2.5 MG: 2.5 TABLET ORAL at 08:30

## 2020-02-12 ASSESSMENT — PAIN SCALES - WONG BAKER: WONGBAKER_NUMERICALRESPONSE: 0

## 2020-02-12 ASSESSMENT — PAIN SCALES - GENERAL
PAINLEVEL_OUTOF10: 0
PAINLEVEL_OUTOF10: 0

## 2020-02-12 NOTE — PROGRESS NOTES
Hospitalist Progress Note      Name:  Cherrie Senior /Age/Sex: 1970  (52 y.o. male)   MRN & CSN:  [id number] & [id number] Admission Date/Time: 2020  1:28 PM   [facility]  [facility] PCP: Andrews Mccord Day: 5    ASSESSMENT & PLAN:   Cherrie Senior is a 52 y.o.  male with past medical history of a combined systolic diastolic congestive heart failure who presented with a complaint of shortness of breath, worsening lower extremity edema. He was admitted to the hospital for acute on chronic systolic and diastolic CHF exacerbation. 1.  Acute on chronic systolic/diastolic CHF exacerbation-- he is on Lasix 40 twice daily and Aldactone 25. He is not fully compliant with diuretics. He is already feeling much better with regards to subjective shortness of breath. Oxygen saturations are normal.  -Lasix 40 p.o. twice daily as of   -Continue Aldactone  -Continue Coreg 12.5 twice daily  - - had Bumex drip since yesterday, per nephrology will let things equilibrate and see how he does tomorrow    2. Probable/possible pulmonary embolism-- patient had a VQ scan on  and showed intermediate probability for PE. Additionally,  d-dimer was elevated at 3200 on last admission on 20. He was subsequently discharged home with Eliquis. This admission, patient underwent CTA chest.  CTA chest without evidence of pulmonary embolism at this point. Patient is unable to afford the medications at this point. He lives on a disability income. He had a total of 1 month supply of Eliquis. Since the patient the patient is unable to afford the medications and evidence of pulmonary embolism is not a firm diagnosis, then it will be reasonable not to pursue further anticoagulation.   - Continue Eliquis for now  -stable today    3. Mediastinal lymphadenopathy-- appears to have increased in size. Pulmonary recommending CT surgery consultation for biopsy. -CT surgery consultation    4. mg, Nightly PRN  sodium chloride flush, 10 mL, PRN  magnesium hydroxide, 30 mL, Daily PRN  ondansetron, 4 mg, Q6H PRN  glucose, 15 g, PRN  dextrose, 12.5 g, PRN  glucagon (rDNA), 1 mg, PRN  dextrose, 100 mL/hr, PRN        Electronically signed by Rafaela Spurling, MD on 2/11/2020 at 7:41 PM

## 2020-02-12 NOTE — PROGRESS NOTES
Pulmonary and Critical Care  Progress Note    Subjective: The patient SACE level normal.  Shortness of breath has improved. Chest pain none  Addressing respiratory complaints Patient is negative for  hemoptysis and cyanosis  CONSTITUTIONAL:  negative for fevers and chills      Past Medical History:     has a past medical history of CAD (coronary artery disease), CHF (congestive heart failure) ([facility] [facility] COPD (chronic obstructive pulmonary disease) ([facility] [facility] Depression, HIGH CHOLESTEROL, Hypertension, [facility]) [facility] and S/P coronary artery bypass graft x 4.   has a past surgical history that includes Cardiac surgery (11/2010); Bypass Graft (04/10/2011); and Leg Surgery. reports that he has been smoking cigarettes. He has a 20.00 pack-year smoking history. He has quit using smokeless tobacco. He reports that he does not drink alcohol or use drugs. Family history:  family history includes Cancer in his father; Diabetes in his mother; Heart Disease in his father and mother; Heart Failure in his father. No Known Allergies  Social History:    Reviewed; no changes    Objective:   PHYSICAL EXAM:        VITALS:  /76   Pulse 97   Temp 98.2 °F (36.8 °C) (Oral)   Resp 19   Ht 5' 11\" (1.803 m)   Wt 180 lb 8 oz (81.9 kg)   SpO2 98%   BMI 25.17 kg/m²     24HR INTAKE/OUTPUT:      Intake/Output Summary (Last 24 hours) at 2/12/2020 1049  Last data filed at 2/12/2020 2701  Gross per 24 hour   Intake 10 ml   Output 2050 ml   Net -2040 ml       CONSTITUTIONAL:  awake, alert, cooperative, no apparent distress, and appears stated age  LUNGS:  decreased breath sounds, occ basilar crackles. CARDIOVASCULAR:  normal S1 and S2 and positive JVD  ABD:Abdomen soft, non-tender. BS normal. No masses,  No organomegaly  NEURO:Alert and oriented x3. Gait normal. Reflexes and motor strength normal and symmetric. Cranial nerves 2-12 and sensation grossly intact.   DATA:    CBC:  Recent Labs     02/10/20  0512   WBC 7.6 RBC 4.25*   HGB 9.8*   HCT 33.7*      MCV 79.3   MCH 23.1*   MCHC 29.1*   RDW 17.2*   SEGSPCT 68.4*      BMP:  Recent Labs     02/10/20  0512 02/11/20  0226 02/12/20  0255   * 133* 130*   K 4.3 4.1 3.9   CL 96* 92* 87*   CO2 23 27 29   BUN 32* 29* 30*   CREATININE 1.4* 1.3 1.4*   CALCIUM 8.9 8.8 9.4   GLUCOSE 117* 117* 116*      ABG:  No results for input(s): PH, PO2ART, WOX4XKB, HCO3, BEART, O2SAT in the last 72 hours. Lab Results   Component Value Date    PROBNP 4,780 (H) 02/07/2020    PROBNP 2,397 (H) 01/26/2020    PROBNP 5,394 (H) 01/23/2020     No results found for: [facility]    Radiology Review:  Pertinent images / reports were reviewed as a part of this visit.     Assessment:     Patient Active Problem List   Diagnosis    Essential hypertension    Osteoarthritis    [facility] disease) [facility]    Paresthesia of left leg    Abdominal hernia    Left shoulder pain    Crushing injury of right hand    Rotator cuff tendinitis    Midline low back pain without sciatica    History of MI (myocardial infarction)    Coronary artery disease involving coronary bypass graft of native heart without angina pectoris    Mixed hyperlipidemia    Depression    Chronic midline low back pain without sciatica    Tobacco abuse    Gastroesophageal reflux disease without esophagitis    Opiate abuse, continuous ([address]    Heroin dependence ([address]    ST elevation myocardial infarction involving left circumflex coronary artery ([address]    STEMI (ST elevation myocardial infarction) ([facility])    Acute on chronic combined systolic (congestive) and diastolic (congestive) heart failure ([facility])    Systolic and diastolic CHF w/reduced LV function, NYHA class 4 ([address]    NSTEMI (non-ST elevated myocardial infarction) ([address]    Acute on chronic congestive heart failure ([address]    Noncompliance    Pulmonary edema, acute ([facility])    Acute kidney injury ([address]    Acute respiratory failure with hypoxia and

## 2020-02-12 NOTE — CONSULTS
Subjective:   Chief complaint: Mediastinal lymphadenopathy    Patient is a 52 y.o.  male who was admitted with increasing lower extremity edema and shortness of breath. Patient has a significant cardiac history with multiple admissions for heart failure. He is known to have an ejection fraction of 20% status post coronary artery bypass grafting. During his initial work-up a VQ scan was performed which was concerning for pulmonary embolism. A CT scan was performed which showed no pulmonary embolism however did show bulky hilar and mediastinal lymph nodes. Pt seen at the request of pulmonary regarding possible mediastinal biopsy    He has a history of previous fever but no known source. This was felt to be from pulmonary effusions per the patient. Patient has a history of heavy smoking and malignancies in his family. He has lived only in [facility] area. He has 2 pit bulls but no new other pets. His home is about 48years old. There is no known mold in the home.     Patient Active Problem List    Diagnosis Date Noted    ST elevation myocardial infarction involving left circumflex coronary artery ([address] 07/02/2018     Priority: High    Acute systolic congestive heart failure ([address] 02/07/2020    Left ventricular systolic dysfunction     Heart failure ([address] 01/23/2020    Ischemic cardiomyopathy 02/05/2019    Pulmonary edema, acute ([address] 12/08/2018    Acute kidney injury ([address] 12/08/2018    Acute respiratory failure with hypoxia and hypercapnia ([address] 12/08/2018    Hypertensive emergency 12/08/2018    Acute on chronic congestive heart failure ([address] 09/14/2018    Noncompliance 09/14/2018    Acute on chronic combined systolic (congestive) and diastolic (congestive) heart failure ([address] 15/05/9847    Systolic and diastolic CHF w/reduced LV function, NYHA class 4 ([address] 07/11/2018    NSTEMI (non-ST elevated myocardial infarction) ([address] 07/11/2018    STEMI (ST elevation myocardial infarction) mg (1 tablet) orally twice daily thereafter.   albuterol sulfate HFA (VENTOLIN HFA) 108 (90 Base) MCG/ACT inhaler, Inhale 2 puffs into the lungs every 4 hours as needed for Wheezing or Shortness of Breath  ipratropium-albuterol (DUONEB) 0.5-2.5 (3) MG/3ML SOLN nebulizer solution, Inhale 3 mLs into the lungs 4 times daily  apixaban (ELIQUIS) 5 MG TABS tablet, Take 1 tablet by mouth 2 times daily  nitroGLYCERIN (NITROSTAT) 0.4 MG SL tablet, Place 1 tablet under the tongue every 5 minutes as needed for Chest pain  No Known Allergies   Social History     Tobacco Use    Smoking status: Current Every Day Smoker     Packs/day: 1.00     Years: 20.00     Pack years: 20.00     Types: Cigarettes    Smokeless tobacco: Former User    Tobacco comment: Reviewed 10/9/17   Substance Use Topics    Alcohol use: No     Alcohol/week: 0.0 standard drinks      Family History   Problem Relation Age of Onset    Cancer Father     Heart Failure Father     Heart Disease Father     Diabetes Mother     Heart Disease Mother       Review of Systems  Constitutional: Slight labored breathing  Eyes: negative  Ears, nose, mouth, throat, and face: negative  Respiratory: See HPI  Cardiovascular: See HPI  Gastrointestinal: negative  Genitourinary:negative  Integument/breast: negative  Hematologic/lymphatic: negative  Musculoskeletal:negative  Neurological: negative  Behavioral/Psych: negative  Endocrine: negative  Allergic/Immunologic: negative    Objective:     Patient Vitals for the past 8 hrs:   BP Temp Temp src Pulse Resp SpO2   02/11/20 1551 -- -- -- -- 21 97 %   02/11/20 1513 109/75 97.9 °F (36.6 °C) Oral 95 13 --     CONSTITUTIONAL:  awake, alert, cooperative, no apparent distress, and appears stated age  NECK:  Supple, symmetrical, trachea midline, no adenopathy, thyroid symmetric, not enlarged and no tenderness, skin normal  HEMATOLOGIC/LYMPHATICS:  no cervical lymphadenopathy and no supraclavicular lymphadenopathy  LUNGS:  No increased work of breathing, good air exchange, clear to auscultation bilaterally, no crackles or wheezing  CARDIOVASCULAR:  Normal apical impulse, regular rate and rhythm, normal S1 and S2, no S3 or S4, and no murmur noted  CHEST/BREASTS:  symmetric  ABDOMEN: soft nt nd, no pulsitile masses  MUSCULOSKELETAL:  There is no redness, warmth, or swelling of the joints. Full range of motion noted. Motor strength is 5 out of 5 all extremities bilaterally. Tone is normal.  NEUROLOGIC:  Awake, alert, oriented to name, place and time. Cranial nerves II-XII are grossly intact. Motor is 5 out of 5 bilaterally. SKIN:  no bruising or bleeding and normal skin color, texture, turgor  VASC: 1+ femoral pulses lower extremity edema 2+ bilaterally        Data Review  CBC:   Lab Results   Component Value Date    WBC 7.6 02/10/2020    RBC 4.25 02/10/2020     BMP:   Lab Results   Component Value Date    GLUCOSE 117 02/11/2020    CO2 27 02/11/2020    BUN 29 02/11/2020    CREATININE 1.3 02/11/2020    CALCIUM 8.8 02/11/2020     Coagulation:   Lab Results   Component Value Date    INR 1.10 07/11/2018    APTT 29.2 07/02/2018       CT chest evaluated. There are large bulky mediastinal lymph nodes. Assessment:     Mediastinal lymphadenopathy    Plan:     These large mediastinal lymph nodes were compared to a previous CT scan in 2017 which did not show these lymph nodes. The subcarinal lymph node and that previous film was enlarged but no other lymph nodes were. I am concerned that this could be indolent lymphoma. This could also be reactive secondary to his multiple episodes of acute on chronic heart failure. I have discussed the plan with the hospitalist and patient. We will plan for him to see me as an outpatient and I will order a PET scan at that time. If the lymph nodes are stable or enlarged or are PET avid, we will proceed with mediastinoscopy.   If the lymph nodes have decreased in size then we will hold off on any

## 2020-02-12 NOTE — PROGRESS NOTES
Nephrology Progress Note        [address], [address] [address]  Phone: [phone]  Office Hours: 8:30AM - 4:30PM  Monday - Friday       [facility]  Rip Borer, MD Bernita Babinski, DO [address],  [address]  [place] [facility]  PHONE: [phone]  FAX: [phone]    2/12/2020 9:31 AM  Subjective:   Admit Date: 2/7/2020  PCP: Darek Aguilar History: doing ok  Breathing much better  Eager to go home    Diet: DIET CARDIAC; Low Sodium (2 GM); Daily Fluid Restriction: 1500 ml      Data:   Scheduled Meds:   lisinopril  2.5 mg Oral Daily    spironolactone  12.5 mg Oral Daily    ipratropium-albuterol  1 ampule Inhalation Q4H WA    sodium chloride flush  10 mL Intravenous 2 times per day    apixaban  5 mg Oral BID    aspirin  81 mg Oral Daily    atorvastatin  40 mg Oral Daily    carvedilol  12.5 mg Oral BID WC    insulin lispro  0-6 Units Subcutaneous TID WC    insulin lispro  0-3 Units Subcutaneous Nightly    nicotine  1 patch Transdermal Daily     Continuous Infusions:   dextrose       PRN Meds:melatonin, sodium chloride flush, magnesium hydroxide, ondansetron, glucose, dextrose, glucagon (rDNA), dextrose  I/O last 3 completed shifts: In: 10 [I.V.:10]  Out: 5250 [Urine:5250]  I/O this shift:  In: 10 [I.V.:10]  Out: -     Intake/Output Summary (Last 24 hours) at 2/12/2020 0931  Last data filed at 2/12/2020 0829  Gross per 24 hour   Intake 10 ml   Output 3650 ml   Net -3640 ml       CBC:   Recent Labs     02/10/20  0512   WBC 7.6   HGB 9.8*          BMP:    Recent Labs     02/10/20  0512 02/11/20  0226 02/12/20  0255   * 133* 130*   K 4.3 4.1 3.9   CL 96* 92* 87*   CO2 23 27 29   BUN 32* 29* 30*   CREATININE 1.4* 1.3 1.4*   GLUCOSE 117* 117* 116*     Hepatic: No results for input(s): AST, ALT, ALB, BILITOT, ALKPHOS in the last 72 hours.   Troponin: No results for input(s): TROPONINI in the last 72 hours.  BNP: No results for input(s): BNP in the last 72 hours. Lipids: No results for input(s): CHOL, HDL in the last 72 hours. Invalid input(s): LDLCALCU  ABGs:   Lab Results   Component Value Date    PO2ART 538 01/23/2020    UGO1YFQ 37.0 01/23/2020     INR: No results for input(s): INR in the last 72 hours.     Objective:   Vitals: /69   Pulse 91   Temp 98.2 °F (36.8 °C) (Oral)   Resp 17   Ht 5' 11\" (1.803 m)   Wt 180 lb 8 oz (81.9 kg)   SpO2 97%   BMI 25.17 kg/m²   General appearance: alert and cooperative with exam, in no acute distress  HEENT: normocephalic, atraumatic,   Neck: supple, trachea midline  Lungs:, breathing comfortably   Heart[de-identified] tachycardic  Abdomen: soft, non-tender; non distended,  Extremities: improved BLE edema  Neurologic: alert, oriented, follows commands, interactive    Assessment and Plan:     - JAIME on CKD3: cr 1.4//ddx: cardiorenal vs atn vs ckd progression, stable  - Acute on chronic systolic chf ef 81%  - hyponatremia  - ble edema: no dvt on ble duplex  - Hilar lymphadenopathy  - ringing in the ears: one typically has to be on higher doses of lasix more than 200mg daily for this to be caused by lasix     Plan:  - cr stable  - start bumex 1mg po bid upon dc  - continue oral fluid restriction 1800ml per day                        Electronically signed by Ilsa Strickland DO on 2/12/2020 at 9:31 AM    [facility]  MD Gabi Galvez DO [address]  [place], [place] [address]  PHONE: [phone]  FAX: [phone]

## 2020-02-13 LAB
EKG ATRIAL RATE: 121 BPM
EKG DIAGNOSIS: NORMAL
EKG P AXIS: 70 DEGREES
EKG P-R INTERVAL: 148 MS
EKG Q-T INTERVAL: 316 MS
EKG QRS DURATION: 116 MS
EKG QTC CALCULATION (BAZETT): 448 MS
EKG R AXIS: 68 DEGREES
EKG T AXIS: 109 DEGREES
EKG VENTRICULAR RATE: 121 BPM

## 2020-02-14 ENCOUNTER — INITIAL CONSULT (OUTPATIENT)
Dept: CARDIOLOGY CLINIC | Age: 50
End: 2020-02-14
Payer: MEDICARE

## 2020-02-14 ENCOUNTER — HOSPITAL ENCOUNTER (OUTPATIENT)
Dept: INFUSION THERAPY | Age: 50
Setting detail: INFUSION SERIES
Discharge: HOME | End: 2020-02-14
Payer: MEDICARE

## 2020-02-14 VITALS
SYSTOLIC BLOOD PRESSURE: 120 MMHG | OXYGEN SATURATION: 99 % | DIASTOLIC BLOOD PRESSURE: 78 MMHG | TEMPERATURE: 98.6 F | RESPIRATION RATE: 14 BRPM | HEART RATE: 74 BPM

## 2020-02-14 VITALS
WEIGHT: 189.2 LBS | RESPIRATION RATE: 18 BRPM | OXYGEN SATURATION: 98 % | HEIGHT: 71 IN | DIASTOLIC BLOOD PRESSURE: 64 MMHG | HEART RATE: 108 BPM | SYSTOLIC BLOOD PRESSURE: 100 MMHG | BODY MASS INDEX: 26.49 KG/M2

## 2020-02-14 LAB
ANION GAP SERPL CALCULATED.3IONS-SCNC: 16 MMOL/L (ref 4–16)
ASPERGILLUS ANTIBODY ID: NORMAL
ASPERGILLUS ANTIBODY ID: NORMAL
BLASTOMYCES ANTIBODY ID: NORMAL
BLASTOMYCES ANTIBODY ID: NORMAL
BUN BLDV-MCNC: 31 MG/DL (ref 6–23)
CALCIUM SERPL-MCNC: 9.3 MG/DL (ref 8.3–10.6)
CHLORIDE BLD-SCNC: 94 MMOL/L (ref 99–110)
CO2: 23 MMOL/L (ref 21–32)
COCCIDIOIDES ANTIBODY ID: NORMAL
COCCIDIOIDES ANTIBODY ID: NORMAL
CREAT SERPL-MCNC: 1.6 MG/DL (ref 0.9–1.3)
FERRITIN: 35 NG/ML (ref 30–400)
FOLATE: 18.3 NG/ML (ref 3.1–17.5)
GFR AFRICAN AMERICAN: 56 ML/MIN/1.73M2
GFR NON-AFRICAN AMERICAN: 46 ML/MIN/1.73M2
GLUCOSE BLD-MCNC: 102 MG/DL (ref 70–99)
HCT VFR BLD CALC: 35.6 % (ref 42–52)
HEMOGLOBIN: 10.6 GM/DL (ref 13.5–18)
HISTOPLASMA AB, ID: NORMAL
HISTOPLASMA AB, ID: NORMAL
IRON: 29 UG/DL (ref 59–158)
MCH RBC QN AUTO: 22.7 PG (ref 27–31)
MCHC RBC AUTO-ENTMCNC: 29.8 % (ref 32–36)
MCV RBC AUTO: 76.4 FL (ref 78–100)
PCT TRANSFERRIN: 6 % (ref 10–44)
PDW BLD-RTO: 17.4 % (ref 11.7–14.9)
PLATELET # BLD: 307 K/CU MM (ref 140–440)
PMV BLD AUTO: 10 FL (ref 7.5–11.1)
POTASSIUM SERPL-SCNC: 4.4 MMOL/L (ref 3.5–5.1)
PRO-BNP: 4930 PG/ML
RBC # BLD: 4.66 M/CU MM (ref 4.6–6.2)
SODIUM BLD-SCNC: 133 MMOL/L (ref 135–145)
TOTAL IRON BINDING CAPACITY: 466 UG/DL (ref 250–450)
UNSATURATED IRON BINDING CAPACITY: 437 UG/DL (ref 110–370)
VITAMIN B-12: 1100 PG/ML (ref 211–911)
WBC # BLD: 7 K/CU MM (ref 4–10.5)

## 2020-02-14 PROCEDURE — 83550 IRON BINDING TEST: CPT

## 2020-02-14 PROCEDURE — 82607 VITAMIN B-12: CPT

## 2020-02-14 PROCEDURE — 99211 OFF/OP EST MAY X REQ PHY/QHP: CPT

## 2020-02-14 PROCEDURE — 80048 BASIC METABOLIC PNL TOTAL CA: CPT

## 2020-02-14 PROCEDURE — 82746 ASSAY OF FOLIC ACID SERUM: CPT

## 2020-02-14 PROCEDURE — 83540 ASSAY OF IRON: CPT

## 2020-02-14 PROCEDURE — 99213 OFFICE O/P EST LOW 20 MIN: CPT | Performed by: NURSE PRACTITIONER

## 2020-02-14 PROCEDURE — G8482 FLU IMMUNIZE ORDER/ADMIN: HCPCS | Performed by: NURSE PRACTITIONER

## 2020-02-14 PROCEDURE — 2580000003 HC RX 258: Performed by: NURSE PRACTITIONER

## 2020-02-14 PROCEDURE — 82728 ASSAY OF FERRITIN: CPT

## 2020-02-14 PROCEDURE — 85027 COMPLETE CBC AUTOMATED: CPT

## 2020-02-14 PROCEDURE — G8419 CALC BMI OUT NRM PARAM NOF/U: HCPCS | Performed by: NURSE PRACTITIONER

## 2020-02-14 PROCEDURE — 2500000003 HC RX 250 WO HCPCS: Performed by: NURSE PRACTITIONER

## 2020-02-14 PROCEDURE — 96374 THER/PROPH/DIAG INJ IV PUSH: CPT

## 2020-02-14 PROCEDURE — G8427 DOCREV CUR MEDS BY ELIG CLIN: HCPCS | Performed by: NURSE PRACTITIONER

## 2020-02-14 PROCEDURE — 83880 ASSAY OF NATRIURETIC PEPTIDE: CPT

## 2020-02-14 RX ORDER — BUMETANIDE 0.25 MG/ML
1 INJECTION, SOLUTION INTRAMUSCULAR; INTRAVENOUS ONCE
Status: COMPLETED | OUTPATIENT
Start: 2020-02-14 | End: 2020-02-14

## 2020-02-14 RX ORDER — FUROSEMIDE 40 MG/1
40 TABLET ORAL 2 TIMES DAILY
Status: ON HOLD | COMMUNITY
End: 2020-03-15 | Stop reason: HOSPADM

## 2020-02-14 RX ORDER — SODIUM CHLORIDE 0.9 % (FLUSH) 0.9 %
10 SYRINGE (ML) INJECTION PRN
Status: DISCONTINUED | OUTPATIENT
Start: 2020-02-14 | End: 2020-02-15 | Stop reason: HOSPADM

## 2020-02-14 RX ADMIN — BUMETANIDE 1 MG: 0.25 INJECTION INTRAMUSCULAR; INTRAVENOUS at 13:00

## 2020-02-14 RX ADMIN — SODIUM CHLORIDE, PRESERVATIVE FREE 10 ML: 5 INJECTION INTRAVENOUS at 13:06

## 2020-02-14 NOTE — PROGRESS NOTES
Tolerated appointment well. Reviewed discharge instruction, voiced understanding. Copies of AVS given. Pt discharged home. Pt to exit via wheelchair.     Orders Placed This Encounter   Medications    bumetanide (BUMEX) injection 1 mg    sodium chloride flush 0.9 % injection 10 mL

## 2020-02-14 NOTE — PROGRESS NOTES
[address]      Visit Date: 2/14/2020  Cardiologist:  Dr. Bettina Santo  Primary Care Physician: Dr. Mendy Gee is a 52 y.o. male who presents today for:  No chief complaint on file. HPI:   Fernanda Rick is a 52 y.o. male who presents to the office for a new patient visit in the heart failure clinic. He has a history of CAD, CABG, HTN, COPD, HTN, dyslipidemia, hilar lymphadenopathy, hyponatremia, and JAIME/ CKD He had CABG in 2011. He has a history of non adherence. He has had multiple admissions for heart failure. Last hospital admission related to Heart Failure:  02/07/2022:  baseline BNP 4,780 on 02/07/2020:    baseline weight: 186 lbs   Chief complaint   Chest Pain: no  Worsening SOB/orthopnea/PND: yes  Edema: trace lower leg edema- has abdominal bloating  Any extra diuretic use: no  Weight gain: yes  Compliant checking home weight: yes  Fatigue: yes  Abdominal bloating: yes  Appetite: poor  Difficulty sleeping: yes  JAYE: no  Cough: no  Compliant checking blood pressure: no  Compliant with medication regimen: no    Vaccinations:  flu Yes  Vaccinations : pneumonia No      Device: No     Activity: poor  Can you walk 1-2 blocks or do a moderate amount of house/yard work? No    NYHA Class: III   Sodium Restrictions: 1500 mg   Fluid Restrictions: 48-64 oz/day  Sodium and fluid restriction compliance: yes      Past Medical History:   Diagnosis Date    CAD (coronary artery disease)     CHF (congestive heart failure) (HCC)     [facility]     Depression     Drug [facility] ([facility]     HIGH CHOLESTEROL     Hypertension     MI (myocardial infarction) ([facility] 2011    S/P coronary artery bypass graft x 4 2011    CABG x 4 Dr Gibran Chapman     Past Surgical History:   Procedure Laterality Date    BYPASS GRAFT  04/10/2011    CABG x 4 Dr Gianna Liu  11/2010     4 stents    LEG SURGERY       Family History   Problem Relation Age of Onset    Cancer Father     Heart Failure Father     Heart Disease Father     Diabetes Mother     Heart Disease Mother      Social History     Tobacco Use    Smoking status: Current Every Day Smoker     Packs/day: 1.00     Years: 20.00     Pack years: 20.00     Types: Cigarettes    Smokeless tobacco: Former User    Tobacco comment: Reviewed 10/9/17   Substance Use Topics    Alcohol use: No     Alcohol/week: 0.0 standard drinks     Current Outpatient Medications   Medication Sig Dispense Refill    albuterol sulfate HFA (VENTOLIN HFA) 108 (90 Base) MCG/ACT inhaler Inhale 2 puffs into the lungs every 4 hours as needed for Wheezing or Shortness of Breath 1 Inhaler 0    bumetanide (BUMEX) 1 MG tablet Take 1 tablet by mouth 2 times daily 30 tablet 1    lisinopril (PRINIVIL;ZESTRIL) 2.5 MG tablet Take 1 tablet by mouth daily 30 tablet 1    nicotine (NICODERM CQ) 21 MG/24HR Place 1 patch onto the skin daily 30 patch 3    spironolactone (ALDACTONE) 25 MG tablet Take 0.5 tablets by mouth daily 30 tablet 3    apixaban (ELIQUIS) 5 MG TABS tablet Take 1 tablet by mouth 2 times daily 42 tablet 0    aspirin 81 MG EC tablet Take 1 tablet by mouth daily 30 tablet 0    atorvastatin (LIPITOR) 40 MG tablet Take 1 tablet by mouth daily 30 tablet 0    carvedilol (COREG) 12.5 MG tablet Take 1 tablet by mouth 2 times daily (with meals) 60 tablet 0    apixaban (ELIQUIS STARTER PACK) 5 MG TABS tablet Take 10 mg (2 tablets) orally twice daily for 7 days, then take 5 mg (1 tablet) orally twice daily thereafter. 74 tablet 0    ipratropium-albuterol (DUONEB) 0.5-2.5 (3) MG/3ML SOLN nebulizer solution Inhale 3 mLs into the lungs 4 times daily 360 mL 0    nitroGLYCERIN (NITROSTAT) 0.4 MG SL tablet Place 1 tablet under the tongue every 5 minutes as needed for Chest pain 25 tablet 0     No current facility-administered medications for this visit.       No Known Allergies    SUBJECTIVE:   Review of Systems:   · Constitutional: No TIBC,  FERRITIN    Iron Deficiency Anemia:  unknown IV Iron Therapy:  No  2017 ACC/AHA HF Guidelines:   intravenous iron replacement in patients with New York Heart Association (NYHA) class II and III HF and iron deficiency (ferritin <100 ng/ml or 100-300 ng/ml if transferrin saturation <20%), to improve functional status and QoL. ASSESSMENT AND PLAN:     1. Acute on chronic combined systolic (congestive) and diastolic (congestive) heart failure ([facility]):   EF 20 % grade III-DDD- he is in fluid overload- + JVD  + crackles to bases + edema to lower legs + ALVAREZ. He did not start the bumex upon discharge- will send to infusion unit for IV bumex -1 mg   Will have CBC BMP BNP and iron study done  Continue with bumex 1 mg bid start in am; continue with  lisinopril- will plan to change to entresto once he is not in acute HF  Continue with coreg 12.5 mg bid  -  Aldactone 12.5 mg qd    Avoid metolazone and thiazides d/t hyponatramia   Would recommend cardiac rehab once he has been out of acute failure and out of hospital for 6 weeks. · Continue with daily weights- to bring in records on each office visit  · Fluid restriction of 2 Liters per day  · Limit sodium in diet to around 4771-8796 mg/day  · Monitor BP at home- to bring in records on each office visit   · Activity as tolerated      2. Ischemic cardiomyopathy known CAD- EF 20 % - on BB and guideline directed medications.  Consider ICD once optimization of medications- plan to recheck echo in 3 months           Patient was instructed to call the [facility] for changes in the following symptoms:    Weight gain of 3 pounds in 1 day or 5 pounds in 1 week   Increased shortness of breath   Shortness of breath while laying down   Cough   Chest pain   Swelling in feet, ankles or legs   Tenderness or bloating in the abdomen   Fatigue    Decreased appetite or nausea    Confusion     follow up on Monday or sooner if needed     Patient given educational

## 2020-02-14 NOTE — PROGRESS NOTES
Tolerated IVP well. Reviewed discharge instruction, voiced understanding. Copies of AVS given. Pt discharged Home. Pt to exit via wheelchair.     Orders Placed This Encounter   Medications    bumetanide (BUMEX) injection 1 mg    sodium chloride flush 0.9 % injection 10 mL

## 2020-02-17 ENCOUNTER — TELEPHONE (OUTPATIENT)
Dept: CARDIOLOGY CLINIC | Age: 50
End: 2020-02-17

## 2020-02-18 NOTE — DISCHARGE SUMMARY
summary he used by inhalation    Mitral regurgitation - severe on recent Echo  -this may be related to his CMP    COPD  -does not see a pulmonologist    Microcytic anemia  -needs outpatient follow up - he is on ASA and Zohra sherrie      The patient expressed appropriate understanding of and agreement with the discharge recommendations, medications, and plan. Consults this admission:  IP CONSULT TO HOSPITALIST  IP CONSULT TO PULMONOLOGY  IP CONSULT TO [address] [place] [address] TO NEPHROLOGY    Discharge Instruction:   Follow up appointments: CT surgery, CHF clinic, nephrology, pulmonary  Primary care physician:  within 2 weeks    Diet:  cardiac diet   Activity: activity as tolerated  Disposition: Discharged to:   []Home, [][facility], []SNF, []Acute Rehab, []Hospice   Condition on discharge: Stable    Discharge Medications:      Adalgisa Poole   [place] Medication Instructions [facility]:[id number]    Printed on:02/18/20 5440   Medication Information                      albuterol sulfate HFA (VENTOLIN HFA) 108 (90 Base) MCG/ACT inhaler  Inhale 2 puffs into the lungs every 4 hours as needed for Wheezing or Shortness of Breath             apixaban (ELIQUIS STARTER PACK) 5 MG TABS tablet  Take 10 mg (2 tablets) orally twice daily for 7 days, then take 5 mg (1 tablet) orally twice daily thereafter.              apixaban (ELIQUIS) 5 MG TABS tablet  Take 1 tablet by mouth 2 times daily             aspirin 81 MG EC tablet  Take 1 tablet by mouth daily             atorvastatin (LIPITOR) 40 MG tablet  Take 1 tablet by mouth daily             bumetanide (BUMEX) 1 MG tablet  Take 1 tablet by mouth 2 times daily             carvedilol (COREG) 12.5 MG tablet  Take 1 tablet by mouth 2 times daily (with meals)             ipratropium-albuterol (DUONEB) 0.5-2.5 (3) MG/3ML SOLN nebulizer solution  Inhale 3 mLs into the lungs 4 times daily             lisinopril (PRINIVIL;ZESTRIL) 2.5 MG tablet  Take 1 tablet by mouth daily nicotine (NICODERM CQ) 21 MG/24HR  Place 1 patch onto the skin daily             nitroGLYCERIN (NITROSTAT) 0.4 MG SL tablet  Place 1 tablet under the tongue every 5 minutes as needed for Chest pain             spironolactone (ALDACTONE) 25 MG tablet  Take 0.5 tablets by mouth daily                 Objective Findings at Discharge:   /76   Pulse 97   Temp 98.2 °F (36.8 °C) (Oral)   Resp 19   Ht 5' 11\" (1.803 m)   Wt 180 lb 8 oz (81.9 kg)   SpO2 98%   BMI 25.17 kg/m²            PHYSICAL EXAM   GEN Awake male, sitting upright in bed in no apparent distress. Appears given age. [place] LABS:    CBC:   Lab Results   Component Value Date    WBC 7.0 02/14/2020    HGB 10.6 02/14/2020    HCT 35.6 02/14/2020    MCV 76.4 02/14/2020     02/14/2020     BMP:   Lab Results   Component Value Date     02/14/2020    K 4.4 02/14/2020    CL 94 02/14/2020    CO2 23 02/14/2020    PHOS 4.1 02/12/2020    BUN 31 02/14/2020    CREATININE 1.6 02/14/2020    CALCIUM 9.3 02/14/2020       IMAGING:     CTA PULMONARY W CONTRAST [[id number]] Collected: 02/07/20 1512     Order Status: Completed Updated: 02/07/20 1953     Narrative:       EXAMINATION:  CTA OF THE CHEST 2/7/2020 3:01 pm    TECHNIQUE:  CTA of the chest was performed after the administration of intravenous  contrast.  Multiplanar reformatted images are provided for review. MIP  images are provided for review. Dose modulation, iterative reconstruction,  and/or weight based adjustment of the mA/kV was utilized to reduce the  radiation dose to as low as reasonably achievable.     COMPARISON:  07/11/2018    HISTORY:  ORDERING SYSTEM PROVIDED HISTORY: tachy/dyspnea  TECHNOLOGIST PROVIDED HISTORY:  Reason for exam:->tachy/dyspnea  Reason for Exam: SOB  Acuity: Acute  Type of Exam: Ongoing  Additional signs and symptoms: Patient states he is full of luid and is  having a hard time breathing  Relevant Medical/Surgical History: Patient states he is full of luid and is  having a hard time breathing    FINDINGS:  Pulmonary Arteries: Pulmonary arteries are adequately opacified for  evaluation. No evidence of intraluminal filling defect to suggest pulmonary  embolism. Main pulmonary artery is normal in caliber. Mediastinum: There is new mediastinal and hilar lymphadenopathy. For  example, there is a bulky right paratracheal lymph node mass measuring  approximately 2.9 x 3.5 cm. A right hilar lymph node measures 2.0 x 2.6 cm. A left AP window lymph node measures 1.5 x 2.0 cm. There are many  subcentimeter axillary and mediastinal lymph nodes. No pericardial effusion. The thoracic aorta is normal in caliber. Lungs/pleura: The central airways are patent. Small bilateral pleural  effusions with lobular pleural fluid seen along the right major fissure. Mild emphysematous changes are seen in the lung apices. Subsegmental  atelectasis is seen in the lung bases. A calcified granuloma is seen in the  left lower lobe. Upper Abdomen: There is at least small volume ascites in the visualized upper  abdomen. Soft Tissues/Bones: No acute bony abnormality is identified. Median  sternotomy wires are noted. Impression:       No pulmonary embolism identified. Small bilateral pleural effusions with lobular pleural fluid located along  the right major fissure. Increased mediastinal and hilar lymphadenopathy of unknown etiology,  including a bulky right paratracheal lymph node mass measuring 2.9 x 3.5 cm. Recommend follow-up chest CT in 3-6 months and/or further evaluation with  PET-CT. At least small volume ascites in the visualized upper abdomen.      VL DUP LOWER EXTREMITY VENOUS BILATERAL [[id number]] Collected: 02/07/20 1521     Order Status: Completed Updated: 02/07/20 1528     Narrative:       EXAMINATION:  DUPLEX VENOUS ULTRASOUND OF THE BILATERAL LOWER EXTREMITIES, 2/7/2020 1:43 pm    TECHNIQUE:  Duplex ultrasound and Doppler images were obtained of the bilateral lower  extremities    COMPARISON:  Bilateral lower extremity duplex ultrasound January 24, 2020    HISTORY:  ORDERING SYSTEM PROVIDED HISTORY: pain/swelling  TECHNOLOGIST PROVIDED HISTORY:  Reason for exam:->pain/swelling  Reason for Exam: swelling  Acuity: Chronic  Type of Exam: Initial    FINDINGS:  The visualized veins of the bilateral lower extremities are patent and free  of echogenic thrombus. The veins are normally compressible and have normal  phasic flow. Impression:       No evidence of DVT in either lower extremity. XR CHEST STANDARD (2 VW) [[id number]] Collected: 02/07/20 1329     Order Status: Completed Specimen: Chest Updated: 02/07/20 3904     Narrative:       EXAMINATION:  TWO XRAY VIEWS OF THE CHEST    2/7/2020 1:15 pm    COMPARISON:  01/23/2020. HISTORY:  ORDERING SYSTEM PROVIDED HISTORY: shortness of breath  TECHNOLOGIST PROVIDED HISTORY:  Reason for exam:->shortness of breath  Reason for Exam: sob  Acuity: Unknown  Type of Exam: Unknown  Relevant Medical/Surgical History: MI, CAD & copd    FINDINGS:  There are postsurgical changes of median sternotomy. The lungs are  hyperinflated with flattening of the diaphragm and increase in the AP  diameter of the chest.  The heart size is enlarged but unchanged. The  pulmonary vasculature is within normal limits. Redemonstrated is an ovoid  masslike density involving the right lower lobe measuring 6.7 x 4.6 cm. There is blunting of the right costophrenic angle. These findings have not  changed compared to the prior exam.  No new airspace disease is seen. Impression:       1. No active pulmonary disease. 2. COPD. 3. Stable cardiomegaly without overt failure. 4. Stable ovoid masslike density in the right lung base with associated  pleural effusion. This has been stable dating back to 09/15/2019. This  likely represents pleural effusion with trapped fluid in the major fissure. CT scan chest would be helpful for further evaluation. Discharge Time of 45 minutes    Electronically signed by Queta Newberry MD on 2/18/2020 at 6:36 PM

## 2020-02-20 ENCOUNTER — TELEPHONE (OUTPATIENT)
Dept: CARDIOLOGY CLINIC | Age: 50
End: 2020-02-20

## 2020-02-20 NOTE — TELEPHONE ENCOUNTER
Iqra Arias called, said she sent a letter to us asking us to let electric company know that he is on a breathing machine and needs electricity. Said we returned it with a N/A on it. I ask her who prescribed the machine and she is not sure. She will resend the letter.

## 2020-03-11 ENCOUNTER — APPOINTMENT (OUTPATIENT)
Dept: ULTRASOUND IMAGING | Age: 50
DRG: 191 | End: 2020-03-11
Payer: MEDICARE

## 2020-03-11 ENCOUNTER — HOSPITAL ENCOUNTER (INPATIENT)
Age: 50
LOS: 3 days | Discharge: HOME | DRG: 191 | End: 2020-03-15
Attending: EMERGENCY MEDICINE | Admitting: INTERNAL MEDICINE
Payer: MEDICARE

## 2020-03-11 ENCOUNTER — APPOINTMENT (OUTPATIENT)
Dept: CT IMAGING | Age: 50
DRG: 191 | End: 2020-03-11
Payer: MEDICARE

## 2020-03-11 ENCOUNTER — APPOINTMENT (OUTPATIENT)
Dept: GENERAL RADIOLOGY | Age: 50
DRG: 191 | End: 2020-03-11
Payer: MEDICARE

## 2020-03-11 LAB
ALBUMIN SERPL-MCNC: 4.2 GM/DL (ref 3.4–5)
ALP BLD-CCNC: 90 IU/L (ref 40–128)
ALT SERPL-CCNC: 22 U/L (ref 10–40)
ANION GAP SERPL CALCULATED.3IONS-SCNC: 13 MMOL/L (ref 4–16)
AST SERPL-CCNC: 30 IU/L (ref 15–37)
BASOPHILS ABSOLUTE: 0.1 K/CU MM
BASOPHILS RELATIVE PERCENT: 1.5 % (ref 0–1)
BILIRUB SERPL-MCNC: 1.2 MG/DL (ref 0–1)
BUN BLDV-MCNC: 17 MG/DL (ref 6–23)
CALCIUM SERPL-MCNC: 9.3 MG/DL (ref 8.3–10.6)
CHLORIDE BLD-SCNC: 97 MMOL/L (ref 99–110)
CO2: 22 MMOL/L (ref 21–32)
CREAT SERPL-MCNC: 1.2 MG/DL (ref 0.9–1.3)
DIFFERENTIAL TYPE: ABNORMAL
EOSINOPHILS ABSOLUTE: 0.3 K/CU MM
EOSINOPHILS RELATIVE PERCENT: 3.5 % (ref 0–3)
GFR AFRICAN AMERICAN: >60 ML/MIN/1.73M2
GFR NON-AFRICAN AMERICAN: >60 ML/MIN/1.73M2
GLUCOSE BLD-MCNC: 104 MG/DL (ref 70–99)
HCT VFR BLD CALC: 37.6 % (ref 42–52)
HEMOGLOBIN: 10.6 GM/DL (ref 13.5–18)
IMMATURE NEUTROPHIL %: 0.5 % (ref 0–0.43)
LYMPHOCYTES ABSOLUTE: 1.8 K/CU MM
LYMPHOCYTES RELATIVE PERCENT: 22.2 % (ref 24–44)
MCH RBC QN AUTO: 21.3 PG (ref 27–31)
MCHC RBC AUTO-ENTMCNC: 28.2 % (ref 32–36)
MCV RBC AUTO: 75.7 FL (ref 78–100)
MONOCYTES ABSOLUTE: 0.6 K/CU MM
MONOCYTES RELATIVE PERCENT: 7.8 % (ref 0–4)
NUCLEATED RBC %: 0.4 %
PDW BLD-RTO: 18.4 % (ref 11.7–14.9)
PLATELET # BLD: 258 K/CU MM (ref 140–440)
PMV BLD AUTO: 9.9 FL (ref 7.5–11.1)
POTASSIUM SERPL-SCNC: 4.4 MMOL/L (ref 3.5–5.1)
PRO-BNP: 4784 PG/ML
RBC # BLD: 4.97 M/CU MM (ref 4.6–6.2)
SEGMENTED NEUTROPHILS ABSOLUTE COUNT: 5.1 K/CU MM
SEGMENTED NEUTROPHILS RELATIVE PERCENT: 64.5 % (ref 36–66)
SODIUM BLD-SCNC: 132 MMOL/L (ref 135–145)
TOTAL IMMATURE NEUTOROPHIL: 0.04 K/CU MM
TOTAL NUCLEATED RBC: 0 K/CU MM
TOTAL PROTEIN: 6.9 GM/DL (ref 6.4–8.2)
TROPONIN T: <0.01 NG/ML
WBC # BLD: 8 K/CU MM (ref 4–10.5)

## 2020-03-11 PROCEDURE — 84484 ASSAY OF TROPONIN QUANT: CPT

## 2020-03-11 PROCEDURE — 2580000003 HC RX 258: Performed by: PHYSICIAN ASSISTANT

## 2020-03-11 PROCEDURE — 71046 X-RAY EXAM CHEST 2 VIEWS: CPT

## 2020-03-11 PROCEDURE — 83880 ASSAY OF NATRIURETIC PEPTIDE: CPT

## 2020-03-11 PROCEDURE — 6360000004 HC RX CONTRAST MEDICATION: Performed by: PHYSICIAN ASSISTANT

## 2020-03-11 PROCEDURE — 94640 AIRWAY INHALATION TREATMENT: CPT

## 2020-03-11 PROCEDURE — 6370000000 HC RX 637 (ALT 250 FOR IP): Performed by: PHYSICIAN ASSISTANT

## 2020-03-11 PROCEDURE — 80053 COMPREHEN METABOLIC PANEL: CPT

## 2020-03-11 PROCEDURE — 93970 EXTREMITY STUDY: CPT

## 2020-03-11 PROCEDURE — 99285 EMERGENCY DEPT VISIT HI MDM: CPT

## 2020-03-11 PROCEDURE — 93005 ELECTROCARDIOGRAM TRACING: CPT | Performed by: PHYSICIAN ASSISTANT

## 2020-03-11 PROCEDURE — 71275 CT ANGIOGRAPHY CHEST: CPT

## 2020-03-11 PROCEDURE — 36415 COLL VENOUS BLD VENIPUNCTURE: CPT

## 2020-03-11 PROCEDURE — 85025 COMPLETE CBC W/AUTO DIFF WBC: CPT

## 2020-03-11 RX ORDER — IPRATROPIUM BROMIDE AND ALBUTEROL SULFATE 2.5; .5 MG/3ML; MG/3ML
1 SOLUTION RESPIRATORY (INHALATION) ONCE
Status: COMPLETED | OUTPATIENT
Start: 2020-03-11 | End: 2020-03-11

## 2020-03-11 RX ORDER — SODIUM CHLORIDE 0.9 % (FLUSH) 0.9 %
10 SYRINGE (ML) INJECTION 2 TIMES DAILY
Status: DISCONTINUED | OUTPATIENT
Start: 2020-03-11 | End: 2020-03-15 | Stop reason: HOSPADM

## 2020-03-11 RX ADMIN — IPRATROPIUM BROMIDE AND ALBUTEROL SULFATE 1 AMPULE: .5; 3 SOLUTION RESPIRATORY (INHALATION) at 21:55

## 2020-03-11 RX ADMIN — Medication 10 ML: at 22:45

## 2020-03-11 RX ADMIN — IPRATROPIUM BROMIDE AND ALBUTEROL SULFATE 1 AMPULE: .5; 3 SOLUTION RESPIRATORY (INHALATION) at 19:30

## 2020-03-11 RX ADMIN — IOPAMIDOL 90 ML: 755 INJECTION, SOLUTION INTRAVENOUS at 22:44

## 2020-03-11 ASSESSMENT — PAIN DESCRIPTION - PAIN TYPE: TYPE: ACUTE PAIN

## 2020-03-11 ASSESSMENT — PAIN SCALES - GENERAL: PAINLEVEL_OUTOF10: 4

## 2020-03-11 NOTE — ED PROVIDER NOTES
comment: Reviewed 10/9/17   Substance and Sexual Activity    Alcohol use: No     Alcohol/week: 0.0 standard drinks    Drug use: No     Comment: march 2018 states he quit    Sexual activity: Not Currently   Lifestyle    Physical activity     Days per week: Not on file     Minutes per session: Not on file    Stress: Not on file   Relationships    Social connections     Talks on phone: Not on file     Gets together: Not on file     Attends Denominational service: Not on file     Active member of club or organization: Not on file     Attends meetings of clubs or organizations: Not on file     Relationship status: Not on file    Intimate partner violence     Fear of current or ex partner: Not on file     Emotionally abused: Not on file     Physically abused: Not on file     Forced sexual activity: Not on file   Other Topics Concern    Not on file   Social History Narrative    Not on file     No current facility-administered medications for this encounter.       Current Outpatient Medications   Medication Sig Dispense Refill    furosemide (LASIX) 40 MG tablet Take 40 mg by mouth 2 times daily      albuterol sulfate HFA (VENTOLIN HFA) 108 (90 Base) MCG/ACT inhaler Inhale 2 puffs into the lungs every 4 hours as needed for Wheezing or Shortness of Breath 1 Inhaler 0    bumetanide (BUMEX) 1 MG tablet Take 1 tablet by mouth 2 times daily 30 tablet 1    lisinopril (PRINIVIL;ZESTRIL) 2.5 MG tablet Take 1 tablet by mouth daily 30 tablet 1    nicotine (NICODERM CQ) 21 MG/24HR Place 1 patch onto the skin daily 30 patch 3    spironolactone (ALDACTONE) 25 MG tablet Take 0.5 tablets by mouth daily 30 tablet 3    aspirin 81 MG EC tablet Take 1 tablet by mouth daily 30 tablet 0    atorvastatin (LIPITOR) 40 MG tablet Take 1 tablet by mouth daily 30 tablet 0    carvedilol (COREG) 12.5 MG tablet Take 1 tablet by mouth 2 times daily (with meals) 60 tablet 0    apixaban (ELIQUIS STARTER PACK) 5 MG TABS tablet Take 10 mg (2 tablets) orally twice daily for 7 days, then take 5 mg (1 tablet) orally twice daily thereafter. (Patient not taking: Reported on 2/14/2020) 74 tablet 0    ipratropium-albuterol (DUONEB) 0.5-2.5 (3) MG/3ML SOLN nebulizer solution Inhale 3 mLs into the lungs 4 times daily 360 mL 0    nitroGLYCERIN (NITROSTAT) 0.4 MG SL tablet Place 1 tablet under the tongue every 5 minutes as needed for Chest pain 25 tablet 0     No Known Allergies    Nursing Notes Reviewed    Physical Exam:  ED Triage Vitals [03/11/20 1603]   Enc Vitals Group      /84      Pulse 112      Resp 20      Temp 97.7 °F (36.5 °C)      Temp Source Oral      SpO2 97 %      Weight 190 lb (86.2 kg)      Height 5' 11\" (1.803 m)      Head Circumference       Peak Flow       Pain Score       Pain Loc       Pain Edu? Excl. in [address]? General :Patient is awake alert oriented person place and time no acute distress nontoxic appearing  HEENT: Pupils are equally round and reactive to light extraocular motors are intact conjunctivae clear sclerae white there is no injection no icterus. Nose without any rhinorrhea or epistaxis. Oral mucosa is moist no exudate buccal mucosa shows no ulcerations. Uvula is midline    Neck: Neck is supple full range of motion trachea midline thyroid nonpalpable  Cardiac: Heart regular rate rhythm no murmurs rubs clicks or gallops  Lungs: Lungs are clear to auscultation there is no wheezing rhonchi or rales. There is no use of accessory muscles no nasal flaring identified. Chest wall: There is no tenderness to palpation over the chest wall or over ribs  Abdomen: Abdomen is soft nontender nondistended.  There is no firm or pulsatile masses no rebound rigidity or guarding negative Dsouza's negative McBurney, no peritoneal signs  Suprapubic:  there is no tenderness to palpation over the external bladder   Musculoskeletal: 5 out of 5 strength in all 4 extremities full flexion extension abduction and adduction supination pronation of all extremities and all digits. No obvious muscle atrophy is noted. No focal muscle deficits are appreciated  Dermatology: Skin is warm and dry there is no obvious abscesses lacerations or lesions noted  Psych: Mentation is grossly normal cognition is grossly normal. Affect is appropriate  Neuro: Motor intact sensory intact cranial nerves II through XII are intact level of consciousness is normal cerebellar function is normal reflexes are grossly normal. No evidence of incontinence or loss of bowel or bladder no saddle anesthesia noted Lymphatic: There is no submandibular or cervical adenopathy appreciated.         I have reviewed and interpreted all of the currently available lab results from this visit (if applicable):  Results for orders placed or performed during the hospital encounter of 03/11/20   CBC Auto Differential   Result Value Ref Range    WBC 8.0 4.0 - 10.5 K/CU MM    RBC 4.97 4.6 - 6.2 M/CU MM    Hemoglobin 10.6 (L) 13.5 - 18.0 GM/DL    Hematocrit 37.6 (L) 42 - 52 %    MCV 75.7 (L) 78 - 100 FL    MCH 21.3 (L) 27 - 31 PG    MCHC 28.2 (L) 32.0 - 36.0 %    RDW 18.4 (H) 11.7 - 14.9 %    Platelets 106 310 - 709 K/CU MM    MPV 9.9 7.5 - 11.1 FL    Differential Type AUTOMATED DIFFERENTIAL     Segs Relative 64.5 36 - 66 %    Lymphocytes % 22.2 (L) 24 - 44 %    Monocytes % 7.8 (H) 0 - 4 %    Eosinophils % 3.5 (H) 0 - 3 %    Basophils % 1.5 (H) 0 - 1 %    Segs Absolute 5.1 K/CU MM    Lymphocytes Absolute 1.8 K/CU MM    Monocytes Absolute 0.6 K/CU MM    Eosinophils Absolute 0.3 K/CU MM    Basophils Absolute 0.1 K/CU MM    Nucleated RBC % 0.4 %    Total Nucleated RBC 0.0 K/CU MM    Total Immature Neutrophil 0.04 K/CU MM    Immature Neutrophil % 0.5 (H) 0 - 0.43 %   Comprehensive Metabolic Panel   Result Value Ref Range    Sodium 132 (L) 135 - 145 MMOL/L    Potassium 4.4 3.5 - 5.1 MMOL/L    Chloride 97 (L) 99 - 110 mMol/L    CO2 22 21 - 32 MMOL/L    BUN 17 6 - 23 MG/DL    CREATININE 1.2 0.9 - 1.3 MG/DL Glucose 104 (H) 70 - 99 MG/DL    Calcium 9.3 8.3 - 10.6 MG/DL    Alb 4.2 3.4 - 5.0 GM/DL    Total Protein 6.9 6.4 - 8.2 GM/DL    Total Bilirubin 1.2 (H) 0.0 - 1.0 MG/DL    ALT 22 10 - 40 U/L    AST 30 15 - 37 IU/L    Alkaline Phosphatase 90 40 - 128 IU/L    GFR Non-African American >60 >60 mL/min/1.73m2    GFR African American >60 >60 mL/min/1.73m2    Anion Gap 13 4 - 16   Troponin   Result Value Ref Range    Troponin T <0.010 <0.01 NG/ML   Brain Natriuretic Peptide   Result Value Ref Range    Pro-BNP 4,784 (H) <300 PG/ML   EKG 12 Lead   Result Value Ref Range    Ventricular Rate 108 BPM    Atrial Rate 108 BPM    P-R Interval 156 ms    QRS Duration 118 ms    Q-T Interval 356 ms    QTc Calculation (Bazett) 477 ms    P Axis 80 degrees    R Axis 81 degrees    T Axis 95 degrees    Diagnosis       Sinus tachycardia  Possible Left atrial enlargement  Nonspecific intraventricular conduction delay  Borderline ECG  When compared with ECG of 07-FEB-2020 12:45,  No significant change was found        Radiographs (if obtained):  [] The following radiograph was interpreted by myself in the absence of a radiologist:   [] Radiologist's Report Reviewed:  XR CHEST STANDARD (2 VW)   Final Result   1. Stable right lower lobe masslike density on CT appears to represent fluid   in the right major fissure   2. Cardiomegaly, unchanged   3. No acute abnormality seen in the chest             EKG (if obtained):   Please See Note of attending physician for EKG interpretation.      Chart review shows recent radiograph(s):  Xr Chest Standard (2 Vw)    Result Date: 3/11/2020  EXAMINATION: TWO XRAY VIEWS OF THE CHEST 3/11/2020 4:14 pm COMPARISON: 02/07/2020 HISTORY: ORDERING SYSTEM PROVIDED HISTORY: chest pain TECHNOLOGIST PROVIDED HISTORY: Reason for exam:->chest pain Reason for Exam: SOB, CHEST PAIN Acuity: Unknown Type of Exam: Initial Additional signs and symptoms: none Relevant Medical/Surgical History: CHF, COPD, CAD, MI FINDINGS: Status system including errors in grammar, punctuation, and spelling, as well as words and phrases that may be inappropriate. If there are any questions or concerns please feel free to contact the dictating provider for clarification.       Malika Cruz, [address] [place] [address]  03/16/20 7805

## 2020-03-12 PROBLEM — R06.02 SOB (SHORTNESS OF BREATH): Status: ACTIVE | Noted: 2020-03-12

## 2020-03-12 LAB
ADENOVIRUS DETECTION BY PCR: NOT DETECTED
BORDETELLA PERTUSSIS PCR: NOT DETECTED
CHLAMYDOPHILA PNEUMONIA PCR: NOT DETECTED
CORONAVIRUS 229E PCR: NOT DETECTED
CORONAVIRUS HKU1 PCR: NOT DETECTED
CORONAVIRUS NL63 PCR: NOT DETECTED
CORONAVIRUS OC43 PCR: NOT DETECTED
HUMAN METAPNEUMOVIRUS PCR: NOT DETECTED
INFLUENZA A BY PCR: NOT DETECTED
INFLUENZA A H1 (2009) PCR: NOT DETECTED
INFLUENZA A H1 PANDEMIC PCR: NOT DETECTED
INFLUENZA A H3 PCR: NOT DETECTED
INFLUENZA B BY PCR: NOT DETECTED
IRON: 28 UG/DL (ref 59–158)
LACTATE DEHYDROGENASE: 313 IU/L (ref 120–246)
MAGNESIUM: 2.3 MG/DL (ref 1.8–2.4)
MYCOPLASMA PNEUMONIAE PCR: NOT DETECTED
PARAINFLUENZA 1 PCR: NOT DETECTED
PARAINFLUENZA 2 PCR: NOT DETECTED
PARAINFLUENZA 3 PCR: NOT DETECTED
PARAINFLUENZA 4 PCR: NOT DETECTED
PCT TRANSFERRIN: 6 % (ref 10–44)
PHOSPHORUS: 3.4 MG/DL (ref 2.5–4.9)
RHINOVIRUS ENTEROVIRUS PCR: NOT DETECTED
RSV PCR: NOT DETECTED
TOTAL IRON BINDING CAPACITY: 506 UG/DL (ref 250–450)
TSH HIGH SENSITIVITY: 2.73 UIU/ML (ref 0.27–4.2)
UNSATURATED IRON BINDING CAPACITY: 478 UG/DL (ref 110–370)

## 2020-03-12 PROCEDURE — 87798 DETECT AGENT NOS DNA AMP: CPT

## 2020-03-12 PROCEDURE — 6360000002 HC RX W HCPCS: Performed by: EMERGENCY MEDICINE

## 2020-03-12 PROCEDURE — 99221 1ST HOSP IP/OBS SF/LOW 40: CPT | Performed by: INTERNAL MEDICINE

## 2020-03-12 PROCEDURE — 2580000003 HC RX 258: Performed by: PHYSICIAN ASSISTANT

## 2020-03-12 PROCEDURE — 84100 ASSAY OF PHOSPHORUS: CPT

## 2020-03-12 PROCEDURE — 94667 MNPJ CHEST WALL 1ST: CPT

## 2020-03-12 PROCEDURE — 87581 M.PNEUMON DNA AMP PROBE: CPT

## 2020-03-12 PROCEDURE — 94640 AIRWAY INHALATION TREATMENT: CPT

## 2020-03-12 PROCEDURE — 83550 IRON BINDING TEST: CPT

## 2020-03-12 PROCEDURE — 84443 ASSAY THYROID STIM HORMONE: CPT

## 2020-03-12 PROCEDURE — 6370000000 HC RX 637 (ALT 250 FOR IP): Performed by: INTERNAL MEDICINE

## 2020-03-12 PROCEDURE — 94761 N-INVAS EAR/PLS OXIMETRY MLT: CPT

## 2020-03-12 PROCEDURE — 94668 MNPJ CHEST WALL SBSQ: CPT

## 2020-03-12 PROCEDURE — 6360000002 HC RX W HCPCS: Performed by: PHYSICIAN ASSISTANT

## 2020-03-12 PROCEDURE — 2700000000 HC OXYGEN THERAPY PER DAY

## 2020-03-12 PROCEDURE — 83615 LACTATE (LD) (LDH) ENZYME: CPT

## 2020-03-12 PROCEDURE — 83735 ASSAY OF MAGNESIUM: CPT

## 2020-03-12 PROCEDURE — 2500000003 HC RX 250 WO HCPCS: Performed by: INTERNAL MEDICINE

## 2020-03-12 PROCEDURE — 83540 ASSAY OF IRON: CPT

## 2020-03-12 PROCEDURE — 87486 CHLMYD PNEUM DNA AMP PROBE: CPT

## 2020-03-12 PROCEDURE — 6370000000 HC RX 637 (ALT 250 FOR IP): Performed by: EMERGENCY MEDICINE

## 2020-03-12 PROCEDURE — 87633 RESP VIRUS 12-25 TARGETS: CPT

## 2020-03-12 PROCEDURE — 2580000003 HC RX 258: Performed by: INTERNAL MEDICINE

## 2020-03-12 PROCEDURE — 1200000000 HC SEMI PRIVATE

## 2020-03-12 RX ORDER — SODIUM CHLORIDE 0.9 % (FLUSH) 0.9 %
10 SYRINGE (ML) INJECTION PRN
Status: DISCONTINUED | OUTPATIENT
Start: 2020-03-12 | End: 2020-03-15 | Stop reason: HOSPADM

## 2020-03-12 RX ORDER — ACETAMINOPHEN 650 MG/1
650 SUPPOSITORY RECTAL EVERY 6 HOURS PRN
Status: DISCONTINUED | OUTPATIENT
Start: 2020-03-12 | End: 2020-03-15 | Stop reason: HOSPADM

## 2020-03-12 RX ORDER — 0.9 % SODIUM CHLORIDE 0.9 %
1000 INTRAVENOUS SOLUTION INTRAVENOUS ONCE
Status: COMPLETED | OUTPATIENT
Start: 2020-03-12 | End: 2020-03-12

## 2020-03-12 RX ORDER — LISINOPRIL 2.5 MG/1
2.5 TABLET ORAL DAILY
Status: DISCONTINUED | OUTPATIENT
Start: 2020-03-12 | End: 2020-03-15 | Stop reason: HOSPADM

## 2020-03-12 RX ORDER — ATORVASTATIN CALCIUM 40 MG/1
40 TABLET, FILM COATED ORAL DAILY
Status: DISCONTINUED | OUTPATIENT
Start: 2020-03-12 | End: 2020-03-15 | Stop reason: HOSPADM

## 2020-03-12 RX ORDER — POLYETHYLENE GLYCOL 3350 17 G/17G
17 POWDER, FOR SOLUTION ORAL DAILY PRN
Status: DISCONTINUED | OUTPATIENT
Start: 2020-03-12 | End: 2020-03-15 | Stop reason: HOSPADM

## 2020-03-12 RX ORDER — IPRATROPIUM BROMIDE AND ALBUTEROL SULFATE 2.5; .5 MG/3ML; MG/3ML
2 SOLUTION RESPIRATORY (INHALATION) ONCE
Status: DISCONTINUED | OUTPATIENT
Start: 2020-03-12 | End: 2020-03-12

## 2020-03-12 RX ORDER — ALBUTEROL SULFATE 2.5 MG/3ML
15 SOLUTION RESPIRATORY (INHALATION) ONCE
Status: COMPLETED | OUTPATIENT
Start: 2020-03-12 | End: 2020-03-12

## 2020-03-12 RX ORDER — ACETAMINOPHEN 325 MG/1
650 TABLET ORAL EVERY 6 HOURS PRN
Status: DISCONTINUED | OUTPATIENT
Start: 2020-03-12 | End: 2020-03-15 | Stop reason: HOSPADM

## 2020-03-12 RX ORDER — PROMETHAZINE HYDROCHLORIDE 25 MG/1
12.5 TABLET ORAL EVERY 6 HOURS PRN
Status: DISCONTINUED | OUTPATIENT
Start: 2020-03-12 | End: 2020-03-15 | Stop reason: HOSPADM

## 2020-03-12 RX ORDER — PREDNISONE 20 MG/1
40 TABLET ORAL DAILY
Status: DISCONTINUED | OUTPATIENT
Start: 2020-03-12 | End: 2020-03-14

## 2020-03-12 RX ORDER — ONDANSETRON 2 MG/ML
4 INJECTION INTRAMUSCULAR; INTRAVENOUS EVERY 6 HOURS PRN
Status: DISCONTINUED | OUTPATIENT
Start: 2020-03-12 | End: 2020-03-15 | Stop reason: HOSPADM

## 2020-03-12 RX ORDER — ASPIRIN 81 MG/1
81 TABLET ORAL DAILY
Status: DISCONTINUED | OUTPATIENT
Start: 2020-03-12 | End: 2020-03-15 | Stop reason: HOSPADM

## 2020-03-12 RX ORDER — SODIUM CHLORIDE 0.9 % (FLUSH) 0.9 %
10 SYRINGE (ML) INJECTION EVERY 12 HOURS SCHEDULED
Status: DISCONTINUED | OUTPATIENT
Start: 2020-03-12 | End: 2020-03-15 | Stop reason: HOSPADM

## 2020-03-12 RX ORDER — BENZONATATE 100 MG/1
100 CAPSULE ORAL 3 TIMES DAILY PRN
Qty: 10 CAPSULE | Refills: 0 | Status: SHIPPED | OUTPATIENT
Start: 2020-03-12 | End: 2020-03-15 | Stop reason: HOSPADM

## 2020-03-12 RX ORDER — ALBUTEROL SULFATE 90 UG/1
2 AEROSOL, METERED RESPIRATORY (INHALATION) EVERY 4 HOURS PRN
Status: DISCONTINUED | OUTPATIENT
Start: 2020-03-12 | End: 2020-03-15 | Stop reason: HOSPADM

## 2020-03-12 RX ORDER — IPRATROPIUM BROMIDE AND ALBUTEROL SULFATE 2.5; .5 MG/3ML; MG/3ML
1 SOLUTION RESPIRATORY (INHALATION) 4 TIMES DAILY
Status: DISCONTINUED | OUTPATIENT
Start: 2020-03-12 | End: 2020-03-14

## 2020-03-12 RX ORDER — BUMETANIDE 0.25 MG/ML
1 INJECTION, SOLUTION INTRAMUSCULAR; INTRAVENOUS 2 TIMES DAILY
Status: DISCONTINUED | OUTPATIENT
Start: 2020-03-12 | End: 2020-03-15 | Stop reason: HOSPADM

## 2020-03-12 RX ORDER — DOXYCYCLINE HYCLATE 100 MG
100 TABLET ORAL 2 TIMES DAILY
Status: DISCONTINUED | OUTPATIENT
Start: 2020-03-12 | End: 2020-03-15 | Stop reason: HOSPADM

## 2020-03-12 RX ORDER — CARVEDILOL 12.5 MG/1
12.5 TABLET ORAL 2 TIMES DAILY WITH MEALS
Status: DISCONTINUED | OUTPATIENT
Start: 2020-03-12 | End: 2020-03-15 | Stop reason: HOSPADM

## 2020-03-12 RX ORDER — DOXYCYCLINE HYCLATE 100 MG
100 TABLET ORAL ONCE
Status: COMPLETED | OUTPATIENT
Start: 2020-03-12 | End: 2020-03-12

## 2020-03-12 RX ORDER — ALBUTEROL SULFATE 90 UG/1
2 AEROSOL, METERED RESPIRATORY (INHALATION) EVERY 4 HOURS PRN
Qty: 1 INHALER | Refills: 1 | Status: ON HOLD | OUTPATIENT
Start: 2020-03-12 | End: 2020-05-22

## 2020-03-12 RX ORDER — METHYLPREDNISOLONE SODIUM SUCCINATE 125 MG/2ML
80 INJECTION, POWDER, LYOPHILIZED, FOR SOLUTION INTRAMUSCULAR; INTRAVENOUS ONCE
Status: COMPLETED | OUTPATIENT
Start: 2020-03-12 | End: 2020-03-12

## 2020-03-12 RX ORDER — PREDNISONE 50 MG/1
50 TABLET ORAL DAILY
Qty: 5 TABLET | Refills: 0 | Status: SHIPPED | OUTPATIENT
Start: 2020-03-12 | End: 2020-03-15 | Stop reason: HOSPADM

## 2020-03-12 RX ORDER — SPIRONOLACTONE 25 MG/1
12.5 TABLET ORAL DAILY
Status: DISCONTINUED | OUTPATIENT
Start: 2020-03-12 | End: 2020-03-15 | Stop reason: HOSPADM

## 2020-03-12 RX ADMIN — IPRATROPIUM BROMIDE AND ALBUTEROL SULFATE 3 ML: .5; 3 SOLUTION RESPIRATORY (INHALATION) at 12:45

## 2020-03-12 RX ADMIN — SODIUM CHLORIDE 1000 ML: 9 INJECTION, SOLUTION INTRAVENOUS at 02:14

## 2020-03-12 RX ADMIN — DOXYCYCLINE HYCLATE 100 MG: 100 TABLET, COATED ORAL at 11:17

## 2020-03-12 RX ADMIN — APIXABAN 5 MG: 5 TABLET, FILM COATED ORAL at 21:25

## 2020-03-12 RX ADMIN — PREDNISONE 40 MG: 20 TABLET ORAL at 08:37

## 2020-03-12 RX ADMIN — SODIUM CHLORIDE, PRESERVATIVE FREE 10 ML: 5 INJECTION INTRAVENOUS at 08:42

## 2020-03-12 RX ADMIN — IPRATROPIUM BROMIDE AND ALBUTEROL SULFATE 3 ML: .5; 3 SOLUTION RESPIRATORY (INHALATION) at 23:49

## 2020-03-12 RX ADMIN — LISINOPRIL 2.5 MG: 2.5 TABLET ORAL at 08:37

## 2020-03-12 RX ADMIN — Medication 10 ML: at 08:42

## 2020-03-12 RX ADMIN — ATORVASTATIN CALCIUM 40 MG: 40 TABLET, FILM COATED ORAL at 08:37

## 2020-03-12 RX ADMIN — ALBUTEROL SULFATE 15 MG/HR: 2.5 SOLUTION RESPIRATORY (INHALATION) at 03:40

## 2020-03-12 RX ADMIN — BUMETANIDE 1 MG: 0.25 INJECTION INTRAMUSCULAR; INTRAVENOUS at 21:25

## 2020-03-12 RX ADMIN — DOXYCYCLINE HYCLATE 100 MG: 100 TABLET, COATED ORAL at 21:25

## 2020-03-12 RX ADMIN — ASPIRIN 81 MG: 81 TABLET, COATED ORAL at 08:37

## 2020-03-12 RX ADMIN — APIXABAN 5 MG: 5 TABLET, FILM COATED ORAL at 08:37

## 2020-03-12 RX ADMIN — DOXYCYCLINE HYCLATE 100 MG: 100 TABLET ORAL at 03:45

## 2020-03-12 RX ADMIN — IPRATROPIUM BROMIDE AND ALBUTEROL SULFATE 3 ML: .5; 3 SOLUTION RESPIRATORY (INHALATION) at 19:03

## 2020-03-12 RX ADMIN — CARVEDILOL 12.5 MG: 12.5 TABLET, FILM COATED ORAL at 08:37

## 2020-03-12 RX ADMIN — IPRATROPIUM BROMIDE AND ALBUTEROL SULFATE 3 ML: .5; 3 SOLUTION RESPIRATORY (INHALATION) at 10:47

## 2020-03-12 RX ADMIN — METHYLPREDNISOLONE SODIUM SUCCINATE 80 MG: 125 INJECTION, POWDER, LYOPHILIZED, FOR SOLUTION INTRAMUSCULAR; INTRAVENOUS at 03:22

## 2020-03-12 RX ADMIN — SODIUM CHLORIDE, PRESERVATIVE FREE 10 ML: 5 INJECTION INTRAVENOUS at 21:25

## 2020-03-12 RX ADMIN — CARVEDILOL 12.5 MG: 12.5 TABLET, FILM COATED ORAL at 17:02

## 2020-03-12 RX ADMIN — IPRATROPIUM BROMIDE AND ALBUTEROL SULFATE 3 ML: .5; 3 SOLUTION RESPIRATORY (INHALATION) at 16:50

## 2020-03-12 RX ADMIN — SPIRONOLACTONE 12.5 MG: 25 TABLET ORAL at 08:37

## 2020-03-12 RX ADMIN — BUMETANIDE 1 MG: 0.25 INJECTION INTRAMUSCULAR; INTRAVENOUS at 08:41

## 2020-03-12 ASSESSMENT — PAIN SCALES - GENERAL: PAINLEVEL_OUTOF10: 0

## 2020-03-12 NOTE — PROGRESS NOTES
Sat at bedside throughout shift. Breathing treatments given as ordered. Denies need for pain medication. RDP resulted negative. Respiratory culture ordered but has not produced sputum this shift to send. Utilizes urinal for elimination. Refuses bed alarm even after education on risks and purpose. Ambulates per self with steady gait within room.

## 2020-03-12 NOTE — CONSULTS
CARDIOLOGY CONSULT NOTE   Reason for consultation:  Shortness of breath    Referring physician:  Vicky Akhtar MD     Primary care physician: Thad Baxter      Dear Dr. Abran Wooten  Thanks for the consult. History of present illness:Alireza is a 52 y. o.year old who  presents with cough, for shortness of breath which is moderate to severe, for many weeks, intermittent, self limiting, not associated with cough or fever, gets worse with activity and better with rest, he has COPD and severe ischemic cardiomyopathy and will need ICD, CARDIOLOGY consult is called for r/o acute CHF WITH DECOMPENSATION. Blood pressure, cholesterol, blood glucose and weight are well controlled. Past medical history:    has a past medical history of CAD (coronary artery disease), CHF (congestive heart failure) ([facility] [address] COPD (chronic obstructive pulmonary disease) ([facility] [address] Depression, Drug abuse ([facility] [address] HIGH CHOLESTEROL, Hypertension, MI (myocardial infarction) ([facility] [address] and S/P coronary artery bypass graft x 4. Past surgical history:   has a past surgical history that includes Cardiac surgery (11/2010); Bypass Graft (04/10/2011); and Leg Surgery. Social History:   reports that he has been smoking cigarettes. He has a 20.00 pack-year smoking history. He has quit using smokeless tobacco. He reports that he does not drink alcohol or use drugs.   Family history:   no family history of CAD, STROKE of DM    No Known Allergies    albuterol sulfate  (90 Base) MCG/ACT inhaler 2 puff, Q4H PRN  apixaban (ELIQUIS) tablet 5 mg, BID  aspirin EC tablet 81 mg, Daily  atorvastatin (LIPITOR) tablet 40 mg, Daily  carvedilol (COREG) tablet 12.5 mg, BID WC  lisinopril (PRINIVIL;ZESTRIL) tablet 2.5 mg, Daily  spironolactone (ALDACTONE) tablet 12.5 mg, Daily  ipratropium-albuterol (DUONEB) nebulizer solution 3 mL, 4x Daily  sodium chloride flush 0.9 % injection 10 mL, 2 times per day  sodium chloride flush 0.9 % injection 10 mL, PRN  acetaminophen Henry Hancock MD        Or    ondansetron [facility]) injection 4 mg  4 mg Intravenous Q6H PRN Wisamshanika Kelley MD        bumetanide (BUMEX) injection 1 mg  1 mg Intravenous BID Wisamshanika Kelley MD   1 mg at 03/12/20 0841    doxycycline hyclate (VIBRA-TABS) tablet 100 mg  100 mg Oral BID Wisam Van Kelley MD        predniSONE (DELTASONE) tablet 40 mg  40 mg Oral Daily Nicolette BARTLETT MD   40 mg at 03/12/20 6459    sodium chloride flush 0.9 % injection 10 mL  10 mL Intravenous BID KRISSY Avalos   10 mL at 03/12/20 5734     Review of Systems:   · Constitutional: No Fever or Weight Loss   · Eyes: No Decreased Vision  · ENT: No Headaches, Hearing Loss or Vertigo  · Cardiovascular: No chest pain, dyspnea on exertion, palpitations or loss of consciousness  · Respiratory: + cough or wheezing    · Gastrointestinal: No abdominal pain, appetite loss, blood in stools, constipation, diarrhea or heartburn  · Genitourinary: No dysuria, trouble voiding, or hematuria  · Musculoskeletal:  No gait disturbance, weakness or joint complaints  · Integumentary: No rash or pruritis  · Neurological: No TIA or stroke symptoms  · Psychiatric: No anxiety or depression  · Endocrine: No malaise, fatigue or temperature intolerance  · Hematologic/Lymphatic: No bleeding problems, blood clots or swollen lymph nodes  · Allergic/Immunologic: No nasal congestion or hives  All systems negative except as marked. ·   ·      Physical Examination:    Vitals:    03/12/20 0835   BP: (!) 115/95   Pulse: 116   Resp: 18   Temp: 98.2 °F (36.8 °C)   SpO2: 100%      Wt Readings from Last 3 Encounters:   03/11/20 190 lb (86.2 kg)   02/14/20 189 lb 3.2 oz (85.8 kg)   02/12/20 180 lb 8 oz (81.9 kg)     Body mass index is 26.5 kg/m². General Appearance:  No distress, conversant    Constitutional:  Well developed, Well nourished, No acute distress, Non-toxic appearance.    HENT:  Normocephalic, Atraumatic, Bilateral external ears normal, Oropharynx EKG:NSR, IVCD    Chest Xray:  Impression   1. Stable right lower lobe masslike density on CT appears to represent fluid   in the right major fissure   2. Cardiomegaly, unchanged   3. No acute abnormality seen in the chest         ECHO:lvef 20%  Labs, echo, meds reviewed  Assessment: 52 y. o.year old with PMH of  has a past medical history of CAD (coronary artery disease), CHF (congestive heart failure) ([address] COPD (chronic obstructive pulmonary disease) ([facility] [address] Depression, Drug abuse ([facility] [address] HIGH CHOLESTEROL, Hypertension, MI (myocardial infarction) ([facility] [address] and S/P coronary artery bypass graft x 4. Recommendations:    1. No evidence of acute decompensated CHF, continue coreg, Increase lisnopril to 5mg daily, ICD follow up in EP office, may continue 1-2 days of IV bumex and then change it to oral dose  2. COPD: recommend treatment as per GL  3. HTN: stable, increase lisnopril to 5mg daily  4. X drug abuse: stable  5. CAD; h/o CABG, STABLE  6. Dyslipidemia: stable, continue statins  All labs, medications and tests reviewed, continue all other medications of all above medical condition listed as is.          Re Whittaker MD, 3/12/2020 10:42 AM

## 2020-03-12 NOTE — DISCHARGE INSTR - COC
Chronic midline low back pain without sciatica M54.5, G89.29    Tobacco abuse Z72.0    Gastroesophageal reflux disease without esophagitis K21.9    Opiate abuse, continuous ([facility]) F11.10    Heroin dependence ([facility]) F11.20    ST elevation myocardial infarction involving left circumflex coronary artery ([facility]) I21.21    STEMI (ST elevation myocardial infarction) ([facility]) I21.3    Acute on chronic combined systolic (congestive) and diastolic (congestive) heart failure ([facility]) H42.35    Systolic and diastolic CHF w/reduced LV function, NYHA class 4 ([facility]) I50.40    NSTEMI (non-ST elevated myocardial infarction) ([facility]) I21.4    Acute on chronic congestive heart failure ([facility]) I50.9    Noncompliance Z91.19    Pulmonary edema, acute ([facility]) J81.0    Acute kidney injury ([facility] N17.9    Acute respiratory failure with hypoxia and hypercapnia ([facility]) J96.01, J96.02    Hypertensive emergency I16.1    Ischemic cardiomyopathy I25.5    Heart failure ([facility]) I50.9    Left ventricular systolic dysfunction X77.0    Acute systolic congestive heart failure ([facility]) I50.21       Isolation/Infection:   Isolation          No Isolation        Patient Infection Status     None to display          Nurse Assessment:  Last Vital Signs: /78   Pulse 100   Temp 97.7 °F (36.5 °C) (Oral)   Resp 18   Ht 5' 11\" (1.803 m)   Wt 190 lb (86.2 kg)   SpO2 98%   BMI 26.50 kg/m²     Last documented pain score (0-10 scale): Pain Level: 4  Last Weight:   Wt Readings from Last 1 Encounters:   03/11/20 190 lb (86.2 kg)     Mental Status:  {IP PT MENTAL STATUS:71014}    IV Access:  { EMMY IV ACCESS:615816935}    Nursing Mobility/ADLs:  Walking   {[facility] DME ZGLM:143490229}  Transfer  {[facility] DME IHFK:319557219}  Bathing  {[facility] DME NSTQ:956757190}  Dressing  {[facility] DME AVHZ:774588872}  Toileting  {[facility] DME FIXR:759060963}  Feeding  {[facility] DME KWAR:697167233}  Med Admin  {[facility] DME JAZM:085319318}  Med Delivery   {[facility] EMMY MED Delivery:542619996}    Wound Care

## 2020-03-12 NOTE — PLAN OF CARE
Problem: Infection:  Goal: Will remain free from infection  Description: Will remain free from infection  Outcome: Ongoing     Problem: Safety:  Goal: Free from accidental physical injury  Description: Free from accidental physical injury  Outcome: Ongoing     Problem: Daily Care:  Goal: Daily care needs are met  Description: Daily care needs are met  Outcome: Ongoing     Problem: Discharge Planning:  Goal: Patients continuum of care needs are met  Description: Patients continuum of care needs are met  Outcome: Ongoing

## 2020-03-12 NOTE — H&P
HISTORY AND PHYSICAL  (Hospitalist, Internal Medicine)  IDENTIFYING INFORMATION   PATIENT:  Sabas Rollins  MRN:  [id number]  ADMIT DATE: 3/11/2020  TIME OF EVALUATION: 3/12/2020 4:46 AM    CHIEF COMPLAINT       HISTORY OF PRESENT ILLNESS   Sabas Rollins is a 52 y.o. male admitted for    PMH listed below:    PAST MEDICAL, SURGICAL, FAMILY, and SOCIAL HISTORY     Past Medical History:   Diagnosis Date    CAD (coronary artery disease)     [facility]) [facility]     COPD [facility]chronic obstructive pulmonary disease) ([facility]     Depression     Drug abuse ([facility]     HIGH CHOLESTEROL     Hypertension     MI (myocardial infarction) ([facility] 2011    S/P coronary artery bypass graft x 4 2011    CABG x 4 Dr Ivania Enamorado     Past Surgical History:   Procedure Laterality Date    BYPASS GRAFT  04/10/2011    CABG x 4 Dr Ivania Enamorado   [address]  11/2010     4 stents    LEG SURGERY       Family History   Problem Relation Age of Onset    Cancer Father     Heart Failure Father     Heart Disease Father     Diabetes Mother     Heart Disease Mother      Family Hx of HTN  Family Hx as reviewed above, otherwise non-contributory  Social History     Socioeconomic History    Marital status:      Spouse name: None    Number of children: None    Years of education: None    Highest education level: None   Occupational History    None   Social Needs    Financial resource strain: None    Food insecurity     Worry: None     Inability: None    Transportation needs     Medical: None     Non-medical: None   Tobacco Use    Smoking status: Current Every Day Smoker     Packs/day: 1.00     Years: 20.00     Pack years: 20.00     Types: Cigarettes    Smokeless tobacco: Former User    Tobacco comment: Reviewed 10/9/17   Substance and Sexual Activity    Alcohol use: No     Alcohol/week: 0.0 standard drinks    Drug use: No     Comment: march 2018 states he quit    Sexual activity: Not Currently   Lifestyle    Physical activity     Days per week: None     Minutes per session: None    Stress: None   Relationships    Social connections     Talks on phone: None     Gets together: None     Attends [employer] service: None     Active member of club or organization: None     Attends meetings of clubs or organizations: None     Relationship status: None    Intimate partner violence     Fear of current or ex partner: None     Emotionally abused: None     Physically abused: None     Forced sexual activity: None   Other Topics Concern    None   Social History Narrative    None       MEDICATIONS   Medications Prior to Admission  Not in a hospital admission. Current Medications  Current Facility-Administered Medications   Medication Dose Route Frequency Provider Last Rate Last Dose    doxycycline hyclate (VIBRA-TABS) tablet 100 mg  100 mg Oral Once John Mcclain MD        sodium chloride flush 0.9 % injection 10 mL  10 mL Intravenous BID KRISSY Soni   10 mL at 03/11/20 2762     Current Outpatient Medications   Medication Sig Dispense Refill    albuterol sulfate HFA (PROVENTIL HFA) 108 (90 Base) MCG/ACT inhaler Inhale 2 puffs into the lungs every 4 hours as needed for Wheezing or Shortness of Breath With spacer (and mask if indicated). Thanks.  1 Inhaler 1    predniSONE (DELTASONE) 50 MG tablet Take 1 tablet by mouth daily for 5 days 5 tablet 0    benzonatate (TESSALON PERLES) 100 MG capsule Take 1 capsule by mouth 3 times daily as needed for Cough 10 capsule 0    furosemide (LASIX) 40 MG tablet Take 40 mg by mouth 2 times daily      albuterol sulfate HFA (VENTOLIN HFA) 108 (90 Base) MCG/ACT inhaler Inhale 2 puffs into the lungs every 4 hours as needed for Wheezing or Shortness of Breath 1 Inhaler 0    bumetanide (BUMEX) 1 MG tablet Take 1 tablet by mouth 2 times daily 30 tablet 1    lisinopril (PRINIVIL;ZESTRIL) 2.5 MG tablet Take 1 tablet by mouth daily 30 tablet 1    nicotine (NICODERM CQ) 21 MG/24HR Place 1 patch onto the skin daily 30 patch 3    spironolactone (ALDACTONE) 25 MG tablet Take 0.5 tablets by mouth daily 30 tablet 3    aspirin 81 MG EC tablet Take 1 tablet by mouth daily 30 tablet 0    atorvastatin (LIPITOR) 40 MG tablet Take 1 tablet by mouth daily 30 tablet 0    carvedilol (COREG) 12.5 MG tablet Take 1 tablet by mouth 2 times daily (with meals) 60 tablet 0    apixaban (ELIQUIS STARTER PACK) 5 MG TABS tablet Take 10 mg (2 tablets) orally twice daily for 7 days, then take 5 mg (1 tablet) orally twice daily thereafter. (Patient not taking: Reported on 2/14/2020) 74 tablet 0    ipratropium-albuterol (DUONEB) 0.5-2.5 (3) MG/3ML SOLN nebulizer solution Inhale 3 mLs into the lungs 4 times daily 360 mL 0    nitroGLYCERIN (NITROSTAT) 0.4 MG SL tablet Place 1 tablet under the tongue every 5 minutes as needed for Chest pain 25 tablet 0         Allergies  No Known Allergies    REVIEW OF SYSTEMS   Within above limitations. 14 point review of systems reviewed. Pertinent positive or negative as per HPI or otherwise negative per 14 point systems review. PHYSICAL EXAM     Wt Readings from Last 3 Encounters:   03/11/20 190 lb (86.2 kg)   02/14/20 189 lb 3.2 oz (85.8 kg)   02/12/20 180 lb 8 oz (81.9 kg)       Blood pressure 118/78, pulse 110, temperature 98.7 °F (37.1 °C), temperature source Oral, resp. rate 18, height 5' 11\" (1.803 m), weight 190 lb (86.2 kg), SpO2 98 %. General - AAO x 3  Psych - Appropriate affect/speech. No agitation  Eyes - Eye lids intact. No scleral icterus  ENT - Lips wnl. External ear clear/dry/intact. No thyromegaly on inspection  Neuro - No gross peripheral or central neuro deficits on inspection  Heart - Sinus. RRR. S1 and S2 present. No added HS/murmurs appreciated. No elevated JVD appreciated  Lung - Adequate air entry b/l, No crackles/wheezes appreciated  GI - Soft. No guarding/rigidity. No hepatosplenomegaly/ascites.  BS+   - No CVA/suprapubic tenderness or palpable bladder RCA and circumflex noted LIMA is patent.  SVG to ramus is occluded, ramus receives right to left collaterals, SVG to OM is atretic receive small collaterals from RCA.  SVG to diagonal is small but patent  -Resume Aspirin and Lipitor       Stable mediastinal adenopathy with bulky paratracheal lymph node measuring 2.9 x 3.5 cm  -CTA chest done on arrival to ED this admission showed: mediastinal and hilar lymphadenopathy of unknown etiology, Recommend follow-up chest CT in 3 months and/or further evaluation with PET-CT.   seen by Dr. Celia Holt prior admission, will need outpatient follow-up    Case d/w ED provider    DVT ppx: Eliquis  Code status:    [address], Internal Medicine  3/12/2020 at 4:46 AM

## 2020-03-12 NOTE — CONSULTS
Nutrition Education    Type and Reason for Visit: Consult, Patient Education(heart failure)    Nutrition Assessment:  Provided/reviewed Heart Healthy Eating Nutrition Therapy (Nutrition Care Manual), focusing on limiting sodium to 500 mg/meal. Pt mostly wanted to discuss his 15 hr wait in the ER, and how he was filming personnel with his phone, who were not attending to him after he ripped off his heart monitor. · Verbally reviewed information with Patient. · Written educational materials provided. · Contact name and number provided. · Refer to Patient Education activity for more details. · Time spent educating pt: 10 minutes.     Electronically signed by Harrison Sanchez RD, LD on 3/12/20 at 1:37 PM EDT    Contact Number: [phone]

## 2020-03-12 NOTE — CONSULTS
[address], [address] [facility]                                  CONSULTATION    PATIENT NAME: Luis Alanis                       :        1970  MED REC NO:   [id number]                          ROOM:       4362  ACCOUNT NO:   [de-identified]                           ADMIT DATE: 2020  PROVIDER:     Memo Eason MD    CONSULT DATE:  2020    HISTORY OF PRESENT ILLNESS:  The patient is a 78-year-old gentleman with  multiple medical problems including COPD, congestive heart failure,  coronary artery disease, hypertension, hyperlipidemia, who was admitted  through the emergency room with complaints of increasing shortness of  breath, cough productive of whitish phlegm, and wheezing. He denies any  hemoptysis. He denies any fever or chills. He denies any nausea or  vomiting. He denies any chest pains. He had a CAT scan of the chest,  which showed no evidence of PE, stable mediastinal bilateral hilar  lymphadenopathy compared to 2000, near complete resolution of the  bilateral pleural effusions, but persistence of loculated effusion in  the right major fissure. He was subsequently admitted to the hospital.   He had an elevated proBNP of 4784. PAST MEDICAL HISTORY:  Significant for COPD, congestive heart failure,  coronary artery disease, hypertension, and hypercholesterolemia. PAST SURGICAL HISTORY:  Remarkable for stent placement, coronary artery  bypass graft surgery. FAMILY HISTORY:  Reveals that his mother had diabetes and heart disease. Father had cancer and congestive heart failure. SOCIAL HISTORY:  Reveals that he smokes about a pack per day and has  been smoking for 20 years. No history of alcohol or drug abuse. MEDICATIONS:  Reviewed. ALLERGIES:  He has no known allergies.     REVIEW OF SYSTEMS:  A 10- to 14-point review of systems was reviewed and  is negative except for what is mentioned in the history of present  illness. PHYSICAL EXAMINATION:  GENERAL:  The patient is alert and oriented x3, in no acute respiratory  distress. VITAL SIGNS:  His blood pressure is 115/95 mmHg, pulse of 116 per  minute, and respiratory rate of 18 per minute. He is afebrile. His  saturation is 100% on room air. HEENT:  Essentially unremarkable. NECK:  There is mild JVD. No lymphadenopathy. The neck is supple. LUNGS:  Revealed diminished breath sounds. Occasional rhonchi and  basilar crackles. HEART:  Showed normal S1 and S2. There was no S3 or S4 noted. ABDOMEN:  Benign. There is no evidence of any organomegaly. The bowel  sounds are present. NEUROLOGIC:  Reveals that he is awake and responsive, answering  questions appropriately, and moving his extremities. LABORATORY DATA:  His electrolytes showed a sodium of 132, potassium  4.4, chloride 97, carbon dioxide 22, BUN 17, creatinine 1.2. His CBC  showed a white count of 8.0, hemoglobin 10.6, and hematocrit of 37.6. His proBNP was 4784. His duplex leg scan was negative for DVT. His CAT  scan of the chest showed no evidence of PE, stable mediastinal bilateral  hilar lymphadenopathy compared to 02/2000, fluid in the major fissure. His respiratory disease panel PCR was negative. IMPRESSION:  1. Acute exacerbation of COPD. 2.  Congestive heart failure. 3.  Mediastinal hilar lymphadenopathy. PLAN:  1. Continue present antibiotic therapy. 2.  Sputum for culture and sensitivity. 3.  Decongestive therapy. 4.  Check serum angiotensin converting enzyme. 5.  Check fungal serology. 6.  The patient will need PET scan as an outpatient. 7.  Discussed with the patient in detail the importance of followup in  the office. 8.  Check procalcitonin level. 9.  As per orders.         Guanaco Garcia MD    D: 03/12/2020 11:29:04       T: 03/12/2020 13:46:31     [email or web address]  Job#: [id number]     Doc#: [id number]    CC:

## 2020-03-12 NOTE — ED PROVIDER NOTES
I independently examined and evaluated Claire Louis. In brief, with shortness of breath and cough. Patient has a history of COPD and states that he has been using his inhalers but for the past 2 days has had increasing shortness of breath, increasing cough. Office not particularly productive. Has had chest tightness. The patient is particularly agitated about his length of stay in the emergency department at the time of my evaluation. Focused exam revealed tachypnea with pursed lip breathing and continues to use 3 or 4 word sentences. Patient has poor air movement throughout and faint wheezes. ED course: Claire Louis is a 52 y.o. male presents with a COPD exacerbation. At this time I feel the patient has failed breathing treatments here in the emergency department and while he does not have an oxygen requirement given his increased work of breathing plan for a 15 mg albuterol neb and admission for COPD exacerbation. I spoke with the hospitalist and they are in agreement with this plan. At the time when I spoke with the patient he was in agreeing was staying. All diagnostic, treatment, and disposition decisions were made by myself in conjunction with the advanced practice provider. For all further details of the patient's emergency department visit, please see the advanced practice provider's documentation. Comment: Please note this report has been produced using speech recognition software and may contain errors related to that system including errors in grammar, punctuation, and spelling, as well as words and phrases that may be inappropriate. If there are any questions or concerns please feel free to contact the dictating provider for clarification. 12 lead EKG per my interpretation:  Sinus Tachycardia 108  Axis is   Normal  QTc is  normal  There is no specific T wave changes appreciated. There is no specific ST wave changes appreciated.   Left atrial enlargement  Prior EKG to compare with was available        Ember Koroma MD  03/12/20 8816

## 2020-03-13 LAB
ALBUMIN SERPL-MCNC: 4.4 GM/DL (ref 3.4–5)
ALP BLD-CCNC: 84 IU/L (ref 40–129)
ALT SERPL-CCNC: 49 U/L (ref 10–40)
ANION GAP SERPL CALCULATED.3IONS-SCNC: 15 MMOL/L (ref 4–16)
AST SERPL-CCNC: 43 IU/L (ref 15–37)
BASOPHILS ABSOLUTE: 0 K/CU MM
BASOPHILS RELATIVE PERCENT: 0.3 % (ref 0–1)
BILIRUB SERPL-MCNC: 0.8 MG/DL (ref 0–1)
BUN BLDV-MCNC: 25 MG/DL (ref 6–23)
CALCIUM SERPL-MCNC: 9.6 MG/DL (ref 8.3–10.6)
CHLORIDE BLD-SCNC: 95 MMOL/L (ref 99–110)
CO2: 23 MMOL/L (ref 21–32)
CREAT SERPL-MCNC: 1.2 MG/DL (ref 0.9–1.3)
DIFFERENTIAL TYPE: ABNORMAL
EOSINOPHILS ABSOLUTE: 0 K/CU MM
EOSINOPHILS RELATIVE PERCENT: 0.1 % (ref 0–3)
GFR AFRICAN AMERICAN: >60 ML/MIN/1.73M2
GFR NON-AFRICAN AMERICAN: >60 ML/MIN/1.73M2
GLUCOSE BLD-MCNC: 116 MG/DL (ref 70–99)
HCT VFR BLD CALC: 35.4 % (ref 42–52)
HEMOGLOBIN: 10.3 GM/DL (ref 13.5–18)
IMMATURE NEUTROPHIL %: 0.6 % (ref 0–0.43)
LYMPHOCYTES ABSOLUTE: 1.1 K/CU MM
LYMPHOCYTES RELATIVE PERCENT: 9 % (ref 24–44)
MAGNESIUM: 2.1 MG/DL (ref 1.8–2.4)
MCH RBC QN AUTO: 21.4 PG (ref 27–31)
MCHC RBC AUTO-ENTMCNC: 29.1 % (ref 32–36)
MCV RBC AUTO: 73.6 FL (ref 78–100)
MONOCYTES ABSOLUTE: 1.1 K/CU MM
MONOCYTES RELATIVE PERCENT: 8.6 % (ref 0–4)
NUCLEATED RBC %: 0.7 %
PDW BLD-RTO: 18.6 % (ref 11.7–14.9)
PLATELET # BLD: 258 K/CU MM (ref 140–440)
PMV BLD AUTO: 10.1 FL (ref 7.5–11.1)
POTASSIUM SERPL-SCNC: 4.6 MMOL/L (ref 3.5–5.1)
PRO-BNP: 4669 PG/ML
PROCALCITONIN: 0.09
RBC # BLD: 4.81 M/CU MM (ref 4.6–6.2)
SEGMENTED NEUTROPHILS ABSOLUTE COUNT: 9.9 K/CU MM
SEGMENTED NEUTROPHILS RELATIVE PERCENT: 81.4 % (ref 36–66)
SODIUM BLD-SCNC: 133 MMOL/L (ref 135–145)
TOTAL IMMATURE NEUTOROPHIL: 0.07 K/CU MM
TOTAL NUCLEATED RBC: 0.1 K/CU MM
TOTAL PROTEIN: 7.1 GM/DL (ref 6.4–8.2)
WBC # BLD: 12.2 K/CU MM (ref 4–10.5)

## 2020-03-13 PROCEDURE — 87077 CULTURE AEROBIC IDENTIFY: CPT

## 2020-03-13 PROCEDURE — 6370000000 HC RX 637 (ALT 250 FOR IP): Performed by: INTERNAL MEDICINE

## 2020-03-13 PROCEDURE — 82164 ANGIOTENSIN I ENZYME TEST: CPT

## 2020-03-13 PROCEDURE — 99232 SBSQ HOSP IP/OBS MODERATE 35: CPT | Performed by: INTERNAL MEDICINE

## 2020-03-13 PROCEDURE — 84145 PROCALCITONIN (PCT): CPT

## 2020-03-13 PROCEDURE — 80053 COMPREHEN METABOLIC PANEL: CPT

## 2020-03-13 PROCEDURE — 87186 SC STD MICRODIL/AGAR DIL: CPT

## 2020-03-13 PROCEDURE — 2580000003 HC RX 258: Performed by: INTERNAL MEDICINE

## 2020-03-13 PROCEDURE — 93010 ELECTROCARDIOGRAM REPORT: CPT | Performed by: INTERNAL MEDICINE

## 2020-03-13 PROCEDURE — 86606 ASPERGILLUS ANTIBODY: CPT

## 2020-03-13 PROCEDURE — 83880 ASSAY OF NATRIURETIC PEPTIDE: CPT

## 2020-03-13 PROCEDURE — 94640 AIRWAY INHALATION TREATMENT: CPT

## 2020-03-13 PROCEDURE — 2700000000 HC OXYGEN THERAPY PER DAY

## 2020-03-13 PROCEDURE — 2580000003 HC RX 258: Performed by: PHYSICIAN ASSISTANT

## 2020-03-13 PROCEDURE — 86635 COCCIDIOIDES ANTIBODY: CPT

## 2020-03-13 PROCEDURE — 86698 HISTOPLASMA ANTIBODY: CPT

## 2020-03-13 PROCEDURE — 6370000000 HC RX 637 (ALT 250 FOR IP): Performed by: NURSE PRACTITIONER

## 2020-03-13 PROCEDURE — 86612 BLASTOMYCES ANTIBODY: CPT

## 2020-03-13 PROCEDURE — 36415 COLL VENOUS BLD VENIPUNCTURE: CPT

## 2020-03-13 PROCEDURE — 99222 1ST HOSP IP/OBS MODERATE 55: CPT | Performed by: INTERNAL MEDICINE

## 2020-03-13 PROCEDURE — 94668 MNPJ CHEST WALL SBSQ: CPT

## 2020-03-13 PROCEDURE — 2500000003 HC RX 250 WO HCPCS: Performed by: INTERNAL MEDICINE

## 2020-03-13 PROCEDURE — 1200000000 HC SEMI PRIVATE

## 2020-03-13 PROCEDURE — 80048 BASIC METABOLIC PNL TOTAL CA: CPT

## 2020-03-13 PROCEDURE — 85025 COMPLETE CBC W/AUTO DIFF WBC: CPT

## 2020-03-13 PROCEDURE — 83735 ASSAY OF MAGNESIUM: CPT

## 2020-03-13 PROCEDURE — 94761 N-INVAS EAR/PLS OXIMETRY MLT: CPT

## 2020-03-13 RX ORDER — IPRATROPIUM BROMIDE AND ALBUTEROL SULFATE 2.5; .5 MG/3ML; MG/3ML
1 SOLUTION RESPIRATORY (INHALATION) EVERY 4 HOURS PRN
Status: DISCONTINUED | OUTPATIENT
Start: 2020-03-13 | End: 2020-03-15 | Stop reason: HOSPADM

## 2020-03-13 RX ORDER — CALCIUM CARBONATE 200(500)MG
500 TABLET,CHEWABLE ORAL 3 TIMES DAILY PRN
Status: DISCONTINUED | OUTPATIENT
Start: 2020-03-13 | End: 2020-03-15 | Stop reason: HOSPADM

## 2020-03-13 RX ADMIN — SODIUM CHLORIDE, PRESERVATIVE FREE 10 ML: 5 INJECTION INTRAVENOUS at 21:20

## 2020-03-13 RX ADMIN — APIXABAN 5 MG: 5 TABLET, FILM COATED ORAL at 21:19

## 2020-03-13 RX ADMIN — ASPIRIN 81 MG: 81 TABLET, COATED ORAL at 10:35

## 2020-03-13 RX ADMIN — CARVEDILOL 12.5 MG: 12.5 TABLET, FILM COATED ORAL at 10:35

## 2020-03-13 RX ADMIN — SPIRONOLACTONE 12.5 MG: 25 TABLET ORAL at 10:36

## 2020-03-13 RX ADMIN — SODIUM CHLORIDE, PRESERVATIVE FREE 10 ML: 5 INJECTION INTRAVENOUS at 10:34

## 2020-03-13 RX ADMIN — IPRATROPIUM BROMIDE AND ALBUTEROL SULFATE 3 ML: .5; 3 SOLUTION RESPIRATORY (INHALATION) at 11:33

## 2020-03-13 RX ADMIN — BUMETANIDE 1 MG: 0.25 INJECTION INTRAMUSCULAR; INTRAVENOUS at 10:34

## 2020-03-13 RX ADMIN — Medication 10 ML: at 21:00

## 2020-03-13 RX ADMIN — IPRATROPIUM BROMIDE AND ALBUTEROL SULFATE 3 ML: .5; 3 SOLUTION RESPIRATORY (INHALATION) at 18:59

## 2020-03-13 RX ADMIN — PREDNISONE 40 MG: 20 TABLET ORAL at 10:35

## 2020-03-13 RX ADMIN — BUMETANIDE 1 MG: 0.25 INJECTION INTRAMUSCULAR; INTRAVENOUS at 21:19

## 2020-03-13 RX ADMIN — LISINOPRIL 2.5 MG: 2.5 TABLET ORAL at 10:35

## 2020-03-13 RX ADMIN — ATORVASTATIN CALCIUM 40 MG: 40 TABLET, FILM COATED ORAL at 10:35

## 2020-03-13 RX ADMIN — IPRATROPIUM BROMIDE AND ALBUTEROL SULFATE 3 ML: .5; 3 SOLUTION RESPIRATORY (INHALATION) at 16:05

## 2020-03-13 RX ADMIN — DOXYCYCLINE HYCLATE 100 MG: 100 TABLET, COATED ORAL at 21:19

## 2020-03-13 RX ADMIN — APIXABAN 5 MG: 5 TABLET, FILM COATED ORAL at 10:35

## 2020-03-13 RX ADMIN — IPRATROPIUM BROMIDE AND ALBUTEROL SULFATE 3 ML: .5; 3 SOLUTION RESPIRATORY (INHALATION) at 07:04

## 2020-03-13 RX ADMIN — CALCIUM CARBONATE 500 MG: 500 TABLET, CHEWABLE ORAL at 23:57

## 2020-03-13 RX ADMIN — DOXYCYCLINE HYCLATE 100 MG: 100 TABLET, COATED ORAL at 10:35

## 2020-03-13 ASSESSMENT — PAIN SCALES - GENERAL
PAINLEVEL_OUTOF10: 0

## 2020-03-13 NOTE — PROGRESS NOTES
Pulmonary and Critical Care  Progress Note    Subjective: The patient has slightly improved  Shortness of breath has slightly improved  Chest pain none  Addressing respiratory complaints Patient is negative for  hemoptysis and cyanosis  CONSTITUTIONAL:  negative for fevers and chills      Past Medical History:     has a past medical history of CAD (coronary artery disease), CHF (congestive heart failure) ([facility] COPD (chronic obstructive pulmonary disease) ([facility] Depression, [facility] HIGH CHOLESTEROL, Hypertension, MI (myocardial infarction) ([facility] and S/P coronary artery bypass graft x 4.   has a past surgical history that includes Cardiac surgery (11/2010); Bypass Graft (04/10/2011); and Leg Surgery. reports that he has been smoking cigarettes. He has a 20.00 pack-year smoking history. He has quit using smokeless tobacco. He reports that he does not drink alcohol or use drugs. Family history:  family history includes Cancer in his father; Diabetes in his mother; Heart Disease in his father and mother; Heart Failure in his father. No Known Allergies  Social History:    Reviewed; no changes    Objective:   PHYSICAL EXAM:        VITALS:  /81   Pulse 103   Temp 97.8 °F (36.6 °C) (Oral)   Resp 17   Ht 5' 11\" (1.803 m)   Wt 190 lb (86.2 kg)   SpO2 97%   BMI 26.50 kg/m²     24HR INTAKE/OUTPUT:      Intake/Output Summary (Last 24 hours) at 3/13/2020 1108  Last data filed at 3/13/2020 0217  Gross per 24 hour   Intake --   Output 2600 ml   Net -2600 ml       CONSTITUTIONAL:  awake, alert, cooperative, no apparent distress, and appears stated age  LUNGS:  decreased breath sounds  CARDIOVASCULAR:  normal S1 and S2 and positive JVD  ABD:Abdomen soft, non-tender. BS normal. No masses,  No organomegaly  NEURO:Alert and oriented x3. Gait normal. Reflexes and motor strength normal and symmetric. Cranial nerves 2-12 and sensation grossly intact.   DATA:    CBC:  Recent Labs     03/11/20  9828

## 2020-03-13 NOTE — CONSULTS
Hematology/Oncology  Consult Note      Reason for Consult: History of PE/ Meidastinal adenopathy. Requesting Physician:  Dr. Moody Andrews:  Shortness of breath    History Obtained From:  patient, electronic medical record    HISTORY OF PRESENT ILLNESS:      The patient is a 52 y.o. male with significant past medical history of COPD, congestive heart failure,  coronary artery disease, hypertension, hyperlipidemia, who presents with worsening shortness of breath, productive cough with wheezing. He has a history of PE diagnosed on 1/24/2020 with VQ scan. He has had CTA 2/7/2020 and 3/11/2020, both of which were negative for PE. On CTA 2/7/2020 he was noted to have 2.9 x 3.5 mediastinal adenopathy with bulky paratracheal lymph node, right hilar lymph node measuring 2.0 x 2.6, left AP window lymph node measuring 1.5 by 2.0 cm. Dr. Gertchen Khan saw him previously. Mr. Cassie Limon reports he has had a long medical course with MI x 4 and EF 10%. He feels concerned about his ability to tolerate further procedures and if they would add to the quality of his life.         Cancer Treatment History:  No previous cancer treatment    Past Medical History:        Diagnosis Date    CAD (coronary artery disease)     [facility]     [facility]     Depression     Drug abuse ([facility]     HIGH CHOLESTEROL     Hypertension     MI (myocardial infarction) ([facility] 2011    S/P coronary artery bypass graft x 4 2011    CABG x 4 Dr Simran Schreiber     Past Surgical History:        Procedure Laterality Date    BYPASS GRAFT  04/10/2011    CABG x 4 Dr Ina Bryant  11/2010     4 stents    LEG SURGERY         Current Medications:    Current Facility-Administered Medications: ipratropium-albuterol (DUONEB) nebulizer solution 1 ampule, 1 ampule, Inhalation, Q4H PRN  albuterol sulfate  (90 Base) MCG/ACT inhaler 2 puff, 2 puff, Inhalation, Q4H PRN  apixaban (ELIQUIS) tablet 5 mg, 5 mg, Oral, BID  aspirin EC tablet 81 mg, 81 mg, Oral, Daily  atorvastatin (LIPITOR) tablet 40 mg, 40 mg, Oral, Daily  carvedilol (COREG) tablet 12.5 mg, 12.5 mg, Oral, BID WC  lisinopril (PRINIVIL;ZESTRIL) tablet 2.5 mg, 2.5 mg, Oral, Daily  spironolactone (ALDACTONE) tablet 12.5 mg, 12.5 mg, Oral, Daily  ipratropium-albuterol (DUONEB) nebulizer solution 3 mL, 1 vial, Inhalation, 4x Daily  sodium chloride flush 0.9 % injection 10 mL, 10 mL, Intravenous, 2 times per day  sodium chloride flush 0.9 % injection 10 mL, 10 mL, Intravenous, PRN  acetaminophen (TYLENOL) tablet 650 mg, 650 mg, Oral, Q6H PRN **OR** acetaminophen (TYLENOL) suppository 650 mg, 650 mg, Rectal, Q6H PRN  polyethylene glycol (GLYCOLAX) packet 17 g, 17 g, Oral, Daily PRN  promethazine (PHENERGAN) tablet 12.5 mg, 12.5 mg, Oral, Q6H PRN **OR** ondansetron (ZOFRAN) injection 4 mg, 4 mg, Intravenous, Q6H PRN  bumetanide (BUMEX) injection 1 mg, 1 mg, Intravenous, BID  doxycycline hyclate (VIBRA-TABS) tablet 100 mg, 100 mg, Oral, BID  predniSONE (DELTASONE) tablet 40 mg, 40 mg, Oral, Daily  sodium chloride flush 0.9 % injection 10 mL, 10 mL, Intravenous, BID    Allergies:  Patient has no known allergies. Social History:    TOBACCO:   reports that he has been smoking cigarettes. He has a 20.00 pack-year smoking history.  He has quit using smokeless tobacco.  Family History:       Problem Relation Age of Onset    Cancer Father     Heart Failure Father     Heart Disease Father     Diabetes Mother     Heart Disease Mother      REVIEW OF SYSTEMS:    CONSTITUTIONAL:  positive for fatigue EYES:  negative  RESPIRATORY: shortness of breath, cough   CARDIOVASCULAR:  negative  HEMATOLOGIC/LYMPHATIC:  negative  NEUROLOGICAL: negative    PHYSICAL EXAM:      Vitals:    /74   Pulse 104   Temp 97.7 °F (36.5 °C) (Oral)   Resp 16   Ht 5' 11\" (1.803 m)   Wt 190 lb (86.2 kg)   SpO2 100%   BMI 26.50 kg/m² CONSTITUTIONAL:  awake, alert, cooperative, no apparent distress, and appears stated age  EYES: extra ocular muscles intact, sclera clear, conjunctiva normal  ENT:  Normocephalic, without obvious abnormality  NECK:  Supple, symmetrical, trachea midline  LUNGS:  No increased work of breathing, slight wheezing  CARDIOVASCULAR:  Normal apical impulse, regular rate and rhythm, normal S1 and S2  ABDOMEN: normal bowel sounds, soft, non-distended, non-tender  NEUROLOGIC:  Awake, alert, oriented to name, place and time. Cranial nerves II-XII are grossly intact. SKIN:  no bruising or bleeding  EXTREMITIES: BLE edema, left leg slightly red    IMPRESSION/RECOMMENDATIONS:  PE- diagnosed on VQ in 1/24/2020. He has been on Eliquis. We recommend to continue for six months. Mediastinal lymphadenopathy: We will continue to monitor this. Dr. Leeanne Stark has seen the patient for this in the past. He needs to follow up with Dr. Leeanne Stark as an outpatient. I saw and examined patient this morning with Ronnie Arce. I discussed with his cardiologist.  I concurred with above recommendation and will discuss with him tomorrow about follow up with Dr Alice Mendez for potential biopsy. Thank you for allowing me to participate in his care. Will follow up. ECOG Performance Status (ECOG Scale 0-4): Score 1:  Patient is restricted in physically strenuous activity but ambulatory and able to carry out work of light or sedentary nature (e.g. light house work, office work).     Electronically signed by NANCY Marquez CNP on 3/13/2020 at 8:54 AM

## 2020-03-13 NOTE — PLAN OF CARE
Problem: Infection:  Goal: Will remain free from infection  Description: Will remain free from infection  Outcome: Ongoing     Problem: Safety:  Goal: Free from accidental physical injury  Description: Free from accidental physical injury  Outcome: Ongoing     Problem: Daily Care:  Goal: Daily care needs are met  Description: Daily care needs are met  Outcome: Ongoing     Problem: Discharge Planning:  Goal: Patients continuum of care needs are met  Description: Patients continuum of care needs are met  Outcome: Ongoing     Problem: Falls - Risk of:  Goal: Will remain free from falls  Description: Will remain free from falls  Outcome: Ongoing  Goal: Absence of physical injury  Description: Absence of physical injury  Outcome: Ongoing

## 2020-03-13 NOTE — CARE COORDINATION
CM - room #   - monitoring for a readmission risk  Pt seen here today for shortness of breath , bi lateral leg swelling and has a history of CHF, The most recent admission was from February 7 --thru February 12 for the same complaint . Currently tests and evaluations are underway .  23:10 -No decision has been made for pt as treatment continue , pt remains tachy and CT is pending , past  end time Hector CM
Reviewed chart and attempted to speak with pt but he was sleeping. Pt lives with family, has a PCP and insurance that covers medications. Patient screened for discharge needs, no needs identified at this time, however CM available if needs arise.
[x] No   [] [facility]   [] [facility]      [] [facility]    [] [facility]   [] [facility]   [x] [facility]   [x] Other: [address]:       SNF:  [] Yes, Name:                  [] Yes, Name:  [x] No                     [x] No     Do you have            DME Company:  [] [facility]      [] CM DME    [] BIPAP/CPAP          [] [employer]  [x] Nebulizer      [] [place]  [] None of the above     [] Other    Home COPD Medications:  Inhalers: Albuterol       Nebulizers:   Duoneb         Other:  N/A    Do you have all your home medications? Can you pay for medication?   [] Yes         [] Yes   []  No [] No     What time do you prefer your appointments:  Do you have transportation:    [] AM                                                                          [] Yes  [] PM                                                                          [] No  [] [employer]

## 2020-03-13 NOTE — PROGRESS NOTES
Labs     03/11/20  1624 03/13/20  0655   WBC 8.0 12.2*   HGB 10.6* 10.3*   HCT 37.6* 35.4*   MCV 75.7* 73.6*    258     BMP:   Recent Labs     03/11/20  1624 03/12/20  0639 03/13/20  0655   *  --  133*   K 4.4  --  4.6   CL 97*  --  95*   CO2 22  --  23   PHOS  --  3.4  --    BUN 17  --  25*   CREATININE 1.2  --  1.2     LIVER PROFILE:   Recent Labs     03/11/20  1624 03/13/20  0655   AST 30 43*   ALT 22 49*   BILITOT 1.2* 0.8   ALKPHOS 90 84     PT/INR: No results for input(s): PROTIME, INR in the last 72 hours. APTT: No results for input(s): APTT in the last 72 hours. BNP:  No results for input(s): BNP in the last 72 hours. TROPONIN: @TROPONINI:3@      Assessment:  52 y. o.year old who is admitted for          Plan:  1. No evidence of acute decompensated CHF, continue coreg lisnopril to 5mg daily, ICD follow up in EP office, may continue 1-2 days of IV bumex and then change it to oral dose  2. COPD: recommend treatment as per GL  3. HTN: stable, increase lisnopril to 5mg daily  4. X drug abuse: stable  5. CAD; h/o CABG, STABLE  All labs, medications and tests reviewed, continue all other medications of all above medical condition listed as is.       Avani Saavedra MD 3/13/2020 3:49 PM

## 2020-03-13 NOTE — PROGRESS NOTES
HOSPITALIST    Patient reports he eats TV dinners and checks the sodium content in the dinners and uses low sodium content dinners. Patient did not follow up with Dr. Victoria Mireles since last dc Feb 12. Will consult Dr. Victoria Mireles.

## 2020-03-14 LAB
ANGIOTENSIN CONVERTING ENZYME: <5 U/L (ref 9–67)
ANGIOTENSIN CONVERTING ENZYME: ABNORMAL U/L (ref 9–67)
ANION GAP SERPL CALCULATED.3IONS-SCNC: 13 MMOL/L (ref 4–16)
BUN BLDV-MCNC: 34 MG/DL (ref 6–23)
CALCIUM SERPL-MCNC: 9.8 MG/DL (ref 8.3–10.6)
CHLORIDE BLD-SCNC: 96 MMOL/L (ref 99–110)
CO2: 25 MMOL/L (ref 21–32)
CREAT SERPL-MCNC: 1.3 MG/DL (ref 0.9–1.3)
GFR AFRICAN AMERICAN: >60 ML/MIN/1.73M2
GFR NON-AFRICAN AMERICAN: 59 ML/MIN/1.73M2
GLUCOSE BLD-MCNC: 112 MG/DL (ref 70–99)
MAGNESIUM: 1.9 MG/DL (ref 1.8–2.4)
POTASSIUM SERPL-SCNC: 4.8 MMOL/L (ref 3.5–5.1)
SODIUM BLD-SCNC: 134 MMOL/L (ref 135–145)

## 2020-03-14 PROCEDURE — 2580000003 HC RX 258: Performed by: INTERNAL MEDICINE

## 2020-03-14 PROCEDURE — 6370000000 HC RX 637 (ALT 250 FOR IP): Performed by: INTERNAL MEDICINE

## 2020-03-14 PROCEDURE — 83735 ASSAY OF MAGNESIUM: CPT

## 2020-03-14 PROCEDURE — 6360000002 HC RX W HCPCS: Performed by: INTERNAL MEDICINE

## 2020-03-14 PROCEDURE — 2500000003 HC RX 250 WO HCPCS: Performed by: INTERNAL MEDICINE

## 2020-03-14 PROCEDURE — 36415 COLL VENOUS BLD VENIPUNCTURE: CPT

## 2020-03-14 PROCEDURE — 99232 SBSQ HOSP IP/OBS MODERATE 35: CPT | Performed by: INTERNAL MEDICINE

## 2020-03-14 PROCEDURE — 2580000003 HC RX 258: Performed by: PHYSICIAN ASSISTANT

## 2020-03-14 PROCEDURE — 80048 BASIC METABOLIC PNL TOTAL CA: CPT

## 2020-03-14 PROCEDURE — 94762 N-INVAS EAR/PLS OXIMTRY CONT: CPT

## 2020-03-14 PROCEDURE — 1200000000 HC SEMI PRIVATE

## 2020-03-14 PROCEDURE — 2700000000 HC OXYGEN THERAPY PER DAY

## 2020-03-14 PROCEDURE — 94640 AIRWAY INHALATION TREATMENT: CPT

## 2020-03-14 PROCEDURE — 94761 N-INVAS EAR/PLS OXIMETRY MLT: CPT

## 2020-03-14 RX ORDER — IPRATROPIUM BROMIDE AND ALBUTEROL SULFATE 2.5; .5 MG/3ML; MG/3ML
1 SOLUTION RESPIRATORY (INHALATION)
Status: DISCONTINUED | OUTPATIENT
Start: 2020-03-14 | End: 2020-03-15 | Stop reason: HOSPADM

## 2020-03-14 RX ORDER — METHYLPREDNISOLONE SODIUM SUCCINATE 40 MG/ML
40 INJECTION, POWDER, LYOPHILIZED, FOR SOLUTION INTRAMUSCULAR; INTRAVENOUS EVERY 6 HOURS
Status: DISCONTINUED | OUTPATIENT
Start: 2020-03-14 | End: 2020-03-15

## 2020-03-14 RX ORDER — GUAIFENESIN 600 MG/1
600 TABLET, EXTENDED RELEASE ORAL 2 TIMES DAILY
Status: DISCONTINUED | OUTPATIENT
Start: 2020-03-14 | End: 2020-03-15 | Stop reason: HOSPADM

## 2020-03-14 RX ADMIN — DOXYCYCLINE HYCLATE 100 MG: 100 TABLET, COATED ORAL at 09:49

## 2020-03-14 RX ADMIN — ASPIRIN 81 MG: 81 TABLET, COATED ORAL at 09:49

## 2020-03-14 RX ADMIN — IPRATROPIUM BROMIDE AND ALBUTEROL SULFATE 3 ML: .5; 3 SOLUTION RESPIRATORY (INHALATION) at 15:45

## 2020-03-14 RX ADMIN — IPRATROPIUM BROMIDE AND ALBUTEROL SULFATE 1 AMPULE: .5; 3 SOLUTION RESPIRATORY (INHALATION) at 03:14

## 2020-03-14 RX ADMIN — METHYLPREDNISOLONE SODIUM SUCCINATE 40 MG: 40 INJECTION, POWDER, FOR SOLUTION INTRAMUSCULAR; INTRAVENOUS at 09:48

## 2020-03-14 RX ADMIN — Medication 10 ML: at 10:00

## 2020-03-14 RX ADMIN — SODIUM CHLORIDE, PRESERVATIVE FREE 10 ML: 5 INJECTION INTRAVENOUS at 21:40

## 2020-03-14 RX ADMIN — CARVEDILOL 12.5 MG: 12.5 TABLET, FILM COATED ORAL at 17:38

## 2020-03-14 RX ADMIN — IPRATROPIUM BROMIDE AND ALBUTEROL SULFATE 3 ML: .5; 3 SOLUTION RESPIRATORY (INHALATION) at 07:15

## 2020-03-14 RX ADMIN — CARVEDILOL 12.5 MG: 12.5 TABLET, FILM COATED ORAL at 09:57

## 2020-03-14 RX ADMIN — GUAIFENESIN 600 MG: 600 TABLET, EXTENDED RELEASE ORAL at 21:39

## 2020-03-14 RX ADMIN — GUAIFENESIN 600 MG: 600 TABLET, EXTENDED RELEASE ORAL at 09:48

## 2020-03-14 RX ADMIN — BUMETANIDE 1 MG: 0.25 INJECTION INTRAMUSCULAR; INTRAVENOUS at 21:39

## 2020-03-14 RX ADMIN — Medication 10 ML: at 21:40

## 2020-03-14 RX ADMIN — APIXABAN 5 MG: 5 TABLET, FILM COATED ORAL at 09:49

## 2020-03-14 RX ADMIN — METHYLPREDNISOLONE SODIUM SUCCINATE 40 MG: 40 INJECTION, POWDER, FOR SOLUTION INTRAMUSCULAR; INTRAVENOUS at 17:38

## 2020-03-14 RX ADMIN — IPRATROPIUM BROMIDE AND ALBUTEROL SULFATE 3 ML: .5; 3 SOLUTION RESPIRATORY (INHALATION) at 20:00

## 2020-03-14 RX ADMIN — IPRATROPIUM BROMIDE AND ALBUTEROL SULFATE 3 ML: .5; 3 SOLUTION RESPIRATORY (INHALATION) at 11:09

## 2020-03-14 RX ADMIN — METHYLPREDNISOLONE SODIUM SUCCINATE 40 MG: 40 INJECTION, POWDER, FOR SOLUTION INTRAMUSCULAR; INTRAVENOUS at 21:40

## 2020-03-14 RX ADMIN — BUMETANIDE 1 MG: 0.25 INJECTION INTRAMUSCULAR; INTRAVENOUS at 09:53

## 2020-03-14 RX ADMIN — APIXABAN 5 MG: 5 TABLET, FILM COATED ORAL at 21:39

## 2020-03-14 RX ADMIN — ATORVASTATIN CALCIUM 40 MG: 40 TABLET, FILM COATED ORAL at 09:49

## 2020-03-14 RX ADMIN — DOXYCYCLINE HYCLATE 100 MG: 100 TABLET, COATED ORAL at 21:39

## 2020-03-14 RX ADMIN — SODIUM CHLORIDE, PRESERVATIVE FREE 10 ML: 5 INJECTION INTRAVENOUS at 09:54

## 2020-03-14 RX ADMIN — LISINOPRIL 2.5 MG: 2.5 TABLET ORAL at 09:48

## 2020-03-14 RX ADMIN — IPRATROPIUM BROMIDE AND ALBUTEROL SULFATE 1 AMPULE: .5; 3 SOLUTION RESPIRATORY (INHALATION) at 23:45

## 2020-03-14 RX ADMIN — SPIRONOLACTONE 12.5 MG: 25 TABLET ORAL at 09:49

## 2020-03-14 ASSESSMENT — PAIN SCALES - GENERAL
PAINLEVEL_OUTOF10: 0

## 2020-03-14 NOTE — PROGRESS NOTES
Hospitalist Progress Note      Name:  Loreta Estrada /Age/Sex: 1970  (52 y.o. male)   MRN & CSN:  [id number] & [id number] Admission Date/Time: 3/11/2020  6:07 PM   Location:  3555/8677-[id number] PCP: Jovanni Farrar Day: 3    Assessment and Plan:   Loreta Estrada is a 52 y.o.  male  who presents with:    CHF - stable for dc per cardiology    Shortness of breath and cough persists - started breathing treatments today    Mediastinal adenopathy - appreciate oncology consult    History of Present Illness:     Had some shortness of breath today    Objective: Intake/Output Summary (Last 24 hours) at 3/13/2020 2216  Last data filed at 3/13/2020 2209  Gross per 24 hour   Intake 20 ml   Output 2900 ml   Net -2880 ml      Vitals:   Vitals:    20 2117   BP: 113/83   Pulse: 98   Resp: 16   Temp: 98 °F (36.7 °C)   SpO2: 98%     Physical Exam:     Physical Exam  Pulmonary:      Effort: Pulmonary effort is normal.   Neurological:      General: No focal deficit present. Mental Status: He is alert.            Medications:   Medications:    apixaban  5 mg Oral BID    aspirin  81 mg Oral Daily    atorvastatin  40 mg Oral Daily    carvedilol  12.5 mg Oral BID WC    lisinopril  2.5 mg Oral Daily    spironolactone  12.5 mg Oral Daily    ipratropium-albuterol  1 vial Inhalation 4x Daily    sodium chloride flush  10 mL Intravenous 2 times per day    bumetanide  1 mg Intravenous BID    doxycycline hyclate  100 mg Oral BID    predniSONE  40 mg Oral Daily    sodium chloride flush  10 mL Intravenous BID      Infusions:   PRN Meds: ipratropium-albuterol, 1 ampule, Q4H PRN  albuterol sulfate HFA, 2 puff, Q4H PRN  sodium chloride flush, 10 mL, PRN  acetaminophen, 650 mg, Q6H PRN    Or  acetaminophen, 650 mg, Q6H PRN  polyethylene glycol, 17 g, Daily PRN  promethazine, 12.5 mg, Q6H PRN    Or  ondansetron, 4 mg, Q6H PRN          Electronically signed by Abby Marie MD on 3/13/2020 at 10:16

## 2020-03-14 NOTE — PROGRESS NOTES
ASSESSMENT/RECOMMENDATION    1. He has mediastinal lymphadenopathy which could be reactive vs malignant process. He refused biopsy. I recommend to follow up with me as outpatient. 2. CHF with low EF. Cardiologist has been following him. We will continue to follow the patient. Thank you.

## 2020-03-14 NOTE — PROGRESS NOTES
Pulmonary and Critical Care  Progress Note    Subjective: The patient has improved  Shortness of breath has improved  Chest pain none  Addressing respiratory complaints Patient is negative for  hemoptysis and cyanosis  CONSTITUTIONAL:  negative for fevers and chills      Past Medical History:     has a past medical history of CAD (coronary artery disease), CHF (congestive heart failure) ([facility] COPD (chronic obstructive pulmonary disease) ([facility] Depression, Drug abuse ([facility] HIGH CHOLESTEROL, Hypertension, MI (myocardial infarction) ([facility] and S/P coronary artery bypass graft x 4.   has a past surgical history that includes Cardiac surgery (11/2010); Bypass Graft (04/10/2011); and Leg Surgery. reports that he has been smoking cigarettes. He has a 20.00 pack-year smoking history. He has quit using smokeless tobacco. He reports that he does not drink alcohol or use drugs. Family history:  family history includes Cancer in his father; Diabetes in his mother; Heart Disease in his father and mother; Heart Failure in his father. No Known Allergies  Social History:    Reviewed; no changes    Objective:   PHYSICAL EXAM:        VITALS:  BP (!) 110/91   Pulse 100   Temp 97.5 °F (36.4 °C)   Resp 18   Ht 5' 11\" (1.803 m)   Wt 190 lb (86.2 kg)   SpO2 99%   BMI 26.50 kg/m²     24HR INTAKE/OUTPUT:      Intake/Output Summary (Last 24 hours) at 3/14/2020 1128  Last data filed at 3/14/2020 2653  Gross per 24 hour   Intake 500 ml   Output 2800 ml   Net -2300 ml       CONSTITUTIONAL:  awake, alert, cooperative, no apparent distress, and appears stated age  LUNGS:  decreased breath sounds  CARDIOVASCULAR:  normal S1 and S2 and positive JVD  ABD:Abdomen soft, non-tender. BS normal. No masses,  No organomegaly  NEURO:Alert and oriented x3. Gait normal. Reflexes and motor strength normal and symmetric. Cranial nerves 2-12 and sensation grossly intact.   DATA:    CBC:  Recent Labs     03/11/20  1624 03/13/20  0655   WBC 8.0 12.2*   RBC 4.97 4.81   HGB 10.6* 10.3*   HCT 37.6* 35.4*    258   MCV 75.7* 73.6*   MCH 21.3* 21.4*   MCHC 28.2* 29.1*   RDW 18.4* 18.6*   SEGSPCT 64.5 81.4*      BMP:  Recent Labs     03/11/20  1624 03/13/20  0655 03/14/20  0831   * 133* 134*   K 4.4 4.6 4.8   CL 97* 95* 96*   CO2 22 23 25   BUN 17 25* 34*   CREATININE 1.2 1.2 1.3   CALCIUM 9.3 9.6 9.8   GLUCOSE 104* 116* 112*      ABG:  No results for input(s): PH, PO2ART, CBG7NDT, HCO3, BEART, O2SAT in the last 72 hours. Lab Results   Component Value Date    PROBNP 4,669 (H) 03/13/2020    PROBNP 4,784 (H) 03/11/2020    PROBNP 4,930 (H) 02/14/2020     No results found for: [facility]    Radiology Review:  Pertinent images / reports were reviewed as a part of this visit.     Assessment:     Patient Active Problem List   Diagnosis    Essential hypertension    Osteoarthritis    COPD [facility]ch[facility]) [facility]    Paresthesia of left leg    Abdominal hernia    Left shoulder pain    Crushing injury of right hand    Rotator cuff tendinitis    Midline low back pain without sciatica    History of MI (myocardial infarction)    Coronary artery disease involving coronary bypass graft of native heart without angina pectoris    Mixed hyperlipidemia    Depression    Chronic midline low back pain without sciatica    Tobacco abuse    Gastroesophageal reflux disease without esophagitis    Opiate abuse, continuous ([address]    Heroin dependence ([address]    ST elevation myocardial infarction involving left circumflex coronary artery ([address]    STEMI (ST elevation myocardial infarction) ([address]    Acute on chronic combined systolic (congestive) and diastolic (congestive) heart failure (HCC)    Systolic and diastolic CHF w/reduced LV function, NYHA class 4 ([address]    NSTEMI (non-ST elevated myocardial infarction) ([address]    Acute on chronic congestive heart failure ([address]    Noncompliance    Pulmonary edema, acute (HCC)    Acute kidney injury ([facility]    Acute respiratory failure with hypoxia and hypercapnia (HCC)    Hypertensive emergency    Ischemic cardiomyopathy    Heart failure ([facility]    Left ventricular systolic dysfunction    Acute systolic congestive heart failure (HCC)    SOB (shortness of breath)       Plan:   1. Overall the patient has improved. 2. As per Dr Madsen Bachelor. 3. PET scan as outpt.     Sarah Tai MD  3/14/2020  11:28 AM

## 2020-03-14 NOTE — CONSULTS
Department of Cardiovascular & Thoracic Surgery   Consult Note    Reason for Consult: Mediastinal adenopathy of unknown etiology    Requesting Physician: Dr. Alesha Lindsay    Date of Consult: 3/14/20      History Obtained From:  Patient EMR     HISTORY OF PRESENT ILLNESS:    The patient is a 52 y.o. male who presents with shortness of breath about 2 to 3 days duration prior to his hospitalization  Following his hospitalization investigations he was noted to have a mediastinal adenopathy for which consultation was requested    He has known history of heart failure is unable to lie flat at times he has been admitted for the similar complaints  He has since been evaluated by pulmonology and oncology.       Past Medical History:        Diagnosis Date    CAD (coronary artery disease)     CHF (congestive heart failure) (HCC)     [facility]     Depression     [facility] ([facility]     HIGH CHOLESTEROL     Hypertension     MI (myocardial infarction) ([facility] 2011    S/P coronary artery bypass graft x 4 2011    CABG x 4 Dr Eh Vallejo     Past Surgical History:        Procedure Laterality Date    BYPASS GRAFT  04/10/2011    CABG x 4 Dr Angelina De Souza  11/2010     4 stents    LEG SURGERY       Current Medications:   Current Facility-Administered Medications: methylPREDNISolone sodium (SOLU-MEDROL) injection 40 mg, 40 mg, Intravenous, Q6H  guaiFENesin (MUCINEX) extended release tablet 600 mg, 600 mg, Oral, BID  ipratropium-albuterol (DUONEB) nebulizer solution 3 mL, 1 vial, Inhalation, Q4H WA  ipratropium-albuterol (DUONEB) nebulizer solution 1 ampule, 1 ampule, Inhalation, Q4H PRN  calcium carbonate (TUMS) chewable tablet 500 mg, 500 mg, Oral, TID PRN  albuterol sulfate  (90 Base) MCG/ACT inhaler 2 puff, 2 puff, Inhalation, Q4H PRN  apixaban (ELIQUIS) tablet 5 mg, 5 mg, Oral, BID  aspirin EC tablet 81 mg, 81 mg, Oral, Daily  atorvastatin (LIPITOR) tablet 40 mg, 40 mg, Oral, Daily  carvedilol (COREG) tablet 12.5 mg, 12.5 mg, Oral, BID WC  lisinopril (PRINIVIL;ZESTRIL) tablet 2.5 mg, 2.5 mg, Oral, Daily  spironolactone (ALDACTONE) tablet 12.5 mg, 12.5 mg, Oral, Daily  sodium chloride flush 0.9 % injection 10 mL, 10 mL, Intravenous, 2 times per day  sodium chloride flush 0.9 % injection 10 mL, 10 mL, Intravenous, PRN  acetaminophen (TYLENOL) tablet 650 mg, 650 mg, Oral, Q6H PRN **OR** acetaminophen (TYLENOL) suppository 650 mg, 650 mg, Rectal, Q6H PRN  polyethylene glycol (GLYCOLAX) packet 17 g, 17 g, Oral, Daily PRN  promethazine (PHENERGAN) tablet 12.5 mg, 12.5 mg, Oral, Q6H PRN **OR** ondansetron (ZOFRAN) injection 4 mg, 4 mg, Intravenous, Q6H PRN  bumetanide (BUMEX) injection 1 mg, 1 mg, Intravenous, BID  doxycycline hyclate (VIBRA-TABS) tablet 100 mg, 100 mg, Oral, BID  sodium chloride flush 0.9 % injection 10 mL, 10 mL, Intravenous, BID  Allergies:  No Known Allergies    Social History:   Social History     Socioeconomic History    Marital status:      Spouse name: Not on file    Number of children: Not on file    Years of education: Not on file    Highest education level: Not on file   Occupational History    Not on file   Social Needs    Financial resource strain: Not on file    Food insecurity     Worry: Not on file     Inability: Not on file    Transportation needs     Medical: Not on file     Non-medical: Not on file   Tobacco Use    Smoking status: Current Every Day Smoker     Packs/day: 1.00     Years: 20.00     Pack years: 20.00     Types: Cigarettes    Smokeless tobacco: Former User    Tobacco comment: Reviewed 10/9/17   Substance and Sexual Activity    Alcohol use: No     Alcohol/week: 0.0 standard drinks    Drug use: No     Comment: march 2018 states he quit    Sexual activity: Not Currently   Lifestyle    Physical activity     Days per week: Not on file     Minutes per session: Not on file    Stress: Not on file   Relationships    Social connections     Talks on phone: Not on file     Gets together: Not on file     Attends [employer] service: Not on file     Active member of club or organization: Not on file     Attends meetings of clubs or organizations: Not on file     Relationship status: Not on file    Intimate partner violence     Fear of current or ex partner: Not on file     Emotionally abused: Not on file     Physically abused: Not on file     Forced sexual activity: Not on file   Other Topics Concern    Not on file   Social History Narrative    Not on file       Family History:        Problem Relation Age of Onset    Cancer Father     Heart Failure Father     Heart Disease Father     Diabetes Mother     Heart Disease Mother        REVIEW OF SYSTEMS:  Constitutional: - fatigue, - fever, - chills, - night sweats  Eyes: - vision loss  Cardiovascular: -  chest pain, - palpitations, - leg swelling, - leg pain     Respiratory: - cough, + shortness of breath, - wheezing     GI: - nausea, - vomiting, - abdominal pain, - constipation, - diarrhea     : - dysuria   MSK: - joint pain, - muscle pain  Integument: - rash, - skin color change   Heme: - easy bruising or bleeding  Neurologic: - headache, - weakness, - dizziness, - paresthesias       EXAM:  Constitutional: Blood pressure (!) 110/91, pulse 100, temperature 97.5 °F (36.4 °C), resp. rate 18, height 5' 11\" (1.803 m), weight 190 lb (86.2 kg), SpO2 99 %. No apparent distress, appears stated age and cooperative. Neurologic: follows commands, no focal weakness noted   Lungs: Good respiratory effort.  Clear to auscultation,   CV: Regular rate/ rhythm , no peripheral edema, feet warm and well perfused  GI: Soft, non-tender in all four quadrants, non-distended, + bowel sounds, liver and spleen no palpable masses  : bladder nondistended   MSK: no obvious deformity   Skin: warm, pink and dry       DATA:  Chest x-ray reviewed independently, cardiomegaly is readily evident which is most likely the cause of his heart failure  Is likely to be fluid in the right major fissure  No obvious masses identified    CT scan images reviewed  Previously identified mediastinal adenopathy and predominantly in the hilum is is noted  Lung fields appear clear   cardiomegaly identified        Patient Active Problem List   Diagnosis    Essential hypertension    Osteoarthritis    COPD (chronic obstructive pulmonary disease) (HCC)    Paresthesia of left leg    Abdominal hernia    Left shoulder pain    Crushing injury of right hand    Rotator cuff tendinitis    Midline low back pain without sciatica    History of MI (myocardial infarction)    Coronary artery disease involving coronary bypass graft of native heart without angina pectoris    Mixed hyperlipidemia    Depression    Chronic midline low back pain without sciatica    Tobacco abuse    Gastroesophageal reflux disease without esophagitis    Opiate abuse, continuous (HCC)    Heroin dependence ([address]    ST elevation myocardial infarction involving left circumflex coronary artery ([address]    STEMI (ST elevation myocardial infarction) (HCC)    Acute on chronic combined systolic (congestive) and diastolic (congestive) heart failure (HCC)    Systolic and diastolic CHF w/reduced LV function, NYHA class 4 ([address]    NSTEMI (non-ST elevated myocardial infarction) ([address]    Acute on chronic congestive heart failure ([address]    Noncompliance    Pulmonary edema, acute ([facility])    Acute kidney injury ([address]    Acute respiratory failure with hypoxia and hypercapnia ([facility])    Hypertensive emergency    Ischemic cardiomyopathy    Heart failure ([address]    Left ventricular systolic dysfunction    Acute systolic congestive heart failure (HCC)    SOB (shortness of breath)       Mediastinal adenopathy      RECOMMENDATIONS:  Upon review of the serial x-rays and CT scans and reviewing the impressions of the oncologist agree with the plan to follow him as an outpatient after his current episodes of heart failure he may eventually need Biopsy of this adenopathy  Discussed the consideration of this doing the biopsy however patient appears to be not inclined to pursue this course of action at the present time  Patient advised to return to see us in follow-up visit as an outpatient for further discussion and consideration for a biopsy of his nose

## 2020-03-15 VITALS
BODY MASS INDEX: 26.6 KG/M2 | OXYGEN SATURATION: 97 % | HEART RATE: 93 BPM | HEIGHT: 71 IN | WEIGHT: 190 LBS | RESPIRATION RATE: 20 BRPM | SYSTOLIC BLOOD PRESSURE: 117 MMHG | DIASTOLIC BLOOD PRESSURE: 83 MMHG | TEMPERATURE: 98.7 F

## 2020-03-15 LAB
ANION GAP SERPL CALCULATED.3IONS-SCNC: 14 MMOL/L (ref 4–16)
BUN BLDV-MCNC: 35 MG/DL (ref 6–23)
CALCIUM SERPL-MCNC: 9.8 MG/DL (ref 8.3–10.6)
CHLORIDE BLD-SCNC: 96 MMOL/L (ref 99–110)
CO2: 26 MMOL/L (ref 21–32)
CREAT SERPL-MCNC: 1.2 MG/DL (ref 0.9–1.3)
CULTURE: ABNORMAL
GFR AFRICAN AMERICAN: >60 ML/MIN/1.73M2
GFR NON-AFRICAN AMERICAN: >60 ML/MIN/1.73M2
GLUCOSE BLD-MCNC: 202 MG/DL (ref 70–99)
GRAM SMEAR: ABNORMAL
Lab: ABNORMAL
MAGNESIUM: 1.9 MG/DL (ref 1.8–2.4)
POTASSIUM SERPL-SCNC: 5.1 MMOL/L (ref 3.5–5.1)
PRO-BNP: 4929 PG/ML
SODIUM BLD-SCNC: 136 MMOL/L (ref 135–145)
SPECIMEN: ABNORMAL

## 2020-03-15 PROCEDURE — 6370000000 HC RX 637 (ALT 250 FOR IP): Performed by: INTERNAL MEDICINE

## 2020-03-15 PROCEDURE — 80048 BASIC METABOLIC PNL TOTAL CA: CPT

## 2020-03-15 PROCEDURE — 2580000003 HC RX 258: Performed by: PHYSICIAN ASSISTANT

## 2020-03-15 PROCEDURE — 6360000002 HC RX W HCPCS: Performed by: INTERNAL MEDICINE

## 2020-03-15 PROCEDURE — 2580000003 HC RX 258: Performed by: INTERNAL MEDICINE

## 2020-03-15 PROCEDURE — 2500000003 HC RX 250 WO HCPCS: Performed by: INTERNAL MEDICINE

## 2020-03-15 PROCEDURE — 94761 N-INVAS EAR/PLS OXIMETRY MLT: CPT

## 2020-03-15 PROCEDURE — 94668 MNPJ CHEST WALL SBSQ: CPT

## 2020-03-15 PROCEDURE — 83735 ASSAY OF MAGNESIUM: CPT

## 2020-03-15 PROCEDURE — 83880 ASSAY OF NATRIURETIC PEPTIDE: CPT

## 2020-03-15 PROCEDURE — 36415 COLL VENOUS BLD VENIPUNCTURE: CPT

## 2020-03-15 PROCEDURE — 94640 AIRWAY INHALATION TREATMENT: CPT

## 2020-03-15 RX ORDER — LISINOPRIL 2.5 MG/1
2.5 TABLET ORAL DAILY
Qty: 30 TABLET | Refills: 1 | Status: SHIPPED | OUTPATIENT
Start: 2020-03-15 | End: 2020-03-15 | Stop reason: SDUPTHER

## 2020-03-15 RX ORDER — ALBUTEROL SULFATE 90 UG/1
2 AEROSOL, METERED RESPIRATORY (INHALATION) EVERY 4 HOURS PRN
Qty: 1 INHALER | Refills: 1 | Status: ON HOLD | OUTPATIENT
Start: 2020-03-15 | End: 2020-05-16 | Stop reason: HOSPADM

## 2020-03-15 RX ORDER — IPRATROPIUM BROMIDE AND ALBUTEROL SULFATE 2.5; .5 MG/3ML; MG/3ML
1 SOLUTION RESPIRATORY (INHALATION) 4 TIMES DAILY
Qty: 360 ML | Refills: 1 | Status: ON HOLD | OUTPATIENT
Start: 2020-03-15 | End: 2020-05-22 | Stop reason: SDUPTHER

## 2020-03-15 RX ORDER — PREDNISONE 10 MG/1
10 TABLET ORAL DAILY
Qty: 3 TABLET | Refills: 0 | Status: SHIPPED | OUTPATIENT
Start: 2020-03-15 | End: 2020-03-18

## 2020-03-15 RX ORDER — LISINOPRIL 5 MG/1
5 TABLET ORAL DAILY
Qty: 30 TABLET | Refills: 0 | Status: ON HOLD | OUTPATIENT
Start: 2020-03-15 | End: 2020-05-16 | Stop reason: SDUPTHER

## 2020-03-15 RX ORDER — BUMETANIDE 1 MG/1
1 TABLET ORAL 2 TIMES DAILY
Qty: 60 TABLET | Refills: 1 | Status: ON HOLD | OUTPATIENT
Start: 2020-03-15 | End: 2020-05-22 | Stop reason: SDUPTHER

## 2020-03-15 RX ORDER — CEFUROXIME AXETIL 500 MG/1
500 TABLET ORAL 2 TIMES DAILY
Qty: 4 TABLET | Refills: 0 | Status: SHIPPED | OUTPATIENT
Start: 2020-03-15 | End: 2020-03-17

## 2020-03-15 RX ORDER — GUAIFENESIN 600 MG/1
600 TABLET, EXTENDED RELEASE ORAL 2 TIMES DAILY
Qty: 30 TABLET | Refills: 0 | Status: ON HOLD | OUTPATIENT
Start: 2020-03-15 | End: 2020-05-13

## 2020-03-15 RX ADMIN — IPRATROPIUM BROMIDE AND ALBUTEROL SULFATE 3 ML: .5; 3 SOLUTION RESPIRATORY (INHALATION) at 11:06

## 2020-03-15 RX ADMIN — SODIUM CHLORIDE, PRESERVATIVE FREE 10 ML: 5 INJECTION INTRAVENOUS at 11:26

## 2020-03-15 RX ADMIN — IPRATROPIUM BROMIDE AND ALBUTEROL SULFATE 3 ML: .5; 3 SOLUTION RESPIRATORY (INHALATION) at 15:03

## 2020-03-15 RX ADMIN — SPIRONOLACTONE 12.5 MG: 25 TABLET ORAL at 11:25

## 2020-03-15 RX ADMIN — ATORVASTATIN CALCIUM 40 MG: 40 TABLET, FILM COATED ORAL at 11:25

## 2020-03-15 RX ADMIN — GUAIFENESIN 600 MG: 600 TABLET, EXTENDED RELEASE ORAL at 11:25

## 2020-03-15 RX ADMIN — DOXYCYCLINE HYCLATE 100 MG: 100 TABLET, COATED ORAL at 11:25

## 2020-03-15 RX ADMIN — IPRATROPIUM BROMIDE AND ALBUTEROL SULFATE 3 ML: .5; 3 SOLUTION RESPIRATORY (INHALATION) at 07:19

## 2020-03-15 RX ADMIN — Medication 10 ML: at 11:30

## 2020-03-15 RX ADMIN — BUMETANIDE 1 MG: 0.25 INJECTION INTRAMUSCULAR; INTRAVENOUS at 11:25

## 2020-03-15 RX ADMIN — LISINOPRIL 2.5 MG: 2.5 TABLET ORAL at 11:25

## 2020-03-15 RX ADMIN — METHYLPREDNISOLONE SODIUM SUCCINATE 40 MG: 40 INJECTION, POWDER, FOR SOLUTION INTRAMUSCULAR; INTRAVENOUS at 11:25

## 2020-03-15 RX ADMIN — METHYLPREDNISOLONE SODIUM SUCCINATE 40 MG: 40 INJECTION, POWDER, FOR SOLUTION INTRAMUSCULAR; INTRAVENOUS at 04:33

## 2020-03-15 RX ADMIN — ASPIRIN 81 MG: 81 TABLET, COATED ORAL at 11:25

## 2020-03-15 RX ADMIN — CARVEDILOL 12.5 MG: 12.5 TABLET, FILM COATED ORAL at 11:25

## 2020-03-15 ASSESSMENT — PAIN SCALES - GENERAL
PAINLEVEL_OUTOF10: 0

## 2020-03-15 NOTE — PROGRESS NOTES
Or  ondansetron, 4 mg, Q6H PRN          Electronically signed by Marge Aguilar MD on 3/14/2020 at 9:17 PM

## 2020-03-15 NOTE — PROGRESS NOTES
HOSPITALIST      Patient was seen in hospital for COPD  . I am prescribing oxygen because the diagnosis and testing requires the patient to have oxygen in the home. Conditions will improve or be benefited by oxygen use. The patient is able to perform good mobility and therefore requires the use of a portable oxygen system for ambulation.

## 2020-03-16 LAB
ASPERGILLUS ANTIBODY ID: NORMAL
ASPERGILLUS ANTIBODY ID: NORMAL
BLASTOMYCES ANTIBODY ID: NORMAL
BLASTOMYCES ANTIBODY ID: NORMAL
COCCIDIOIDES ANTIBODY ID: NORMAL
COCCIDIOIDES ANTIBODY ID: NORMAL
HISTOPLASMA AB, ID: NORMAL
HISTOPLASMA AB, ID: NORMAL

## 2020-03-18 LAB
EKG ATRIAL RATE: 108 BPM
EKG DIAGNOSIS: NORMAL
EKG P AXIS: 80 DEGREES
EKG P-R INTERVAL: 156 MS
EKG Q-T INTERVAL: 356 MS
EKG QRS DURATION: 118 MS
EKG QTC CALCULATION (BAZETT): 477 MS
EKG R AXIS: 81 DEGREES
EKG T AXIS: 95 DEGREES
EKG VENTRICULAR RATE: 108 BPM

## 2020-03-22 NOTE — DISCHARGE SUMMARY
advised to follow-up with them as well. He improved, and was discharged home in stable condition. Problem list    COPD exacerbation  -Steroids: He received IV Solu-Medrol initially on the 11th followed by prednisone for 2 days. When he was not improving he was given IV Solu-Medrol 40 mg every 6 hours for 1 day. He was then again switched to prednisone which he had 1 dose of the day of discharge. He was discharged home with prednisone 10 mg daily for 3 days.  -Antibiotics: he received 4 days of doxycycline while here. Sputum culture grew Klebsiella. He was discharged with Ceftin 500 mg twice daily for 2 more days.   -Procalcitonin is negative, so Klebsiella is probably just a colonizer. There was only scanty growth. He was treated as a precaution given his severe heart disease. Mediastinal adenopathy  -This problem is known from last admission when he was seen by CT surgery. He was supposed to follow-up with CT surgery for a PET scan but he had did not. During this admission pulmonary, CT surgery and oncology were consulted. -Repeat CTA chest showed the following  -Stable mediastinal and bilateral hilar lymphadenopathy, compared to February 7, 2020. This is nonspecific. Correlation with PET-CT may be helpful. At minimum, continued close follow-up recommended within 3 months. Lymphoproliferative disorder or metastatic disease is not excluded.   -Per CT surgery and oncology, he decided not to have a biopsy at this time. He will follow-up with them. -Patient talked to me quite a bit and he seemed to not want to now. He was at times talking about end-of-life issues. He indicated he has severe heart disease. He was supported. I did advise him though that we do not know what the problem right now, and to continue to follow-up.     Chronic systolic/diastolic heart failure-HFrEF  -Ejection fraction 06%, grade 3 diastolic dysfunction, and severe mitral valve regurgitation were seen on echocardiogram in January. Per echo report those were old findings at the time.  -He had 3 admissions for CHF in 2018, and 1 in 2019.  -He had 2 other admissions in 2020 during which CHF was not an issue  -Beta blocker: Resume carvedilol 12.5 mg daily  -ACE inhibitor: Lisinopril 5 mg daily was started this admission. Blood pressure was 1/17/1983 upon discharge. He tolerated it well.  -Loop diuretic: Resume bumetanide 1 mg twice daily  -Mineralocorticoid receptor antagonist: Resume Spironolactone 12.5 mg daily  -ICD eval: He was seen by Dr. Yasemin Youssef recently who recommended optimizing his medications and following up in the future for an ICD eval if EF is still low. Coronary artery disease status post CABG in 2010  -In 2018 he had a STEMI; cath at that time showed that all the SVG's were occluded. The LIMA was patent and he had right-to-left collaterals to the ramus. He was managed medically. Per cath report at that time he was noncompliant.  -Resume aspirin and Lipitor    Anticoagulation with Eliquis:    -During an admission in late January at [facility] scan was read as showing an intermediate probability scan pattern for pulmonary embolism.  -He has been on Eliquis  -Patient reports that when he takes his medications he gets nauseated and short of breath. He did not know which one was causing that. However while here he believes that Eliquis was causing it.  -Upon discharge I switched him to [address] [place] [id number]  -Hematology saw him this admission and established care with him. They recommended continuing anticoagulation for 6 months.  -The 6-month. With and in late June 2020    Hypoxia-he qualified for home oxygen this admission. I strongly advised him to avoid smoking around the oxygen. He understands the risks. He is a smoker. Opiate use disorder  -He was admitted for this issue here to this hospital in 2017 and 2018    Microcytic anemia  -MCV is 74 and hemoglobin is 10.3. He is anticoagulated with Eliquis and takes aspirin.   Was tablet  Take 1 tablet by mouth daily             atorvastatin (LIPITOR) 40 MG tablet  Take 1 tablet by mouth daily             bumetanide (BUMEX) 1 MG tablet  Take 1 tablet by mouth 2 times daily             carvedilol (COREG) 12.5 MG tablet  Take 1 tablet by mouth 2 times daily (with meals)             guaiFENesin (MUCINEX) 600 MG extended release tablet  Take 1 tablet by mouth 2 times daily             ipratropium-albuterol (DUONEB) 0.5-2.5 (3) MG/3ML SOLN nebulizer solution  Inhale 3 mLs into the lungs 4 times daily             lisinopril (PRINIVIL;ZESTRIL) 5 MG tablet  Take 1 tablet by mouth daily             nicotine (NICODERM CQ) 21 MG/24HR  Place 1 patch onto the skin daily             nitroGLYCERIN (NITROSTAT) 0.4 MG SL tablet  Place 1 tablet under the tongue every 5 minutes as needed for Chest pain             rivaroxaban (XARELTO) 20 MG TABS tablet  Take 1 tablet by mouth daily (with breakfast)             spironolactone (ALDACTONE) 25 MG tablet  Take 0.5 tablets by mouth daily                 Objective Findings at Discharge:   /83   Pulse 93   Temp 98.7 °F (37.1 °C)   Resp 20   Ht 5' 11\" (1.803 m)   Wt 190 lb (86.2 kg)   SpO2 97%   BMI 26.50 kg/m²            PHYSICAL EXAM   GEN Awake male, sitting upright in bed in no apparent distress. Appears given age.     LABS:    CBC:   Lab Results   Component Value Date    WBC 12.2 03/13/2020    HGB 10.3 03/13/2020    HCT 35.4 03/13/2020    MCV 73.6 03/13/2020     03/13/2020     BMP:   Lab Results   Component Value Date     03/15/2020    K 5.1 03/15/2020    CL 96 03/15/2020    CO2 26 03/15/2020    PHOS 3.4 03/12/2020    BUN 35 03/15/2020    CREATININE 1.2 03/15/2020    CALCIUM 9.8 03/15/2020     IMAGING:     CTA PULMONARY W CONTRAST [[id number]] Collected: 03/11/20 5140     Order Status: Completed Updated: 03/12/20 2603     Narrative:       EXAMINATION:  CTA OF THE CHEST 3/11/2020 10:44 pm    TECHNIQUE:  CTA of the chest was performed after disorder or metastatic disease is not excluded. 4. Near complete resolution of bilateral pleural effusions. Persistent  loculated fluid within the right major fissure is grossly stable. VL DUP LOWER EXTREMITY VENOUS BILATERAL [[id number]] Collected: 03/11/20 2024     Order Status: Completed Updated: 03/11/20 2028     Narrative:       EXAMINATION:  DUPLEX VENOUS ULTRASOUND OF THE BILATERAL LOWER EXTREMITIES, 3/11/2020 7:44 pm    TECHNIQUE:  Duplex ultrasound and Doppler images were obtained of the bilateral lower  extremities    COMPARISON:  02/07/2020, 01/24/2020    HISTORY:  ORDERING SYSTEM PROVIDED HISTORY: pain  TECHNOLOGIST PROVIDED HISTORY:  Reason for exam:->pain  Reason for Exam: swelling  Acuity: Unknown  Type of Exam: Ongoing  Additional signs and symptoms: SOB  Relevant Medical/Surgical History: hx of CHF    FINDINGS:  The visualized veins of the bilateral lower extremities are patent and free  of echogenic thrombus. The veins are normally compressible and have normal  phasic flow. Impression:       Negative study for deep venous thrombosis in the bilateral lower extremities. XR CHEST STANDARD (2 VW) [[id number]] Collected: 03/11/20 1619     Order Status: Completed Specimen: Chest Updated: 03/11/20 1624     Narrative:       EXAMINATION:  TWO XRAY VIEWS OF THE CHEST    3/11/2020 4:14 pm    COMPARISON:  02/07/2020    HISTORY:  ORDERING SYSTEM PROVIDED HISTORY: chest pain  TECHNOLOGIST PROVIDED HISTORY:  Reason for exam:->chest pain  Reason for Exam: SOB, CHEST PAIN  Acuity: Unknown  Type of Exam: Initial  Additional signs and symptoms: none  Relevant Medical/Surgical History: CHF, COPD, CAD, MI    FINDINGS:  Status post median sternotomy. Stable cardiomegaly. No new airspace  disease. Right lower lobe mass not significantly changed. Impression:       1. Stable right lower lobe masslike density on CT appears to represent fluid  in the right major fissure  2. Cardiomegaly, unchanged  3.  No acute abnormality seen in the chest     XR CHEST PORTABLE [[id number]] Updated: 03/11/20 1614     Order Status: Canceled          Discharge Time of 45 minutes    Electronically signed by Wing Beatriz MD on 3/22/2020 at 10:32 AM

## 2020-05-10 ENCOUNTER — APPOINTMENT (OUTPATIENT)
Dept: CT IMAGING | Age: 50
DRG: 291 | End: 2020-05-10
Payer: MEDICARE

## 2020-05-10 ENCOUNTER — APPOINTMENT (OUTPATIENT)
Dept: GENERAL RADIOLOGY | Age: 50
DRG: 291 | End: 2020-05-10
Payer: MEDICARE

## 2020-05-10 ENCOUNTER — HOSPITAL ENCOUNTER (EMERGENCY)
Age: 50
Discharge: HOME | DRG: 291 | End: 2020-05-10
Payer: MEDICARE

## 2020-05-10 VITALS
SYSTOLIC BLOOD PRESSURE: 126 MMHG | BODY MASS INDEX: 23.38 KG/M2 | WEIGHT: 167 LBS | DIASTOLIC BLOOD PRESSURE: 88 MMHG | OXYGEN SATURATION: 98 % | HEART RATE: 99 BPM | HEIGHT: 71 IN | TEMPERATURE: 97.7 F | RESPIRATION RATE: 17 BRPM

## 2020-05-10 DIAGNOSIS — R59.1 LYMPHADENOPATHY: ICD-10-CM

## 2020-05-10 DIAGNOSIS — R06.00 DYSPNEA, UNSPECIFIED TYPE: ICD-10-CM

## 2020-05-10 DIAGNOSIS — J44.1 COPD EXACERBATION (HCC): Primary | ICD-10-CM

## 2020-05-10 DIAGNOSIS — R79.89 ELEVATED LFTS: ICD-10-CM

## 2020-05-10 DIAGNOSIS — R79.89 ELEVATED SERUM CREATININE: ICD-10-CM

## 2020-05-10 LAB
ALBUMIN SERPL-MCNC: 4.2 GM/DL (ref 3.4–5)
ALP BLD-CCNC: 98 IU/L (ref 40–129)
ALT SERPL-CCNC: 264 U/L (ref 10–40)
ANION GAP SERPL CALCULATED.3IONS-SCNC: 18 MMOL/L (ref 4–16)
AST SERPL-CCNC: 271 IU/L (ref 15–37)
BASOPHILS ABSOLUTE: 0.1 K/CU MM
BASOPHILS RELATIVE PERCENT: 0.9 % (ref 0–1)
BILIRUB SERPL-MCNC: 2.8 MG/DL (ref 0–1)
BUN BLDV-MCNC: 44 MG/DL (ref 6–23)
CALCIUM SERPL-MCNC: 9.6 MG/DL (ref 8.3–10.6)
CHLORIDE BLD-SCNC: 96 MMOL/L (ref 99–110)
CO2: 18 MMOL/L (ref 21–32)
CREAT SERPL-MCNC: 2.2 MG/DL (ref 0.9–1.3)
DIFFERENTIAL TYPE: ABNORMAL
EOSINOPHILS ABSOLUTE: 0 K/CU MM
EOSINOPHILS RELATIVE PERCENT: 0.2 % (ref 0–3)
GFR AFRICAN AMERICAN: 39 ML/MIN/1.73M2
GFR NON-AFRICAN AMERICAN: 32 ML/MIN/1.73M2
GLUCOSE BLD-MCNC: 113 MG/DL (ref 70–99)
HCT VFR BLD CALC: 33.8 % (ref 42–52)
HEMOGLOBIN: 10.2 GM/DL (ref 13.5–18)
IMMATURE NEUTROPHIL %: 0.7 % (ref 0–0.43)
LYMPHOCYTES ABSOLUTE: 0.9 K/CU MM
LYMPHOCYTES RELATIVE PERCENT: 10.7 % (ref 24–44)
MCH RBC QN AUTO: 20.4 PG (ref 27–31)
MCHC RBC AUTO-ENTMCNC: 30.2 % (ref 32–36)
MCV RBC AUTO: 67.5 FL (ref 78–100)
MONOCYTES ABSOLUTE: 0.8 K/CU MM
MONOCYTES RELATIVE PERCENT: 9 % (ref 0–4)
NUCLEATED RBC %: 0.8 %
PDW BLD-RTO: 21.3 % (ref 11.7–14.9)
PLATELET # BLD: 314 K/CU MM (ref 140–440)
PMV BLD AUTO: 9.6 FL (ref 7.5–11.1)
POTASSIUM SERPL-SCNC: 4 MMOL/L (ref 3.5–5.1)
PRO-BNP: 3762 PG/ML
RBC # BLD: 5.01 M/CU MM (ref 4.6–6.2)
SEGMENTED NEUTROPHILS ABSOLUTE COUNT: 6.8 K/CU MM
SEGMENTED NEUTROPHILS RELATIVE PERCENT: 78.5 % (ref 36–66)
SODIUM BLD-SCNC: 132 MMOL/L (ref 135–145)
TOTAL IMMATURE NEUTOROPHIL: 0.06 K/CU MM
TOTAL NUCLEATED RBC: 0.1 K/CU MM
TOTAL PROTEIN: 7.1 GM/DL (ref 6.4–8.2)
TROPONIN T: 0.02 NG/ML
WBC # BLD: 8.7 K/CU MM (ref 4–10.5)

## 2020-05-10 PROCEDURE — 94640 AIRWAY INHALATION TREATMENT: CPT

## 2020-05-10 PROCEDURE — 6360000004 HC RX CONTRAST MEDICATION: Performed by: PHYSICIAN ASSISTANT

## 2020-05-10 PROCEDURE — 93010 ELECTROCARDIOGRAM REPORT: CPT | Performed by: INTERNAL MEDICINE

## 2020-05-10 PROCEDURE — 71045 X-RAY EXAM CHEST 1 VIEW: CPT

## 2020-05-10 PROCEDURE — 6370000000 HC RX 637 (ALT 250 FOR IP): Performed by: PHYSICIAN ASSISTANT

## 2020-05-10 PROCEDURE — 74176 CT ABD & PELVIS W/O CONTRAST: CPT

## 2020-05-10 PROCEDURE — 96374 THER/PROPH/DIAG INJ IV PUSH: CPT

## 2020-05-10 PROCEDURE — 71275 CT ANGIOGRAPHY CHEST: CPT

## 2020-05-10 PROCEDURE — 84484 ASSAY OF TROPONIN QUANT: CPT

## 2020-05-10 PROCEDURE — 93005 ELECTROCARDIOGRAM TRACING: CPT | Performed by: PHYSICIAN ASSISTANT

## 2020-05-10 PROCEDURE — 6360000002 HC RX W HCPCS: Performed by: PHYSICIAN ASSISTANT

## 2020-05-10 PROCEDURE — 99285 EMERGENCY DEPT VISIT HI MDM: CPT

## 2020-05-10 PROCEDURE — 80053 COMPREHEN METABOLIC PANEL: CPT

## 2020-05-10 PROCEDURE — 94761 N-INVAS EAR/PLS OXIMETRY MLT: CPT

## 2020-05-10 PROCEDURE — 85025 COMPLETE CBC W/AUTO DIFF WBC: CPT

## 2020-05-10 PROCEDURE — 83880 ASSAY OF NATRIURETIC PEPTIDE: CPT

## 2020-05-10 RX ORDER — FUROSEMIDE 10 MG/ML
40 INJECTION INTRAMUSCULAR; INTRAVENOUS ONCE
Status: COMPLETED | OUTPATIENT
Start: 2020-05-10 | End: 2020-05-10

## 2020-05-10 RX ORDER — IPRATROPIUM BROMIDE AND ALBUTEROL SULFATE 2.5; .5 MG/3ML; MG/3ML
1 SOLUTION RESPIRATORY (INHALATION) ONCE
Status: COMPLETED | OUTPATIENT
Start: 2020-05-10 | End: 2020-05-10

## 2020-05-10 RX ORDER — PREDNISONE 50 MG/1
50 TABLET ORAL DAILY
Qty: 7 TABLET | Refills: 0 | Status: ON HOLD | OUTPATIENT
Start: 2020-05-10 | End: 2020-05-16 | Stop reason: HOSPADM

## 2020-05-10 RX ORDER — DOXYCYCLINE HYCLATE 100 MG
100 TABLET ORAL 2 TIMES DAILY
Qty: 20 TABLET | Refills: 0 | Status: ON HOLD | OUTPATIENT
Start: 2020-05-10 | End: 2020-05-16 | Stop reason: HOSPADM

## 2020-05-10 RX ORDER — SPIRONOLACTONE 25 MG/1
12.5 TABLET ORAL DAILY
Qty: 15 TABLET | Refills: 0 | Status: ON HOLD | OUTPATIENT
Start: 2020-05-10 | End: 2020-05-16 | Stop reason: SDUPTHER

## 2020-05-10 RX ADMIN — IOPAMIDOL 75 ML: 755 INJECTION, SOLUTION INTRAVENOUS at 12:57

## 2020-05-10 RX ADMIN — FUROSEMIDE 40 MG: 10 INJECTION, SOLUTION INTRAVENOUS at 10:51

## 2020-05-10 RX ADMIN — IPRATROPIUM BROMIDE AND ALBUTEROL SULFATE 1 AMPULE: .5; 3 SOLUTION RESPIRATORY (INHALATION) at 12:19

## 2020-05-10 NOTE — ED PROVIDER NOTES
infarction) [facility]) 2011    S/P coronary artery bypass graft x 4 2011    CABG x 4 Dr Nai Rodriguez     Past Surgical History:   Procedure Laterality Date    BYPASS GRAFT  04/10/2011    CABG x 4 Dr Anjelica Garcia  11/2010     4 stents    LEG SURGERY         CURRENT MEDICATIONS    Current Outpatient Rx   Medication Sig Dispense Refill    spironolactone (ALDACTONE) 25 MG tablet Take 0.5 tablets by mouth daily 15 tablet 0    predniSONE (DELTASONE) 50 MG tablet Take 1 tablet by mouth daily for 7 days 7 tablet 0    doxycycline hyclate (VIBRA-TABS) 100 MG tablet Take 1 tablet by mouth 2 times daily for 10 days 20 tablet 0    albuterol sulfate HFA (VENTOLIN HFA) 108 (90 Base) MCG/ACT inhaler Inhale 2 puffs into the lungs every 4 hours as needed for Wheezing or Shortness of Breath 1 Inhaler 1    ipratropium-albuterol (DUONEB) 0.5-2.5 (3) MG/3ML SOLN nebulizer solution Inhale 3 mLs into the lungs 4 times daily 360 mL 1    bumetanide (BUMEX) 1 MG tablet Take 1 tablet by mouth 2 times daily 60 tablet 1    rivaroxaban (XARELTO) 20 MG TABS tablet Take 1 tablet by mouth daily (with breakfast) 30 tablet 0    guaiFENesin (MUCINEX) 600 MG extended release tablet Take 1 tablet by mouth 2 times daily 30 tablet 0    lisinopril (PRINIVIL;ZESTRIL) 5 MG tablet Take 1 tablet by mouth daily 30 tablet 0    albuterol sulfate HFA (PROVENTIL HFA) 108 (90 Base) MCG/ACT inhaler Inhale 2 puffs into the lungs every 4 hours as needed for Wheezing or Shortness of Breath With spacer (and mask if indicated). Thanks.  1 Inhaler 1    nicotine (NICODERM CQ) 21 MG/24HR Place 1 patch onto the skin daily 30 patch 3    aspirin 81 MG EC tablet Take 1 tablet by mouth daily 30 tablet 0    atorvastatin (LIPITOR) 40 MG tablet Take 1 tablet by mouth daily 30 tablet 0    carvedilol (COREG) 12.5 MG tablet Take 1 tablet by mouth 2 times daily (with meals) 60 tablet 0    nitroGLYCERIN (NITROSTAT) 0.4 MG SL tablet Place 1 tablet under the tongue every 5 minutes as needed for Chest pain 25 tablet 0       ALLERGIES    No Known Allergies    SOCIAL & FAMILY HISTORY    Social History     Socioeconomic History    Marital status:      Spouse name: None    Number of children: None    Years of education: None    Highest education level: None   Occupational History    None   Social Needs    Financial resource strain: None    Food insecurity     Worry: None     Inability: None    Transportation needs     Medical: None     Non-medical: None   Tobacco Use    Smoking status: Current Every Day Smoker     Packs/day: 1.00     Years: 20.00     Pack years: 20.00     Types: Cigarettes    Smokeless tobacco: Former User    Tobacco comment: Reviewed 10/9/17   Substance and Sexual Activity    Alcohol use: No     Alcohol/week: 0.0 standard drinks    Drug use: No     Comment: march 2018 states he quit    Sexual activity: Not Currently   Lifestyle    Physical activity     Days per week: None     Minutes per session: None    Stress: None   Relationships    Social connections     Talks on phone: None     Gets together: None     Attends Holiness service: None     Active member of club or organization: None     Attends meetings of clubs or organizations: None     Relationship status: None    Intimate partner violence     Fear of current or ex partner: None     Emotionally abused: None     Physically abused: None     Forced sexual activity: None   Other Topics Concern    None   Social History Narrative    None     Family History   Problem Relation Age of Onset    Cancer Father     Heart Failure Father     Heart Disease Father     Diabetes Mother     Heart Disease Mother        PHYSICAL EXAM    VITAL SIGNS: /88   Pulse 99   Temp 97.7 °F (36.5 °C) (Oral)   Resp 17   Ht 5' 11\" (1.803 m)   Wt 167 lb (75.8 kg)   SpO2 98%   BMI 23.29 kg/m²    Constitutional:  Well developed, well nourished, no acute distress   HENT:  Atraumatic, moist mucus - 40 U/L     (H) 15 - 37 IU/L    Alkaline Phosphatase 98 40 - 129 IU/L    GFR Non- 32 (L) >60 mL/min/1.73m2    GFR  39 (L) >60 mL/min/1.73m2    Anion Gap 18 (H) 4 - 16   Brain Natriuretic Peptide   Result Value Ref Range    Pro-BNP 3,762 (H) <300 PG/ML   Troponin   Result Value Ref Range    Troponin T 0.020 (H) <0.01 NG/ML   EKG 12 Lead   Result Value Ref Range    Ventricular Rate 112 BPM    Atrial Rate 112 BPM    P-R Interval 158 ms    QRS Duration 120 ms    Q-T Interval 354 ms    QTc Calculation (Bazett) 483 ms    P Axis 72 degrees    R Axis 74 degrees    T Axis 98 degrees    Diagnosis       Sinus tachycardia with occasional premature ventricular complexes  Anterolateral infarct , age undetermined  Abnormal ECG  When compared with ECG of 11-MAR-2020 16:08,  premature ventricular complexes are now present  No significant change was found    Confirmed by Enrrique Holly MD, [facility] ([id number]) on 5/10/2020 3:58:09 PM             RADIOLOGY/PROCEDURES    CXR:    CT ABDOMEN PELVIS WO CONTRAST   Final Result   1. No evidence for pulmonary embolism. 2. Stable cardiomegaly. 3. Redemonstration of mediastinal and hilar lymphadenopathy is noted on   previous exams. Again, lymphoproliferative disorder or metastatic disease   cannot be excluded. 4. Similar appearance of loculated fluid along the right major fissure. 5. No acute intra-process identified. 6. A few mildly enlarged nodes along the left iliac chain and at the left   inguinal region which are nonspecific. 7. Atherosclerotic disease. CTA PULMONARY W CONTRAST   Final Result   1. No evidence for pulmonary embolism. 2. Stable cardiomegaly. 3. Redemonstration of mediastinal and hilar lymphadenopathy is noted on   previous exams. Again, lymphoproliferative disorder or metastatic disease   cannot be excluded. 4. Similar appearance of loculated fluid along the right major fissure. 5. No acute intra-process identified. 6. A few mildly enlarged nodes along the left iliac chain and at the left   inguinal region which are nonspecific. 7. Atherosclerotic disease. XR CHEST PORTABLE   Final Result   Decreasing fluid in the right pleural space/major fissure. Cardiomegaly. ED COURSE & MEDICAL DECISION MAKING       Vital signs and nursing notes reviewed during ED course. I have independently evaluated this patient . Supervising MD present in the Emergency Department, available for consultation, throughout entirety of  patient care. With ongoing COVID-19 pandemic I did wear mask, eye protection and gloves during my interaction with patient. Patient presents as above. He is tachycardic on arrival with otherwise normal vital signs, afebrile, oxygenating at 97% on room air. Does have mild bilateral peripheral edema noted on exam.  Lungs with scattered rhonchi and expiratory wheeze. He is given a breathing treatment and dose of IV Lasix. Lab work significant for chronic anemia. BUN and creatinine elevated from baseline, creatinine of 2.2, appears baseline of 1.3-1.6 on chart review. Transaminitis noted, on chart review it has been elevated similarly in the past.  T bili more elevated than typical.  Troponin detectable at 0.020 in the setting of decreased kidney function. On chart review, this has been elevated similarly in the past as well. BNP of over 3000. Pulmonary CTA and CT abdomen and pelvis without evidence of PE. There is stable cardiomegaly. There is redemonstration of mediastinal and hilar lymphadenopathy noted on previous exams. Lymphoproliferative disorder or metastatic disease cannot be excluded. There is loculated fluid along the right major fissure which is similar to previous exams. No acute intra-abdominal process noted. There are a few mildly enlarged nodes along the left iliac chain and at the left inguinal region which are nonspecific.    Above labs and imaging discussed with patient. He is initially agreeable with admission, however then felt that he would like to be discharged home. I discussed with patient that he has an acute kidney injury for which I would recommend hospitalization for further evaluation and treatment and close monitoring with diuresis. Patient refuses admission at this time, states that he will return if symptoms worsen at all. He is aware of the mediastinal and hilar lymphadenopathy and concern for possible malignant process. He states he does not want any further work-up for this because if this is cancer he would not want any further treatment. Case management is consulted who evaluates patient and does provide assistance for filling his home medications. I encouraged patient to return at any time for further evaluation and likely hospitalization, however should immediately return with any new or worsening symptoms. He is agreeable with this as able to reiterate the risks of discharge home with new or worsening symptoms including death. Following this shared decision making conversation, patient discharged home. He remains hemodynamically stable, afebrile, normal pulse and oxygenation on discharge. All pertinent Lab data and radiographic results reviewed with patient at bedside. The patient and/or the family were informed of the results of any tests/labs/imaging, the treatment plan, and time was allotted to answer questions. Vitals:    05/10/20 1145 05/10/20 1219 05/10/20 1304 05/10/20 1400   BP: (!) 132/90   126/88   Pulse: 108   99   Resp: 18 17     Temp:       TempSrc:       SpO2: 96% 99% 98% 98%   Weight:       Height:           Clinical  IMPRESSION    1. COPD exacerbation (HCC)    2. Dyspnea, unspecified type    3. Elevated LFTs    4. Elevated serum creatinine    5. Lymphadenopathy          Diagnosis and plan discussed in detail with patient. Patient agrees to return emergency department if symptoms worsen or any new symptoms develop. Comment: Please note this report has been produced using speech recognition software and may contain errors related to that system including errors in grammar, punctuation, and spelling, as well as words and phrases that may be inappropriate. If there are any questions or concerns please feel free to contact the dictating provider for clarification.                           KRISSY Kumar  05/10/20 1127

## 2020-05-10 NOTE — ED PROVIDER NOTES
This EKG was interpreted by me. Rate is 112, rhythm is sinus with occasional PVCs. CA and QT intervals are within normal limits. There is no ST segment or T wave changes. This EKG was compared to previous EKG from date 3/11/2020. Appears similar to previous EKG.      Rosangela Soni DO  05/10/20 3681

## 2020-05-10 NOTE — CARE COORDINATION
LSW was consulted to assist this pt with discharge planning. Pt here today for SOB x 3 days. Pt has hx of CHF. LSW spoke to Lucas who explained pt to be d/c'd unless significant findings on labs. Pt not in need of COVID testing and just SOB. Pt reported he does not have the money to get his lasix last month and will need assistance w/this script if d/c'd. LSW then completed Chart review which notes Pt well known to LU[facility] FERNAND[facility] Mccormick and has had previous admissions for SOB. Also, pt received Med Asst during 1/23 - 1/28 admission and is listed on Med Asst list as not completing application which was mailed to pt 1/30/20. Also, pt was to be d/c'd home w/02 after 3/11 - 3/12 admission. 0  LSW spoke to this pt and explained CM role. Pt reports he is  and lives in a 1 story home he owns. Pt explained his oldest so, Caterina Cramer, lives w/him as well as his son's SO. Pt has 4 children and noted he does not care about any of them. Yet, pt continues to allow his son to live w/him and pay no rent. Pt voiced much frustration w/his past doctors and this hospital as well as Ritu mejia. Pt believe they both killed his parents. Also, pt does not feel he receives good care while here. Only reason pt came here is because his son drove him here. Pt wanted to go [facility] in [place], [place]. Pt does have a truck and he does drive but pt noted d/t his breathing difficulty, he did not drive today. Pt explained he takes breathing tx's every 4 hrs @ home. Pt explained he receives disability (730/mo) since 2015 for heart issues. Pt states he had 4 heart attacks. Pt noted he was to have a pacemaker placed but he declined. LSW completed ACP w/pt and he noted how he wants no Resuscitation or Ventilation. Pt also noted he does not want any family or contact information listed in chart. Pt stated his heart is only @ 10% capacity. Pt states he smokes a pack of cigarettes a day and is not interested in quitting.  Pt just wants some assistance for his breathing difficulty and to get some fluids off himself. Pt notes the lasix he was given in ED are helping. Pt also explained he has to pay 310/mo for child support so things are tight financially and although pt does have insurance, he cannot afford his medications. Pt does have Medicaid pending. Pt does use good rx but still chooses which meds he will get filled as he never has enough money. LSW gave pt a Med Asst application and informed him he needs to complete if to receive any assistance in future. Pt does not have a PCP and noted he does not trust doctor's. LSW asked pt how he gets scripts for his meds and noted the hospital always prescribes them. Pt use to go to Dr. Robin Colon but had a falling out. LSW talked w/pt about coming to hospital as not an appropriate and he needs to secure PCP but pt states unlikely he will do so. LSW did provide him PCP list for rAthur Inman. Pt has the following DME: [employer]. Pt did report he never went home w/02 after 3/12 admission but never followed-up to see why. LSW apprised Lucas of above information and that pt unable to receives assistance for medications @ this time. 1215  Pt receiving duo-nanci tx.     1500  Pt able to be d/c'd.

## 2020-05-10 NOTE — ED NOTES
Discharge education reviewed. Questions answered. Medications clarified. Belongings gathered. Discharge instructions signed. All belongings accounted for. Patient discharged to home via POV.       Shalonda Rosenthal RN  05/10/20 0098

## 2020-05-10 NOTE — ED NOTES
Patient brought himself in for SOB. FOR 3 DAYS. Hx of CHF. Pt ran out of lasix yesterday. Patient displays pitting, pedal edema. Patient states he has been diuresing less than normal. No fevers or cough at home. No loss of taste. No CP. Patient tachypneic. No hypoxia. No O2 use at home. Patient smokes.       Poornima Hansen RN  05/10/20 1038

## 2020-05-10 NOTE — ACP (ADVANCE CARE PLANNING)
Advance Care Planning     Advance Care Planning Activator (Inpatient)  Conversation Note      Date of ACP Conversation: 5/10/2020    Conversation Conducted with: Patient with Decision Making Capacity    ACP Activator: Rachel Mccord    *When Decision Maker makes decisions on behalf of the incapacitated patient: Decision Maker is asked to consider and make decisions based on patient values, known preferences, or best interests. Health Care Decision Maker:    Current Designated Health Care Decision Maker:   Pt does NOT WANT TO LIST ANYONE as decision maker. LSW did inform pt that by default his children would be his surrogate decision makers per [place]. (If there is a valid [place] named in the [facility] Makers\" box in the ACP activity, but it is not visible above, be sure to open that field and then select the health care decision maker relationship (ie \"primary\") in the blank space to the right of the name.) Validate  this information as still accurate & up-to-date; edit [place] field as needed.)    Note: Assess and validate information in current ACP documents, as indicated. Care Preferences    Ventilation: \"If you were in your present state of health and suddenly became very ill and were unable to breathe on your own, what would your preference be about the use of a ventilator (breathing machine) if it were available to you? \"      Would the patient desire the use of ventilator (breathing machine)?: no    \"If your health worsens and it becomes clear that your chance of recovery is unlikely, what would your preference be about the use of a ventilator (breathing machine) if it were available to you? \"     Would the patient desire the use of ventilator (breathing machine)?: see below. Pt never wants to be put on ventilator- pt states to \"let me die\".        Resuscitation  \"CPR works best to restart the heart when there is a sudden event, like a heart attack,

## 2020-05-11 ENCOUNTER — CARE COORDINATION (OUTPATIENT)
Dept: CARE COORDINATION | Age: 50
End: 2020-05-11

## 2020-05-13 ENCOUNTER — HOSPITAL ENCOUNTER (INPATIENT)
Age: 50
LOS: 3 days | Discharge: HOME | DRG: 291 | End: 2020-05-16
Attending: EMERGENCY MEDICINE | Admitting: INTERNAL MEDICINE
Payer: MEDICARE

## 2020-05-13 ENCOUNTER — APPOINTMENT (OUTPATIENT)
Dept: GENERAL RADIOLOGY | Age: 50
DRG: 291 | End: 2020-05-13
Payer: MEDICARE

## 2020-05-13 ENCOUNTER — APPOINTMENT (OUTPATIENT)
Dept: ULTRASOUND IMAGING | Age: 50
DRG: 291 | End: 2020-05-13
Payer: MEDICARE

## 2020-05-13 DIAGNOSIS — E87.1 HYPONATREMIA: Primary | ICD-10-CM

## 2020-05-13 DIAGNOSIS — R79.89 ELEVATED LFTS: ICD-10-CM

## 2020-05-13 PROBLEM — I50.43 ACUTE ON CHRONIC COMBINED SYSTOLIC AND DIASTOLIC HEART FAILURE (HCC): Status: ACTIVE | Noted: 2020-05-13

## 2020-05-13 LAB
ACETAMINOPHEN LEVEL: <5 UG/ML (ref 15–30)
ALBUMIN SERPL-MCNC: 4.1 GM/DL (ref 3.4–5)
ALBUMIN SERPL-MCNC: 4.2 GM/DL (ref 3.4–5)
ALCOHOL SCREEN SERUM: <0.01 %WT/VOL
ALP BLD-CCNC: 118 IU/L (ref 40–129)
ALP BLD-CCNC: 121 IU/L (ref 40–128)
ALT SERPL-CCNC: 588 U/L (ref 10–40)
ALT SERPL-CCNC: 617 U/L (ref 10–40)
ANION GAP SERPL CALCULATED.3IONS-SCNC: 13 MMOL/L (ref 4–16)
ANION GAP SERPL CALCULATED.3IONS-SCNC: 15 MMOL/L (ref 4–16)
AST SERPL-CCNC: 361 IU/L (ref 15–37)
AST SERPL-CCNC: 416 IU/L (ref 15–37)
BASOPHILS ABSOLUTE: 0.1 K/CU MM
BASOPHILS RELATIVE PERCENT: 1.2 % (ref 0–1)
BILIRUB SERPL-MCNC: 2 MG/DL (ref 0–1)
BILIRUB SERPL-MCNC: 2.4 MG/DL (ref 0–1)
BUN BLDV-MCNC: 23 MG/DL (ref 6–23)
BUN BLDV-MCNC: 23 MG/DL (ref 6–23)
CALCIUM SERPL-MCNC: 9 MG/DL (ref 8.3–10.6)
CALCIUM SERPL-MCNC: 9.1 MG/DL (ref 8.3–10.6)
CHLORIDE BLD-SCNC: 89 MMOL/L (ref 99–110)
CHLORIDE BLD-SCNC: 92 MMOL/L (ref 99–110)
CO2: 21 MMOL/L (ref 21–32)
CO2: 21 MMOL/L (ref 21–32)
CREAT SERPL-MCNC: 1.2 MG/DL (ref 0.9–1.3)
CREAT SERPL-MCNC: 1.3 MG/DL (ref 0.9–1.3)
CREATININE URINE: 27.5 MG/DL
DIFFERENTIAL TYPE: ABNORMAL
DOSE AMOUNT: ABNORMAL
DOSE TIME: ABNORMAL
EOSINOPHILS ABSOLUTE: 0.1 K/CU MM
EOSINOPHILS RELATIVE PERCENT: 1.7 % (ref 0–3)
GFR AFRICAN AMERICAN: >60 ML/MIN/1.73M2
GFR AFRICAN AMERICAN: >60 ML/MIN/1.73M2
GFR NON-AFRICAN AMERICAN: 59 ML/MIN/1.73M2
GFR NON-AFRICAN AMERICAN: >60 ML/MIN/1.73M2
GLUCOSE BLD-MCNC: 122 MG/DL (ref 70–99)
GLUCOSE BLD-MCNC: 132 MG/DL (ref 70–99)
HAV IGM SER IA-ACNC: NON REACTIVE
HCT VFR BLD CALC: 36.4 % (ref 42–52)
HEMOGLOBIN: 10.3 GM/DL (ref 13.5–18)
HEPATITIS B CORE IGM ANTIBODY: NON REACTIVE
HEPATITIS B SURFACE ANTIGEN: NON REACTIVE
HEPATITIS C ANTIBODY: NON REACTIVE
IMMATURE NEUTROPHIL %: 1 % (ref 0–0.43)
IRON: 23 UG/DL (ref 59–158)
LYMPHOCYTES ABSOLUTE: 1.3 K/CU MM
LYMPHOCYTES RELATIVE PERCENT: 16.8 % (ref 24–44)
MCH RBC QN AUTO: 20 PG (ref 27–31)
MCHC RBC AUTO-ENTMCNC: 28.3 % (ref 32–36)
MCV RBC AUTO: 70.5 FL (ref 78–100)
MONOCYTES ABSOLUTE: 0.8 K/CU MM
MONOCYTES RELATIVE PERCENT: 10.4 % (ref 0–4)
NUCLEATED RBC %: 1.6 %
PCT TRANSFERRIN: 5 % (ref 10–44)
PDW BLD-RTO: 21.9 % (ref 11.7–14.9)
PLATELET # BLD: 342 K/CU MM (ref 140–440)
PMV BLD AUTO: 9.9 FL (ref 7.5–11.1)
POTASSIUM SERPL-SCNC: 3.8 MMOL/L (ref 3.5–5.1)
POTASSIUM SERPL-SCNC: 4.2 MMOL/L (ref 3.5–5.1)
PRO-BNP: 4746 PG/ML
RBC # BLD: 5.16 M/CU MM (ref 4.6–6.2)
SEGMENTED NEUTROPHILS ABSOLUTE COUNT: 5.3 K/CU MM
SEGMENTED NEUTROPHILS RELATIVE PERCENT: 68.9 % (ref 36–66)
SODIUM BLD-SCNC: 123 MMOL/L (ref 135–145)
SODIUM BLD-SCNC: 128 MMOL/L (ref 135–145)
SODIUM URINE: 67 MMOL/L (ref 35–167)
TOTAL IMMATURE NEUTOROPHIL: 0.08 K/CU MM
TOTAL IRON BINDING CAPACITY: 434 UG/DL (ref 250–450)
TOTAL NUCLEATED RBC: 0.1 K/CU MM
TOTAL PROTEIN: 6.7 GM/DL (ref 6.4–8.2)
TOTAL PROTEIN: 7 GM/DL (ref 6.4–8.2)
TROPONIN T: <0.01 NG/ML
TSH HIGH SENSITIVITY: 5.65 UIU/ML (ref 0.27–4.2)
UNSATURATED IRON BINDING CAPACITY: 411 UG/DL (ref 110–370)
WBC # BLD: 7.7 K/CU MM (ref 4–10.5)

## 2020-05-13 PROCEDURE — 82570 ASSAY OF URINE CREATININE: CPT

## 2020-05-13 PROCEDURE — 85025 COMPLETE CBC W/AUTO DIFF WBC: CPT

## 2020-05-13 PROCEDURE — 84300 ASSAY OF URINE SODIUM: CPT

## 2020-05-13 PROCEDURE — 76705 ECHO EXAM OF ABDOMEN: CPT

## 2020-05-13 PROCEDURE — 84484 ASSAY OF TROPONIN QUANT: CPT

## 2020-05-13 PROCEDURE — 6370000000 HC RX 637 (ALT 250 FOR IP): Performed by: NURSE PRACTITIONER

## 2020-05-13 PROCEDURE — 83880 ASSAY OF NATRIURETIC PEPTIDE: CPT

## 2020-05-13 PROCEDURE — 80074 ACUTE HEPATITIS PANEL: CPT

## 2020-05-13 PROCEDURE — 94640 AIRWAY INHALATION TREATMENT: CPT

## 2020-05-13 PROCEDURE — G0480 DRUG TEST DEF 1-7 CLASSES: HCPCS

## 2020-05-13 PROCEDURE — 6360000002 HC RX W HCPCS: Performed by: NURSE PRACTITIONER

## 2020-05-13 PROCEDURE — 2140000000 HC CCU INTERMEDIATE R&B

## 2020-05-13 PROCEDURE — 71045 X-RAY EXAM CHEST 1 VIEW: CPT

## 2020-05-13 PROCEDURE — 83550 IRON BINDING TEST: CPT

## 2020-05-13 PROCEDURE — 93005 ELECTROCARDIOGRAM TRACING: CPT | Performed by: EMERGENCY MEDICINE

## 2020-05-13 PROCEDURE — 96360 HYDRATION IV INFUSION INIT: CPT

## 2020-05-13 PROCEDURE — 83540 ASSAY OF IRON: CPT

## 2020-05-13 PROCEDURE — 83935 ASSAY OF URINE OSMOLALITY: CPT

## 2020-05-13 PROCEDURE — 80053 COMPREHEN METABOLIC PANEL: CPT

## 2020-05-13 PROCEDURE — 2580000003 HC RX 258: Performed by: NURSE PRACTITIONER

## 2020-05-13 PROCEDURE — 94761 N-INVAS EAR/PLS OXIMETRY MLT: CPT

## 2020-05-13 PROCEDURE — 2580000003 HC RX 258: Performed by: EMERGENCY MEDICINE

## 2020-05-13 PROCEDURE — 84443 ASSAY THYROID STIM HORMONE: CPT

## 2020-05-13 PROCEDURE — 99285 EMERGENCY DEPT VISIT HI MDM: CPT

## 2020-05-13 RX ORDER — ACETAMINOPHEN 325 MG/1
650 TABLET ORAL EVERY 6 HOURS PRN
Status: DISCONTINUED | OUTPATIENT
Start: 2020-05-13 | End: 2020-05-13

## 2020-05-13 RX ORDER — ONDANSETRON 2 MG/ML
4 INJECTION INTRAMUSCULAR; INTRAVENOUS EVERY 6 HOURS PRN
Status: DISCONTINUED | OUTPATIENT
Start: 2020-05-13 | End: 2020-05-16 | Stop reason: HOSPADM

## 2020-05-13 RX ORDER — SPIRONOLACTONE 25 MG/1
12.5 TABLET ORAL DAILY
Status: DISCONTINUED | OUTPATIENT
Start: 2020-05-13 | End: 2020-05-16 | Stop reason: HOSPADM

## 2020-05-13 RX ORDER — PROMETHAZINE HYDROCHLORIDE 25 MG/1
12.5 TABLET ORAL EVERY 6 HOURS PRN
Status: DISCONTINUED | OUTPATIENT
Start: 2020-05-13 | End: 2020-05-16 | Stop reason: HOSPADM

## 2020-05-13 RX ORDER — SODIUM CHLORIDE 0.9 % (FLUSH) 0.9 %
10 SYRINGE (ML) INJECTION PRN
Status: DISCONTINUED | OUTPATIENT
Start: 2020-05-13 | End: 2020-05-16 | Stop reason: HOSPADM

## 2020-05-13 RX ORDER — ACETAMINOPHEN 650 MG/1
650 SUPPOSITORY RECTAL EVERY 6 HOURS PRN
Status: DISCONTINUED | OUTPATIENT
Start: 2020-05-13 | End: 2020-05-13

## 2020-05-13 RX ORDER — NICOTINE 21 MG/24HR
1 PATCH, TRANSDERMAL 24 HOURS TRANSDERMAL DAILY
Status: DISCONTINUED | OUTPATIENT
Start: 2020-05-13 | End: 2020-05-16 | Stop reason: HOSPADM

## 2020-05-13 RX ORDER — FUROSEMIDE 10 MG/ML
40 INJECTION INTRAMUSCULAR; INTRAVENOUS 2 TIMES DAILY
Status: DISCONTINUED | OUTPATIENT
Start: 2020-05-13 | End: 2020-05-14

## 2020-05-13 RX ORDER — SODIUM CHLORIDE 0.9 % (FLUSH) 0.9 %
10 SYRINGE (ML) INJECTION EVERY 12 HOURS SCHEDULED
Status: DISCONTINUED | OUTPATIENT
Start: 2020-05-13 | End: 2020-05-16 | Stop reason: HOSPADM

## 2020-05-13 RX ORDER — POLYETHYLENE GLYCOL 3350 17 G/17G
17 POWDER, FOR SOLUTION ORAL DAILY PRN
Status: DISCONTINUED | OUTPATIENT
Start: 2020-05-13 | End: 2020-05-16 | Stop reason: HOSPADM

## 2020-05-13 RX ORDER — ASPIRIN 81 MG/1
81 TABLET ORAL DAILY
Status: DISCONTINUED | OUTPATIENT
Start: 2020-05-13 | End: 2020-05-16 | Stop reason: HOSPADM

## 2020-05-13 RX ORDER — IPRATROPIUM BROMIDE AND ALBUTEROL SULFATE 2.5; .5 MG/3ML; MG/3ML
1 SOLUTION RESPIRATORY (INHALATION) EVERY 4 HOURS PRN
Status: DISCONTINUED | OUTPATIENT
Start: 2020-05-13 | End: 2020-05-15

## 2020-05-13 RX ORDER — 0.9 % SODIUM CHLORIDE 0.9 %
500 INTRAVENOUS SOLUTION INTRAVENOUS ONCE
Status: COMPLETED | OUTPATIENT
Start: 2020-05-13 | End: 2020-05-13

## 2020-05-13 RX ORDER — NITROGLYCERIN 0.4 MG/1
0.4 TABLET SUBLINGUAL EVERY 5 MIN PRN
Status: DISCONTINUED | OUTPATIENT
Start: 2020-05-13 | End: 2020-05-16 | Stop reason: HOSPADM

## 2020-05-13 RX ORDER — LISINOPRIL 5 MG/1
5 TABLET ORAL DAILY
Status: DISCONTINUED | OUTPATIENT
Start: 2020-05-13 | End: 2020-05-14

## 2020-05-13 RX ORDER — GUAIFENESIN 600 MG/1
600 TABLET, EXTENDED RELEASE ORAL 2 TIMES DAILY
Status: DISCONTINUED | OUTPATIENT
Start: 2020-05-13 | End: 2020-05-16 | Stop reason: HOSPADM

## 2020-05-13 RX ORDER — ATORVASTATIN CALCIUM 40 MG/1
40 TABLET, FILM COATED ORAL DAILY
Status: DISCONTINUED | OUTPATIENT
Start: 2020-05-13 | End: 2020-05-13

## 2020-05-13 RX ADMIN — LISINOPRIL 5 MG: 5 TABLET ORAL at 16:03

## 2020-05-13 RX ADMIN — FUROSEMIDE 40 MG: 10 INJECTION, SOLUTION INTRAMUSCULAR; INTRAVENOUS at 16:03

## 2020-05-13 RX ADMIN — GUAIFENESIN 600 MG: 600 TABLET, EXTENDED RELEASE ORAL at 21:29

## 2020-05-13 RX ADMIN — SODIUM CHLORIDE, PRESERVATIVE FREE 10 ML: 5 INJECTION INTRAVENOUS at 21:29

## 2020-05-13 RX ADMIN — IPRATROPIUM BROMIDE AND ALBUTEROL SULFATE 1 AMPULE: .5; 3 SOLUTION RESPIRATORY (INHALATION) at 20:27

## 2020-05-13 RX ADMIN — SPIRONOLACTONE 12.5 MG: 25 TABLET ORAL at 16:03

## 2020-05-13 RX ADMIN — ASPIRIN 81 MG: 81 TABLET, COATED ORAL at 16:03

## 2020-05-13 RX ADMIN — ATORVASTATIN CALCIUM 40 MG: 40 TABLET, FILM COATED ORAL at 16:04

## 2020-05-13 RX ADMIN — SODIUM CHLORIDE 500 ML: 9 INJECTION, SOLUTION INTRAVENOUS at 13:29

## 2020-05-13 ASSESSMENT — ENCOUNTER SYMPTOMS
RHINORRHEA: 0
NAUSEA: 0
SHORTNESS OF BREATH: 1
ABDOMINAL PAIN: 0
EYE PAIN: 0
BACK PAIN: 0
SORE THROAT: 0
VOMITING: 0
EYE DISCHARGE: 0

## 2020-05-13 NOTE — ED PROVIDER NOTES
[address] ENCOUNTER      Pt Name: Micah Marie  MRN: [id number]  [place] 1970  Date of evaluation: 5/13/2020  Provider: Lacie Gomez MD    CHIEF COMPLAINT       Chief Complaint   Patient presents with    Shortness of Breath         HISTORY OF PRESENT ILLNESS      Micah Marie is a 52 y.o. male who presents to the emergency department  for   Chief Complaint   Patient presents with    Shortness of Breath       52 yom presents reporting volume overload. He endorses a history of CHF. He states he is having some shortness of breath and orthopnea. He was seen in the emergency department about 3 days ago for similar symptoms. He had a work-up at that time that included CAT scans of his chest and abdomen. The scans were overall nonacute. He was given some IV Lasix and offered admission. He declined admission. He states he was prescribed a diuretic for home however the prescription was given to 1 of his children he has not been able to fill it. He presents the emergency department complaining of worsening symptoms. Denies any cough or sputum production. Denies any fevers. Denies any significant abdominal pain. Denies any nausea or vomiting. Denies any injury to his abdomen. He is speaking in full sentences in the emergency department. No significant signs of respiratory distress. He is afebrile on presentation. Denies any established history of liver disease or kidney disease. Nursing Notes, Triage Notes & Vital Signs were reviewed. REVIEW OF SYSTEMS    (2-9 systems for level 4, 10 or more for level 5)     Review of Systems   Constitutional: Negative for chills and fever. HENT: Negative for congestion, rhinorrhea and sore throat. Eyes: Negative for pain and discharge. Respiratory: Positive for shortness of breath. Cardiovascular: Positive for leg swelling.    Gastrointestinal: Negative for abdominal pain, nausea and vomiting. Endocrine: Negative for polydipsia and polyuria. Genitourinary: Negative for dysuria and flank pain. Musculoskeletal: Negative for back pain and neck pain. Skin: Negative for pallor and rash. Neurological: Negative for dizziness, facial asymmetry, light-headedness, numbness and headaches. Psychiatric/Behavioral: Negative for confusion. Except as noted above the remainder of the review of systems was reviewed and negative. PAST MEDICAL HISTORY     Past Medical History:   Diagnosis Date    CAD (coronary artery disease)     CHF (congestive heart failure) (HCC)     [facility]     Depression     [facility]     HIGH CHOLESTEROL     Hypertension     MI (myocardial infarction) ([facility] 2011    S/P coronary artery bypass graft x 4 2011    CABG x 4 Dr Veronica Koroma       Prior to Admission medications    Medication Sig Start Date End Date Taking?  Authorizing Provider   spironolactone (ALDACTONE) 25 MG tablet Take 0.5 tablets by mouth daily 5/10/20   KRISSY Huynh   predniSONE (DELTASONE) 50 MG tablet Take 1 tablet by mouth daily for 7 days 5/10/20 5/17/20  KRISSY Huynh   doxycycline hyclate (VIBRA-TABS) 100 MG tablet Take 1 tablet by mouth 2 times daily for 10 days 5/10/20 5/20/20  KRISSY Huynh   albuterol sulfate HFA (VENTOLIN HFA) 108 (90 Base) MCG/ACT inhaler Inhale 2 puffs into the lungs every 4 hours as needed for Wheezing or Shortness of Breath 3/15/20   Mary Damon MD   ipratropium-albuterol (DUONEB) 0.5-2.5 (3) MG/3ML SOLN nebulizer solution Inhale 3 mLs into the lungs 4 times daily 3/15/20   Mary Damon MD   [facility]) 1 MG tablet Take 1 tablet by mouth 2 times daily 3/15/20   Mary Damon MD   rivaroxaban Troy Fregoso) 20 MG TABS tablet Take 1 tablet by mouth daily (with breakfast) 3/15/20   Mary Damon MD   guaiFENesin (MUCINEX) 600 MG extended release tablet Take 1 tablet by mouth 2 times daily 3/15/20   Jazzmine Cintron MD   lisinopril (PRINIVIL;ZESTRIL) 5 MG tablet Take 1 tablet by mouth daily 3/15/20   Jazzmine Cintron MD   albuterol sulfate HFA (PROVENTIL HFA) 108 (90 Base) MCG/ACT inhaler Inhale 2 puffs into the lungs every 4 hours as needed for Wheezing or Shortness of Breath With spacer (and mask if indicated). Thanks.  3/12/20 4/11/20  Suellen Berkowitz PA-C   nicotine (NICODERM CQ) 21 MG/24HR Place 1 patch onto the skin daily 2/13/20   Jazzmine Cintron MD   aspirin 81 MG EC tablet Take 1 tablet by mouth daily 1/28/20   Mary Ann Ambriz MD   atorvastatin (LIPITOR) 40 MG tablet Take 1 tablet by mouth daily 1/28/20   Mary Ann Ambriz MD   carvedilol (COREG) 12.5 MG tablet Take 1 tablet by mouth 2 times daily (with meals) 1/29/20   Mary Ann Ambriz MD   nitroGLYCERIN (NITROSTAT) 0.4 MG SL tablet Place 1 tablet under the tongue every 5 minutes as needed for Chest pain 9/17/19   Chad Morel MD        Patient Active Problem List   Diagnosis    Essential hypertension    Osteoarthritis    COPD (chronic obstructive pulmonary disease) ([address]    Paresthesia of left leg    Abdominal hernia    Left shoulder pain    Crushing injury of right hand    Rotator cuff tendinitis    Midline low back pain without sciatica    History of MI (myocardial infarction)    Coronary artery disease involving coronary bypass graft of native heart without angina pectoris    Mixed hyperlipidemia    Depression    Chronic midline low back pain without sciatica    Tobacco abuse    Gastroesophageal reflux disease without esophagitis    Opiate abuse, continuous ([address]    Heroin dependence ([address]    ST elevation myocardial infarction involving left circumflex coronary artery ([address]    STEMI (ST elevation myocardial infarction) ([address]    Acute on chronic combined systolic (congestive) and diastolic (congestive) heart failure (HCC)    Systolic and diastolic CHF w/reduced LV tongue every 5 minutes as needed for Chest pain    PREDNISONE (DELTASONE) 50 MG TABLET    Take 1 tablet by mouth daily for 7 days    RIVAROXABAN (XARELTO) 20 MG TABS TABLET    Take 1 tablet by mouth daily (with breakfast)    SPIRONOLACTONE (ALDACTONE) 25 MG TABLET    Take 0.5 tablets by mouth daily       ALLERGIES     Patient has no known allergies.     FAMILY HISTORY       Family History   Problem Relation Age of Onset    Cancer Father     Heart Failure Father     Heart Disease Father     Diabetes Mother     Heart Disease Mother           SOCIAL HISTORY       Social History     Socioeconomic History    Marital status:      Spouse name: None    Number of children: None    Years of education: None    Highest education level: None   Occupational History    None   Social Needs    Financial resource strain: None    Food insecurity     Worry: None     Inability: None    Transportation needs     Medical: None     Non-medical: None   Tobacco Use    Smoking status: Current Every Day Smoker     Packs/day: 1.00     Years: 20.00     Pack years: 20.00     Types: Cigarettes    Smokeless tobacco: Former User    Tobacco comment: Reviewed 10/9/17   Substance and Sexual Activity    Alcohol use: No     Alcohol/week: 0.0 standard drinks    Drug use: No     Comment: march 2018 states he quit    Sexual activity: Not Currently   Lifestyle    Physical activity     Days per week: None     Minutes per session: None    Stress: None   Relationships    Social connections     Talks on phone: None     Gets together: None     Attends Sikhism service: None     Active member of club or organization: None     Attends meetings of clubs or organizations: None     Relationship status: None    Intimate partner violence     Fear of current or ex partner: None     Emotionally abused: None     Physically abused: None     Forced sexual activity: None   Other Topics Concern    None   Social History Narrative    None SCREENINGS    Aries Coma Scale  Eye Opening: Spontaneous  Best Verbal Response: Oriented  Best Motor Response: Obeys commands  Aries Coma Scale Score: 15          PHYSICAL EXAM    (up to 7 for level 4, 8 or more for level 5)     ED Triage Vitals [05/13/20 1056]   BP Temp Temp Source Pulse Resp SpO2 Height Weight   (!) 133/100 98.2 °F (36.8 °C) Oral 116 22 100 % -- --       Physical Exam  Vitals signs reviewed. Constitutional:       Appearance: He is not ill-appearing or toxic-appearing. HENT:      Head: Normocephalic and atraumatic. Nose: No congestion or rhinorrhea. Mouth/Throat:      Mouth: Mucous membranes are dry. Pharynx: No oropharyngeal exudate or posterior oropharyngeal erythema. Eyes:      General:         Right eye: No discharge. Left eye: No discharge. Extraocular Movements: Extraocular movements intact. Pupils: Pupils are equal, round, and reactive to light. Neck:      Musculoskeletal: Normal range of motion. No muscular tenderness. Cardiovascular:      Rate and Rhythm: Tachycardia present. Heart sounds: No friction rub. No gallop. Pulmonary:      Comments: Respirations unlabored  Mild rhonchi bilaterally  Mildly diminished breath sounds bilaterally  Abdominal:      Palpations: Abdomen is soft. Tenderness: There is no abdominal tenderness. Comments: Abdomen soft, non-tender, non-peritoneal  No guarding on abdominal exam   Musculoskeletal:      Right lower leg: Edema present. Left lower leg: Edema present. Comments: Mild b/l lower extremity edema   Lymphadenopathy:      Cervical: No cervical adenopathy. Skin:     Capillary Refill: Capillary refill takes less than 2 seconds. Findings: No erythema or lesion. Neurological:      General: No focal deficit present. Mental Status: He is alert and oriented to person, place, and time. Mental status is at baseline.          DIAGNOSTIC RESULTS     Labs Reviewed COMPREHENSIVE METABOLIC PANEL W/ REFLEX TO MG FOR LOW K - Abnormal; Notable for the following components:       Result Value    Sodium 123 (*)     Chloride 89 (*)     Glucose 122 (*)     Total Bilirubin 2.0 (*)      (*)      (*)     All other components within normal limits   CBC WITH AUTO DIFFERENTIAL - Abnormal; Notable for the following components:    Hemoglobin 10.3 (*)     Hematocrit 36.4 (*)     MCV 70.5 (*)     MCH 20.0 (*)     MCHC 28.3 (*)     RDW 21.9 (*)     Segs Relative 68.9 (*)     Lymphocytes % 16.8 (*)     Monocytes % 10.4 (*)     Basophils % 1.2 (*)     Immature Neutrophil % 1.0 (*)     All other components within normal limits   BRAIN NATRIURETIC PEPTIDE - Abnormal; Notable for the following components:    Pro-BNP 4,746 (*)     All other components within normal limits   ACETAMINOPHEN LEVEL - Abnormal; Notable for the following components:    Acetaminophen Level <5.0 (*)     All other components within normal limits   TROPONIN   HEPATITIS PANEL, ACUTE   ETHANOL          EKG: All EKG's are interpreted by the Emergency Department Physician who either signs or Co-signs this chart in the absence of a cardiologist.       EKG Interpretation    Interpreted by emergency department physician    EKG is interpreted by me. EKG shows a rate of 115 bpm, normal axis, there are number PVCs in the EKG, QRSs are prolonged, T waves overall appear to be unremarkable, no remarkable ST segment elevations, there are some ST segment depressions in inferior leads, WA interval of 152, QRS ration 120, QTc of 486. Final impression, nonspecific EKG, sinus tachycardia    Leatha Woodson     RADIOLOGY:     Non-plain film images such as CT, Ultrasound and MRI are read by the radiologist. Plain radiographic images are visualized and preliminarily interpreted by the emergency physician. Interpretation per the Radiologist below, if available at the time of this note:    [address] organ? GALLBLADDER, LIVER   Preliminary Result   Nonspecific wall thickening to relatively contracted gallbladder without   other gallbladder abnormality noted. Otherwise unremarkable exam.         XR CHEST 1 VW   Final Result   Slightly increasing small right pleural effusion with adjacent atelectasis. ED BEDSIDE ULTRASOUND:   Performed by ED Physician Antonio Zarate MD       LABS:  Labs Reviewed   COMPREHENSIVE METABOLIC PANEL W/ REFLEX TO MG FOR LOW K - Abnormal; Notable for the following components:       Result Value    Sodium 123 (*)     Chloride 89 (*)     Glucose 122 (*)     Total Bilirubin 2.0 (*)      (*)      (*)     All other components within normal limits   CBC WITH AUTO DIFFERENTIAL - Abnormal; Notable for the following components:    Hemoglobin 10.3 (*)     Hematocrit 36.4 (*)     MCV 70.5 (*)     MCH 20.0 (*)     MCHC 28.3 (*)     RDW 21.9 (*)     Segs Relative 68.9 (*)     Lymphocytes % 16.8 (*)     Monocytes % 10.4 (*)     Basophils % 1.2 (*)     Immature Neutrophil % 1.0 (*)     All other components within normal limits   BRAIN NATRIURETIC PEPTIDE - Abnormal; Notable for the following components:    Pro-BNP 4,746 (*)     All other components within normal limits   ACETAMINOPHEN LEVEL - Abnormal; Notable for the following components:    Acetaminophen Level <5.0 (*)     All other components within normal limits   TROPONIN   HEPATITIS PANEL, ACUTE   ETHANOL       All other labs were within normal range or not returned as of this dictation.     EMERGENCY DEPARTMENT COURSE and DIFFERENTIAL DIAGNOSIS/MDM:   Vitals:    Vitals:    05/13/20 1304 05/13/20 1316 05/13/20 1317 05/13/20 1333   BP:  (!) 138/93  (!) 150/104   Pulse:  112  112   Resp:    20   Temp:       TempSrc:       SpO2: 99% 93% 97% 98%           MDM  Number of Diagnoses or Management Options  Elevated LFTs:   Hyponatremia:   Diagnosis management comments: 40-year-old male presents complaining of \"volume overload. \"  He endorses a history of CHF. He states he was seen in the emergency department few days ago for similar symptoms. According to records, he underwent a work-up including labs as well as imaging. His imaging was overall nonacute. He was offered admission at that time but declined. He was prescribed diuretics for home and states that he gave the prescription to his child and has not been able to get the medication. He endorses some orthopnea. Denies any significant fever or other infectious symptoms. Denies abdominal pain. He is tachycardic with elevated blood pressure upon presentation emergency department. Vitals otherwise unremarkable. Labs are obtained. He does have some elevations in his liver enzymes. He did have these elevations 2 days ago but they are more significant now. His BNP is also more elevated than its been previously. Creatinine is within normal limits. His sodium is low at 123. His sodium was in the 130s a few days ago. I did obtain an ultrasound of his abdomen which did not show any acute pathology. Chest x-ray is obtained and does show some increase in a right-sided pleural effusion and atelectasis. Given the hyponatremia and the increasing liver enzymes, he will be admitted for further evaluation including correction of his sodium as well as possible GI consult. He is agreeable plan of care. He is given a small fluid bolus in the emergency department for both hyponatremia and his tachycardia. Amount and/or Complexity of Data Reviewed  Clinical lab tests: reviewed  Tests in the radiology section of CPT®: reviewed  Decide to obtain previous medical records or to obtain history from someone other than the patient: yes            CRITICAL CARE TIME     Total critical care time provided today was 20 minutes. This excludes seperately billable procedures and family discussion time.  Critical care time provided for obtaining history, conducting a physical exam, performing and monitoring interventions, ordering, collecting and interpreting tests, and establishing medical decision-making. There was a potential for life/limb threatening pathology requiring close evaluation and intervention with concern for patient decompensation. CONSULTS:  IP CONSULT TO HOSPITALIST    PROCEDURES:  None performed unless otherwise noted below     Procedures        FINAL IMPRESSION      1. Hyponatremia    2. Elevated LFTs          DISPOSITION/PLAN   DISPOSITION Decision To Admit 05/13/2020 02:24:55 PM      PATIENT REFERRED TO:  No follow-up provider specified. DISCHARGE MEDICATIONS:  New Prescriptions    No medications on file       ED Provider Disposition Time  DISPOSITION Decision To Admit 05/13/2020 02:24:55 PM      Appropriate personal protective equipment was worn during the patient's evaluation. These included surgical, eye protection, surgical mask or in 95 respirator and gloves. The patient was also placed in a surgical mask for the prevention of possible spread of respiratory viral illnesses. The Patient was instructed to read the package inserts with any medication that was prescribed. Major potential reactions and medication interactions were discussed. The Patient understands that there are numerous possible adverse reactions not covered. The patient was also instructed to arrange follow-up with his or her primary care provider for review of any pending labwork or incidental findings on any radiology results that were obtained. All efforts were made to discuss any incidental findings that require further monitoring. Controlled Substances Monitoring:     No flowsheet data found.     (Please note that portions of this note were completed with a voice recognition program.  Efforts were made to edit the dictations but occasionally words are mis-transcribed.)    Lucrezia Cockayne, MD (electronically signed)  Attending Emergency Physician           Collette Brink Adina Noel MD  05/13/20 1167

## 2020-05-13 NOTE — H&P
History and Physical      Name:  Kosta Anderson /Age/Sex: 1970  (52 y.o. male)   MRN & CSN:  [id number] & [id number] Admission Date/Time: 2020 10:53 AM   Location:  [facility] PCP: No primary care provider on file. Admitting Physicians : Dr. Yosvany Aguilar is a 52 y.o.  male  who presents with Shortness of Breath    Assessment and Plan:   Acute on chronic combined systolic and diastolic heart failure  Last echo EF 20%, grade III DD, Severe mitral regurgitation, moderate tricuspid regurgitation (2020). CXR concerning for small pleural effusion  BNP 4,746 on presentation, chronically elevarted  -Lasix 40 mg BID. Hold Bumex  -Daily weights  -Strict intake and output  -IV Lasix  -Monitor electrolytes, especially K  -Hold BB due to decompensation  -Cardiology consult    Moderate Hyponatremia 123 likely cardiorenal  -Duiresis  -Check urine indices and  TSH  -Consider nephrology consult    Abnormal LFT  Etoh negative. Abd. US: Nonspecific wall thickening to relatively contracted gallbladder without other gallbladder abnormality noted.  Otherwise unremarkable exam.  -Hepatitis panel negative  -Repeat LFT in am  -Hold acetaminophen and statins  -Avoid hepatotoxins    Other chronic Issues  -COPD, not in exacerbation  -HTN  -HLD  -Depression  -CAD        DVT-PPX: Xarelto  Diet: Low sodium    Medications:   Medications:    sodium chloride  500 mL Intravenous Once      Infusions:   PRN Meds:      Current Facility-Administered Medications:     0.9 % sodium chloride bolus, 500 mL, Intravenous, Once, Krystina Rosales MD, Last Rate: 500 mL/hr at 20 1329, 500 mL at 20 1329    Current Outpatient Medications:     spironolactone (ALDACTONE) 25 MG tablet, Take 0.5 tablets by mouth daily, Disp: 15 tablet, Rfl: 0    predniSONE (DELTASONE) 50 MG tablet, Take 1 tablet by mouth daily for 7 days, Disp: 7 tablet, Rfl: 0    doxycycline hyclate (VIBRA-TABS) 100 MG tablet, Take 1 tablet by mouth 2 times daily for 10 days, Disp: 20 tablet, Rfl: 0    albuterol sulfate HFA (VENTOLIN HFA) 108 (90 Base) MCG/ACT inhaler, Inhale 2 puffs into the lungs every 4 hours as needed for Wheezing or Shortness of Breath, Disp: 1 Inhaler, Rfl: 1    ipratropium-albuterol (DUONEB) 0.5-2.5 (3) MG/3ML SOLN nebulizer solution, Inhale 3 mLs into the lungs 4 times daily, Disp: 360 mL, Rfl: 1    bumetanide (BUMEX) 1 MG tablet, Take 1 tablet by mouth 2 times daily, Disp: 60 tablet, Rfl: 1    rivaroxaban (XARELTO) 20 MG TABS tablet, Take 1 tablet by mouth daily (with breakfast), Disp: 30 tablet, Rfl: 0    guaiFENesin (MUCINEX) 600 MG extended release tablet, Take 1 tablet by mouth 2 times daily, Disp: 30 tablet, Rfl: 0    lisinopril (PRINIVIL;ZESTRIL) 5 MG tablet, Take 1 tablet by mouth daily, Disp: 30 tablet, Rfl: 0    albuterol sulfate HFA (PROVENTIL HFA) 108 (90 Base) MCG/ACT inhaler, Inhale 2 puffs into the lungs every 4 hours as needed for Wheezing or Shortness of Breath With spacer (and mask if indicated). Thanks. , Disp: 1 Inhaler, Rfl: 1    nicotine (NICODERM CQ) 21 MG/24HR, Place 1 patch onto the skin daily, Disp: 30 patch, Rfl: 3    aspirin 81 MG EC tablet, Take 1 tablet by mouth daily, Disp: 30 tablet, Rfl: 0    atorvastatin (LIPITOR) 40 MG tablet, Take 1 tablet by mouth daily, Disp: 30 tablet, Rfl: 0    carvedilol (COREG) 12.5 MG tablet, Take 1 tablet by mouth 2 times daily (with meals), Disp: 60 tablet, Rfl: 0    nitroGLYCERIN (NITROSTAT) 0.4 MG SL tablet, Place 1 tablet under the tongue every 5 minutes as needed for Chest pain, Disp: 25 tablet, Rfl: 0    History of present illness     Chief Complaint: Shortness of Breath      Vanessa Powers is a 52 y.o.  male  who presents with worsening shortness of breath and ortopnea that has been going on for 3 days, but got worse earlier this morning. Patient was seen in ED 3 days ago and had work up done with no significant abnormalities.  He was offered admission for IV Lasix, but declined. He was given a prescription for Lasix, he has not been able to refill it. Denies fever, chills, cough, or chest pain. Hx of CHF, HTN, HLD, CAD, COPD, and depression. Review of Systems   Ten point ROS reviewed and negative, unless as noted below. GENERAL:  Denies fever, chills, night sweats, or changes in weight. EYES:  Denies recent visual changes. ENT:  Denies ear pain, hearing loss or tinnitus  RESP:  Denies any cough, dyspnea, or wheezing. CV:  Denies any chest pain with exertion or at rest, palpitations, syncope, + edema. GI:  Denies any dysphagia, nausea, vomiting, abdominal pain, heartburn, changes in bowel habit, melena or rectal bleeding  MUSCULOSKELETAL:  Denies any joint swelling, joint pain, or loss of range of motion. NEURO:  Denies any headaches, tremors, dizziness, vertigo, memory loss, confusion, weakness, numbness or tingling. PSYCH:  Denies any sleeping problems, history of abuse, marital discord. HEME/LYMPHATIC/IMMUNO:  Denies , bruising, bleeding abnormalities   ENDO:  Denies any heat or cold intolerance, panemiaolyuria or polydipsia. Objective:   No intake or output data in the 24 hours ending 05/13/20 1420   Vitals:   Vitals:    05/13/20 1333   BP: (!) 150/104   Pulse: 112   Resp: 20   Temp:    SpO2: 98%     Physical Exam:   Gen:  awake, alert, cooperative, no apparent distress. Ill appearing  EYES:Lids and lashes normal, pupils equal, round ,extra ocular muscles intact, sclera clear, conjunctiva normal  ENT:  Normocephalic, oral pharynx with moist mucus membranes  NECK:  Supple, symmetrical, trachea midline, no adenopathy,  LUNGS:  Diminished bilaterally with crackles noted. Using accessory muscle. Unable to lay flat  CARDIOVASCULAR:  Tachycardic,  normal S1 and S2,no murmur noted, peripheral pulses 2+, 2+ pitting edema bilateral  ABDOMEN: Normal BS, Non tender, non distended, no HSM noted.   MUSCULOSKELETAL:  ROM of all extremities grossly wnl  NEUROLOGIC: AOx 3,  Cranial nerves II-XII are grossly intact. Motor is 5 out of 5 bilaterally. Sensory is intact, no lateralizing findings. SKIN:  no bruising or bleeding, normal skin color, turgor, no redness, warmth, or swelling      Past Medical History:      Past Medical History:   Diagnosis Date    CAD (coronary artery disease)     [facility]     [facility]     Depression     [facility]     HIGH CHOLESTEROL     Hypertension     MI (myocardial infarction) ([facility] 2011    S/P coronary artery bypass graft x 4 2011    CABG x 4 Dr Sharon Stanley:  has a past surgical history that includes Cardiac surgery (11/2010); Bypass Graft (04/10/2011); and Leg Surgery. Allergies: No Known Allergies    FAM HX: family history includes Cancer in his father; Diabetes in his mother; Heart Disease in his father and mother; Heart Failure in his father. Family history reviewed and is essentially negative unless as stated above.      Soc HX:   Social History     Socioeconomic History    Marital status:      Spouse name: None    Number of children: None    Years of education: None    Highest education level: None   Occupational History    None   Social Needs    Financial resource strain: None    Food insecurity     Worry: None     Inability: None    Transportation needs     Medical: None     Non-medical: None   Tobacco Use    Smoking status: Current Every Day Smoker     Packs/day: 1.00     Years: 20.00     Pack years: 20.00     Types: Cigarettes    Smokeless tobacco: Former User    Tobacco comment: Reviewed 10/9/17   Substance and Sexual Activity    Alcohol use: No     Alcohol/week: 0.0 standard drinks    Drug use: No     Comment: march 2018 states he quit    Sexual activity: Not Currently   Lifestyle    Physical activity     Days per week: None     Minutes per session: None    Stress: None   Relationships    Social connections     Talks on phone: None     Gets together: None     Attends Shinto service: None     Active member of club or organization: None     Attends meetings of clubs or organizations: None     Relationship status: None    Intimate partner violence     Fear of current or ex partner: None     Emotionally abused: None     Physically abused: None     Forced sexual activity: None   Other Topics Concern    None   Social History Narrative    None       Electronically signed by NANCY Delacruz CNP on 5/13/2020 at 2:20 PM    I have examined the patient. I have discussed the patient with Kimberly Farah CNP. I agree with the plan as written.     Sharonda Del Rosario MD, MSc

## 2020-05-13 NOTE — CONSULTS
Chart reviewed  Full note to follow                      Name:  Maru Brown /Age/Sex: 1970  (52 y.o. male)   MRN & CSN:  [id number] & [id number] Admission Date/Time: 2020 10:53 AM   Location:  [facility]/9965 PCP: No primary care provider on file. Hospital Day: 1          Referring physician:  Stanley Renner MD         Reason for consultation:  CHF        Thanks for referral.    Information source: patient    CC;  SOB      HPI:   Thank you for involving me in taking  care of Maru Brown who  is a 52 y. o.year  Old male  Presents with  H/o CAD, CABG, EF 20% , CKD, PE  now admitted with worsenin moderate recurrent  exertional SOB for past few days, feeling weak, little better after diuresis, has no CP. BNP level markedly elevated. Past medical history:    has a past medical history of CAD (coronary artery disease), CHF (congestive heart failure) ([facility] [address] COPD (chronic obstructive pulmonary disease) ([facility] [address] Depression, Drug abuse ([facility] [address] HIGH CHOLESTEROL, Hypertension, MI (myocardial infarction) ([facility] [address] and S/P coronary artery bypass graft x 4. Past surgical history:   has a past surgical history that includes Cardiac surgery (2010); Bypass Graft (04/10/2011); and Leg Surgery. Social History:   reports that he has been smoking cigarettes. He has a 20.00 pack-year smoking history. He has quit using smokeless tobacco. He reports that he does not drink alcohol or use drugs. Family history:  family history includes Cancer in his father; Diabetes in his mother; Heart Disease in his father and mother; Heart Failure in his father.     No Known Allergies    aspirin EC tablet 81 mg, Daily  ipratropium-albuterol (DUONEB) nebulizer solution 1 ampule, Q4H PRN  spironolactone (ALDACTONE) tablet 12.5 mg, Daily  [START ON 2020] rivaroxaban (XARELTO) tablet 20 mg, Daily with breakfast  nitroGLYCERIN (NITROSTAT) SL tablet 0.4 mg, Q5 Min PRN  nicotine (NICODERM CQ) 21 MG/24HR 1 patch, - CNP        furosemide (LASIX) injection 40 mg  40 mg Intravenous BID NANCY Oswald - CNP   40 mg at 05/13/20 1603     Review of Systems:  All 14 systems reviewed, all negative except for  sob    Physical Examination:    BP (!) 134/99   Pulse 115   Temp 98.5 °F (36.9 °C) (Oral)   Resp 24   SpO2 99%      Wt Readings from Last 3 Encounters:   05/10/20 167 lb (75.8 kg)   03/11/20 190 lb (86.2 kg)   02/14/20 189 lb 3.2 oz (85.8 kg)     There is no height or weight on file to calculate BMI. General Appearance:  fair  Head: normocephalic     Eyes: normal, noninjected conjunctiva    ENT: normal mucosa, noninjected throat, normal     NECK: No JVP  No thyromegaly        Cardiovascular: No thrills palpated   Auscultation: Normal S1 and S2,  no murmur   carotid bruit no   Abdominal Aorta no bruit    Respiratory:    Breath sounds Diminshed bilaterally    Extremities:  1+ Edema clubbing ,   no cyanosis    SKIN: Warm and well perfused, no pallor or cyanosis    Vascular exam:  Pedal Pulses: palp  bilaterally        Abdomen:  No masses or tenderness. No organomegaly noted. Neurological:  Oriented to time, place, and person   No focal neurological deficit noted. Psychiatric:normal mood, no anxiety    Lab Review   Recent Labs     05/13/20  1152   WBC 7.7   HGB 10.3*   HCT 36.4*         Recent Labs     05/13/20  1724   *   K 4.2   CL 92*   CO2 21   BUN 23   CREATININE 1.3     Recent Labs     05/13/20  1724   *   *   BILITOT 2.4*   ALKPHOS 121     No results for input(s): TROPONINI in the last 72 hours.   Lab Results   Component Value Date     (H) 04/16/2011     (H) 04/15/2011     (H) 04/14/2011     Lab Results   Component Value Date    INR 1.10 07/11/2018    PROTIME 12.5 07/11/2018           Assessment/Recommendations:       - HFrEF: diurese, add dobutamine and lasix drip,  EP for ICD  - CAD ; [facility] reviewed only med therapy was advised  - VT in past  ; EP to see  - PE on VQ on xarelto  - CKD  stable       Katharina Lowe MD, 5/13/2020 7:45 PM

## 2020-05-14 LAB
CHOLESTEROL: 108 MG/DL
EKG ATRIAL RATE: 112 BPM
EKG DIAGNOSIS: NORMAL
EKG P AXIS: 72 DEGREES
EKG P-R INTERVAL: 158 MS
EKG Q-T INTERVAL: 354 MS
EKG QRS DURATION: 120 MS
EKG QTC CALCULATION (BAZETT): 483 MS
EKG R AXIS: 74 DEGREES
EKG T AXIS: 98 DEGREES
EKG VENTRICULAR RATE: 112 BPM
HDLC SERPL-MCNC: 19 MG/DL
LDL CHOLESTEROL DIRECT: 77 MG/DL
MAGNESIUM: 2.1 MG/DL (ref 1.8–2.4)
OSMOLALITY URINE: 286 MOS/L (ref 292–1090)
TRIGL SERPL-MCNC: 91 MG/DL

## 2020-05-14 PROCEDURE — 6370000000 HC RX 637 (ALT 250 FOR IP): Performed by: INTERNAL MEDICINE

## 2020-05-14 PROCEDURE — 6360000002 HC RX W HCPCS: Performed by: INTERNAL MEDICINE

## 2020-05-14 PROCEDURE — 2140000000 HC CCU INTERMEDIATE R&B

## 2020-05-14 PROCEDURE — 93010 ELECTROCARDIOGRAM REPORT: CPT | Performed by: INTERNAL MEDICINE

## 2020-05-14 PROCEDURE — 83735 ASSAY OF MAGNESIUM: CPT

## 2020-05-14 PROCEDURE — 6370000000 HC RX 637 (ALT 250 FOR IP): Performed by: NURSE PRACTITIONER

## 2020-05-14 PROCEDURE — 2580000003 HC RX 258: Performed by: INTERNAL MEDICINE

## 2020-05-14 PROCEDURE — 80061 LIPID PANEL: CPT

## 2020-05-14 PROCEDURE — 6360000002 HC RX W HCPCS: Performed by: PHYSICIAN ASSISTANT

## 2020-05-14 PROCEDURE — 94761 N-INVAS EAR/PLS OXIMETRY MLT: CPT

## 2020-05-14 PROCEDURE — 36415 COLL VENOUS BLD VENIPUNCTURE: CPT

## 2020-05-14 PROCEDURE — 99222 1ST HOSP IP/OBS MODERATE 55: CPT | Performed by: INTERNAL MEDICINE

## 2020-05-14 PROCEDURE — 2700000000 HC OXYGEN THERAPY PER DAY

## 2020-05-14 PROCEDURE — 2580000003 HC RX 258: Performed by: NURSE PRACTITIONER

## 2020-05-14 PROCEDURE — 83721 ASSAY OF BLOOD LIPOPROTEIN: CPT

## 2020-05-14 PROCEDURE — 94640 AIRWAY INHALATION TREATMENT: CPT

## 2020-05-14 RX ORDER — METOPROLOL SUCCINATE 25 MG/1
25 TABLET, EXTENDED RELEASE ORAL DAILY
Status: DISCONTINUED | OUTPATIENT
Start: 2020-05-14 | End: 2020-05-16 | Stop reason: HOSPADM

## 2020-05-14 RX ORDER — LISINOPRIL 10 MG/1
10 TABLET ORAL DAILY
Status: DISCONTINUED | OUTPATIENT
Start: 2020-05-14 | End: 2020-05-16 | Stop reason: HOSPADM

## 2020-05-14 RX ORDER — DOBUTAMINE HYDROCHLORIDE 200 MG/100ML
5 INJECTION INTRAVENOUS CONTINUOUS
Status: DISCONTINUED | OUTPATIENT
Start: 2020-05-14 | End: 2020-05-15

## 2020-05-14 RX ORDER — FUROSEMIDE 10 MG/ML
40 INJECTION INTRAMUSCULAR; INTRAVENOUS ONCE
Status: COMPLETED | OUTPATIENT
Start: 2020-05-14 | End: 2020-05-14

## 2020-05-14 RX ADMIN — IPRATROPIUM BROMIDE AND ALBUTEROL SULFATE 1 AMPULE: .5; 3 SOLUTION RESPIRATORY (INHALATION) at 21:56

## 2020-05-14 RX ADMIN — RIVAROXABAN 20 MG: 20 TABLET, FILM COATED ORAL at 09:56

## 2020-05-14 RX ADMIN — ASPIRIN 81 MG: 81 TABLET, COATED ORAL at 09:53

## 2020-05-14 RX ADMIN — DOBUTAMINE IN DEXTROSE 5 MCG/KG/MIN: 200 INJECTION, SOLUTION INTRAVENOUS at 06:37

## 2020-05-14 RX ADMIN — METOPROLOL SUCCINATE 25 MG: 25 TABLET, EXTENDED RELEASE ORAL at 09:56

## 2020-05-14 RX ADMIN — FUROSEMIDE 5 MG/HR: 10 INJECTION, SOLUTION INTRAMUSCULAR; INTRAVENOUS at 09:23

## 2020-05-14 RX ADMIN — SODIUM CHLORIDE, PRESERVATIVE FREE 10 ML: 5 INJECTION INTRAVENOUS at 20:19

## 2020-05-14 RX ADMIN — SODIUM CHLORIDE, PRESERVATIVE FREE 10 ML: 5 INJECTION INTRAVENOUS at 09:53

## 2020-05-14 RX ADMIN — FUROSEMIDE 40 MG: 10 INJECTION, SOLUTION INTRAMUSCULAR; INTRAVENOUS at 04:25

## 2020-05-14 RX ADMIN — SPIRONOLACTONE 12.5 MG: 25 TABLET ORAL at 09:53

## 2020-05-14 RX ADMIN — GUAIFENESIN 600 MG: 600 TABLET, EXTENDED RELEASE ORAL at 20:18

## 2020-05-14 RX ADMIN — FUROSEMIDE 5 MG/HR: 10 INJECTION, SOLUTION INTRAMUSCULAR; INTRAVENOUS at 21:10

## 2020-05-14 RX ADMIN — GUAIFENESIN 600 MG: 600 TABLET, EXTENDED RELEASE ORAL at 09:53

## 2020-05-14 ASSESSMENT — ENCOUNTER SYMPTOMS
BLOOD IN STOOL: 0
CONSTIPATION: 0
ABDOMINAL PAIN: 0
COUGH: 0
BACK PAIN: 0
EYE PAIN: 0
PHOTOPHOBIA: 0
SHORTNESS OF BREATH: 1
VOMITING: 0
CHEST TIGHTNESS: 0
NAUSEA: 1
DIARRHEA: 0
WHEEZING: 0
COLOR CHANGE: 0

## 2020-05-14 NOTE — PROGRESS NOTES
HISTORY: No change in clinical status. ROS:  No chest pain. No abdominal pain. No nausea. No vomiting. Objective: Intake/Output Summary (Last 24 hours) at 5/14/2020 0854  Last data filed at 5/14/2020 0800  Gross per 24 hour   Intake 960 ml   Output 3075 ml   Net -2115 ml      Vitals:   Vitals:    05/14/20 0500   BP: 119/85   Pulse: 107   Resp: 18   Temp:    SpO2: 99%     Physical Exam:   GEN: Awake male, sitting upright in bed. Nontoxic-appearing. EYES: Sclera anicteric. No eye discharge. HENT: Membranes moist.  No nasal discharge. NECK: Supple,   RESP: No crackles. No wheezing. Symmetric breath sounds. No accessory muscle use. CV: RRR. No murmur. No peripheral edema. GI: Soft abdomen. Nontender. : No Hansen in place. MSK: no bony fractures. SKIN: warm, dry, no rashes  NEURO: Cranial nerves appear grossly intact, normal speech, no lateralizing weakness.   PSYCH: Awake, alert, oriented    Medications:   Medications:    aspirin  81 mg Oral Daily    spironolactone  12.5 mg Oral Daily    rivaroxaban  20 mg Oral Daily with breakfast    nicotine  1 patch Transdermal Daily    lisinopril  5 mg Oral Daily    guaiFENesin  600 mg Oral BID    sodium chloride flush  10 mL Intravenous 2 times per day      Infusions:    furosemide (LASIX) 1mg/ml infusion      DOBUTamine 5 mcg/kg/min (05/14/20 0637)     PRN Meds: ipratropium-albuterol, 1 ampule, Q4H PRN  nitroGLYCERIN, 0.4 mg, Q5 Min PRN  sodium chloride flush, 10 mL, PRN  polyethylene glycol, 17 g, Daily PRN  promethazine, 12.5 mg, Q6H PRN    Or  ondansetron, 4 mg, Q6H PRN      Electronically signed by Claribel Pardo MD on 5/14/2020 at 8:54 AM

## 2020-05-14 NOTE — CARE COORDINATION
Attempted to call patient for dc planning with no answer. Patient screened for discharge needs with no needs identified at this time. Pt is from home, has insurance with RX coverage and was independent prior to admission. PCP and walk in clinic information placed on AVS. However, CM available if needs arise.

## 2020-05-14 NOTE — PROGRESS NOTES
This RN spoke with Dr Marquis Ragsdale regarding patients BP being 102/62 and on lasix gtt. This RN asked if he still wanted me to give lisinopril. Dr Marquis Ragsdale stated he would like me to hold lisinopril at this time.

## 2020-05-14 NOTE — CONSULTS
coronary artery bypass graft x 4 2011    CABG x 4 Dr Charla Arizmendi       Surgical history :   Past Surgical History:   Procedure Laterality Date    BYPASS GRAFT  04/10/2011    CABG x 4 Dr Alejo Torres  11/2010     4 stents    LEG SURGERY         Family history:   Family History   Problem Relation Age of Onset    Cancer Father     Heart Failure Father     Heart Disease Father     Diabetes Mother     Heart Disease Mother        Social history :  reports that he has been smoking cigarettes. He has a 20.00 pack-year smoking history. He has quit using smokeless tobacco. He reports that he does not drink alcohol or use drugs. No Known Allergies    No current facility-administered medications on file prior to encounter. Current Outpatient Medications on File Prior to Encounter   Medication Sig Dispense Refill    spironolactone (ALDACTONE) 25 MG tablet Take 0.5 tablets by mouth daily 15 tablet 0    predniSONE (DELTASONE) 50 MG tablet Take 1 tablet by mouth daily for 7 days 7 tablet 0    doxycycline hyclate (VIBRA-TABS) 100 MG tablet Take 1 tablet by mouth 2 times daily for 10 days 20 tablet 0    albuterol sulfate HFA (VENTOLIN HFA) 108 (90 Base) MCG/ACT inhaler Inhale 2 puffs into the lungs every 4 hours as needed for Wheezing or Shortness of Breath 1 Inhaler 1    ipratropium-albuterol (DUONEB) 0.5-2.5 (3) MG/3ML SOLN nebulizer solution Inhale 3 mLs into the lungs 4 times daily 360 mL 1    bumetanide (BUMEX) 1 MG tablet Take 1 tablet by mouth 2 times daily 60 tablet 1    rivaroxaban (XARELTO) 20 MG TABS tablet Take 1 tablet by mouth daily (with breakfast) 30 tablet 0    lisinopril (PRINIVIL;ZESTRIL) 5 MG tablet Take 1 tablet by mouth daily 30 tablet 0    albuterol sulfate HFA (PROVENTIL HFA) 108 (90 Base) MCG/ACT inhaler Inhale 2 puffs into the lungs every 4 hours as needed for Wheezing or Shortness of Breath With spacer (and mask if indicated). Thanks.  1 Inhaler 1    aspirin 81 MG EC tablet Take 1 tablet by mouth daily 30 tablet 0    atorvastatin (LIPITOR) 40 MG tablet Take 1 tablet by mouth daily 30 tablet 0    carvedilol (COREG) 12.5 MG tablet Take 1 tablet by mouth 2 times daily (with meals) 60 tablet 0    nitroGLYCERIN (NITROSTAT) 0.4 MG SL tablet Place 1 tablet under the tongue every 5 minutes as needed for Chest pain 25 tablet 0       Review of Systems:   Review of Systems   Constitutional: Positive for fatigue. Negative for activity change, chills and fever. HENT: Negative for congestion, ear pain and tinnitus. Eyes: Negative for photophobia, pain and visual disturbance. Respiratory: Positive for shortness of breath. Negative for cough, chest tightness and wheezing. Cardiovascular: Positive for leg swelling. Negative for chest pain and palpitations. Gastrointestinal: Positive for nausea. Negative for abdominal pain, blood in stool, constipation, diarrhea and vomiting. Endocrine: Negative for cold intolerance and heat intolerance. Genitourinary: Positive for difficulty urinating. Negative for dysuria, flank pain and hematuria. Musculoskeletal: Positive for arthralgias and myalgias. Negative for back pain and neck stiffness. Skin: Negative for color change and rash. Allergic/Immunologic: Negative for food allergies. Neurological: Positive for weakness. Negative for dizziness, light-headedness, numbness and headaches. Hematological: Does not bruise/bleed easily. Psychiatric/Behavioral: Positive for agitation. Negative for behavioral problems and confusion. Examination:      Vitals:    05/13/20 2100 05/14/20 0202 05/14/20 0500 05/14/20 0951   BP: 120/71 120/80 119/85 102/63   Pulse: 107  107 106   Resp: 28  18 24   Temp: 98.5 °F (36.9 °C) 97.8 °F (36.6 °C)  97.9 °F (36.6 °C)   TempSrc: Oral Oral  Oral   SpO2: 97%  99% 98%   Weight:  182 lb 12.2 oz (82.9 kg)          Body mass index is 25.49 kg/m². Physical Exam  HENT:      Head: Normocephalic and atraumatic. Eyes:      General:         Right eye: No discharge. Conjunctiva/sclera: Conjunctivae normal.      Pupils: Pupils are equal, round, and reactive to light. Neck:      Musculoskeletal: Normal range of motion. Thyroid: No thyromegaly. Vascular: JVD present. Cardiovascular:      Rate and Rhythm: Normal rate and regular rhythm. Heart sounds: Murmur (grade 2/6 systolic murmur) present. No friction rub. Pulmonary:      Effort: Respiratory distress present. Breath sounds: No stridor. Rales present. No wheezing. Comments: Patient in mild resp distress. Decreased basilar breath sounds  Abdominal:      General: Bowel sounds are normal. There is no distension. Palpations: Abdomen is soft. Tenderness: There is no abdominal tenderness. Musculoskeletal: Normal range of motion. General: No tenderness. Right lower leg: Edema (2+) present. Left lower leg: Edema (2+) present. Skin:     General: Skin is warm and dry. Findings: No erythema or rash. Neurological:      Mental Status: He is alert and oriented to person, place, and time. Cranial Nerves: No cranial nerve deficit.       Coordination: Coordination normal.      Deep Tendon Reflexes: Reflexes normal.           CBC:   Lab Results   Component Value Date    WBC 7.7 05/13/2020    HGB 10.3 05/13/2020    HCT 36.4 05/13/2020     05/13/2020     Lipids:  Lab Results   Component Value Date    CHOL 108 05/14/2020    TRIG 91 05/14/2020    HDL 19 (L) 05/14/2020    LDLCALC 149 (H) 12/01/2014    LDLDIRECT 77 05/14/2020     PT/INR:   Lab Results   Component Value Date    INR 1.10 07/11/2018        BMP:    Lab Results   Component Value Date     (L) 05/13/2020    K 4.2 05/13/2020    CL 92 (L) 05/13/2020    CO2 21 05/13/2020    BUN 23 05/13/2020     CMP:   Lab Results   Component Value Date     (H) 05/13/2020    PROT 6.7 05/13/2020    BILITOT 2.4 (H) 05/13/2020    ALKPHOS 121 05/13/2020     TSH: No results found for: TSH    EKGINTERPRETATION - EKG Interpretation:        IMPRESSION / RECOMMENDATIONS:     Acute on chronic combined systolic and diastolic heart failure  Ischemic cardiomyopathy  Nonsustained VT  Severe MR  Pulmonary hypertension   hyponatremia  Hypertension  Hyperlipidemia  Anemia  Mediastinal lymphadenopathy    Patient admitted with acute on chronic heart failure symptoms  There has been noncompliance with diuretics may have led to his heart failure exacerbation  I saw him previously in January and discussed about optimization of medical therapy prior to the device  Also he has underlying left bundle morphology on ekg    Patient is also has severe hyponatremia with suggest the severity in heart failure    Patient could be considered for biventricular ICD but at this time his heart failure exacerbation needs to be treated. Prior to considering any device therapy patient needs to be euvolemic. Patient is on dobutamine drip for afterload reduction     Please keep potassium between 4 to 4.5  Please keep Mag between 2 to 2.2    Will revisit device therapy once patient is euvolemic and improved from heart failure symptoms. Also there was some suggestion of mediastinal lymphadenopathy -hopefully this is nothing adverse like cancer      Thanks again for allowing me to participate in care of this patient. Please call me if you have any questions. With best regards. Cristobal Hendricks MD, 5/14/2020 1:53 PM     Please note this report has been partially produced using speech recognition software and may contain errors related to that system including errors in grammar, punctuation, and spelling, as well as words and phrases that may be inappropriate. If there are any questions or concerns please feel free to contact the dictating provider for clarification.

## 2020-05-15 LAB
ALBUMIN SERPL-MCNC: 4 GM/DL (ref 3.4–5)
ALP BLD-CCNC: 108 IU/L (ref 40–129)
ALT SERPL-CCNC: 415 U/L (ref 10–40)
ANION GAP SERPL CALCULATED.3IONS-SCNC: 14 MMOL/L (ref 4–16)
AST SERPL-CCNC: 179 IU/L (ref 15–37)
BILIRUB SERPL-MCNC: 1.6 MG/DL (ref 0–1)
BUN BLDV-MCNC: 23 MG/DL (ref 6–23)
CALCIUM SERPL-MCNC: 8.6 MG/DL (ref 8.3–10.6)
CHLORIDE BLD-SCNC: 93 MMOL/L (ref 99–110)
CO2: 26 MMOL/L (ref 21–32)
CREAT SERPL-MCNC: 1.3 MG/DL (ref 0.9–1.3)
GFR AFRICAN AMERICAN: >60 ML/MIN/1.73M2
GFR NON-AFRICAN AMERICAN: 59 ML/MIN/1.73M2
GLUCOSE BLD-MCNC: 94 MG/DL (ref 70–99)
MAGNESIUM: 1.8 MG/DL (ref 1.8–2.4)
POTASSIUM SERPL-SCNC: 3.8 MMOL/L (ref 3.5–5.1)
SODIUM BLD-SCNC: 133 MMOL/L (ref 135–145)
TOTAL PROTEIN: 6.1 GM/DL (ref 6.4–8.2)

## 2020-05-15 PROCEDURE — 6370000000 HC RX 637 (ALT 250 FOR IP): Performed by: NURSE PRACTITIONER

## 2020-05-15 PROCEDURE — 6370000000 HC RX 637 (ALT 250 FOR IP): Performed by: INTERNAL MEDICINE

## 2020-05-15 PROCEDURE — 83735 ASSAY OF MAGNESIUM: CPT

## 2020-05-15 PROCEDURE — 94761 N-INVAS EAR/PLS OXIMETRY MLT: CPT

## 2020-05-15 PROCEDURE — 2140000000 HC CCU INTERMEDIATE R&B

## 2020-05-15 PROCEDURE — 6360000002 HC RX W HCPCS: Performed by: INTERNAL MEDICINE

## 2020-05-15 PROCEDURE — 80053 COMPREHEN METABOLIC PANEL: CPT

## 2020-05-15 PROCEDURE — 6360000002 HC RX W HCPCS: Performed by: NURSE PRACTITIONER

## 2020-05-15 PROCEDURE — 36415 COLL VENOUS BLD VENIPUNCTURE: CPT

## 2020-05-15 PROCEDURE — 94640 AIRWAY INHALATION TREATMENT: CPT

## 2020-05-15 PROCEDURE — 99232 SBSQ HOSP IP/OBS MODERATE 35: CPT | Performed by: NURSE PRACTITIONER

## 2020-05-15 PROCEDURE — APPSS60 APP SPLIT SHARED TIME 46-60 MINUTES: Performed by: NURSE PRACTITIONER

## 2020-05-15 PROCEDURE — 2580000003 HC RX 258: Performed by: NURSE PRACTITIONER

## 2020-05-15 RX ORDER — MAGNESIUM SULFATE IN WATER 40 MG/ML
2 INJECTION, SOLUTION INTRAVENOUS ONCE
Status: COMPLETED | OUTPATIENT
Start: 2020-05-15 | End: 2020-05-15

## 2020-05-15 RX ORDER — POTASSIUM CHLORIDE 20 MEQ/1
20 TABLET, EXTENDED RELEASE ORAL
Status: DISCONTINUED | OUTPATIENT
Start: 2020-05-15 | End: 2020-05-16 | Stop reason: HOSPADM

## 2020-05-15 RX ORDER — ALBUTEROL SULFATE 90 UG/1
2 AEROSOL, METERED RESPIRATORY (INHALATION) EVERY 6 HOURS PRN
Status: DISCONTINUED | OUTPATIENT
Start: 2020-05-15 | End: 2020-05-16 | Stop reason: HOSPADM

## 2020-05-15 RX ORDER — FUROSEMIDE 40 MG/1
40 TABLET ORAL 2 TIMES DAILY
Status: DISCONTINUED | OUTPATIENT
Start: 2020-05-15 | End: 2020-05-16 | Stop reason: HOSPADM

## 2020-05-15 RX ADMIN — METOPROLOL SUCCINATE 25 MG: 25 TABLET, EXTENDED RELEASE ORAL at 08:54

## 2020-05-15 RX ADMIN — MAGNESIUM SULFATE 2 G: 2 INJECTION INTRAVENOUS at 08:54

## 2020-05-15 RX ADMIN — FUROSEMIDE 40 MG: 40 TABLET ORAL at 18:05

## 2020-05-15 RX ADMIN — ALBUTEROL SULFATE 2 PUFF: 90 AEROSOL, METERED RESPIRATORY (INHALATION) at 22:43

## 2020-05-15 RX ADMIN — SODIUM CHLORIDE, PRESERVATIVE FREE 10 ML: 5 INJECTION INTRAVENOUS at 20:34

## 2020-05-15 RX ADMIN — DOBUTAMINE IN DEXTROSE 5 MCG/KG/MIN: 200 INJECTION, SOLUTION INTRAVENOUS at 01:08

## 2020-05-15 RX ADMIN — SPIRONOLACTONE 12.5 MG: 25 TABLET ORAL at 08:54

## 2020-05-15 RX ADMIN — RIVAROXABAN 20 MG: 20 TABLET, FILM COATED ORAL at 08:54

## 2020-05-15 RX ADMIN — GUAIFENESIN 600 MG: 600 TABLET, EXTENDED RELEASE ORAL at 08:53

## 2020-05-15 RX ADMIN — ASPIRIN 81 MG: 81 TABLET, COATED ORAL at 08:53

## 2020-05-15 RX ADMIN — POTASSIUM CHLORIDE 20 MEQ: 1500 TABLET, EXTENDED RELEASE ORAL at 09:01

## 2020-05-15 RX ADMIN — ALBUTEROL SULFATE 2 PUFF: 90 AEROSOL, METERED RESPIRATORY (INHALATION) at 15:13

## 2020-05-15 RX ADMIN — GUAIFENESIN 600 MG: 600 TABLET, EXTENDED RELEASE ORAL at 20:34

## 2020-05-15 RX ADMIN — LISINOPRIL 10 MG: 10 TABLET ORAL at 08:53

## 2020-05-15 RX ADMIN — IPRATROPIUM BROMIDE AND ALBUTEROL SULFATE 1 AMPULE: .5; 3 SOLUTION RESPIRATORY (INHALATION) at 01:11

## 2020-05-15 ASSESSMENT — ENCOUNTER SYMPTOMS
DIARRHEA: 0
BACK PAIN: 0
COUGH: 0
SHORTNESS OF BREATH: 1
VOMITING: 0
NAUSEA: 0
ABDOMINAL PAIN: 0
CONSTIPATION: 0
PHOTOPHOBIA: 0
BLOOD IN STOOL: 0

## 2020-05-15 NOTE — PROGRESS NOTES
05/13/20  1152 05/13/20  1724 05/15/20  0504   * 128* 133*   K 3.8 4.2 3.8   CL 89* 92* 93*   CO2 21 21 26   BUN 23 23 23   CREATININE 1.2 1.3 1.3     LIVER PROFILE:   Recent Labs     05/13/20  1152 05/13/20  1724 05/15/20  0504   * 416* 179*   * 617* 415*   BILITOT 2.0* 2.4* 1.6*   ALKPHOS 118 121 108     PT/INR: No results for input(s): PROTIME, INR in the last 72 hours. APTT: No results for input(s): APTT in the last 72 hours. BNP:  No results for input(s): BNP in the last 72 hours.   TROPONIN: @TROPONINI:3@      Assessment and plan:  Acute on chronic combined systolic and diastolic heart failure  Ischemic cardiomyopathy  Non sustained VT  Severe MR   Pulmnary HTN  Hyponatremia  HTN  HLD  Anemia  mediastinal lymphadenopathy      He is in a negative fluid balance  On lasix and dobutamine gtts for afterload reduction   Fluid restriction 1800 ml   Stressed compliance     Sodium is improving -cramping has improved  Replace magnesium  Recommend to keep magnesium between 2.0-2.2  LFT are trending down   No further further VT  Hold on amiodarone    Anemia stable     can revisit device therapy one patient is euvolemic and out of acute heart failure - may be done as outpatient     NANCY March - CNP, 5/15/2020 9:25 AM     .

## 2020-05-15 NOTE — PLAN OF CARE
Problem: Breathing Pattern - Ineffective:  Goal: Ability to achieve and maintain a regular respiratory rate will improve  Description: Ability to achieve and maintain a regular respiratory rate will improve  5/15/2020 1338 by Angie Sawyer RN  Outcome: Ongoing  5/15/2020 0126 by Alessandra Gutiérrez RN  Outcome: Ongoing

## 2020-05-15 NOTE — PROGRESS NOTES
[facility]  Alma Nunes  Phone: [phone]    Fax [phone]                  Kristina Obando MD, Velma Alanis MD, Conner Mahmood MD, MD Charlie Meyer MD Stephen Haggis, MD Lanora Burrows, APRN      Chao Gtz, APRN  Sammie Rhodes, [address] Horan, APRASHLI    Cardiology Progress Note     Today's Plan: continue to diuresis    Admit Date:  5/13/2020    Consult reason/ Seen today for: CHF    Subjective and  Overnight Events:  Patient states that he is doing a little better. He ran out of his lasix for three days and sent his son to get his prescription refilled, but never received the medication. He also complaining that he did not get his oxygen delivery he was waiting on. He feels frustrated that his ICD has not been placed. Discussed him needing to be out of hospital for a while and being able to lay flat for his ICD, patient states that he is down to being in the hospital monthly. Discussed his fluid and sodium intake. Patient states he is doing good with it but is unable to tell me how much fluid he drinks in a day or the amount of sodium he takes in. Assessment / Plan / Recommendation:     1. HFrEF: EF 20% 1/2020. 5/13/2020 BNP 4746. Chest x ray 5/13/2020 shows slightly larger pleural effusion. Continue lasix infusion @ 5 mg/hr and dobutamine infusion @ 5 mcg/kg/min. He is  -6.4 L per documentation. Strict intake out put and daily weights. Continue toprol XL 25 mg daily, aldactone 12.5 mg daily, and lisinopril 10 mg daily. Acute exacerbation is due to multiple factors of non adherence. EP for ICD  2. CAD; [facility] reviewed only med therapy was advised  3. VT in past ; EP consulted. Discussed that he will have to be in good enough shape respiratory wise to lay flat for procedure. He states that he hasn't been able to lay flat for a long time. 4. PE on VQ 1/2020. Continue xarelto  5.  CKD stable  6. DVT prophylaxis if not contraindicated while in the hospital.       History of Presenting Illness:    Chief complain on admission : 52 y. o.year old who is admitted for  Chief Complaint   Patient presents with    Shortness of Breath        Past medical history:    has a past medical history of CAD (coronary artery disease), CHF (congestive heart failure) ([facility] [address] COPD (chronic obstructive pulmonary disease) ([facility] [address] Depression, Drug abuse ([facility] [address] HIGH CHOLESTEROL, Hypertension, MI (myocardial infarction) ([facility] [address] and S/P coronary artery bypass graft x 4. Past surgical history:   has a past surgical history that includes Cardiac surgery (11/2010); Bypass Graft (04/10/2011); and Leg Surgery. Social History:   reports that he has been smoking cigarettes. He has a 20.00 pack-year smoking history. He has quit using smokeless tobacco. He reports that he does not drink alcohol or use drugs. Family history:  family history includes Cancer in his father; Diabetes in his mother; Heart Disease in his father and mother; Heart Failure in his father. No Known Allergies    Review of Systems   Constitutional: Negative for fatigue and fever. Eyes: Negative for photophobia and visual disturbance. Respiratory: Positive for shortness of breath. Negative for cough. Cardiovascular: Negative for chest pain, palpitations and leg swelling. Gastrointestinal: Negative for abdominal pain, blood in stool, constipation, diarrhea, nausea and vomiting. Genitourinary: Positive for decreased urine volume (improved). Negative for difficulty urinating. Musculoskeletal: Positive for arthralgias. Negative for back pain. Neurological: Negative for dizziness, syncope, weakness, light-headedness and headaches.        /67   Pulse 99   Temp 97.5 °F (36.4 °C) (Oral)   Resp 24   Wt 191 lb 12.8 oz (87 kg)   SpO2 98%   BMI 26.75 kg/m²       Intake/Output Summary (Last 24 hours) at 5/15/2020 0817  Last data filed at 5/15/2020 0600  Gross per 24 hour   Intake 1240 ml   Output 5550 ml   Net -4310 ml       Physical Exam  Vitals signs and nursing note reviewed. Constitutional:       Appearance: Normal appearance. HENT:      Head: Normocephalic and atraumatic. Mouth/Throat:      Mouth: Mucous membranes are moist.      Pharynx: Oropharynx is clear. Eyes:      Conjunctiva/sclera: Conjunctivae normal.      Pupils: Pupils are equal, round, and reactive to light. Neck:      Musculoskeletal: Neck supple. No muscular tenderness. Cardiovascular:      Rate and Rhythm: Normal rate and regular rhythm. Pulses: Normal pulses. Heart sounds: Normal heart sounds. Pulmonary:      Effort: Pulmonary effort is normal.      Breath sounds: Normal breath sounds. Musculoskeletal:         General: No swelling. Skin:     General: Skin is warm and dry. Capillary Refill: Capillary refill takes less than 2 seconds. Neurological:      Mental Status: He is alert and oriented to person, place, and time. Psychiatric:         Mood and Affect: Mood normal.         Behavior: Behavior normal.         Thought Content:  Thought content normal.         Judgment: Judgment normal.       Telemetry Reviewed:   Sinus Rhythm rate 106    Medications:    lisinopril  10 mg Oral Daily    metoprolol succinate  25 mg Oral Daily    aspirin  81 mg Oral Daily    spironolactone  12.5 mg Oral Daily    rivaroxaban  20 mg Oral Daily with breakfast    nicotine  1 patch Transdermal Daily    guaiFENesin  600 mg Oral BID    sodium chloride flush  10 mL Intravenous 2 times per day      furosemide (LASIX) 1mg/ml infusion 5 mg/hr (05/14/20 2110)    DOBUTamine 5 mcg/kg/min (05/15/20 0108)     ipratropium-albuterol, nitroGLYCERIN, sodium chloride flush, polyethylene glycol, promethazine **OR** ondansetron    Lab Data:  CBC:   Recent Labs     05/13/20  1152   WBC 7.7   HGB 10.3*   HCT 36.4*   MCV 70.5*        BMP:   Recent Labs     05/13/20  1152 05/13/20  1724 05/15/20  0504   * 128* 133*   K 3.8 4.2 3.8   CL 89* 92* 93*   CO2 21 21 26   BUN 23 23 23   CREATININE 1.2 1.3 1.3     PT/INR: No results for input(s): PROTIME, INR in the last 72 hours. BNP:    Recent Labs     05/13/20  1152   PROBNP 4,746*     TROPONIN:   Recent Labs     05/13/20  1152   TROPONINT <0.010        ECHO :   Echocardiogram 1/24/2020 Summary   Left ventricular systolic function is abnormal.   Ejection fraction is visually estimated at 20%. Dilated left ventricle. Grade III diastolic dysfunction. Moderately dilated left atrium. Severe mitral regurgitation is present. Moderate tricuspid regurgitation is present. RVSP is 49 mmHg. No evidence of any pericardial effusion. All labs, medications and tests reviewed by myself , continue all other medications of all above medical condition listed as is except for changes mentioned above. Thank you very much for consult , please call with questions. Electronically signed by NANCY Deshpande CNP on 5/15/2020 at 8:17 AM      I have seen ,spoken to  and examined this patient personally, independently of the nurse practitioner. I have reviewed the hospital care given to date and reviewed all pertinent labs and imaging. The plan was developed mutually at the time of the visit with the patient,  NP  and myself. I have spoken with patient, nursing staff and provided written and verbal instructions . The above note has been reviewed and I agree with the assessment, diagnosis, and treatment plan with changes made by me as follows     CARDIOLOGY ATTENDING ADDENDUM    HPI:  I have reviewed the above HPI  And agree with above   Sherrill Queen is a 52 y. o.year old who and presents with had concerns including Shortness of Breath.   Chief Complaint   Patient presents with    Shortness of Breath     Interval history:  No SOB    Physical Exam:  General:  Awake, alert, NAD  Head:normal  Eye:normal  Neck:  No JVD   Chest:  Clear to auscultation, respiration easy  Cardiovascular:  RRR S1S2  Abdomen:   nontender  Extremities:  tr edema  Pulses; palpable  Neuro: grossly normal      MEDICAL DECISION MAKING;    I agree with the above plan, which was planned by myself and discussed with NP.   Breathing better  ICD per EP  To oral lasix 80 BID  Will fu as needed        Artem Gill MD [facility] [place] [facility]

## 2020-05-15 NOTE — PROGRESS NOTES
edema      INTERVAL HISTORY: Shortness of breath better. Urine output at 6 L.       ROS:  No chest pain. No abdominal pain. No nausea. No vomiting. Objective: Intake/Output Summary (Last 24 hours) at 5/15/2020 1015  Last data filed at 5/15/2020 0919  Gross per 24 hour   Intake 1240 ml   Output 5750 ml   Net -4510 ml      Vitals:   Vitals:    05/15/20 0851   BP: 115/77   Pulse: 104   Resp: 13   Temp: 97.4 °F (36.3 °C)   SpO2: 98%     Physical Exam:   GEN: Awake male, sitting upright. Pleasant. Answers questions appropriate. EYES: Ocular muscles intact. HENT: Membranes moist.  No nasal discharge. NECK: Supple,   RESP: Clear to auscultation bilaterally. CV: RRR. No murmur. 1+ pitting edema on the lower extremity. GI: Soft abdomen. Nontender. : No Hansen in place. MSK: no bony fractures. SKIN: warm, dry, no rashes  NEURO: No focal deficit. No lateralizing weakness. Normal speech.   PSYCH: Awake, alert, oriented    Medications:   Medications:    potassium chloride  20 mEq Oral Daily with breakfast    magnesium sulfate  2 g Intravenous Once    furosemide  40 mg Oral BID    lisinopril  10 mg Oral Daily    metoprolol succinate  25 mg Oral Daily    aspirin  81 mg Oral Daily    spironolactone  12.5 mg Oral Daily    rivaroxaban  20 mg Oral Daily with breakfast    nicotine  1 patch Transdermal Daily    guaiFENesin  600 mg Oral BID    sodium chloride flush  10 mL Intravenous 2 times per day      Infusions:     PRN Meds: ipratropium-albuterol, 1 ampule, Q4H PRN  nitroGLYCERIN, 0.4 mg, Q5 Min PRN  sodium chloride flush, 10 mL, PRN  polyethylene glycol, 17 g, Daily PRN  promethazine, 12.5 mg, Q6H PRN    Or  ondansetron, 4 mg, Q6H PRN      Electronically signed by Arron Alvarez MD on 5/15/2020 at 10:15 AM

## 2020-05-15 NOTE — PROGRESS NOTES
Nutrition Education    Type and Reason for Visit: Consult    Nutrition Assessment:  Admit referral for education on heart failure diet guidelines. Currently on low sodium diet with 1800 ml fluid restriction. Diet ed on past admits for low sodium diet. Tries to follow low sodium diet at home. Usually eats only one meal per day, supper meal. Started this routine years ago when employed as a . Does not plan to change this routine. No diet ed wanted on visit but brief discussion regarding current 1800 ml fluid restriction. · Verbally reviewed information with Patient  · Written educational materials provided, heart failure diet from Nutrition Care Manual   · Contact name and number provided. · Refer to Patient Education activity for more details.     Electronically signed by Xavier Edwards RD, AVA on 5/15/20 at 2:19 PM EDT    Contact Number: [phone]

## 2020-05-16 VITALS
HEIGHT: 71 IN | OXYGEN SATURATION: 100 % | BODY MASS INDEX: 27.1 KG/M2 | TEMPERATURE: 97.8 F | WEIGHT: 193.56 LBS | DIASTOLIC BLOOD PRESSURE: 70 MMHG | SYSTOLIC BLOOD PRESSURE: 100 MMHG | HEART RATE: 101 BPM | RESPIRATION RATE: 15 BRPM

## 2020-05-16 LAB
ALBUMIN SERPL-MCNC: 4 GM/DL (ref 3.4–5)
ALP BLD-CCNC: 112 IU/L (ref 40–128)
ALT SERPL-CCNC: 336 U/L (ref 10–40)
ANION GAP SERPL CALCULATED.3IONS-SCNC: 14 MMOL/L (ref 4–16)
AST SERPL-CCNC: 132 IU/L (ref 15–37)
BILIRUB SERPL-MCNC: 1.5 MG/DL (ref 0–1)
BUN BLDV-MCNC: 25 MG/DL (ref 6–23)
CALCIUM SERPL-MCNC: 9.2 MG/DL (ref 8.3–10.6)
CHLORIDE BLD-SCNC: 88 MMOL/L (ref 99–110)
CO2: 25 MMOL/L (ref 21–32)
CREAT SERPL-MCNC: 1.4 MG/DL (ref 0.9–1.3)
GFR AFRICAN AMERICAN: >60 ML/MIN/1.73M2
GFR NON-AFRICAN AMERICAN: 54 ML/MIN/1.73M2
GLUCOSE BLD-MCNC: 138 MG/DL (ref 70–99)
MAGNESIUM: 2.4 MG/DL (ref 1.8–2.4)
POTASSIUM SERPL-SCNC: 4.5 MMOL/L (ref 3.5–5.1)
PRO-BNP: 2143 PG/ML
SODIUM BLD-SCNC: 127 MMOL/L (ref 135–145)
TOTAL PROTEIN: 6.6 GM/DL (ref 6.4–8.2)

## 2020-05-16 PROCEDURE — 2580000003 HC RX 258: Performed by: NURSE PRACTITIONER

## 2020-05-16 PROCEDURE — 1800000000 HC LEAVE OF ABSENCE

## 2020-05-16 PROCEDURE — 94640 AIRWAY INHALATION TREATMENT: CPT

## 2020-05-16 PROCEDURE — 6370000000 HC RX 637 (ALT 250 FOR IP): Performed by: INTERNAL MEDICINE

## 2020-05-16 PROCEDURE — 6370000000 HC RX 637 (ALT 250 FOR IP): Performed by: NURSE PRACTITIONER

## 2020-05-16 PROCEDURE — 94150 VITAL CAPACITY TEST: CPT

## 2020-05-16 PROCEDURE — 6360000002 HC RX W HCPCS: Performed by: NURSE PRACTITIONER

## 2020-05-16 PROCEDURE — 94010 BREATHING CAPACITY TEST: CPT

## 2020-05-16 PROCEDURE — 83880 ASSAY OF NATRIURETIC PEPTIDE: CPT

## 2020-05-16 PROCEDURE — 83735 ASSAY OF MAGNESIUM: CPT

## 2020-05-16 PROCEDURE — 99231 SBSQ HOSP IP/OBS SF/LOW 25: CPT | Performed by: INTERNAL MEDICINE

## 2020-05-16 PROCEDURE — 94761 N-INVAS EAR/PLS OXIMETRY MLT: CPT

## 2020-05-16 PROCEDURE — 80053 COMPREHEN METABOLIC PANEL: CPT

## 2020-05-16 RX ORDER — SPIRONOLACTONE 25 MG/1
12.5 TABLET ORAL DAILY
Qty: 30 TABLET | Refills: 1 | Status: ON HOLD | OUTPATIENT
Start: 2020-05-16 | End: 2020-05-22 | Stop reason: HOSPADM

## 2020-05-16 RX ORDER — METOPROLOL SUCCINATE 25 MG/1
25 TABLET, EXTENDED RELEASE ORAL DAILY
Qty: 30 TABLET | Refills: 2 | Status: ON HOLD | OUTPATIENT
Start: 2020-05-17 | End: 2020-05-22 | Stop reason: SDUPTHER

## 2020-05-16 RX ORDER — LISINOPRIL 5 MG/1
5 TABLET ORAL DAILY
Qty: 30 TABLET | Refills: 1 | Status: ON HOLD | OUTPATIENT
Start: 2020-05-16 | End: 2020-05-22 | Stop reason: HOSPADM

## 2020-05-16 RX ADMIN — SPIRONOLACTONE 12.5 MG: 25 TABLET ORAL at 09:26

## 2020-05-16 RX ADMIN — ASPIRIN 81 MG: 81 TABLET, COATED ORAL at 09:15

## 2020-05-16 RX ADMIN — LISINOPRIL 10 MG: 10 TABLET ORAL at 09:26

## 2020-05-16 RX ADMIN — RIVAROXABAN 20 MG: 20 TABLET, FILM COATED ORAL at 09:15

## 2020-05-16 RX ADMIN — SODIUM CHLORIDE, PRESERVATIVE FREE 10 ML: 5 INJECTION INTRAVENOUS at 09:15

## 2020-05-16 RX ADMIN — ONDANSETRON HYDROCHLORIDE 4 MG: 2 SOLUTION INTRAMUSCULAR; INTRAVENOUS at 03:43

## 2020-05-16 RX ADMIN — ALBUTEROL SULFATE 2 PUFF: 90 AEROSOL, METERED RESPIRATORY (INHALATION) at 08:02

## 2020-05-16 RX ADMIN — FUROSEMIDE 40 MG: 40 TABLET ORAL at 09:26

## 2020-05-16 RX ADMIN — POTASSIUM CHLORIDE 20 MEQ: 1500 TABLET, EXTENDED RELEASE ORAL at 09:15

## 2020-05-16 RX ADMIN — GUAIFENESIN 600 MG: 600 TABLET, EXTENDED RELEASE ORAL at 09:15

## 2020-05-16 RX ADMIN — METOPROLOL SUCCINATE 25 MG: 25 TABLET, EXTENDED RELEASE ORAL at 09:26

## 2020-05-16 ASSESSMENT — PAIN SCALES - GENERAL
PAINLEVEL_OUTOF10: 0
PAINLEVEL_OUTOF10: 0

## 2020-05-16 NOTE — PLAN OF CARE
Problem: Breathing Pattern - Ineffective:  Goal: Ability to achieve and maintain a regular respiratory rate will improve  Description: Ability to achieve and maintain a regular respiratory rate will improve  Outcome: Ongoing     Problem: Falls - Risk of:  Goal: Will remain free from falls  Description: Will remain free from falls  Outcome: Ongoing  Goal: Absence of physical injury  Description: Absence of physical injury  Outcome: Ongoing     Problem: OXYGENATION/RESPIRATORY FUNCTION  Goal: Patient will maintain patent airway  Outcome: Ongoing  Goal: Patient will achieve/maintain normal respiratory rate/effort  Description: Respiratory rate and effort will be within normal limits for the patient  Outcome: Ongoing     Problem: HEMODYNAMIC STATUS  Goal: Patient has stable vital signs and fluid balance  Outcome: Ongoing     Problem: FLUID AND ELECTROLYTE IMBALANCE  Goal: Fluid and electrolyte balance are achieved/maintained  Outcome: Ongoing     Problem: ACTIVITY INTOLERANCE/IMPAIRED MOBILITY  Goal: Mobility/activity is maintained at optimum level for patient  Outcome: Ongoing

## 2020-05-16 NOTE — PROGRESS NOTES
Dr. Binh Alexander wanted us to walk him in the hallway to make sure his saturations didn't drop. Pt walked around unit and the lowest he got was 91%. Pt did get a little winded, but sats stayed in the mid 90's.  Will let dr. Binh Alexander know results

## 2020-05-16 NOTE — PROGRESS NOTES
Went through discharge instructions with pt. Instructed pt to use the IS q 4 hrs while awake for 1 week. To follow up with dr. Diogo Smith in 1 week and to make an appt with a PCP and use the walk in clinics incase the PCP can't see the pt for 1 month. Gave pt a blooklet about CHF and instructed him to weigh himself and check his BP every day so when he follows up with his cardiologist he will be able to give him the numbers. Scripts were sent to [facility] in [facility]. Pt taken out in [place] to families car.

## 2020-05-16 NOTE — PROGRESS NOTES
Hospitalist Progress Note      Name:  Bo Kemp /Age/Sex: 1970  (52 y.o. male)   MRN & CSN:  [id number] & [id number] Admission Date/Time: 2020 10:53 AM   [facility] PCP: No primary care provider on file. Hospital Day: 4    ASSESSMENT & PLAN:   Bo Kemp is a 52 y.o.  male who presented with a complaint of shortness of breath with rest and with exertion. He also reports orthopnea. He has bilateral lower extremity edema. Patient requesting discharge. #.  Combined systolic and diastolic CHF exacerbation--EF 20%. Grade 2 diastolic dysfunction. Presenting with orthopnea and dyspnea at rest and on exertion. Has 1+ bilateral lower extremity edema. May be component of dietary discretion. He thinks that his dry weight is about 160s  Pounds. BNP 4746. Chest x-ray without evidence of pulmonary vascular congestion. Urine output 6 L from  7 AM to 5/15 7 AM.  Urine output 1350 over the past 24 hours.  -Lasix 40 p.o. twice daily  -CMP and mag daily  -Toprol-XL  -Aldactone  -Lisinopril  - BiV-ICD placement likely on outpatient basis    #. CAD--s/p three-vessel CABG in . No acute issues. -Aspirin, Toprol-XL    #. Elevated liver enzymes-- right upper quadrant ultrasound nonacute. Acute hepatitis panel negative. Etiology unclear.  -Trend CMP     #. Tobacco use disorder-- on nicotine patch    #. Probable/possible pulmonary embolism-- patient had a VQ scan on 20  and showed intermediate probability for PE. Additionally,  d-dimer was elevated at 3200 on last admission on 20. He was subsequently discharged home with Eliquis. He is not able to afford the Eliquis. He was readmitted again. Since then multiple times and subsequently has had CTA pulmonary on 20, 3/11/20 and 5/10/20 and none of them showed pulmonary embolism. A prescription for Xarelto was sent to his pharmacy in 2020. The prescription was never picked up.   It appears that the 100/70   Pulse: 101   Resp: 15   Temp: 97.8 °F (36.6 °C)   SpO2: 100%     Physical Exam:   GEN: Awake male, sitting upright at bedside. Nontoxic-appearing. EYES: No eye discharge. HENT: Normocephalic atraumatic. No nasal discharge. NECK: Supple,   RESP: Clear to auscultation bilaterally. CV: RRR. No murmur. 1+ pitting edema on the lower extremity. GI: Soft abdomen. Nontender. : No Hansen in place. MSK: no bony fractures. SKIN: warm, dry, no rashes  NEURO: No focal deficit. No lateralizing weakness. Normal speech.   PSYCH: Awake, alert, oriented    Medications:   Medications:    potassium chloride  20 mEq Oral Daily with breakfast    furosemide  40 mg Oral BID    lisinopril  10 mg Oral Daily    metoprolol succinate  25 mg Oral Daily    aspirin  81 mg Oral Daily    spironolactone  12.5 mg Oral Daily    rivaroxaban  20 mg Oral Daily with breakfast    nicotine  1 patch Transdermal Daily    guaiFENesin  600 mg Oral BID    sodium chloride flush  10 mL Intravenous 2 times per day      Infusions:     PRN Meds: albuterol sulfate HFA, 2 puff, Q6H PRN  nitroGLYCERIN, 0.4 mg, Q5 Min PRN  sodium chloride flush, 10 mL, PRN  polyethylene glycol, 17 g, Daily PRN  promethazine, 12.5 mg, Q6H PRN    Or  ondansetron, 4 mg, Q6H PRN      Electronically signed by Gunnar Gusman MD on 5/16/2020 at 10:02 AM

## 2020-05-16 NOTE — DISCHARGE SUMMARY
Discharge Summary    Name:  Rachid Barraza /Age/Sex: 1970  (52 y.o. male)   MRN & CSN:  [id number] & [id number] Admission Date/Time: 2020 10:53 AM   Attending:  Kenia Youssef MD Discharging Physician: Kenia Youssef MD     Hospital Course:   Rachid Barraza is a 52 y.o.  male who presented with a complaint of shortness of breath with rest and with exertion. He also reports orthopnea. He has bilateral lower extremity edema. Patient requesting discharge. I have talked to the pharmacist at [facility] in [place]. He stated the patient filled Lasix in 2020 for 1 month only. Since then he has not filled any Lasix prescription. He also stated there was a prescription for Bumex that was never picked up. The only medications  that the patient picked up lately was lisinopril and Aldactone. HOSPITAL COURSE:    #. Combined systolic and diastolic CHF exacerbation--EF 20%. Grade 2 diastolic dysfunction. Presenting with orthopnea and dyspnea at rest and on exertion. Has 1+ bilateral lower extremity edema. May be component of dietary discretion. He thinks that his dry weight is about 160s  Pounds. BNP 4746. Chest x-ray without evidence of pulmonary vascular congestion. Patient was adequately diuresed. Urine output 6 L from  7 AM to 5/15 7 AM.  Urine output 1350 over the past 24 hours. Patient walked the hallways without any difficulty. Patient was instructed to continue Bumex 1 mg twice daily, Aldactone, lisinopril and Toprol-XL. Patient requested discharge and was discharged in stable condition. #.  CAD--s/p three-vessel CABG in . On aspirin, beta-blocker, statin     #. Tobacco use disorder-- on nicotine patch     #.  Probable/possible pulmonary embolism-- patient had a VQ scan on 20  and showed intermediate probability for PE.  Additionally,  d-dimer was elevated at 3200 on last admission on 20. Tonja Healy was subsequently discharged home with Eliquis.   He is not able to afford the Eliquis. He was readmitted again. Since then multiple times and subsequently has had CTA pulmonary on 2/7/20, 3/11/20 and 5/10/20 and none of them showed pulmonary embolism. A prescription for Xarelto was sent to his pharmacy in March 2020. The prescription was never picked up. It appears that the patient may have never been on anticoagulation at all since the VQ scan showed probable pulmonary embolism. The VQ scan was done on January 24, 2020. Subsequently, CTA pulmonary embolism was done in February 7, 2020 which did not show any evidence of pulmonary embolism. Given that the patient has not been on anticoagulation and there was only 2 weeks difference between the time the VQ scan was done and  the time  the CTA pulmonary was done which did not show pulmonary embolism. Therefore, there is high likelihood that the patient did not have PE. Additionally, the patient is not able to afford any of the anticoagulations that were prescribed. At this point, it is not necessary for the patient to be on anticoagulation  since the diagnosis of pulmonary  embolism is not definite and multiple subsequent CTA pulmonary did not show pulmonary embolism and the patient cannot afford the medication at all. Additionally, it has been almost 4 months since the initial diagnosis of intermediate probability for pulmonary embolism was noted on VQ scan and has been briefly (may be a month) on anticoagulation since the diagnosis pulmonary embolism was made in January 2020. The patient expressed appropriate understanding of and agreement with the discharge recommendations, medications, and plan.      Consults this admission:  IP CONSULT TO HOSPITALIST  IP CONSULT TO HEART FAILURE NURSE/COORDINATOR  IP CONSULT TO DIETITIAN  IP CONSULT TO CARDIOLOGY  IP CONSULT TO ELECTROPHYSIOLOGY    Discharge Instruction:   Follow-up with PCP and cardiology on outpatient basis    Diet:  cardiac diet   Activity: activity as tolerated  Disposition: Discharged to:   [x]Home, [][facility], []SNF, []Acute Rehab, []Hospice   Condition on discharge: Stable    Discharge Medications:      Nancy Sousa   Home Medication Instructions CAROLYN:[id number]    Printed on:05/16/20 4328   Medication Information                      albuterol sulfate HFA (PROVENTIL HFA) 108 (90 Base) MCG/ACT inhaler  Inhale 2 puffs into the lungs every 4 hours as needed for Wheezing or Shortness of Breath With spacer (and mask if indicated). Thanks. aspirin 81 MG EC tablet  Take 1 tablet by mouth daily             atorvastatin (LIPITOR) 40 MG tablet  Take 1 tablet by mouth daily             bumetanide (BUMEX) 1 MG tablet  Take 1 tablet by mouth 2 times daily             ipratropium-albuterol (DUONEB) 0.5-2.5 (3) MG/3ML SOLN nebulizer solution  Inhale 3 mLs into the lungs 4 times daily             lisinopril (PRINIVIL;ZESTRIL) 5 MG tablet  Take 1 tablet by mouth daily             metoprolol succinate (TOPROL XL) 25 MG extended release tablet  Take 1 tablet by mouth daily             nitroGLYCERIN (NITROSTAT) 0.4 MG SL tablet  Place 1 tablet under the tongue every 5 minutes as needed for Chest pain             spironolactone (ALDACTONE) 25 MG tablet  Take 0.5 tablets by mouth daily                 Objective Findings at Discharge:   /70   Pulse 101   Temp 97.8 °F (36.6 °C)   Resp 15   Ht 5' 11\" (1.803 m)   Wt 193 lb 9 oz (87.8 kg)   SpO2 100%   BMI 27.00 kg/m²            PHYSICAL EXAM   GEN: Awake male, sitting upright at bedside. Nontoxic-appearing. EYES: No eye discharge. HENT: Normocephalic atraumatic. No nasal discharge. NECK: Supple,   RESP: Clear to auscultation bilaterally. CV: RRR. No murmur. 1+ pitting edema on the lower extremity. GI: Soft abdomen. Nontender. : No Hansen in place. MSK: no bony fractures. SKIN: warm, dry, no rashes  NEURO: No focal deficit. No lateralizing weakness. Normal speech.   PSYCH: Awake, alert, oriented    BMP/CBC  Recent Labs     05/13/20  1152 05/13/20  1724 05/15/20  0504 05/16/20  0205   * 128* 133* 127*   K 3.8 4.2 3.8 4.5   CL 89* 92* 93* 88*   CO2 21 21 26 25   BUN 23 23 23 25*   CREATININE 1.2 1.3 1.3 1.4*   WBC 7.7  --   --   --    HCT 36.4*  --   --   --      --   --   --        IMAGING:  All imaging reviewed before discharge    Discharge Time of 36 minutes    Electronically signed by Arron Alvarez MD on 5/16/2020 at 11:04 AM

## 2020-05-16 NOTE — PROGRESS NOTES
Patient instructed and educated on [employer]. Patient able to do 1000 ml. Vital capacity. Patient's goal is 3150ml.  Electronically signed by Laron Merchant RCP on 5/16/2020 at 11:18 AM

## 2020-05-16 NOTE — DISCHARGE INSTR - DIET
Percent Daily Values for sodium. · Be aware that sodium can come in forms other than salt, including monosodium glutamate (MSG), sodium citrate, and sodium bicarbonate (baking soda). MSG is often added to Asian food. When you eat out, you can sometimes ask for food without MSG or added salt. Buy low-sodium foods  · Buy foods that are labeled \"unsalted\" (no salt added), \"sodium-free\" (less than 5 mg of sodium per serving), or \"low-sodium\" (less than 140 mg of sodium per serving). Foods labeled \"reduced-sodium\" and \"light sodium\" may still have too much sodium. Be sure to read the label to see how much sodium you are getting. · Buy fresh vegetables, or frozen vegetables without added sauces. Buy low-sodium versions of canned vegetables, soups, and other canned goods. Prepare low-sodium meals  · Cut back on the amount of salt you use in cooking. This will help you adjust to the taste. Do not add salt after cooking. One teaspoon of salt has about 2,300 mg of sodium. · Take the salt shaker off the table. · Flavor your food with garlic, lemon juice, onion, vinegar, herbs, and spices. Do not use soy sauce, lite soy sauce, steak sauce, onion salt, garlic salt, celery salt, mustard, or ketchup on your food. · Use low-sodium salad dressings, sauces, and ketchup. Or make your own salad dressings and sauces without adding salt. · Use less salt (or none) when recipes call for it. You can often use half the salt a recipe calls for without losing flavor. Other foods such as rice, pasta, and grains do not need added salt. · Rinse canned vegetables, and cook them in fresh water. This removes some--but not all--of the salt. · Avoid water that is naturally high in sodium or that has been treated with water softeners, which add sodium. Call your local water company to find out the sodium content of your water supply. If you buy bottled water, read the label and choose a sodium-free brand.   Avoid high-sodium foods  · Avoid eating:  ? Smoked, cured, salted, and canned meat, fish, and poultry. ? Ham, rodriges, hot dogs, and luncheon meats. ? Regular, hard, and processed cheese and regular peanut butter. ? Crackers with salted tops, and other salted snack foods such as pretzels, chips, and salted popcorn. ? Frozen prepared meals, unless labeled low-sodium. ? Canned and dried soups, broths, and bouillon, unless labeled sodium-free or low-sodium. ? Canned vegetables, unless labeled sodium-free or low-sodium. ? [place] fries, pizza, tacos, and other fast foods. ? Pickles, olives, ketchup, and other condiments, especially soy sauce, unless labeled sodium-free or low-sodium. Where can you learn more? Go to [email or web address] and sign in to your [employer] account. Enter K495 in the [facility] [employer] box to learn more about \"Low Sodium Diet (2,000 Milligram): Care Instructions. \"     If you do not have an account, please click on the \"Sign Up Now\" link. Current as of: August 21, 2019Content Version: 12.4  © 4706-5881 Healthwise, Incorporated. Care instructions adapted under license by [facility] ([facility]). If you have questions about a medical condition or this instruction, always ask your healthcare professional. [address] any warranty or liability for your use of this information.

## 2020-05-17 ENCOUNTER — APPOINTMENT (OUTPATIENT)
Dept: CT IMAGING | Age: 50
DRG: 377 | End: 2020-05-17
Payer: MEDICARE

## 2020-05-17 ENCOUNTER — HOSPITAL ENCOUNTER (INPATIENT)
Age: 50
LOS: 4 days | Discharge: HOME | DRG: 377 | End: 2020-05-22
Attending: EMERGENCY MEDICINE | Admitting: INTERNAL MEDICINE
Payer: MEDICARE

## 2020-05-17 ENCOUNTER — APPOINTMENT (OUTPATIENT)
Dept: GENERAL RADIOLOGY | Age: 50
DRG: 377 | End: 2020-05-17
Payer: MEDICARE

## 2020-05-17 DIAGNOSIS — J44.1 COPD EXACERBATION (HCC): ICD-10-CM

## 2020-05-17 DIAGNOSIS — I50.43 ACUTE ON CHRONIC COMBINED SYSTOLIC AND DIASTOLIC CONGESTIVE HEART FAILURE (HCC): ICD-10-CM

## 2020-05-17 DIAGNOSIS — K92.0 HEMATEMESIS WITH NAUSEA: ICD-10-CM

## 2020-05-17 DIAGNOSIS — E78.2 MIXED HYPERLIPIDEMIA: Chronic | ICD-10-CM

## 2020-05-17 DIAGNOSIS — J43.1 PANLOBULAR EMPHYSEMA (HCC): Chronic | ICD-10-CM

## 2020-05-17 DIAGNOSIS — Z72.0 TOBACCO ABUSE: ICD-10-CM

## 2020-05-17 DIAGNOSIS — K70.31 ASCITES DUE TO ALCOHOLIC CIRRHOSIS (HCC): ICD-10-CM

## 2020-05-17 DIAGNOSIS — I25.2 HISTORY OF MI (MYOCARDIAL INFARCTION): ICD-10-CM

## 2020-05-17 DIAGNOSIS — J86.9 EMPYEMA LUNG (HCC): Primary | ICD-10-CM

## 2020-05-17 DIAGNOSIS — Z95.1 HISTORY OF CORONARY ARTERY BYPASS GRAFT: ICD-10-CM

## 2020-05-17 LAB
BASOPHILS ABSOLUTE: 0.1 K/CU MM
BASOPHILS RELATIVE PERCENT: 1.6 % (ref 0–1)
DIFFERENTIAL TYPE: ABNORMAL
EOSINOPHILS ABSOLUTE: 0.2 K/CU MM
EOSINOPHILS RELATIVE PERCENT: 2.6 % (ref 0–3)
HCT VFR BLD CALC: 35.4 % (ref 42–52)
HEMOGLOBIN: 10.1 GM/DL (ref 13.5–18)
IMMATURE NEUTROPHIL %: 0.8 % (ref 0–0.43)
LACTATE: 2 MMOL/L (ref 0.4–2)
LYMPHOCYTES ABSOLUTE: 1.5 K/CU MM
LYMPHOCYTES RELATIVE PERCENT: 20.1 % (ref 24–44)
MCH RBC QN AUTO: 19.6 PG (ref 27–31)
MCHC RBC AUTO-ENTMCNC: 28.5 % (ref 32–36)
MCV RBC AUTO: 68.6 FL (ref 78–100)
MONOCYTES ABSOLUTE: 0.8 K/CU MM
MONOCYTES RELATIVE PERCENT: 10.2 % (ref 0–4)
NUCLEATED RBC %: 0.9 %
PDW BLD-RTO: 21 % (ref 11.7–14.9)
PLATELET # BLD: 340 K/CU MM (ref 140–440)
PMV BLD AUTO: 9.8 FL (ref 7.5–11.1)
RBC # BLD: 5.16 M/CU MM (ref 4.6–6.2)
SEGMENTED NEUTROPHILS ABSOLUTE COUNT: 5 K/CU MM
SEGMENTED NEUTROPHILS RELATIVE PERCENT: 64.7 % (ref 36–66)
TOTAL IMMATURE NEUTOROPHIL: 0.06 K/CU MM
TOTAL NUCLEATED RBC: 0.1 K/CU MM
TROPONIN T: <0.01 NG/ML
WBC # BLD: 7.7 K/CU MM (ref 4–10.5)

## 2020-05-17 PROCEDURE — 93005 ELECTROCARDIOGRAM TRACING: CPT | Performed by: EMERGENCY MEDICINE

## 2020-05-17 PROCEDURE — 86900 BLOOD TYPING SEROLOGIC ABO: CPT

## 2020-05-17 PROCEDURE — 80053 COMPREHEN METABOLIC PANEL: CPT

## 2020-05-17 PROCEDURE — 85025 COMPLETE CBC W/AUTO DIFF WBC: CPT

## 2020-05-17 PROCEDURE — 83880 ASSAY OF NATRIURETIC PEPTIDE: CPT

## 2020-05-17 PROCEDURE — 86901 BLOOD TYPING SEROLOGIC RH(D): CPT

## 2020-05-17 PROCEDURE — 99285 EMERGENCY DEPT VISIT HI MDM: CPT

## 2020-05-17 PROCEDURE — 86850 RBC ANTIBODY SCREEN: CPT

## 2020-05-17 PROCEDURE — 71275 CT ANGIOGRAPHY CHEST: CPT

## 2020-05-17 PROCEDURE — 83690 ASSAY OF LIPASE: CPT

## 2020-05-17 PROCEDURE — 84145 PROCALCITONIN (PCT): CPT

## 2020-05-17 PROCEDURE — 71045 X-RAY EXAM CHEST 1 VIEW: CPT

## 2020-05-17 PROCEDURE — 6360000004 HC RX CONTRAST MEDICATION: Performed by: EMERGENCY MEDICINE

## 2020-05-17 PROCEDURE — 84484 ASSAY OF TROPONIN QUANT: CPT

## 2020-05-17 PROCEDURE — 83605 ASSAY OF LACTIC ACID: CPT

## 2020-05-17 PROCEDURE — 2580000003 HC RX 258: Performed by: EMERGENCY MEDICINE

## 2020-05-17 PROCEDURE — 1800000000 HC LEAVE OF ABSENCE

## 2020-05-17 RX ORDER — SODIUM CHLORIDE 0.9 % (FLUSH) 0.9 %
10 SYRINGE (ML) INJECTION 2 TIMES DAILY
Status: DISCONTINUED | OUTPATIENT
Start: 2020-05-17 | End: 2020-05-22 | Stop reason: HOSPADM

## 2020-05-17 RX ORDER — 0.9 % SODIUM CHLORIDE 0.9 %
1000 INTRAVENOUS SOLUTION INTRAVENOUS ONCE
Status: COMPLETED | OUTPATIENT
Start: 2020-05-17 | End: 2020-05-18

## 2020-05-17 RX ORDER — SODIUM CHLORIDE 0.9 % (FLUSH) 0.9 %
10 SYRINGE (ML) INJECTION 2 TIMES DAILY
Status: DISCONTINUED | OUTPATIENT
Start: 2020-05-18 | End: 2020-05-22 | Stop reason: HOSPADM

## 2020-05-17 RX ADMIN — IOPAMIDOL 100 ML: 755 INJECTION, SOLUTION INTRAVENOUS at 23:46

## 2020-05-17 RX ADMIN — SODIUM CHLORIDE 1000 ML: 9 INJECTION, SOLUTION INTRAVENOUS at 23:48

## 2020-05-18 ENCOUNTER — ANESTHESIA EVENT (OUTPATIENT)
Dept: MEDSURG UNIT | Age: 50
End: 2020-05-18

## 2020-05-18 ENCOUNTER — APPOINTMENT (OUTPATIENT)
Dept: CT IMAGING | Age: 50
DRG: 377 | End: 2020-05-18
Payer: MEDICARE

## 2020-05-18 PROBLEM — K92.0 HEMATEMESIS: Status: ACTIVE | Noted: 2020-05-18

## 2020-05-18 LAB
ABO/RH: NORMAL
ALBUMIN SERPL-MCNC: 4.2 GM/DL (ref 3.4–5)
ALP BLD-CCNC: 108 IU/L (ref 40–129)
ALT SERPL-CCNC: 221 U/L (ref 10–40)
ANION GAP SERPL CALCULATED.3IONS-SCNC: 12 MMOL/L (ref 4–16)
ANION GAP SERPL CALCULATED.3IONS-SCNC: 13 MMOL/L (ref 4–16)
ANION GAP SERPL CALCULATED.3IONS-SCNC: 13 MMOL/L (ref 4–16)
ANTIBODY SCREEN: NEGATIVE
AST SERPL-CCNC: 62 IU/L (ref 15–37)
BACTERIA: NEGATIVE /HPF
BILIRUB SERPL-MCNC: 1.3 MG/DL (ref 0–1)
BILIRUBIN URINE: NEGATIVE MG/DL
BLOOD, URINE: NEGATIVE
BUN BLDV-MCNC: 36 MG/DL (ref 6–23)
BUN BLDV-MCNC: 38 MG/DL (ref 6–23)
BUN BLDV-MCNC: 39 MG/DL (ref 6–23)
CALCIUM SERPL-MCNC: 8.6 MG/DL (ref 8.3–10.6)
CALCIUM SERPL-MCNC: 8.7 MG/DL (ref 8.3–10.6)
CALCIUM SERPL-MCNC: 9 MG/DL (ref 8.3–10.6)
CHLORIDE BLD-SCNC: 86 MMOL/L (ref 99–110)
CHLORIDE BLD-SCNC: 88 MMOL/L (ref 99–110)
CHLORIDE BLD-SCNC: 89 MMOL/L (ref 99–110)
CLARITY: CLEAR
CO2: 19 MMOL/L (ref 21–32)
CO2: 24 MMOL/L (ref 21–32)
CO2: 24 MMOL/L (ref 21–32)
COLOR: YELLOW
CREAT SERPL-MCNC: 1.5 MG/DL (ref 0.9–1.3)
CREAT SERPL-MCNC: 1.5 MG/DL (ref 0.9–1.3)
CREAT SERPL-MCNC: 1.6 MG/DL (ref 0.9–1.3)
EKG ATRIAL RATE: 110 BPM
EKG DIAGNOSIS: NORMAL
EKG P AXIS: 63 DEGREES
EKG P-R INTERVAL: 160 MS
EKG Q-T INTERVAL: 336 MS
EKG QRS DURATION: 114 MS
EKG QTC CALCULATION (BAZETT): 454 MS
EKG R AXIS: 68 DEGREES
EKG T AXIS: 101 DEGREES
EKG VENTRICULAR RATE: 110 BPM
ERYTHROCYTE SEDIMENTATION RATE: 6 MM/HR (ref 0–15)
GFR AFRICAN AMERICAN: 56 ML/MIN/1.73M2
GFR AFRICAN AMERICAN: >60 ML/MIN/1.73M2
GFR AFRICAN AMERICAN: >60 ML/MIN/1.73M2
GFR NON-AFRICAN AMERICAN: 46 ML/MIN/1.73M2
GFR NON-AFRICAN AMERICAN: 50 ML/MIN/1.73M2
GFR NON-AFRICAN AMERICAN: 50 ML/MIN/1.73M2
GLUCOSE BLD-MCNC: 118 MG/DL (ref 70–99)
GLUCOSE BLD-MCNC: 127 MG/DL (ref 70–99)
GLUCOSE BLD-MCNC: 92 MG/DL (ref 70–99)
GLUCOSE, URINE: NEGATIVE MG/DL
HCT VFR BLD CALC: 35 % (ref 42–52)
HCT VFR BLD CALC: 36.1 % (ref 42–52)
HEMOGLOBIN: 10.1 GM/DL (ref 13.5–18)
HEMOGLOBIN: 10.4 GM/DL (ref 13.5–18)
HIGH SENSITIVE C-REACTIVE PROTEIN: 7.5 MG/L
INR BLD: 1.79 INDEX
KETONES, URINE: NEGATIVE MG/DL
LACTATE DEHYDROGENASE: 332 IU/L (ref 120–246)
LEUKOCYTE ESTERASE, URINE: NEGATIVE
LIPASE: 49 IU/L (ref 13–60)
NITRITE URINE, QUANTITATIVE: NEGATIVE
PH, URINE: 5 (ref 5–8)
POTASSIUM SERPL-SCNC: 5.1 MMOL/L (ref 3.5–5.1)
POTASSIUM SERPL-SCNC: 5.2 MMOL/L (ref 3.5–5.1)
POTASSIUM SERPL-SCNC: 5.5 MMOL/L (ref 3.5–5.1)
PRO-BNP: 2759 PG/ML
PROCALCITONIN: 0.11
PROCALCITONIN: 0.11
PROTEIN UA: NEGATIVE MG/DL
PROTHROMBIN TIME: 21.8 SECONDS (ref 11.7–14.5)
RBC URINE: <1 /HPF (ref 0–3)
SODIUM BLD-SCNC: 121 MMOL/L (ref 135–145)
SODIUM BLD-SCNC: 123 MMOL/L (ref 135–145)
SODIUM BLD-SCNC: 124 MMOL/L (ref 135–145)
SPECIFIC GRAVITY UA: 1.05 (ref 1–1.03)
TOTAL PROTEIN: 6.8 GM/DL (ref 6.4–8.2)
TRICHOMONAS: ABNORMAL /HPF
UROBILINOGEN, URINE: NORMAL MG/DL (ref 0.2–1)
WBC UA: ABNORMAL /HPF (ref 0–2)

## 2020-05-18 PROCEDURE — 2580000003 HC RX 258: Performed by: INTERNAL MEDICINE

## 2020-05-18 PROCEDURE — 6360000002 HC RX W HCPCS: Performed by: INTERNAL MEDICINE

## 2020-05-18 PROCEDURE — 80048 BASIC METABOLIC PNL TOTAL CA: CPT

## 2020-05-18 PROCEDURE — 84145 PROCALCITONIN (PCT): CPT

## 2020-05-18 PROCEDURE — C9113 INJ PANTOPRAZOLE SODIUM, VIA: HCPCS | Performed by: INTERNAL MEDICINE

## 2020-05-18 PROCEDURE — 6360000002 HC RX W HCPCS: Performed by: EMERGENCY MEDICINE

## 2020-05-18 PROCEDURE — 86141 C-REACTIVE PROTEIN HS: CPT

## 2020-05-18 PROCEDURE — 96375 TX/PRO/DX INJ NEW DRUG ADDON: CPT

## 2020-05-18 PROCEDURE — 6370000000 HC RX 637 (ALT 250 FOR IP): Performed by: EMERGENCY MEDICINE

## 2020-05-18 PROCEDURE — 85610 PROTHROMBIN TIME: CPT

## 2020-05-18 PROCEDURE — 1200000000 HC SEMI PRIVATE

## 2020-05-18 PROCEDURE — 94640 AIRWAY INHALATION TREATMENT: CPT

## 2020-05-18 PROCEDURE — 85014 HEMATOCRIT: CPT

## 2020-05-18 PROCEDURE — 6370000000 HC RX 637 (ALT 250 FOR IP): Performed by: INTERNAL MEDICINE

## 2020-05-18 PROCEDURE — 81001 URINALYSIS AUTO W/SCOPE: CPT

## 2020-05-18 PROCEDURE — 74177 CT ABD & PELVIS W/CONTRAST: CPT

## 2020-05-18 PROCEDURE — 85018 HEMOGLOBIN: CPT

## 2020-05-18 PROCEDURE — 99222 1ST HOSP IP/OBS MODERATE 55: CPT | Performed by: INTERNAL MEDICINE

## 2020-05-18 PROCEDURE — 96365 THER/PROPH/DIAG IV INF INIT: CPT

## 2020-05-18 PROCEDURE — 6360000004 HC RX CONTRAST MEDICATION: Performed by: EMERGENCY MEDICINE

## 2020-05-18 PROCEDURE — 2580000003 HC RX 258: Performed by: EMERGENCY MEDICINE

## 2020-05-18 PROCEDURE — 2500000003 HC RX 250 WO HCPCS: Performed by: INTERNAL MEDICINE

## 2020-05-18 PROCEDURE — 94761 N-INVAS EAR/PLS OXIMETRY MLT: CPT

## 2020-05-18 PROCEDURE — 99223 1ST HOSP IP/OBS HIGH 75: CPT | Performed by: INTERNAL MEDICINE

## 2020-05-18 PROCEDURE — 85652 RBC SED RATE AUTOMATED: CPT

## 2020-05-18 PROCEDURE — 93010 ELECTROCARDIOGRAM REPORT: CPT | Performed by: INTERNAL MEDICINE

## 2020-05-18 PROCEDURE — 36415 COLL VENOUS BLD VENIPUNCTURE: CPT

## 2020-05-18 PROCEDURE — 83615 LACTATE (LD) (LDH) ENZYME: CPT

## 2020-05-18 RX ORDER — ONDANSETRON 2 MG/ML
4 INJECTION INTRAMUSCULAR; INTRAVENOUS EVERY 6 HOURS PRN
Status: DISCONTINUED | OUTPATIENT
Start: 2020-05-18 | End: 2020-05-22 | Stop reason: HOSPADM

## 2020-05-18 RX ORDER — METOPROLOL SUCCINATE 25 MG/1
25 TABLET, EXTENDED RELEASE ORAL DAILY
Status: DISCONTINUED | OUTPATIENT
Start: 2020-05-18 | End: 2020-05-22 | Stop reason: HOSPADM

## 2020-05-18 RX ORDER — ALBUTEROL SULFATE 90 UG/1
2 AEROSOL, METERED RESPIRATORY (INHALATION) ONCE
Status: COMPLETED | OUTPATIENT
Start: 2020-05-18 | End: 2020-05-18

## 2020-05-18 RX ORDER — BUMETANIDE 0.25 MG/ML
1 INJECTION, SOLUTION INTRAMUSCULAR; INTRAVENOUS EVERY 12 HOURS
Status: DISCONTINUED | OUTPATIENT
Start: 2020-05-18 | End: 2020-05-20

## 2020-05-18 RX ORDER — VANCOMYCIN HYDROCHLORIDE 1 G/200ML
1000 INJECTION, SOLUTION INTRAVENOUS ONCE
Status: COMPLETED | OUTPATIENT
Start: 2020-05-18 | End: 2020-05-18

## 2020-05-18 RX ORDER — PANTOPRAZOLE SODIUM 40 MG/10ML
40 INJECTION, POWDER, LYOPHILIZED, FOR SOLUTION INTRAVENOUS 2 TIMES DAILY
Status: DISCONTINUED | OUTPATIENT
Start: 2020-05-18 | End: 2020-05-21

## 2020-05-18 RX ORDER — LISINOPRIL 5 MG/1
5 TABLET ORAL DAILY
Status: DISCONTINUED | OUTPATIENT
Start: 2020-05-18 | End: 2020-05-18

## 2020-05-18 RX ORDER — POTASSIUM CHLORIDE 20 MEQ/1
40 TABLET, EXTENDED RELEASE ORAL PRN
Status: DISCONTINUED | OUTPATIENT
Start: 2020-05-18 | End: 2020-05-22 | Stop reason: HOSPADM

## 2020-05-18 RX ORDER — POTASSIUM CHLORIDE 7.45 MG/ML
10 INJECTION INTRAVENOUS PRN
Status: DISCONTINUED | OUTPATIENT
Start: 2020-05-18 | End: 2020-05-22 | Stop reason: HOSPADM

## 2020-05-18 RX ORDER — MAGNESIUM SULFATE IN WATER 40 MG/ML
2 INJECTION, SOLUTION INTRAVENOUS PRN
Status: DISCONTINUED | OUTPATIENT
Start: 2020-05-18 | End: 2020-05-22 | Stop reason: HOSPADM

## 2020-05-18 RX ORDER — ATORVASTATIN CALCIUM 40 MG/1
40 TABLET, FILM COATED ORAL DAILY
Status: DISCONTINUED | OUTPATIENT
Start: 2020-05-18 | End: 2020-05-22 | Stop reason: HOSPADM

## 2020-05-18 RX ORDER — SODIUM CHLORIDE 0.9 % (FLUSH) 0.9 %
10 SYRINGE (ML) INJECTION PRN
Status: DISCONTINUED | OUTPATIENT
Start: 2020-05-18 | End: 2020-05-22 | Stop reason: HOSPADM

## 2020-05-18 RX ORDER — LEVOFLOXACIN 5 MG/ML
500 INJECTION, SOLUTION INTRAVENOUS EVERY 24 HOURS
Status: DISCONTINUED | OUTPATIENT
Start: 2020-05-18 | End: 2020-05-18

## 2020-05-18 RX ORDER — POLYETHYLENE GLYCOL 3350 17 G/17G
17 POWDER, FOR SOLUTION ORAL DAILY PRN
Status: DISCONTINUED | OUTPATIENT
Start: 2020-05-18 | End: 2020-05-22 | Stop reason: HOSPADM

## 2020-05-18 RX ORDER — ACETAMINOPHEN 325 MG/1
650 TABLET ORAL EVERY 6 HOURS PRN
Status: DISCONTINUED | OUTPATIENT
Start: 2020-05-18 | End: 2020-05-22 | Stop reason: HOSPADM

## 2020-05-18 RX ORDER — SODIUM CHLORIDE 0.9 % (FLUSH) 0.9 %
10 SYRINGE (ML) INJECTION EVERY 12 HOURS SCHEDULED
Status: DISCONTINUED | OUTPATIENT
Start: 2020-05-18 | End: 2020-05-22 | Stop reason: HOSPADM

## 2020-05-18 RX ORDER — HYDROXYZINE HYDROCHLORIDE 50 MG/ML
50 INJECTION, SOLUTION INTRAMUSCULAR ONCE
Status: DISCONTINUED | OUTPATIENT
Start: 2020-05-18 | End: 2020-05-22 | Stop reason: HOSPADM

## 2020-05-18 RX ORDER — ONDANSETRON 2 MG/ML
4 INJECTION INTRAMUSCULAR; INTRAVENOUS EVERY 30 MIN PRN
Status: DISCONTINUED | OUTPATIENT
Start: 2020-05-18 | End: 2020-05-18 | Stop reason: SDUPTHER

## 2020-05-18 RX ORDER — PROMETHAZINE HYDROCHLORIDE 12.5 MG/1
12.5 TABLET ORAL EVERY 6 HOURS PRN
Status: DISCONTINUED | OUTPATIENT
Start: 2020-05-18 | End: 2020-05-22 | Stop reason: HOSPADM

## 2020-05-18 RX ORDER — ACETAMINOPHEN 650 MG/1
650 SUPPOSITORY RECTAL EVERY 6 HOURS PRN
Status: DISCONTINUED | OUTPATIENT
Start: 2020-05-18 | End: 2020-05-22 | Stop reason: HOSPADM

## 2020-05-18 RX ORDER — IPRATROPIUM BROMIDE AND ALBUTEROL SULFATE 2.5; .5 MG/3ML; MG/3ML
1 SOLUTION RESPIRATORY (INHALATION) 4 TIMES DAILY
Status: DISCONTINUED | OUTPATIENT
Start: 2020-05-18 | End: 2020-05-22 | Stop reason: HOSPADM

## 2020-05-18 RX ORDER — ASPIRIN 81 MG/1
81 TABLET ORAL DAILY
Status: DISCONTINUED | OUTPATIENT
Start: 2020-05-18 | End: 2020-05-22 | Stop reason: HOSPADM

## 2020-05-18 RX ORDER — SPIRONOLACTONE 25 MG/1
12.5 TABLET ORAL DAILY
Status: DISCONTINUED | OUTPATIENT
Start: 2020-05-18 | End: 2020-05-19

## 2020-05-18 RX ORDER — HYDROXYZINE HYDROCHLORIDE 50 MG/ML
50 INJECTION, SOLUTION INTRAMUSCULAR ONCE
Status: DISCONTINUED | OUTPATIENT
Start: 2020-05-18 | End: 2020-05-18

## 2020-05-18 RX ADMIN — BUMETANIDE 1 MG: 0.25 INJECTION INTRAMUSCULAR; INTRAVENOUS at 15:47

## 2020-05-18 RX ADMIN — MEROPENEM 1 G: 1 INJECTION, POWDER, FOR SOLUTION INTRAVENOUS at 08:52

## 2020-05-18 RX ADMIN — ONDANSETRON 4 MG: 2 INJECTION INTRAMUSCULAR; INTRAVENOUS at 05:04

## 2020-05-18 RX ADMIN — VANCOMYCIN HYDROCHLORIDE 1000 MG: 1 INJECTION, SOLUTION INTRAVENOUS at 02:57

## 2020-05-18 RX ADMIN — ALBUTEROL SULFATE 2 PUFF: 90 AEROSOL, METERED RESPIRATORY (INHALATION) at 01:46

## 2020-05-18 RX ADMIN — IPRATROPIUM BROMIDE AND ALBUTEROL SULFATE 3 ML: .5; 3 SOLUTION RESPIRATORY (INHALATION) at 11:49

## 2020-05-18 RX ADMIN — SPIRONOLACTONE 12.5 MG: 25 TABLET ORAL at 08:52

## 2020-05-18 RX ADMIN — BUMETANIDE 1 MG: 0.25 INJECTION INTRAMUSCULAR; INTRAVENOUS at 04:16

## 2020-05-18 RX ADMIN — SODIUM CHLORIDE, PRESERVATIVE FREE 10 ML: 5 INJECTION INTRAVENOUS at 08:57

## 2020-05-18 RX ADMIN — PANTOPRAZOLE SODIUM 40 MG: 40 INJECTION, POWDER, FOR SOLUTION INTRAVENOUS at 21:39

## 2020-05-18 RX ADMIN — LEVOFLOXACIN 500 MG: 5 INJECTION, SOLUTION INTRAVENOUS at 01:53

## 2020-05-18 RX ADMIN — ONDANSETRON 4 MG: 2 INJECTION INTRAMUSCULAR; INTRAVENOUS at 21:40

## 2020-05-18 RX ADMIN — CEFTRIAXONE SODIUM 2 G: 2 INJECTION, POWDER, FOR SOLUTION INTRAMUSCULAR; INTRAVENOUS at 15:47

## 2020-05-18 RX ADMIN — IPRATROPIUM BROMIDE AND ALBUTEROL SULFATE 3 ML: .5; 3 SOLUTION RESPIRATORY (INHALATION) at 16:15

## 2020-05-18 RX ADMIN — IOPAMIDOL 75 ML: 755 INJECTION, SOLUTION INTRAVENOUS at 00:07

## 2020-05-18 RX ADMIN — SODIUM CHLORIDE, PRESERVATIVE FREE 10 ML: 5 INJECTION INTRAVENOUS at 21:40

## 2020-05-18 RX ADMIN — ATORVASTATIN CALCIUM 40 MG: 40 TABLET, FILM COATED ORAL at 08:52

## 2020-05-18 RX ADMIN — IPRATROPIUM BROMIDE AND ALBUTEROL SULFATE 3 ML: .5; 3 SOLUTION RESPIRATORY (INHALATION) at 08:25

## 2020-05-18 RX ADMIN — PANTOPRAZOLE SODIUM 40 MG: 40 INJECTION, POWDER, FOR SOLUTION INTRAVENOUS at 08:52

## 2020-05-18 RX ADMIN — ONDANSETRON 4 MG: 2 INJECTION INTRAMUSCULAR; INTRAVENOUS at 01:38

## 2020-05-18 RX ADMIN — ASPIRIN 81 MG: 81 TABLET, COATED ORAL at 08:52

## 2020-05-18 RX ADMIN — METOPROLOL SUCCINATE 25 MG: 25 TABLET, EXTENDED RELEASE ORAL at 08:52

## 2020-05-18 ASSESSMENT — ENCOUNTER SYMPTOMS
COUGH: 1
CONSTIPATION: 0
VOICE CHANGE: 0
EYE REDNESS: 0
RECTAL PAIN: 0
VOMITING: 1
DIARRHEA: 0
SPUTUM PRODUCTION: 0
SHORTNESS OF BREATH: 1
PHOTOPHOBIA: 0
EYE ITCHING: 0
SWOLLEN GLANDS: 0
SHORTNESS OF BREATH: 1
STRIDOR: 0
HEMOPTYSIS: 0
ABDOMINAL PAIN: 1
CHEST TIGHTNESS: 0
ABDOMINAL DISTENTION: 1
EYES NEGATIVE: 1
APNEA: 0
BACK PAIN: 0
TROUBLE SWALLOWING: 0
WHEEZING: 1
CHOKING: 0
NAUSEA: 0
EYE PAIN: 0
RHINORRHEA: 0
SINUS PRESSURE: 0
FACIAL SWELLING: 0
EYE DISCHARGE: 0
BLOOD IN STOOL: 0
SINUS PAIN: 0

## 2020-05-18 ASSESSMENT — PAIN SCALES - GENERAL
PAINLEVEL_OUTOF10: 0
PAINLEVEL_OUTOF10: 0

## 2020-05-18 NOTE — PROGRESS NOTES
Hospitalist Progress Note         Admit Date: 5/17/2020    PCP: No primary care provider on file. Chief Complaint   Patient presents with    Shortness of Breath    Hematemesis     recent discharge yesterday. Assessment and Plan:      Hematemesis/nausea/vomiting: Continue Protonix IV. GI planning for EGD tomorrow   Acute on chronic combined systolic and diastolic heart failure/hyponatremia    Continue Bumex IV as per nephrology. Recent echo EF 20% and G3 DD. Continue Toprol-XL and Aldactone. Consult cardiology if needed.  Abnormal CT chest with concern for empyema: ID evaluated. Empiric Rocephin    Just to cover possible esophageal variceal bleed though less likely. Continue monitor   Mediastinal lymphadenopathy on CTA chest: No infective source-??? Lymphoproliferative disorder. Oncology consult was placed-CTS consult requested for lymph node biopsy.  Acute kidney injury possible cardiorenal syndrome. Continue diuresis plan. Follow BMP   Abnormal LFTs possibly from congestion of liver. Recent liver ultrasound okay. Recent hepatitis panel negative. Continue to trend LFTs as needed. VTE prophylaxis SCDs only.     Current Facility-Administered Medications   Medication Dose Route Frequency Provider Last Rate Last Dose    aspirin EC tablet 81 mg  81 mg Oral Daily Paul Bates MD   81 mg at 05/18/20 0852    atorvastatin (LIPITOR) tablet 40 mg  40 mg Oral Daily Paul Bates MD   40 mg at 05/18/20 0852    ipratropium-albuterol (DUONEB) nebulizer solution 3 mL  1 vial Inhalation 4x Daily Paul Bates MD   3 mL at 05/18/20 1615    metoprolol succinate (TOPROL XL) extended release tablet 25 mg  25 mg Oral Daily Paul Bates MD   25 mg at 05/18/20 8166    spironolactone (ALDACTONE) tablet 12.5 mg  12.5 mg Oral Daily Paul Bates MD   12.5 mg at 05/18/20 4172    sodium chloride flush 0.9 % injection 10 mL  10 mL Intravenous 2 times per day Paul Bates MD   10 mL at 05/18/20 0857    sodium chloride flush 0.9 % injection 10 mL  10 mL Intravenous PRN Nolan Timmons MD        acetaminophen (TYLENOL) tablet 650 mg  650 mg Oral Q6H PRN Nolan Timmons MD        Or   James Young acetaminophen (TYLENOL) suppository 650 mg  650 mg Rectal Q6H PRN Nolan Timmons MD        polyethylene glycol (GLYCOLAX) packet 17 g  17 g Oral Daily PRN Nolan Timmons MD        promethazine (PHENERGAN) tablet 12.5 mg  12.5 mg Oral Q6H PRN Nolan Timmons MD        Or    ondansetron [facility]) injection 4 mg  4 mg Intravenous Q6H PRN Nolan Timmons MD   4 mg at 05/18/20 0504    potassium chloride (KLOR-CON M) extended release tablet 40 mEq  40 mEq Oral PRN Nolan Timmons MD        Or   James Young potassium bicarb-citric acid (EFFER-K) effervescent tablet 40 mEq  40 mEq Oral PRN Nolan Timmons MD        Or   James Young potassium chloride 10 mEq/100 mL IVPB (Peripheral Line)  10 mEq Intravenous PRN Nolan Timmons MD        magnesium sulfate 2 g in 50 mL IVPB premix  2 g Intravenous PRN Nolan Timmons MD        pantoprazole (PROTONIX) injection 40 mg  40 mg Intravenous BID Nolan Timmons MD   40 mg at 05/18/20 0852    bumetanide (BUMEX) injection 1 mg  1 mg Intravenous Q12H Nolan Timmons MD   1 mg at 05/18/20 1547    cefTRIAXone (ROCEPHIN) 2 g in dextrose 5 % 50 mL IVPB  2 g Intravenous Q24H Evelin Valentin MD   Stopped at 05/18/20 1617    sodium chloride flush 0.9 % injection 10 mL  10 mL Intravenous BID Donnie Lim DO        sodium chloride flush 0.9 % injection 10 mL  10 mL Intravenous BID Donnie Lim DO           Subjective:     Patient reports nausea, and bloody vomiting. He was recently discharged from hospital 2 days ago after treated for heart failure. Currently n.p.o. status    Objective:        Intake/Output Summary (Last 24 hours) at 5/18/2020 1930  Last data filed at 5/18/2020 1438  Gross per 24 hour   Intake 210 ml   Output 2000 ml   Net -1790 ml      Vitals:   Vitals:    05/18/20 1722 BP: 113/83   Pulse: 101   Resp: 18   Temp: 97.4 °F (36.3 °C)   SpO2: 100%     Physical Exam:  General Appearance:    Alert, cooperative, mild distress +  Head:      Normocephalic, without obvious abnormality, atraumatic  Eyes:       Conjunctiva/corneas clear, EOM's intact  Lungs:    Occasional crackles at bases right >left  Heart:                Regular rate and rhythm, S1 and S2 normal, no murmur,   rub or gallop  Abdomen:     Soft, non-tender, bowel sounds active, no masses, no organomegaly  Extremities:   Extremities normal, atraumatic, no cyanosis or edema  Neurological:   Grossly Intact. Significant Diagnostic Studies:   DATA:    CBC   Recent Labs     05/17/20 2300 05/18/20  0414   WBC 7.7  --    HGB 10.1* 10.4*   HCT 35.4* 36.1*     --       BMP   Recent Labs     05/17/20 2300 05/18/20  0414 05/18/20  0948   * 121* 124*   K 5.2* 5.5* 5.1   CL 86* 89* 88*   CO2 24 19* 24   BUN 39* 38* 36*   CREATININE 1.6* 1.5* 1.5*     LFT'S   Recent Labs     05/16/20 0205 05/17/20  2300   * 62*   * 221*   BILITOT 1.5* 1.3*   ALKPHOS 112 108     COAG   Recent Labs     05/18/20  0414   INR 1.79     POC:   Lab Results   Component Value Date    POCGLU 119 02/12/2020    POCGLU 149 02/12/2020    POCGLU 131 02/11/2020    POCGLU 172 02/11/2020     [id number]:  Lab Results   Component Value Date    LABA1C 6.6 01/24/2020     CARDIAC ENZYMES  No results for input(s): CKTOTAL, CKMB, CKMBINDEX, TROPONINI in the last 72 hours.   Troponin:   Recent Labs     05/17/20 2300   TROPONINT <0.010     BNP:   Recent Labs     05/16/20 0205 05/17/20 2300   PROBNP 2,143* 2,759*     U/A:    Lab Results   Component Value Date    NITRITE negative 03/25/2013    COLORU YELLOW 05/18/2020    WBCUA NONE SEEN 05/18/2020    RBCUA <1 05/18/2020    MUCUS NEGATIVE 12/08/2018    BACTERIA NEGATIVE 05/18/2020    CLARITYU CLEAR 05/18/2020    SPECGRAV 1.046 05/18/2020    LEUKOCYTESUR NEGATIVE 05/18/2020    BLOODU NEGATIVE 05/18/2020    GLUCOSEU negative 03/25/2013       Ct Abdomen Pelvis Wo Contrast    Result Date: 5/10/2020  EXAMINATION: CTA OF THE CHEST; CT OF THE ABDOMEN AND PELVIS WITHOUT CONTRAST 5/10/2020 12:32 pm TECHNIQUE: CTA of the chest was performed after the administration of intravenous contrast.  Multiplanar reformatted images are provided for review. MIP images are provided for review. Dose modulation, iterative reconstruction, and/or weight based adjustment of the mA/kV was utilized to reduce the radiation dose to as low as reasonably achievable.; CT of the abdomen and pelvis was performed without the administration of intravenous contrast. Multiplanar reformatted images are provided for review. Dose modulation, iterative reconstruction, and/or weight based adjustment of the mA/kV was utilized to reduce the radiation dose to as low as reasonably achievable. COMPARISON: None HISTORY: ORDERING SYSTEM PROVIDED HISTORY: sob TECHNOLOGIST PROVIDED HISTORY: Reason for exam:->sob Reason for Exam: shortness of breath, edema; ORDERING SYSTEM PROVIDED HISTORY: Abd pain TECHNOLOGIST PROVIDED HISTORY: Reason for exam:->Abd pain Reason for Exam: Abd pain, edema FINDINGS: CTA pulmonary: Pulmonary Arteries: Pulmonary arteries are adequately opacified for evaluation. No evidence of intraluminal filling defect to suggest pulmonary embolism. Main pulmonary artery is normal in caliber. Mediastinum: Redemonstration of mediastinal and hilar lymphadenopathy with representative nodes including a 1.8 cm pretracheal node, 1.1 cm AP window node, and 1.4 cm right hilar node. Lymphadenopathy was also noted on previous exam dated March 11, 2020. Again, lymphoproliferative disorder or metastatic disease cannot be excluded. Heart is stable in size and remains enlarged. No pericardial effusion. The thoracic aorta is normal in caliber. Coronary artery atherosclerotic disease.  Lungs/pleura: Redemonstration of loculated pleural fluid along the Similar appearance of loculated fluid along the right major fissure. 5. No acute intra-process identified. 6. A few mildly enlarged nodes along the left iliac chain and at the left inguinal region which are nonspecific. 7. Atherosclerotic disease. Ct Abdomen Pelvis W Iv Contrast Additional Contrast? None    Result Date: 5/18/2020  EXAMINATION: CT OF THE ABDOMEN AND PELVIS WITH CONTRAST 5/17/2020 11:53 pm TECHNIQUE: CT of the abdomen and pelvis was performed with the administration of intravenous contrast. Multiplanar reformatted images are provided for review. Dose modulation, iterative reconstruction, and/or weight based adjustment of the mA/kV was utilized to reduce the radiation dose to as low as reasonably achievable. COMPARISON: 05/10/2020. HISTORY: ORDERING SYSTEM PROVIDED HISTORY: Hematemsis TECHNOLOGIST PROVIDED HISTORY: Reason for exam:->Hematemsis Additional Contrast?->None Reason for Exam: abd pain mid line FINDINGS: Lower Chest: Small loculated fluid collection interhemispheric fissure right lower thorax. No focal consolidation. Organs: The visualized portions of the liver, gallbladder, spleen, pancreas and adrenal glands demonstrate no acute abnormality. No biliary ductal dilatation. The kidneys appear normal in size and demonstrate symmetric enhancement. No focal renal mass. No hydronephrosis. No perinephric stranding. Multiple nonobstructing renal calculi. Multiple renal cortical scars right kidney upper pole. GI/Bowel: No bowel dilatation to suggest obstruction. No evidence of acute appendicitis. Pelvis: The urinary bladder appears unremarkable. The pelvic organs demonstrate no acute abnormality. Peritoneum/Retroperitoneum: The abdominal aorta is normal in caliber. Extensive atherosclerotic change. Mild ascites. Bones/Soft Tissues: No acute findings. Mild ascites. Please correlate for indicators of liver failure.      Xr Chest Portable    Result Date: 5/17/2020  EXAMINATION: ONE XRAY VIEW OF THE CHEST 5/17/2020 11:11 pm COMPARISON: Studies back to the 02/07/2020 HISTORY: ORDERING SYSTEM PROVIDED HISTORY: PAin TECHNOLOGIST PROVIDED HISTORY: Reason for exam:->PAin Reason for Exam: chest pain Acuity: Acute Type of Exam: Initial FINDINGS: Again seen is a focal opacity at the right lung base, stable. Lungs are otherwise clear. No pneumothorax or pleural effusion. Mild cardiac enlargement. Median sternotomy wires. No pleural effusion or pneumothorax. Stable focal wedge-like atelectatic change right lung base. Xr Chest Portable    Result Date: 5/10/2020  EXAMINATION: ONE X-RAY VIEW OF THE CHEST 5/10/2020 10:41 am COMPARISON: 03/11/2020 HISTORY: ORDERING SYSTEM PROVIDED HISTORY: SOB TECHNOLOGIST PROVIDED HISTORY: Reason for exam:->SOB Reason for Exam: SOB Acuity: Acute Type of Exam: Initial Additional signs and symptoms: Patient brought himself in for SOB for 3 days. Hx of CHF. Pt ran out of Lasix yesterday. Patient displays pitting, pedal edema. Patient states he has been diuresing less than normal. FINDINGS: The heart is enlarged. Sternal wires are in place. There is some atelectasis or small amount of fluid in the major fissures/CP angle on the right. This is improved when correlated to 2 months earlier. The lungs are otherwise unremarkable. Decreasing fluid in the right pleural space/major fissure. Cardiomegaly. Xr Chest 1 Vw    Result Date: 5/13/2020  EXAMINATION: ONE XRAY VIEW OF THE CHEST 5/13/2020 11:33 am COMPARISON: 05/10/2020 HISTORY: ORDERING SYSTEM PROVIDED HISTORY: shortness of breath TECHNOLOGIST PROVIDED HISTORY: Reason for exam:->shortness of breath Reason for Exam: bilat lower extremities and abd swelling Acuity: Chronic Type of Exam: Ongoing Relevant Medical/Surgical History: chf    copd    cad FINDINGS: Status post median sternotomy. Stable cardiomegaly. Slightly increasing small right pleural effusion with adjacent atelectasis.   The left costophrenic from 05/10/2020. Complex pleural fluid is suspected and developing empyema must be considered. Image guided aspiration of the multiloculated right pleural fluid collection would be helpful in further evaluation. There is increasing thickening and irregularity of the pleura and adjacent right hemidiaphragm. There is new right perihepatic low-attenuation fluid, likely reactive. Follow-up is recommended. Stable mediastinal adenopathy. Unchanged prominent left-greater-than-right axillary lymph nodes. Cta Pulmonary W Contrast    Result Date: 5/10/2020  EXAMINATION: CTA OF THE CHEST; CT OF THE ABDOMEN AND PELVIS WITHOUT CONTRAST 5/10/2020 12:32 pm TECHNIQUE: CTA of the chest was performed after the administration of intravenous contrast.  Multiplanar reformatted images are provided for review. MIP images are provided for review. Dose modulation, iterative reconstruction, and/or weight based adjustment of the mA/kV was utilized to reduce the radiation dose to as low as reasonably achievable.; CT of the abdomen and pelvis was performed without the administration of intravenous contrast. Multiplanar reformatted images are provided for review. Dose modulation, iterative reconstruction, and/or weight based adjustment of the mA/kV was utilized to reduce the radiation dose to as low as reasonably achievable. COMPARISON: None HISTORY: ORDERING SYSTEM PROVIDED HISTORY: sob TECHNOLOGIST PROVIDED HISTORY: Reason for exam:->sob Reason for Exam: shortness of breath, edema; ORDERING SYSTEM PROVIDED HISTORY: Abd pain TECHNOLOGIST PROVIDED HISTORY: Reason for exam:->Abd pain Reason for Exam: Abd pain, edema FINDINGS: CTA pulmonary: Pulmonary Arteries: Pulmonary arteries are adequately opacified for evaluation. No evidence of intraluminal filling defect to suggest pulmonary embolism. Main pulmonary artery is normal in caliber.  Mediastinum: Redemonstration of mediastinal and hilar lymphadenopathy with representative nodes including a 1.8 cm pretracheal node, 1.1 cm AP window node, and 1.4 cm right hilar node. Lymphadenopathy was also noted on previous exam dated March 11, 2020. Again, lymphoproliferative disorder or metastatic disease cannot be excluded. Heart is stable in size and remains enlarged. No pericardial effusion. The thoracic aorta is normal in caliber. Coronary artery atherosclerotic disease. Lungs/pleura: Redemonstration of loculated pleural fluid along the right major fissure. This appears similar to recent CT. Mild bibasilar atelectasis. No focal consolidation identified. No pneumothorax. Calcified granuloma within the superior aspect of the left lower lobe. Soft Tissues/Bones: No acute osseous or soft tissue abnormality. Postoperative changes of median sternotomy. Several prominent left axillary lymph nodes which are upper limits of normal in size. Abdomen/Pelvis: Organs: Evaluation of the solid organs is limited due to lack of intravenous contrast.  The nonenhanced liver and gallbladder appear grossly unremarkable. No biliary dilatation. Several calcified granulomata within the spleen. The nonenhanced pancreas and bilateral adrenal glands demonstrate no acute abnormality. The kidneys are symmetric in size. No hydronephrosis. GI/Bowel: No bowel obstruction identified. The colon is partially collapsed and therefore not well evaluated. The appendix is unremarkable. Pelvis: Bladder and solid pelvic organs demonstrate no acute abnormality. Peritoneum/Retroperitoneum: Moderate to severe calcified atherosclerotic plaque throughout the aorta. Multiple shotty non pathologically enlarged retroperitoneal lymph nodes. Few mildly prominent nodes are also identified along the iliac change bilaterally with mildly enlarged 1 cm node along the distal left iliac chain (image 89 series 2).   Multiple shotty non pathologically enlarged inguinal nodes are identified on the right and mildly enlarged left inguinal node is quadrant ascites. Nonspecific wall thickening to relatively contracted gallbladder without other gallbladder abnormality noted.  Otherwise unremarkable exam.           Slick Dunn [facility] Hospitalist

## 2020-05-18 NOTE — CARE COORDINATION
Patient screened for discharge needs, no needs identified at this time, however CM available if needs arise.   (PCP info provider/ [facility] in [place])

## 2020-05-18 NOTE — CONSULTS
[facility]COL[facility] ([facility])  CONSULTATION REPORT    REASON FOR CONSULT  To evaluate the patient with mediastinal lymphadenopathy. CHIEF COMPLAINT    Chief Complaint   Patient presents with    Shortness of Breath    Hematemesis     recent discharge yesterday. HISTORY OF PRESENT ILLNESS   Harman Foreman is a 52 y.o. male with medical history significant for COPD, CHF, CAD s/p CABG, depression, drug abuse, hyperlipidemia, HTN, presented to [facility] on 5/18/20 with hematemesis. He vomited bright red blood once a few hours prior to ER visit. No abdominal pain, chest pain, cough, fever or chills. He has chronic shortness of breath unchanged. Patient was recently hospitalized discharged yesterday. He was hospitalized for CHF exacerbation treated with diuresis. He was found to have persistent mediastinal lymphadenopathy and I was called to evaluate him. He was seen by Dr. Richar Perdue and Dr. King Ramirez before. He refused to have biopsy before.      PAST MEDICAL HISTORY    Past Medical History:   Diagnosis Date    CAD (coronary artery disease)     CHF (congestive heart failure) (HCC)     COPD [facility] obstructive [facility]) [facility]     Depression     Drug abuse ([facility]     HIGH CHOLESTEROL     Hypertension     MI (myocardial infarction) ([facility] 2011    S/P coronary artery bypass graft x 4 2011    CABG x 4 Dr Sushil Madrid       SURGICAL HISTORY    Past Surgical History:   Procedure Laterality Date    BYPASS GRAFT  04/10/2011    CABG x 4 Dr Wayne Musa  11/2010     4 stents    LEG SURGERY         FAMILY HISTORY    Family History   Problem Relation Age of Onset    Cancer Father     Heart Failure Father     Heart Disease Father     Diabetes Mother     Heart Disease Mother        SOCIAL HISTORY    Social History     Socioeconomic History    Marital status:      Spouse name: None    Number of children: None    Years of education: None    Highest education level: None   Occupational History    None   Social Needs    Financial resource strain: None    Food insecurity     Worry: None     Inability: None    Transportation needs     Medical: None     Non-medical: None   Tobacco Use    Smoking status: Current Every Day Smoker     Packs/day: 1.00     Years: 20.00     Pack years: 20.00     Types: Cigarettes    Smokeless tobacco: Former User    Tobacco comment: Reviewed 10/9/17   Substance and Sexual Activity    Alcohol use: No     Alcohol/week: 0.0 standard drinks    Drug use: No     Comment: march 2018 states he quit    Sexual activity: Not Currently   Lifestyle    Physical activity     Days per week: None     Minutes per session: None    Stress: None   Relationships    Social connections     Talks on phone: None     Gets together: None     Attends [employer] service: None     Active member of club or organization: None     Attends meetings of clubs or organizations: None     Relationship status: None    Intimate partner violence     Fear of current or ex partner: None     Emotionally abused: None     Physically abused: None     Forced sexual activity: None   Other Topics Concern    None   Social History Narrative    None       REVIEW OF SYSTEMS    Constitutional:  Denies fever, chills, loss of appetite, weight loss, tiredness, fatigue or weakness   HEENT:  Denies swelling of neck glands  Respiratory:  Denies cough, shortness of breath or hemoptysis  Cardiovascular:  Denies chest pain, palpitations or swelling   GI:  Denies abdominal pain, nausea, constipation or diarrhea. He had an episode of hematemesis before admission   Musculoskeletal:  Denies back pain   Skin:  Denies rash   Neurologic:  Denies headache, focal weakness or sensory changes   All systems negative except as marked.      PHYSICAL EXAM    Vitals: /74   Pulse 95   Temp 98.2 °F (36.8 °C) (Oral)   Resp 18   Ht 5' 11\" (1.803 m)   Wt 197 lb 6.4 oz (89.5 kg)   SpO2 92%   BMI 27.53 kg/m²   CONSTITUTIONAL: awake, alert, cooperative, no apparent distress   EYES: pupils equal, round and reactive to light, sclera clear and conjunctiva normal  ENT: Normocephalic, without obvious abnormality, atraumatic  NECK: supple, symmetrical, no jugular venous distension and no carotid bruits   HEMATOLOGIC/LYMPHATIC: no cervical, supraclavicular or axillary lymphadenopathy   LUNGS: VBS, no wheezes, no crackles, no rhonchi, no increased work of breathing and clear to auscultation   CARDIOVASCULAR: regular rate and rhythm, normal S1 and S2, no murmur noted  ABDOMEN: normal bowel sounds x 4, soft, non-distended, non-tender, no masses palpated, no hepatosplenomgaly   MUSCULOSKELETAL: full range of motion noted, tone is normal  NEUROLOGIC: awake, alert, oriented to name, place and time. Motor skills grossly intact. SKIN: Normal skin color, texture, turgor and no jaundice.  Skin appears intact   EXTREMITIES: no LE edema, no cyanosis, no leg swelling, no clubbing    LABORATORY RESULTS  CBC:   Recent Labs     05/17/20  2300 05/18/20  0414   WBC 7.7  --    HGB 10.1* 10.4*     --      BMP:    Recent Labs     05/17/20  2300 05/18/20  0414 05/18/20  0948   * 121* 124*   K 5.2* 5.5* 5.1   CL 86* 89* 88*   CO2 24 19* 24   BUN 39* 38* 36*   CREATININE 1.6* 1.5* 1.5*   GLUCOSE 118* 127* 92     Hepatic:   Recent Labs     05/16/20  0205 05/17/20  2300   * 62*   * 221*   BILITOT 1.5* 1.3*   ALKPHOS 112 108     INR:   Recent Labs     05/18/20  0414   INR 1.79       RADIOLOGY REPORTS  CT scan of the chest  No evidence of an acute pulmonary embolus.       Right basilar pleural and parenchymal lung disease with multiloculated   pleural fluid primarily in the major fissure, increasing to a mild degree   from 05/10/2020.  Complex pleural fluid is suspected and developing empyema   must be considered. Cayetano Paniagua guided aspiration of the multiloculated right   pleural fluid collection would be helpful in further evaluation.       There is increasing thickening and irregularity of the pleura and adjacent   right hemidiaphragm.  There is new right perihepatic low-attenuation fluid,   likely reactive.  Follow-up is recommended.       Stable mediastinal adenopathy.  Unchanged prominent left-greater-than-right   axillary lymph nodes.         CT scan of the abdomen & pelvis  Mild ascites.  Please correlate for indicators of liver failure.         ASSESSMENT  1. Mediastinal lymphadenopathy    RECOMMENDATION  I reviewed his CT scan with him. His mediastinal lymphadenopathy could be due to reactive process or lymphoproliferative neoplasm. He refused to have biopsy before. I recommend him to see Cardiothoracic surgery to discuss about possible biopsy of mediastinal lymphadenopathy. He was seen by Dr. Teresita Mario before and I will request them to see him today. Meanwhile, please continue with current management. We will follow the patient. Thank you for allowing me to participate in the care of this very pleasant patient.

## 2020-05-18 NOTE — ED PROVIDER NOTES
[address] ENCOUNTER      Pt Name: Luis Rascon  MRN: [id number]  [place] 1970  Date of evaluation: 5/17/2020  Provider: Shannan García DO    CHIEF COMPLAINT       Chief Complaint   Patient presents with    Shortness of Breath    Hematemesis     recent discharge yesterday. HISTORY OF PRESENT ILLNESS      Luis Rascon is a 52 y.o. male who presents to the emergency department  for   Chief Complaint   Patient presents with    Shortness of Breath    Hematemesis     recent discharge yesterday. The history is provided by the patient. No  was used. Shortness of Breath   Severity:  Moderate  Onset quality:  Gradual  Duration:  2 days  Timing:  Intermittent  Progression:  Worsening  Chronicity:  Chronic  Context: pollens, smoke exposure and weather changes    Relieved by:  Oxygen, inhaler and diuretics  Worsened by:  Exertion  Ineffective treatments:  None tried  Associated symptoms: abdominal pain, chest pain, cough, vomiting and wheezing    Associated symptoms: no fever, no headaches, no hemoptysis, no neck pain, no rash, no sputum production, no syncope and no swollen glands    Risk factors: alcohol use, obesity, prolonged immobilization and tobacco use    Risk factors: no family hx of DVT and no hx of cancer          Nursing Notes, Triage Notes & Vital Signs were reviewed. REVIEW OF SYSTEMS    (2-9 systems for level 4, 10 or more for level 5)     Review of Systems   Constitutional: Negative. Negative for activity change, appetite change, chills, fatigue and fever. HENT: Negative. Negative for congestion, dental problem, drooling, facial swelling, nosebleeds, postnasal drip, rhinorrhea, sinus pressure, sinus pain, tinnitus, trouble swallowing and voice change. Eyes: Negative. Negative for photophobia, pain, discharge, redness, itching and visual disturbance.    Respiratory: Positive for cough, shortness of breath and wheezing. Negative for apnea, hemoptysis, sputum production, choking, chest tightness and stridor. Cardiovascular: Positive for chest pain. Negative for palpitations, leg swelling and syncope. Gastrointestinal: Positive for abdominal distention, abdominal pain and vomiting. Negative for blood in stool, constipation, diarrhea, nausea and rectal pain. Endocrine: Negative for polyuria. Genitourinary: Negative. Negative for dysuria, flank pain, frequency, hematuria and urgency. Musculoskeletal: Negative. Negative for arthralgias, back pain, gait problem, myalgias, neck pain and neck stiffness. Skin: Negative. Negative for rash. Neurological: Negative. Negative for dizziness, speech difficulty, light-headedness, numbness and headaches. Psychiatric/Behavioral: Negative. Negative for agitation, confusion, self-injury, sleep disturbance and suicidal ideas. The patient is not nervous/anxious. All other systems reviewed and are negative. Except as noted above the remainder of the review of systems was reviewed and negative. PAST MEDICAL HISTORY     Past Medical History:   Diagnosis Date    CAD (coronary artery disease)     [facility]     COPD [facility]     Depression     Drug abuse ([facility]     HIGH CHOLESTEROL     Hypertension     MI (myocardial infarction) ([facility] 2011    S/P coronary artery bypass graft x 4 2011    CABG x 4 Dr Letty Benavides       Prior to Admission medications    Medication Sig Start Date End Date Taking?  Authorizing Provider   metoprolol succinate (TOPROL XL) 25 MG extended release tablet Take 1 tablet by mouth daily 5/17/20   Woody Hurst MD   spironolactone (ALDACTONE) 25 MG tablet Take 0.5 tablets by mouth daily 5/16/20   Woody Hurst MD   lisinopril (PRINIVIL;ZESTRIL) 5 MG tablet Take 1 tablet by mouth daily 5/16/20   Woody Hurst MD ipratropium-albuterol (DUONEB) 0.5-2.5 (3) MG/3ML SOLN nebulizer solution Inhale 3 mLs into the lungs 4 times daily 3/15/20   Elise Wakefield MD   [facility]) 1 MG tablet Take 1 tablet by mouth 2 times daily 3/15/20   Elise Wakefield MD   albuterol sulfate HFA (PROVENTIL HFA) 108 (90 Base) MCG/ACT inhaler Inhale 2 puffs into the lungs every 4 hours as needed for Wheezing or Shortness of Breath With spacer (and mask if indicated). Thanks.  3/12/20 4/11/20  Donna Fox PA-C   aspirin 81 MG EC tablet Take 1 tablet by mouth daily 1/28/20   Chelsea Kerr MD   atorvastatin (LIPITOR) 40 MG tablet Take 1 tablet by mouth daily 1/28/20   Chelsea Kerr MD   nitroGLYCERIN (NITROSTAT) 0.4 MG SL tablet Place 1 tablet under the tongue every 5 minutes as needed for Chest pain 9/17/19   [place] MD Jennifer        Patient Active Problem List   Diagnosis    Essential hypertension    Osteoarthritis    COPD (chronic obstructive pulmonary disease) (HCC)    Paresthesia of left leg    Abdominal hernia    Left shoulder pain    Crushing injury of right hand    Rotator cuff tendinitis    Midline low back pain without sciatica    History of MI (myocardial infarction)    Coronary artery disease involving coronary bypass graft of native heart without angina pectoris    Mixed hyperlipidemia    Depression    Chronic midline low back pain without sciatica    Tobacco abuse    Gastroesophageal reflux disease without esophagitis    Opiate abuse, continuous ([address]    Heroin dependence ([address]    ST elevation myocardial infarction involving left circumflex coronary artery ([address]    STEMI (ST elevation myocardial infarction) ([address]    Acute on chronic combined systolic (congestive) and diastolic (congestive) heart failure (HCC)    Systolic and diastolic CHF w/reduced LV function, NYHA class 4 ([address]    NSTEMI (non-ST elevated myocardial infarction) ([address]    Acute on chronic congestive heart failure ([address]    Noncompliance    Pulmonary edema, acute (HCC)    Acute kidney injury ([facility]    Acute respiratory failure with hypoxia and hypercapnia (HCC)    Hypertensive emergency    Ischemic cardiomyopathy    Heart failure ([facility]    Left ventricular systolic dysfunction    Acute systolic congestive heart failure (HCC)    SOB (shortness of breath)    Acute on chronic combined systolic and diastolic heart failure (HCC)    Hyponatremia    Hematemesis         SURGICAL HISTORY       Past Surgical History:   Procedure Laterality Date    BYPASS GRAFT  04/10/2011    CABG x 4 Dr lAejo Hamm  11/2010     4 stents    LEG SURGERY           CURRENT MEDICATIONS       Previous Medications    ALBUTEROL SULFATE HFA (PROVENTIL HFA) 108 (90 BASE) MCG/ACT INHALER    Inhale 2 puffs into the lungs every 4 hours as needed for Wheezing or Shortness of Breath With spacer (and mask if indicated). Thanks. ASPIRIN 81 MG EC TABLET    Take 1 tablet by mouth daily    ATORVASTATIN (LIPITOR) 40 MG TABLET    Take 1 tablet by mouth daily    BUMETANIDE (BUMEX) 1 MG TABLET    Take 1 tablet by mouth 2 times daily    IPRATROPIUM-ALBUTEROL (DUONEB) 0.5-2.5 (3) MG/3ML SOLN NEBULIZER SOLUTION    Inhale 3 mLs into the lungs 4 times daily    LISINOPRIL (PRINIVIL;ZESTRIL) 5 MG TABLET    Take 1 tablet by mouth daily    METOPROLOL SUCCINATE (TOPROL XL) 25 MG EXTENDED RELEASE TABLET    Take 1 tablet by mouth daily    NITROGLYCERIN (NITROSTAT) 0.4 MG SL TABLET    Place 1 tablet under the tongue every 5 minutes as needed for Chest pain    SPIRONOLACTONE (ALDACTONE) 25 MG TABLET    Take 0.5 tablets by mouth daily       ALLERGIES     Patient has no known allergies.     FAMILY HISTORY       Family History   Problem Relation Age of Onset    Cancer Father     Heart Failure Father     Heart Disease Father     Diabetes Mother     Heart Disease Mother           SOCIAL HISTORY       Social History     Socioeconomic History    Marital status:  Head: Normocephalic and atraumatic. Right Ear: Tympanic membrane, ear canal and external ear normal. There is no impacted cerumen. Left Ear: Tympanic membrane, ear canal and external ear normal. There is no impacted cerumen. Nose: Nose normal. No congestion or rhinorrhea. Mouth/Throat:      Mouth: Mucous membranes are dry. Pharynx: Oropharynx is clear. No oropharyngeal exudate or posterior oropharyngeal erythema. Eyes:      General: No scleral icterus. Right eye: No discharge. Left eye: No discharge. Extraocular Movements: Extraocular movements intact. Conjunctiva/sclera: Conjunctivae normal.      Pupils: Pupils are equal, round, and reactive to light. Neck:      Musculoskeletal: Normal range of motion and neck supple. No neck rigidity or muscular tenderness. Vascular: No carotid bruit. Cardiovascular:      Rate and Rhythm: Normal rate. Pulses: Normal pulses. Heart sounds: Normal heart sounds. No murmur. No friction rub. No gallop. Pulmonary:      Effort: Pulmonary effort is normal. No respiratory distress. Breath sounds: No stridor. Rales present. No wheezing or rhonchi. Chest:      Chest wall: No tenderness. Abdominal:      General: Abdomen is flat. Bowel sounds are normal. There is no distension. Palpations: Abdomen is soft. There is no mass. Tenderness: There is abdominal tenderness. There is guarding. There is no right CVA tenderness, left CVA tenderness or rebound. Hernia: No hernia is present. Musculoskeletal: Normal range of motion. General: No swelling, tenderness, deformity or signs of injury. Right lower leg: No edema. Left lower leg: No edema. Lymphadenopathy:      Cervical: No cervical adenopathy. Skin:     Capillary Refill: Capillary refill takes less than 2 seconds. Coloration: Skin is not jaundiced or pale. Findings: No bruising, erythema, lesion or rash. Neurological:      General: No focal deficit present. Mental Status: He is alert and oriented to person, place, and time. Mental status is at baseline. Cranial Nerves: No cranial nerve deficit. Sensory: No sensory deficit. Motor: No weakness. Coordination: Coordination normal.      Gait: Gait normal.      Deep Tendon Reflexes: Reflexes normal.   Psychiatric:         Mood and Affect: Mood normal.         Behavior: Behavior normal.         Thought Content:  Thought content normal.         Judgment: Judgment normal.         DIAGNOSTIC RESULTS     Labs Reviewed   CBC WITH AUTO DIFFERENTIAL - Abnormal; Notable for the following components:       Result Value    Hemoglobin 10.1 (*)     Hematocrit 35.4 (*)     MCV 68.6 (*)     MCH 19.6 (*)     MCHC 28.5 (*)     RDW 21.0 (*)     Lymphocytes % 20.1 (*)     Monocytes % 10.2 (*)     Basophils % 1.6 (*)     Immature Neutrophil % 0.8 (*)     All other components within normal limits   COMPREHENSIVE METABOLIC PANEL - Abnormal; Notable for the following components:    Sodium 123 (*)     Potassium 5.2 (*)     Chloride 86 (*)     BUN 39 (*)     CREATININE 1.6 (*)     Glucose 118 (*)     Total Bilirubin 1.3 (*)      (*)     AST 62 (*)     GFR Non- 46 (*)     GFR  56 (*)     All other components within normal limits   BRAIN NATRIURETIC PEPTIDE - Abnormal; Notable for the following components:    Pro-BNP 2,759 (*)     All other components within normal limits   URINALYSIS - Abnormal; Notable for the following components:    Specific Gravity, UA 1.046 (*)     All other components within normal limits   LIPASE   TROPONIN   LACTIC ACID, PLASMA   PROCALCITONIN   TYPE AND SCREEN          EKG: All EKG's are interpreted by the Emergency Department Physician who either signs or Co-signs this chart in the absence of a cardiologist.       EKG Interpretation    Interpreted by emergency department physician    Rhythm: normal sinus Rate: tachycardia  Axis: normal  Ectopy: none  Conduction: normal  ST Segments: no acute change  T Waves: no acute change  Q Waves: none    Clinical Impression: no acute changes    Nichol Buitrago     RADIOLOGY:     Non-plain film images such as CT, Ultrasound and MRI are read by the radiologist. Plain radiographic images are visualized and preliminarily interpreted by the emergency physician. Interpretation per the Radiologist below, if available at the time of this note:    CT ABDOMEN PELVIS W IV CONTRAST Additional Contrast? None   Final Result   Mild ascites. Please correlate for indicators of liver failure. CTA PULMONARY W CONTRAST   Final Result   No evidence of an acute pulmonary embolus. Right basilar pleural and parenchymal lung disease with multiloculated   pleural fluid primarily in the major fissure, increasing to a mild degree   from 05/10/2020. Complex pleural fluid is suspected and developing empyema   must be considered. Image guided aspiration of the multiloculated right   pleural fluid collection would be helpful in further evaluation. There is increasing thickening and irregularity of the pleura and adjacent   right hemidiaphragm. There is new right perihepatic low-attenuation fluid,   likely reactive. Follow-up is recommended. Stable mediastinal adenopathy. Unchanged prominent left-greater-than-right   axillary lymph nodes. XR CHEST PORTABLE   Final Result   Stable focal wedge-like atelectatic change right lung base.                ED BEDSIDE ULTRASOUND:   Performed by ED Physician Nichol Buitrago DO       LABS:  Labs Reviewed   CBC WITH AUTO DIFFERENTIAL - Abnormal; Notable for the following components:       Result Value    Hemoglobin 10.1 (*)     Hematocrit 35.4 (*)     MCV 68.6 (*)     MCH 19.6 (*)     MCHC 28.5 (*)     RDW 21.0 (*)     Lymphocytes % 20.1 (*)     Monocytes % 10.2 (*)     Basophils % 1.6 (*)     Immature Neutrophil % 0.8 (*)     All other components within normal limits   COMPREHENSIVE METABOLIC PANEL - Abnormal; Notable for the following components:    Sodium 123 (*)     Potassium 5.2 (*)     Chloride 86 (*)     BUN 39 (*)     CREATININE 1.6 (*)     Glucose 118 (*)     Total Bilirubin 1.3 (*)      (*)     AST 62 (*)     GFR Non- 46 (*)     GFR  56 (*)     All other components within normal limits   BRAIN NATRIURETIC PEPTIDE - Abnormal; Notable for the following components:    Pro-BNP 2,759 (*)     All other components within normal limits   URINALYSIS - Abnormal; Notable for the following components:    Specific Gravity, UA 1.046 (*)     All other components within normal limits   LIPASE   TROPONIN   LACTIC ACID, PLASMA   PROCALCITONIN   TYPE AND SCREEN       All other labs were within normal range or not returned as of this dictation. EMERGENCY DEPARTMENT COURSE and DIFFERENTIAL DIAGNOSIS/MDM:   Vitals:    Vitals:    05/18/20 0031 05/18/20 0109 05/18/20 0146 05/18/20 0201   BP: (!) 123/96 (!) 140/95  (!) 137/102   Pulse: 109 109 107 108   Resp: 23 17 18 16   Temp:       TempSrc:       SpO2: 100% 99% 99% 100%   Weight:       Height:               MDM  Number of Diagnoses or Management Options  Diagnosis management comments: 43-year-old male presents emergency department with multiple chief complaints. Patient reports worsening dyspnea since he was discharged yesterday. Patient also reports an episode of hematemesis that occurred this morning. Patient has a history of tobacco use, alcohol use. Patient also has a history of severe congestive heart failure and COPD. Patient reports that he is still smoking. CT of the chest shows what appears to be worsening empyema. CT the abdomen pelvis was negative for acute surgical pathology. Patient did not have any further hematemesis in the emergency department. Patient did receive pain medication and albuterol.   Patient reports that he requires albuterol every 4 hours. Will admit to hospitalist service with interventional radiology consultation for possible thoracentesis. Vital signs are stable at this time. Patient was given Levaquin in the emergency department for likely pneumonia with empyema. Amount and/or Complexity of Data Reviewed  Clinical lab tests: ordered and reviewed  Tests in the radiology section of CPT®: ordered and reviewed  Tests in the medicine section of CPT®: ordered and reviewed    Risk of Complications, Morbidity, and/or Mortality  Presenting problems: moderate  Diagnostic procedures: moderate  Management options: moderate    Critical Care  Total time providing critical care: < 30 minutes    Patient Progress  Patient progress: stable        REASSESSMENT          CRITICAL CARE TIME     Total critical care time provided today was 0 minutes. This excludes seperately billable procedures and family discussion time. Critical care time provided for obtaining history, conducting a physical exam, performing and monitoring interventions, ordering, collecting and interpreting tests, and establishing medical decision-making. There was a potential for life/limb threatening pathology requiring close evaluation and intervention with concern for patient decompensation. CONSULTS:  IP CONSULT TO HOSPITALIST  IP CONSULT TO GI    PROCEDURES:  None performed unless otherwise noted below     Procedures        FINAL IMPRESSION      1. Empyema lung ([address]    2. COPD exacerbation ([address]    3. Acute on chronic combined systolic and diastolic congestive heart failure ([address]    4. Hematemesis with nausea    5. Ascites due to alcoholic cirrhosis ([address]          DISPOSITION/PLAN   DISPOSITION Admitted 05/18/2020 01:55:36 AM      PATIENT REFERRED TO:  No follow-up provider specified.     DISCHARGE MEDICATIONS:  New Prescriptions    No medications on file       ED Provider Disposition Time  DISPOSITION Admitted 05/18/2020 01:55:36 AM      Appropriate personal protective equipment was worn during the patient's evaluation. These included surgical, eye protection, surgical mask or in 95 respirator and gloves. The patient was also placed in a surgical mask for the prevention of possible spread of respiratory viral illnesses. The Patient was instructed to read the package inserts with any medication that was prescribed. Major potential reactions and medication interactions were discussed. The Patient understands that there are numerous possible adverse reactions not covered. The patient was also instructed to arrange follow-up with his or her primary care provider for review of any pending labwork or incidental findings on any radiology results that were obtained. All efforts were made to discuss any incidental findings that require further monitoring. Controlled Substances Monitoring:     No flowsheet data found.     (Please note that portions of this note were completed with a voice recognition program.  Efforts were made to edit the dictations but occasionally words are mis-transcribed.)    Klaus Dunaway DO (electronically signed)  Attending Emergency Physician            Klaus Dunaway DO  05/18/20 [address],   05/18/20 [id number]

## 2020-05-18 NOTE — PROGRESS NOTES
Renal consult acknowledged  Give iv bumex  Limit oral fluid intake  Monitor uop  Full note to follow

## 2020-05-18 NOTE — PROGRESS NOTES
Patient is known to Dr. Oralia Clinton.  I will add him to the treatment team.    Stacy Ware, [address] [facility]

## 2020-05-18 NOTE — H&P
History and Physical      Name:  Kimberly Mayers /Age/Sex: 1970  (52 y.o. male)   MRN & CSN:  [id number] & [id number] Admission Date/Time: 2020 10:42 PM   Location:  [place] [address] PCP: No primary care provider on file. Hospital Day: 2    Assessment and Plan:   Kimberly Mayers is a 52 y.o.  male  who presents with hematemesis    -Hematemesis probable upper GI bleeding   -Right pleural effusion; multiloculated primarily in the major fissure probable developing empyema, more likely related to CHF. No fever, cough or leukocytosis  -Anemia, hemoglobin stable  -Acute on chronic systolic congestive heart failure ejection fraction 20%  -Severe mitral regurgitation  -Coronary disease status post CABG in the past    Plan  Serial H&H  IV PPI  Bumex 1 mg IV twice daily and repeat chest x-ray post diuresis  GI consult  Hold off antibiotics  Check procalcitonin          Diet No diet orders on file   DVT Prophylaxis [] Lovenox, []  Heparin, [] SCDs, [] Ambulation   GI Prophylaxis [] PPI,  [] H2 Blocker,  [] Carafate,  [] Diet/Tube Feeds   Code Status Prior   Disposition Patient requires continued admission due to    MDM [] Low, [] Moderate,[]  High  Patient's risk as above due to      History of Present Illness:     Chief Complaint: Hematemesis  Kimberly Mayers is a 52 y.o.  male  who presents with hematemesis. Patient presented to emergency room for evaluation of hematemesis. He vomited bright red blood once a few hours prior to presentation. No abdominal pain, chest pain, cough, fever or chills. He has chronic shortness of breath unchanged. Patient was recently hospitalized discharged yesterday. He was hospitalized for CHF exacerbation treated with diuresis.        Ten point ROS reviewed negative, unless as noted above    Objective:   No intake or output data in the 24 hours ending 20 0133   Vitals:   Vitals:    20 0109   BP: (!) 140/95   Pulse: 109   Resp: 17   Temp:    SpO2: 99%     Physical Exam:   GEN Awake male, sitting upright in bed in no apparent distress. Appears given age. EYES Pupils are equally round. No scleral erythema, discharge, or conjunctivitis. HENT Mucous membranes are moist. Oral pharynx without exudates, no evidence of thrush. NECK Supple, no apparent thyromegaly or masses. RESP Clear to auscultation, no wheezes, rales or rhonchi. Symmetric chest movement while on room air. CARDIO/VASC S1/S2 auscultated. Regular rate without appreciable murmurs, rubs, or gallops. No JVD or carotid bruits. Peripheral pulses equal bilaterally and palpable. No peripheral edema. GI Abdomen is soft without significant tenderness, masses, or guarding. Bowel sounds are normoactive. Rectal exam deferred.  No costovertebral angle tenderness. Hansen catheter is not present. HEME/LYMPH No palpable cervical lymphadenopathy and no hepatosplenomegaly. No petechiae or ecchymoses. MSK No gross joint deformities. SKIN Normal coloration, warm, dry. NEURO Cranial nerves appear grossly intact, normal speech, no lateralizing weakness. PSYCH Awake, alert, oriented x 4. Affect appropriate. Past Medical History:      Past Medical History:   Diagnosis Date    CAD (coronary artery disease)     [facility]     COPD (chronic obstructive pulmonary disease) (HCC)     Depression     Drug abuse ([facility]     HIGH CHOLESTEROL     Hypertension     MI (myocardial infarction) ([facility] 2011    S/P coronary artery bypass graft x 4 2011    CABG x 4 Dr Silverman Minus:  has a past surgical history that includes Cardiac surgery (11/2010); Bypass Graft (04/10/2011); and Leg Surgery. Allergies: No Known Allergies    FAM HX: family history includes Cancer in his father; Diabetes in his mother; Heart Disease in his father and mother; Heart Failure in his father.   Soc HX:   Social History     Socioeconomic History    Marital status:      Spouse name: None    Number of children: None    Years of education: None    Highest education level: None   Occupational History    None   Social Needs    Financial resource strain: None    Food insecurity     Worry: None     Inability: None    Transportation needs     Medical: None     Non-medical: None   Tobacco Use    Smoking status: Current Every Day Smoker     Packs/day: 1.00     Years: 20.00     Pack years: 20.00     Types: Cigarettes    Smokeless tobacco: Former User    Tobacco comment: Reviewed 10/9/17   Substance and Sexual Activity    Alcohol use: No     Alcohol/week: 0.0 standard drinks    Drug use: No     Comment: march 2018 states he quit    Sexual activity: Not Currently   Lifestyle    Physical activity     Days per week: None     Minutes per session: None    Stress: None   Relationships    Social connections     Talks on phone: None     Gets together: None     Attends Hindu service: None     Active member of club or organization: None     Attends meetings of clubs or organizations: None     Relationship status: None    Intimate partner violence     Fear of current or ex partner: None     Emotionally abused: None     Physically abused: None     Forced sexual activity: None   Other Topics Concern    None   Social History Narrative    None       Medications:   Medications:    sodium chloride flush  10 mL Intravenous BID    sodium chloride flush  10 mL Intravenous BID      Infusions:   PRN Meds:    Prior to Admission medications    Medication Sig Start Date End Date Taking?  Authorizing Provider   metoprolol succinate (TOPROL XL) 25 MG extended release tablet Take 1 tablet by mouth daily 5/17/20   Puneet Pederson MD   spironolactone (ALDACTONE) 25 MG tablet Take 0.5 tablets by mouth daily 5/16/20   Puneet Pederson MD   lisinopril (PRINIVIL;ZESTRIL) 5 MG tablet Take 1 tablet by mouth daily 5/16/20   Puneet Pederson MD   ipratropium-albuterol (DUONEB) 0.5-2.5 (3) MG/3ML SOLN nebulizer solution Inhale 3 mLs into the lungs 4 times daily 3/15/20   Konrad Webster MD   bumetanide [facility]) 1 MG tablet Take 1 tablet by mouth 2 times daily 3/15/20   Konrad Webster MD   albuterol sulfate HFA (PROVENTIL HFA) 108 (90 Base) MCG/ACT inhaler Inhale 2 puffs into the lungs every 4 hours as needed for Wheezing or Shortness of Breath With spacer (and mask if indicated). Thanks.  3/12/20 4/11/20  Rogelio Mcintyre PA-C   aspirin 81 MG EC tablet Take 1 tablet by mouth daily 1/28/20   Chato Alonso MD   atorvastatin (LIPITOR) 40 MG tablet Take 1 tablet by mouth daily 1/28/20   Chato Alonso MD   nitroGLYCERIN (NITROSTAT) 0.4 MG SL tablet Place 1 tablet under the tongue every 5 minutes as needed for Chest pain 9/17/19   Geoffrey Vigil MD         Electronically signed by Jevon Tong MD on 5/18/2020 at 1:33 AM

## 2020-05-18 NOTE — CONSULTS
[address] [place] [address], [address] [facility]                                  CONSULTATION    PATIENT NAME: Katy Cintron                       :        1970  MED REC NO:   [id number]                          ROOM:       6019  ACCOUNT NO:   [de-identified]                           ADMIT DATE: 2020  PROVIDER:     Jaki Whaley MD    CONSULT DATE:  2020    PRIMARY CARE PROVIDER:  Elen Paul MD at [facility]. CHIEF COMPLAINT:  History of hematemesis; rule out upper GI bleeding. HISTORY OF PRESENT ILLNESS:  The patient is a 35-year-old white  gentleman with a history of hypertension, coronary artery disease status  post CABG, COPD, depression, history of recreational drug use (heroin),  who was admitted to the hospital on 2020 with acute onset of  nausea, vomiting, and mild hematemesis. The patient was last week  admitted with shortness of breath due to congestive heart failure/COPD  exacerbation and was discharged home on 2020. The patient's  hemoglobin on 2020 was 10.1 gm percent, today is 7.4 gm percent. The patient denies abdominal pain, melena, or hematochezia. The patient  has not had an EGD or a colonoscopy done in the past.  The patient was  last seen by me in consultation on 2020 for abnormal LFTs which  was attributed to his congestive hepatopathy. Hepatitis A, B and C  serology was negative and ultrasound of the right upper quadrant was  negative as well. The patient is on Protonix and is hemodynamically  stable. REVIEW OF SYSTEMS:  CENTRAL NERVOUS SYSTEM:  The patient denies headache or focal  sensorimotor symptoms. CARDIOVASCULAR SYSTEM:  No history of chest pain, but the patient  complains of shortness of breath and leg swelling. GENITOURINARY SYSTEM:  No history of dysuria, pyuria, or hematuria.   MUSCULOSKELETAL SYSTEM:  The patient complains of swelling of the lower  extremities. PAST MEDICAL HISTORY:  Significant for history of hypertension, coronary  artery disease status post CABG, COPD, depression, and history of  recreational drug use (heroin). According to the patient, he quit using  drugs in 2017. There is no history of IV drug abuse. FAMILY HISTORY:  The patient's father was diagnosed with carcinoma of  the lung, maternal grandmother with carcinoma of unknown primary, and  mother with cirrhosis of the liver. MEDICATIONS:  Please refer to chart (the patient is on aspirin). SOCIOECONOMIC HISTORY:  There is no history of EtOH abuse. The patient  is a current everyday smoker. There is also a history of recreational  drug use in the past.  The patient used heroin in the past and quit in  2017. PAST SURGICAL HISTORY:  Significant for CABG done and also the patient  had surgery on his leg. ALLERGIES:  No known drug allergies. PHYSICAL EXAMINATION:  GENERAL:  Shows a 40-year-old white gentleman of average build and  nutritional status, who is lying comfortably flat in bed, in no acute  distress. He is awake, alert, and oriented, and pleasant to talk with. VITAL SIGNS:  Please refer to the chart. HEENT:  Shows skull to be atraumatic. NECK:  Supple. CHEST:  Clear. HEART:  S1 and S2 are normal.  ABDOMEN:  Soft, nontender, nondistended. Liver and spleen are not  palpable. Bowel sounds are present. RECTAL:  Deferred. CNS:  Shows the patient to be awake, alert, and oriented. There are no  focal sensorimotor signs. MUSCULOSKELETAL SYSTEM:  Shows edema of the lower extremities. LABORATORY DATA:  The labs drawn during the present hospitalization  comprised of chem profile, which is remarkable for sodium of 121,  potassium 5.5, BUN is 38, creatinine 1.5. The patient's LFTs show a  total bilirubin of 1.3, AST of 62, ALT of 221, alkaline phosphatase of  108. The patient's INR is 1.79.     CAT scan of the abdomen, pelvis and chest was done, which showed  mediastinal adenopathy and underlying lymphoproliferative disorder needs  to be ruled out. IMPRESSION:  A 77-year-old white gentleman with multiple comorbidities,  presents with:  1. Acute onset of hematemesis, rule out upper GI bleeding and etiology  to be determined. 2.  Abnormal CAT scan, rule out underlying lymphoproliferative disorder. Oncology and Pulmonary consult in order. RECOMMENDATIONS:  1. Agree with present management. 2.  We will monitor the patient's H and H and transfuse on a p.r.n.  basis to keep hemoglobin above 7 gm percent. 3.  We will proceed with EGD in a.m.  4.  The patient will need a screening colonoscopy also later as an  outpatient. 5.  The patient has been strongly instructed to quit smoking cigarettes. Meenakshi Hendricks MD    D: 05/18/2020 9:45:49       T: 05/18/2020 12:36:14     [id number]  Job#: [id number]     Doc#: [id number]    CC:   Abril Leiva MD

## 2020-05-18 NOTE — PROGRESS NOTES
Patient arrived to unit from ed via stretcher. Alert and oriented x 4. Independent with ambulation. Skin assessment completed with [facility] RN. Skin is warm dry and intact. Scattered scabs/bruising noted to various parts of body. No other skin issues noted at this time.  Will continue to monitor

## 2020-05-18 NOTE — CONSULTS
Infectious Disease Consult Note  2020   Patient Name: Hallie Shirley : 1970   Impression   Abnormal chest CT  o Multiloculated fluid collection in major fissure of right lung. This lesion has been present as recently as 2 months ago and the size is actually reduced. Given the absence of major clinical symptoms, no elevation of inflammatory markers, suspect that this is non-infectious. o Given history of mediastinal lymphadenopathy and cigarette smoking malignancy will need to be considered.  Chronic liver disease  o History of chronic alcohol use, not presently using. Hepatitis viral panels are negative.  o Presence of fetal hepaticus, elevated liver enzymes, raises the possibility of liver cirrhosis with portal hypertension that may have resulted in hematemesis.  Multi-morbidity: per PMHx CAD, essential hypertension, COPD, cigarette smoking, Heroin use in remission (clean since 2017)  Plan:   Discontinue meropenem, levofloxacin and vancomycin.  Start Rocephin for GI prophylaxis for possible bleeding esophageal varices. This will still need to be confirmed by gastroenterology service.  Further work-up as per oncology and pulmonology services. Thank you for allowing me to consult in the care of this patient.  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  male COPD, CAD status post CABG, essential hypertension, recently discharged on  after a 3-day admission for combined systolic and diastolic CHF exacerbation with ejection fraction of 20% who was admitted 2020 for further evaluation and management of hematemesis-vomited bright red blood prior to admission. He had eaten some chicken. He has chronic shortness of breath. He underwent a CTA of the chest that showed right basilar pleural and parenchymal lung disease with multiloculated pleural fluid primarily in the major fissure increase in the mild degree from 5/10/2020. Complex pleural fluid was said to be suspected and evolving empyema was to be considered. He has received intravenous vancomycin, meropenem and levofloxacin. Procalcitonin was not elevated, CRP was not elevated. He has elevated liver enzymes and hyperbilirubinemia. ? Infectious diseases service was consulted to evaluate the pt, and recommend further investigative and therapeutic measures. ROS: Other systems reviewed Including eyes, ENT, respiratory, cardiovascular, GI, , dermatologic, neurologic, psych, hem/lymphatic, musculoskeletal and endocrine were negative other than what is mentioned above.       Patient Active Problem List    Diagnosis Date Noted    ST elevation myocardial infarction involving left circumflex coronary artery ([address] 07/02/2018     Priority: High    Hematemesis 05/18/2020    Acute on chronic combined systolic and diastolic heart failure ([address] 05/13/2020    Hyponatremia     SOB (shortness of breath) 53/38/2692    Acute systolic congestive heart failure ([address] 02/07/2020    Left ventricular systolic dysfunction     Heart failure ([address] 01/23/2020    Ischemic cardiomyopathy 02/05/2019    Pulmonary edema, acute ([address] 12/08/2018    Acute kidney injury ([address] 12/08/2018    Acute respiratory failure with hypoxia and hypercapnia ([address] 12/08/2018    Hypertensive emergency 12/08/2018    Acute on chronic congestive heart failure ([address] 09/14/2018    Noncompliance 09/14/2018    Acute on chronic combined systolic (congestive) and diastolic (congestive) heart failure ([address] 15/69/6277    Systolic and diastolic CHF w/reduced LV function, NYHA class 4 ([address] 07/11/2018    NSTEMI (non-ST elevated myocardial infarction) ([address] 07/11/2018    STEMI (ST elevation myocardial infarction) ([address] 07/02/2018    Heroin dependence ([address] 03/03/2018    Opiate abuse, continuous ([address] 12/28/2017    Gastroesophageal reflux disease without esophagitis 12/29/2016    Coronary artery disease involving coronary bypass graft of native heart without angina pectoris 07/26/2016    Mixed hyperlipidemia 07/26/2016    Depression 07/26/2016    Chronic midline low back pain without sciatica 07/26/2016    Tobacco abuse 07/26/2016    Midline low back pain without sciatica 09/10/2015    History of MI (myocardial infarction) 09/10/2015    Left shoulder pain 08/19/2015    Crushing injury of right hand 08/19/2015    Rotator cuff tendinitis 08/19/2015    Abdominal hernia 06/21/2015    Paresthesia of left leg 02/20/2015    Essential hypertension 08/10/2012    Osteoarthritis 08/10/2012    COPD (chronic obstructive pulmonary disease) ([facility] 08/10/2012     Past Medical History:   Diagnosis Date    CAD (coronary artery disease)     CHF (congestive heart failure) (HCC)     COPD (chronic obstructive pulmonary disease) (HCC)     Depression     Drug abuse ([facility]     HIGH CHOLESTEROL     Hypertension     MI (myocardial infarction) ([facility] 2011    S/P coronary artery bypass graft x 4 2011    CABG x 4 Dr Micheline Melgoza      Past Surgical History:   Procedure Laterality Date    BYPASS GRAFT  04/10/2011    CABG x 4 Dr Romero [facility]  11/2010     4 stents    LEG SURGERY        Family History   Problem Relation Age of Onset    Cancer Father     Heart Failure Father     Heart Disease Father     Diabetes Mother     Heart Disease Mother       Infectious disease related family history - not contibutory. SOCIAL HISTORY  Social History     Tobacco Use    Smoking status: Current Every Day Smoker     Packs/day: 1.00     Years: 20.00     Pack years: 20.00     Types: Cigarettes    Smokeless tobacco: Former User    Tobacco comment: Reviewed 10/9/17   Substance Use Topics    Alcohol use: No     Alcohol/week: 0.0 standard drinks       Born: Avelina Darby Lived   Occupation:    No recent travel of significance.  No recent unusual exposures.  Pets: 2 dogs   ?   ALLERGIES  No Known Allergies

## 2020-05-19 ENCOUNTER — ANESTHESIA (OUTPATIENT)
Dept: MEDSURG UNIT | Age: 50
End: 2020-05-19

## 2020-05-19 LAB
ALBUMIN SERPL-MCNC: 4.1 GM/DL (ref 3.4–5)
ALP BLD-CCNC: 103 IU/L (ref 40–128)
ALT SERPL-CCNC: 184 U/L (ref 10–40)
AMYLASE: 43 U/L (ref 25–115)
ANION GAP SERPL CALCULATED.3IONS-SCNC: 14 MMOL/L (ref 4–16)
ANION GAP SERPL CALCULATED.3IONS-SCNC: 14 MMOL/L (ref 4–16)
APTT: 36.1 SECONDS (ref 25.1–37.1)
AST SERPL-CCNC: 79 IU/L (ref 15–37)
BASOPHILS ABSOLUTE: 0.1 K/CU MM
BASOPHILS RELATIVE PERCENT: 0.9 % (ref 0–1)
BILIRUB SERPL-MCNC: 2.1 MG/DL (ref 0–1)
BUN BLDV-MCNC: 32 MG/DL (ref 6–23)
CALCIUM SERPL-MCNC: 9.1 MG/DL (ref 8.3–10.6)
CHLORIDE BLD-SCNC: 88 MMOL/L (ref 99–110)
CHLORIDE BLD-SCNC: 89 MMOL/L (ref 99–110)
CO2: 20 MMOL/L (ref 21–32)
CO2: 24 MMOL/L (ref 21–32)
CREAT SERPL-MCNC: 1.7 MG/DL (ref 0.9–1.3)
DIFFERENTIAL TYPE: ABNORMAL
EKG ATRIAL RATE: 115 BPM
EKG DIAGNOSIS: NORMAL
EKG P AXIS: 76 DEGREES
EKG P-R INTERVAL: 152 MS
EKG Q-T INTERVAL: 352 MS
EKG QRS DURATION: 120 MS
EKG QTC CALCULATION (BAZETT): 486 MS
EKG R AXIS: 76 DEGREES
EKG T AXIS: 114 DEGREES
EKG VENTRICULAR RATE: 115 BPM
EOSINOPHILS ABSOLUTE: 0.1 K/CU MM
EOSINOPHILS RELATIVE PERCENT: 0.8 % (ref 0–3)
ESTIMATED AVERAGE GLUCOSE: 151 MG/DL
GFR AFRICAN AMERICAN: 52 ML/MIN/1.73M2
GFR NON-AFRICAN AMERICAN: 43 ML/MIN/1.73M2
GLUCOSE BLD-MCNC: 114 MG/DL (ref 70–99)
GLUCOSE BLD-MCNC: 134 MG/DL (ref 70–99)
GLUCOSE BLD-MCNC: 230 MG/DL (ref 70–99)
GLUCOSE BLD-MCNC: 246 MG/DL (ref 70–99)
HBA1C MFR BLD: 6.9 % (ref 4.2–6.3)
HCT VFR BLD CALC: 35.2 % (ref 42–52)
HEMOGLOBIN: 10.2 GM/DL (ref 13.5–18)
IMMATURE NEUTROPHIL %: 1.6 % (ref 0–0.43)
INR BLD: 1.77 INDEX
LIPASE: 29 IU/L (ref 13–60)
LYMPHOCYTES ABSOLUTE: 1.2 K/CU MM
LYMPHOCYTES RELATIVE PERCENT: 11.6 % (ref 24–44)
MCH RBC QN AUTO: 19.8 PG (ref 27–31)
MCHC RBC AUTO-ENTMCNC: 29 % (ref 32–36)
MCV RBC AUTO: 68.2 FL (ref 78–100)
MONOCYTES ABSOLUTE: 0.7 K/CU MM
MONOCYTES RELATIVE PERCENT: 7 % (ref 0–4)
NUCLEATED RBC %: 0.4 %
PDW BLD-RTO: 21.2 % (ref 11.7–14.9)
PLATELET # BLD: 344 K/CU MM (ref 140–440)
PMV BLD AUTO: 9.4 FL (ref 7.5–11.1)
POTASSIUM SERPL-SCNC: 5 MMOL/L (ref 3.5–5.1)
POTASSIUM SERPL-SCNC: 6.2 MMOL/L (ref 3.5–5.1)
PROTHROMBIN TIME: 21.5 SECONDS (ref 11.7–14.5)
RBC # BLD: 5.16 M/CU MM (ref 4.6–6.2)
SEGMENTED NEUTROPHILS ABSOLUTE COUNT: 8.2 K/CU MM
SEGMENTED NEUTROPHILS RELATIVE PERCENT: 78.1 % (ref 36–66)
SODIUM BLD-SCNC: 122 MMOL/L (ref 135–145)
SODIUM BLD-SCNC: 127 MMOL/L (ref 135–145)
TOTAL IMMATURE NEUTOROPHIL: 0.17 K/CU MM
TOTAL NUCLEATED RBC: 0 K/CU MM
TOTAL PROTEIN: 6.4 GM/DL (ref 6.4–8.2)
WBC # BLD: 10.6 K/CU MM (ref 4–10.5)

## 2020-05-19 PROCEDURE — C9113 INJ PANTOPRAZOLE SODIUM, VIA: HCPCS | Performed by: INTERNAL MEDICINE

## 2020-05-19 PROCEDURE — 6370000000 HC RX 637 (ALT 250 FOR IP): Performed by: INTERNAL MEDICINE

## 2020-05-19 PROCEDURE — 2580000003 HC RX 258: Performed by: PHYSICIAN ASSISTANT

## 2020-05-19 PROCEDURE — 85018 HEMOGLOBIN: CPT

## 2020-05-19 PROCEDURE — 6360000002 HC RX W HCPCS: Performed by: INTERNAL MEDICINE

## 2020-05-19 PROCEDURE — 99222 1ST HOSP IP/OBS MODERATE 55: CPT | Performed by: INTERNAL MEDICINE

## 2020-05-19 PROCEDURE — 80051 ELECTROLYTE PANEL: CPT

## 2020-05-19 PROCEDURE — 85025 COMPLETE CBC W/AUTO DIFF WBC: CPT

## 2020-05-19 PROCEDURE — 83036 HEMOGLOBIN GLYCOSYLATED A1C: CPT

## 2020-05-19 PROCEDURE — 2500000003 HC RX 250 WO HCPCS: Performed by: INTERNAL MEDICINE

## 2020-05-19 PROCEDURE — 1200000000 HC SEMI PRIVATE

## 2020-05-19 PROCEDURE — 2580000003 HC RX 258: Performed by: INTERNAL MEDICINE

## 2020-05-19 PROCEDURE — 36415 COLL VENOUS BLD VENIPUNCTURE: CPT

## 2020-05-19 PROCEDURE — 6370000000 HC RX 637 (ALT 250 FOR IP): Performed by: PHYSICIAN ASSISTANT

## 2020-05-19 PROCEDURE — 80053 COMPREHEN METABOLIC PANEL: CPT

## 2020-05-19 PROCEDURE — 80048 BASIC METABOLIC PNL TOTAL CA: CPT

## 2020-05-19 PROCEDURE — 94640 AIRWAY INHALATION TREATMENT: CPT

## 2020-05-19 PROCEDURE — 83690 ASSAY OF LIPASE: CPT

## 2020-05-19 PROCEDURE — 2580000003 HC RX 258: Performed by: EMERGENCY MEDICINE

## 2020-05-19 PROCEDURE — 85610 PROTHROMBIN TIME: CPT

## 2020-05-19 PROCEDURE — 94761 N-INVAS EAR/PLS OXIMETRY MLT: CPT

## 2020-05-19 PROCEDURE — 51798 US URINE CAPACITY MEASURE: CPT

## 2020-05-19 PROCEDURE — 82962 GLUCOSE BLOOD TEST: CPT

## 2020-05-19 PROCEDURE — 82150 ASSAY OF AMYLASE: CPT

## 2020-05-19 PROCEDURE — 99232 SBSQ HOSP IP/OBS MODERATE 35: CPT | Performed by: INTERNAL MEDICINE

## 2020-05-19 PROCEDURE — 85730 THROMBOPLASTIN TIME PARTIAL: CPT

## 2020-05-19 PROCEDURE — 2500000003 HC RX 250 WO HCPCS: Performed by: PHYSICIAN ASSISTANT

## 2020-05-19 RX ORDER — NICOTINE POLACRILEX 4 MG
15 LOZENGE BUCCAL PRN
Status: DISCONTINUED | OUTPATIENT
Start: 2020-05-19 | End: 2020-05-22 | Stop reason: HOSPADM

## 2020-05-19 RX ORDER — DEXTROSE MONOHYDRATE 50 MG/ML
100 INJECTION, SOLUTION INTRAVENOUS PRN
Status: DISCONTINUED | OUTPATIENT
Start: 2020-05-19 | End: 2020-05-22 | Stop reason: HOSPADM

## 2020-05-19 RX ORDER — SODIUM POLYSTYRENE SULFONATE 15 G/60ML
15 SUSPENSION ORAL; RECTAL ONCE
Status: DISCONTINUED | OUTPATIENT
Start: 2020-05-19 | End: 2020-05-19

## 2020-05-19 RX ORDER — DEXTROSE MONOHYDRATE 25 G/50ML
25 INJECTION, SOLUTION INTRAVENOUS ONCE
Status: COMPLETED | OUTPATIENT
Start: 2020-05-19 | End: 2020-05-19

## 2020-05-19 RX ORDER — DEXTROSE MONOHYDRATE 25 G/50ML
12.5 INJECTION, SOLUTION INTRAVENOUS PRN
Status: DISCONTINUED | OUTPATIENT
Start: 2020-05-19 | End: 2020-05-22 | Stop reason: HOSPADM

## 2020-05-19 RX ADMIN — DEXTROSE MONOHYDRATE 25 G: 25 INJECTION, SOLUTION INTRAVENOUS at 06:22

## 2020-05-19 RX ADMIN — SODIUM CHLORIDE, PRESERVATIVE FREE 10 ML: 5 INJECTION INTRAVENOUS at 21:16

## 2020-05-19 RX ADMIN — ASPIRIN 81 MG: 81 TABLET, COATED ORAL at 10:37

## 2020-05-19 RX ADMIN — ONDANSETRON 4 MG: 2 INJECTION INTRAMUSCULAR; INTRAVENOUS at 03:45

## 2020-05-19 RX ADMIN — ATORVASTATIN CALCIUM 40 MG: 40 TABLET, FILM COATED ORAL at 10:37

## 2020-05-19 RX ADMIN — SODIUM CHLORIDE, PRESERVATIVE FREE 10 ML: 5 INJECTION INTRAVENOUS at 10:38

## 2020-05-19 RX ADMIN — BUMETANIDE 1 MG: 0.25 INJECTION INTRAMUSCULAR; INTRAVENOUS at 03:45

## 2020-05-19 RX ADMIN — INSULIN HUMAN 10 UNITS: 100 INJECTION, SOLUTION PARENTERAL at 06:23

## 2020-05-19 RX ADMIN — SODIUM CHLORIDE, PRESERVATIVE FREE 10 ML: 5 INJECTION INTRAVENOUS at 21:15

## 2020-05-19 RX ADMIN — Medication 50 MEQ: at 06:22

## 2020-05-19 RX ADMIN — CEFTRIAXONE SODIUM 2 G: 2 INJECTION, POWDER, FOR SOLUTION INTRAMUSCULAR; INTRAVENOUS at 13:47

## 2020-05-19 RX ADMIN — PANTOPRAZOLE SODIUM 40 MG: 40 INJECTION, POWDER, FOR SOLUTION INTRAVENOUS at 10:37

## 2020-05-19 RX ADMIN — METOPROLOL SUCCINATE 25 MG: 25 TABLET, EXTENDED RELEASE ORAL at 10:37

## 2020-05-19 RX ADMIN — IPRATROPIUM BROMIDE AND ALBUTEROL SULFATE 3 ML: .5; 3 SOLUTION RESPIRATORY (INHALATION) at 15:46

## 2020-05-19 RX ADMIN — IPRATROPIUM BROMIDE AND ALBUTEROL SULFATE 3 ML: .5; 3 SOLUTION RESPIRATORY (INHALATION) at 21:14

## 2020-05-19 RX ADMIN — BUMETANIDE 1 MG: 0.25 INJECTION INTRAMUSCULAR; INTRAVENOUS at 16:27

## 2020-05-19 RX ADMIN — PANTOPRAZOLE SODIUM 40 MG: 40 INJECTION, POWDER, FOR SOLUTION INTRAVENOUS at 21:15

## 2020-05-19 RX ADMIN — IPRATROPIUM BROMIDE AND ALBUTEROL SULFATE 3 ML: .5; 3 SOLUTION RESPIRATORY (INHALATION) at 13:40

## 2020-05-19 RX ADMIN — IPRATROPIUM BROMIDE AND ALBUTEROL SULFATE 3 ML: .5; 3 SOLUTION RESPIRATORY (INHALATION) at 00:51

## 2020-05-19 ASSESSMENT — ENCOUNTER SYMPTOMS
CONSTIPATION: 0
NAUSEA: 1
SHORTNESS OF BREATH: 0
BACK PAIN: 0
COLOR CHANGE: 0
ABDOMINAL PAIN: 0
DIARRHEA: 0
COUGH: 0
BLOOD IN STOOL: 0
PHOTOPHOBIA: 0
VOMITING: 1
CHEST TIGHTNESS: 0
WHEEZING: 0
EYE PAIN: 0

## 2020-05-19 NOTE — CONSULTS
Department of Cardiovascular & Thoracic Surgery   Consult Note        Reason for Consult: Mediastinal lymphadenopathy  Requesting Physician:  Jevon Tong MD        Date of Consult: 05/19/20    Chief Complaint: Hematemesis    History Obtained From:  patient, electronic medical record    HISTORY OF PRESENT ILLNESS:    The patient is a 52 y.o. male who presents with hematemesis. The patient has a significant history of cardiomyopathy with ejection fraction approximately 20%. He was found in February to have mediastinal lymphadenopathy. At that time he was advised to follow-up as an outpatient to obtain a PET scan. The patient did not follow-up. He did present back to the hospital in March and was seen by my partner at that time. He again was offered biopsy however he refused. He represents today and we are consulted to reevaluate mediastinal lymphadenopathy. The patient has no personal history of malignancy. He does have a family history of lung cancer. He has no family history of lymphoma. He denies chest pains. He has chronic shortness of breath. His hematemesis has improved. He is being seen by GI who is planning an EGD. Patient has no history of B symptoms. He does have a history of current smoking and IV drug abuse.   He quit his IV drug abuse in 2018    He has no fullness in his chest.    Past Medical History:        Diagnosis Date    CAD (coronary artery disease)     CHF (congestive heart failure) (HCC)     COPD (chronic obstructive pulmonary disease) ([address]     Depression     [facility]     HIGH CHOLESTEROL     Hypertension     MI [facility]myocardial infarction) [facility] 2011    S/P coronary artery bypass graft x 4 2011    CABG x 4 Dr Jenifer Lombardi     Past Surgical History:        Procedure Laterality Date    BYPASS GRAFT  04/10/2011    CABG x 4 Dr Cayetano Toledo  11/2010     4 stents    LEG SURGERY       Current Medications:   Current Facility-Administered Medications: glucose (GLUTOSE) 40 % oral gel 15 g, 15 g, Oral, PRN  dextrose 50 % IV solution, 12.5 g, Intravenous, PRN  glucagon (rDNA) injection 1 mg, 1 mg, Intramuscular, PRN  dextrose 5 % solution, 100 mL/hr, Intravenous, PRN  aspirin EC tablet 81 mg, 81 mg, Oral, Daily  atorvastatin (LIPITOR) tablet 40 mg, 40 mg, Oral, Daily  ipratropium-albuterol (DUONEB) nebulizer solution 3 mL, 1 vial, Inhalation, 4x Daily  metoprolol succinate (TOPROL XL) extended release tablet 25 mg, 25 mg, Oral, Daily  sodium chloride flush 0.9 % injection 10 mL, 10 mL, Intravenous, 2 times per day  sodium chloride flush 0.9 % injection 10 mL, 10 mL, Intravenous, PRN  acetaminophen (TYLENOL) tablet 650 mg, 650 mg, Oral, Q6H PRN **OR** acetaminophen (TYLENOL) suppository 650 mg, 650 mg, Rectal, Q6H PRN  polyethylene glycol (GLYCOLAX) packet 17 g, 17 g, Oral, Daily PRN  promethazine (PHENERGAN) tablet 12.5 mg, 12.5 mg, Oral, Q6H PRN **OR** ondansetron (ZOFRAN) injection 4 mg, 4 mg, Intravenous, Q6H PRN  potassium chloride (KLOR-CON M) extended release tablet 40 mEq, 40 mEq, Oral, PRN **OR** potassium bicarb-citric acid (EFFER-K) effervescent tablet 40 mEq, 40 mEq, Oral, PRN **OR** potassium chloride 10 mEq/100 mL IVPB (Peripheral Line), 10 mEq, Intravenous, PRN  magnesium sulfate 2 g in 50 mL IVPB premix, 2 g, Intravenous, PRN  pantoprazole (PROTONIX) injection 40 mg, 40 mg, Intravenous, BID  bumetanide (BUMEX) injection 1 mg, 1 mg, Intravenous, Q12H  cefTRIAXone (ROCEPHIN) 2 g in dextrose 5 % 50 mL IVPB, 2 g, Intravenous, Q24H  hydrOXYzine (VISTARIL) injection 50 mg, 50 mg, Intramuscular, Once  sodium chloride flush 0.9 % injection 10 mL, 10 mL, Intravenous, BID  sodium chloride flush 0.9 % injection 10 mL, 10 mL, Intravenous, BID  Allergies:  Patient has no known allergies.     Social History:   Social History     Socioeconomic History    Marital status:      Spouse name: Not on file    Number of children: Not on file    Years of education: Not on file    Highest education level: Not on file   Occupational History    Not on file   Social Needs    Financial resource strain: Not on file    Food insecurity     Worry: Not on file     Inability: Not on file    Transportation needs     Medical: Not on file     Non-medical: Not on file   Tobacco Use    Smoking status: Current Every Day Smoker     Packs/day: 1.00     Years: 20.00     Pack years: 20.00     Types: Cigarettes    Smokeless tobacco: Former User    Tobacco comment: Reviewed 10/9/17   Substance and Sexual Activity    Alcohol use: No     Alcohol/week: 0.0 standard drinks    Drug use: No     Comment: march 2018 states he quit    Sexual activity: Not Currently   Lifestyle    Physical activity     Days per week: Not on file     Minutes per session: Not on file    Stress: Not on file   Relationships    Social connections     Talks on phone: Not on file     Gets together: Not on file     Attends Hoahaoism service: Not on file     Active member of club or organization: Not on file     Attends meetings of clubs or organizations: Not on file     Relationship status: Not on file    Intimate partner violence     Fear of current or ex partner: Not on file     Emotionally abused: Not on file     Physically abused: Not on file     Forced sexual activity: Not on file   Other Topics Concern    Not on file   Social History Narrative    Not on file     Family History:    Family History   Problem Relation Age of Onset    Cancer Father     Heart Failure Father     Heart Disease Father     Diabetes Mother     Heart Disease Mother        REVIEW OF SYSTEMS:    CONSTITUTIONAL:  Neg. EYES:  Neg. EARS:  Neg     NOSE:  Neg.    MOUTH/THROAT:  Neg.    RESPIRATORY:   Neg. CARDIOVASCULAR   see HPI  GASTROINTESTINAL: See HPI  GENITOURINARY:  Neg.    HEMATOLOGIC/LYMPHATIC:  Neg.    MUSCULOSKELETAL:  Neg. NEUROLOGICAL:  Neg. SKIN :  Neg. PSYCHIATRIC: Depressed mood  ENDOCRINE:  Neg.   ALL/IMM : Neg.    PHYSICAL EXAM:  VITALS:  /71   Pulse 89   Temp 98 °F (36.7 °C)   Resp 20   Ht 5' 11\" (1.803 m)   Wt 197 lb 6.4 oz (89.5 kg)   SpO2 98%   BMI 27.53 kg/m²   CONSTITUTIONAL:  awake, alert, cooperative, no apparent distress, and appears stated age  NECK:  Supple, symmetrical, trachea midline, no adenopathy, thyroid symmetric, not enlarged and no tenderness, skin normal  HEMATOLOGIC/LYMPHATICS:  no cervical lymphadenopathy and no supraclavicular lymphadenopathy  LUNGS:  No increased work of breathing, good air exchange, clear to auscultation bilaterally, no crackles or wheezing  CARDIOVASCULAR:  Normal apical impulse, regular rate and rhythm, normal S1 and S2, no S3 or S4, and no murmur noted  CHEST/BREASTS:  symmetric  ABDOMEN: soft nt nd, no pulsitile masses  MUSCULOSKELETAL:  There is no redness, warmth, or swelling of the joints. Full range of motion noted. Motor strength is 5 out of 5 all extremities bilaterally. Tone is normal.  NEUROLOGIC:  Awake, alert, oriented to name, place and time. Cranial nerves II-XII are grossly intact. Motor is 5 out of 5 bilaterally. SKIN:  no bruising or bleeding and normal skin color, texture, turgor  VASC: 1+ femoral pulses        DATA:  CT scan chest reviewed. Lymph nodes are approximately the same size. He also has smaller axillary lymph nodes as well. IMPRESSION  Active Hospital Problems    Diagnosis Date Noted    Hematemesis [K92.0] 05/18/2020       Mediastinal lymphadenopathy      RECOMMENDATIONS:    I recommend Mediastinoscopy however the patient is currently unsure of what he wants. We will plan to re visit him tomorrow after his endoscopy to discuss further. I will attempt to proceed with mediastinoscopy during this admission if he is medically cleared (cardiology clearance for general anesthesia). I do believe these lymph nodes very well could be reactive.   Unfortunately he does have smaller lymph nodes in his bilateral axilla as well.  The patient has been counseled in detail that this could potentially be a malignancy but also could be a benign process. He is concerned that if this is a malignancy that he is not healthy enough for any treatment. This is a valid thought process. We discussed the risks and alternatives of this and I will return back tomorrow to discuss whether he would like to proceed with surgery. We will attempt to perform surgery during this admission if he would like us to.     Electronically signed by Silvia Cobian MD on 5/19/2020 at 7:25 AM

## 2020-05-19 NOTE — PROGRESS NOTES
area  Abd: soft, ascites no tenderness, no hepatomegaly. Normoactive bowel sounds. Ext: left leg 2+ edema   Catheter Site: without erythema or tenderness  Neuro: Mental status intact. CN 2-12 intact and no focal sensory or motor deficits     Radiologic / Imaging / TESTING  No results found.      Labs:    Recent Results (from the past 24 hour(s))   Basic metabolic panel    Collection Time: 05/18/20  9:48 AM   Result Value Ref Range    Sodium 124 (L) 135 - 145 MMOL/L    Potassium 5.1 3.5 - 5.1 MMOL/L    Chloride 88 (L) 99 - 110 mMol/L    CO2 24 21 - 32 MMOL/L    Anion Gap 12 4 - 16    BUN 36 (H) 6 - 23 MG/DL    CREATININE 1.5 (H) 0.9 - 1.3 MG/DL    Glucose 92 70 - 99 MG/DL    Calcium 8.7 8.3 - 10.6 MG/DL    GFR Non-African American 50 (L) >60 mL/min/1.73m2    GFR African American >60 >60 mL/min/1.73m2   Hemoglobin and hematocrit, blood    Collection Time: 05/18/20  8:05 PM   Result Value Ref Range    Hemoglobin 10.1 (L) 13.5 - 18.0 GM/DL    Hematocrit 35.0 (L) 42 - 52 %   CBC Auto Differential    Collection Time: 05/19/20  4:07 AM   Result Value Ref Range    WBC 10.6 (H) 4.0 - 10.5 K/CU MM    RBC 5.16 4.6 - 6.2 M/CU MM    Hemoglobin 10.2 (L) 13.5 - 18.0 GM/DL    Hematocrit 35.2 (L) 42 - 52 %    MCV 68.2 (L) 78 - 100 FL    MCH 19.8 (L) 27 - 31 PG    MCHC 29.0 (L) 32.0 - 36.0 %    RDW 21.2 (H) 11.7 - 14.9 %    Platelets 484 555 - 488 K/CU MM    MPV 9.4 7.5 - 11.1 FL    Differential Type AUTOMATED DIFFERENTIAL     Segs Relative 78.1 (H) 36 - 66 %    Lymphocytes % 11.6 (L) 24 - 44 %    Monocytes % 7.0 (H) 0 - 4 %    Eosinophils % 0.8 0 - 3 %    Basophils % 0.9 0 - 1 %    Segs Absolute 8.2 K/CU MM    Lymphocytes Absolute 1.2 K/CU MM    Monocytes Absolute 0.7 K/CU MM    Eosinophils Absolute 0.1 K/CU MM    Basophils Absolute 0.1 K/CU MM    Nucleated RBC % 0.4 %    Total Nucleated RBC 0.0 K/CU MM    Total Immature Neutrophil 0.17 K/CU MM    Immature Neutrophil % 1.6 (H) 0 - 0.43 %   Comprehensive Metabolic Panel    Collection Time: 05/19/20  4:07 AM   Result Value Ref Range    Sodium 122 (L) 135 - 145 MMOL/L    Potassium 6.2 (HH) 3.5 - 5.1 MMOL/L    Chloride 88 (L) 99 - 110 mMol/L    CO2 20 (L) 21 - 32 MMOL/L    BUN 32 (H) 6 - 23 MG/DL    CREATININE 1.7 (H) 0.9 - 1.3 MG/DL    Glucose 114 (H) 70 - 99 MG/DL    Calcium 9.1 8.3 - 10.6 MG/DL    Alb 4.1 3.4 - 5.0 GM/DL    Total Protein 6.4 6.4 - 8.2 GM/DL    Total Bilirubin 2.1 (H) 0.0 - 1.0 MG/DL     (H) 10 - 40 U/L    AST 79 (H) 15 - 37 IU/L    Alkaline Phosphatase 103 40 - 128 IU/L    GFR Non- 43 (L) >60 mL/min/1.73m2    GFR  52 (L) >60 mL/min/1.73m2    Anion Gap 14 4 - 16   Amylase    Collection Time: 05/19/20  4:07 AM   Result Value Ref Range    Amylase 43 25 - 115 U/L   Lipase    Collection Time: 05/19/20  4:07 AM   Result Value Ref Range    Lipase 29 13 - 60 IU/L   Protime/INR & PTT    Collection Time: 05/19/20  4:07 AM   Result Value Ref Range    Protime 21.5 (H) 11.7 - 14.5 SECONDS    INR 1.77 INDEX    aPTT 36.1 25.1 - 37.1 SECONDS   Electrolyte Panel w/ Reflex to MG    Collection Time: 05/19/20  8:41 AM   Result Value Ref Range    Sodium 127 (L) 135 - 145 MMOL/L    Potassium 5.0 3.5 - 5.1 MMOL/L    Chloride 89 (L) 99 - 110 mMol/L    CO2 24 21 - 32 MMOL/L    Anion Gap 14 4 - 16     CULTURE results: Invalid input(s): BLOOD CULTURE,  URINE CULTURE, SURGICAL CULTURE    Diagnosis:  Patient Active Problem List   Diagnosis    Essential hypertension    Osteoarthritis    COPD (chronic obstructive pulmonary disease) (HCC)    Paresthesia of left leg    Abdominal hernia    Left shoulder pain    Crushing injury of right hand    Rotator cuff tendinitis    Midline low back pain without sciatica    History of MI (myocardial infarction)    Coronary artery disease involving coronary bypass graft of native heart without angina pectoris    Mixed hyperlipidemia    Depression    Chronic midline low back pain without sciatica    Tobacco abuse    Gastroesophageal reflux disease without esophagitis    Opiate abuse, continuous (HCC)    [facility] dependence ([facility]    ST elevation myocardial infarction involving left circumflex coronary artery (HCC)    STEMI (ST elevation myocardial infarction) (HCC)    Acute on chronic combined systolic (congestive) and diastolic (congestive) heart failure ([facility])    Systolic and diastolic CHF w/reduced LV function, NYHA class 4 ([address]    NSTEMI (non-ST elevated myocardial infarction) ([address]    Acute on chronic congestive heart failure ([address]    Noncompliance    Pulmonary edema, acute (HCC)    Acute kidney injury ([address]    Acute respiratory failure with hypoxia and hypercapnia ([facility])    Hypertensive emergency    Ischemic cardiomyopathy    Heart failure ([address]    Left ventricular systolic dysfunction    Acute systolic congestive heart failure (HCC)    SOB (shortness of breath)    Acute on chronic combined systolic and diastolic heart failure ([facility])    Hyponatremia    Hematemesis       Active Problems  Active Problems:    Hematemesis  Resolved Problems:    * No resolved hospital problems.  *    Electronically signed by: Electronically signed by Yue Sanchez MD on 5/19/2020 at 9:31 AM

## 2020-05-19 NOTE — CONSULTS
Nephrology Service Consultation        [address], [address] [address]  Phone: [phone]  Office Hours: 8:30AM - 4:30PM  Monday - Friday           Patient:  Nasima Herrera  MRN: [id number]  Consulting physician:  Bonnie Root MD  Reason for Consult: hyponatremia, hyperkalemia      PCP: No primary care provider on file. HISTORY OF PRESENT ILLNESS:   The patient is a 52 y.o. male with chf presented with hematemesis. Renal consult for sodium 122 and hyperkalemia. He denies diarrhea, vomiting, worsening leg swelling  He takes bumex bid at home. CT of the chest is concerning for an empyema  He is breathing comfortably today. REVIEW OF SYSTEMS:  14 point ROS is Negative. See positive ROS per HPI    Past Medical History:        Diagnosis Date    CAD (coronary artery disease)     [facility]     COPD [facility]chronic obstructive pulmonary disease) [facility]     Depression     [facility]     HIGH CHOLESTEROL     Hypertension     MI (myocardial infarction) ([facility] 2011    S/P coronary artery bypass graft x 4 2011    CABG x 4 Dr Warren Bell       Past Surgical History:        Procedure Laterality Date    BYPASS GRAFT  04/10/2011    CABG x 4 Dr Estela Ariza  11/2010     4 stents    LEG SURGERY         Medications:   Prior to Admission medications    Medication Sig Start Date End Date Taking?  Authorizing Provider   metoprolol succinate (TOPROL XL) 25 MG extended release tablet Take 1 tablet by mouth daily 5/17/20   Emile Mensah MD   spironolactone (ALDACTONE) 25 MG tablet Take 0.5 tablets by mouth daily 5/16/20   Emile Mensah MD   lisinopril (PRINIVIL;ZESTRIL) 5 MG tablet Take 1 tablet by mouth daily 5/16/20   Emile Mensah MD   ipratropium-albuterol (DUONEB) 0.5-2.5 (3) MG/3ML SOLN nebulizer solution Inhale 3 mLs into the lungs 4 times daily 3/15/20   Lita Valdovinos MD   bumetanide (BUMEX) 1 MG tablet Take 1 tablet by mouth 2 times daily 3/15/20   Ricky Orta MD   albuterol sulfate HFA (PROVENTIL HFA) 108 (90 Base) MCG/ACT inhaler Inhale 2 puffs into the lungs every 4 hours as needed for Wheezing or Shortness of Breath With spacer (and mask if indicated). Thanks. 3/12/20 4/11/20  Greta Elliott PA-C   aspirin 81 MG EC tablet Take 1 tablet by mouth daily 1/28/20   Mario Landis MD   atorvastatin (LIPITOR) 40 MG tablet Take 1 tablet by mouth daily 1/28/20   Mario Landis MD   nitroGLYCERIN (NITROSTAT) 0.4 MG SL tablet Place 1 tablet under the tongue every 5 minutes as needed for Chest pain 9/17/19   Manny Pierre MD        Allergies:  Patient has no known allergies. Social History:   TOBACCO:   reports that he has been smoking cigarettes. He has a 20.00 pack-year smoking history. He has quit using smokeless tobacco.  ETOH:   reports no history of alcohol use.   OCCUPATION:      Family History:       Problem Relation Age of Onset    Cancer Father     Heart Failure Father     Heart Disease Father     Diabetes Mother     Heart Disease Mother            Physical Exam:    Vitals: /71   Pulse 89   Temp 98 °F (36.7 °C) (Oral)   Resp 20   Ht 5' 11\" (1.803 m)   Wt 197 lb 6.4 oz (89.5 kg)   SpO2 98%   BMI 27.53 kg/m²   General appearance: in no acute distress, appears stated age  Skin: Skin color, texture, turgor normal. No rashes or lesions  HEENT: normocephalic, atraumatic  Neck: supple, trachea midline  Lungs: clear to auscultation bilaterally, breathing comfortably  Heart[de-identified] regular rate and rhythm, S1, S2 normal,  Abdomen: soft, non-tender; bowel sounds normal; no masses,   Extremities: extremities normal, atraumatic, no cyanosis or edema  Neurologic: Mental status: alert, oriented, interactive, following commands  Psychiatric: mood and affect appropriate    CBC:   Recent Labs     05/17/20  2300 05/18/20  0414 05/18/20 2005 05/19/20  0407   WBC 7.7  --   --  10.6*   HGB 10.1* 10.4* 10.1* 10.2*     -- --  344     BMP:    Recent Labs     05/18/20  0414 05/18/20  0948 05/19/20 0407   * 124* 122*   K 5.5* 5.1 6.2*   CL 89* 88* 88*   CO2 19* 24 20*   BUN 38* 36* 32*   CREATININE 1.5* 1.5* 1.7*   GLUCOSE 127* 92 114*     Hepatic:   Recent Labs     05/17/20  2300 05/19/20  0407   AST 62* 79*   * 184*   BILITOT 1.3* 2.1*   ALKPHOS 108 103     Troponin: No results for input(s): TROPONINI in the last 72 hours. BNP: No results for input(s): BNP in the last 72 hours. Lipids: No results for input(s): CHOL, HDL in the last 72 hours. Invalid input(s): LDLCALCU  ABGs:   Lab Results   Component Value Date    PO2ART 538 01/23/2020    WSB6VUC 37.0 01/23/2020     INR:   Recent Labs     05/18/20 0414 05/19/20 0407   INR 1.79 1.77       I/O last 3 completed shifts:   In: 10 [I.V.:10]  Out: 65 [Urine:3150]      I/O this shift:  In: -   Out: 350 [Urine:350]     -----------------------------------------------------------------      Assessment and Recommendations     Patient Active Problem List   Diagnosis    Essential hypertension    Osteoarthritis    [facility]    Paresthesia of left leg    Abdominal hernia    Left shoulder pain    Crushing injury of right hand    Rotator cuff tendinitis    Midline low back pain without sciatica    History of MI (myocardial infarction)    Coronary artery disease involving coronary bypass graft of native heart without angina pectoris    Mixed hyperlipidemia    Depression    Chronic midline low back pain without sciatica    Tobacco abuse    Gastroesophageal reflux disease without esophagitis    Opiate abuse, continuous ([facility])    [facility]    ST elevation myocardial infarction involving left circumflex coronary artery ([facility]    STEMI (ST elevation myocardial infarction) ([facility])    Acute on chronic combined systolic (congestive) and diastolic (congestive) heart failure ([facility])    Systolic and diastolic CHF w/reduced

## 2020-05-19 NOTE — PROGRESS NOTES
DOING FAIR NO ACTIVE GIT BLEEDING  VITALS STABLE   LABS NOTED HB 10.2 LFTS ABN MOST LIKELY DUE TO CONGESTIVE HEPATOPATHY EGD CANCELLED BECAUSE OF ELEVATED K LEVEL  WILL CPM F/U H/H  EGD ONCE K CORRECTED

## 2020-05-19 NOTE — ANESTHESIA PRE PROCEDURE
Diagnosis Date    CAD (coronary artery disease)     CHF (congestive heart failure) (HCC)     [facility]     Depression     Drug abuse ([facility]     HIGH CHOLESTEROL     Hypertension     MI (my[facility] infarction) ([facility] 2011    S/P coronary artery bypass graft x 4 2011    CABG x 4 Dr Colletta Rinne       Past Surgical History:        Procedure Laterality Date    BYPASS GRAFT  04/10/2011    CABG x 4 Dr Esteban Record  11/2010     4 stents    LEG SURGERY         Social History:    Social History     Tobacco Use    Smoking status: Current Every Day Smoker     Packs/day: 1.00     Years: 20.00     Pack years: 20.00     Types: Cigarettes    Smokeless tobacco: Former User    Tobacco comment: Reviewed 10/9/17   Substance Use Topics    Alcohol use: No     Alcohol/week: 0.0 standard drinks                                Ready to quit: Not Answered  Counseling given: Not Answered  Comment: Reviewed 10/9/17      Vital Signs (Current):   Vitals:    05/18/20 0850 05/18/20 1151 05/18/20 1619 05/18/20 1722   BP: 123/74   113/83   Pulse: 95   101   Resp: 18 18 18 18   Temp: 36.8 °C (98.2 °F)   36.3 °C (97.4 °F)   TempSrc: Oral   Oral   SpO2: 92% 95% 99% 100%   Weight:       Height:                                                  BP Readings from Last 3 Encounters:   05/18/20 113/83   05/16/20 100/70   05/10/20 126/88       NPO Status:                                                                                 BMI:   Wt Readings from Last 3 Encounters:   05/18/20 197 lb 6.4 oz (89.5 kg)   05/16/20 193 lb 9 oz (87.8 kg)   05/10/20 167 lb (75.8 kg)     Body mass index is 27.53 kg/m².     CBC:   Lab Results   Component Value Date    WBC 7.7 05/17/2020    RBC 5.16 05/17/2020    HGB 10.1 05/18/2020    HCT 35.0 05/18/2020    MCV 68.6 05/17/2020    RDW 21.0 05/17/2020     05/17/2020       CMP:   Lab Results   Component Value Date     05/18/2020    K 5.1 05/18/2020

## 2020-05-19 NOTE — CONSULTS
facility-administered medications on file prior to encounter. Current Outpatient Medications on File Prior to Encounter   Medication Sig Dispense Refill    metoprolol succinate (TOPROL XL) 25 MG extended release tablet Take 1 tablet by mouth daily 30 tablet 2    spironolactone (ALDACTONE) 25 MG tablet Take 0.5 tablets by mouth daily 30 tablet 1    lisinopril (PRINIVIL;ZESTRIL) 5 MG tablet Take 1 tablet by mouth daily 30 tablet 1    ipratropium-albuterol (DUONEB) 0.5-2.5 (3) MG/3ML SOLN nebulizer solution Inhale 3 mLs into the lungs 4 times daily 360 mL 1    bumetanide (BUMEX) 1 MG tablet Take 1 tablet by mouth 2 times daily 60 tablet 1    albuterol sulfate HFA (PROVENTIL HFA) 108 (90 Base) MCG/ACT inhaler Inhale 2 puffs into the lungs every 4 hours as needed for Wheezing or Shortness of Breath With spacer (and mask if indicated). Thanks. 1 Inhaler 1    aspirin 81 MG EC tablet Take 1 tablet by mouth daily 30 tablet 0    atorvastatin (LIPITOR) 40 MG tablet Take 1 tablet by mouth daily 30 tablet 0    nitroGLYCERIN (NITROSTAT) 0.4 MG SL tablet Place 1 tablet under the tongue every 5 minutes as needed for Chest pain 25 tablet 0       Review of Systems:   Review of Systems   Constitutional: Positive for fatigue and unexpected weight change. Negative for activity change, chills and fever. HENT: Negative for congestion, ear pain and tinnitus. Eyes: Negative for photophobia, pain and visual disturbance. Respiratory: Negative for cough, chest tightness, shortness of breath and wheezing. Cardiovascular: Negative for chest pain, palpitations and leg swelling. Gastrointestinal: Positive for nausea and vomiting. Negative for abdominal pain, blood in stool, constipation and diarrhea. Endocrine: Negative for cold intolerance and heat intolerance. Genitourinary: Negative for dysuria, flank pain and hematuria. Musculoskeletal: Positive for arthralgias.  Negative for back pain, myalgias and neck stiffness. Skin: Negative for color change and rash. Allergic/Immunologic: Negative for food allergies. Neurological: Negative for dizziness, light-headedness, numbness and headaches. Hematological: Does not bruise/bleed easily. Psychiatric/Behavioral: Negative for agitation, behavioral problems and confusion. Examination:      Vitals:    05/19/20 1046 05/19/20 1341 05/19/20 1528 05/19/20 1546   BP: 109/76  116/74    Pulse: 102  92    Resp: 18 18 16 16   Temp:   98.1 °F (36.7 °C)    TempSrc:   Oral    SpO2: 98%  95%    Weight:       Height:            Body mass index is 27.53 kg/m². Physical Exam  Constitutional:       General: He is not in acute distress. Appearance: He is well-developed. HENT:      Head: Normocephalic and atraumatic. Eyes:      Pupils: Pupils are equal, round, and reactive to light. Neck:      Musculoskeletal: Normal range of motion. Vascular: No JVD. Cardiovascular:      Rate and Rhythm: Normal rate and regular rhythm. Heart sounds: Murmur (grade 2/6 systolic murmur) present. No friction rub. Pulmonary:      Effort: Pulmonary effort is normal. No respiratory distress. Breath sounds: Normal breath sounds. No wheezing or rales. Abdominal:      General: Bowel sounds are normal. There is no distension. Palpations: Abdomen is soft. Tenderness: There is no abdominal tenderness. Musculoskeletal:         General: No tenderness. Skin:     General: Skin is warm and dry. Neurological:      Mental Status: He is alert and oriented to person, place, and time. Cranial Nerves: No cranial nerve deficit.          CBC:   Lab Results   Component Value Date    WBC 10.6 05/19/2020    HGB 10.2 05/19/2020    HCT 35.2 05/19/2020     05/19/2020     Lipids:  Lab Results   Component Value Date    CHOL 108 05/14/2020    TRIG 91 05/14/2020    HDL 19 (L) 05/14/2020    LDLCALC 149 (H) 12/01/2014    LDLDIRECT 77 05/14/2020     PT/INR:   Lab Results   Component Value Date    INR 1.77 05/19/2020        BMP:    Lab Results   Component Value Date     (L) 05/19/2020    K 5.0 05/19/2020    CL 89 (L) 05/19/2020    CO2 24 05/19/2020    BUN 32 (H) 05/19/2020     CMP:   Lab Results   Component Value Date    AST 79 (H) 05/19/2020    PROT 6.4 05/19/2020    BILITOT 2.1 (H) 05/19/2020    ALKPHOS 103 05/19/2020     TSH:  No results found for: TSH    EKGINTERPRETATION - EKG Interpretation:      Cath 7/2018     1. Three vessel CAD. LAD & Ramus are occluded. Mild to moderate   disease of RCA & Circumflex noted. 2. LIMA is patent. 3. SVBG to Ramus occluded. Ramus receives right to left   Collaterals. 4. SVBG to OM is atretic, receives small collateral from RCA. 5. SVBG to Diagonal is small but patent. 6. [employer] WMA seen with severely reduced LV Systolic   function. LVEF is about 25 to 30 %. 7. Aortic Root angiography Helped in understanding the graft lay   out. IMPRESSION / RECOMMENDATIONS:     1. Chronic combined systolic and diastolic left ventricular systolic dysfunction  2. Ischemic cardiomyopathy  3. Mediastinal lymphadenopathy  4. Hematemesis  5. Hyponatremia  6. Hyperkalemia  6. Elevated liver enzymes  7. Acute on chronic kidney disease  8. Severe MR  9. Pulmonary HTN  10. HTN        Patient was recently admitted with acute on chronic heart failure exacerbation. Patient was managed with diuresis and was euvolemic at the time of discharge. Patient was discharged on Saturday and patient back with hematemesis on Sunday to the hospital.  Patient reports  he was able to get prescriptions of new medications filled but did not have any prior home medications. Currently patient admitted with hyperkalemia also and they were hyponatremia also also Aldactone and ACE inhibitor held because of that. Patient on Bumex and Toprol.   Patient appears to be euvolemic compared to what he was previously    CT surgery and oncology consulted for mediastinal lymphadenopathy and possible need for mediastinoscopy with biopsy of the lymph nodes    I would not recommend any further cardiac work-up on him prior to the procedure. Patient cath previously suggests LIMA was patent and other vessels pretty much on collaterals. Non intervenable disease pretty much. LVEF at that time was 20% too and recent echo was echo was 20% too. Patient will be moderate to high risk for a moderate risk surgery given his cardiac condition. Patient is as compensated as he can be at this time for the procedure. Proceeding with procedure is not prohibitory as patient needs it and we need diagnosis for his mediastinal lymphadenopathy    Please closely monitor his  fluid status perioperatively and avoid volume overload    GI  following patient for hematemesis    Thanks again for allowing me to participate in care of this patient. Please call me if you have any questions. With best regards. Elisabeth Brooks MD, 5/19/2020 5:20 PM     Please note this report has been partially produced using speech recognition software and may contain errors related to that system including errors in grammar, punctuation, and spelling, as well as words and phrases that may be inappropriate. If there are any questions or concerns please feel free to contact the dictating provider for clarification.

## 2020-05-19 NOTE — PLAN OF CARE
Problem: SAFETY  Goal: Free from accidental physical injury  Outcome: Ongoing  Goal: Free from intentional harm  Outcome: Ongoing     Problem: DAILY CARE  Goal: Daily care needs are met  Outcome: Ongoing     Problem: PAIN  Goal: Patient's pain/discomfort is manageable  Outcome: Ongoing     Problem: SKIN INTEGRITY  Goal: Skin integrity is maintained or improved  Outcome: Ongoing     Problem: KNOWLEDGE DEFICIT  Goal: Patient/S.O. demonstrates understanding of disease process, treatment plan, medications, and discharge instructions. Outcome: Ongoing     Problem: KNOWLEDGE DEFICIT  Goal: Patient/S.O. demonstrates understanding of disease process, treatment plan, medications, and discharge instructions.   Outcome: Ongoing     Problem: Falls - Risk of:  Goal: Will remain free from falls  Description: Will remain free from falls  Outcome: Ongoing  Goal: Absence of physical injury  Description: Absence of physical injury  Outcome: Ongoing

## 2020-05-19 NOTE — PROGRESS NOTES
Critical lab: potassium 6.2 reported to eleazar rey via perfect [facility]. Awaiting response    0645 insulin, dextrose, and bicarb given per Rosibel REY orders. Bladder scan completed. 238 urine in bladder. Patient denies discomfort. Voiding well.

## 2020-05-19 NOTE — PROGRESS NOTES
chloride flush, 10 mL, PRN  acetaminophen, 650 mg, Q6H PRN    Or  acetaminophen, 650 mg, Q6H PRN  polyethylene glycol, 17 g, Daily PRN  promethazine, 12.5 mg, Q6H PRN    Or  ondansetron, 4 mg, Q6H PRN  potassium chloride, 40 mEq, PRN    Or  potassium alternative oral replacement, 40 mEq, PRN    Or  potassium chloride, 10 mEq, PRN  magnesium sulfate, 2 g, PRN      Subjective:   Doing ok no distress    Objective: Intake/Output Summary (Last 24 hours) at 5/19/2020 1034  Last data filed at 5/19/2020 0801  Gross per 24 hour   Intake --   Output 2300 ml   Net -2300 ml      Vitals:   Vitals:    05/19/20 0345   BP: 111/71   Pulse: 89   Resp: 20   Temp: 98 °F (36.7 °C)   SpO2: 98%     Physical Exam:   Gen:  awake, alert, no apparent distress  Head/Eyes:  Normocephalic atraumatic, EOMI   NECK:   symmetrical, trachea midline  LUNGS: Normal Effort   CARDIOVASCULAR:  Normal rate  ABDOMEN:  non distended  MUSCULOSKELETAL:  ROM WNL  NEUROLOGIC: Alert and Oriented,  Cranial nerves II-XII are grossly intact.    SKIN:  no bruising or bleeding, normal skin color,  no redness      Data:       CBC   Recent Labs     05/17/20  2300 05/18/20 0414 05/18/20 2005 05/19/20  0407   WBC 7.7  --   --  10.6*   HGB 10.1* 10.4* 10.1* 10.2*   HCT 35.4* 36.1* 35.0* 35.2*     --   --  344      BMP   Recent Labs     05/18/20  0414 05/18/20  0948 05/19/20  0407 05/19/20  0841   * 124* 122* 127*   K 5.5* 5.1 6.2* 5.0   CL 89* 88* 88* 89*   CO2 19* 24 20* 24   BUN 38* 36* 32*  --    CREATININE 1.5* 1.5* 1.7*  --          Electronically signed by Hector Williamson MD on 5/19/2020 at 10:34 AM

## 2020-05-19 NOTE — PROGRESS NOTES
PATIENT NAME: Jose Coulter    TODAY'S DATE: 05/19/20    Reason for today's visit: mediastinal LAD    SUBJECTIVE:    Pt is feeling well. His upper endoscopy was cancelled this am due to hyperkalemia. OBJECTIVE:   VITALS:    Vitals:    05/19/20 1528   BP: 116/74   Pulse: 92   Resp: 16   Temp: 98.1 °F (36.7 °C)   SpO2: 95%     INTAKE/OUTPUT:    Date 05/19/20 0000 - 05/19/20 2359   Shift 7247-4037 0133-1465 3353-7905 24 Hour Total   INTAKE   Shift Total(mL/kg)       OUTPUT   Urine(mL/kg/hr) 1150(1.6) 350  1500   Shift Total(mL/kg) 1150(12.8) 350(3.9)  1500(16.8)   Weight (kg) 89.5 89.5 89.5 89.5      Patient Vitals for the past 96 hrs (Last 3 readings):   Weight   05/18/20 0314 197 lb 6.4 oz (89.5 kg)   05/17/20 2251 167 lb (75.8 kg)       EXAM:  Constitutional: Blood pressure 116/74, pulse 92, temperature 98.1 °F (36.7 °C), temperature source Oral, resp. rate 16, height 5' 11\" (1.803 m), weight 197 lb 6.4 oz (89.5 kg), SpO2 95 %. No apparent distress, appears stated age and cooperative. Neurologic: follows commands, no focal weakness noted   Lungs: Good respiratory effort.  Clear to auscultation,   CV: Regular rate/ rhythm , no peripheral edema, feet warm and well perfused  GI: Soft, non-tender in all four quadrants, non-distended, + bowel sounds, spleen and liver no palpable masses   : bladder nondistended   MSK: no obvious deformity   Skin: warm, pink and dry       Data:  CBC:   Recent Labs     05/17/20  2300 05/18/20 0414 05/18/20 2005 05/19/20 0407   WBC 7.7  --   --  10.6*   HGB 10.1* 10.4* 10.1* 10.2*   HCT 35.4* 36.1* 35.0* 35.2*     --   --  344     BMP:    Recent Labs     05/18/20 0414 05/18/20  0948 05/19/20  0407 05/19/20  0841   * 124* 122* 127*   K 5.5* 5.1 6.2* 5.0   CL 89* 88* 88* 89*   CO2 19* 24 20* 24   BUN 38* 36* 32*  --    CREATININE 1.5* 1.5* 1.7*  --    GLUCOSE 127* 92 114*  --      Hepatic:   Recent Labs     05/17/20  2300 05/19/20 0407   AST 62* 79*   * 184* BILITOT 1.3* 2.1*   ALKPHOS 108 103     Mag:    No results for input(s): MG in the last 72 hours. Phos:   No results for input(s): PHOS in the last 72 hours.    INR:   Recent Labs     05/18/20  0414 05/19/20  0407   INR 1.79 1.77       Radiology Review:  CT reviewed, multiple enlarged mediastinal and axillary LN      ASSESSMENT:  Patient Active Problem List   Diagnosis    Essential hypertension    Osteoarthritis    COPD (chronic obstructive pulmonary disease) ([facility] [address]    Paresthesia of left leg    Abdominal hernia    Left shoulder pain    Crushing injury of right hand    Rotator cuff tendinitis    Midline low back pain without sciatica    History of MI (myocardial infarction)    Coronary artery disease involving coronary bypass graft of native heart without angina pectoris    Mixed hyperlipidemia    Depression    Chronic midline low back pain without sciatica    Tobacco abuse    Gastroesophageal reflux disease without esophagitis    Opiate abuse, continuous (HCC)    Heroin dependence ([facility] [address]    ST elevation myocardial infarction involving left circumflex coronary artery ([facility] [address]    STEMI (ST elevation myocardial infarction) ([facility] [address]    Acute on chronic combined systolic (congestive) and diastolic (congestive) heart failure (HCC)    Systolic and diastolic CHF w/reduced LV function, NYHA class 4 ([facility] [address]    NSTEMI (non-ST elevated myocardial infarction) ([address]    Acute on chronic congestive heart failure ([facility] [address]    Noncompliance    Pulmonary edema, acute ([facility])    Acute kidney injury ([facility] [address]    Acute respiratory failure with hypoxia and hypercapnia ([facility])    Hypertensive emergency    Ischemic cardiomyopathy    Heart failure ([facility] [address]    Left ventricular systolic dysfunction    Acute systolic congestive heart failure (HCC)    SOB (shortness of breath)    Acute on chronic combined systolic and diastolic heart failure (HCC)    Hyponatremia    Hematemesis       Mediastinal LAD    PLAN:     Pt has decided he would like to proceed with mediastinoscopy to bx the lymph nodes. He is going for EGD tomorrow am so will plan for surgery on Thursday. Will consult cardiology for preop clearance.      James Zarate PA-C

## 2020-05-20 LAB
ALBUMIN SERPL-MCNC: 4.3 GM/DL (ref 3.4–5)
ALP BLD-CCNC: 107 IU/L (ref 40–128)
ALT SERPL-CCNC: 168 U/L (ref 10–40)
ANION GAP SERPL CALCULATED.3IONS-SCNC: 13 MMOL/L (ref 4–16)
APTT: 36.4 SECONDS (ref 25.1–37.1)
AST SERPL-CCNC: 80 IU/L (ref 15–37)
BILIRUB SERPL-MCNC: 1.3 MG/DL (ref 0–1)
BUN BLDV-MCNC: 27 MG/DL (ref 6–23)
CALCIUM SERPL-MCNC: 9.3 MG/DL (ref 8.3–10.6)
CHLORIDE BLD-SCNC: 91 MMOL/L (ref 99–110)
CO2: 27 MMOL/L (ref 21–32)
CREAT SERPL-MCNC: 1.8 MG/DL (ref 0.9–1.3)
GFR AFRICAN AMERICAN: 49 ML/MIN/1.73M2
GFR NON-AFRICAN AMERICAN: 40 ML/MIN/1.73M2
GLUCOSE BLD-MCNC: 111 MG/DL (ref 70–99)
HCT VFR BLD CALC: 35 % (ref 42–52)
HEMOGLOBIN: 10.1 GM/DL (ref 13.5–18)
INR BLD: 1.42 INDEX
MCH RBC QN AUTO: 19.9 PG (ref 27–31)
MCHC RBC AUTO-ENTMCNC: 28.9 % (ref 32–36)
MCV RBC AUTO: 69 FL (ref 78–100)
PDW BLD-RTO: 21.7 % (ref 11.7–14.9)
PLATELET # BLD: 325 K/CU MM (ref 140–440)
PMV BLD AUTO: 9.4 FL (ref 7.5–11.1)
POTASSIUM SERPL-SCNC: 4.5 MMOL/L (ref 3.5–5.1)
PROTHROMBIN TIME: 17.3 SECONDS (ref 11.7–14.5)
RBC # BLD: 5.07 M/CU MM (ref 4.6–6.2)
SODIUM BLD-SCNC: 131 MMOL/L (ref 135–145)
TOTAL PROTEIN: 6.8 GM/DL (ref 6.4–8.2)
WBC # BLD: 8.4 K/CU MM (ref 4–10.5)

## 2020-05-20 PROCEDURE — 99231 SBSQ HOSP IP/OBS SF/LOW 25: CPT | Performed by: INTERNAL MEDICINE

## 2020-05-20 PROCEDURE — 6370000000 HC RX 637 (ALT 250 FOR IP): Performed by: INTERNAL MEDICINE

## 2020-05-20 PROCEDURE — 2580000003 HC RX 258: Performed by: INTERNAL MEDICINE

## 2020-05-20 PROCEDURE — 2580000003 HC RX 258: Performed by: EMERGENCY MEDICINE

## 2020-05-20 PROCEDURE — 6360000002 HC RX W HCPCS: Performed by: INTERNAL MEDICINE

## 2020-05-20 PROCEDURE — 85027 COMPLETE CBC AUTOMATED: CPT

## 2020-05-20 PROCEDURE — 1200000000 HC SEMI PRIVATE

## 2020-05-20 PROCEDURE — 94640 AIRWAY INHALATION TREATMENT: CPT

## 2020-05-20 PROCEDURE — 85610 PROTHROMBIN TIME: CPT

## 2020-05-20 PROCEDURE — 94761 N-INVAS EAR/PLS OXIMETRY MLT: CPT

## 2020-05-20 PROCEDURE — 83036 HEMOGLOBIN GLYCOSYLATED A1C: CPT

## 2020-05-20 PROCEDURE — 36415 COLL VENOUS BLD VENIPUNCTURE: CPT

## 2020-05-20 PROCEDURE — 80053 COMPREHEN METABOLIC PANEL: CPT

## 2020-05-20 PROCEDURE — 85730 THROMBOPLASTIN TIME PARTIAL: CPT

## 2020-05-20 PROCEDURE — C9113 INJ PANTOPRAZOLE SODIUM, VIA: HCPCS | Performed by: INTERNAL MEDICINE

## 2020-05-20 PROCEDURE — 2500000003 HC RX 250 WO HCPCS: Performed by: INTERNAL MEDICINE

## 2020-05-20 RX ORDER — BUMETANIDE 1 MG/1
1 TABLET ORAL 2 TIMES DAILY
Status: DISCONTINUED | OUTPATIENT
Start: 2020-05-20 | End: 2020-05-22 | Stop reason: HOSPADM

## 2020-05-20 RX ADMIN — BUMETANIDE 1 MG: 0.25 INJECTION INTRAMUSCULAR; INTRAVENOUS at 03:37

## 2020-05-20 RX ADMIN — PANTOPRAZOLE SODIUM 40 MG: 40 INJECTION, POWDER, FOR SOLUTION INTRAVENOUS at 09:24

## 2020-05-20 RX ADMIN — ATORVASTATIN CALCIUM 40 MG: 40 TABLET, FILM COATED ORAL at 09:25

## 2020-05-20 RX ADMIN — PANTOPRAZOLE SODIUM 40 MG: 40 INJECTION, POWDER, FOR SOLUTION INTRAVENOUS at 21:28

## 2020-05-20 RX ADMIN — SODIUM CHLORIDE, PRESERVATIVE FREE 10 ML: 5 INJECTION INTRAVENOUS at 21:28

## 2020-05-20 RX ADMIN — SODIUM CHLORIDE, PRESERVATIVE FREE 10 ML: 5 INJECTION INTRAVENOUS at 21:38

## 2020-05-20 RX ADMIN — IPRATROPIUM BROMIDE AND ALBUTEROL SULFATE 3 ML: .5; 3 SOLUTION RESPIRATORY (INHALATION) at 20:59

## 2020-05-20 RX ADMIN — IPRATROPIUM BROMIDE AND ALBUTEROL SULFATE 3 ML: .5; 3 SOLUTION RESPIRATORY (INHALATION) at 05:30

## 2020-05-20 RX ADMIN — METOPROLOL SUCCINATE 25 MG: 25 TABLET, EXTENDED RELEASE ORAL at 09:25

## 2020-05-20 RX ADMIN — ASPIRIN 81 MG: 81 TABLET, COATED ORAL at 09:24

## 2020-05-20 RX ADMIN — SODIUM CHLORIDE, PRESERVATIVE FREE 10 ML: 5 INJECTION INTRAVENOUS at 09:28

## 2020-05-20 RX ADMIN — IPRATROPIUM BROMIDE AND ALBUTEROL SULFATE 3 ML: .5; 3 SOLUTION RESPIRATORY (INHALATION) at 12:10

## 2020-05-20 RX ADMIN — BUMETANIDE 1 MG: 1 TABLET ORAL at 09:25

## 2020-05-20 RX ADMIN — CEFTRIAXONE SODIUM 2 G: 2 INJECTION, POWDER, FOR SOLUTION INTRAMUSCULAR; INTRAVENOUS at 15:01

## 2020-05-20 RX ADMIN — BUMETANIDE 1 MG: 1 TABLET ORAL at 21:28

## 2020-05-20 RX ADMIN — IPRATROPIUM BROMIDE AND ALBUTEROL SULFATE 3 ML: .5; 3 SOLUTION RESPIRATORY (INHALATION) at 16:31

## 2020-05-20 NOTE — CONSULTS
Endocrinology   Consult Note  Dear Doctor  Rosita Dior    Thank You for the Consult     Pt. Was Admitted for : Bleeding/hematemesis    Reason for Consult: Better control of blood glucose      History Obtained From:  Patient/ EMR       HISTORY OF PRESENT ILLNESS:                The patient is a 52 y.o. male with significant past medical history of CAD, CABG, CHF, COPD, hyperlipidemia, hypertension, peripheral vascular disease bypass graft was admitted to hospital for GI bleeding mostly vomiting blood. Patient was also running higher blood glucose patient is not known to be diabetic but be borderline diabetic with hemoglobin A1c of 6.9. I was  consulted for better control of blood glucose. ROS:   Pt's ROS done in detail. Abnormal ROS are noted in Medical and Surgical History Section below: Other Medical History:        Diagnosis Date    CAD (coronary artery disease)     CHF (congestive heart failure) (HCC)     [facility]     Depression     Drug abuse ([facility]     HIGH CHOLESTEROL     Hypertension     MI (myocardial infarction) ([facility] 2011    S/P coronary artery bypass graft x 4 2011    CABG x 4 Dr Rory Dickey     Surgical History:        Procedure Laterality Date    BYPASS GRAFT  04/10/2011    CABG x 4 Dr Nora Rojas  11/2010     4 stents    LEG SURGERY         Allergies:  Patient has no known allergies.     Family History:       Problem Relation Age of Onset    Cancer Father     Heart Failure Father     Heart Disease Father     Diabetes Mother     Heart Disease Mother      REVIEW OF SYSTEMS:  Review of System Done as noted above     PHYSICAL EXAM:      Vitals:    /71   Pulse 96   Temp 96.7 °F (35.9 °C) (Oral)   Resp 16   Ht 5' 11\" (1.803 m)   Wt 197 lb 6.4 oz (89.5 kg)   SpO2 99%   BMI 27.53 kg/m²     CONSTITUTIONAL:  awake, alert, cooperative, appears stated age   EYES:  vision intact Fundoscopic Exam not performed   ENT:Normal  NECK: Supple, No JVD. Thyroid Exam:Normal   LUNGS:  Has Vesicular Breath Sounds,   CARDIOVASCULAR:  Normal apical impulse, regular rate and rhythm, normal S1 and S2, no S3 or S4, and has no  murmur   ABDOMEN:  No scars, normal bowel sounds, soft, non-distended, non-tender, no masses palpated, no hepatolienomegaly  Musculoskeletal: Normal  Extremities: Normal, peripheral pulses normal, , has no edema   NEUROLOGIC:  Awake, alert, oriented to name, place and time. Cranial nerves II-XII are grossly intact. Motor is  intact. Sensory is intact. ,  and gait is normal.    DATA:    CBC:   Recent Labs     05/17/20 2300 05/18/20  2005 05/19/20  0407 05/20/20  0916   WBC 7.7  --   --  10.6* 8.4   HGB 10.1*   < > 10.1* 10.2* 10.1*     --   --  344 325    < > = values in this interval not displayed. CMP:  Recent Labs     05/17/20 2300 05/18/20 0948 05/19/20 0407 05/19/20  0841 05/20/20  0916   *   < > 124* 122* 127* 131*   K 5.2*   < > 5.1 6.2* 5.0 4.5   CL 86*   < > 88* 88* 89* 91*   CO2 24   < > 24 20* 24 27   BUN 39*   < > 36* 32*  --  27*   CREATININE 1.6*   < > 1.5* 1.7*  --  1.8*   CALCIUM 9.0   < > 8.7 9.1  --  9.3   PROT 6.8  --   --  6.4  --  6.8   LABALBU 4.2  --   --  4.1  --  4.3   BILITOT 1.3*  --   --  2.1*  --  1.3*   ALKPHOS 108  --   --  103  --  107   AST 62*  --   --  79*  --  80*   *  --   --  184*  --  168*    < > = values in this interval not displayed.      Lipids:   Lab Results   Component Value Date    CHOL 108 05/14/2020    CHOL 218 12/01/2014    HDL 19 05/14/2020    TRIG 91 05/14/2020     Glucose:   Recent Labs     05/19/20  1035 05/19/20  1325 05/19/20  1730   POCGLU 246* 134* 230*     Hemoglobin A1C:   Lab Results   Component Value Date    LABA1C 6.9 05/19/2020     Free T4:   Lab Results   Component Value Date    T4FREE 1.09 07/21/2016     Free T3:   Lab Results   Component Value Date    FT3 2.9 07/21/2016     TSH High Sensitivity:   Lab Results   Component Value Date [facility] 5.650 05/13/2020       Ct Abdomen Pelvis W Iv Contrast Additional Contrast? None    Result Date: 5/18/2020  EXAMINATION: CT OF THE ABDOMEN AND PELVIS WITH CONTRAST 5/17/2020 11:53 pm TECHNIQUE: CT of the abdomen and pelvis was performed with the administration of intravenous contrast. Multiplanar reformatted images are provided for review. Dose modulation, iterative reconstruction, and/or weight based adjustment of the mA/kV was utilized to reduce the radiation dose to as low as reasonably achievable. COMPARISON: 05/10/2020. HISTORY: ORDERING SYSTEM PROVIDED HISTORY: Hematemsis TECHNOLOGIST PROVIDED HISTORY: Reason for exam:->Hematemsis Additional Contrast?->None Reason for Exam: abd pain mid line FINDINGS: Lower Chest: Small loculated fluid collection interhemispheric fissure right lower thorax. No focal consolidation. Organs: The visualized portions of the liver, gallbladder, spleen, pancreas and adrenal glands demonstrate no acute abnormality. No biliary ductal dilatation. The kidneys appear normal in size and demonstrate symmetric enhancement. No focal renal mass. No hydronephrosis. No perinephric stranding. Multiple nonobstructing renal calculi. Multiple renal cortical scars right kidney upper pole. GI/Bowel: No bowel dilatation to suggest obstruction. No evidence of acute appendicitis. Pelvis: The urinary bladder appears unremarkable. The pelvic organs demonstrate no acute abnormality. Peritoneum/Retroperitoneum: The abdominal aorta is normal in caliber. Extensive atherosclerotic change. Mild ascites. Bones/Soft Tissues: No acute findings. Mild ascites. Please correlate for indicators of liver failure.      Xr Chest Portable    Result Date: 5/17/2020  EXAMINATION: ONE XRAY VIEW OF THE CHEST 5/17/2020 11:11 pm COMPARISON: Studies back to the 02/07/2020 HISTORY: ORDERING SYSTEM PROVIDED HISTORY: PAin TECHNOLOGIST PROVIDED HISTORY: Reason for exam:->PAin Reason for Exam: chest pain Acuity: Acute Type of Exam: Initial FINDINGS: Again seen is a focal opacity at the right lung base, stable. Lungs are otherwise clear. No pneumothorax or pleural effusion. Mild cardiac enlargement. Median sternotomy wires. No pleural effusion or pneumothorax. Stable focal wedge-like atelectatic change right lung base. Cta Pulmonary W Contrast    Result Date: 5/18/2020  EXAMINATION: CTA OF THE CHEST 5/17/2020 11:28 pm TECHNIQUE: CTA of the chest was performed after the administration of intravenous contrast.  Multiplanar reformatted images are provided for review. MIP images are provided for review. Dose modulation, iterative reconstruction, and/or weight based adjustment of the mA/kV was utilized to reduce the radiation dose to as low as reasonably achievable. COMPARISON: 05/10/2020 and 03/11/2020 HISTORY: ORDERING SYSTEM PROVIDED HISTORY: SOB. CP TECHNOLOGIST PROVIDED HISTORY: Reason for exam:->SOB. CP Reason for Exam: sob/janelle d-dimer FINDINGS: Pulmonary Arteries: Pulmonary arteries are adequately opacified for evaluation. No evidence of intraluminal filling defect to suggest pulmonary embolism. Main pulmonary artery is normal in caliber. Mediastinum: Unchanged mediastinal adenopathy. Unchanged mildly enlarged left greater than right axillary lymph nodes. The heart is enlarged and unchanged. Lungs/pleura: There is multiloculated pleural fluid in the inferolateral extent of the right major fissure. There is mildly irregular pleural thickening with adjacent subpleural parenchymal disease. The multiloculated pleural thickening and associated lung disease has increased to a mild degree. Pleural fluid appears mildly heterogeneous/complex. There is increasing adjacent right perihepatic fluid. Upper Abdomen: Increasing perihepatic low-attenuation fluid. New trace perisplenic fluid. Soft Tissues/Bones: Prior sternal splitting procedure. No acute bone finding. No evidence of an acute pulmonary embolus. Right basilar pleural and parenchymal lung disease with multiloculated pleural fluid primarily in the major fissure, increasing to a mild degree from 05/10/2020. Complex pleural fluid is suspected and developing empyema must be considered. Image guided aspiration of the multiloculated right pleural fluid collection would be helpful in further evaluation. There is increasing thickening and irregularity of the pleura and adjacent right hemidiaphragm. There is new right perihepatic low-attenuation fluid, likely reactive. Follow-up is recommended. Stable mediastinal adenopathy. Unchanged prominent left-greater-than-right axillary lymph nodes. Us Abdomen Limited Specify Organ? Gallbladder, Liver    Result Date: 5/13/2020  EXAMINATION: RIGHT UPPER QUADRANT ULTRASOUND 5/13/2020 1:43 pm COMPARISON: CT abdomen and pelvis 05/10/2020. Right upper quadrant ultrasound 01/23/2020. HISTORY: ORDERING SYSTEM PROVIDED HISTORY: elevated lfts; concern for gallbladder issue TECHNOLOGIST PROVIDED HISTORY: Reason for exam:->elevated lfts; concern for gallbladder issue Specify organ?->GALLBLADDER Specify organ?->LIVER Reason for Exam: elevated LFTS Acuity: Acute Type of Exam: Initial Relevant Medical/Surgical History: previous US 1/23/20 FINDINGS: LIVER:  The liver demonstrates normal echogenicity without evidence of intrahepatic biliary ductal dilatation. BILIARY SYSTEM:  Gallbladder appears relatively contracted somewhat limiting evaluation. Diffuse gallbladder wall thickening, nonspecific measuring up to 7 mm, improved from prior ultrasound. No stones or pericholecystic fluid noted. Negative sonographic Dsozua's sign. Common bile duct is within normal limits measuring 3 mm. RIGHT KIDNEY: The right kidney is grossly unremarkable without evidence of hydronephrosis. PANCREAS:  Visualized portions of the pancreas are unremarkable. OTHER: No evidence of right upper quadrant ascites.      Nonspecific wall thickening to relatively contracted gallbladder without other gallbladder abnormality noted.  Otherwise unremarkable exam.       Scheduled Medicines   Medications:    bumetanide  1 mg Oral BID    aspirin  81 mg Oral Daily    atorvastatin  40 mg Oral Daily    ipratropium-albuterol  1 vial Inhalation 4x Daily    metoprolol succinate  25 mg Oral Daily    sodium chloride flush  10 mL Intravenous 2 times per day    pantoprazole  40 mg Intravenous BID    cefTRIAXone (ROCEPHIN) IV  2 g Intravenous Q24H    hydrOXYzine  50 mg Intramuscular Once    sodium chloride flush  10 mL Intravenous BID    sodium chloride flush  10 mL Intravenous BID      Infusions:    dextrose           IMPRESSION    Patient Active Problem List   Diagnosis    Essential hypertension    Osteoarthritis    COPD [facility]) [facility]    Paresthesia of left leg    Abdominal hernia    Left shoulder pain    Crushing injury of right hand    Rotator cuff tendinitis    Midline low back pain without sciatica    History of MI (myocardial infarction)    Coronary artery disease involving coronary bypass graft of native heart without angina pectoris    Mixed hyperlipidemia    Depression    Chronic midline low back pain without sciatica    Tobacco abuse    Gastroesophageal reflux disease without esophagitis    Opiate abuse, continuous ([facility])    Heroin dependence ([facility])    ST elevation myocardial infarction involving left circumflex coronary artery ([facility])    STEMI (ST elevation myocardial infarction) ([facility])    Acute on chronic combined systolic (congestive) and diastolic (congestive) heart failure ([facility])    Systolic and diastolic CHF w/reduced LV function, NYHA class 4 ([facility])    NSTEMI (non-ST elevated myocardial infarction) ([address]    Acute on chronic congestive heart failure ([address]    Noncompliance    Pulmonary edema, acute ([facility])    Acute kidney injury ([address]    Acute respiratory failure with hypoxia and hypercapnia ([facility])    Hypertensive emergency    Ischemic cardiomyopathy    Heart failure [facility]    Left ventricular systolic dysfunction    Acute systolic congestive heart failure (HCC)    SOB (shortness of breath)    Acute on chronic combined systolic and diastolic heart failure (HCC)    Hyponatremia    Hematemesis         RECOMMENDATIONS:      1. Reviewed POC blood glucose . Labs and X ray results   2. Reviewed Home and Current Medicines   3. Will Start On Correction bolus Humalog/ Insulin regime  4. Monitor Blood glucose frequently   5. Modify  the dose of Insulin as needed        Will follow with you  Again thank you for sharing pt's care with me.      Truly yours,       Cee Hidalgo MD

## 2020-05-20 NOTE — PLAN OF CARE
Problem: SAFETY  Goal: Free from accidental physical injury  5/20/2020 0403 by Shaneka Saunders RN  Outcome: Ongoing  5/19/2020 1814 by Buck Campos RN  Outcome: Ongoing  Goal: Free from intentional harm  5/20/2020 0403 by Shaneka Saunders RN  Outcome: Ongoing  5/19/2020 1814 by Buck Campos RN  Outcome: Ongoing     Problem: DAILY CARE  Goal: Daily care needs are met  5/20/2020 0403 by Shaneka Saunders RN  Outcome: Ongoing  5/19/2020 1814 by Buck Campos RN  Outcome: Ongoing     Problem: PAIN  Goal: Patient's pain/discomfort is manageable  5/20/2020 0403 by Shaneka Saunders RN  Outcome: Ongoing  5/19/2020 1814 by Buck Campos RN  Outcome: Ongoing     Problem: SKIN INTEGRITY  Goal: Skin integrity is maintained or improved  5/20/2020 0403 by Shaneka Saunders RN  Outcome: Ongoing  5/19/2020 1814 by Buck Campos RN  Outcome: Ongoing     Problem: KNOWLEDGE DEFICIT  Goal: Patient/S.O. demonstrates understanding of disease process, treatment plan, medications, and discharge instructions.   5/20/2020 0403 by Shaneka Saunders RN  Outcome: Ongoing  5/19/2020 1814 by Buck Campos RN  Outcome: Ongoing     Problem: Falls - Risk of:  Goal: Will remain free from falls  Description: Will remain free from falls  5/20/2020 0403 by Shaneka Saunders RN  Outcome: Ongoing  5/19/2020 1814 by Buck Campos RN  Outcome: Ongoing  Goal: Absence of physical injury  Description: Absence of physical injury  5/20/2020 0403 by Shaneka Saunders RN  Outcome: Ongoing  5/19/2020 1814 by Buck Campos RN  Outcome: Ongoing

## 2020-05-20 NOTE — PROGRESS NOTES
Nephrology Progress Note        [address], [address] [facility], [place] [address]  Phone: [phone]  Office Hours: 8:30AM - 4:30PM  Monday - Friday       [facility]  eLo Boston MD  [address],   [address],  [place]  [facility] [address]  PHONE: [phone]  FAX: [phone]    5/20/2020 8:55 AM  Subjective:   Admit Date: 5/17/2020  PCP: No primary care provider on file. Interval History: resting comfortably  Repeat potassium yesterday was normal  Only 350ml urine yesterday? ?? Diet: DIET GENERAL; No Added Salt (3-4 GM); Daily Fluid Restriction: 1500 ml      Data:   Scheduled Meds:   bumetanide  1 mg Oral BID    aspirin  81 mg Oral Daily    atorvastatin  40 mg Oral Daily    ipratropium-albuterol  1 vial Inhalation 4x Daily    metoprolol succinate  25 mg Oral Daily    sodium chloride flush  10 mL Intravenous 2 times per day    pantoprazole  40 mg Intravenous BID    cefTRIAXone (ROCEPHIN) IV  2 g Intravenous Q24H    hydrOXYzine  50 mg Intramuscular Once    sodium chloride flush  10 mL Intravenous BID    sodium chloride flush  10 mL Intravenous BID     Continuous Infusions:   dextrose       PRN Meds:glucose, dextrose, glucagon (rDNA), dextrose, sodium chloride flush, acetaminophen **OR** acetaminophen, polyethylene glycol, promethazine **OR** ondansetron, potassium chloride **OR** potassium alternative oral replacement **OR** potassium chloride, magnesium sulfate  I/O last 3 completed shifts: In: 200 [P.O.:200]  Out: 350 [Urine:350]  No intake/output data recorded.     Intake/Output Summary (Last 24 hours) at 5/20/2020 0855  Last data filed at 5/19/2020 1932  Gross per 24 hour   Intake 200 ml   Output --   Net 200 ml       CBC:   Recent Labs     05/17/20  2300 05/18/20 0414 05/18/20 2005 05/19/20 0407   WBC 7.7  --   --  10.6*   HGB 10.1* 10.4* 10.1* 10.2*     --   --  344       BMP:    Recent Labs     05/18/20 0414 05/18/20  0948 05/19/20  0407 05/19/20  0841   * 124* 122* 127*   K 5.5* 5.1 6.2* 5.0   CL 89* 88* 88* 89*   CO2 19* 24 20* 24   BUN 38* 36* 32*  --    CREATININE 1.5* 1.5* 1.7*  --    GLUCOSE 127* 92 114*  --      Hepatic:   Recent Labs     05/17/20  2300 05/19/20  0407   AST 62* 79*   * 184*   BILITOT 1.3* 2.1*   ALKPHOS 108 103     Troponin: No results for input(s): TROPONINI in the last 72 hours. BNP: No results for input(s): BNP in the last 72 hours. Lipids: No results for input(s): CHOL, HDL in the last 72 hours.     Invalid input(s): LDLCALCU  ABGs:   Lab Results   Component Value Date    PO2ART 538 01/23/2020    HDY7TON 37.0 01/23/2020     INR:   Recent Labs     05/18/20  0414 05/19/20  0407   INR 1.79 1.77       Objective:   Vitals: /71   Pulse 96   Temp 96.7 °F (35.9 °C) (Oral)   Resp 18   Ht 5' 11\" (1.803 m)   Wt 197 lb 6.4 oz (89.5 kg)   SpO2 97%   BMI 27.53 kg/m²   General appearance: alert and cooperative with exam, in no acute distress  HEENT: normocephalic, atraumatic,   Neck: supple, trachea midline  Lungs: , breathing comfortably  Heart[de-identified] regular rate and rhythm,   Abdomen: soft, non-tender; non distended,  Extremities: extremities atraumatic, no cyanosis or edema      Assessment and Plan:     Hyponatremia: sodium 122, cardiorenal vs volume depletion  Hyperkalemia  JAIME  Acute on chronic systolic CHF  Hematemesis  Transaminitis  Anemia/ GI bleed suspected  Mediastinal lymphadenopathy     Suggest  Transition to oral bumex  Awaiting BMP today  Stop RAAS backing agents due to hyperkalemia  Avoid nephrotoxins  Renally dose mes  Daily BMP  Oral fluid restriction  Will follow                  Electronically signed by Fredy Dent DO on 5/20/2020 at 8:55 AM    [facility]  MD Jorge Luis Carrasco DO [address],  [address]  [place], [place] [address]  PHONE: [phone]  FAX: [phone]

## 2020-05-20 NOTE — PROGRESS NOTES
Admit Date:  5/17/2020    Admission diagnosis / Complaint : hematemesis      Subjective:  Mr. Jessica Tenorio is scheduled for procedure tomorrow he reports. Denies shortness of breath    Objective:   /71   Pulse 96   Temp 96.7 °F (35.9 °C) (Oral)   Resp 16   Ht 5' 11\" (1.803 m)   Wt 197 lb 6.4 oz (89.5 kg)   SpO2 99%   BMI 27.53 kg/m²       Intake/Output Summary (Last 24 hours) at 5/20/2020 1335  Last data filed at 5/19/2020 1932  Gross per 24 hour   Intake 200 ml   Output --   Net 200 ml       TELEMETRY: Sinus    has a past medical history of CAD (coronary artery disease), [facility]congestive [facility] failure) ([facility] COPD (chronic obstructive pulmonary disease) ([facility] Depression, Drug abuse ([facility] HIGH CHOLESTEROL, Hypertension, MI (myocardial infarction) ([facility] and S/P coronary artery bypass graft x 4.   has a past surgical history that includes Cardiac surgery (11/2010); Bypass Graft (04/10/2011); and Leg Surgery.   Physical Exam:  General:  Awake, alert, NAD  Skin:  Warm and dry  Neck:  JVD none  Chest:  Clear to auscultation, respiration easy  Cardiovascular:  RRR S1S2  Abdomen:  Soft non tender  Extremities:  no edema    Medications:    bumetanide  1 mg Oral BID    aspirin  81 mg Oral Daily    atorvastatin  40 mg Oral Daily    ipratropium-albuterol  1 vial Inhalation 4x Daily    metoprolol succinate  25 mg Oral Daily    sodium chloride flush  10 mL Intravenous 2 times per day    pantoprazole  40 mg Intravenous BID    cefTRIAXone (ROCEPHIN) IV  2 g Intravenous Q24H    hydrOXYzine  50 mg Intramuscular Once    sodium chloride flush  10 mL Intravenous BID    sodium chloride flush  10 mL Intravenous BID      dextrose       glucose, dextrose, glucagon (rDNA), dextrose, sodium chloride flush, acetaminophen **OR** acetaminophen, polyethylene glycol, promethazine **OR** ondansetron, potassium chloride **OR** potassium alternative oral replacement **OR** potassium chloride, magnesium sulfate    Lab Data:  CBC: for clarification.

## 2020-05-20 NOTE — PROGRESS NOTES
DOING FAIR NO ACTIVE GIT BLEEDING  N/ VOMITING OR HEMATEMESIS  VITALS STABLE   LABS NOTED HB 10.2    PT REFUSED EGD WILL CPM

## 2020-05-20 NOTE — PROGRESS NOTES
Hospitalist Progress Note      Name:  Harman Foreman /Age/Sex: 1970  (52 y.o. male)   MRN & CSN:  [id number] & [id number] Admission Date/Time: 2020 10:42 PM   [facility]:  [facility] PCP: No primary care provider on file. Harman Foreman is a 52 y.o.  male  who presents with Shortness of Breath and Hematemesis (recent discharge yesterday.)      Assessment and Plan:   Hematemesis/nausea/vomiting  - clinically stable  - PPI IV BID  - pt refused EGD  - monitor H&H stable  - ct abd/pelvis non acute  - GI following     Acute on chronic combined systolic and diastolic heart failure  - neg 3L  - IV bumex changed to oral  - echo ef 20%, grade 3 DD, severe MR  - strict I and O  - toprol  - cardio consulted     HyperK  -resolved  - aldactone stopped  - monitor on tele     Juliet  - likely increase 2/2 to diuresis  - nephro following  - monitor    HypoNa  - improving     Abnormal CT chest with concern for empyema  - ruled out clinically  - ID consulted     Mediastinal lymphadenopathy on CTA chest    - Oncology consult was placed-CTS consult requested for lymph node biopsy, plan to be done tomorrow, pt agrees for this     Abnormal LFTs possibly from congestion of liver  - Recent liver ultrasound okay.    - Recent hepatitis panel negative.    - Continue to trend LFTs as needed. DM  - start carb diet  - hba1c: 6.9  - consult endo for med mx            Diet DIET GENERAL; Carb Control: 5 carb choices (75 gms)/meal; No Added Salt (3-4 GM);  Daily Fluid Restriction: 1500 ml   Code Status Full Code     Medications:   Medications:    bumetanide  1 mg Oral BID    aspirin  81 mg Oral Daily    atorvastatin  40 mg Oral Daily    ipratropium-albuterol  1 vial Inhalation 4x Daily    metoprolol succinate  25 mg Oral Daily    sodium chloride flush  10 mL Intravenous 2 times per day    pantoprazole  40 mg Intravenous BID    cefTRIAXone (ROCEPHIN) IV  2 g Intravenous Q24H    hydrOXYzine  50 mg Intramuscular Once    sodium chloride flush  10 mL Intravenous BID    sodium chloride flush  10 mL Intravenous BID      Infusions:    dextrose       PRN Meds: glucose, 15 g, PRN  dextrose, 12.5 g, PRN  glucagon (rDNA), 1 mg, PRN  dextrose, 100 mL/hr, PRN  sodium chloride flush, 10 mL, PRN  acetaminophen, 650 mg, Q6H PRN    Or  acetaminophen, 650 mg, Q6H PRN  polyethylene glycol, 17 g, Daily PRN  promethazine, 12.5 mg, Q6H PRN    Or  ondansetron, 4 mg, Q6H PRN  potassium chloride, 40 mEq, PRN    Or  potassium alternative oral replacement, 40 mEq, PRN    Or  potassium chloride, 10 mEq, PRN  magnesium sulfate, 2 g, PRN      Subjective:   Doing ok, no distress    Objective: Intake/Output Summary (Last 24 hours) at 5/20/2020 1027  Last data filed at 5/19/2020 1932  Gross per 24 hour   Intake 200 ml   Output --   Net 200 ml      Vitals:   Vitals:    05/20/20 0531   BP:    Pulse:    Resp:    Temp:    SpO2: 97%     Physical Exam:   Gen:  awake, alert, no apparent distress  Head/Eyes:  Normocephalic atraumatic, EOMI   NECK:   symmetrical, trachea midline  LUNGS: Normal Effort   CARDIOVASCULAR:  Normal rate  ABDOMEN:  non distended  MUSCULOSKELETAL:  ROM WNL  NEUROLOGIC: Alert and Oriented,  Cranial nerves II-XII are grossly intact. SKIN:  no bruising or bleeding, normal skin color,  no redness      Data:       CBC   Recent Labs     05/17/20  2300  05/18/20  2005 05/19/20  0407 05/20/20  0916   WBC 7.7  --   --  10.6* 8.4   HGB 10.1*   < > 10.1* 10.2* 10.1*   HCT 35.4*   < > 35.0* 35.2* 35.0*     --   --  344 325    < > = values in this interval not displayed.       BMP   Recent Labs     05/18/20  0948 05/19/20  0407 05/19/20  0841 05/20/20  0916   * 122* 127* 131*   K 5.1 6.2* 5.0 4.5   CL 88* 88* 89* 91*   CO2 24 20* 24 27   BUN 36* 32*  --  27*   CREATININE 1.5* 1.7*  --  1.8*         Electronically signed by Mag Nieves MD on 5/20/2020 at 10:27 AM

## 2020-05-20 NOTE — PROGRESS NOTES
EGD was canceled yesterday due to high potassium. This morning he refused EGD. He is agreeable to have mediastinal lymph node sampling in AM by Dr Halima Mar. He is ambulating. Off and on upper abdomen discomfort. 96.7F  96 18 104/71  97%  AAOX3, no distress. Supple, no JVD. No palpable cervical LAD. S1S2 regular. [place] air entry bilaterally. No wheezes. Soft. BS present. No cyanosis or edema. CT chest 5/17/2020:  No evidence of an acute pulmonary embolus.       Right basilar pleural and parenchymal lung disease with multiloculated   pleural fluid primarily in the major fissure, increasing to a mild degree   from 05/10/2020.  Complex pleural fluid is suspected and developing empyema   must be considered. Carolyn Salas guided aspiration of the multiloculated right   pleural fluid collection would be helpful in further evaluation.       There is increasing thickening and irregularity of the pleura and adjacent   right hemidiaphragm.  There is new right perihepatic low-attenuation fluid,   likely reactive.  Follow-up is recommended.       Stable mediastinal adenopathy.  Unchanged prominent left-greater-than-right   axillary lymph nodes. CT abdomen 5/18/2020:  Mild ascites.  Please correlate for indicators of liver failure. Results for Kevin Christopher (MRN [id number]) as of 5/20/2020 07:41   Ref.  Range 5/19/2020 04:07   Sodium Latest Ref Range: 135 - 145 MMOL/L 122 (L)   Potassium Latest Ref Range: 3.5 - 5.1 MMOL/L 6.2 (HH)   Chloride Latest Ref Range: 99 - 110 mMol/L 88 (L)   CO2 Latest Ref Range: 21 - 32 MMOL/L 20 (L)   BUN Latest Ref Range: 6 - 23 MG/DL 32 (H)   Creatinine Latest Ref Range: 0.9 - 1.3 MG/DL 1.7 (H)   Anion Gap Latest Ref Range: 4 - 16  14   GFR Non- Latest Ref Range: >60 mL/min/1.73m2 43 (L)   GFR  Latest Ref Range: >60 mL/min/1.73m2 52 (L)   Glucose Latest Ref Range: 70 - 99 MG/ (H)   Calcium Latest Ref Range: 8.3 - 10.6 MG/DL 9.1   Total Protein Latest Ref Range: 6.4 - 8.2 GM/DL 6.4   Albumin Latest Ref Range: 3.4 - 5.0 GM/DL 4.1   Alk Phos Latest Ref Range: 40 - 128 IU/L 103   ALT Latest Ref Range: 10 - 40 U/L 184 (H)   Amylase Latest Ref Range: 25 - 115 U/L 43   AST Latest Ref Range: 15 - 37 IU/L 79 (H)   Bilirubin Latest Ref Range: 0.0 - 1.0 MG/DL 2.1 (H)   Lipase Latest Ref Range: 13 - 60 IU/L 29   Hemoglobin A1C Latest Ref Range: 4.2 - 6.3 % 6.9 (H)   eAG (mg/dL) Latest Units: mg/dL 151     Results for MARCIA CHAPPELL (MRN [id number]) as of 5/20/2020 07:41   Ref. Range 3/4/2018 08:07 1/24/2020 06:00 5/13/2020 11:52   Hep A IgM Latest Ref Range: NON REACTIVE  NON REACTIVE NON REACTIVE NON REACTIVE   Hepatitis B Surface Ag Latest Ref Range: NON REACTIVE  NON REACTIVE NON REACTIVE NON REACTIVE   Hepatitis C Ab Latest Ref Range: NON REACTIVE  NON REACTIVE NON REACTIVE NON REACTIVE   Hep B Core Ab, IgM Latest Ref Range: NON REACTIVE  NON REACTIVE NON REACTIVE NON REACTIVE     A/P:    1. Mediastinal LAD. For sampling in AM.    2. Congestive hepatopathy. GI is on board. 3. ID is on board. Will follow up.

## 2020-05-20 NOTE — PROGRESS NOTES
Patient refusing to have scheduled EGD done today. Dr. Lucia Salazar notified.  EGD cancelled per Dr. Lucia Salazar, diet order updated

## 2020-05-21 ENCOUNTER — ANESTHESIA EVENT (OUTPATIENT)
Dept: MEDSURG UNIT | Age: 50
DRG: 377 | End: 2020-05-21
Payer: MEDICARE

## 2020-05-21 ENCOUNTER — ANESTHESIA (OUTPATIENT)
Dept: MEDSURG UNIT | Age: 50
DRG: 377 | End: 2020-05-21
Payer: MEDICARE

## 2020-05-21 LAB
ALBUMIN SERPL-MCNC: 4.3 GM/DL (ref 3.4–5)
ALP BLD-CCNC: 115 IU/L (ref 40–128)
ALT SERPL-CCNC: 144 U/L (ref 10–40)
ANION GAP SERPL CALCULATED.3IONS-SCNC: 15 MMOL/L (ref 4–16)
AST SERPL-CCNC: 68 IU/L (ref 15–37)
BILIRUB SERPL-MCNC: 1.2 MG/DL (ref 0–1)
BUN BLDV-MCNC: 25 MG/DL (ref 6–23)
CALCIUM SERPL-MCNC: 9.1 MG/DL (ref 8.3–10.6)
CHLORIDE BLD-SCNC: 89 MMOL/L (ref 99–110)
CO2: 28 MMOL/L (ref 21–32)
CREAT SERPL-MCNC: 1.7 MG/DL (ref 0.9–1.3)
ESTIMATED AVERAGE GLUCOSE: 151 MG/DL
GFR AFRICAN AMERICAN: 52 ML/MIN/1.73M2
GFR NON-AFRICAN AMERICAN: 43 ML/MIN/1.73M2
GLUCOSE BLD-MCNC: 112 MG/DL (ref 70–99)
HBA1C MFR BLD: 6.9 % (ref 4.2–6.3)
HCT VFR BLD CALC: 34.8 % (ref 42–52)
HEMOGLOBIN: 10 GM/DL (ref 13.5–18)
MCH RBC QN AUTO: 19.9 PG (ref 27–31)
MCHC RBC AUTO-ENTMCNC: 28.7 % (ref 32–36)
MCV RBC AUTO: 69.2 FL (ref 78–100)
PDW BLD-RTO: 21.6 % (ref 11.7–14.9)
PLATELET # BLD: 334 K/CU MM (ref 140–440)
PMV BLD AUTO: 9.9 FL (ref 7.5–11.1)
POTASSIUM SERPL-SCNC: 4 MMOL/L (ref 3.5–5.1)
RBC # BLD: 5.03 M/CU MM (ref 4.6–6.2)
SODIUM BLD-SCNC: 132 MMOL/L (ref 135–145)
TOTAL PROTEIN: 6.8 GM/DL (ref 6.4–8.2)
WBC # BLD: 7.1 K/CU MM (ref 4–10.5)

## 2020-05-21 PROCEDURE — 2580000003 HC RX 258: Performed by: INTERNAL MEDICINE

## 2020-05-21 PROCEDURE — 2580000003 HC RX 258: Performed by: ANESTHESIOLOGY

## 2020-05-21 PROCEDURE — 80048 BASIC METABOLIC PNL TOTAL CA: CPT

## 2020-05-21 PROCEDURE — 85027 COMPLETE CBC AUTOMATED: CPT

## 2020-05-21 PROCEDURE — 2580000003 HC RX 258: Performed by: EMERGENCY MEDICINE

## 2020-05-21 PROCEDURE — 36415 COLL VENOUS BLD VENIPUNCTURE: CPT

## 2020-05-21 PROCEDURE — 1200000000 HC SEMI PRIVATE

## 2020-05-21 PROCEDURE — 6370000000 HC RX 637 (ALT 250 FOR IP): Performed by: INTERNAL MEDICINE

## 2020-05-21 PROCEDURE — 94640 AIRWAY INHALATION TREATMENT: CPT

## 2020-05-21 PROCEDURE — 99232 SBSQ HOSP IP/OBS MODERATE 35: CPT | Performed by: NURSE PRACTITIONER

## 2020-05-21 PROCEDURE — 99231 SBSQ HOSP IP/OBS SF/LOW 25: CPT | Performed by: INTERNAL MEDICINE

## 2020-05-21 PROCEDURE — 80053 COMPREHEN METABOLIC PANEL: CPT

## 2020-05-21 PROCEDURE — 94761 N-INVAS EAR/PLS OXIMETRY MLT: CPT

## 2020-05-21 PROCEDURE — 6360000002 HC RX W HCPCS: Performed by: INTERNAL MEDICINE

## 2020-05-21 PROCEDURE — 6370000000 HC RX 637 (ALT 250 FOR IP): Performed by: NURSE PRACTITIONER

## 2020-05-21 PROCEDURE — 99232 SBSQ HOSP IP/OBS MODERATE 35: CPT | Performed by: INTERNAL MEDICINE

## 2020-05-21 PROCEDURE — 6370000000 HC RX 637 (ALT 250 FOR IP): Performed by: FAMILY MEDICINE

## 2020-05-21 RX ORDER — LANOLIN ALCOHOL/MO/W.PET/CERES
3 CREAM (GRAM) TOPICAL NIGHTLY PRN
Status: DISCONTINUED | OUTPATIENT
Start: 2020-05-21 | End: 2020-05-22 | Stop reason: HOSPADM

## 2020-05-21 RX ORDER — SODIUM CHLORIDE, SODIUM LACTATE, POTASSIUM CHLORIDE, CALCIUM CHLORIDE 600; 310; 30; 20 MG/100ML; MG/100ML; MG/100ML; MG/100ML
INJECTION, SOLUTION INTRAVENOUS CONTINUOUS
Status: DISCONTINUED | OUTPATIENT
Start: 2020-05-21 | End: 2020-05-21

## 2020-05-21 RX ORDER — PANTOPRAZOLE SODIUM 40 MG/1
40 TABLET, DELAYED RELEASE ORAL
Status: DISCONTINUED | OUTPATIENT
Start: 2020-05-21 | End: 2020-05-22 | Stop reason: HOSPADM

## 2020-05-21 RX ADMIN — IPRATROPIUM BROMIDE AND ALBUTEROL SULFATE 3 ML: .5; 3 SOLUTION RESPIRATORY (INHALATION) at 02:35

## 2020-05-21 RX ADMIN — IPRATROPIUM BROMIDE AND ALBUTEROL SULFATE 3 ML: .5; 3 SOLUTION RESPIRATORY (INHALATION) at 15:30

## 2020-05-21 RX ADMIN — ASPIRIN 81 MG: 81 TABLET, COATED ORAL at 14:08

## 2020-05-21 RX ADMIN — IPRATROPIUM BROMIDE AND ALBUTEROL SULFATE 3 ML: .5; 3 SOLUTION RESPIRATORY (INHALATION) at 08:16

## 2020-05-21 RX ADMIN — SODIUM CHLORIDE, PRESERVATIVE FREE 10 ML: 5 INJECTION INTRAVENOUS at 19:57

## 2020-05-21 RX ADMIN — MELATONIN 3 MG: at 19:57

## 2020-05-21 RX ADMIN — SODIUM CHLORIDE, PRESERVATIVE FREE 10 ML: 5 INJECTION INTRAVENOUS at 14:10

## 2020-05-21 RX ADMIN — IPRATROPIUM BROMIDE AND ALBUTEROL SULFATE 3 ML: .5; 3 SOLUTION RESPIRATORY (INHALATION) at 19:40

## 2020-05-21 RX ADMIN — PANTOPRAZOLE SODIUM 40 MG: 40 TABLET, DELAYED RELEASE ORAL at 15:45

## 2020-05-21 RX ADMIN — BUMETANIDE 1 MG: 1 TABLET ORAL at 19:57

## 2020-05-21 RX ADMIN — BUMETANIDE 1 MG: 1 TABLET ORAL at 14:08

## 2020-05-21 RX ADMIN — ATORVASTATIN CALCIUM 40 MG: 40 TABLET, FILM COATED ORAL at 14:08

## 2020-05-21 RX ADMIN — SODIUM CHLORIDE, POTASSIUM CHLORIDE, SODIUM LACTATE AND CALCIUM CHLORIDE: 600; 310; 30; 20 INJECTION, SOLUTION INTRAVENOUS at 09:15

## 2020-05-21 RX ADMIN — METOPROLOL SUCCINATE 25 MG: 25 TABLET, EXTENDED RELEASE ORAL at 14:08

## 2020-05-21 RX ADMIN — CEFTRIAXONE SODIUM 2 G: 2 INJECTION, POWDER, FOR SOLUTION INTRAMUSCULAR; INTRAVENOUS at 14:08

## 2020-05-21 RX ADMIN — SODIUM CHLORIDE, POTASSIUM CHLORIDE, SODIUM LACTATE AND CALCIUM CHLORIDE: 600; 310; 30; 20 INJECTION, SOLUTION INTRAVENOUS at 14:56

## 2020-05-21 RX ADMIN — IPRATROPIUM BROMIDE AND ALBUTEROL SULFATE 3 ML: .5; 3 SOLUTION RESPIRATORY (INHALATION) at 12:11

## 2020-05-21 ASSESSMENT — ENCOUNTER SYMPTOMS: SHORTNESS OF BREATH: 1

## 2020-05-21 NOTE — PROGRESS NOTES
ONCOLOGY HEMATOLOGY CARE ([facility])  PROGRESS NOTE      Patient was seen and examined today. Not in any acute distress and no overnight events. He is scheduled for mediastinoscopy. No new complaints. PHYSICAL EXAM    Vitals: /76   Pulse 106   Temp 97.8 °F (36.6 °C) (Oral)   Resp 16   Ht 5' 11\" (1.803 m)   Wt 193 lb 4.8 oz (87.7 kg)   SpO2 98%   BMI 26.96 kg/m²   CONSTITUTIONAL: awake, alert, cooperative, no apparent distress   EYES: pupils equal, round and reactive to light, sclera clear and conjunctiva normal  ENT: Normocephalic, without obvious abnormality, atraumatic  NECK: supple, symmetrical, no jugular venous distension and no carotid bruits   HEMATOLOGIC/LYMPHATIC: no cervical, supraclavicular or axillary lymphadenopathy   LUNGS: no increased work of breathing and clear to auscultation   CARDIOVASCULAR: regular rate and rhythm, normal S1 and S2, no murmur noted  ABDOMEN: normal bowel sounds x 4, soft, non-distended, non-tender, no masses palpated, no hepatosplenomegaly. MUSCULOSKELETAL: full range of motion noted, tone is normal  NEUROLOGIC: awake, alert, oriented to name, place and time. Motor skills grossly intact. SKIN: Normal skin color, texture, turgor and no jaundice.  appears intact   EXTREMITIES: no LE edema     LABORATORY RESULTS  CBC:   Recent Labs     05/19/20 0407 05/20/20  0916 05/21/20  0354   WBC 10.6* 8.4 7.1   HGB 10.2* 10.1* 10.0*    325 334     BMP:    Recent Labs     05/19/20 0407 05/19/20  0841 05/20/20  0916 05/21/20  0354   * 127* 131* 132*   K 6.2* 5.0 4.5 4.0   CL 88* 89* 91* 89*   CO2 20* 24 27 28   BUN 32*  --  27* 25*   CREATININE 1.7*  --  1.8* 1.7*   GLUCOSE 114*  --  111* 112*     Hepatic:   Recent Labs     05/19/20 0407 05/20/20  0916 05/21/20  0354   AST 79* 80* 68*   * 168* 144*   BILITOT 2.1* 1.3* 1.2*   ALKPHOS 103 107 115     INR:   Recent Labs     05/19/20  0407 05/20/20  0916   INR 1.77 1.42       RADIOLOGY REPORTS  CT scan of the chest  CT scan of the abdomen & pelvis  CT scan of the head  Bone scan    ASSESSMENT/RECOMMENDATION    He is scheduled for mediastinoscopy. He has been NPO. We will continue to follow the patient. Thank you.

## 2020-05-21 NOTE — PROGRESS NOTES
DOING FAIR NO ACTIVE GIT BLEEDING OR HEMATEMESIS REFUSED EGD  VITALS STABLE   LABS NOTED HB 10.0  FOR MEDIATINOSCOPY TODAY

## 2020-05-21 NOTE — PROGRESS NOTES
Admit Date:  5/17/2020    Admission diagnosis / Complaint : hematemesis      Subjective:  Mr. Slick Garcia when asked for next of kin contact patient reports not wanting anyone to have his information. Explained that in the event that you are unresponsive unable to make decisions, by the state of [place] are made by the next of kin. Objective:   /77   Pulse 107   Temp 97.8 °F (36.6 °C) (Oral)   Resp 17   Ht 5' 11\" (1.803 m)   Wt 193 lb 4.8 oz (87.7 kg)   SpO2 98%   BMI 26.96 kg/m²       Intake/Output Summary (Last 24 hours) at 5/21/2020 1611  Last data filed at 5/20/2020 2128  Gross per 24 hour   Intake 10 ml   Output --   Net 10 ml       TELEMETRY: Sinus    has a past medical history of CAD (coronary artery disease), CHF (congestive heart failure) ([facility] [address] COPD (chronic obstructive pulmonary disease) ([facility] [address] Depression, Drug abuse ([facility] [address] HIGH CHOLESTEROL, Hypertension, MI (myocardial infarction) ([facility] [address] and S/P coronary artery bypass graft x 4.   has a past surgical history that includes Cardiac surgery (11/2010); Bypass Graft (04/10/2011); and Leg Surgery.   Physical Exam:  General:  Awake, alert, NAD  Skin:  Warm and dry  Neck:  JVD none  Chest:  Clear to auscultation, respiration easy  Cardiovascular:  RRR S1S2  Abdomen:  Soft non tender  Extremities:  no edema    Medications:    pantoprazole  40 mg Oral BID AC    bumetanide  1 mg Oral BID    aspirin  81 mg Oral Daily    atorvastatin  40 mg Oral Daily    ipratropium-albuterol  1 vial Inhalation 4x Daily    metoprolol succinate  25 mg Oral Daily    sodium chloride flush  10 mL Intravenous 2 times per day    cefTRIAXone (ROCEPHIN) IV  2 g Intravenous Q24H    hydrOXYzine  50 mg Intramuscular Once    sodium chloride flush  10 mL Intravenous BID    sodium chloride flush  10 mL Intravenous BID      dextrose       melatonin, glucose, dextrose, glucagon (rDNA), dextrose, sodium chloride flush, acetaminophen **OR** acetaminophen, polyethylene glycol,

## 2020-05-21 NOTE — PROGRESS NOTES
Nephrology Progress Note        [address], [address] [facility], [place] [address]  Phone: [phone]  Office Hours: 8:30AM - 4:30PM  Monday - Friday       [facility]  MD Louis Ramírez, DO [address],  [place], [facility] [address]  PHONE: [phone]  FAX: [phone]    5/21/2020 8:23 AM  Subjective:   Admit Date: 5/17/2020  PCP: No primary care provider on file. Interval History: looking ok  making urine daily  No soa    Diet: Diet NPO, After Midnight Exceptions are: Sips with Meds      Data:   Scheduled Meds:   bumetanide  1 mg Oral BID    aspirin  81 mg Oral Daily    atorvastatin  40 mg Oral Daily    ipratropium-albuterol  1 vial Inhalation 4x Daily    metoprolol succinate  25 mg Oral Daily    sodium chloride flush  10 mL Intravenous 2 times per day    pantoprazole  40 mg Intravenous BID    cefTRIAXone (ROCEPHIN) IV  2 g Intravenous Q24H    hydrOXYzine  50 mg Intramuscular Once    sodium chloride flush  10 mL Intravenous BID    sodium chloride flush  10 mL Intravenous BID     Continuous Infusions:   dextrose       PRN Meds:melatonin, glucose, dextrose, glucagon (rDNA), dextrose, sodium chloride flush, acetaminophen **OR** acetaminophen, polyethylene glycol, promethazine **OR** ondansetron, potassium chloride **OR** potassium alternative oral replacement **OR** potassium chloride, magnesium sulfate  I/O last 3 completed shifts: In: 10 [I.V.:10]  Out: -   No intake/output data recorded.     Intake/Output Summary (Last 24 hours) at 5/21/2020 0823  Last data filed at 5/20/2020 2128  Gross per 24 hour   Intake 10 ml   Output --   Net 10 ml       CBC:   Recent Labs     05/19/20  0407 05/20/20  0916 05/21/20  0354   WBC 10.6* 8.4 7.1   HGB 10.2* 10.1* 10.0*    325 334       BMP:    Recent Labs     05/19/20  0407 05/19/20  0841 05/20/20  0916 05/21/20  0354   * 127* 131* 132*   K 6.2* 5.0 4.5 4.0   CL 88* 89* 91* 89* CO2 20* 24 27 28   BUN 32*  --  27* 25*   CREATININE 1.7*  --  1.8* 1.7*   GLUCOSE 114*  --  111* 112*     Hepatic:   Recent Labs     05/19/20  0407 05/20/20  0916 05/21/20  0354   AST 79* 80* 68*   * 168* 144*   BILITOT 2.1* 1.3* 1.2*   ALKPHOS 103 107 115     Troponin: No results for input(s): TROPONINI in the last 72 hours. BNP: No results for input(s): BNP in the last 72 hours. Lipids: No results for input(s): CHOL, HDL in the last 72 hours.     Invalid input(s): LDLCALCU  ABGs:   Lab Results   Component Value Date    PO2ART 538 01/23/2020    IFQ5ERP 37.0 01/23/2020     INR:   Recent Labs     05/19/20  0407 05/20/20  0916   INR 1.77 1.42       Objective:   Vitals: /76   Pulse 106   Temp 97.8 °F (36.6 °C) (Oral)   Resp 16   Ht 5' 11\" (1.803 m)   Wt 193 lb 4.8 oz (87.7 kg)   SpO2 98%   BMI 26.96 kg/m²   General appearance: alert and cooperative with exam, in no acute distress  HEENT: normocephalic, atraumatic,   Neck: supple, trachea midline  Lungs:  breathing comfortably   Abdomen: soft, non-tender; non distended,  Extremities: trace ble edema  Neurologic: alert, oriented, follows commands, interactive    Assessment and Plan:   Hyponatremia: sodium 122, from fluid overload  Hyperkalemia; resolved  JAIME  Acute on chronic systolic CHF  Hematemesis  Transaminitis  Anemia/ GI bleed suspected; pt declined EGD  Mediastinal lymphadenopathy     Suggest  Cr stable  Continue po bumex  Stop RAAS backing agents due to hyperkalemia  Avoid nephrotoxins  Renally dose mes  Daily BMP  Oral fluid restriction  Will follow                                Electronically signed by Bari Long DO on 5/21/2020 at 8:23 AM    MD Deonte Kinney DO [address],  [address], [place] [address]  PHONE: [phone]  FAX: [phone]

## 2020-05-21 NOTE — PROGRESS NOTES
PATIENT NAME: Sommer Santana    TODAY'S DATE: 05/20/20    Reason for visit: Mediastinal lymphadenopathy    SUBJECTIVE:    Pt doing well today. He was seen by cardiology who states that he is high risk but okay to proceed with surgery. The patient refused EGD today however GI has stated he is stable. Patient denies any new symptoms today. He denies chest pain or shortness of breath. He is sitting outside the nurses station. OBJECTIVE:   VITALS:    Vitals:    05/20/20 2130   BP: 113/78   Pulse: 103   Resp: 16   Temp: 98.5 °F (36.9 °C)   SpO2: 100%     INTAKE/OUTPUT:    Date 05/20/20 0000 - 05/20/20 2359   Shift 7967-2234 7493-4331 7670-7702 24 Hour Total   INTAKE   I.V.(mL/kg/hr)   10 10   Shift Total(mL/kg)   10(0.1) 10(0.1)   OUTPUT   Shift Total(mL/kg)       Weight (kg) 89.5 89.5 89.5 89.5        EXAM:  Blood pressure 113/78, pulse 103, temperature 98.5 °F (36.9 °C), temperature source Oral, resp. rate 16, height 5' 11\" (1.803 m), weight 197 lb 6.4 oz (89.5 kg), SpO2 100 %. General appearance: No apparent distress, appears stated age and cooperative. Skin: unremarkable  HEENT Normocephalic, atraumatic without obvious deformity. Neck: Supple, Trachea midline   Lungs: Good respiratory effort.  Clear to auscultation, bilaterally  Heart: Regular rate/ rhythm   Abdomen: Soft, non-tender or non-distended   Extremities: Minimal lower extremity edema warm well perfused  Neurologic: Alert, grossly intact  Mental status: normal affect      Data:  CBC:   Recent Labs     05/18/20 2005 05/19/20  0407 05/20/20  0916   WBC  --  10.6* 8.4   HGB 10.1* 10.2* 10.1*   HCT 35.0* 35.2* 35.0*   PLT  --  344 325     BMP:    Recent Labs     05/18/20  0948 05/19/20  0407 05/19/20  0841 05/20/20  0916   * 122* 127* 131*   K 5.1 6.2* 5.0 4.5   CL 88* 88* 89* 91*   CO2 24 20* 24 27   BUN 36* 32*  --  27*   CREATININE 1.5* 1.7*  --  1.8*   GLUCOSE 92 114*  --  111*     Hepatic:   Recent Labs     05/19/20  0406 05/20/20  0916   AST 79* 80*   * 168*   BILITOT 2.1* 1.3*   ALKPHOS 103 107     Mag:    No results for input(s): MG in the last 72 hours. Phos:   No results for input(s): PHOS in the last 72 hours.    INR:   Recent Labs     05/18/20  0414 05/19/20  0407 05/20/20  0916   INR 1.79 1.77 1.42     Radiology Review:        ASSESSMENT AND PLAN:  Mediastinal lymphadenopathy    * No surgery date entered *  Patient Active Problem List   Diagnosis    Essential hypertension    Osteoarthritis    COPD (chronic obstructive pulmonary disease) ([facility] [address]    Paresthesia of left leg    Abdominal hernia    Left shoulder pain    Crushing injury of right hand    Rotator cuff tendinitis    Midline low back pain without sciatica    History of MI (myocardial infarction)    Coronary artery disease involving coronary bypass graft of native heart without angina pectoris    Mixed hyperlipidemia    Depression    Chronic midline low back pain without sciatica    Tobacco abuse    Gastroesophageal reflux disease without esophagitis    Opiate abuse, continuous ([facility] [address]    Heroin dependence ([facility] [address]    ST elevation myocardial infarction involving left circumflex coronary artery ([address]    STEMI (ST elevation myocardial infarction) ([address]    Acute on chronic combined systolic (congestive) and diastolic (congestive) heart failure (HCC)    Systolic and diastolic CHF w/reduced LV function, NYHA class 4 ([address]    NSTEMI (non-ST elevated myocardial infarction) ([address]    Acute on chronic congestive heart failure ([address]    Noncompliance    Pulmonary edema, acute (HCC)    Acute kidney injury ([address]    Acute respiratory failure with hypoxia and hypercapnia ([facility] [address]    Hypertensive emergency    Ischemic cardiomyopathy    Heart failure ([address]    Left ventricular systolic dysfunction    Acute systolic congestive heart failure (HCC)    SOB (shortness of breath)    Acute on chronic combined systolic and diastolic heart failure ([address]    Hyponatremia    Hematemesis       The plan is to proceed with mediastinoscopy tomorrow. Risks and benefits of mediastinoscopy were discussed in detail. The risks include but are not limited to infection, bleeding, and rarely need for urgent sternotomy. The patient understands and agrees to proceed.       OR time requested  Electronically signed by Shilo Sarabia MD on 5/20/2020 at 11:38 PM

## 2020-05-21 NOTE — PROGRESS NOTES
Progress Note( Dr. Deacon Sanchez)  5/21/2020  Subjective:   Admit Date: 5/17/2020  PCP: No primary care provider on file. Admitted For :    Consulted For:     Interval History:    Denies any chest pains,   Denies SOB . Denies nausea or vomiting. No new bowel or bladder symptoms. Intake/Output Summary (Last 24 hours) at 5/21/2020 0736  Last data filed at 5/20/2020 2128  Gross per 24 hour   Intake 10 ml   Output --   Net 10 ml       DATA    CBC:   Recent Labs     05/19/20  0407 05/20/20  0916 05/21/20  0354   WBC 10.6* 8.4 7.1   HGB 10.2* 10.1* 10.0*    325 334    CMP:  Recent Labs     05/19/20  0407 05/19/20  0841 05/20/20  0916 05/21/20  0354   * 127* 131* 132*   K 6.2* 5.0 4.5 4.0   CL 88* 89* 91* 89*   CO2 20* 24 27 28   BUN 32*  --  27* 25*   CREATININE 1.7*  --  1.8* 1.7*   CALCIUM 9.1  --  9.3 9.1   PROT 6.4  --  6.8 6.8   LABALBU 4.1  --  4.3 4.3   BILITOT 2.1*  --  1.3* 1.2*   ALKPHOS 103  --  107 115   AST 79*  --  80* 68*   *  --  168* 144*     Lipids:   Lab Results   Component Value Date    CHOL 108 05/14/2020    CHOL 218 12/01/2014    HDL 19 05/14/2020    TRIG 91 05/14/2020     Glucose:  Recent Labs     05/19/20  1035 05/19/20  1325 05/19/20  1730   POCGLU 246* 134* 230*     [id number]:  Lab Results   Component Value Date    LABA1C 6.9 05/20/2020     High Sensitivity TSH:   Lab Results   Component Value Date    TSHHS 5.650 05/13/2020     Free T3:   Lab Results   Component Value Date    FT3 2.9 07/21/2016     Free T4:  Lab Results   Component Value Date    T4FREE 1.09 07/21/2016       Ct Abdomen Pelvis W Iv Contrast Additional Contrast? None    Result Date: 5/18/2020  EXAMINATION: CT OF THE ABDOMEN AND PELVIS WITH CONTRAST 5/17/2020 11:53 pm TECHNIQUE: CT of the abdomen and pelvis was performed with the administration of intravenous contrast. Multiplanar reformatted images are provided for review.  Dose modulation, iterative reconstruction, and/or weight based adjustment of the mA/kV was utilized to reduce the radiation dose to as low as reasonably achievable. COMPARISON: 05/10/2020. HISTORY: ORDERING SYSTEM PROVIDED HISTORY: Hematemsis TECHNOLOGIST PROVIDED HISTORY: Reason for exam:->Hematemsis Additional Contrast?->None Reason for Exam: abd pain mid line FINDINGS: Lower Chest: Small loculated fluid collection interhemispheric fissure right lower thorax. No focal consolidation. Organs: The visualized portions of the liver, gallbladder, spleen, pancreas and adrenal glands demonstrate no acute abnormality. No biliary ductal dilatation. The kidneys appear normal in size and demonstrate symmetric enhancement. No focal renal mass. No hydronephrosis. No perinephric stranding. Multiple nonobstructing renal calculi. Multiple renal cortical scars right kidney upper pole. GI/Bowel: No bowel dilatation to suggest obstruction. No evidence of acute appendicitis. Pelvis: The urinary bladder appears unremarkable. The pelvic organs demonstrate no acute abnormality. Peritoneum/Retroperitoneum: The abdominal aorta is normal in caliber. Extensive atherosclerotic change. Mild ascites. Bones/Soft Tissues: No acute findings. Mild ascites. Please correlate for indicators of liver failure. Xr Chest Portable    Result Date: 5/17/2020  EXAMINATION: ONE XRAY VIEW OF THE CHEST 5/17/2020 11:11 pm COMPARISON: Studies back to the 02/07/2020 HISTORY: ORDERING SYSTEM PROVIDED HISTORY: PAin TECHNOLOGIST PROVIDED HISTORY: Reason for exam:->PAin Reason for Exam: chest pain Acuity: Acute Type of Exam: Initial FINDINGS: Again seen is a focal opacity at the right lung base, stable. Lungs are otherwise clear. No pneumothorax or pleural effusion. Mild cardiac enlargement. Median sternotomy wires. No pleural effusion or pneumothorax. Stable focal wedge-like atelectatic change right lung base.      Cta Pulmonary W Contrast    Result Date: 5/18/2020  EXAMINATION: CTA OF THE CHEST 5/17/2020 11:28 pm TECHNIQUE: CTA of the chest was performed after the administration of intravenous contrast.  Multiplanar reformatted images are provided for review. MIP images are provided for review. Dose modulation, iterative reconstruction, and/or weight based adjustment of the mA/kV was utilized to reduce the radiation dose to as low as reasonably achievable. COMPARISON: 05/10/2020 and 03/11/2020 HISTORY: ORDERING SYSTEM PROVIDED HISTORY: SOB. CP TECHNOLOGIST PROVIDED HISTORY: Reason for exam:->SOB. CP Reason for Exam: sob/janelle d-dimer FINDINGS: Pulmonary Arteries: Pulmonary arteries are adequately opacified for evaluation. No evidence of intraluminal filling defect to suggest pulmonary embolism. Main pulmonary artery is normal in caliber. Mediastinum: Unchanged mediastinal adenopathy. Unchanged mildly enlarged left greater than right axillary lymph nodes. The heart is enlarged and unchanged. Lungs/pleura: There is multiloculated pleural fluid in the inferolateral extent of the right major fissure. There is mildly irregular pleural thickening with adjacent subpleural parenchymal disease. The multiloculated pleural thickening and associated lung disease has increased to a mild degree. Pleural fluid appears mildly heterogeneous/complex. There is increasing adjacent right perihepatic fluid. Upper Abdomen: Increasing perihepatic low-attenuation fluid. New trace perisplenic fluid. Soft Tissues/Bones: Prior sternal splitting procedure. No acute bone finding. No evidence of an acute pulmonary embolus. Right basilar pleural and parenchymal lung disease with multiloculated pleural fluid primarily in the major fissure, increasing to a mild degree from 05/10/2020. Complex pleural fluid is suspected and developing empyema must be considered. Image guided aspiration of the multiloculated right pleural fluid collection would be helpful in further evaluation.  There is increasing thickening and irregularity of the pleura and adjacent right hemidiaphragm. There is new right perihepatic low-attenuation fluid, likely reactive. Follow-up is recommended. Stable mediastinal adenopathy. Unchanged prominent left-greater-than-right axillary lymph nodes. Scheduled Medicines   Medications:    bumetanide  1 mg Oral BID    aspirin  81 mg Oral Daily    atorvastatin  40 mg Oral Daily    ipratropium-albuterol  1 vial Inhalation 4x Daily    metoprolol succinate  25 mg Oral Daily    sodium chloride flush  10 mL Intravenous 2 times per day    pantoprazole  40 mg Intravenous BID    cefTRIAXone (ROCEPHIN) IV  2 g Intravenous Q24H    hydrOXYzine  50 mg Intramuscular Once    sodium chloride flush  10 mL Intravenous BID    sodium chloride flush  10 mL Intravenous BID      Infusions:    dextrose           Objective:   Vitals: /76   Pulse 106   Temp 97.8 °F (36.6 °C) (Oral)   Resp 16   Ht 5' 11\" (1.803 m)   Wt 193 lb 4.8 oz (87.7 kg)   SpO2 98%   BMI 26.96 kg/m²   General appearance: alert and cooperative with exam  Neck: no JVD or bruit  Thyroid : Normal lobes   Lungs: Has Vesicular Breath sounds   Heart:  regular rate and rhythm  Abdomen: soft, non-tender; bowel sounds normal; no masses,  no organomegaly  Musculoskeletal: Normal  Extremities: extremities normal, , no edema  Neurologic:  Awake, alert, oriented to name, place and time. Cranial nerves II-XII are grossly intact. Motor is  intact. Sensory is intact. ,  and gait is normal.    Assessment:     Patient Active Problem List:     Essential hypertension     Osteoarthritis     COPD (chronic obstructive pulmonary disease) ([facility] [address]     Paresthesia of left leg     Abdominal hernia     Left shoulder pain     Crushing injury of right hand     Rotator cuff tendinitis     Midline low back pain without sciatica     History of MI (myocardial infarction)     Coronary artery disease involving coronary bypass graft of native heart without angina pectoris     Mixed hyperlipidemia Depression     Chronic midline low back pain without sciatica     Tobacco abuse     Gastroesophageal reflux disease without esophagitis     Opiate abuse, continuous (HCC)     Heroin dependence ([facility])     ST elevation myocardial infarction involving left circumflex coronary artery ([facility])     STEMI (ST elevation myocardial infarction) ([facility])     Acute on chronic combined systolic (congestive) and diastolic (congestive) heart failure ([facility])     Systolic and diastolic CHF w/reduced LV function, NYHA class 4 ([facility])     NSTEMI (non-ST elevated myocardial infarction) ([address]     Acute on chronic congestive heart failure ([address]     Noncompliance     Pulmonary edema, acute ([address]     Acute kidney injury ([address]     Acute respiratory failure with hypoxia and hypercapnia ([facility])     Hypertensive emergency     Ischemic cardiomyopathy     Heart failure ([address]     Left ventricular systolic dysfunction     Acute systolic congestive heart failure ([facility])     SOB (shortness of breath)     Acute on chronic combined systolic and diastolic heart failure ([address]     Hyponatremia     Hematemesis      Plan:     1. Reviewed POC blood glucose . Labs and X ray results   2. Reviewed Current Medicines   3. On Correction bolus Humalog Insulin regime   4. Monitor Blood glucose frequently   5. Modified  the dose of Insulin/ other medicines as needed   6. Will follow     .      Prashant Payton MD

## 2020-05-21 NOTE — ANESTHESIA PRE PROCEDURE
 Acute respiratory failure with hypoxia and hypercapnia ([facility]) J96.01, J96.02    Hypertensive emergency I16.1    Ischemic cardiomyopathy I25.5    Heart failure ([facility]) I50.9    Left ventricular systolic dysfunction W15.7    Acute systolic congestive heart failure (HCC) I50.21    SOB (shortness of breath) R06.02    Acute on chronic combined systolic and diastolic heart failure ([facility]) I50.43    Hyponatremia E87.1    Hematemesis K92.0       Past Medical History:        Diagnosis Date    CAD (coronary artery disease)     CHF (congestive heart failure) ([facility])     COPD (chronic obstructive pulmonary disease) ([facility])     Depression     Drug abuse ([facility] [address]     HIGH CHOLESTEROL     Hypertension     MI (myocardial infarction) ([facility] [address] 2011    S/P coronary artery bypass graft x 4 2011    CABG x 4 Dr Haydee Richmond       Past Surgical History:        Procedure Laterality Date    BYPASS GRAFT  04/10/2011    CABG x 4 Dr Haydee Richmond   [address]  11/2010     4 stents    LEG SURGERY         Social History:    Social History     Tobacco Use    Smoking status: Current Every Day Smoker     Packs/day: 1.00     Years: 20.00     Pack years: 20.00     Types: Cigarettes    Smokeless tobacco: Former User    Tobacco comment: Reviewed 10/9/17   Substance Use Topics    Alcohol use: No     Alcohol/week: 0.0 standard drinks                                Ready to quit: Not Answered  Counseling given: Not Answered  Comment: Reviewed 10/9/17      Vital Signs (Current):   Vitals:    05/21/20 0436 05/21/20 0800 05/21/20 0818 05/21/20 0900   BP:    121/77   Pulse:    99   Resp:    16   Temp:    97.2 °F (36.2 °C)   TempSrc:    Temporal   SpO2:  97% 98% 97%   Weight: 193 lb 4.8 oz (87.7 kg)      Height:                                                  BP Readings from Last 3 Encounters:   05/21/20 121/77   05/16/20 100/70   05/10/20 126/88       NPO Status: Time of last liquid consumption: 2300                        Time of last solid consumption: 2100                        Date of last liquid consumption: 05/20/20                        Date of last solid food consumption: 05/20/20    BMI:   Wt Readings from Last 3 Encounters:   05/21/20 193 lb 4.8 oz (87.7 kg)   05/16/20 193 lb 9 oz (87.8 kg)   05/10/20 167 lb (75.8 kg)     Body mass index is 26.96 kg/m².     CBC:   Lab Results   Component Value Date    WBC 7.1 05/21/2020    RBC 5.03 05/21/2020    HGB 10.0 05/21/2020    HCT 34.8 05/21/2020    MCV 69.2 05/21/2020    RDW 21.6 05/21/2020     05/21/2020       CMP:   Lab Results   Component Value Date     05/21/2020    K 4.0 05/21/2020    CL 89 05/21/2020    CO2 28 05/21/2020    BUN 25 05/21/2020    CREATININE 1.7 05/21/2020    GFRAA 52 05/21/2020    AGRATIO 1.6 12/01/2014    LABGLOM 43 05/21/2020    GLUCOSE 112 05/21/2020    PROT 6.8 05/21/2020    PROT 7.4 09/22/2012    CALCIUM 9.1 05/21/2020    BILITOT 1.2 05/21/2020    ALKPHOS 115 05/21/2020    AST 68 05/21/2020     05/21/2020       POC Tests:   Recent Labs     05/19/20  1730   POCGLU 230*       Coags:   Lab Results   Component Value Date    PROTIME 17.3 05/20/2020    PROTIME 12.9 04/16/2011    INR 1.42 05/20/2020    APTT 36.4 05/20/2020       HCG (If Applicable): No results found for: PREGTESTUR, PREGSERUM, HCG, HCGQUANT     ABGs:   Lab Results   Component Value Date    PO2ART 538 01/23/2020    DDG4FQD 37.0 01/23/2020    LDU5QNH 20.4 01/23/2020    BEART 1 04/11/2011        Type & Screen (If Applicable):  No results found for: LABABO, [address]    Drug/Infectious Status (If Applicable):  Lab Results   Component Value Date    HEPCAB NON REACTIVE 05/13/2020       COVID-19 Screening (If Applicable): No results found for: COVID19      Anesthesia Evaluation  Patient summary reviewed  Airway: Mallampati: IV  TM distance: >3 FB   Neck ROM: full  Mouth opening: > = 3 FB Dental:          Pulmonary:   (+) COPD:  shortness of breath:  decreased breath sounds, Cardiovascular:    (+) hypertension:, past MI:, CAD:, CABG/stent:, CHF:,         Rhythm: regular                   ROS comment: EF 20%     Neuro/Psych:   (+) psychiatric history:            GI/Hepatic/Renal:   (+) GERD:,           Endo/Other:                     Abdominal:           Vascular:                                        Anesthesia Plan      general     ASA 4       Induction: intravenous. arterial line  MIPS: Postoperative opioids intended. Anesthetic plan and risks discussed with patient. Use of blood products discussed with patient whom consented to blood products. Plan discussed with CRNA.     Attending anesthesiologist reviewed and agrees with Alphonse Lobato MD   5/21/2020

## 2020-05-21 NOTE — PROGRESS NOTES
Infectious Disease Progress Note  2020   Patient Name: Lion Mclean : 1970   Impression  · Abnormal chest CT  ? Given history of mediastinal lymphadenopathy and cigarette smoking malignancy will need to be considered. Scheduled for mediastinoscopy on . · Hyponatremia  · Chronic liver disease  · Congestive hepatopathy  ? Refused EGD  · JAIME  · Multi-morbidity: per PMHx CAD, essential hypertension, COPD, cigarette smoking, Heroin use in remission (clean since ), combined systolic and diastolic heart failure  Plan:  · Continue ceftriaxone  · Further work-up as per oncology and pulmonology services. Ongoing Antimicrobial Therapy  Ceftriaxone ? Completed Antimicrobial Therapy  Vancomycin   Meropenem     levofloxacin   Reason for visit: Follow-up for abnormal chest CT, chronic liver disease, ? History:? Interval history noted  Denies n/v/d/f or untoward effects of antibiotics  Physical Exam:  Vital Signs: /83   Pulse 106   Temp 96 °F (35.6 °C) (Oral)   Resp 17   Ht 5' 11\" (1.803 m)   Wt 193 lb 4.8 oz (87.7 kg)   SpO2 97%   BMI 26.96 kg/m²     Gen: alert and oriented X3, no distress  Skin: no stigmata of endocarditis  Wounds: C/D/I  HEMT: AT/NC Oropharynx pink, moist, and without lesions or exudates; dentition in good state of repair  Eyes: PERRLA, EOMI, conjunctiva pink, sclera anicteric. Neck: Supple. Trachea midline. No LAD. Chest: no distress and CTA. Good air movement. Heart: RRR and holosystolic murmur in mitral area  Abd: soft, ascites no tenderness, no hepatomegaly. Normoactive bowel sounds. Ext: left leg 2+ edema   Catheter Site: without erythema or tenderness  Neuro: Mental status intact. CN 2-12 intact and no focal sensory or motor deficits     Radiologic / Imaging / TESTING  No results found.      Labs:    Recent Results (from the past 24 hour(s))   Hemoglobin A1c    Collection Time: 20 11:39 PM   Result Value Ref Range    Hemoglobin A1C 6.9 (H) coronary artery ([facility] [address]    STEMI (ST elevation myocardial infarction) ([facility] [address]    Acute on chronic combined systolic (congestive) and diastolic (congestive) heart failure (HCC)    Systolic and diastolic CHF w/reduced LV function, NYHA class 4 ([facility] [address]    NSTEMI (non-ST elevated myocardial infarction) ([facility] [address]    Acute on chronic congestive heart failure ([facility] [address]    Noncompliance    Pulmonary edema, acute (HCC)    Acute kidney injury ([facility] [address]    Acute respiratory failure with hypoxia and hypercapnia [facility]    Hypertensive emergency    Ischemic cardiomyopathy    Heart failure ([facility] [address]    Left ventricular systolic dysfunction    Acute systolic congestive heart failure (HCC)    SOB (shortness of breath)    Acute on chronic combined systolic and diastolic heart failure (HCC)    Hyponatremia    Hematemesis       Active Problems  Active Problems:    Hematemesis  Resolved Problems:    * No resolved hospital problems.  *    Electronically signed by: Electronically signed by Daniel Avery MD on 5/21/2020 at 5:03 PM

## 2020-05-21 NOTE — PROGRESS NOTES
Hospitalist Progress Note      Name:  Jhony Ratliff /Age/Sex: 1970  (52 y.o. male)   MRN & CSN:  [id number] & [id number] Admission Date/Time: 2020 10:42 PM   Location:  OR/NONE PCP: No primary care provider on file.        Jhony Ratliff is a 52 y.o.  male  who presents with Shortness of Breath and Hematemesis (recent discharge yesterday.)      Assessment and Plan:   Hematemesis/nausea/vomiting  - clinically stable  - PPI IV BID changed to oral  - pt refused EGD  - monitor H&H stable  - ct abd/pelvis non acute  - GI following     Acute on chronic combined systolic and diastolic heart failure  - neg 3L  - IV bumex changed to oral  - echo ef 20%, grade 3 DD, severe MR  - strict I and O  - toprol  - cardio consulted     HyperK  -resolved  - aldactone stopped  - monitor on tele     Juliet on CKD 3  -stable  - likely increase 2/2 to diuresis  - nephro following  - monitor     HypoNa  - improving     Abnormal CT chest with concern for empyema  - ruled out clinically  - ID consulted     Mediastinal lymphadenopathy on CTA chest    - Oncology consult was placed-CTS consult requested for lymph node biopsy, plan to be done today     Abnormal LFTs possibly from congestion of liver  - Recent liver ultrasound okay.    - Recent hepatitis panel negative.    - Continue to trend LFTs as needed.     DM  - start carb diet  - hba1c: 6.9  - consult endo for med mx            Diet Diet NPO, After Midnight Exceptions are: Sips with Meds   Code Status Full Code     Medications:   Medications:    bumetanide  1 mg Oral BID    aspirin  81 mg Oral Daily    atorvastatin  40 mg Oral Daily    ipratropium-albuterol  1 vial Inhalation 4x Daily    metoprolol succinate  25 mg Oral Daily    sodium chloride flush  10 mL Intravenous 2 times per day    pantoprazole  40 mg Intravenous BID    cefTRIAXone (ROCEPHIN) IV  2 g Intravenous Q24H    hydrOXYzine  50 mg Intramuscular Once    sodium chloride flush  10 mL Intravenous BID    sodium chloride flush  10 mL Intravenous BID      Infusions:    lactated ringers 100 mL/hr at 05/21/20 0915    dextrose       PRN Meds: melatonin, 3 mg, Nightly PRN  glucose, 15 g, PRN  dextrose, 12.5 g, PRN  glucagon (rDNA), 1 mg, PRN  dextrose, 100 mL/hr, PRN  sodium chloride flush, 10 mL, PRN  acetaminophen, 650 mg, Q6H PRN    Or  acetaminophen, 650 mg, Q6H PRN  polyethylene glycol, 17 g, Daily PRN  promethazine, 12.5 mg, Q6H PRN    Or  ondansetron, 4 mg, Q6H PRN  potassium chloride, 40 mEq, PRN    Or  potassium alternative oral replacement, 40 mEq, PRN    Or  potassium chloride, 10 mEq, PRN  magnesium sulfate, 2 g, PRN      Subjective:     Doing ok this am  Objective: Intake/Output Summary (Last 24 hours) at 5/21/2020 0936  Last data filed at 5/20/2020 2128  Gross per 24 hour   Intake 10 ml   Output --   Net 10 ml      Vitals:   Vitals:    05/21/20 0900   BP: 121/77   Pulse: 99   Resp: 16   Temp: 97.2 °F (36.2 °C)   SpO2: 97%     Physical Exam:   Gen:  awake, alert, no apparent distress  Head/Eyes:  Normocephalic atraumatic, EOMI   NECK:   symmetrical, trachea midline  LUNGS: Normal Effort   CARDIOVASCULAR:  Normal rate  ABDOMEN:  non distended  MUSCULOSKELETAL:  ROM WNL  NEUROLOGIC: Alert and Oriented,  Cranial nerves II-XII are grossly intact.    SKIN:  no bruising or bleeding, normal skin color,  no redness      Data:       CBC   Recent Labs     05/19/20  0407 05/20/20  0916 05/21/20  0354   WBC 10.6* 8.4 7.1   HGB 10.2* 10.1* 10.0*   HCT 35.2* 35.0* 34.8*    325 334      BMP   Recent Labs     05/19/20  0407 05/19/20  0841 05/20/20  0916 05/21/20  0354   * 127* 131* 132*   K 6.2* 5.0 4.5 4.0   CL 88* 89* 91* 89*   CO2 20* 24 27 28   BUN 32*  --  27* 25*   CREATININE 1.7*  --  1.8* 1.7*         Electronically signed by Candido Zapata MD on 5/21/2020 at 9:36 AM

## 2020-05-21 NOTE — PLAN OF CARE
Problem: SAFETY  Goal: Free from accidental physical injury  Outcome: Ongoing  Goal: Free from intentional harm  Outcome: Ongoing     Problem: DAILY CARE  Goal: Daily care needs are met  Outcome: Ongoing     Problem: PAIN  Goal: Patient's pain/discomfort is manageable  Outcome: Ongoing     Problem: SKIN INTEGRITY  Goal: Skin integrity is maintained or improved  Outcome: Ongoing     Problem: KNOWLEDGE DEFICIT  Goal: Patient/S.O. demonstrates understanding of disease process, treatment plan, medications, and discharge instructions.   Outcome: Ongoing     Problem: Falls - Risk of:  Goal: Will remain free from falls  Description: Will remain free from falls  Outcome: Ongoing  Goal: Absence of physical injury  Description: Absence of physical injury  Outcome: Ongoing

## 2020-05-22 VITALS
RESPIRATION RATE: 17 BRPM | HEART RATE: 103 BPM | BODY MASS INDEX: 27.58 KG/M2 | TEMPERATURE: 97.4 F | DIASTOLIC BLOOD PRESSURE: 75 MMHG | HEIGHT: 71 IN | WEIGHT: 197 LBS | OXYGEN SATURATION: 98 % | SYSTOLIC BLOOD PRESSURE: 117 MMHG

## 2020-05-22 PROCEDURE — 6370000000 HC RX 637 (ALT 250 FOR IP): Performed by: INTERNAL MEDICINE

## 2020-05-22 PROCEDURE — 2580000003 HC RX 258: Performed by: INTERNAL MEDICINE

## 2020-05-22 PROCEDURE — 99232 SBSQ HOSP IP/OBS MODERATE 35: CPT | Performed by: INTERNAL MEDICINE

## 2020-05-22 PROCEDURE — 94761 N-INVAS EAR/PLS OXIMETRY MLT: CPT

## 2020-05-22 PROCEDURE — 99231 SBSQ HOSP IP/OBS SF/LOW 25: CPT | Performed by: INTERNAL MEDICINE

## 2020-05-22 PROCEDURE — 6370000000 HC RX 637 (ALT 250 FOR IP): Performed by: NURSE PRACTITIONER

## 2020-05-22 PROCEDURE — 94640 AIRWAY INHALATION TREATMENT: CPT

## 2020-05-22 RX ORDER — ALBUTEROL SULFATE 90 UG/1
2 AEROSOL, METERED RESPIRATORY (INHALATION) EVERY 4 HOURS PRN
Status: DISCONTINUED | OUTPATIENT
Start: 2020-05-22 | End: 2020-05-22 | Stop reason: HOSPADM

## 2020-05-22 RX ORDER — PANTOPRAZOLE SODIUM 40 MG/1
40 TABLET, DELAYED RELEASE ORAL
Qty: 60 TABLET | Refills: 0 | Status: ON HOLD | OUTPATIENT
Start: 2020-05-22 | End: 2020-08-12 | Stop reason: HOSPADM

## 2020-05-22 RX ORDER — ALBUTEROL SULFATE 90 UG/1
2 AEROSOL, METERED RESPIRATORY (INHALATION) EVERY 4 HOURS PRN
Qty: 1 INHALER | Refills: 1 | Status: ON HOLD | OUTPATIENT
Start: 2020-05-22 | End: 2020-07-27 | Stop reason: HOSPADM

## 2020-05-22 RX ORDER — ASPIRIN 81 MG/1
81 TABLET ORAL DAILY
Qty: 30 TABLET | Refills: 0 | Status: ON HOLD | OUTPATIENT
Start: 2020-05-22 | End: 2020-10-07 | Stop reason: HOSPADM

## 2020-05-22 RX ORDER — METOPROLOL SUCCINATE 25 MG/1
25 TABLET, EXTENDED RELEASE ORAL DAILY
Qty: 30 TABLET | Refills: 2 | Status: ON HOLD | OUTPATIENT
Start: 2020-05-22 | End: 2020-08-12 | Stop reason: HOSPADM

## 2020-05-22 RX ORDER — BUMETANIDE 1 MG/1
1 TABLET ORAL 2 TIMES DAILY
Qty: 60 TABLET | Refills: 1 | Status: ON HOLD | OUTPATIENT
Start: 2020-05-22 | End: 2020-05-30 | Stop reason: SDUPTHER

## 2020-05-22 RX ORDER — ATORVASTATIN CALCIUM 40 MG/1
40 TABLET, FILM COATED ORAL DAILY
Qty: 30 TABLET | Refills: 0 | Status: ON HOLD | OUTPATIENT
Start: 2020-05-22 | End: 2020-10-07 | Stop reason: HOSPADM

## 2020-05-22 RX ORDER — IPRATROPIUM BROMIDE AND ALBUTEROL SULFATE 2.5; .5 MG/3ML; MG/3ML
1 SOLUTION RESPIRATORY (INHALATION) 4 TIMES DAILY
Qty: 360 ML | Refills: 1 | Status: ON HOLD | OUTPATIENT
Start: 2020-05-22 | End: 2020-10-07 | Stop reason: SDUPTHER

## 2020-05-22 RX ADMIN — ALBUTEROL SULFATE 2 PUFF: 90 AEROSOL, METERED RESPIRATORY (INHALATION) at 05:54

## 2020-05-22 RX ADMIN — ATORVASTATIN CALCIUM 40 MG: 40 TABLET, FILM COATED ORAL at 09:39

## 2020-05-22 RX ADMIN — METOPROLOL SUCCINATE 25 MG: 25 TABLET, EXTENDED RELEASE ORAL at 09:40

## 2020-05-22 RX ADMIN — BUMETANIDE 1 MG: 1 TABLET ORAL at 09:40

## 2020-05-22 RX ADMIN — ALBUTEROL SULFATE 2 PUFF: 90 AEROSOL, METERED RESPIRATORY (INHALATION) at 00:43

## 2020-05-22 RX ADMIN — ASPIRIN 81 MG: 81 TABLET, COATED ORAL at 09:39

## 2020-05-22 RX ADMIN — SODIUM CHLORIDE, PRESERVATIVE FREE 10 ML: 5 INJECTION INTRAVENOUS at 09:40

## 2020-05-22 RX ADMIN — IPRATROPIUM BROMIDE AND ALBUTEROL SULFATE 3 ML: .5; 3 SOLUTION RESPIRATORY (INHALATION) at 07:32

## 2020-05-22 ASSESSMENT — PAIN SCALES - GENERAL: PAINLEVEL_OUTOF10: 0

## 2020-05-22 NOTE — DISCHARGE SUMMARY
Rosmery Walton 1970 [id number]  PCP:  No primary care provider on file. Admit date: 5/17/2020  Admitting Physician: Rebekah Cleaning MD    Discharge date: 5/22/2020 Discharge Physician: Jase Blanchard MD         Hospital Course and Discharge Diagnoses Include:    Hematemesis/nausea/vomiting  - clinically stable  - PPI IV BID changed to oral  - pt refused EGD  - monitor H&H stable  - ct abd/pelvis non acute  - GI following, f/u outpt since he refused EGD     Acute on chronic combined systolic and diastolic heart failure  - neg 3L  - IV bumex changed to oral  - echo ef 20%, grade 3 DD, severe MR  - strict I and O  - toprol  - cardio consulted     HyperK  -resolved  - aldactone stopped  - monitor on tele     Juliet on CKD 3  -stable  - likely increase 2/2 to diuresis  - d/c lisinopril  - nephro following  - monitor     HypoNa  - improving     Abnormal CT chest with concern for empyema  - ruled out clinically  - ID consulted     Mediastinal lymphadenopathy on CTA chest    - Oncology consult was placed-CTS consult requested for lymph node biopsy, pt refused this as well. - needs PET scan outpt since he refused bx inpt.     Abnormal LFTs possibly from congestion of liver  - Recent liver ultrasound okay.    - Recent hepatitis panel negative.    - Continue to trend LFTs as needed.     DM  - start carb diet  - hba1c: 6.9  - consult endo for med mx        Physical Exam on Discharge date: 05/22/20  Gen:  awake, alert, no apparent distress  Head/Eyes:  Normocephalic atraumatic, EOMI   NECK:   symmetrical, trachea midline  LUNGS: Normal Effort   CARDIOVASCULAR:  Normal rate  ABDOMEN:  non distended  MUSCULOSKELETAL:  ROM WNL  NEUROLOGIC: Alert and Oriented,  Cranial nerves II-XII are grossly intact.    SKIN:  no bruising or bleeding, normal skin color,  no redness    Procedures:  See above  Ct Abdomen Pelvis Wo Contrast    Result Date: 5/10/2020  EXAMINATION: CTA OF THE CHEST; CT OF THE ABDOMEN AND PELVIS WITHOUT CONTRAST 5/10/2020 12:32 pm TECHNIQUE: CTA of the chest was performed after the administration of intravenous contrast.  Multiplanar reformatted images are provided for review. MIP images are provided for review. Dose modulation, iterative reconstruction, and/or weight based adjustment of the mA/kV was utilized to reduce the radiation dose to as low as reasonably achievable.; CT of the abdomen and pelvis was performed without the administration of intravenous contrast. Multiplanar reformatted images are provided for review. Dose modulation, iterative reconstruction, and/or weight based adjustment of the mA/kV was utilized to reduce the radiation dose to as low as reasonably achievable. COMPARISON: None HISTORY: ORDERING SYSTEM PROVIDED HISTORY: sob TECHNOLOGIST PROVIDED HISTORY: Reason for exam:->sob Reason for Exam: shortness of breath, edema; ORDERING SYSTEM PROVIDED HISTORY: Abd pain TECHNOLOGIST PROVIDED HISTORY: Reason for exam:->Abd pain Reason for Exam: Abd pain, edema FINDINGS: CTA pulmonary: Pulmonary Arteries: Pulmonary arteries are adequately opacified for evaluation. No evidence of intraluminal filling defect to suggest pulmonary embolism. Main pulmonary artery is normal in caliber. Mediastinum: Redemonstration of mediastinal and hilar lymphadenopathy with representative nodes including a 1.8 cm pretracheal node, 1.1 cm AP window node, and 1.4 cm right hilar node. Lymphadenopathy was also noted on previous exam dated March 11, 2020. Again, lymphoproliferative disorder or metastatic disease cannot be excluded. Heart is stable in size and remains enlarged. No pericardial effusion. The thoracic aorta is normal in caliber. Coronary artery atherosclerotic disease. Lungs/pleura: Redemonstration of loculated pleural fluid along the right major fissure. This appears similar to recent CT. Mild bibasilar atelectasis. No focal consolidation identified. No pneumothorax.   Calcified granuloma within the superior aspect of the left lower lobe. Soft Tissues/Bones: No acute osseous or soft tissue abnormality. Postoperative changes of median sternotomy. Several prominent left axillary lymph nodes which are upper limits of normal in size. Abdomen/Pelvis: Organs: Evaluation of the solid organs is limited due to lack of intravenous contrast.  The nonenhanced liver and gallbladder appear grossly unremarkable. No biliary dilatation. Several calcified granulomata within the spleen. The nonenhanced pancreas and bilateral adrenal glands demonstrate no acute abnormality. The kidneys are symmetric in size. No hydronephrosis. GI/Bowel: No bowel obstruction identified. The colon is partially collapsed and therefore not well evaluated. The appendix is unremarkable. Pelvis: Bladder and solid pelvic organs demonstrate no acute abnormality. Peritoneum/Retroperitoneum: Moderate to severe calcified atherosclerotic plaque throughout the aorta. Multiple shotty non pathologically enlarged retroperitoneal lymph nodes. Few mildly prominent nodes are also identified along the iliac change bilaterally with mildly enlarged 1 cm node along the distal left iliac chain (image 89 series 2). Multiple shotty non pathologically enlarged inguinal nodes are identified on the right and mildly enlarged left inguinal node is present measuring up to 1.2 cm in short axis dimension (image 115 series 2). No free fluid or free air identified within the abdomen or pelvis. Bones/Soft Tissues: No acute osseous or soft tissue abnormality is identified. 1. No evidence for pulmonary embolism. 2. Stable cardiomegaly. 3. Redemonstration of mediastinal and hilar lymphadenopathy is noted on previous exams. Again, lymphoproliferative disorder or metastatic disease cannot be excluded. 4. Similar appearance of loculated fluid along the right major fissure. 5. No acute intra-process identified.  6. A few mildly enlarged nodes along the left iliac chain and at the left inguinal region which are nonspecific. 7. Atherosclerotic disease. Ct Abdomen Pelvis W Iv Contrast Additional Contrast? None    Result Date: 5/18/2020  EXAMINATION: CT OF THE ABDOMEN AND PELVIS WITH CONTRAST 5/17/2020 11:53 pm TECHNIQUE: CT of the abdomen and pelvis was performed with the administration of intravenous contrast. Multiplanar reformatted images are provided for review. Dose modulation, iterative reconstruction, and/or weight based adjustment of the mA/kV was utilized to reduce the radiation dose to as low as reasonably achievable. COMPARISON: 05/10/2020. HISTORY: ORDERING SYSTEM PROVIDED HISTORY: Hematemsis TECHNOLOGIST PROVIDED HISTORY: Reason for exam:->Hematemsis Additional Contrast?->None Reason for Exam: abd pain mid line FINDINGS: Lower Chest: Small loculated fluid collection interhemispheric fissure right lower thorax. No focal consolidation. Organs: The visualized portions of the liver, gallbladder, spleen, pancreas and adrenal glands demonstrate no acute abnormality. No biliary ductal dilatation. The kidneys appear normal in size and demonstrate symmetric enhancement. No focal renal mass. No hydronephrosis. No perinephric stranding. Multiple nonobstructing renal calculi. Multiple renal cortical scars right kidney upper pole. GI/Bowel: No bowel dilatation to suggest obstruction. No evidence of acute appendicitis. Pelvis: The urinary bladder appears unremarkable. The pelvic organs demonstrate no acute abnormality. Peritoneum/Retroperitoneum: The abdominal aorta is normal in caliber. Extensive atherosclerotic change. Mild ascites. Bones/Soft Tissues: No acute findings. Mild ascites. Please correlate for indicators of liver failure.      Xr Chest Portable    Result Date: 5/17/2020  EXAMINATION: ONE XRAY VIEW OF THE CHEST 5/17/2020 11:11 pm COMPARISON: Studies back to the 02/07/2020 HISTORY: ORDERING SYSTEM PROVIDED HISTORY: PAin TECHNOLOGIST PROVIDED HISTORY: Reason for exam:->PAin Reason for Exam: chest pain Acuity: Acute Type of Exam: Initial FINDINGS: Again seen is a focal opacity at the right lung base, stable. Lungs are otherwise clear. No pneumothorax or pleural effusion. Mild cardiac enlargement. Median sternotomy wires. No pleural effusion or pneumothorax. Stable focal wedge-like atelectatic change right lung base. Xr Chest Portable    Result Date: 5/10/2020  EXAMINATION: ONE X-RAY VIEW OF THE CHEST 5/10/2020 10:41 am COMPARISON: 03/11/2020 HISTORY: ORDERING SYSTEM PROVIDED HISTORY: SOB TECHNOLOGIST PROVIDED HISTORY: Reason for exam:->SOB Reason for Exam: SOB Acuity: Acute Type of Exam: Initial Additional signs and symptoms: Patient brought himself in for SOB for 3 days. Hx of CHF. Pt ran out of Lasix yesterday. Patient displays pitting, pedal edema. Patient states he has been diuresing less than normal. FINDINGS: The heart is enlarged. Sternal wires are in place. There is some atelectasis or small amount of fluid in the major fissures/CP angle on the right. This is improved when correlated to 2 months earlier. The lungs are otherwise unremarkable. Decreasing fluid in the right pleural space/major fissure. Cardiomegaly. Xr Chest 1 Vw    Result Date: 5/13/2020  EXAMINATION: ONE XRAY VIEW OF THE CHEST 5/13/2020 11:33 am COMPARISON: 05/10/2020 HISTORY: ORDERING SYSTEM PROVIDED HISTORY: shortness of breath TECHNOLOGIST PROVIDED HISTORY: Reason for exam:->shortness of breath Reason for Exam: bilat lower extremities and abd swelling Acuity: Chronic Type of Exam: Ongoing Relevant Medical/Surgical History: chf    copd    cad FINDINGS: Status post median sternotomy. Stable cardiomegaly. Slightly increasing small right pleural effusion with adjacent atelectasis. The left costophrenic angle sharp. No pneumothorax. No overt pulmonary edema. The osseous structures are stable. Slightly increasing small right pleural effusion with adjacent atelectasis. Cta Pulmonary W Contrast    Result Date: 5/18/2020  EXAMINATION: CTA OF THE CHEST 5/17/2020 11:28 pm TECHNIQUE: CTA of the chest was performed after the administration of intravenous contrast.  Multiplanar reformatted images are provided for review. MIP images are provided for review. Dose modulation, iterative reconstruction, and/or weight based adjustment of the mA/kV was utilized to reduce the radiation dose to as low as reasonably achievable. COMPARISON: 05/10/2020 and 03/11/2020 HISTORY: ORDERING SYSTEM PROVIDED HISTORY: SOB. CP TECHNOLOGIST PROVIDED HISTORY: Reason for exam:->SOB. CP Reason for Exam: sob/janelle d-dimer FINDINGS: Pulmonary Arteries: Pulmonary arteries are adequately opacified for evaluation. No evidence of intraluminal filling defect to suggest pulmonary embolism. Main pulmonary artery is normal in caliber. Mediastinum: Unchanged mediastinal adenopathy. Unchanged mildly enlarged left greater than right axillary lymph nodes. The heart is enlarged and unchanged. Lungs/pleura: There is multiloculated pleural fluid in the inferolateral extent of the right major fissure. There is mildly irregular pleural thickening with adjacent subpleural parenchymal disease. The multiloculated pleural thickening and associated lung disease has increased to a mild degree. Pleural fluid appears mildly heterogeneous/complex. There is increasing adjacent right perihepatic fluid. Upper Abdomen: Increasing perihepatic low-attenuation fluid. New trace perisplenic fluid. Soft Tissues/Bones: Prior sternal splitting procedure. No acute bone finding. No evidence of an acute pulmonary embolus. Right basilar pleural and parenchymal lung disease with multiloculated pleural fluid primarily in the major fissure, increasing to a mild degree from 05/10/2020. Complex pleural fluid is suspected and developing empyema must be considered.   Image guided aspiration of the multiloculated right pleural fluid collection would be helpful in further evaluation. There is increasing thickening and irregularity of the pleura and adjacent right hemidiaphragm. There is new right perihepatic low-attenuation fluid, likely reactive. Follow-up is recommended. Stable mediastinal adenopathy. Unchanged prominent left-greater-than-right axillary lymph nodes. Cta Pulmonary W Contrast    Result Date: 5/10/2020  EXAMINATION: CTA OF THE CHEST; CT OF THE ABDOMEN AND PELVIS WITHOUT CONTRAST 5/10/2020 12:32 pm TECHNIQUE: CTA of the chest was performed after the administration of intravenous contrast.  Multiplanar reformatted images are provided for review. MIP images are provided for review. Dose modulation, iterative reconstruction, and/or weight based adjustment of the mA/kV was utilized to reduce the radiation dose to as low as reasonably achievable.; CT of the abdomen and pelvis was performed without the administration of intravenous contrast. Multiplanar reformatted images are provided for review. Dose modulation, iterative reconstruction, and/or weight based adjustment of the mA/kV was utilized to reduce the radiation dose to as low as reasonably achievable. COMPARISON: None HISTORY: ORDERING SYSTEM PROVIDED HISTORY: sob TECHNOLOGIST PROVIDED HISTORY: Reason for exam:->sob Reason for Exam: shortness of breath, edema; ORDERING SYSTEM PROVIDED HISTORY: Abd pain TECHNOLOGIST PROVIDED HISTORY: Reason for exam:->Abd pain Reason for Exam: Abd pain, edema FINDINGS: CTA pulmonary: Pulmonary Arteries: Pulmonary arteries are adequately opacified for evaluation. No evidence of intraluminal filling defect to suggest pulmonary embolism. Main pulmonary artery is normal in caliber. Mediastinum: Redemonstration of mediastinal and hilar lymphadenopathy with representative nodes including a 1.8 cm pretracheal node, 1.1 cm AP window node, and 1.4 cm right hilar node. Lymphadenopathy was also noted on previous exam dated March 11, 2020.   Again, lymphoproliferative disorder or metastatic disease cannot be excluded. Heart is stable in size and remains enlarged. No pericardial effusion. The thoracic aorta is normal in caliber. Coronary artery atherosclerotic disease. Lungs/pleura: Redemonstration of loculated pleural fluid along the right major fissure. This appears similar to recent CT. Mild bibasilar atelectasis. No focal consolidation identified. No pneumothorax. Calcified granuloma within the superior aspect of the left lower lobe. Soft Tissues/Bones: No acute osseous or soft tissue abnormality. Postoperative changes of median sternotomy. Several prominent left axillary lymph nodes which are upper limits of normal in size. Abdomen/Pelvis: Organs: Evaluation of the solid organs is limited due to lack of intravenous contrast.  The nonenhanced liver and gallbladder appear grossly unremarkable. No biliary dilatation. Several calcified granulomata within the spleen. The nonenhanced pancreas and bilateral adrenal glands demonstrate no acute abnormality. The kidneys are symmetric in size. No hydronephrosis. GI/Bowel: No bowel obstruction identified. The colon is partially collapsed and therefore not well evaluated. The appendix is unremarkable. Pelvis: Bladder and solid pelvic organs demonstrate no acute abnormality. Peritoneum/Retroperitoneum: Moderate to severe calcified atherosclerotic plaque throughout the aorta. Multiple shotty non pathologically enlarged retroperitoneal lymph nodes. Few mildly prominent nodes are also identified along the iliac change bilaterally with mildly enlarged 1 cm node along the distal left iliac chain (image 89 series 2). Multiple shotty non pathologically enlarged inguinal nodes are identified on the right and mildly enlarged left inguinal node is present measuring up to 1.2 cm in short axis dimension (image 115 series 2). No free fluid or free air identified within the abdomen or pelvis.  Bones/Soft Tissues: No acute osseous or soft tissue abnormality is identified. 1. No evidence for pulmonary embolism. 2. Stable cardiomegaly. 3. Redemonstration of mediastinal and hilar lymphadenopathy is noted on previous exams. Again, lymphoproliferative disorder or metastatic disease cannot be excluded. 4. Similar appearance of loculated fluid along the right major fissure. 5. No acute intra-process identified. 6. A few mildly enlarged nodes along the left iliac chain and at the left inguinal region which are nonspecific. 7. Atherosclerotic disease. Us Abdomen Limited Specify Organ? Gallbladder, Liver    Result Date: 5/13/2020  EXAMINATION: RIGHT UPPER QUADRANT ULTRASOUND 5/13/2020 1:43 pm COMPARISON: CT abdomen and pelvis 05/10/2020. Right upper quadrant ultrasound 01/23/2020. HISTORY: ORDERING SYSTEM PROVIDED HISTORY: elevated lfts; concern for gallbladder issue TECHNOLOGIST PROVIDED HISTORY: Reason for exam:->elevated lfts; concern for gallbladder issue Specify organ?->GALLBLADDER Specify organ?->LIVER Reason for Exam: elevated LFTS Acuity: Acute Type of Exam: Initial Relevant Medical/Surgical History: previous US 1/23/20 FINDINGS: LIVER:  The liver demonstrates normal echogenicity without evidence of intrahepatic biliary ductal dilatation. BILIARY SYSTEM:  Gallbladder appears relatively contracted somewhat limiting evaluation. Diffuse gallbladder wall thickening, nonspecific measuring up to 7 mm, improved from prior ultrasound. No stones or pericholecystic fluid noted. Negative sonographic Dsouza's sign. Common bile duct is within normal limits measuring 3 mm. RIGHT KIDNEY: The right kidney is grossly unremarkable without evidence of hydronephrosis. PANCREAS:  Visualized portions of the pancreas are unremarkable. OTHER: No evidence of right upper quadrant ascites. Nonspecific wall thickening to relatively contracted gallbladder without other gallbladder abnormality noted.  Otherwise unremarkable exam. Significant Diagnostic Studies at discharge:   CBC:   Lab Results   Component Value Date    WBC 7.1 05/21/2020    RBC 5.03 05/21/2020    HGB 10.0 05/21/2020    HCT 34.8 05/21/2020    MCV 69.2 05/21/2020    MCH 19.9 05/21/2020    MCHC 28.7 05/21/2020    RDW 21.6 05/21/2020     05/21/2020    MPV 9.9 05/21/2020       Patient Instructions:     Medication List      START taking these medications    pantoprazole 40 MG tablet  Commonly known as:  PROTONIX  Take 1 tablet by mouth 2 times daily (before meals)        CONTINUE taking these medications    albuterol sulfate  (90 Base) MCG/ACT inhaler  Commonly known as:  Proventil HFA  Inhale 2 puffs into the lungs every 4 hours as needed for Wheezing or Shortness of Breath With spacer (and mask if indicated). Thanks.      aspirin 81 MG EC tablet  Take 1 tablet by mouth daily     atorvastatin 40 MG tablet  Commonly known as:  LIPITOR  Take 1 tablet by mouth daily     bumetanide 1 MG tablet  Commonly known as:  BUMEX  Take 1 tablet by mouth 2 times daily     ipratropium-albuterol 0.5-2.5 (3) MG/3ML Soln nebulizer solution  Commonly known as:  DuoNeb  Inhale 3 mLs into the lungs 4 times daily     metoprolol succinate 25 MG extended release tablet  Commonly known as:  TOPROL XL  Take 1 tablet by mouth daily        STOP taking these medications    lisinopril 5 MG tablet  Commonly known as:  PRINIVIL;ZESTRIL     nitroGLYCERIN 0.4 MG SL tablet  Commonly known as:  NITROSTAT     spironolactone 25 MG tablet  Commonly known as:  ALDACTONE           Where to Get Your Medications      These medications were sent to [address] [place] [address]    Phone:  [phone]   · albuterol sulfate  (90 Base) MCG/ACT inhaler  · aspirin 81 MG EC tablet  · atorvastatin 40 MG tablet  · bumetanide 1 MG tablet  · ipratropium-albuterol 0.5-2.5 (3) MG/3ML Soln nebulizer solution  · metoprolol succinate 25 MG extended release tablet  · pantoprazole 40 MG tablet            Code Status: Full Code     Consults:   IP CONSULT TO HOSPITALIST  IP CONSULT TO GI  IP CONSULT TO NEPHROLOGY  IP CONSULT TO ONCOLOGY  IP CONSULT TO INFECTIOUS DISEASES  IP CONSULT TO CARDIOTHORACIC SURGERY  IP CONSULT TO CARDIOLOGY  IP CONSULT TO CARDIOLOGY  IP CONSULT TO ENDOCRINOLOGY    Diet: cardiac diet    Activity: activity as tolerated   Work:    Discharged Condition: good    Prognosis: Fair - Good    Disposition: home      Follow-up with   1. PCP within   5-7    Days    Follow up labs: none       Discharge Physician Signed: Mag Nieves M.D. The patient was seen and examined on day of discharge and this discharge summary is in conjunction with any daily progress note from day of discharge.   Time spent on discharge in the examination, evaluation, counseling and review of medications and discharge plan: 34 minutes

## 2020-05-22 NOTE — PROGRESS NOTES
Nephrology Progress Note        [address], [address] [place] [address]  Phone: [phone]  Office Hours: 8:30AM - 4:30PM  Monday - Friday       [facility]  Abby Sanford MD  [address], DO  [address],  [place]  [facility] [facility], [place] [address]  PHONE: [phone]  FAX: [phone]    5/22/2020 7:02 AM  Subjective:   Admit Date: 5/17/2020  PCP: No primary care provider on file. Interval History:   He declined am labs today and also declined the mediastinoscopy yesterday  Doing ok      Diet: DIET GENERAL; No Added Salt (3-4 GM); Daily Fluid Restriction: 1500 ml      Data:   Scheduled Meds:   pantoprazole  40 mg Oral BID AC    bumetanide  1 mg Oral BID    aspirin  81 mg Oral Daily    atorvastatin  40 mg Oral Daily    ipratropium-albuterol  1 vial Inhalation 4x Daily    metoprolol succinate  25 mg Oral Daily    sodium chloride flush  10 mL Intravenous 2 times per day    cefTRIAXone (ROCEPHIN) IV  2 g Intravenous Q24H    hydrOXYzine  50 mg Intramuscular Once    sodium chloride flush  10 mL Intravenous BID    sodium chloride flush  10 mL Intravenous BID     Continuous Infusions:   dextrose       PRN Meds:albuterol sulfate HFA, melatonin, glucose, dextrose, glucagon (rDNA), dextrose, sodium chloride flush, acetaminophen **OR** acetaminophen, polyethylene glycol, promethazine **OR** ondansetron, potassium chloride **OR** potassium alternative oral replacement **OR** potassium chloride, magnesium sulfate  I/O last 3 completed shifts: In: 1 [P.O.:720; I.V.:250]  Out: -   No intake/output data recorded.     Intake/Output Summary (Last 24 hours) at 5/22/2020 0702  Last data filed at 5/21/2020 1850  Gross per 24 hour   Intake 970 ml   Output --   Net 970 ml       CBC:   Recent Labs     05/20/20  0916 05/21/20  0354   WBC 8.4 7.1   HGB 10.1* 10.0*    334       BMP:    Recent Labs     05/19/20  0841 05/20/20  0916 05/21/20  0354   * 131* 132* K 5.0 4.5 4.0   CL 89* 91* 89*   CO2 24 27 28   BUN  --  27* 25*   CREATININE  --  1.8* 1.7*   GLUCOSE  --  111* 112*     Hepatic:   Recent Labs     05/20/20  0916 05/21/20  0354   AST 80* 68*   * 144*   BILITOT 1.3* 1.2*   ALKPHOS 107 115     Troponin: No results for input(s): TROPONINI in the last 72 hours. BNP: No results for input(s): BNP in the last 72 hours. Lipids: No results for input(s): CHOL, HDL in the last 72 hours.     Invalid input(s): LDLCALCU  ABGs:   Lab Results   Component Value Date    PO2ART 538 01/23/2020    WHX4NFK 37.0 01/23/2020     INR:   Recent Labs     05/20/20  0916   INR 1.42       Objective:   Vitals: /74   Pulse 104   Temp 97.8 °F (36.6 °C) (Oral)   Resp 18   Ht 5' 11\" (1.803 m)   Wt 197 lb (89.4 kg)   SpO2 98%   BMI 27.48 kg/m²   General appearance: , in no acute distress  HEENT: normocephalic, atraumatic,   Neck: supple, trachea midline  Lungs:  breathing comfortably  Abdomen:  non distended,  Extremities: ble edema is at baseline      Assessment and Plan:   Hyponatremia: sodium 122, from fluid overload//resolved  Hyperkalemia; resolved  JAIME  Acute on chronic systolic CHF  Hematemesis  Transaminitis  Anemia/ GI bleed suspected; pt declined EGD  Mediastinal lymphadenopathy     Suggest  Cr stable, sodium level is better  Continue po bumex  Stop RAAS backing agents due to hyperkalemia  Avoid nephrotoxins  Renally dose mes  Oral fluid restriction    Will sign off today so call me back if needed  Thank you                          Electronically signed by Tana Miller DO on 5/22/2020 at 7:02 AM    MD Amna Kinney DO [address]  [place], [place] [address]  PHONE: [phone]  FAX: [phone]

## 2020-05-22 NOTE — PROGRESS NOTES
Infectious Disease Progress Note  2020   Patient Name: Chet Tobar : 1970   Impression  · Abnormal chest CT  ? Given history of mediastinal lymphadenopathy and cigarette smoking malignancy will need to be considered. Scheduled for mediastinoscopy on . · Hyponatremia  · Chronic liver disease  · Congestive hepatopathy  ? Refused EGD  · JAIME  · Multi-morbidity: per PMHx CAD, essential hypertension, COPD, cigarette smoking, Heroin use in remission (clean since ), combined systolic and diastolic heart failure  Plan:  · discontinue ceftriaxone  · Further work-up as per oncology and pulmonology services. · Will sign off and not follow the patient actively, please reconsult as needed. ·     Ongoing Antimicrobial Therapy  ? Completed Antimicrobial Therapy  Vancomycin   Meropenem     levofloxacin   Ceftriaxone   Reason for visit: Follow-up for abnormal chest CT, chronic liver disease, ? History:? Interval history noted  Denies n/v/d/f or untoward effects of antibiotics  Physical Exam:  Vital Signs: /74   Pulse 104   Temp 97.8 °F (36.6 °C) (Oral)   Resp 18   Ht 5' 11\" (1.803 m)   Wt 197 lb (89.4 kg)   SpO2 97%   BMI 27.48 kg/m²     Gen: alert and oriented X3, no distress  Skin: no stigmata of endocarditis  Wounds: C/D/I  HEMT: AT/NC Oropharynx pink, moist, and without lesions or exudates; dentition in good state of repair  Eyes: PERRLA, EOMI, conjunctiva pink, sclera anicteric. Neck: Supple. Trachea midline. No LAD. Chest: no distress and CTA. Good air movement. Heart: RRR and holosystolic murmur in mitral area  Abd: soft, ascites no tenderness, no hepatomegaly. Normoactive bowel sounds. Ext: left leg 2+ edema   Catheter Site: without erythema or tenderness  Neuro: Mental status intact. CN 2-12 intact and no focal sensory or motor deficits     Radiologic / Imaging / TESTING  No results found.      Labs:    No results found for this or any previous visit (from the past 24 hour(s)). CULTURE results: Invalid input(s): BLOOD CULTURE,  URINE CULTURE, SURGICAL CULTURE    Diagnosis:  Patient Active Problem List   Diagnosis    Essential hypertension    Osteoarthritis    [facility] obstructive [facility] disease) [facility]    Paresthesia of left leg    Abdominal hernia    Left shoulder pain    Crushing injury of right hand    Rotator cuff tendinitis    Midline low back pain without sciatica    History of MI (myocardial infarction)    Coronary artery disease involving coronary bypass graft of native heart without angina pectoris    Mixed hyperlipidemia    Depression    Chronic midline low back pain without sciatica    Tobacco abuse    Gastroesophageal reflux disease without esophagitis    Opiate abuse, continuous (HCC)    Heroin dependence ([facility]    ST elevation myocardial infarction involving left circumflex coronary artery ([facility]    STEMI (ST elevation myocardial infarction) ([facility]    Acute on chronic combined systolic (congestive) and diastolic (congestive) heart failure (HCC)    Systolic and diastolic CHF w/reduced LV function, NYHA class 4 ([facility]    NSTEMI (non-ST elevated myocardial infarction) ([facility]    Acute on chronic congestive heart failure ([facility]    Noncompliance    Pulmonary edema, acute (HCC)    Acute kidney injury ([facility]    Acute respiratory failure with hypoxia and hypercapnia ([facility])    Hypertensive emergency    Ischemic cardiomyopathy    Heart failure ([facility]    Left ventricular systolic dysfunction    Acute systolic congestive heart failure (HCC)    SOB (shortness of breath)    Acute on chronic combined systolic and diastolic heart failure ([facility])    Hyponatremia    Hematemesis       Active Problems  Active Problems:    Hematemesis  Resolved Problems:    * No resolved hospital problems.  *    Electronically signed by: Electronically signed by Karol Person MD on 5/22/2020 at 9:16 AM

## 2020-05-22 NOTE — PROGRESS NOTES
He was scheduled yesterday for mediastinoscopy and refused the procedure. I discussed with Dr Jose Perez who plans PET as oustpatient. I explained to patient about PET scan. He could have reactive lymph adenopathy. I strongly advise him to follow up with Dr Jose Perez and me after PET scan. Thanks.

## 2020-05-22 NOTE — PROGRESS NOTES
Patient refused labs this am, told tech to come back later.   Tried to educate and patient stated no later

## 2020-05-24 ENCOUNTER — HOSPITAL ENCOUNTER (EMERGENCY)
Age: 50
Discharge: HOME | End: 2020-05-24
Attending: EMERGENCY MEDICINE
Payer: MEDICARE

## 2020-05-24 ENCOUNTER — APPOINTMENT (OUTPATIENT)
Dept: GENERAL RADIOLOGY | Age: 50
End: 2020-05-24
Payer: MEDICARE

## 2020-05-24 VITALS
SYSTOLIC BLOOD PRESSURE: 106 MMHG | HEART RATE: 99 BPM | HEIGHT: 71 IN | BODY MASS INDEX: 26.88 KG/M2 | RESPIRATION RATE: 19 BRPM | DIASTOLIC BLOOD PRESSURE: 81 MMHG | TEMPERATURE: 97.5 F | WEIGHT: 192 LBS | OXYGEN SATURATION: 95 %

## 2020-05-24 LAB
ANION GAP SERPL CALCULATED.3IONS-SCNC: 14 MMOL/L (ref 4–16)
BASOPHILS ABSOLUTE: 0.1 K/CU MM
BASOPHILS RELATIVE PERCENT: 1.2 % (ref 0–1)
BUN BLDV-MCNC: 34 MG/DL (ref 6–23)
CALCIUM SERPL-MCNC: 9.1 MG/DL (ref 8.3–10.6)
CHLORIDE BLD-SCNC: 88 MMOL/L (ref 99–110)
CO2: 27 MMOL/L (ref 21–32)
CREAT SERPL-MCNC: 1.7 MG/DL (ref 0.9–1.3)
DIFFERENTIAL TYPE: ABNORMAL
EOSINOPHILS ABSOLUTE: 0.3 K/CU MM
EOSINOPHILS RELATIVE PERCENT: 4.3 % (ref 0–3)
GFR AFRICAN AMERICAN: 52 ML/MIN/1.73M2
GFR NON-AFRICAN AMERICAN: 43 ML/MIN/1.73M2
GLUCOSE BLD-MCNC: 119 MG/DL (ref 70–99)
HCT VFR BLD CALC: 32.2 % (ref 42–52)
HEMOGLOBIN: 9.1 GM/DL (ref 13.5–18)
IMMATURE NEUTROPHIL %: 0.7 % (ref 0–0.43)
LYMPHOCYTES ABSOLUTE: 1.4 K/CU MM
LYMPHOCYTES RELATIVE PERCENT: 18.8 % (ref 24–44)
MCH RBC QN AUTO: 19.4 PG (ref 27–31)
MCHC RBC AUTO-ENTMCNC: 28.3 % (ref 32–36)
MCV RBC AUTO: 68.7 FL (ref 78–100)
MONOCYTES ABSOLUTE: 0.6 K/CU MM
MONOCYTES RELATIVE PERCENT: 8.4 % (ref 0–4)
PDW BLD-RTO: 20.9 % (ref 11.7–14.9)
PLATELET # BLD: 294 K/CU MM (ref 140–440)
PMV BLD AUTO: 9.3 FL (ref 7.5–11.1)
POTASSIUM SERPL-SCNC: 3.9 MMOL/L (ref 3.5–5.1)
PRO-BNP: 4250 PG/ML
RBC # BLD: 4.69 M/CU MM (ref 4.6–6.2)
SEGMENTED NEUTROPHILS ABSOLUTE COUNT: 5 K/CU MM
SEGMENTED NEUTROPHILS RELATIVE PERCENT: 66.6 % (ref 36–66)
SODIUM BLD-SCNC: 129 MMOL/L (ref 135–145)
TOTAL IMMATURE NEUTOROPHIL: 0.05 K/CU MM
TROPONIN T: <0.01 NG/ML
WBC # BLD: 7.5 K/CU MM (ref 4–10.5)

## 2020-05-24 PROCEDURE — 84484 ASSAY OF TROPONIN QUANT: CPT

## 2020-05-24 PROCEDURE — 71045 X-RAY EXAM CHEST 1 VIEW: CPT

## 2020-05-24 PROCEDURE — 80048 BASIC METABOLIC PNL TOTAL CA: CPT

## 2020-05-24 PROCEDURE — 83880 ASSAY OF NATRIURETIC PEPTIDE: CPT

## 2020-05-24 PROCEDURE — 85025 COMPLETE CBC W/AUTO DIFF WBC: CPT

## 2020-05-24 PROCEDURE — 93005 ELECTROCARDIOGRAM TRACING: CPT | Performed by: EMERGENCY MEDICINE

## 2020-05-24 PROCEDURE — 99285 EMERGENCY DEPT VISIT HI MDM: CPT

## 2020-05-24 ASSESSMENT — ENCOUNTER SYMPTOMS
WHEEZING: 0
ABDOMINAL PAIN: 0
SHORTNESS OF BREATH: 1
SORE THROAT: 0
GASTROINTESTINAL NEGATIVE: 1
VOMITING: 0
SPUTUM PRODUCTION: 0
HEMOPTYSIS: 0
COUGH: 1
EYES NEGATIVE: 1
SWOLLEN GLANDS: 0

## 2020-05-25 PROCEDURE — 93010 ELECTROCARDIOGRAM REPORT: CPT | Performed by: INTERNAL MEDICINE

## 2020-05-25 NOTE — ED NOTES
The client is alert, makes eye contact when prompted, and engages in conversation appropriately. This individual's breathing is unlabored and they are able to speak in clear, complete sentences. When breathing, their chest expansion is symmetrical. Their skin texture is warm & dry, extremities & facial expressions are relaxed, and are able to obey commands or move any unaffected extremities at will. The client is observed to ambulate with a steady gait, exhibiting no shuffling or hesitation with steps, and performs this task with no assistance from a cane or walker or crutches.      [facility], [place]  05/24/20 9202

## 2020-05-25 NOTE — ED PROVIDER NOTES
The history is provided by the patient. Shortness of Breath   Severity:  Moderate  Onset quality:  Gradual  Timing:  Constant  Progression:  Waxing and waning  Chronicity:  Chronic  Context: activity    Relieved by:  Nothing  Worsened by:  Deep breathing, activity, stress and exertion  Ineffective treatments:  None tried  Associated symptoms: cough    Associated symptoms: no abdominal pain, no chest pain, no claudication, no diaphoresis, no fever, no headaches, no hemoptysis, no neck pain, no rash, no sore throat, no sputum production, no swollen glands, no vomiting and no wheezing        Review of Systems   Constitutional: Negative. Negative for diaphoresis and fever. HENT: Negative. Negative for sore throat. Eyes: Negative. Respiratory: Positive for cough and shortness of breath. Negative for hemoptysis, sputum production and wheezing. Cardiovascular: Negative. Negative for chest pain and claudication. Gastrointestinal: Negative. Negative for abdominal pain and vomiting. Genitourinary: Negative. Musculoskeletal: Negative. Negative for neck pain. Skin: Negative. Negative for rash. Neurological: Negative. Negative for headaches. All other systems reviewed and are negative.       Family History   Problem Relation Age of Onset    Cancer Father     Heart Failure Father     Heart Disease Father     Diabetes Mother     Heart Disease Mother      Social History     Socioeconomic History    Marital status:      Spouse name: Not on file    Number of children: Not on file    Years of education: Not on file    Highest education level: Not on file   Occupational History    Not on file   Social Needs    Financial resource strain: Not on file    Food insecurity     Worry: Not on file     Inability: Not on file    Transportation needs     Medical: Not on file     Non-medical: Not on file   Tobacco Use    Smoking status: Current Every Day Smoker     Packs/day: 1.00     Years: 20.00     Pack years: 20.00     Types: Cigarettes    Smokeless tobacco: Former User    Tobacco comment: Reviewed 10/9/17   Substance and Sexual Activity    Alcohol use: No     Alcohol/week: 0.0 standard drinks    Drug use: No     Comment: march 2018 states he quit    Sexual activity: Not Currently   Lifestyle    Physical activity     Days per week: Not on file     Minutes per session: Not on file    Stress: Not on file   Relationships    Social connections     Talks on phone: Not on file     Gets together: Not on file     Attends [employer] service: Not on file     Active member of club or organization: Not on file     Attends meetings of clubs or organizations: Not on file     Relationship status: Not on file    Intimate partner violence     Fear of current or ex partner: Not on file     Emotionally abused: Not on file     Physically abused: Not on file     Forced sexual activity: Not on file   Other Topics Concern    Not on file   Social History Narrative    Not on file     Past Surgical History:   Procedure Laterality Date    BYPASS GRAFT  04/10/2011    CABG x 4 Dr Obey Ramirez  11/2010     4 stents    LEG SURGERY       Past Medical History:   Diagnosis Date    CAD (coronary artery disease)     [facility]est[facility]     COPD (chronic obstructive pulmonary disease) [facility]     Depression     Drug abuse ([facility]     HIGH CHOLESTEROL     Hypertension     MI (myocardial infarction) ([facility] 2011    S/P coronary artery bypass graft x 4 2011    CABG x 4 Dr Sergio Cook     No Known Allergies  Prior to Admission medications    Medication Sig Start Date End Date Taking?  Authorizing Provider   pantoprazole (PROTONIX) 40 MG tablet Take 1 tablet by mouth 2 times daily (before meals) 5/22/20  Yes Alcon Reid MD   aspirin 81 MG EC tablet Take 1 tablet by mouth daily 5/22/20  Yes Alcon eRid MD   albuterol sulfate HFA (PROVENTIL HFA) 108 (90 Base) MCG/ACT inhaler Inhale 2 puffs

## 2020-05-26 ENCOUNTER — CARE COORDINATION (OUTPATIENT)
Dept: CARE COORDINATION | Age: 50
End: 2020-05-26

## 2020-05-26 ENCOUNTER — HOSPITAL ENCOUNTER (EMERGENCY)
Age: 50
Discharge: HOME | DRG: 291 | End: 2020-05-26
Payer: MEDICARE

## 2020-05-26 ENCOUNTER — APPOINTMENT (OUTPATIENT)
Dept: GENERAL RADIOLOGY | Age: 50
DRG: 291 | End: 2020-05-26
Payer: MEDICARE

## 2020-05-26 VITALS
OXYGEN SATURATION: 99 % | BODY MASS INDEX: 26.88 KG/M2 | SYSTOLIC BLOOD PRESSURE: 110 MMHG | WEIGHT: 192 LBS | HEART RATE: 102 BPM | HEIGHT: 71 IN | TEMPERATURE: 98.4 F | DIASTOLIC BLOOD PRESSURE: 73 MMHG | RESPIRATION RATE: 18 BRPM

## 2020-05-26 PROCEDURE — 4500000002 HC ER NO CHARGE

## 2020-05-26 PROCEDURE — 93005 ELECTROCARDIOGRAM TRACING: CPT

## 2020-05-26 PROCEDURE — 71046 X-RAY EXAM CHEST 2 VIEWS: CPT

## 2020-05-27 ENCOUNTER — HOSPITAL ENCOUNTER (INPATIENT)
Age: 50
LOS: 3 days | Discharge: HOME | DRG: 291 | End: 2020-05-30
Attending: EMERGENCY MEDICINE | Admitting: INTERNAL MEDICINE
Payer: MEDICARE

## 2020-05-27 ENCOUNTER — CARE COORDINATION (OUTPATIENT)
Dept: CARE COORDINATION | Age: 50
End: 2020-05-27

## 2020-05-27 PROBLEM — I50.23 ACUTE ON CHRONIC SYSTOLIC CHF (CONGESTIVE HEART FAILURE) (HCC): Status: ACTIVE | Noted: 2020-05-27

## 2020-05-27 LAB
ALBUMIN SERPL-MCNC: 4.1 GM/DL (ref 3.4–5)
ALP BLD-CCNC: 113 IU/L (ref 40–128)
ALT SERPL-CCNC: 46 U/L (ref 10–40)
ANION GAP SERPL CALCULATED.3IONS-SCNC: 15 MMOL/L (ref 4–16)
ANISOCYTOSIS: ABNORMAL
AST SERPL-CCNC: 34 IU/L (ref 15–37)
BASOPHILS ABSOLUTE: 0.1 K/CU MM
BASOPHILS RELATIVE PERCENT: 1.7 % (ref 0–1)
BILIRUB SERPL-MCNC: 1.3 MG/DL (ref 0–1)
BUN BLDV-MCNC: 32 MG/DL (ref 6–23)
CALCIUM SERPL-MCNC: 9 MG/DL (ref 8.3–10.6)
CHLORIDE BLD-SCNC: 93 MMOL/L (ref 99–110)
CO2: 24 MMOL/L (ref 21–32)
CREAT SERPL-MCNC: 1.6 MG/DL (ref 0.9–1.3)
DIFFERENTIAL TYPE: ABNORMAL
DIFFERENTIAL TYPE: ABNORMAL
DOHLE BODIES: PRESENT
EOSINOPHILS ABSOLUTE: 0.2 K/CU MM
EOSINOPHILS RELATIVE PERCENT: 2.8 % (ref 0–3)
ESTIMATED AVERAGE GLUCOSE: 157 MG/DL
GFR AFRICAN AMERICAN: 56 ML/MIN/1.73M2
GFR NON-AFRICAN AMERICAN: 46 ML/MIN/1.73M2
GLUCOSE BLD-MCNC: 127 MG/DL (ref 70–99)
GLUCOSE BLD-MCNC: 134 MG/DL (ref 70–99)
GLUCOSE BLD-MCNC: 169 MG/DL (ref 70–99)
GLUCOSE BLD-MCNC: 169 MG/DL (ref 70–99)
GLUCOSE BLD-MCNC: 183 MG/DL (ref 70–99)
HBA1C MFR BLD: 7.1 % (ref 4.2–6.3)
HCT VFR BLD CALC: 33.7 % (ref 42–52)
HEMOGLOBIN: 9.7 GM/DL (ref 13.5–18)
IMMATURE NEUTROPHIL %: 0.9 % (ref 0–0.43)
LYMPHOCYTES ABSOLUTE: 1.2 K/CU MM
LYMPHOCYTES RELATIVE PERCENT: 16.1 % (ref 24–44)
MCH RBC QN AUTO: 19.7 PG (ref 27–31)
MCHC RBC AUTO-ENTMCNC: 28.8 % (ref 32–36)
MCV RBC AUTO: 68.4 FL (ref 78–100)
MONOCYTES ABSOLUTE: 0.6 K/CU MM
MONOCYTES RELATIVE PERCENT: 8.3 % (ref 0–4)
NUCLEATED RBC %: 0.7 %
PDW BLD-RTO: 21.3 % (ref 11.7–14.9)
PLATELET # BLD: 302 K/CU MM (ref 140–440)
PMV BLD AUTO: 9.6 FL (ref 7.5–11.1)
POTASSIUM SERPL-SCNC: 4.3 MMOL/L (ref 3.5–5.1)
PRO-BNP: 3975 PG/ML
RBC # BLD: 4.93 M/CU MM (ref 4.6–6.2)
RBC # BLD: ABNORMAL 10*6/UL
SEGMENTED NEUTROPHILS ABSOLUTE COUNT: 5.4 K/CU MM
SEGMENTED NEUTROPHILS RELATIVE PERCENT: 70.2 % (ref 36–66)
SODIUM BLD-SCNC: 132 MMOL/L (ref 135–145)
TOTAL IMMATURE NEUTOROPHIL: 0.07 K/CU MM
TOTAL NUCLEATED RBC: 0.1 K/CU MM
TOTAL PROTEIN: 6.8 GM/DL (ref 6.4–8.2)
TOXIC GRANULATION: PRESENT
TROPONIN T: 0.01 NG/ML
WBC # BLD: 7.6 K/CU MM (ref 4–10.5)

## 2020-05-27 PROCEDURE — 94761 N-INVAS EAR/PLS OXIMETRY MLT: CPT

## 2020-05-27 PROCEDURE — 99285 EMERGENCY DEPT VISIT HI MDM: CPT

## 2020-05-27 PROCEDURE — 99221 1ST HOSP IP/OBS SF/LOW 40: CPT | Performed by: INTERNAL MEDICINE

## 2020-05-27 PROCEDURE — 6370000000 HC RX 637 (ALT 250 FOR IP): Performed by: HOSPITALIST

## 2020-05-27 PROCEDURE — 85025 COMPLETE CBC W/AUTO DIFF WBC: CPT

## 2020-05-27 PROCEDURE — 94640 AIRWAY INHALATION TREATMENT: CPT

## 2020-05-27 PROCEDURE — 1200000000 HC SEMI PRIVATE

## 2020-05-27 PROCEDURE — 6360000002 HC RX W HCPCS: Performed by: HOSPITALIST

## 2020-05-27 PROCEDURE — 96374 THER/PROPH/DIAG INJ IV PUSH: CPT

## 2020-05-27 PROCEDURE — 2500000003 HC RX 250 WO HCPCS: Performed by: EMERGENCY MEDICINE

## 2020-05-27 PROCEDURE — 2500000003 HC RX 250 WO HCPCS: Performed by: INTERNAL MEDICINE

## 2020-05-27 PROCEDURE — 82962 GLUCOSE BLOOD TEST: CPT

## 2020-05-27 PROCEDURE — 80053 COMPREHEN METABOLIC PANEL: CPT

## 2020-05-27 PROCEDURE — 93005 ELECTROCARDIOGRAM TRACING: CPT | Performed by: EMERGENCY MEDICINE

## 2020-05-27 PROCEDURE — 2580000003 HC RX 258: Performed by: INTERNAL MEDICINE

## 2020-05-27 PROCEDURE — 6370000000 HC RX 637 (ALT 250 FOR IP): Performed by: INTERNAL MEDICINE

## 2020-05-27 PROCEDURE — 83880 ASSAY OF NATRIURETIC PEPTIDE: CPT

## 2020-05-27 PROCEDURE — 93010 ELECTROCARDIOGRAM REPORT: CPT | Performed by: INTERNAL MEDICINE

## 2020-05-27 PROCEDURE — 83036 HEMOGLOBIN GLYCOSYLATED A1C: CPT

## 2020-05-27 PROCEDURE — 84484 ASSAY OF TROPONIN QUANT: CPT

## 2020-05-27 RX ORDER — ATORVASTATIN CALCIUM 40 MG/1
40 TABLET, FILM COATED ORAL DAILY
Status: DISCONTINUED | OUTPATIENT
Start: 2020-05-27 | End: 2020-05-30 | Stop reason: HOSPADM

## 2020-05-27 RX ORDER — POLYETHYLENE GLYCOL 3350 17 G/17G
17 POWDER, FOR SOLUTION ORAL DAILY PRN
Status: DISCONTINUED | OUTPATIENT
Start: 2020-05-27 | End: 2020-05-30 | Stop reason: HOSPADM

## 2020-05-27 RX ORDER — PROMETHAZINE HYDROCHLORIDE 12.5 MG/1
12.5 TABLET ORAL EVERY 6 HOURS PRN
Status: DISCONTINUED | OUTPATIENT
Start: 2020-05-27 | End: 2020-05-30 | Stop reason: HOSPADM

## 2020-05-27 RX ORDER — DEXTROSE MONOHYDRATE 50 MG/ML
100 INJECTION, SOLUTION INTRAVENOUS PRN
Status: DISCONTINUED | OUTPATIENT
Start: 2020-05-27 | End: 2020-05-30 | Stop reason: HOSPADM

## 2020-05-27 RX ORDER — SODIUM CHLORIDE 0.9 % (FLUSH) 0.9 %
10 SYRINGE (ML) INJECTION PRN
Status: DISCONTINUED | OUTPATIENT
Start: 2020-05-27 | End: 2020-05-30 | Stop reason: HOSPADM

## 2020-05-27 RX ORDER — BUMETANIDE 0.25 MG/ML
2 INJECTION, SOLUTION INTRAMUSCULAR; INTRAVENOUS EVERY 8 HOURS
Status: DISCONTINUED | OUTPATIENT
Start: 2020-05-27 | End: 2020-05-29

## 2020-05-27 RX ORDER — ACETAMINOPHEN 325 MG/1
650 TABLET ORAL EVERY 6 HOURS PRN
Status: DISCONTINUED | OUTPATIENT
Start: 2020-05-27 | End: 2020-05-30 | Stop reason: HOSPADM

## 2020-05-27 RX ORDER — ALBUTEROL SULFATE 90 UG/1
2 AEROSOL, METERED RESPIRATORY (INHALATION) EVERY 4 HOURS PRN
Status: DISCONTINUED | OUTPATIENT
Start: 2020-05-27 | End: 2020-05-30 | Stop reason: HOSPADM

## 2020-05-27 RX ORDER — SPIRONOLACTONE 25 MG/1
25 TABLET ORAL DAILY
Status: DISCONTINUED | OUTPATIENT
Start: 2020-05-27 | End: 2020-05-30 | Stop reason: HOSPADM

## 2020-05-27 RX ORDER — ONDANSETRON 2 MG/ML
4 INJECTION INTRAMUSCULAR; INTRAVENOUS EVERY 6 HOURS PRN
Status: DISCONTINUED | OUTPATIENT
Start: 2020-05-27 | End: 2020-05-30 | Stop reason: HOSPADM

## 2020-05-27 RX ORDER — PANTOPRAZOLE SODIUM 40 MG/1
40 TABLET, DELAYED RELEASE ORAL
Status: DISCONTINUED | OUTPATIENT
Start: 2020-05-28 | End: 2020-05-30 | Stop reason: HOSPADM

## 2020-05-27 RX ORDER — ASPIRIN 81 MG/1
81 TABLET ORAL DAILY
Status: DISCONTINUED | OUTPATIENT
Start: 2020-05-27 | End: 2020-05-30 | Stop reason: HOSPADM

## 2020-05-27 RX ORDER — METOPROLOL SUCCINATE 25 MG/1
25 TABLET, EXTENDED RELEASE ORAL DAILY
Status: DISCONTINUED | OUTPATIENT
Start: 2020-05-27 | End: 2020-05-30 | Stop reason: HOSPADM

## 2020-05-27 RX ORDER — BUMETANIDE 0.25 MG/ML
1 INJECTION, SOLUTION INTRAMUSCULAR; INTRAVENOUS ONCE
Status: COMPLETED | OUTPATIENT
Start: 2020-05-27 | End: 2020-05-27

## 2020-05-27 RX ORDER — NICOTINE POLACRILEX 4 MG
15 LOZENGE BUCCAL PRN
Status: DISCONTINUED | OUTPATIENT
Start: 2020-05-27 | End: 2020-05-30 | Stop reason: HOSPADM

## 2020-05-27 RX ORDER — LISINOPRIL 2.5 MG/1
2.5 TABLET ORAL DAILY
Status: DISCONTINUED | OUTPATIENT
Start: 2020-05-27 | End: 2020-05-30 | Stop reason: HOSPADM

## 2020-05-27 RX ORDER — SODIUM CHLORIDE 0.9 % (FLUSH) 0.9 %
10 SYRINGE (ML) INJECTION EVERY 12 HOURS SCHEDULED
Status: DISCONTINUED | OUTPATIENT
Start: 2020-05-27 | End: 2020-05-30 | Stop reason: HOSPADM

## 2020-05-27 RX ORDER — DEXTROSE MONOHYDRATE 25 G/50ML
12.5 INJECTION, SOLUTION INTRAVENOUS PRN
Status: DISCONTINUED | OUTPATIENT
Start: 2020-05-27 | End: 2020-05-30 | Stop reason: HOSPADM

## 2020-05-27 RX ORDER — IPRATROPIUM BROMIDE AND ALBUTEROL SULFATE 2.5; .5 MG/3ML; MG/3ML
1 SOLUTION RESPIRATORY (INHALATION)
Status: DISCONTINUED | OUTPATIENT
Start: 2020-05-28 | End: 2020-05-30 | Stop reason: HOSPADM

## 2020-05-27 RX ADMIN — ALBUTEROL SULFATE 2 PUFF: 90 AEROSOL, METERED RESPIRATORY (INHALATION) at 20:49

## 2020-05-27 RX ADMIN — LISINOPRIL 2.5 MG: 2.5 TABLET ORAL at 11:52

## 2020-05-27 RX ADMIN — BUMETANIDE 2 MG: 0.25 INJECTION INTRAMUSCULAR; INTRAVENOUS at 17:07

## 2020-05-27 RX ADMIN — ACETAMINOPHEN 650 MG: 325 TABLET ORAL at 22:00

## 2020-05-27 RX ADMIN — BUMETANIDE 1 MG: 0.25 INJECTION INTRAMUSCULAR; INTRAVENOUS at 05:52

## 2020-05-27 RX ADMIN — METOPROLOL SUCCINATE 25 MG: 25 TABLET, EXTENDED RELEASE ORAL at 18:45

## 2020-05-27 RX ADMIN — SPIRONOLACTONE 25 MG: 25 TABLET ORAL at 11:52

## 2020-05-27 RX ADMIN — BUMETANIDE 2 MG: 0.25 INJECTION INTRAMUSCULAR; INTRAVENOUS at 09:11

## 2020-05-27 RX ADMIN — PROMETHAZINE HYDROCHLORIDE 12.5 MG: 12.5 TABLET ORAL at 11:52

## 2020-05-27 RX ADMIN — SODIUM CHLORIDE, PRESERVATIVE FREE 10 ML: 5 INJECTION INTRAVENOUS at 09:15

## 2020-05-27 RX ADMIN — SODIUM CHLORIDE, PRESERVATIVE FREE 10 ML: 5 INJECTION INTRAVENOUS at 21:38

## 2020-05-27 RX ADMIN — ATORVASTATIN CALCIUM 40 MG: 40 TABLET, FILM COATED ORAL at 18:45

## 2020-05-27 RX ADMIN — INSULIN LISPRO 1 UNITS: 100 INJECTION, SOLUTION INTRAVENOUS; SUBCUTANEOUS at 17:08

## 2020-05-27 ASSESSMENT — PAIN DESCRIPTION - ORIENTATION: ORIENTATION: LEFT

## 2020-05-27 ASSESSMENT — PAIN SCALES - GENERAL
PAINLEVEL_OUTOF10: 0
PAINLEVEL_OUTOF10: 7
PAINLEVEL_OUTOF10: 3
PAINLEVEL_OUTOF10: 0
PAINLEVEL_OUTOF10: 0

## 2020-05-27 ASSESSMENT — PAIN DESCRIPTION - PROGRESSION: CLINICAL_PROGRESSION: RAPIDLY WORSENING

## 2020-05-27 ASSESSMENT — PAIN DESCRIPTION - DESCRIPTORS: DESCRIPTORS: ACHING;CRAMPING

## 2020-05-27 ASSESSMENT — PAIN DESCRIPTION - FREQUENCY: FREQUENCY: INTERMITTENT

## 2020-05-27 ASSESSMENT — PAIN - FUNCTIONAL ASSESSMENT: PAIN_FUNCTIONAL_ASSESSMENT: PREVENTS OR INTERFERES SOME ACTIVE ACTIVITIES AND ADLS

## 2020-05-27 ASSESSMENT — PAIN DESCRIPTION - PAIN TYPE: TYPE: ACUTE PAIN

## 2020-05-27 ASSESSMENT — PAIN DESCRIPTION - ONSET: ONSET: AWAKENED FROM SLEEP

## 2020-05-27 ASSESSMENT — PAIN DESCRIPTION - LOCATION: LOCATION: LEG

## 2020-05-27 NOTE — CONSULTS
age    No Known Allergies    sodium chloride flush 0.9 % injection 10 mL, 2 times per day  sodium chloride flush 0.9 % injection 10 mL, PRN  acetaminophen (TYLENOL) tablet 650 mg, Q6H PRN    Or  acetaminophen (TYLENOL) suppository 650 mg, Q6H PRN  polyethylene glycol (GLYCOLAX) packet 17 g, Daily PRN  promethazine (PHENERGAN) tablet 12.5 mg, Q6H PRN    Or  ondansetron (ZOFRAN) injection 4 mg, Q6H PRN  bumetanide (BUMEX) injection 2 mg, Q8H  enoxaparin (LOVENOX) injection 30 mg, Daily  insulin lispro (HUMALOG) injection vial 0-6 Units, TID WC  insulin lispro (HUMALOG) injection vial 0-3 Units, Nightly  glucose (GLUTOSE) 40 % oral gel 15 g, PRN  dextrose 50 % IV solution, PRN  glucagon (rDNA) injection 1 mg, PRN  dextrose 5 % solution, PRN      Current Facility-Administered Medications   Medication Dose Route Frequency Provider Last Rate Last Dose    sodium chloride flush 0.9 % injection 10 mL  10 mL Intravenous 2 times per day Cheo Baltazar MD   10 mL at 05/27/20 0915    sodium chloride flush 0.9 % injection 10 mL  10 mL Intravenous PRN Cheo Baltazar MD        acetaminophen (TYLENOL) tablet 650 mg  650 mg Oral Q6H PRN Cheo Baltazar MD        Or   [address] [place] [address] acetaminophen (TYLENOL) suppository 650 mg  650 mg Rectal Q6H PRN Cheo Baltazar MD        polyethylene glycol (GLYCOLAX) packet 17 g  17 g Oral Daily PRN Cheo Baltazar MD        promethazine (PHENERGAN) tablet 12.5 mg  12.5 mg Oral Q6H PRN Cheo Baltazar MD        Or    ondansetron (ZOFRAN) injection 4 mg  4 mg Intravenous Q6H PRN Cheo Baltazar MD        bumetanide (BUMEX) injection 2 mg  2 mg Intravenous Q8H Cheo Baltazar MD   2 mg at 05/27/20 0911    enoxaparin (LOVENOX) injection 30 mg  30 mg Subcutaneous Daily Seb Mayberry MD        insulin lispro (HUMALOG) injection vial 0-6 Units  0-6 Units Subcutaneous TID  Seb Mayberry MD   Stopped at 05/27/20 0916    insulin lispro (HUMALOG) injection vial 0-3 Units  0-3 Units Subcutaneous administration of intravenous contrast. Multiplanar reformatted images are provided for review. Dose modulation, iterative reconstruction, and/or weight based adjustment of the mA/kV was utilized to reduce the radiation dose to as low as reasonably achievable. COMPARISON: None HISTORY: ORDERING SYSTEM PROVIDED HISTORY: sob TECHNOLOGIST PROVIDED HISTORY: Reason for exam:->sob Reason for Exam: shortness of breath, edema; ORDERING SYSTEM PROVIDED HISTORY: Abd pain TECHNOLOGIST PROVIDED HISTORY: Reason for exam:->Abd pain Reason for Exam: Abd pain, edema FINDINGS: CTA pulmonary: Pulmonary Arteries: Pulmonary arteries are adequately opacified for evaluation. No evidence of intraluminal filling defect to suggest pulmonary embolism. Main pulmonary artery is normal in caliber. Mediastinum: Redemonstration of mediastinal and hilar lymphadenopathy with representative nodes including a 1.8 cm pretracheal node, 1.1 cm AP window node, and 1.4 cm right hilar node. Lymphadenopathy was also noted on previous exam dated March 11, 2020. Again, lymphoproliferative disorder or metastatic disease cannot be excluded. Heart is stable in size and remains enlarged. No pericardial effusion. The thoracic aorta is normal in caliber. Coronary artery atherosclerotic disease. Lungs/pleura: Redemonstration of loculated pleural fluid along the right major fissure. This appears similar to recent CT. Mild bibasilar atelectasis. No focal consolidation identified. No pneumothorax. Calcified granuloma within the superior aspect of the left lower lobe. Soft Tissues/Bones: No acute osseous or soft tissue abnormality. Postoperative changes of median sternotomy. Several prominent left axillary lymph nodes which are upper limits of normal in size. Abdomen/Pelvis: Organs: Evaluation of the solid organs is limited due to lack of intravenous contrast.  The nonenhanced liver and gallbladder appear grossly unremarkable. No biliary dilatation.   Several calcified granulomata within the spleen. The nonenhanced pancreas and bilateral adrenal glands demonstrate no acute abnormality. The kidneys are symmetric in size. No hydronephrosis. GI/Bowel: No bowel obstruction identified. The colon is partially collapsed and therefore not well evaluated. The appendix is unremarkable. Pelvis: Bladder and solid pelvic organs demonstrate no acute abnormality. Peritoneum/Retroperitoneum: Moderate to severe calcified atherosclerotic plaque throughout the aorta. Multiple shotty non pathologically enlarged retroperitoneal lymph nodes. Few mildly prominent nodes are also identified along the iliac change bilaterally with mildly enlarged 1 cm node along the distal left iliac chain (image 89 series 2). Multiple shotty non pathologically enlarged inguinal nodes are identified on the right and mildly enlarged left inguinal node is present measuring up to 1.2 cm in short axis dimension (image 115 series 2). No free fluid or free air identified within the abdomen or pelvis. Bones/Soft Tissues: No acute osseous or soft tissue abnormality is identified. 1. No evidence for pulmonary embolism. 2. Stable cardiomegaly. 3. Redemonstration of mediastinal and hilar lymphadenopathy is noted on previous exams. Again, lymphoproliferative disorder or metastatic disease cannot be excluded. 4. Similar appearance of loculated fluid along the right major fissure. 5. No acute intra-process identified. 6. A few mildly enlarged nodes along the left iliac chain and at the left inguinal region which are nonspecific. 7. Atherosclerotic disease.      Xr Chest Standard (2 Vw)    Result Date: 5/26/2020  EXAMINATION: TWO XRAY VIEWS OF THE CHEST 5/26/2020 5:35 pm COMPARISON: Multiple prior studies including CT chest 03/11/2020 and chest x-ray 05/17/2020 HISTORY: ORDERING SYSTEM PROVIDED HISTORY: SOB TECHNOLOGIST PROVIDED HISTORY: Reason for exam:->SOB Reason for Exam: sob Acuity: Chronic Type of Exam: aorta is normal in caliber. Extensive atherosclerotic change. Mild ascites. Bones/Soft Tissues: No acute findings. Mild ascites. Please correlate for indicators of liver failure. Xr Chest Portable    Result Date: 5/24/2020  EXAMINATION: ONE XRAY VIEW OF THE CHEST 5/24/2020 9:00 pm COMPARISON: May 17 HISTORY: ORDERING SYSTEM PROVIDED HISTORY: chest pain TECHNOLOGIST PROVIDED HISTORY: Reason for exam:->chest pain Reason for Exam: Chest pain Acuity: Acute Type of Exam: Initial Additional signs and symptoms: Chest pain FINDINGS: Heart size is enlarged. Heart size is stable. Sternotomy wires are noted. Focal ovoid increased density in the right lateral lung base is again noted. There is blunting of the right CP angle. This was present on the prior. Otherwise, there is no new area of consolidation     Focal opacity in the right lateral lung base with blunting of the CP angle suggesting pleural effusion, some of which is loculated in the fissure. A small amount of adjacent airspace disease would be difficult to exclude. No acute abnormality otherwise     Xr Chest Portable    Result Date: 5/17/2020  EXAMINATION: ONE XRAY VIEW OF THE CHEST 5/17/2020 11:11 pm COMPARISON: Studies back to the 02/07/2020 HISTORY: ORDERING SYSTEM PROVIDED HISTORY: PAin TECHNOLOGIST PROVIDED HISTORY: Reason for exam:->PAin Reason for Exam: chest pain Acuity: Acute Type of Exam: Initial FINDINGS: Again seen is a focal opacity at the right lung base, stable. Lungs are otherwise clear. No pneumothorax or pleural effusion. Mild cardiac enlargement. Median sternotomy wires. No pleural effusion or pneumothorax. Stable focal wedge-like atelectatic change right lung base.      Xr Chest Portable    Result Date: 5/10/2020  EXAMINATION: ONE X-RAY VIEW OF THE CHEST 5/10/2020 10:41 am COMPARISON: 03/11/2020 HISTORY: ORDERING SYSTEM PROVIDED HISTORY: SOB TECHNOLOGIST PROVIDED HISTORY: Reason for exam:->SOB Reason for Exam: SOB Acuity: Acute Type of Exam: Initial Additional signs and symptoms: Patient brought himself in for SOB for 3 days. Hx of CHF. Pt ran out of Lasix yesterday. Patient displays pitting, pedal edema. Patient states he has been diuresing less than normal. FINDINGS: The heart is enlarged. Sternal wires are in place. There is some atelectasis or small amount of fluid in the major fissures/CP angle on the right. This is improved when correlated to 2 months earlier. The lungs are otherwise unremarkable. Decreasing fluid in the right pleural space/major fissure. Cardiomegaly. Xr Chest 1 Vw    Result Date: 5/13/2020  EXAMINATION: ONE XRAY VIEW OF THE CHEST 5/13/2020 11:33 am COMPARISON: 05/10/2020 HISTORY: ORDERING SYSTEM PROVIDED HISTORY: shortness of breath TECHNOLOGIST PROVIDED HISTORY: Reason for exam:->shortness of breath Reason for Exam: bilat lower extremities and abd swelling Acuity: Chronic Type of Exam: Ongoing Relevant Medical/Surgical History: chf    copd    cad FINDINGS: Status post median sternotomy. Stable cardiomegaly. Slightly increasing small right pleural effusion with adjacent atelectasis. The left costophrenic angle sharp. No pneumothorax. No overt pulmonary edema. The osseous structures are stable. Slightly increasing small right pleural effusion with adjacent atelectasis. Cta Pulmonary W Contrast    Result Date: 5/18/2020  EXAMINATION: CTA OF THE CHEST 5/17/2020 11:28 pm TECHNIQUE: CTA of the chest was performed after the administration of intravenous contrast.  Multiplanar reformatted images are provided for review. MIP images are provided for review. Dose modulation, iterative reconstruction, and/or weight based adjustment of the mA/kV was utilized to reduce the radiation dose to as low as reasonably achievable. COMPARISON: 05/10/2020 and 03/11/2020 HISTORY: ORDERING SYSTEM PROVIDED HISTORY: SOB. CP TECHNOLOGIST PROVIDED HISTORY: Reason for exam:->SOB.   CP Reason for Exam: sob/janelle

## 2020-05-27 NOTE — ED PROVIDER NOTES
60 tablet 1     No Known Allergies    Nursing Notes Reviewed    Physical Exam:  ED Triage Vitals [05/27/20 0423]   Enc Vitals Group      /70      Pulse 111      Resp 20      Temp 98.4 °F (36.9 °C)      Temp Source Oral      SpO2       Weight 192 lb (87.1 kg)      Height 5' 11\" (1.803 m)      Head Circumference       Peak Flow       Pain Score       Pain Loc       Pain Edu? Excl. in [address]? GENERAL APPEARANCE: Awake and alert. Cooperative. No acute distress. HEAD: Normocephalic. Atraumatic. EYES: EOM's grossly intact. Sclera anicteric. ENT: Mucous membranes are moist. Tolerates saliva. No trismus. NECK: Supple. Trachea midline. HEART: RRR. Radial pulses 2+. LUNGS: Respirations with mild conversational dyspnea. CTAB  ABDOMEN: Soft. Non-tender. No guarding or rebound. EXTREMITIES: No acute deformities. 2+ pitting edema bilateral lower extremities  SKIN: Warm and dry. NEUROLOGICAL: No gross facial drooping. Moves all 4 extremities spontaneously. PSYCHIATRIC: Normal mood.     I have reviewed and interpreted all of the currently available lab results from this visit (if applicable):  Results for orders placed or performed during the hospital encounter of 05/27/20   CBC Auto Differential   Result Value Ref Range    WBC 7.6 4.0 - 10.5 K/CU MM    RBC 4.93 4.6 - 6.2 M/CU MM    Hemoglobin 9.7 (L) 13.5 - 18.0 GM/DL    Hematocrit 33.7 (L) 42 - 52 %    MCV 68.4 (L) 78 - 100 FL    MCH 19.7 (L) 27 - 31 PG    MCHC 28.8 (L) 32.0 - 36.0 %    RDW 21.3 (H) 11.7 - 14.9 %    Platelets 647 562 - 549 K/CU MM    MPV 9.6 7.5 - 11.1 FL    Differential Type AUTOMATED DIFFERENTIAL     Segs Relative 70.2 (H) 36 - 66 %    Lymphocytes % 16.1 (L) 24 - 44 %    Monocytes % 8.3 (H) 0 - 4 %    Eosinophils % 2.8 0 - 3 %    Basophils % 1.7 (H) 0 - 1 %    Segs Absolute 5.3 K/CU MM    Lymphocytes Absolute 1.2 K/CU MM    Monocytes Absolute 0.6 K/CU MM    Eosinophils Absolute 0.2 K/CU MM    Basophils Absolute 0.1 K/CU MM    Nucleated

## 2020-05-27 NOTE — H&P
History and Physical      Name:  Brad Pelayo /Age/Sex: 1970  (52 y.o. male)   MRN & CSN:  [id number] & [id number] Admission Date/Time: 2020  4:18 AM   Location:  7951/1816-V PCP: No primary care provider on file. Hospital Day: 1    Assessment and Plan:   Brad Pelayo is a 52 y.o.  male  who presents with <principal problem not specified>    1.  Acute on chronic combined systolic and diastolic heart failure:  Continue on Bumex 1 mg every 8 hour monitor renal function urine output serum electrolyte  Daily weight fluid restriction  Consult cardiologist  Last echocardiogram showed ejection fraction 20%  BMP 3900    2-history of hematemesis in the past: Patient denies vomiting or hematemesis at this time  Hb stable at 9.7  Continue on PPI    3-chronic kidney disease [place] due to heart failure: Serum creatinine 1.6 stable baseline      4-loculated pleural fluid and possibly round atelectasis:  Patient ruled out empyema last admission  Patient has the sternal lymph adenopathy on CTA chest last admission oncology last admission consulted and wanted to do lymph node biopsy but the patient refused  Patient will need PET scan outpatient      5-history of diabetes start on SSI  Diabetic diet        Diet DIET CARDIAC;   DVT Prophylaxis [x] Lovenox, []  Heparin, [] SCDs, [] Ambulation   GI Prophylaxis [x] PPI,  [] H2 Blocker,  [] Carafate,  [] Diet/Tube Feeds   Code Status Full Code   Disposition Patient requires continued admission due to    MDM [] Low, [] Moderate,[]  High  Patient's risk as above due to      History of Present Illness:     Chief Complaint: Acute shortness of breath with bilateral leg swelling over the last 1 to 2 days  Brad Pelayo is a 52 y.o.  male with history of systolic and diastolic heart failure history of chronic kidney disease who presented with worsening shortness of breath especially with exertion over the past few days associated with significant increase in leg swelling and distention of abdomen weight gain. Patient was discharged from [facility] on May 22,20. He was treated for the acute decompensated heart failure and was discharged on Bumex the patient stated he complied with the Bumex and with a fluid restriction. Patient denies chest pain nausea vomiting fever cough, night emesis or dark stool  Chest x-ray yesterday show loculated pleural fluid and possibly rounded atelectasis       Ten point ROS reviewed negative, unless as noted above    Objective:   No intake or output data in the 24 hours ending 05/27/20 0732   Vitals:   Vitals:    05/27/20 0637   BP: 112/75   Pulse: 114   Resp: 16   Temp: 97.8 °F (36.6 °C)     Physical Exam:   GEN Awake. Alert , not in respiratory distress, not in pain  HEENT: PEERLA, , supple neck,   Chest: air entry equal bilaterally, no wheezing , decreased breathing bilaterally with basal crepitation  Heart: S1 and S2 heard, no murmur, no gallop or rub, regular rate  Abdomen: soft, distended abdomen, Nt, +BS  Extremities: no cyanosis, tenderness or erythema, peripheral pulses audible  Bilateral 3+ pitting edema  Neurology: alert, oriented x3, able to move 4 limbs    Past Medical History:      Past Medical History:   Diagnosis Date    CAD (coronary artery disease)     CHF (congestive heart failure) ([address]     COPD (chronic obstructive pulmonary disease) ([address]     Depression     [facility] [facility]     HIGH CHOLESTEROL     Hypertension     MI (myocardial infarction) ([facility] [address] 2011    S/P coronary artery bypass graft x 4 2011    CABG x 4 Dr Maggie Lynch:  has a past surgical history that includes Cardiac surgery (11/2010); Bypass Graft (04/10/2011); and Leg Surgery. Allergies: No Known Allergies    FAM HX: family history includes Cancer in his father; Diabetes in his mother; Heart Disease in his father and mother; Heart Failure in his father.   Soc HX:   Social History     Socioeconomic History    Marital status:

## 2020-05-27 NOTE — CARE COORDINATION
Chart reviewed for discharge planning. Pt lives at home, ind with ADLs, pt has insurance and transportation. Pt interested in getting established with a PCP now as he has had frequent visits to ED/hospital for care. PCP list provided. Pt denied additional needs.

## 2020-05-28 LAB
EKG ATRIAL RATE: 104 BPM
EKG DIAGNOSIS: NORMAL
EKG P AXIS: 72 DEGREES
EKG P-R INTERVAL: 164 MS
EKG Q-T INTERVAL: 366 MS
EKG QRS DURATION: 118 MS
EKG QTC CALCULATION (BAZETT): 481 MS
EKG R AXIS: 75 DEGREES
EKG T AXIS: 95 DEGREES
EKG VENTRICULAR RATE: 104 BPM
GLUCOSE BLD-MCNC: 108 MG/DL (ref 70–99)
GLUCOSE BLD-MCNC: 142 MG/DL (ref 70–99)
GLUCOSE BLD-MCNC: 142 MG/DL (ref 70–99)
GLUCOSE BLD-MCNC: 223 MG/DL (ref 70–99)

## 2020-05-28 PROCEDURE — 6370000000 HC RX 637 (ALT 250 FOR IP): Performed by: INTERNAL MEDICINE

## 2020-05-28 PROCEDURE — 1200000000 HC SEMI PRIVATE

## 2020-05-28 PROCEDURE — 85025 COMPLETE CBC W/AUTO DIFF WBC: CPT

## 2020-05-28 PROCEDURE — 99233 SBSQ HOSP IP/OBS HIGH 50: CPT | Performed by: INTERNAL MEDICINE

## 2020-05-28 PROCEDURE — 83735 ASSAY OF MAGNESIUM: CPT

## 2020-05-28 PROCEDURE — 2500000003 HC RX 250 WO HCPCS: Performed by: INTERNAL MEDICINE

## 2020-05-28 PROCEDURE — 6370000000 HC RX 637 (ALT 250 FOR IP): Performed by: HOSPITALIST

## 2020-05-28 PROCEDURE — 2580000003 HC RX 258: Performed by: INTERNAL MEDICINE

## 2020-05-28 PROCEDURE — 6360000002 HC RX W HCPCS: Performed by: HOSPITALIST

## 2020-05-28 PROCEDURE — 85027 COMPLETE CBC AUTOMATED: CPT

## 2020-05-28 PROCEDURE — 6370000000 HC RX 637 (ALT 250 FOR IP): Performed by: PHYSICIAN ASSISTANT

## 2020-05-28 PROCEDURE — 80048 BASIC METABOLIC PNL TOTAL CA: CPT

## 2020-05-28 PROCEDURE — 94640 AIRWAY INHALATION TREATMENT: CPT

## 2020-05-28 PROCEDURE — APPSS45 APP SPLIT SHARED TIME 31-45 MINUTES: Performed by: NURSE PRACTITIONER

## 2020-05-28 PROCEDURE — 94761 N-INVAS EAR/PLS OXIMETRY MLT: CPT

## 2020-05-28 PROCEDURE — 82962 GLUCOSE BLOOD TEST: CPT

## 2020-05-28 RX ORDER — LANOLIN ALCOHOL/MO/W.PET/CERES
9 CREAM (GRAM) TOPICAL NIGHTLY PRN
Status: DISCONTINUED | OUTPATIENT
Start: 2020-05-28 | End: 2020-05-30 | Stop reason: HOSPADM

## 2020-05-28 RX ORDER — CYCLOBENZAPRINE HCL 10 MG
10 TABLET ORAL ONCE
Status: COMPLETED | OUTPATIENT
Start: 2020-05-28 | End: 2020-05-28

## 2020-05-28 RX ADMIN — LISINOPRIL 2.5 MG: 2.5 TABLET ORAL at 11:25

## 2020-05-28 RX ADMIN — IPRATROPIUM BROMIDE AND ALBUTEROL SULFATE 1 AMPULE: .5; 3 SOLUTION RESPIRATORY (INHALATION) at 13:21

## 2020-05-28 RX ADMIN — BUMETANIDE 2 MG: 0.25 INJECTION INTRAMUSCULAR; INTRAVENOUS at 17:35

## 2020-05-28 RX ADMIN — BUMETANIDE 2 MG: 0.25 INJECTION INTRAMUSCULAR; INTRAVENOUS at 11:37

## 2020-05-28 RX ADMIN — IPRATROPIUM BROMIDE AND ALBUTEROL SULFATE 1 AMPULE: .5; 3 SOLUTION RESPIRATORY (INHALATION) at 00:29

## 2020-05-28 RX ADMIN — PANTOPRAZOLE SODIUM 40 MG: 40 TABLET, DELAYED RELEASE ORAL at 17:25

## 2020-05-28 RX ADMIN — IPRATROPIUM BROMIDE AND ALBUTEROL SULFATE 1 AMPULE: .5; 3 SOLUTION RESPIRATORY (INHALATION) at 05:08

## 2020-05-28 RX ADMIN — ASPIRIN 81 MG: 81 TABLET, COATED ORAL at 11:25

## 2020-05-28 RX ADMIN — IPRATROPIUM BROMIDE AND ALBUTEROL SULFATE 1 AMPULE: .5; 3 SOLUTION RESPIRATORY (INHALATION) at 23:45

## 2020-05-28 RX ADMIN — BUMETANIDE 2 MG: 0.25 INJECTION INTRAMUSCULAR; INTRAVENOUS at 23:30

## 2020-05-28 RX ADMIN — MELATONIN 9 MG: at 21:31

## 2020-05-28 RX ADMIN — INSULIN LISPRO 1 UNITS: 100 INJECTION, SOLUTION INTRAVENOUS; SUBCUTANEOUS at 17:36

## 2020-05-28 RX ADMIN — IPRATROPIUM BROMIDE AND ALBUTEROL SULFATE 1 AMPULE: .5; 3 SOLUTION RESPIRATORY (INHALATION) at 15:33

## 2020-05-28 RX ADMIN — METOPROLOL SUCCINATE 25 MG: 25 TABLET, EXTENDED RELEASE ORAL at 11:25

## 2020-05-28 RX ADMIN — BUMETANIDE 2 MG: 0.25 INJECTION INTRAMUSCULAR; INTRAVENOUS at 01:52

## 2020-05-28 RX ADMIN — CYCLOBENZAPRINE HYDROCHLORIDE 10 MG: 10 TABLET, FILM COATED ORAL at 23:30

## 2020-05-28 RX ADMIN — PANTOPRAZOLE SODIUM 40 MG: 40 TABLET, DELAYED RELEASE ORAL at 05:13

## 2020-05-28 RX ADMIN — IPRATROPIUM BROMIDE AND ALBUTEROL SULFATE 1 AMPULE: .5; 3 SOLUTION RESPIRATORY (INHALATION) at 08:11

## 2020-05-28 RX ADMIN — SODIUM CHLORIDE, PRESERVATIVE FREE 10 ML: 5 INJECTION INTRAVENOUS at 20:29

## 2020-05-28 RX ADMIN — SPIRONOLACTONE 25 MG: 25 TABLET ORAL at 11:25

## 2020-05-28 RX ADMIN — SODIUM CHLORIDE, PRESERVATIVE FREE 10 ML: 5 INJECTION INTRAVENOUS at 11:25

## 2020-05-28 RX ADMIN — IPRATROPIUM BROMIDE AND ALBUTEROL SULFATE 1 AMPULE: .5; 3 SOLUTION RESPIRATORY (INHALATION) at 19:32

## 2020-05-28 RX ADMIN — INSULIN LISPRO 2 UNITS: 100 INJECTION, SOLUTION INTRAVENOUS; SUBCUTANEOUS at 11:26

## 2020-05-28 RX ADMIN — ATORVASTATIN CALCIUM 40 MG: 40 TABLET, FILM COATED ORAL at 11:25

## 2020-05-28 ASSESSMENT — ENCOUNTER SYMPTOMS
SHORTNESS OF BREATH: 1
BACK PAIN: 0
COUGH: 0
PHOTOPHOBIA: 0

## 2020-05-28 NOTE — PROGRESS NOTES
Hospitalist Progress Note      Name:  Dennis Iyer /Age/Sex: 1970  (52 y.o. male)   MRN & CSN:  [id number] & [id number] Admission Date/Time: 2020  4:18 AM   Location:  [facility]- PCP: No primary care provider on file. Hospital Day: 2    Assessment and Plan:       1.  Acute on chronic decompensated combined systolic and diastolic heart failure:  Continue on Bumex 2 mg every 8 hour monitor renal function urine output serum electrolyte  Has 5 L negative fluid balance  Daily weight fluid restriction  Consult cardiologist ,   Last echocardiogram showed ejection fraction 20%, due to ischemic cardiomyopathy  BMP 3900  Cardiologist add Toprol-XL 25 mg daily and small dose lisinopril 2.5 mg daily  Continue on spironolactone       2-history of hematemesis in the past: Patient denies vomiting or hematemesis at this time  Hb stable at 9.7  Continue on PPI     3-chronic kidney disease [place] due to heart failure: Serum creatinine 1.6 stable baseline  Monitor renal function and urine output        4-loculated pleural fluid and possibly round atelectasis:  Patient ruled out empyema last admission  Patient has the sternal lymph adenopathy on CTA chest last admission oncology last admission consulted and wanted to do lymph node biopsy but the patient refused  Patient will need PET scan outpatient        5-history of diabetes start on SSI  Diabetic diet    Dyslipidemia continue statin    Diet DIET CARDIAC;   DVT Prophylaxis [] Lovenox, [x]  Heparin, [] SCDs, [] Ambulation   GI Prophylaxis [x] PPI,  [] H2 Blocker,  [] Carafate,  [] Diet/Tube Feeds   Code Status Full Code   Disposition Patient requires continued admission due to    MDM [] Low, [] Moderate,[]  High  Patient's risk as above due to      History of Present Illness:     Patient was seen and examined at the bedside  Patient feels better breathing wise  Had 5 L negative fluid balance since yesterday  Denies chest pain or shortness of breath    Objective: Intake/Output Summary (Last 24 hours) at 5/28/2020 0826  Last data filed at 5/28/2020 0513  Gross per 24 hour   Intake --   Output 7800 ml   Net -7800 ml      Vitals:   Vitals:    05/28/20 0811   BP:    Pulse:    Resp: 14   Temp:    SpO2:      Physical Exam:   GEN Awake.  Alert , not in respiratory distress, not in pain  HEENT: PEERLA, , supple neck,   Chest: air entry equal bilaterally, no wheezing , decreased breathing bilaterally with basal crepitation  Heart: S1 and S2 heard, no murmur, no gallop or rub, regular rate  Abdomen: soft, ND , Nt, +BS  Extremities: no cyanosis, tenderness or erythema, peripheral pulses audible  Bilateral 2+ pitting edema  Neurology: alert, oriented x3, able to move 4 limbs    Medications:   Medications:    sodium chloride flush  10 mL Intravenous 2 times per day    bumetanide  2 mg Intravenous Q8H    aspirin  81 mg Oral Daily    atorvastatin  40 mg Oral Daily    metoprolol succinate  25 mg Oral Daily    pantoprazole  40 mg Oral BID AC    enoxaparin  30 mg Subcutaneous Daily    insulin lispro  0-6 Units Subcutaneous TID WC    insulin lispro  0-3 Units Subcutaneous Nightly    lisinopril  2.5 mg Oral Daily    spironolactone  25 mg Oral Daily    ipratropium-albuterol  1 ampule Inhalation 6 times per day      Infusions:    dextrose       PRN Meds: sodium chloride flush, 10 mL, PRN  acetaminophen, 650 mg, Q6H PRN    Or  acetaminophen, 650 mg, Q6H PRN  polyethylene glycol, 17 g, Daily PRN  promethazine, 12.5 mg, Q6H PRN    Or  ondansetron, 4 mg, Q6H PRN  albuterol sulfate HFA, 2 puff, Q4H PRN  glucose, 15 g, PRN  dextrose, 12.5 g, PRN  glucagon (rDNA), 1 mg, PRN  dextrose, 100 mL/hr, PRN          Electronically signed by Oriana Diaz MD on 5/28/2020 at 8:26 AM

## 2020-05-28 NOTE — PROGRESS NOTES
Patient was complaining of severe muscle cramps in his left lower extremity that resolved on its own. Bumex was administered this shift. BLE are still weeping, but swelling has improved. Respiratory has seen patient and he is now on scheduled nebulizer tx every 4 hours. He is running NSR/ST on Tele. Resting with call light within reach.    Electronically signed by Vanna Gonzalez RN on 5/28/2020 at 4:25 AM

## 2020-05-28 NOTE — PROGRESS NOTES
CHOLESTEROL, Hypertension, MI (myocardial infarction) ([facility] [address] and S/P coronary artery bypass graft x 4. Past surgical history:   has a past surgical history that includes Cardiac surgery (11/2010); Bypass Graft (04/10/2011); and Leg Surgery. Social History:   reports that he has been smoking cigarettes. He has a 20.00 pack-year smoking history. He has quit using smokeless tobacco. He reports that he does not drink alcohol or use drugs. Family history:  family history includes Cancer in his father; Diabetes in his mother; Heart Disease in his father and mother; Heart Failure in his father. No Known Allergies    Review of Systems   Constitutional: Negative for fatigue and fever. Eyes: Negative for photophobia and visual disturbance. Respiratory: Positive for shortness of breath. Negative for cough. Cardiovascular: Positive for leg swelling. Negative for chest pain and palpitations. Musculoskeletal: Negative for arthralgias and back pain. Neurological: Negative for dizziness, syncope, weakness, light-headedness and headaches. /74   Pulse 107   Temp 97.9 °F (36.6 °C) (Oral)   Resp 19   Ht 5' 11\" (1.803 m)   Wt 208 lb 6.4 oz (94.5 kg)   SpO2 96%   BMI 29.07 kg/m²       Intake/Output Summary (Last 24 hours) at 5/28/2020 1554  Last data filed at 5/28/2020 9346  Gross per 24 hour   Intake --   Output 2800 ml   Net -2800 ml       Physical Exam  Vitals signs and nursing note reviewed. Constitutional:       Appearance: Normal appearance. He is obese. HENT:      Head: Normocephalic and atraumatic. Mouth/Throat:      Mouth: Mucous membranes are moist.      Pharynx: Oropharynx is clear. Eyes:      Conjunctiva/sclera: Conjunctivae normal.      Pupils: Pupils are equal, round, and reactive to light. Neck:      Musculoskeletal: Neck supple. No muscular tenderness. Vascular: No carotid bruit. Cardiovascular:      Rate and Rhythm: Normal rate and regular rhythm.       Pulses: Normal

## 2020-05-29 LAB
EKG ATRIAL RATE: 107 BPM
EKG DIAGNOSIS: NORMAL
EKG P AXIS: 83 DEGREES
EKG P-R INTERVAL: 150 MS
EKG Q-T INTERVAL: 356 MS
EKG QRS DURATION: 112 MS
EKG QTC CALCULATION (BAZETT): 475 MS
EKG R AXIS: 85 DEGREES
EKG T AXIS: 57 DEGREES
EKG VENTRICULAR RATE: 107 BPM
GLUCOSE BLD-MCNC: 132 MG/DL (ref 70–99)
GLUCOSE BLD-MCNC: 135 MG/DL (ref 70–99)
GLUCOSE BLD-MCNC: 145 MG/DL (ref 70–99)
GLUCOSE BLD-MCNC: 194 MG/DL (ref 70–99)

## 2020-05-29 PROCEDURE — 94664 DEMO&/EVAL PT USE INHALER: CPT

## 2020-05-29 PROCEDURE — 85027 COMPLETE CBC AUTOMATED: CPT

## 2020-05-29 PROCEDURE — 6370000000 HC RX 637 (ALT 250 FOR IP): Performed by: HOSPITALIST

## 2020-05-29 PROCEDURE — 94761 N-INVAS EAR/PLS OXIMETRY MLT: CPT

## 2020-05-29 PROCEDURE — 1200000000 HC SEMI PRIVATE

## 2020-05-29 PROCEDURE — 99233 SBSQ HOSP IP/OBS HIGH 50: CPT | Performed by: INTERNAL MEDICINE

## 2020-05-29 PROCEDURE — 83735 ASSAY OF MAGNESIUM: CPT

## 2020-05-29 PROCEDURE — 84100 ASSAY OF PHOSPHORUS: CPT

## 2020-05-29 PROCEDURE — 82962 GLUCOSE BLOOD TEST: CPT

## 2020-05-29 PROCEDURE — APPSS45 APP SPLIT SHARED TIME 31-45 MINUTES: Performed by: NURSE PRACTITIONER

## 2020-05-29 PROCEDURE — 80053 COMPREHEN METABOLIC PANEL: CPT

## 2020-05-29 PROCEDURE — 2500000003 HC RX 250 WO HCPCS: Performed by: HOSPITALIST

## 2020-05-29 PROCEDURE — 94640 AIRWAY INHALATION TREATMENT: CPT

## 2020-05-29 PROCEDURE — 6370000000 HC RX 637 (ALT 250 FOR IP): Performed by: INTERNAL MEDICINE

## 2020-05-29 PROCEDURE — 2580000003 HC RX 258: Performed by: INTERNAL MEDICINE

## 2020-05-29 PROCEDURE — 6370000000 HC RX 637 (ALT 250 FOR IP): Performed by: PHYSICIAN ASSISTANT

## 2020-05-29 PROCEDURE — 6370000000 HC RX 637 (ALT 250 FOR IP): Performed by: NURSE PRACTITIONER

## 2020-05-29 RX ORDER — BUMETANIDE 1 MG/1
2 TABLET ORAL 2 TIMES DAILY
Status: DISCONTINUED | OUTPATIENT
Start: 2020-05-29 | End: 2020-05-30 | Stop reason: HOSPADM

## 2020-05-29 RX ORDER — BUMETANIDE 0.25 MG/ML
2 INJECTION, SOLUTION INTRAMUSCULAR; INTRAVENOUS 2 TIMES DAILY
Status: DISCONTINUED | OUTPATIENT
Start: 2020-05-29 | End: 2020-05-29

## 2020-05-29 RX ADMIN — SODIUM CHLORIDE, PRESERVATIVE FREE 10 ML: 5 INJECTION INTRAVENOUS at 21:14

## 2020-05-29 RX ADMIN — IPRATROPIUM BROMIDE AND ALBUTEROL SULFATE 1 AMPULE: .5; 3 SOLUTION RESPIRATORY (INHALATION) at 15:29

## 2020-05-29 RX ADMIN — INSULIN LISPRO 1 UNITS: 100 INJECTION, SOLUTION INTRAVENOUS; SUBCUTANEOUS at 09:24

## 2020-05-29 RX ADMIN — LISINOPRIL 2.5 MG: 2.5 TABLET ORAL at 09:25

## 2020-05-29 RX ADMIN — SODIUM CHLORIDE, PRESERVATIVE FREE 10 ML: 5 INJECTION INTRAVENOUS at 09:26

## 2020-05-29 RX ADMIN — IPRATROPIUM BROMIDE AND ALBUTEROL SULFATE 1 AMPULE: .5; 3 SOLUTION RESPIRATORY (INHALATION) at 19:09

## 2020-05-29 RX ADMIN — PANTOPRAZOLE SODIUM 40 MG: 40 TABLET, DELAYED RELEASE ORAL at 05:25

## 2020-05-29 RX ADMIN — BUMETANIDE 2 MG: 0.25 INJECTION INTRAMUSCULAR; INTRAVENOUS at 09:26

## 2020-05-29 RX ADMIN — MELATONIN 9 MG: at 23:02

## 2020-05-29 RX ADMIN — IPRATROPIUM BROMIDE AND ALBUTEROL SULFATE 1 AMPULE: .5; 3 SOLUTION RESPIRATORY (INHALATION) at 05:34

## 2020-05-29 RX ADMIN — ASPIRIN 81 MG: 81 TABLET, COATED ORAL at 09:25

## 2020-05-29 RX ADMIN — METOPROLOL SUCCINATE 25 MG: 25 TABLET, EXTENDED RELEASE ORAL at 09:25

## 2020-05-29 RX ADMIN — BUMETANIDE 2 MG: 1 TABLET ORAL at 21:20

## 2020-05-29 RX ADMIN — IPRATROPIUM BROMIDE AND ALBUTEROL SULFATE 1 AMPULE: .5; 3 SOLUTION RESPIRATORY (INHALATION) at 08:23

## 2020-05-29 RX ADMIN — ATORVASTATIN CALCIUM 40 MG: 40 TABLET, FILM COATED ORAL at 09:25

## 2020-05-29 RX ADMIN — PANTOPRAZOLE SODIUM 40 MG: 40 TABLET, DELAYED RELEASE ORAL at 16:46

## 2020-05-29 RX ADMIN — IPRATROPIUM BROMIDE AND ALBUTEROL SULFATE 1 AMPULE: .5; 3 SOLUTION RESPIRATORY (INHALATION) at 11:29

## 2020-05-29 RX ADMIN — SPIRONOLACTONE 25 MG: 25 TABLET ORAL at 09:25

## 2020-05-29 ASSESSMENT — ENCOUNTER SYMPTOMS
COUGH: 0
BACK PAIN: 0
SHORTNESS OF BREATH: 1
PHOTOPHOBIA: 0

## 2020-05-29 NOTE — PROGRESS NOTES
CREATININE 1.6*     PT/INR: No results for input(s): PROTIME, INR in the last 72 hours. BNP:    Recent Labs     05/27/20 0428   PROBNP 3,975*     TROPONIN:   Recent Labs     05/27/20 0428   TROPONINT 0.010*        ECHO :   Echocardiogram  1/24/2020 Summary   Left ventricular systolic function is abnormal.   Ejection fraction is visually estimated at 20%. Dilated left ventricle. Grade III diastolic dysfunction. Moderately dilated left atrium. Severe mitral regurgitation is present. Moderate tricuspid regurgitation is present. RVSP is 49 mmHg. No evidence of any pericardial effusion. OLD FINDINGS       All labs, medications and tests reviewed by myself , continue all other medications of all above medical condition listed as is except for changes mentioned above. Thank you very much for consult , please call with questions. Electronically signed by NANCY Montiel CNP on 5/29/2020 at 2:44 PM      CARDIOLOGY ATTENDING ADDENDUM    I have seen ,spoken to  and examined this patient personally, independently of the nurse practitioner. I have reviewed the hospital care given to date and reviewed all pertinent labs and imaging. The plan was developed mutually at the time of the visit with the patient,  NP   and myself. I have spoken with patient, nursing staff and provided written and verbal instructions . The above note has been reviewed and I agree with the assessment, diagnosis, and treatment plan with changes made by me as follows     HPI:  I have reviewed the above HPI  And agree with above   Colby Kim is a 52 y. o.year old who and presents with had concerns including Shortness of Breath (worse x2 days , got bad tonight).   Chief Complaint   Patient presents with    Shortness of Breath     worse x2 days , got bad tonight     Please review addendum/changes made to note above   Interval history: lost 11 litres but being difficult with nurses, hallucinating    Physical Exam:  General:  Awake,

## 2020-05-29 NOTE — PROGRESS NOTES
the bedside  Patient still has SOB   Has total 11.6  L negative fluid balance   Denies chest pain  Feels dizzy sometimes     Objective: Intake/Output Summary (Last 24 hours) at 5/29/2020 0844  Last data filed at 5/29/2020 0741  Gross per 24 hour   Intake 600 ml   Output 3875 ml   Net -3275 ml      Vitals:   Vitals:    05/29/20 0823   BP:    Pulse:    Resp: 20   Temp:    SpO2: 96%     Physical Exam:   GEN Awake.  Alert , not in respiratory distress, not in pain  HEENT: PEERLA, , supple neck,   Chest: air entry equal bilaterally, no wheezing , decreased breathing bilaterally with basal crepitation  Heart: S1 and S2 heard, no murmur, no gallop or rub, regular rate  Abdomen: soft, ND , Nt, +BS  Extremities: no cyanosis, tenderness or erythema, peripheral pulses audible  Bilateral 1+ pitting edema  Neurology: alert, oriented x3, able to move 4 limbs    Medications:   Medications:    bumetanide  2 mg Intravenous BID    sodium chloride flush  10 mL Intravenous 2 times per day    aspirin  81 mg Oral Daily    atorvastatin  40 mg Oral Daily    metoprolol succinate  25 mg Oral Daily    pantoprazole  40 mg Oral BID AC    enoxaparin  30 mg Subcutaneous Daily    insulin lispro  0-6 Units Subcutaneous TID WC    insulin lispro  0-3 Units Subcutaneous Nightly    lisinopril  2.5 mg Oral Daily    spironolactone  25 mg Oral Daily    ipratropium-albuterol  1 ampule Inhalation 6 times per day      Infusions:    dextrose       PRN Meds: melatonin, 9 mg, Nightly PRN  sodium chloride flush, 10 mL, PRN  acetaminophen, 650 mg, Q6H PRN    Or  acetaminophen, 650 mg, Q6H PRN  polyethylene glycol, 17 g, Daily PRN  promethazine, 12.5 mg, Q6H PRN    Or  ondansetron, 4 mg, Q6H PRN  albuterol sulfate HFA, 2 puff, Q4H PRN  glucose, 15 g, PRN  dextrose, 12.5 g, PRN  glucagon (rDNA), 1 mg, PRN  dextrose, 100 mL/hr, PRN          Electronically signed by Alessia Mancera MD on 5/29/2020 at 8:44 AM

## 2020-05-29 NOTE — PROGRESS NOTES
Patient is refusing to have the finch catheter in any longer. Hospitalist was made aware.  Will be removing finch and monitoring UO per urinal.   Electronically signed by Raina Chaudhry RN on 5/28/2020 at 11:23 PM

## 2020-05-30 ENCOUNTER — APPOINTMENT (OUTPATIENT)
Dept: GENERAL RADIOLOGY | Age: 50
DRG: 291 | End: 2020-05-30
Payer: MEDICARE

## 2020-05-30 VITALS
TEMPERATURE: 97.7 F | HEART RATE: 103 BPM | HEIGHT: 71 IN | SYSTOLIC BLOOD PRESSURE: 104 MMHG | WEIGHT: 206 LBS | OXYGEN SATURATION: 96 % | DIASTOLIC BLOOD PRESSURE: 73 MMHG | BODY MASS INDEX: 28.84 KG/M2 | RESPIRATION RATE: 22 BRPM

## 2020-05-30 LAB
ALBUMIN SERPL-MCNC: 4.3 GM/DL (ref 3.4–5)
ALP BLD-CCNC: 110 IU/L (ref 40–128)
ALT SERPL-CCNC: 30 U/L (ref 10–40)
ANION GAP SERPL CALCULATED.3IONS-SCNC: 16 MMOL/L (ref 4–16)
AST SERPL-CCNC: 26 IU/L (ref 15–37)
BILIRUB SERPL-MCNC: 1.4 MG/DL (ref 0–1)
BUN BLDV-MCNC: 32 MG/DL (ref 6–23)
CALCIUM SERPL-MCNC: 9.2 MG/DL (ref 8.3–10.6)
CHLORIDE BLD-SCNC: 91 MMOL/L (ref 99–110)
CO2: 24 MMOL/L (ref 21–32)
CREAT SERPL-MCNC: 1.5 MG/DL (ref 0.9–1.3)
GFR AFRICAN AMERICAN: >60 ML/MIN/1.73M2
GFR NON-AFRICAN AMERICAN: 50 ML/MIN/1.73M2
GLUCOSE BLD-MCNC: 107 MG/DL (ref 70–99)
GLUCOSE BLD-MCNC: 133 MG/DL (ref 70–99)
HCT VFR BLD CALC: 33.9 % (ref 42–52)
HEMOGLOBIN: 9.7 GM/DL (ref 13.5–18)
MAGNESIUM: 2.3 MG/DL (ref 1.8–2.4)
MCH RBC QN AUTO: 19.1 PG (ref 27–31)
MCHC RBC AUTO-ENTMCNC: 28.6 % (ref 32–36)
MCV RBC AUTO: 66.9 FL (ref 78–100)
PDW BLD-RTO: 20.8 % (ref 11.7–14.9)
PHOSPHORUS: 4.7 MG/DL (ref 2.5–4.9)
PLATELET # BLD: 277 K/CU MM (ref 140–440)
PMV BLD AUTO: 9.6 FL (ref 7.5–11.1)
POTASSIUM SERPL-SCNC: 4.1 MMOL/L (ref 3.5–5.1)
RBC # BLD: 5.07 M/CU MM (ref 4.6–6.2)
SODIUM BLD-SCNC: 131 MMOL/L (ref 135–145)
TOTAL PROTEIN: 6.6 GM/DL (ref 6.4–8.2)
WBC # BLD: 7 K/CU MM (ref 4–10.5)

## 2020-05-30 PROCEDURE — 2580000003 HC RX 258: Performed by: INTERNAL MEDICINE

## 2020-05-30 PROCEDURE — 6370000000 HC RX 637 (ALT 250 FOR IP): Performed by: PHYSICIAN ASSISTANT

## 2020-05-30 PROCEDURE — 6370000000 HC RX 637 (ALT 250 FOR IP): Performed by: HOSPITALIST

## 2020-05-30 PROCEDURE — 83735 ASSAY OF MAGNESIUM: CPT

## 2020-05-30 PROCEDURE — 80053 COMPREHEN METABOLIC PANEL: CPT

## 2020-05-30 PROCEDURE — 94761 N-INVAS EAR/PLS OXIMETRY MLT: CPT

## 2020-05-30 PROCEDURE — 94640 AIRWAY INHALATION TREATMENT: CPT

## 2020-05-30 PROCEDURE — 82962 GLUCOSE BLOOD TEST: CPT

## 2020-05-30 PROCEDURE — 99232 SBSQ HOSP IP/OBS MODERATE 35: CPT | Performed by: INTERNAL MEDICINE

## 2020-05-30 PROCEDURE — 36415 COLL VENOUS BLD VENIPUNCTURE: CPT

## 2020-05-30 PROCEDURE — 2700000000 HC OXYGEN THERAPY PER DAY

## 2020-05-30 PROCEDURE — 6370000000 HC RX 637 (ALT 250 FOR IP): Performed by: INTERNAL MEDICINE

## 2020-05-30 PROCEDURE — 85027 COMPLETE CBC AUTOMATED: CPT

## 2020-05-30 PROCEDURE — 6370000000 HC RX 637 (ALT 250 FOR IP): Performed by: NURSE PRACTITIONER

## 2020-05-30 PROCEDURE — 71045 X-RAY EXAM CHEST 1 VIEW: CPT

## 2020-05-30 PROCEDURE — 84100 ASSAY OF PHOSPHORUS: CPT

## 2020-05-30 RX ORDER — SPIRONOLACTONE 25 MG/1
25 TABLET ORAL DAILY
Qty: 30 TABLET | Refills: 3 | Status: ON HOLD | OUTPATIENT
Start: 2020-05-30 | End: 2020-07-27 | Stop reason: HOSPADM

## 2020-05-30 RX ORDER — LISINOPRIL 2.5 MG/1
2.5 TABLET ORAL DAILY
Qty: 30 TABLET | Refills: 3 | Status: ON HOLD | OUTPATIENT
Start: 2020-05-30 | End: 2020-07-27 | Stop reason: HOSPADM

## 2020-05-30 RX ORDER — BUMETANIDE 2 MG/1
2 TABLET ORAL 2 TIMES DAILY
Qty: 60 TABLET | Refills: 2 | Status: ON HOLD | OUTPATIENT
Start: 2020-05-30 | End: 2020-08-12 | Stop reason: SDUPTHER

## 2020-05-30 RX ORDER — LORAZEPAM 0.5 MG/1
0.5 TABLET ORAL ONCE
Status: COMPLETED | OUTPATIENT
Start: 2020-05-30 | End: 2020-05-30

## 2020-05-30 RX ADMIN — IPRATROPIUM BROMIDE AND ALBUTEROL SULFATE 1 AMPULE: .5; 3 SOLUTION RESPIRATORY (INHALATION) at 01:15

## 2020-05-30 RX ADMIN — LORAZEPAM 0.5 MG: 0.5 TABLET ORAL at 02:03

## 2020-05-30 RX ADMIN — METOPROLOL SUCCINATE 25 MG: 25 TABLET, EXTENDED RELEASE ORAL at 09:51

## 2020-05-30 RX ADMIN — PANTOPRAZOLE SODIUM 40 MG: 40 TABLET, DELAYED RELEASE ORAL at 06:03

## 2020-05-30 RX ADMIN — ASPIRIN 81 MG: 81 TABLET, COATED ORAL at 09:40

## 2020-05-30 RX ADMIN — LISINOPRIL 2.5 MG: 2.5 TABLET ORAL at 09:51

## 2020-05-30 RX ADMIN — ATORVASTATIN CALCIUM 40 MG: 40 TABLET, FILM COATED ORAL at 09:41

## 2020-05-30 RX ADMIN — BUMETANIDE 2 MG: 1 TABLET ORAL at 09:41

## 2020-05-30 RX ADMIN — SPIRONOLACTONE 25 MG: 25 TABLET ORAL at 09:51

## 2020-05-30 RX ADMIN — IPRATROPIUM BROMIDE AND ALBUTEROL SULFATE 1 AMPULE: .5; 3 SOLUTION RESPIRATORY (INHALATION) at 07:51

## 2020-05-30 RX ADMIN — SODIUM CHLORIDE, PRESERVATIVE FREE 10 ML: 5 INJECTION INTRAVENOUS at 09:45

## 2020-05-30 ASSESSMENT — PAIN SCALES - GENERAL: PAINLEVEL_OUTOF10: 0

## 2020-05-30 NOTE — PROGRESS NOTES
Today's plan:  [facility] for discharge on current medications, will adjust diuretics as out patient, recommend ICD as out patient      Admit Date:  5/27/2020    Subjective:ok      Chief complaints on admission  Chief Complaint   Patient presents with    Shortness of Breath     worse x2 days , got bad tonight         History of present illness:Alireza is a 52 y. o.year old who  presents with had concerns including Shortness of Breath (worse x2 days , got bad tonight). Past medical history:    has a past medical history of CAD (coronary artery disease), CHF (congestive heart failure) ([address] COPD (chronic obstructive pulmonary disease) ([facility] [address] Depression, Drug abuse ([facility] [address] HIGH CHOLESTEROL, Hypertension, MI (myocardial infarction) ([facility] [address] and S/P coronary artery bypass graft x 4. Past surgical history:   has a past surgical history that includes Cardiac surgery (11/2010); Bypass Graft (04/10/2011); and Leg Surgery. Social History:   reports that he has been smoking cigarettes. He has a 20.00 pack-year smoking history. He has quit using smokeless tobacco. He reports that he does not drink alcohol or use drugs. Family history:  family history includes Cancer in his father; Diabetes in his mother; Heart Disease in his father and mother; Heart Failure in his father. No Known Allergies      Objective:   /73   Pulse 103   Temp 97.7 °F (36.5 °C) (Oral)   Resp 22   Ht 5' 11\" (1.803 m)   Wt 206 lb (93.4 kg)   SpO2 96%   BMI 28.73 kg/m²       Intake/Output Summary (Last 24 hours) at 5/30/2020 1123  Last data filed at 5/30/2020 4774  Gross per 24 hour   Intake 720 ml   Output 2275 ml   Net -1555 ml           Physical Exam:  Constitutional:  Well developed, Well nourished, No acute distress, Non-toxic appearance. HENT:  Normocephalic, Atraumatic, Bilateral external ears normal, Oropharynx moist, No oral exudates, Nose normal. Neck- Normal range of motion, No tenderness, Supple, No stridor.    Eyes:  PERRL, EOMI, Conjunctiva normal, No discharge. Respiratory:  Normal breath sounds, No respiratory distress, No wheezing, No chest tenderness. ,no use of accessory muscles, diaphragm movement is normal  Cardiovascular: (PMI) apex non displaced,no lifts no thrills, no s3,no s4, Normal heart rate, Normal rhythm, No murmurs, No rubs, No gallops. Carotid arteries pulse and amplitude are normal no bruit, no abdominal bruit noted ( normal abdominal aorta ausculation), femoral arteries pulse and amplitude are normal no bruit, pedal pulses are normal  GI:  Bowel sounds normal, Soft, No tenderness, No masses, No pulsatile masses. : External genitalia appear normal, No masses or lesions. No discharge. No CVA tenderness. Musculoskeletal:  Intact distal pulses, No edema, No tenderness, No cyanosis, No clubbing. Good range of motion in all major joints. No tenderness to palpation or major deformities noted. Back- No tenderness. Integument:  Warm, Dry, No erythema, No rash. Lymphatic:  No lymphadenopathy noted. Neurologic:  Alert & oriented x 3, Normal motor function, Normal sensory function, No focal deficits noted.    Psychiatric:  Affect normal, Judgment normal, Mood normal.     Medications:    bumetanide  2 mg Oral BID    sodium chloride flush  10 mL Intravenous 2 times per day    aspirin  81 mg Oral Daily    atorvastatin  40 mg Oral Daily    metoprolol succinate  25 mg Oral Daily    pantoprazole  40 mg Oral BID AC    enoxaparin  30 mg Subcutaneous Daily    insulin lispro  0-6 Units Subcutaneous TID     insulin lispro  0-3 Units Subcutaneous Nightly    lisinopril  2.5 mg Oral Daily    spironolactone  25 mg Oral Daily    ipratropium-albuterol  1 ampule Inhalation 6 times per day      dextrose       melatonin, sodium chloride flush, acetaminophen **OR** acetaminophen, polyethylene glycol, promethazine **OR** ondansetron, albuterol sulfate HFA, glucose, dextrose, glucagon (rDNA), dextrose    Lab Data:  CBC:   Recent Labs     05/30/20  0348   WBC 7.0   HGB 9.7*   HCT 33.9*   MCV 66.9*        BMP:   Recent Labs     05/30/20 0348   *   K 4.1   CL 91*   CO2 24   PHOS 4.7   BUN 32*   CREATININE 1.5*     LIVER PROFILE:   Recent Labs     05/30/20 0348   AST 26   ALT 30   BILITOT 1.4*   ALKPHOS 110     PT/INR: No results for input(s): PROTIME, INR in the last 72 hours. APTT: No results for input(s): APTT in the last 72 hours. BNP:  No results for input(s): BNP in the last 72 hours. TROPONIN: @TROPONINI:3@      Assessment:  52 y. o.year old who is admitted for          Plan:  1.  CHF, continue metoptolol, bumex lisnopril , ICD follow up in EP office,  2. COPD: recommend treatment as per GL  3. HTN: stable, increase lisnopril to 5mg daily  4. X drug abuse: stable  CAD; h/o CABG, STABLEHealth maintenance: exerise and diet  All labs, medications and tests reviewed, continue all other medications of all above medical condition listed as is.       Abdirahman Medina MD 5/30/2020 11:23 AM

## 2020-05-30 NOTE — DISCHARGE SUMMARY
Discharge Summary    Name:  Netta Gunderson /Age/Sex: 1970  (52 y.o. male)   MRN & CSN:  [id number] & [id number] Admission Date/Time: 2020  4:18 AM   Attending:  Torrey Schwartz MD Discharging Physician: Easton Giron MD     HPI:     Netta Gunderson is a 52 y.o.  male with history of systolic and diastolic heart failure history of chronic kidney disease who presented with worsening shortness of breath especially with exertion over the past few days associated with significant increase in leg swelling and distention of abdomen weight gain. Patient was discharged from [facility] on May 22,20. He was treated for the acute decompensated heart failure and was discharged on Bumex the patient stated he complied with the Bumex and with a fluid restriction.   Patient denies chest pain nausea vomiting fever cough, night emesis or dark stool  Chest x-ray yesterday show loculated pleural fluid and possibly rounded atelectasis     Hospital Course:   Netta Gunderson is a 52 y.o.  male  who presents with Acute on chronic systolic CHF (congestive heart failure) (HCC)       >Acute on chronic decompensated combined systolic and diastolic heart failure:  -Last echocardiogram showed ejection fraction 20%, due to ischemic cardiomyopathy  BMP 3900  -Consulted cardiologist ,   - Bumex 2 mg every 12 hour monitor renal function urine output serum electrolyte  -Has 11 L negative fluid balance since admission  -Cardiologist add Toprol-XL 25 mg daily and small dose lisinopril 2.5 mg daily, Continue on spironolactone  - sx and clinically improved , ambulating in hallway with no dyspnea, no hypoxia, denied need for [place] and was discharged home in a stable condition.       >history of hematemesis in the past: Patient denies vomiting or hematemesis at this time  Hb stable at 9.7  Continue on PPI     >chronic kidney disease [place] due to heart failure: Serum creatinine 1.6 stable baseline  Monitor renal function and urine Discharge:   /73   Pulse 103   Temp 97.7 °F (36.5 °C) (Oral)   Resp 22   Ht 5' 11\" (1.803 m)   Wt 206 lb (93.4 kg)   SpO2 96%   BMI 28.73 kg/m²            PHYSICAL EXAM   GEN Awake male, cooperative, no apparent distress. RESP Clear to auscultation, no wheezes, rales or rhonchi. Symmetric chest movement . CARDIO/VASC S1/S2 auscultated. Regular rate. GI Abdomen is soft without significant tenderness, Bowel sounds are normoactive. MSK No gross joint deformities. Spontaneous movement of all extremities  SKIN Normal coloration, warm, dry. NEURO normal speech, no lateralizing weakness. PSYCH Awake, alert, oriented x 4. Affect appropriate. BMP/CBC  Recent Labs     05/30/20  0348   *   K 4.1   CL 91*   CO2 24   BUN 32*   CREATININE 1.5*   WBC 7.0   HCT 33.9*          IMAGING:  Xr Chest Standard (2 Vw)    Result Date: 5/26/2020  EXAMINATION: TWO XRAY VIEWS OF THE CHEST 5/26/2020 5:35 pm COMPARISON: Multiple prior studies including CT chest 03/11/2020 and chest x-ray 05/17/2020 HISTORY: ORDERING SYSTEM PROVIDED HISTORY: SOB TECHNOLOGIST PROVIDED HISTORY: Reason for exam:->SOB Reason for Exam: sob Acuity: Chronic Type of Exam: Ongoing Relevant Medical/Surgical History: chf   cad   copd FINDINGS: There is unchanged right basilar pleural and parenchymal lung disease with loculated pleural fluid and or thickening. Rounded opacity in this region may represent \"rounded atelectasis\". Lungs are otherwise clear. The heart is enlarged and unchanged. Prior sternal splitting procedure. Stable/right basilar pleural and parenchymal lung disease with findings of loculated pleural fluid and possibly \"rounded atelectasis\".      Xr Chest 1 Vw    Result Date: 5/30/2020  EXAMINATION: ONE XRAY VIEW OF THE CHEST 5/30/2020 1:56 am COMPARISON: 05/26/2020 HISTORY: ORDERING SYSTEM PROVIDED HISTORY: dyspnea TECHNOLOGIST PROVIDED HISTORY: Reason for exam:->dyspnea Reason for Exam: dyspnea Acuity: Unknown

## 2020-06-01 ENCOUNTER — CARE COORDINATION (OUTPATIENT)
Dept: CASE MANAGEMENT | Age: 50
End: 2020-06-01

## 2020-06-01 LAB
EKG ATRIAL RATE: 113 BPM
EKG DIAGNOSIS: NORMAL
EKG P AXIS: 71 DEGREES
EKG P-R INTERVAL: 164 MS
EKG Q-T INTERVAL: 328 MS
EKG QRS DURATION: 116 MS
EKG QTC CALCULATION (BAZETT): 449 MS
EKG R AXIS: 78 DEGREES
EKG T AXIS: 99 DEGREES
EKG VENTRICULAR RATE: 113 BPM

## 2020-06-02 ENCOUNTER — CARE COORDINATION (OUTPATIENT)
Dept: CASE MANAGEMENT | Age: 50
End: 2020-06-02

## 2020-06-02 NOTE — CARE COORDINATION
[facility] Transitions Initial Follow Up Call    Call within 2 business days of discharge: Yes    Patient: Gee Heller Patient : 1970   MRN: [id number]  Reason for Admission: A/C CHF  Discharge Date: 20 RARS: Readmission Risk Score: 54  Facility: [address] [place] [address] Diagnosis Description Type Department Provider    20 Shortness of Breath Acute heart failure, unspecified heart failure type [facility]) ED to Hosp-Admission (Discharged) (Joshua Uribe MD; Meredith Mitchell MD... COVID19 RISK MONITORING  COVID19 SCREEN: N/A   PATIENT RISK FACTORS: CHF, COPD    2nd unsuccessful attempt to reach Patient for initial call; message left requesting call back. Per protocol, no further outreach planned.    Edmar Tello RN

## 2020-06-06 ENCOUNTER — HOSPITAL ENCOUNTER (EMERGENCY)
Age: 50
Discharge: HOME | End: 2020-06-07
Attending: EMERGENCY MEDICINE
Payer: MEDICARE

## 2020-06-06 ENCOUNTER — APPOINTMENT (OUTPATIENT)
Dept: GENERAL RADIOLOGY | Age: 50
End: 2020-06-06
Payer: MEDICARE

## 2020-06-06 LAB — LACTATE: 1.8 MMOL/L (ref 0.4–2)

## 2020-06-06 PROCEDURE — 71045 X-RAY EXAM CHEST 1 VIEW: CPT

## 2020-06-06 PROCEDURE — 99285 EMERGENCY DEPT VISIT HI MDM: CPT

## 2020-06-06 PROCEDURE — 83880 ASSAY OF NATRIURETIC PEPTIDE: CPT

## 2020-06-06 PROCEDURE — 85025 COMPLETE CBC W/AUTO DIFF WBC: CPT

## 2020-06-06 PROCEDURE — 93005 ELECTROCARDIOGRAM TRACING: CPT | Performed by: EMERGENCY MEDICINE

## 2020-06-06 PROCEDURE — 83605 ASSAY OF LACTIC ACID: CPT

## 2020-06-06 PROCEDURE — 84484 ASSAY OF TROPONIN QUANT: CPT

## 2020-06-06 PROCEDURE — 80053 COMPREHEN METABOLIC PANEL: CPT

## 2020-06-06 PROCEDURE — 83735 ASSAY OF MAGNESIUM: CPT

## 2020-06-07 VITALS
DIASTOLIC BLOOD PRESSURE: 82 MMHG | BODY MASS INDEX: 28.84 KG/M2 | OXYGEN SATURATION: 99 % | TEMPERATURE: 98.1 F | HEART RATE: 105 BPM | RESPIRATION RATE: 12 BRPM | WEIGHT: 206 LBS | SYSTOLIC BLOOD PRESSURE: 98 MMHG | HEIGHT: 71 IN

## 2020-06-07 LAB
ALBUMIN SERPL-MCNC: 4 GM/DL (ref 3.4–5)
ALP BLD-CCNC: 105 IU/L (ref 40–128)
ALT SERPL-CCNC: 17 U/L (ref 10–40)
ANION GAP SERPL CALCULATED.3IONS-SCNC: 14 MMOL/L (ref 4–16)
AST SERPL-CCNC: 27 IU/L (ref 15–37)
BASE EXCESS MIXED: 0.9 (ref 0–1.2)
BASOPHILS ABSOLUTE: 0.1 K/CU MM
BASOPHILS RELATIVE PERCENT: 1.5 % (ref 0–1)
BILIRUB SERPL-MCNC: 1.2 MG/DL (ref 0–1)
BUN BLDV-MCNC: 30 MG/DL (ref 6–23)
CALCIUM SERPL-MCNC: 8.7 MG/DL (ref 8.3–10.6)
CHLORIDE BLD-SCNC: 98 MMOL/L (ref 99–110)
CO2: 20 MMOL/L (ref 21–32)
COMMENT: ABNORMAL
CREAT SERPL-MCNC: 1.3 MG/DL (ref 0.9–1.3)
DIFFERENTIAL TYPE: ABNORMAL
EOSINOPHILS ABSOLUTE: 0.3 K/CU MM
EOSINOPHILS RELATIVE PERCENT: 3.5 % (ref 0–3)
GFR AFRICAN AMERICAN: >60 ML/MIN/1.73M2
GFR NON-AFRICAN AMERICAN: 59 ML/MIN/1.73M2
GLUCOSE BLD-MCNC: 169 MG/DL (ref 70–99)
HCO3 VENOUS: 23.8 MMOL/L (ref 19–25)
HCT VFR BLD CALC: 31.1 % (ref 42–52)
HEMOGLOBIN: 8.9 GM/DL (ref 13.5–18)
IMMATURE NEUTROPHIL %: 0.8 % (ref 0–0.43)
LYMPHOCYTES ABSOLUTE: 1.2 K/CU MM
LYMPHOCYTES RELATIVE PERCENT: 15.2 % (ref 24–44)
MAGNESIUM: 2 MG/DL (ref 1.8–2.4)
MCH RBC QN AUTO: 19.5 PG (ref 27–31)
MCHC RBC AUTO-ENTMCNC: 28.6 % (ref 32–36)
MCV RBC AUTO: 68.2 FL (ref 78–100)
MONOCYTES ABSOLUTE: 0.7 K/CU MM
MONOCYTES RELATIVE PERCENT: 9.5 % (ref 0–4)
NUCLEATED RBC %: 0.9 %
O2 SAT, VEN: 86.2 % (ref 50–70)
PCO2, VEN: 32 MMHG (ref 38–52)
PDW BLD-RTO: 20.9 % (ref 11.7–14.9)
PH VENOUS: 7.48 (ref 7.32–7.42)
PLATELET # BLD: 262 K/CU MM (ref 140–440)
PMV BLD AUTO: 9.7 FL (ref 7.5–11.1)
PO2, VEN: 109 MMHG (ref 28–48)
POTASSIUM SERPL-SCNC: 3.5 MMOL/L (ref 3.5–5.1)
PRO-BNP: 3251 PG/ML
RBC # BLD: 4.56 M/CU MM (ref 4.6–6.2)
SEGMENTED NEUTROPHILS ABSOLUTE COUNT: 5.4 K/CU MM
SEGMENTED NEUTROPHILS RELATIVE PERCENT: 69.5 % (ref 36–66)
SODIUM BLD-SCNC: 132 MMOL/L (ref 135–145)
TOTAL IMMATURE NEUTOROPHIL: 0.06 K/CU MM
TOTAL NUCLEATED RBC: 0.1 K/CU MM
TOTAL PROTEIN: 7.3 GM/DL (ref 6.4–8.2)
TROPONIN T: <0.01 NG/ML
WBC # BLD: 7.8 K/CU MM (ref 4–10.5)

## 2020-06-07 PROCEDURE — 87040 BLOOD CULTURE FOR BACTERIA: CPT

## 2020-06-07 PROCEDURE — 94640 AIRWAY INHALATION TREATMENT: CPT

## 2020-06-07 PROCEDURE — 6370000000 HC RX 637 (ALT 250 FOR IP): Performed by: EMERGENCY MEDICINE

## 2020-06-07 PROCEDURE — 93010 ELECTROCARDIOGRAM REPORT: CPT | Performed by: INTERNAL MEDICINE

## 2020-06-07 PROCEDURE — 82805 BLOOD GASES W/O2 SATURATION: CPT

## 2020-06-07 RX ORDER — PREDNISONE 20 MG/1
40 TABLET ORAL DAILY
Qty: 10 TABLET | Refills: 0 | Status: SHIPPED | OUTPATIENT
Start: 2020-06-07 | End: 2020-06-12

## 2020-06-07 RX ORDER — ALBUTEROL SULFATE 90 UG/1
2 AEROSOL, METERED RESPIRATORY (INHALATION) ONCE
Status: COMPLETED | OUTPATIENT
Start: 2020-06-07 | End: 2020-06-07

## 2020-06-07 RX ADMIN — ALBUTEROL SULFATE 2 PUFF: 90 AEROSOL, METERED RESPIRATORY (INHALATION) at 00:54

## 2020-06-07 RX ADMIN — Medication 2 PUFF: at 00:55

## 2020-06-07 ASSESSMENT — ENCOUNTER SYMPTOMS
EYE DISCHARGE: 0
NAUSEA: 0
VOMITING: 0
SORE THROAT: 0
ABDOMINAL PAIN: 0
COUGH: 0
BACK PAIN: 0
EYE PAIN: 0
SHORTNESS OF BREATH: 1
RHINORRHEA: 0

## 2020-06-07 NOTE — CARE COORDINATION
CM review of pt chart for readmission risk, last admission 5/27-30/20 dx COPD exacerbation, CHF, multi comorbidities. Pt lives at home, ind with ADLs, pt has insurance and transportation, pt was discharged home and was given PCP list.    Pt returns to ER today with same complaint of increased edema in adelia legs thinks its due to CHF. Results pending, POC undetermined at this time.  NADIARN/CM

## 2020-06-07 NOTE — ED PROVIDER NOTES
(*)     Hematocrit 31.1 (*)     MCV 68.2 (*)     MCH 19.5 (*)     MCHC 28.6 (*)     RDW 20.9 (*)     Segs Relative 69.5 (*)     Lymphocytes % 15.2 (*)     Monocytes % 9.5 (*)     Eosinophils % 3.5 (*)     Basophils % 1.5 (*)     Immature Neutrophil % 0.8 (*)     All other components within normal limits   BRAIN NATRIURETIC PEPTIDE - Abnormal; Notable for the following components:    Pro-BNP 3,251 (*)     All other components within normal limits   BLOOD GAS, VENOUS - Abnormal; Notable for the following components:    pH, Sukumar 7.48 (*)     pCO2, Sukumar 32 (*)     pO2, Sukumar 109 (*)     O2 Sat, Sukumar 86.2 (*)     All other components within normal limits   CULTURE, BLOOD 1   CULTURE, BLOOD 2   TROPONIN   LACTIC ACID, PLASMA   MAGNESIUM       All other labs were within normal range or not returned as of this dictation. EMERGENCY DEPARTMENT COURSE and DIFFERENTIAL DIAGNOSIS/MDM:   Vitals:    Vitals:    06/06/20 2230 06/06/20 2304 06/07/20 0042 06/07/20 0131   BP: 118/76 104/66 106/78 98/82   Pulse: 113 107 108 105   Resp: 29 (!) 34 20 12   Temp:       TempSrc:       SpO2: 100% 97% 98% 99%   Weight:       Height:               MDM  Number of Diagnoses or Management Options  COPD exacerbation ([facility] [address]):   Shortness of breath:   Diagnosis management comments: 63-year-old male presents with shortness of breath. He has a history of COPD and CHF. He does not have a home oxygen requirement. He believes he may be volume overloaded. He states he has been compliant with his home diuretic. In the emergency department, he was tachycardic and a bit tachypneic upon presentation. He did have oxygen saturations in the high 90s on room air. Labs and imaging obtained. His chest x-ray was read his baseline. No significant pulmonary edema or fluid noted. His BNP is elevated but is actually improved from prior values. VBG does not show any hypercapnia. No leukocytosis.   Overall his etiology seems more consistent with exacerbation of his COPD.  He is given inhaler treatment to the emergency department. Patient does want to leave the emergency department. I did recommend inpatient admission. We discussed the possibility of progression of his symptoms. He demonstrated understanding and showed appropriate capacity to decline admission. He will be discharged [address] He is prescribed's course of steroids at home for COPD exacerbation. Amount and/or Complexity of Data Reviewed  Clinical lab tests: reviewed  Tests in the radiology section of CPT®: reviewed  Decide to obtain previous medical records or to obtain history from someone other than the patient: yes          CRITICAL CARE TIME     Total critical care time provided today was 35 minutes. This excludes seperately billable procedures and family discussion time. Critical care time provided for obtaining history, conducting a physical exam, performing and monitoring interventions, ordering, collecting and interpreting tests, and establishing medical decision-making. There was a potential for life/limb threatening pathology requiring close evaluation and intervention with concern for patient decompensation. CONSULTS:  None    PROCEDURES:  None performed unless otherwise noted below     Procedures        FINAL IMPRESSION      1. COPD exacerbation ([facility] [address]    2. Shortness of breath          DISPOSITION/PLAN   DISPOSITION [place] 06/07/2020 01:48:10 AM      PATIENT REFERRED TO:  No follow-up provider specified. DISCHARGE MEDICATIONS:  Discharge Medication List as of 6/7/2020  1:53 AM      START taking these medications    Details   predniSONE (DELTASONE) 20 MG tablet Take 2 tablets by mouth daily for 5 days, Disp-10 tablet, R-0Print             ED Provider Disposition Time  DISPOSITION [place] 06/07/2020 01:48:10 AM      Appropriate personal protective equipment was worn during the patient's evaluation.   These included surgical, eye protection, surgical mask or in 95 respirator and

## 2020-06-08 ENCOUNTER — CARE COORDINATION (OUTPATIENT)
Dept: CARE COORDINATION | Age: 50
End: 2020-06-08

## 2020-06-12 LAB
CULTURE: NORMAL
CULTURE: NORMAL
Lab: NORMAL
Lab: NORMAL
SPECIMEN: NORMAL
SPECIMEN: NORMAL

## 2020-06-17 LAB
EKG ATRIAL RATE: 108 BPM
EKG DIAGNOSIS: NORMAL
EKG P AXIS: 80 DEGREES
EKG P-R INTERVAL: 160 MS
EKG Q-T INTERVAL: 370 MS
EKG QRS DURATION: 122 MS
EKG QTC CALCULATION (BAZETT): 495 MS
EKG R AXIS: 75 DEGREES
EKG T AXIS: 100 DEGREES
EKG VENTRICULAR RATE: 108 BPM

## 2020-06-18 ENCOUNTER — TELEPHONE (OUTPATIENT)
Dept: INTERNAL MEDICINE CLINIC | Age: 50
End: 2020-06-18

## 2020-06-25 NOTE — PROGRESS NOTES
Progress Note( Dr. Castillo Settler)    Subjective:   Admit Date: 5/17/2020  PCP: No primary care provider on file. Admitted For : Hematemesis    Consulted For: Borderline diabetes better control    Interval History: Being followed by GI planning for endoscopy    Denies any chest pains,   Denies SOB . Denies nausea or vomiting. No new bowel or bladder symptoms. No intake or output data in the 24 hours ending 06/24/20 2029    DATA    CBC: No results for input(s): WBC, HGB, PLT in the last 72 hours. CMP:No results for input(s): NA, K, CL, CO2, BUN, CREATININE, CALCIUM, PROT, LABALBU, BILITOT, ALKPHOS, AST, ALT in the last 72 hours. Invalid input(s): GLU  Lipids:   Lab Results   Component Value Date    CHOL 108 05/14/2020    CHOL 218 12/01/2014    HDL 19 05/14/2020    TRIG 91 05/14/2020     Glucose:No results for input(s): POCGLU in the last 72 hours. [id number]:  Lab Results   Component Value Date    LABA1C 7.1 05/27/2020     High Sensitivity TSH:   Lab Results   Component Value Date    TSHHS 5.650 05/13/2020     Free T3:   Lab Results   Component Value Date    FT3 2.9 07/21/2016     Free T4:  Lab Results   Component Value Date    T4FREE 1.09 07/21/2016       No results found. Scheduled Medicines   Medications:    Infusions:       Objective:   Vitals: /75   Pulse 103   Temp 97.4 °F (36.3 °C) (Oral)   Resp 17   Ht 5' 11\" (1.803 m)   Wt 197 lb (89.4 kg)   SpO2 98%   BMI 27.48 kg/m²   General appearance: alert and cooperative with exam  Neck: no JVD or bruit  Thyroid : Normal lobes   Lungs: Has Vesicular Breath sounds   Heart:  regular rate and rhythm  Abdomen: soft, non-tender; bowel sounds normal; no masses,  no organomegaly  Musculoskeletal: Normal  Extremities: extremities normal, , no edema  Neurologic:  Awake, alert, oriented to name, place and time. Cranial nerves II-XII are grossly intact. Motor is  intact. Sensory is intact. ,  and gait is normal.    Assessment:     Patient Active Problem List:     Essential hypertension     Osteoarthritis     COPD (chronic obstructive pulmonary disease) ([facility])     Paresthesia of left leg     Abdominal hernia     Left shoulder pain     Crushing injury of right hand     Rotator cuff tendinitis     Midline low back pain without sciatica     History of MI (myocardial infarction)     Coronary artery disease involving coronary bypass graft of native heart without angina pectoris     Mixed hyperlipidemia     Depression     Chronic midline low back pain without sciatica     Tobacco abuse     Gastroesophageal reflux disease without esophagitis     Opiate abuse, continuous ([facility])     Heroin dependence ([facility] [address]     ST elevation myocardial infarction involving left circumflex coronary artery ([facility])     STEMI (ST elevation myocardial infarction) ([facility])     Acute on chronic combined systolic (congestive) and diastolic (congestive) heart failure ([facility])     Systolic and diastolic CHF w/reduced LV function, NYHA class 4 ([facility])     NSTEMI (non-ST elevated myocardial infarction) ([address]     Acute on chronic congestive heart failure ([address]     Noncompliance     Pulmonary edema, acute ([address]     Acute kidney injury ([address]     Acute respiratory failure with hypoxia and hypercapnia ([facility])     Hypertensive emergency     Ischemic cardiomyopathy     Heart failure ([address]     Left ventricular systolic dysfunction     Acute systolic congestive heart failure ([facility])     SOB (shortness of breath)     Acute on chronic combined systolic and diastolic heart failure ([facility])     Hyponatremia     Hematemesis     Acute on chronic systolic CHF (congestive heart failure) ([address]      Plan:     1. Reviewed POC blood glucose . Labs and X ray results   2. Reviewed Current Medicines   3. On Correction bolus Humalog Insulin regime   4. Monitor Blood glucose frequently   5. Modified  the dose of Insulin/ other medicines as needed   6. Will follow     .      Selina Cavazos MD

## 2020-06-25 NOTE — PROGRESS NOTES
Progress Note( Dr. Maxwell Taylor)  6/24/2020  Subjective:   Admit Date: 5/17/2020  PCP: No primary care provider on file. Admitted For : Hematemesis    Consulted For: Borderline diabetes better control    Interval History: Being followed by GI planning for endoscopy    Denies any chest pains,   Denies SOB . Denies nausea or vomiting. No new bowel or bladder symptoms. No intake or output data in the 24 hours ending 06/24/20 2027    DATA    CBC: No results for input(s): WBC, HGB, PLT in the last 72 hours. CMP:No results for input(s): NA, K, CL, CO2, BUN, CREATININE, CALCIUM, PROT, LABALBU, BILITOT, ALKPHOS, AST, ALT in the last 72 hours. Invalid input(s): GLU  Lipids:   Lab Results   Component Value Date    CHOL 108 05/14/2020    CHOL 218 12/01/2014    HDL 19 05/14/2020    TRIG 91 05/14/2020     Glucose:No results for input(s): POCGLU in the last 72 hours. [id number]:  Lab Results   Component Value Date    LABA1C 7.1 05/27/2020     High Sensitivity TSH:   Lab Results   Component Value Date    TSHHS 5.650 05/13/2020     Free T3:   Lab Results   Component Value Date    FT3 2.9 07/21/2016     Free T4:  Lab Results   Component Value Date    T4FREE 1.09 07/21/2016       No results found. Scheduled Medicines   Medications:    Infusions:       Objective:   Vitals: /75   Pulse 103   Temp 97.4 °F (36.3 °C) (Oral)   Resp 17   Ht 5' 11\" (1.803 m)   Wt 197 lb (89.4 kg)   SpO2 98%   BMI 27.48 kg/m²   General appearance: alert and cooperative with exam  Neck: no JVD or bruit  Thyroid : Normal lobes   Lungs: Has Vesicular Breath sounds   Heart:  regular rate and rhythm  Abdomen: soft, non-tender; bowel sounds normal; no masses,  no organomegaly  Musculoskeletal: Normal  Extremities: extremities normal, , no edema  Neurologic:  Awake, alert, oriented to name, place and time. Cranial nerves II-XII are grossly intact. Motor is  intact. Sensory is intact. ,  and gait is normal.    Assessment:     Patient Active Problem List:     Essential hypertension     Osteoarthritis     COPD (chronic obstructive pulmonary disease) ([facility])     Paresthesia of left leg     Abdominal hernia     Left shoulder pain     Crushing injury of right hand     Rotator cuff tendinitis     Midline low back pain without sciatica     History of MI (myocardial infarction)     Coronary artery disease involving coronary bypass graft of native heart without angina pectoris     Mixed hyperlipidemia     Depression     Chronic midline low back pain without sciatica     Tobacco abuse     Gastroesophageal reflux disease without esophagitis     Opiate abuse, continuous ([facility])     Heroin dependence ([facility] [address]     ST elevation myocardial infarction involving left circumflex coronary artery ([facility])     STEMI (ST elevation myocardial infarction) ([facility])     Acute on chronic combined systolic (congestive) and diastolic (congestive) heart failure ([facility])     Systolic and diastolic CHF w/reduced LV function, NYHA class 4 ([facility])     NSTEMI (non-ST elevated myocardial infarction) ([facility] [address]     Acute on chronic congestive heart failure ([facility] [address]     Noncompliance     Pulmonary edema, acute ([facility])     Acute kidney injury ([facility])     Acute respiratory failure with hypoxia and hypercapnia ([facility])     Hypertensive emergency     Ischemic cardiomyopathy     Heart failure ([facility] [address]     Left ventricular systolic dysfunction     Acute systolic congestive heart failure ([facility])     SOB (shortness of breath)     Acute on chronic combined systolic and diastolic heart failure ([facility])     Hyponatremia     Hematemesis     Acute on chronic systolic CHF (congestive heart failure) ([facility] [address]      Plan:     1. Reviewed POC blood glucose . Labs and X ray results   2. Reviewed Current Medicines   3. On Correction bolus Humalog Insulin regime   4. Monitor Blood glucose frequently   5. Modified  the dose of Insulin/ other medicines as needed   6. Will follow     .      Argelia Short MD

## 2020-07-02 ENCOUNTER — HOSPITAL ENCOUNTER (INPATIENT)
Age: 50
LOS: 6 days | Discharge: LEFT BEFORE DISCHARGE | DRG: 190 | End: 2020-07-08
Attending: EMERGENCY MEDICINE | Admitting: HOSPITALIST
Payer: MEDICARE

## 2020-07-02 ENCOUNTER — APPOINTMENT (OUTPATIENT)
Dept: GENERAL RADIOLOGY | Age: 50
DRG: 190 | End: 2020-07-02
Payer: MEDICARE

## 2020-07-02 LAB
ACANTHOCYTES: ABNORMAL
ALBUMIN SERPL-MCNC: 4.3 GM/DL (ref 3.4–5)
ALP BLD-CCNC: 121 IU/L (ref 40–128)
ALT SERPL-CCNC: 12 U/L (ref 10–40)
ANION GAP SERPL CALCULATED.3IONS-SCNC: 12 MMOL/L (ref 4–16)
AST SERPL-CCNC: 24 IU/L (ref 15–37)
BASOPHILS ABSOLUTE: 0.1 K/CU MM
BASOPHILS RELATIVE PERCENT: 1.9 % (ref 0–1)
BILIRUB SERPL-MCNC: 1.2 MG/DL (ref 0–1)
BUN BLDV-MCNC: 34 MG/DL (ref 6–23)
CALCIUM SERPL-MCNC: 9.2 MG/DL (ref 8.3–10.6)
CHLORIDE BLD-SCNC: 94 MMOL/L (ref 99–110)
CO2: 22 MMOL/L (ref 21–32)
CREAT SERPL-MCNC: 1.7 MG/DL (ref 0.9–1.3)
DIFFERENTIAL TYPE: ABNORMAL
EOSINOPHILS ABSOLUTE: 0.1 K/CU MM
EOSINOPHILS RELATIVE PERCENT: 1.3 % (ref 0–3)
GFR AFRICAN AMERICAN: 52 ML/MIN/1.73M2
GFR NON-AFRICAN AMERICAN: 43 ML/MIN/1.73M2
GLUCOSE BLD-MCNC: 127 MG/DL (ref 70–99)
HCT VFR BLD CALC: 27.6 % (ref 42–52)
HEMOGLOBIN: 7.9 GM/DL (ref 13.5–18)
HYPOCHROMIA: ABNORMAL
IMMATURE NEUTROPHIL %: 1 % (ref 0–0.43)
IRON: 20 UG/DL (ref 59–158)
LYMPHOCYTES ABSOLUTE: 1 K/CU MM
LYMPHOCYTES RELATIVE PERCENT: 14.5 % (ref 24–44)
MAGNESIUM: 2 MG/DL (ref 1.8–2.4)
MCH RBC QN AUTO: 18.9 PG (ref 27–31)
MCHC RBC AUTO-ENTMCNC: 28.6 % (ref 32–36)
MCV RBC AUTO: 66.2 FL (ref 78–100)
MICROCYTES: ABNORMAL
MONOCYTES ABSOLUTE: 0.6 K/CU MM
MONOCYTES RELATIVE PERCENT: 9 % (ref 0–4)
NUCLEATED RBC %: 1.2 %
PCT TRANSFERRIN: 4 % (ref 10–44)
PDW BLD-RTO: 21.3 % (ref 11.7–14.9)
PLATELET # BLD: 320 K/CU MM (ref 140–440)
PMV BLD AUTO: 9.4 FL (ref 7.5–11.1)
POTASSIUM SERPL-SCNC: 3.9 MMOL/L (ref 3.5–5.1)
PRO-BNP: 3712 PG/ML
RBC # BLD: 4.17 M/CU MM (ref 4.6–6.2)
REASON FOR REJECTION: NORMAL
REJECTED TEST: NORMAL
SEGMENTED NEUTROPHILS ABSOLUTE COUNT: 4.9 K/CU MM
SEGMENTED NEUTROPHILS RELATIVE PERCENT: 72.3 % (ref 36–66)
SODIUM BLD-SCNC: 128 MMOL/L (ref 135–145)
TOTAL IMMATURE NEUTOROPHIL: 0.07 K/CU MM
TOTAL IRON BINDING CAPACITY: 489 UG/DL (ref 250–450)
TOTAL NUCLEATED RBC: 0.1 K/CU MM
TOTAL PROTEIN: 7.6 GM/DL (ref 6.4–8.2)
TROPONIN T: <0.01 NG/ML
UNSATURATED IRON BINDING CAPACITY: 469 UG/DL (ref 110–370)
WBC # BLD: 6.8 K/CU MM (ref 4–10.5)

## 2020-07-02 PROCEDURE — 83540 ASSAY OF IRON: CPT

## 2020-07-02 PROCEDURE — 71045 X-RAY EXAM CHEST 1 VIEW: CPT

## 2020-07-02 PROCEDURE — 6360000002 HC RX W HCPCS: Performed by: EMERGENCY MEDICINE

## 2020-07-02 PROCEDURE — 6360000002 HC RX W HCPCS: Performed by: INTERNAL MEDICINE

## 2020-07-02 PROCEDURE — 80053 COMPREHEN METABOLIC PANEL: CPT

## 2020-07-02 PROCEDURE — 96374 THER/PROPH/DIAG INJ IV PUSH: CPT

## 2020-07-02 PROCEDURE — 6370000000 HC RX 637 (ALT 250 FOR IP): Performed by: EMERGENCY MEDICINE

## 2020-07-02 PROCEDURE — 99285 EMERGENCY DEPT VISIT HI MDM: CPT

## 2020-07-02 PROCEDURE — 83735 ASSAY OF MAGNESIUM: CPT

## 2020-07-02 PROCEDURE — 6360000002 HC RX W HCPCS: Performed by: HOSPITALIST

## 2020-07-02 PROCEDURE — 6370000000 HC RX 637 (ALT 250 FOR IP): Performed by: INTERNAL MEDICINE

## 2020-07-02 PROCEDURE — 2580000003 HC RX 258: Performed by: INTERNAL MEDICINE

## 2020-07-02 PROCEDURE — 6370000000 HC RX 637 (ALT 250 FOR IP): Performed by: HOSPITALIST

## 2020-07-02 PROCEDURE — 83550 IRON BINDING TEST: CPT

## 2020-07-02 PROCEDURE — 99221 1ST HOSP IP/OBS SF/LOW 40: CPT | Performed by: INTERNAL MEDICINE

## 2020-07-02 PROCEDURE — 94761 N-INVAS EAR/PLS OXIMETRY MLT: CPT

## 2020-07-02 PROCEDURE — 83880 ASSAY OF NATRIURETIC PEPTIDE: CPT

## 2020-07-02 PROCEDURE — 94640 AIRWAY INHALATION TREATMENT: CPT

## 2020-07-02 PROCEDURE — 85025 COMPLETE CBC W/AUTO DIFF WBC: CPT

## 2020-07-02 PROCEDURE — 1200000000 HC SEMI PRIVATE

## 2020-07-02 PROCEDURE — 84484 ASSAY OF TROPONIN QUANT: CPT

## 2020-07-02 RX ORDER — SODIUM CHLORIDE 0.9 % (FLUSH) 0.9 %
10 SYRINGE (ML) INJECTION EVERY 12 HOURS SCHEDULED
Status: DISCONTINUED | OUTPATIENT
Start: 2020-07-02 | End: 2020-07-04 | Stop reason: SDUPTHER

## 2020-07-02 RX ORDER — METOPROLOL SUCCINATE 25 MG/1
25 TABLET, EXTENDED RELEASE ORAL DAILY
Status: DISCONTINUED | OUTPATIENT
Start: 2020-07-02 | End: 2020-07-05

## 2020-07-02 RX ORDER — PROMETHAZINE HYDROCHLORIDE 12.5 MG/1
12.5 TABLET ORAL EVERY 6 HOURS PRN
Status: DISCONTINUED | OUTPATIENT
Start: 2020-07-02 | End: 2020-07-08 | Stop reason: HOSPADM

## 2020-07-02 RX ORDER — ASPIRIN 81 MG/1
324 TABLET, CHEWABLE ORAL ONCE
Status: COMPLETED | OUTPATIENT
Start: 2020-07-02 | End: 2020-07-02

## 2020-07-02 RX ORDER — ASPIRIN 81 MG/1
81 TABLET ORAL DAILY
Status: DISCONTINUED | OUTPATIENT
Start: 2020-07-02 | End: 2020-07-08 | Stop reason: HOSPADM

## 2020-07-02 RX ORDER — ONDANSETRON 2 MG/ML
4 INJECTION INTRAMUSCULAR; INTRAVENOUS EVERY 6 HOURS PRN
Status: DISCONTINUED | OUTPATIENT
Start: 2020-07-02 | End: 2020-07-08 | Stop reason: HOSPADM

## 2020-07-02 RX ORDER — METHYLPREDNISOLONE SODIUM SUCCINATE 40 MG/ML
40 INJECTION, POWDER, LYOPHILIZED, FOR SOLUTION INTRAMUSCULAR; INTRAVENOUS EVERY 12 HOURS
Status: DISCONTINUED | OUTPATIENT
Start: 2020-07-02 | End: 2020-07-03

## 2020-07-02 RX ORDER — PANTOPRAZOLE SODIUM 40 MG/1
40 TABLET, DELAYED RELEASE ORAL
Status: DISCONTINUED | OUTPATIENT
Start: 2020-07-02 | End: 2020-07-08 | Stop reason: HOSPADM

## 2020-07-02 RX ORDER — SODIUM CHLORIDE 0.9 % (FLUSH) 0.9 %
10 SYRINGE (ML) INJECTION PRN
Status: DISCONTINUED | OUTPATIENT
Start: 2020-07-02 | End: 2020-07-04 | Stop reason: SDUPTHER

## 2020-07-02 RX ORDER — ATORVASTATIN CALCIUM 40 MG/1
40 TABLET, FILM COATED ORAL DAILY
Status: DISCONTINUED | OUTPATIENT
Start: 2020-07-02 | End: 2020-07-08 | Stop reason: HOSPADM

## 2020-07-02 RX ORDER — MIDODRINE HYDROCHLORIDE 5 MG/1
5 TABLET ORAL
Status: DISCONTINUED | OUTPATIENT
Start: 2020-07-02 | End: 2020-07-05

## 2020-07-02 RX ORDER — SPIRONOLACTONE 25 MG/1
25 TABLET ORAL DAILY
Status: DISCONTINUED | OUTPATIENT
Start: 2020-07-02 | End: 2020-07-04

## 2020-07-02 RX ORDER — IPRATROPIUM BROMIDE AND ALBUTEROL SULFATE 2.5; .5 MG/3ML; MG/3ML
1 SOLUTION RESPIRATORY (INHALATION) 4 TIMES DAILY
Status: DISCONTINUED | OUTPATIENT
Start: 2020-07-02 | End: 2020-07-08

## 2020-07-02 RX ORDER — POLYETHYLENE GLYCOL 3350 17 G/17G
17 POWDER, FOR SOLUTION ORAL DAILY PRN
Status: DISCONTINUED | OUTPATIENT
Start: 2020-07-02 | End: 2020-07-08 | Stop reason: HOSPADM

## 2020-07-02 RX ORDER — ACETAMINOPHEN 650 MG/1
650 SUPPOSITORY RECTAL EVERY 6 HOURS PRN
Status: DISCONTINUED | OUTPATIENT
Start: 2020-07-02 | End: 2020-07-08 | Stop reason: HOSPADM

## 2020-07-02 RX ORDER — FUROSEMIDE 10 MG/ML
60 INJECTION INTRAMUSCULAR; INTRAVENOUS ONCE
Status: COMPLETED | OUTPATIENT
Start: 2020-07-02 | End: 2020-07-02

## 2020-07-02 RX ORDER — FERROUS SULFATE 325(65) MG
325 TABLET ORAL 2 TIMES DAILY WITH MEALS
Status: DISCONTINUED | OUTPATIENT
Start: 2020-07-02 | End: 2020-07-08 | Stop reason: HOSPADM

## 2020-07-02 RX ORDER — FUROSEMIDE 10 MG/ML
40 INJECTION INTRAMUSCULAR; INTRAVENOUS EVERY 12 HOURS
Status: DISCONTINUED | OUTPATIENT
Start: 2020-07-02 | End: 2020-07-05

## 2020-07-02 RX ORDER — ACETAMINOPHEN 325 MG/1
650 TABLET ORAL EVERY 6 HOURS PRN
Status: DISCONTINUED | OUTPATIENT
Start: 2020-07-02 | End: 2020-07-08 | Stop reason: HOSPADM

## 2020-07-02 RX ADMIN — METOPROLOL SUCCINATE 25 MG: 25 TABLET, EXTENDED RELEASE ORAL at 10:26

## 2020-07-02 RX ADMIN — IPRATROPIUM BROMIDE AND ALBUTEROL SULFATE 3 ML: .5; 3 SOLUTION RESPIRATORY (INHALATION) at 12:44

## 2020-07-02 RX ADMIN — SPIRONOLACTONE 25 MG: 25 TABLET ORAL at 10:26

## 2020-07-02 RX ADMIN — SODIUM CHLORIDE, PRESERVATIVE FREE 10 ML: 5 INJECTION INTRAVENOUS at 10:27

## 2020-07-02 RX ADMIN — ATORVASTATIN CALCIUM 40 MG: 40 TABLET, FILM COATED ORAL at 10:26

## 2020-07-02 RX ADMIN — MIDODRINE HYDROCHLORIDE 5 MG: 5 TABLET ORAL at 17:25

## 2020-07-02 RX ADMIN — ASPIRIN 81 MG 324 MG: 81 TABLET ORAL at 04:37

## 2020-07-02 RX ADMIN — SODIUM CHLORIDE, PRESERVATIVE FREE 10 ML: 5 INJECTION INTRAVENOUS at 19:50

## 2020-07-02 RX ADMIN — FERROUS SULFATE TAB 325 MG (65 MG ELEMENTAL FE) 325 MG: 325 (65 FE) TAB at 10:26

## 2020-07-02 RX ADMIN — METHYLPREDNISOLONE SODIUM SUCCINATE 40 MG: 40 INJECTION, POWDER, FOR SOLUTION INTRAMUSCULAR; INTRAVENOUS at 17:25

## 2020-07-02 RX ADMIN — IPRATROPIUM BROMIDE AND ALBUTEROL SULFATE 3 ML: .5; 3 SOLUTION RESPIRATORY (INHALATION) at 16:52

## 2020-07-02 RX ADMIN — IPRATROPIUM BROMIDE AND ALBUTEROL SULFATE 3 ML: .5; 3 SOLUTION RESPIRATORY (INHALATION) at 19:44

## 2020-07-02 RX ADMIN — ASPIRIN 81 MG: 81 TABLET, COATED ORAL at 10:26

## 2020-07-02 RX ADMIN — FUROSEMIDE 60 MG: 10 INJECTION, SOLUTION INTRAVENOUS at 05:07

## 2020-07-02 RX ADMIN — PANTOPRAZOLE SODIUM 40 MG: 40 TABLET, DELAYED RELEASE ORAL at 16:46

## 2020-07-02 RX ADMIN — FERROUS SULFATE TAB 325 MG (65 MG ELEMENTAL FE) 325 MG: 325 (65 FE) TAB at 16:46

## 2020-07-02 ASSESSMENT — PAIN SCALES - GENERAL
PAINLEVEL_OUTOF10: 0
PAINLEVEL_OUTOF10: 0

## 2020-07-02 NOTE — ED TRIAGE NOTES
Patient to the ED with complaints of SOB that has worsened since yesterday. Patient states hx of CHF. Denies N/V/D, denies fevers.

## 2020-07-02 NOTE — ED NOTES
Called report to [address] [place] [address] on [id number] [facility].      Jeramie Aragon RN  07/02/20 2101

## 2020-07-02 NOTE — CARE COORDINATION
PT known to case management due to frequent visits to ED/[facility]. Pt admitted from home, remains ind with ADLs- pt would greatly benefit from following closely with a pcp but has yet to follow up with getting established with anyone.   Pt again provided with pcp options/ [facility].

## 2020-07-02 NOTE — H&P
hours as needed for Wheezing or Shortness of Breath With spacer (and mask if indicated). Thanks. 1 Inhaler 1    ipratropium-albuterol (DUONEB) 0.5-2.5 (3) MG/3ML SOLN nebulizer solution Inhale 3 mLs into the lungs 4 times daily 360 mL 1    atorvastatin (LIPITOR) 40 MG tablet Take 1 tablet by mouth daily 30 tablet 0    metoprolol succinate (TOPROL XL) 25 MG extended release tablet Take 1 tablet by mouth daily 30 tablet 2         Allergies:  Patient has no known allergies.     Social History:   Social History     Socioeconomic History    Marital status:      Spouse name: Not on file    Number of children: Not on file    Years of education: Not on file    Highest education level: Not on file   Occupational History    Not on file   Social Needs    Financial resource strain: Not on file    Food insecurity     Worry: Not on file     Inability: Not on file   [employer] needs     Medical: Not on file     Non-medical: Not on file   Tobacco Use    Smoking status: Current Every Day Smoker     Packs/day: 1.00     Years: 20.00     Pack years: 20.00     Types: Cigarettes    Smokeless tobacco: Former User    Tobacco comment: Reviewed 10/9/17   Substance and Sexual Activity    Alcohol use: No     Alcohol/week: 0.0 standard drinks    Drug use: No     Comment: march 2018 states he quit    Sexual activity: Not Currently   Lifestyle    Physical activity     Days per week: Not on file     Minutes per session: Not on file    Stress: Not on file   Relationships    Social connections     Talks on phone: Not on file     Gets together: Not on file     Attends Sabianist service: Not on file     Active member of club or organization: Not on file     Attends meetings of clubs or organizations: Not on file     Relationship status: Not on file    Intimate partner violence     Fear of current or ex partner: Not on file     Emotionally abused: Not on file     Physically abused: Not on file     Forced sexual activity: Not on file   Other Topics Concern    Not on file   Social History Narrative    Not on file         Family History:   Family History   Problem Relation Age of Onset    Cancer Father     Heart Failure Father     Heart Disease Father     Diabetes Mother     Heart Disease Mother        REVIEW OF SYSTEMS:  CONSTITUTIONAL:  negative  EYES:  negative  HEENT:  negative  RESPIRATORY:  positive for  dyspnea  CARDIOVASCULAR:  negative  GASTROINTESTINAL:  negative  GENITOURINARY:  negative  ENDOCRINE:  negative  MUSCULOSKELETAL:  negative  NEUROLOGICAL:  negative  BEHAVIOR/PSYCH:  negative  PHYSICAL EXAM:    Vitals:  /70   Pulse 109   Temp 98.4 °F (36.9 °C)   Resp 19   Ht 5' 11\" (1.803 m)   Wt 194 lb (88 kg)   SpO2 100%   BMI 27.06 kg/m²     CONSTITUTIONAL:  fatigued  EYES:  lids and lashes normal  ENT:  normocepalic, without obvious abnormality  LUNGS:  Crackles bases  CARDIOVASCULAR:  Regular rhythm, murmur  ABDOMEN:  No scars, normal bowel sounds, soft, non-distended, non-tender, no masses palpated, no hepatosplenomegally  MUSCULOSKELETAL:  Bilateral LE edema  NEUROLOGIC:  Awake, alert, oriented to name, place and time. Cranial nerves II-XII are grossly intact. Motor is 5 out of 5 bilaterally. SKIN:  no bruising or bleeding    DATA:  CXR NAD  EKG [address] PVC  ASSESSMENT AND PLAN:    Acute on chronic combined systolic and diastolic CHF  -ASa, BB, ARB, lasix  -not a candidate for ACE/ARB with CKD  Hypervolemic hyponatremia  -diurese and monitor  JAIME on CKD 3  -cardio renal and diurese  YEIMY  -drop and monitor  -place on ferrous sulfate  -if drops further then venofer  COPD  -duoneb  DVT prophylaxis  -lovenox

## 2020-07-02 NOTE — ED NOTES
Bed: ED-33  Expected date:   Expected time:   Means of arrival:   Comments:  [address] [facility]  07/02/20 [id number]

## 2020-07-02 NOTE — ED NOTES
Patient taken from ED to 4N at this time. Patient appears in no acute distress; A+Ox4, respirations equal and unlabored, warm and dry. Patient with all belongings at time of transfer and denies needs.        Melecio Eden RN  07/02/20 [id number]

## 2020-07-02 NOTE — CONSULTS
CARDIOLOGY CONSULT NOTE   Reason for consultation:  Shortness of breath    Referring physician:  Sehldon Schwartz MD     Primary care physician: none    Dear Dr. Arin Mahmood  Thanks for the consult. History of present illness:Alireza is a 52 y. o.year old who  presents with for shortness of breath which is mild, for many years, intermittent, self limiting, not associated with cough or fever, gets worse with activity and better with rest,    Chief Complaint   Patient presents with    Shortness of Breath     since yesterday, hx of CHF     Blood pressure, cholesterol, blood glucose and weight are well controlled. Past medical history:    has a past medical history of CAD (coronary artery disease), CHF (congestive heart failure) ([facility] COPD (chronic obstructive pulmonary disease) ([facility] Depression, Drug abuse ([facility] HIGH CHOLESTEROL, Hypertension, MI (myocardial infarction) ([facility] and S/P coronary artery bypass graft x 4. Past surgical history:   has a past surgical history that includes Cardiac surgery (11/2010); Bypass Graft (04/10/2011); and Leg Surgery. Social History:   reports that he has been smoking cigarettes. He has a 20.00 pack-year smoking history. He has quit using smokeless tobacco. He reports that he does not drink alcohol or use drugs.   Family history:   no family history of CAD, STROKE of DM    No Known Allergies    aspirin EC tablet 81 mg, Daily  atorvastatin (LIPITOR) tablet 40 mg, Daily  ipratropium-albuterol (DUONEB) nebulizer solution 3 mL, 4x Daily  metoprolol succinate (TOPROL XL) extended release tablet 25 mg, Daily  pantoprazole (PROTONIX) tablet 40 mg, BID AC  spironolactone (ALDACTONE) tablet 25 mg, Daily  sodium chloride flush 0.9 % injection 10 mL, 2 times per day  sodium chloride flush 0.9 % injection 10 mL, PRN  acetaminophen (TYLENOL) tablet 650 mg, Q6H PRN    Or  acetaminophen (TYLENOL) suppository 650 mg, Q6H PRN  polyethylene glycol (GLYCOLAX) packet 17 g, Daily PRN  promethazine (PHENERGAN) tablet 12.5 mg, Q6H PRN    Or  ondansetron (ZOFRAN) injection 4 mg, Q6H PRN  enoxaparin (LOVENOX) injection 40 mg, Daily  furosemide (LASIX) injection 40 mg, Q12H  ferrous sulfate (IRON 325) tablet 325 mg, BID       Current Facility-Administered Medications   Medication Dose Route Frequency Provider Last Rate Last Dose    aspirin EC tablet 81 mg  81 mg Oral Daily Wisam Walter MD        atorvastatin (LIPITOR) tablet 40 mg  40 mg Oral Daily Wisam Walter MD        ipratropium-albuterol (DUONEB) nebulizer solution 3 mL  1 vial Inhalation 4x Daily Wisam Walter MD        metoprolol succinate (TOPROL XL) extended release tablet 25 mg  25 mg Oral Daily Wisam Walter MD        pantoprazole (PROTONIX) tablet 40 mg  40 mg Oral BID AC Wisam Walter MD        spironolactone (ALDACTONE) tablet 25 mg  25 mg Oral Daily Wisam Walter MD        sodium chloride flush 0.9 % injection 10 mL  10 mL Intravenous 2 times per day Wisam Walter MD        sodium chloride flush 0.9 % injection 10 mL  10 mL Intravenous PRN Wisam Walter MD        acetaminophen (TYLENOL) tablet 650 mg  650 mg Oral Q6H PRN Wisam Walter MD        Or    acetaminophen (TYLENOL) suppository 650 mg  650 mg Rectal Q6H PRN Wisam Walter MD        polyethylene glycol (GLYCOLAX) packet 17 g  17 g Oral Daily PRN Wisam Walter MD        promethazine (PHENERGAN) tablet 12.5 mg  12.5 mg Oral Q6H PRN Wisam Walter MD        Or    ondansetron (ZOFRAN) injection 4 mg  4 mg Intravenous Q6H PRN Wisam Walter MD        enoxaparin (LOVENOX) injection 40 mg  40 mg Subcutaneous Daily Wisam Walter MD        furosemide (LASIX) injection 40 mg  40 mg Intravenous Q12H Wisam Walter MD        ferrous sulfate (IRON 325) tablet 325 mg  325 mg Oral BID  Mervat Benton MD         Review of Systems:   · Constitutional: No Fever or Weight Loss   · Eyes: No Decreased Vision  · ENT: No Headaches, Hearing Loss or pedal pulses are normal  GI:  Bowel sounds normal, Soft, No tenderness, No masses, No pulsatile masses, no hepatosplenomegally, no bruits  : External genitalia appear normal, No masses or lesions. No discharge. No CVA tenderness. Musculoskeletal:  Intact distal pulses,mild edema, No tenderness, No cyanosis, No clubbing. Good range of motion in all major joints. No tenderness to palpation or major deformities noted. Back- No tenderness. Integument:  Warm, Dry, No erythema, No rash. Skin: no rash, no ulcers  Lymphatic:  No lymphadenopathy noted. Neurologic:  Alert & oriented x 3, Normal motor function, Normal sensory function, No focal deficits noted. Psychiatric:  Affect normal, Judgment normal, Mood normal.   Lab Review   Recent Labs     07/02/20 0442   WBC 6.8   HGB 7.9*   HCT 27.6*         Recent Labs     07/02/20 0442   *   K 3.9   CL 94*   CO2 22   BUN 34*   CREATININE 1.7*     Recent Labs     07/02/20 0442   AST 24   ALT 12   BILITOT 1.2*   ALKPHOS 121     No results for input(s): TROPONINI in the last 72 hours. Lab Results   Component Value Date     (H) 04/16/2011     (H) 04/15/2011     (H) 04/14/2011     Lab Results   Component Value Date    INR 1.42 05/20/2020    PROTIME 17.3 (H) 05/20/2020         EKG:    Chest Xray:    ECHO:  Labs, echo, meds reviewed  Assessment: 52 y. o.year old with PMH of  has a past medical history of CAD (coronary artery disease), CHF (congestive heart failure) ([address] COPD (chronic obstructive pulmonary disease) ([address] Depression, Drug abuse ([facility] HIGH CHOLESTEROL, Hypertension, MI (myocardial infarction) ([address] and S/P coronary artery bypass graft x 4. Recommendations:    1. Shortness of breath: could exacerbation of Copd and mild CHF, continue IV diuretics,   2. HTN; continue medications  3. X drug uses  4. CAD: h/o CABG, stabke  5.  Health maintenance: exerise and diet  All labs, medications and tests reviewed, continue all other medications of all above medical condition listed as is.          Gabi Sandhu MD, 7/2/2020 10:14 AM

## 2020-07-02 NOTE — ED PROVIDER NOTES
.Emergency Department Encounter  Location: [address] [place] [address] EMERGENCY DEPARTMENT    Patient: Netta Kay  MRN: [id number]  : 1970  Date of evaluation: 2020  ED Provider: Eleazar Canseco DO    Chief Complaint:    Shortness of Breath (since yesterday, hx of CHF)    [place]:  Netta Kay is a 52 y.o. male that presents to the emergency department with complaint of shortness of breath. Describes that his abdomen feels full and his legs are swollen. Symptoms started yesterday. States he did cough a little bit earlier after taking a nebulizer treatment, but otherwise has not been coughing. Denies fever, chills or chest pain. He indicates that he feels \"full of fluid\" despite taking Bumex 2 mg twice daily as well as Spironolactone. He indicates he does not think he is urinating as much as he ought to be but denies any symptoms of dysuria, urgency or urinary retention. No abdominal pain or back pain. ROS:  At least 10 systems reviewed and are acutely negative unless otherwise noted in the HPI.     Past Medical History:   Diagnosis Date    CAD (coronary artery disease)     CHF (congestive heart failure) (HCC)     COPD (chronic obstructive pulmonary disease) (HCC)     Depression     Drug [facility] ([facility]     HIGH CHOLESTEROL     Hypertension     MI (myocardial infarction) ([facility]     S/P coronary artery bypass graft x 4     CABG x 4 Dr Fidel Hsieh     Past Surgical History:   Procedure Laterality Date    BYPASS GRAFT  04/10/2011    CABG x 4 Dr Tamia Ortega  2010     4 stents    LEG SURGERY       Family History   Problem Relation Age of Onset    Cancer Father     Heart Failure Father     Heart Disease Father     Diabetes Mother     Heart Disease Mother      Social History     Socioeconomic History    Marital status:      Spouse name: Not on file    Number of children: Not on file    Years of education: Not on file    Highest education ([employer]OVENTIL HFA) 108 (90 Base) MCG/ACT inhaler Inhale 2 puffs into the lungs every 4 hours as needed for Wheezing or Shortness of Breath With spacer (and mask if indicated). Thanks. 1 Inhaler 1    ipratropium-albuterol (DUONEB) 0.5-2.5 (3) MG/3ML SOLN nebulizer solution Inhale 3 mLs into the lungs 4 times daily 360 mL 1    atorvastatin (LIPITOR) 40 MG tablet Take 1 tablet by mouth daily 30 tablet 0    metoprolol succinate (TOPROL XL) 25 MG extended release tablet Take 1 tablet by mouth daily 30 tablet 2     No Known Allergies    Nursing Notes Reviewed    Physical Exam:  ED Triage Vitals [07/02/20 1624]   Enc Vitals Group      /79      Pulse 110      Resp 16      Temp 98.4 °F (36.9 °C)      Temp src       SpO2 98 %      Weight 194 lb (88 kg)      Height 5' 11\" (1.803 m)      Head Circumference       Peak Flow       Pain Score       Pain Loc       Pain Edu? Excl. in [address]? GENERAL APPEARANCE: Awake and alert. Cooperative. No acute distress. HEAD: Normocephalic. Atraumatic. EYES: EOM's grossly intact. Sclera anicteric. ENT: Tolerates saliva. No trismus. NECK: Supple. Trachea midline. CARDIO: RRR. Radial pulse 2+. LUNGS: Respirations intermittently tachypneic. Diminished in the bases but otherwise CTAB. ABDOMEN: Soft. Appears to be mildly bloated but otherwise nontender. EXTREMITIES: No acute deformities. Pitting edema in bilateral lower extremities. Is symmetric and mildly tender. Symmetric lower extremity pulses. SKIN: Warm and dry. NEUROLOGICAL: No gross facial drooping. Moves all 4 extremities spontaneously. PSYCHIATRIC: Normal mood.      Labs:  Results for orders placed or performed during the hospital encounter of 07/02/20   CBC Auto Differential   Result Value Ref Range    WBC 6.8 4.0 - 10.5 K/CU MM    RBC 4.17 (L) 4.6 - 6.2 M/CU MM    Hemoglobin 7.9 (L) 13.5 - 18.0 GM/DL    Hematocrit 27.6 (L) 42 - 52 %    MCV 66.2 (L) 78 - 100 FL    MCH 18.9 (L) 27 - 31 PG    MCHC 28.6 (L)

## 2020-07-03 ENCOUNTER — APPOINTMENT (OUTPATIENT)
Dept: CT IMAGING | Age: 50
DRG: 190 | End: 2020-07-03
Payer: MEDICARE

## 2020-07-03 LAB
AMPHETAMINES: NEGATIVE
ANION GAP SERPL CALCULATED.3IONS-SCNC: 20 MMOL/L (ref 4–16)
APTT: 35.6 SECONDS (ref 25.1–37.1)
BARBITURATE SCREEN URINE: NEGATIVE
BASE EXCESS: 5 (ref 0–3.3)
BASE EXCESS: 7 (ref 0–3.3)
BASOPHILS ABSOLUTE: 0 K/CU MM
BASOPHILS RELATIVE PERCENT: 0.1 % (ref 0–1)
BENZODIAZEPINE SCREEN, URINE: NEGATIVE
BUN BLDV-MCNC: 33 MG/DL (ref 6–23)
C-REACTIVE PROTEIN, HIGH SENSITIVITY: 5.6 MG/L
CALCIUM SERPL-MCNC: 9.9 MG/DL (ref 8.3–10.6)
CANNABINOID SCREEN URINE: NEGATIVE
CARBON MONOXIDE, BLOOD: 2.3 % (ref 0–5)
CARBON MONOXIDE, BLOOD: 2.3 % (ref 0–5)
CHLORIDE BLD-SCNC: 87 MMOL/L (ref 99–110)
CHOLESTEROL: 96 MG/DL
CO2 CONTENT: 16.9 MMOL/L (ref 19–24)
CO2 CONTENT: 18.7 MMOL/L (ref 19–24)
CO2: 15 MMOL/L (ref 21–32)
COCAINE METABOLITE: NEGATIVE
COMMENT: 15
COMMENT: ABNORMAL
CREAT SERPL-MCNC: 1.4 MG/DL (ref 0.9–1.3)
D DIMER: 793 NG/ML(DDU)
DIFFERENTIAL TYPE: ABNORMAL
EKG ATRIAL RATE: 108 BPM
EKG DIAGNOSIS: NORMAL
EKG P AXIS: 71 DEGREES
EKG P-R INTERVAL: 156 MS
EKG Q-T INTERVAL: 368 MS
EKG QRS DURATION: 122 MS
EKG QTC CALCULATION (BAZETT): 493 MS
EKG R AXIS: 76 DEGREES
EKG T AXIS: 125 DEGREES
EKG VENTRICULAR RATE: 108 BPM
EOSINOPHILS ABSOLUTE: 0 K/CU MM
EOSINOPHILS RELATIVE PERCENT: 0 % (ref 0–3)
FERRITIN: 27 NG/ML (ref 30–400)
FIBRINOGEN LEVEL: 255 MG/DL (ref 196.9–442.1)
GFR AFRICAN AMERICAN: >60 ML/MIN/1.73M2
GFR NON-AFRICAN AMERICAN: 54 ML/MIN/1.73M2
GLUCOSE BLD-MCNC: 211 MG/DL (ref 70–99)
HCO3 ARTERIAL: 16.1 MMOL/L (ref 18–23)
HCO3 ARTERIAL: 17.9 MMOL/L (ref 18–23)
HCT VFR BLD CALC: 30.7 % (ref 42–52)
HDLC SERPL-MCNC: 23 MG/DL
HEMOGLOBIN: 8.9 GM/DL (ref 13.5–18)
IMMATURE NEUTROPHIL %: 1.2 % (ref 0–0.43)
INR BLD: 1.77 INDEX
LACTATE DEHYDROGENASE: 288 IU/L (ref 120–246)
LACTATE: 5.3 MMOL/L (ref 0.4–2)
LACTATE: 6.3 MMOL/L (ref 0.4–2)
LDL CHOLESTEROL DIRECT: 62 MG/DL
LYMPHOCYTES ABSOLUTE: 0.6 K/CU MM
LYMPHOCYTES RELATIVE PERCENT: 7.7 % (ref 24–44)
MAGNESIUM: 2.2 MG/DL (ref 1.8–2.4)
MCH RBC QN AUTO: 19.1 PG (ref 27–31)
MCHC RBC AUTO-ENTMCNC: 29 % (ref 32–36)
MCV RBC AUTO: 66 FL (ref 78–100)
METHEMOGLOBIN ARTERIAL: 0.9 %
METHEMOGLOBIN ARTERIAL: 1.5 %
MONOCYTES ABSOLUTE: 0.2 K/CU MM
MONOCYTES RELATIVE PERCENT: 3.1 % (ref 0–4)
NUCLEATED RBC %: 2.7 %
O2 SATURATION: 92.4 % (ref 96–97)
O2 SATURATION: 96.4 % (ref 96–97)
OPIATES, URINE: NEGATIVE
OXYCODONE: NEGATIVE
PCO2 ARTERIAL: 26 MMHG (ref 32–45)
PCO2 ARTERIAL: 27 MMHG (ref 32–45)
PDW BLD-RTO: 21.6 % (ref 11.7–14.9)
PH BLOOD: 7.4 (ref 7.34–7.45)
PH BLOOD: 7.43 (ref 7.34–7.45)
PHENCYCLIDINE, URINE: NEGATIVE
PLATELET # BLD: 367 K/CU MM (ref 140–440)
PMV BLD AUTO: 9.8 FL (ref 7.5–11.1)
PO2 ARTERIAL: 168 MMHG (ref 75–100)
PO2 ARTERIAL: 72 MMHG (ref 75–100)
POTASSIUM SERPL-SCNC: 5.6 MMOL/L (ref 3.5–5.1)
PROCALCITONIN: 0.14
PROTHROMBIN TIME: 21.6 SECONDS (ref 11.7–14.5)
RBC # BLD: 4.65 M/CU MM (ref 4.6–6.2)
SEGMENTED NEUTROPHILS ABSOLUTE COUNT: 6.5 K/CU MM
SEGMENTED NEUTROPHILS RELATIVE PERCENT: 87.9 % (ref 36–66)
SODIUM BLD-SCNC: 122 MMOL/L (ref 135–145)
TOTAL IMMATURE NEUTOROPHIL: 0.09 K/CU MM
TOTAL NUCLEATED RBC: 0.2 K/CU MM
TRIGL SERPL-MCNC: 63 MG/DL
TROPONIN T: <0.01 NG/ML
WBC # BLD: 7.4 K/CU MM (ref 4–10.5)

## 2020-07-03 PROCEDURE — 83721 ASSAY OF BLOOD LIPOPROTEIN: CPT

## 2020-07-03 PROCEDURE — 84156 ASSAY OF PROTEIN URINE: CPT

## 2020-07-03 PROCEDURE — 93005 ELECTROCARDIOGRAM TRACING: CPT | Performed by: INTERNAL MEDICINE

## 2020-07-03 PROCEDURE — 2700000000 HC OXYGEN THERAPY PER DAY

## 2020-07-03 PROCEDURE — 85384 FIBRINOGEN ACTIVITY: CPT

## 2020-07-03 PROCEDURE — 82803 BLOOD GASES ANY COMBINATION: CPT

## 2020-07-03 PROCEDURE — 85025 COMPLETE CBC W/AUTO DIFF WBC: CPT

## 2020-07-03 PROCEDURE — 2500000003 HC RX 250 WO HCPCS: Performed by: INTERNAL MEDICINE

## 2020-07-03 PROCEDURE — 2000000000 HC ICU R&B

## 2020-07-03 PROCEDURE — 6370000000 HC RX 637 (ALT 250 FOR IP): Performed by: HOSPITALIST

## 2020-07-03 PROCEDURE — 83615 LACTATE (LD) (LDH) ENZYME: CPT

## 2020-07-03 PROCEDURE — 83735 ASSAY OF MAGNESIUM: CPT

## 2020-07-03 PROCEDURE — 6360000002 HC RX W HCPCS: Performed by: INTERNAL MEDICINE

## 2020-07-03 PROCEDURE — 94640 AIRWAY INHALATION TREATMENT: CPT

## 2020-07-03 PROCEDURE — 80061 LIPID PANEL: CPT

## 2020-07-03 PROCEDURE — 84145 PROCALCITONIN (PCT): CPT

## 2020-07-03 PROCEDURE — 86335 IMMUNFIX E-PHORSIS/URINE/CSF: CPT

## 2020-07-03 PROCEDURE — 6360000002 HC RX W HCPCS

## 2020-07-03 PROCEDURE — 84484 ASSAY OF TROPONIN QUANT: CPT

## 2020-07-03 PROCEDURE — 83605 ASSAY OF LACTIC ACID: CPT

## 2020-07-03 PROCEDURE — 86140 C-REACTIVE PROTEIN: CPT

## 2020-07-03 PROCEDURE — 87081 CULTURE SCREEN ONLY: CPT

## 2020-07-03 PROCEDURE — 6360000002 HC RX W HCPCS: Performed by: HOSPITALIST

## 2020-07-03 PROCEDURE — 2580000003 HC RX 258: Performed by: INTERNAL MEDICINE

## 2020-07-03 PROCEDURE — 71250 CT THORAX DX C-: CPT

## 2020-07-03 PROCEDURE — 85379 FIBRIN DEGRADATION QUANT: CPT

## 2020-07-03 PROCEDURE — APPSS30 APP SPLIT SHARED TIME 16-30 MINUTES: Performed by: NURSE PRACTITIONER

## 2020-07-03 PROCEDURE — 85610 PROTHROMBIN TIME: CPT

## 2020-07-03 PROCEDURE — 87040 BLOOD CULTURE FOR BACTERIA: CPT

## 2020-07-03 PROCEDURE — 80307 DRUG TEST PRSMV CHEM ANLYZR: CPT

## 2020-07-03 PROCEDURE — 81050 URINALYSIS VOLUME MEASURE: CPT

## 2020-07-03 PROCEDURE — 83880 ASSAY OF NATRIURETIC PEPTIDE: CPT

## 2020-07-03 PROCEDURE — 6370000000 HC RX 637 (ALT 250 FOR IP): Performed by: INTERNAL MEDICINE

## 2020-07-03 PROCEDURE — 94660 CPAP INITIATION&MGMT: CPT

## 2020-07-03 PROCEDURE — 85730 THROMBOPLASTIN TIME PARTIAL: CPT

## 2020-07-03 PROCEDURE — 87486 CHLMYD PNEUM DNA AMP PROBE: CPT

## 2020-07-03 PROCEDURE — 36600 WITHDRAWAL OF ARTERIAL BLOOD: CPT

## 2020-07-03 PROCEDURE — 82728 ASSAY OF FERRITIN: CPT

## 2020-07-03 PROCEDURE — 94761 N-INVAS EAR/PLS OXIMETRY MLT: CPT

## 2020-07-03 PROCEDURE — 80048 BASIC METABOLIC PNL TOTAL CA: CPT

## 2020-07-03 PROCEDURE — 74176 CT ABD & PELVIS W/O CONTRAST: CPT

## 2020-07-03 RX ORDER — ALBUTEROL SULFATE 2.5 MG/3ML
2.5 SOLUTION RESPIRATORY (INHALATION) EVERY 6 HOURS PRN
Status: DISCONTINUED | OUTPATIENT
Start: 2020-07-03 | End: 2020-07-08 | Stop reason: HOSPADM

## 2020-07-03 RX ORDER — LORAZEPAM 2 MG/ML
INJECTION INTRAMUSCULAR
Status: COMPLETED
Start: 2020-07-03 | End: 2020-07-03

## 2020-07-03 RX ORDER — ALBUTEROL SULFATE 2.5 MG/3ML
2.5 SOLUTION RESPIRATORY (INHALATION) ONCE
Status: DISCONTINUED | OUTPATIENT
Start: 2020-07-03 | End: 2020-07-04

## 2020-07-03 RX ORDER — METHYLPREDNISOLONE SODIUM SUCCINATE 40 MG/ML
40 INJECTION, POWDER, LYOPHILIZED, FOR SOLUTION INTRAMUSCULAR; INTRAVENOUS EVERY 6 HOURS
Status: DISCONTINUED | OUTPATIENT
Start: 2020-07-03 | End: 2020-07-07

## 2020-07-03 RX ORDER — METHYLPREDNISOLONE SODIUM SUCCINATE 125 MG/2ML
125 INJECTION, POWDER, LYOPHILIZED, FOR SOLUTION INTRAMUSCULAR; INTRAVENOUS ONCE
Status: COMPLETED | OUTPATIENT
Start: 2020-07-03 | End: 2020-07-03

## 2020-07-03 RX ORDER — DOBUTAMINE HYDROCHLORIDE 200 MG/100ML
5 INJECTION INTRAVENOUS CONTINUOUS
Status: DISCONTINUED | OUTPATIENT
Start: 2020-07-03 | End: 2020-07-06

## 2020-07-03 RX ORDER — DILTIAZEM HYDROCHLORIDE 5 MG/ML
20 INJECTION INTRAVENOUS ONCE
Status: COMPLETED | OUTPATIENT
Start: 2020-07-03 | End: 2020-07-03

## 2020-07-03 RX ORDER — DIGOXIN 0.25 MG/ML
250 INJECTION INTRAMUSCULAR; INTRAVENOUS EVERY 6 HOURS
Status: COMPLETED | OUTPATIENT
Start: 2020-07-03 | End: 2020-07-04

## 2020-07-03 RX ORDER — LORAZEPAM 2 MG/ML
2 INJECTION INTRAMUSCULAR ONCE
Status: COMPLETED | OUTPATIENT
Start: 2020-07-03 | End: 2020-07-03

## 2020-07-03 RX ORDER — ADENOSINE 3 MG/ML
6 INJECTION, SOLUTION INTRAVENOUS ONCE
Status: DISCONTINUED | OUTPATIENT
Start: 2020-07-03 | End: 2020-07-03

## 2020-07-03 RX ADMIN — FUROSEMIDE 40 MG: 10 INJECTION, SOLUTION INTRAMUSCULAR; INTRAVENOUS at 10:16

## 2020-07-03 RX ADMIN — ENOXAPARIN SODIUM 90 MG: 100 INJECTION SUBCUTANEOUS at 20:44

## 2020-07-03 RX ADMIN — DIGOXIN 250 MCG: 0.25 INJECTION INTRAMUSCULAR; INTRAVENOUS at 21:56

## 2020-07-03 RX ADMIN — DEXMEDETOMIDINE HYDROCHLORIDE 0.2 MCG/KG/HR: 100 INJECTION, SOLUTION INTRAVENOUS at 19:17

## 2020-07-03 RX ADMIN — IPRATROPIUM BROMIDE AND ALBUTEROL SULFATE 3 ML: .5; 3 SOLUTION RESPIRATORY (INHALATION) at 20:25

## 2020-07-03 RX ADMIN — SODIUM BICARBONATE: 84 INJECTION, SOLUTION INTRAVENOUS at 19:17

## 2020-07-03 RX ADMIN — METOPROLOL TARTRATE 5 MG: 1 INJECTION, SOLUTION INTRAVENOUS at 15:49

## 2020-07-03 RX ADMIN — METHYLPREDNISOLONE SODIUM SUCCINATE 40 MG: 40 INJECTION, POWDER, FOR SOLUTION INTRAMUSCULAR; INTRAVENOUS at 05:24

## 2020-07-03 RX ADMIN — ATORVASTATIN CALCIUM 40 MG: 40 TABLET, FILM COATED ORAL at 08:42

## 2020-07-03 RX ADMIN — METHYLPREDNISOLONE SODIUM SUCCINATE 125 MG: 125 INJECTION, POWDER, FOR SOLUTION INTRAMUSCULAR; INTRAVENOUS at 13:35

## 2020-07-03 RX ADMIN — SPIRONOLACTONE 25 MG: 25 TABLET ORAL at 08:41

## 2020-07-03 RX ADMIN — LORAZEPAM 2 MG: 2 INJECTION INTRAMUSCULAR at 16:07

## 2020-07-03 RX ADMIN — IPRATROPIUM BROMIDE AND ALBUTEROL SULFATE 3 ML: .5; 3 SOLUTION RESPIRATORY (INHALATION) at 02:58

## 2020-07-03 RX ADMIN — IPRATROPIUM BROMIDE AND ALBUTEROL SULFATE 3 ML: .5; 3 SOLUTION RESPIRATORY (INHALATION) at 15:14

## 2020-07-03 RX ADMIN — DILTIAZEM HYDROCHLORIDE 20 MG: 5 INJECTION INTRAVENOUS at 16:40

## 2020-07-03 RX ADMIN — LORAZEPAM 2 MG: 2 INJECTION INTRAMUSCULAR; INTRAVENOUS at 16:07

## 2020-07-03 RX ADMIN — MIDODRINE HYDROCHLORIDE 5 MG: 5 TABLET ORAL at 08:41

## 2020-07-03 RX ADMIN — IPRATROPIUM BROMIDE AND ALBUTEROL SULFATE 3 ML: .5; 3 SOLUTION RESPIRATORY (INHALATION) at 11:00

## 2020-07-03 RX ADMIN — SODIUM CHLORIDE, PRESERVATIVE FREE 10 ML: 5 INJECTION INTRAVENOUS at 08:42

## 2020-07-03 RX ADMIN — DEXTROSE MONOHYDRATE 5 MG/HR: 50 INJECTION, SOLUTION INTRAVENOUS at 16:28

## 2020-07-03 RX ADMIN — IPRATROPIUM BROMIDE AND ALBUTEROL SULFATE 3 ML: .5; 3 SOLUTION RESPIRATORY (INHALATION) at 07:26

## 2020-07-03 RX ADMIN — ASPIRIN 81 MG: 81 TABLET, COATED ORAL at 08:41

## 2020-07-03 RX ADMIN — METOPROLOL SUCCINATE 25 MG: 25 TABLET, EXTENDED RELEASE ORAL at 08:42

## 2020-07-03 RX ADMIN — DOBUTAMINE IN DEXTROSE 5 MCG/KG/MIN: 200 INJECTION, SOLUTION INTRAVENOUS at 21:55

## 2020-07-03 RX ADMIN — ENOXAPARIN SODIUM 90 MG: 100 INJECTION SUBCUTANEOUS at 13:35

## 2020-07-03 RX ADMIN — CEFEPIME HYDROCHLORIDE 2 G: 2 INJECTION, POWDER, FOR SOLUTION INTRAVENOUS at 20:44

## 2020-07-03 RX ADMIN — FERROUS SULFATE TAB 325 MG (65 MG ELEMENTAL FE) 325 MG: 325 (65 FE) TAB at 08:42

## 2020-07-03 RX ADMIN — SODIUM CHLORIDE, PRESERVATIVE FREE 10 ML: 5 INJECTION INTRAVENOUS at 20:44

## 2020-07-03 RX ADMIN — PANTOPRAZOLE SODIUM 40 MG: 40 TABLET, DELAYED RELEASE ORAL at 05:24

## 2020-07-03 RX ADMIN — PANTOPRAZOLE SODIUM 40 MG: 40 TABLET, DELAYED RELEASE ORAL at 15:24

## 2020-07-03 RX ADMIN — VANCOMYCIN HYDROCHLORIDE 1750 MG: 5 INJECTION, POWDER, LYOPHILIZED, FOR SOLUTION INTRAVENOUS at 21:23

## 2020-07-03 RX ADMIN — METHYLPREDNISOLONE SODIUM SUCCINATE 40 MG: 40 INJECTION, POWDER, FOR SOLUTION INTRAMUSCULAR; INTRAVENOUS at 20:44

## 2020-07-03 ASSESSMENT — PAIN SCALES - GENERAL
PAINLEVEL_OUTOF10: 0
PAINLEVEL_OUTOF10: 0

## 2020-07-03 NOTE — PROGRESS NOTES
Perfect Serve message sent to Dr. Zohra Braun on call for Dr. Casey Leiva regarding nephrology consult

## 2020-07-03 NOTE — PROGRESS NOTES
[address] [place] [address] [address] [place] [address]  Phone: [phone]  Office Hours: 8:30AM - 4:30PM  Monday - Friday     Patient known to our service from prior contact  Patient seen and evaluated  Full note to follow

## 2020-07-03 NOTE — PROGRESS NOTES
Called Dr. Tami Youssef regarding patient's respiratory distress. Precedex drip ordered, will continue to monitor.

## 2020-07-03 NOTE — CONSULTS
[address] [place] [address] [address] [place] [address]  Phone: [phone]  Office Hours: 8:30AM - 4:30PM  Monday - Friday     Nephrology Service Consultation    Patient:  Yandy Nixon  MRN: [id number]  Consulting physician:  Eileen Martinez MD  Reason for Consult: JAIME on CKD3  History Obtained From:  patient, electronic medical record  PCP: No primary care provider on file.     HISTORY OF PRESENT ILLNESS:   The patient is a 52 y.o. male who presents with increasing SOA\"filling up with fluid\"  \"Has happened junaid this many times  Making urine but not enough\"  Weak and tired  Has a history of CHF and hypotension  Also CKD3    Past Medical History:        Diagnosis Date    CAD (coronary artery disease)     [facility]) ([facility]     COPD (chronic obstructive pulmonary disease) ([facility]     Depression     Drug abuse ([facility]     HIGH CHOLESTEROL     Hypertension     MI (myocardial infarction) ([facility] 2011    S/P coronary artery bypass graft x 4 2011    CABG x 4 Dr Seth Romberg       Past Surgical History:        Procedure Laterality Date    BYPASS GRAFT  04/10/2011    CABG x 4 Dr Raffy Allen  11/2010     4 stents    LEG SURGERY         Medications:   Scheduled Meds:   albuterol  2.5 mg Nebulization Once    aspirin  81 mg Oral Daily    atorvastatin  40 mg Oral Daily    ipratropium-albuterol  1 vial Inhalation 4x Daily    metoprolol succinate  25 mg Oral Daily    pantoprazole  40 mg Oral BID AC    spironolactone  25 mg Oral Daily    sodium chloride flush  10 mL Intravenous 2 times per day    enoxaparin  40 mg Subcutaneous Daily    furosemide  40 mg Intravenous Q12H    ferrous sulfate  325 mg Oral BID WC    methylPREDNISolone  40 mg Intravenous Q12H    midodrine  5 mg Oral TID WC     Continuous Infusions:  PRN Meds:.albuterol, sodium chloride flush, acetaminophen **OR** acetaminophen, polyethylene glycol, promethazine **OR** ondansetron    Allergies:  Patient has no known allergies. Social History:   TOBACCO:   reports that he has been smoking cigarettes. He has a 20.00 pack-year smoking history. He has quit using smokeless tobacco.  ETOH:   reports no history of alcohol use. OCCUPATION:      Family History:       Problem Relation Age of Onset    Cancer Father     Heart Failure Father     Heart Disease Father     Diabetes Mother     Heart Disease Mother        REVIEW OF SYSTEMS:  Negative except for dyspnea and weakness  EF was 20%    Physical Exam:    Vitals: /71   Pulse 115   Temp 98.3 °F (36.8 °C) (Oral)   Resp 18   Ht 5' 11\" (1.803 m)   Wt 207 lb 1.6 oz (93.9 kg)   SpO2 99%   BMI 28.88 kg/m²   General appearance: alert, appears stated age and cooperative  Skin: Skin color, texture, turgor normal. No rashes or lesions  HEENT: Head: Normocephalic, no lesions, without obvious abnormality. Neck: no adenopathy, no carotid bruit, no JVD, supple, symmetrical, trachea midline and thyroid not enlarged, symmetric, no tenderness/mass/nodules  Lungs: diminished breath sounds bilaterally and rhonchi bibasilar  Heart: S1, S2 normal  Abdomen: soft, non-tender; bowel sounds normal; no masses,  no organomegaly  Extremities: edema plus  Neurologic: Mental status: alertness: alert    CBC:   Recent Labs     07/02/20  0442   WBC 6.8   HGB 7.9*        BMP:    Recent Labs     07/02/20  0442   *   K 3.9   CL 94*   CO2 22   BUN 34*   CREATININE 1.7*   GLUCOSE 127*     Hepatic:   Recent Labs     07/02/20  0442   AST 24   ALT 12   BILITOT 1.2*   ALKPHOS 121     Troponin: No results for input(s): TROPONINI in the last 72 hours. BNP: No results for input(s): BNP in the last 72 hours. Lipids: No results for input(s): CHOL, HDL in the last 72 hours.     Invalid input(s): LDLCALCU  ABGs:   Lab Results   Component Value Date    PO2ART 538 01/23/2020    QPM0RKL 37.0 01/23/2020     INR: No results for input(s): INR in the last 72 hours.  -----------------------------------------------------------------      Assessment and Recommendations     Patient Active Problem List   Diagnosis Code    Essential hypertension I10    Osteoarthritis M19.90    COPD (chronic obstructive pulmonary disease) ([facility] J44.9    Paresthesia of left leg R20.2    Abdominal hernia K46.9    Left shoulder pain M25.512    Crushing injury of right hand S67. 21XA    Rotator cuff tendinitis M75.80    Midline low back pain without sciatica M54.5    History of MI (myocardial infarction) I25.2    Coronary artery disease involving coronary bypass graft of native heart without angina pectoris I25.810    Mixed hyperlipidemia E78.2    Depression F32.9    Chronic midline low back pain without sciatica M54.5, G89.29    Tobacco abuse Z72.0    Gastroesophageal reflux disease without esophagitis K21.9    Opiate abuse, continuous ([facility]) F11.10    Heroin dependence ([facility]) F11.20    ST elevation myocardial infarction involving left circumflex coronary artery ([facility]) I21.21    STEMI (ST elevation myocardial infarction) ([facility]) I21.3    Acute on chronic combined systolic (congestive) and diastolic (congestive) heart failure ([facility]) W24.56    Systolic and diastolic CHF w/reduced LV function, NYHA class 4 ([facility]) I50.40    NSTEMI (non-ST elevated myocardial infarction) ([facility]) I21.4    Acute on chronic congestive heart failure ([facility]) I50.9    Noncompliance Z91.19    Pulmonary edema, acute ([facility]) J81.0    Acute kidney injury ([facility] N17.9    Acute respiratory failure with hypoxia and hypercapnia ([facility]) J96.01, J96.02    Hypertensive emergency I16.1    Ischemic cardiomyopathy I25.5    Heart failure ([facility]) I50.9    Left ventricular systolic dysfunction X46.9    Acute systolic congestive heart failure ([facility]) I50.21    SOB (shortness of breath) R06.02    Acute on chronic combined systolic and diastolic heart failure ([facility]) I50.43    Hyponatremia E87.1    Hematemesis K92.0    Acute on chronic systolic CHF (congestive heart failure) (HCC) I50.23   IMP  JAIME on CKD3- cardiorenal syndrome  Combined systolic diastolic CHF- EF 20  Fluid overload  Hyponatremia from fluid overload  CKD3  Proteinuria  Orthostatic hypotension  Suggest  Diuresis with loop to resume  Cont BP support with midodrine  quantitative proteinuria and UPEP  Correct iron def- benefit from IV iron as he is thoroughly depleted       Roseanne Lombardi MD

## 2020-07-03 NOTE — PROGRESS NOTES
Cardiology Note    Admit Date:  7/2/2020     Today's Plan: order continuous telemetry    Admission diagnosis / Complaint: shortness of breath      Subjective:  Mr. Bhavana Avila is resting in bed. Complaining of shortness of breath. Assessment and Plan:    1. Shortness of breath: patient has known COPD. Last EF 1/24/2020 was 20%. Chest xray no cardiopulmonary finding noted. Patient does have bilateral lower leg swelling. Will recommend diuresis at this time. \    2. COPD: per pulmonary    3. Sinus Tachycardia: will order telemetry    4. Anemia- hgb 7.9. ? Could be contributing to tachycardia and shortness of breath. Patient is on iron    5. HFrEF- continue toprol xl 25 mg daily, continue aldactone 25 mg daily, continue lasix 40 mg BID      Objective:   /71   Pulse 115   Temp 98.3 °F (36.8 °C) (Oral)   Resp 18   Ht 5' 11\" (1.803 m)   Wt 207 lb 1.6 oz (93.9 kg)   SpO2 99%   BMI 28.88 kg/m²       Intake/Output Summary (Last 24 hours) at 7/3/2020 1253  Last data filed at 7/3/2020 1221  Gross per 24 hour   Intake --   Output 1475 ml   Net -1475 ml       TELEMETRY: Sinus    has a past medical history of CAD (coronary artery disease), CHF (congestive heart failure) ([facility] COPD (chronic obstructive pulmonary disease) ([facility] Depression, Drug abuse ([facility] HIGH CHOLESTEROL, Hypertension, MI (myocardial infarction) ([facility] and S/P coronary artery bypass graft x 4.   has a past surgical history that includes Cardiac surgery (11/2010); Bypass Graft (04/10/2011); and Leg Surgery.      Physical Exam:  General:  Awake, alert, NAD  Skin:  Warm and dry  Neck:  JVD not present  Chest: bilateral wheeze noted, tachypnea  Cardiovascular: tachycardia,  RRR S1S2  Abdomen:  Soft nontender  Extremities:  nonpitting edema    Medications:    albuterol  2.5 mg Nebulization Once    aspirin  81 mg Oral Daily    atorvastatin  40 mg Oral Daily    ipratropium-albuterol  1 vial Inhalation 4x Daily    metoprolol succinate  25 mg Oral Daily    pantoprazole  40 mg Oral BID AC    spironolactone  25 mg Oral Daily    sodium chloride flush  10 mL Intravenous 2 times per day    enoxaparin  40 mg Subcutaneous Daily    furosemide  40 mg Intravenous Q12H    ferrous sulfate  325 mg Oral BID WC    methylPREDNISolone  40 mg Intravenous Q12H    midodrine  5 mg Oral TID WC       albuterol, sodium chloride flush, acetaminophen **OR** acetaminophen, polyethylene glycol, promethazine **OR** ondansetron    Lab Data:  CBC:   Recent Labs     07/02/20 0442   WBC 6.8   HGB 7.9*   HCT 27.6*   MCV 66.2*        BMP:   Recent Labs     07/02/20 0442   *   K 3.9   CL 94*   CO2 22   BUN 34*   CREATININE 1.7*     LIVER PROFILE:   Recent Labs     07/02/20 0442   AST 24   ALT 12   BILITOT 1.2*   ALKPHOS 121     PT/INR: No results for input(s): PROTIME, INR in the last 72 hours. APTT: No results for input(s): APTT in the last 72 hours. BNP:  No results for input(s): BNP in the last 72 hours.   TROPONIN: @TROPONINI:3@          RUSSELL Peck 7/3/2020 12:53 PM

## 2020-07-03 NOTE — CONSULTS
Subjective:   CHIEF COMPLAINT / HPI:  52year old male admitted with sob and this is accompanied by wheeze. He has cough which he attributes to his smoking. He denies any fever or chest pain or hemoptysis.       Past Medical History:  Past Medical History:   Diagnosis Date    CAD (coronary artery disease)     CHF (congestive heart failure) (HCC)     COPD (chronic obstructive pulmonary disease) (HCC)     Depression     [facility]     HIGH CHOLESTEROL     Hypertension     MI (myocardial infarction) ([facility] 2011    S/P coronary artery bypass graft x 4 2011    CABG x 4 Dr Dionna South       Past Surgical History:        Procedure Laterality Date    BYPASS GRAFT  04/10/2011    CABG x 4 Dr Negra Guillen  11/2010     4 stents    LEG SURGERY         Current Medications:    Current Facility-Administered Medications: albuterol (PROVENTIL) nebulizer solution 2.5 mg, 2.5 mg, Nebulization, Once  albuterol (PROVENTIL) nebulizer solution 2.5 mg, 2.5 mg, Nebulization, Q6H PRN  aspirin EC tablet 81 mg, 81 mg, Oral, Daily  atorvastatin (LIPITOR) tablet 40 mg, 40 mg, Oral, Daily  ipratropium-albuterol (DUONEB) nebulizer solution 3 mL, 1 vial, Inhalation, 4x Daily  metoprolol succinate (TOPROL XL) extended release tablet 25 mg, 25 mg, Oral, Daily  pantoprazole (PROTONIX) tablet 40 mg, 40 mg, Oral, BID AC  spironolactone (ALDACTONE) tablet 25 mg, 25 mg, Oral, Daily  sodium chloride flush 0.9 % injection 10 mL, 10 mL, Intravenous, 2 times per day  sodium chloride flush 0.9 % injection 10 mL, 10 mL, Intravenous, PRN  acetaminophen (TYLENOL) tablet 650 mg, 650 mg, Oral, Q6H PRN **OR** acetaminophen (TYLENOL) suppository 650 mg, 650 mg, Rectal, Q6H PRN  polyethylene glycol (GLYCOLAX) packet 17 g, 17 g, Oral, Daily PRN  promethazine (PHENERGAN) tablet 12.5 mg, 12.5 mg, Oral, Q6H PRN **OR** ondansetron (ZOFRAN) injection 4 mg, 4 mg, Intravenous, Q6H PRN  enoxaparin (LOVENOX) injection 40 mg, 40 mg, Subcutaneous, Daily  furosemide (LASIX) injection 40 mg, 40 mg, Intravenous, Q12H  ferrous sulfate (IRON 325) tablet 325 mg, 325 mg, Oral, BID WC  methylPREDNISolone sodium (SOLU-MEDROL) injection 40 mg, 40 mg, Intravenous, Q12H  midodrine (PROAMATINE) tablet 5 mg, 5 mg, Oral, TID WC    No Known Allergies    Social History:    Social History     Socioeconomic History    Marital status:      Spouse name: None    Number of children: None    Years of education: None    Highest education level: None   Occupational History    None   Social Needs    Financial resource strain: None    Food insecurity     Worry: None     Inability: None    Transportation needs     Medical: None     Non-medical: None   Tobacco Use    Smoking status: Current Every Day Smoker     Packs/day: 1.00     Years: 20.00     Pack years: 20.00     Types: Cigarettes    Smokeless tobacco: Former User    Tobacco comment: Reviewed 10/9/17   Substance and Sexual Activity    Alcohol use: No     Alcohol/week: 0.0 standard drinks    Drug use: No     Comment: march 2018 states he quit    Sexual activity: Not Currently   Lifestyle    Physical activity     Days per week: None     Minutes per session: None    Stress: None   Relationships    Social connections     Talks on phone: None     Gets together: None     Attends [employer] service: None     Active member of club or organization: None     Attends meetings of clubs or organizations: None     Relationship status: None    Intimate partner violence     Fear of current or ex partner: None     Emotionally abused: None     Physically abused: None     Forced sexual activity: None   Other Topics Concern    None   Social History Narrative    None       Family History:   Family History   Problem Relation Age of Onset    Cancer Father     Heart Failure Father     Heart Disease Father     Diabetes Mother     Heart Disease Mother        Immunization:  Immunization History   Administered Date(s) Administered    Influenza Virus Vaccine 03/11/2016    Influenza, Elsy Shin, 6 mo and older, IM, PF (Flulaval, Fluarix) 09/15/2018    Influenza, Quadv, IM, PF (6 mo and older Fluzone, Flulaval, Fluarix, and 3 yrs and older Afluria) 12/29/2016, 12/31/2017, 02/11/2020    Pneumococcal Conjugate 13-valent ([id number]) 12/11/2018    Pneumococcal Polysaccharide ([id number]) 03/11/2016         REVIEW OF SYSTEMS:    CONSTITUTIONAL:  negative for fevers, chills, diaphoresis, activity change, appetite change, fatigue, night sweats and unexpected weight change. EYES:  negative for blurred vision, eye discharge, visual disturbance and icterus  HEENT:  negative for hearing loss, tinnitus, ear drainage, sinus pressure, nasal congestion, epistaxis and snoring  RESPIRATORY:  See HPI  CARDIOVASCULAR:  negative for chest pain, palpitations, exertional chest pressure/discomfort, edema, syncope  GASTROINTESTINAL:  negative for nausea, vomiting, diarrhea, constipation, blood in stool and abdominal pain  GENITOURINARY:  negative for frequency, dysuria and hematuria  HEMATOLOGIC/LYMPHATIC:  negative for easy bruising, bleeding and lymphadenopathy  ALLERGIC/IMMUNOLOGIC:  negative for recurrent infections, angioedema, anaphylaxis and drug reactions  ENDOCRINE:  negative for weight changes and diabetic symptoms including polyuria, polydipsia and polyphagia    MUSCULOSKELETAL:  negative for  pain, joint swelling, decreased range of motion and muscle weakness  NEUROLOGICAL:  negative for headaches, slurred speech, unilateral weakness  PSYCHIATRIC/BEHAVIORAL: negative for hallucinations, behavioral problems, confusion and agitation.      Objective:   PHYSICAL EXAM:      VITALS:    Vitals:    07/03/20 0726 07/03/20 0832 07/03/20 1010 07/03/20 1101   BP:  116/71     Pulse:  113 115    Resp: 20 20 18   Temp:  98.3 °F (36.8 °C)     TempSrc:  Oral     SpO2: 97% 96% 99% 99%   Weight:  207 lb 1.6 oz (93.9 kg)     Height:             CONSTITUTIONAL: awake, alert, cooperative, no apparent distress, and appears stated age  NECK:  Supple, symmetrical, trachea midline, no adenopathy, thyroid symmetric, not enlarged and no tenderness  CHEST: Chest expansion equal and symmetrical, no intercostal retraction. LUNGS:  no increased work of breathing, has expiratory wheezes both lungs, no crackles. CARDIOVASCULAR: S1 and S2, no edema and no JVD  ABDOMEN:  normal bowel sounds, non-distended and no masses palpated, and no tenderness to palpation. No hepatospleenomegaly  LYMPHADENOPATHY:  no axillary or supraclavicular adenopathy. No cervical adnenopathy  PSYCHIATRIC: Oriented to person place and time. No obvious depression or anxiety. MUSCULOSKELETAL: No obvious misalignment or effusion of the joints. No clubbing, cyanosis of the digits. RIGHT AND LEFT LOWER EXTREMITIES: No edema, no inflammation, no tenderness. SKIN:  normal skin color, texture, turgor and no redness, warmth, or swelling. No palpable nodules    DATA:                 EXAMINATION:   CT OF THE CHEST WITHOUT CONTRAST 7/3/2020 9:39 am       TECHNIQUE:   CT of the chest was performed without the administration of intravenous   contrast. Multiplanar reformatted images are provided for review.  Dose   modulation, iterative reconstruction, and/or weight based adjustment of the   mA/kV was utilized to reduce the radiation dose to as low as reasonably   achievable.       COMPARISON:   05/17/2020       HISTORY:   ORDERING SYSTEM PROVIDED HISTORY: Follow up adenopathy   TECHNOLOGIST PROVIDED HISTORY:   Reason for exam:->Follow up adenopathy   Reason for Exam: Follow up adenopathy   Acuity: Acute   Type of Exam: Initial   Additional signs and symptoms: pt c/o sob       FINDINGS:   Mediastinum: Mediastinal adenopathy is again demonstrated, grossly similar in   distribution to prior.  For example, subcarinal lymph node measures   approximately 1.8 cm in short axis, precarinal lymph node is approximately   1.5 cm in short axis, and right paratracheal lymph node is approximately 3.1   x 2.5 cm.  Calcification of the thoracic aorta.  Coronary artery   calcification.  Hilar evaluation is limited given lack of contrast.  Thyroid   is symmetric.  No axillary adenopathy.  Shotty axillary lymph nodes are again   seen bilaterally.  Status post median sternotomy.       Lungs/pleura: Loculated pleural fluid is again demonstrated within the right   major fissure inferiorly, similar to prior.  Small amount of loculated fluid   is also demonstrated about the periphery of the inferior right hemithorax. Calcified left lower lobe pulmonary nodules, in keeping with granulomatous   disease.  Unchanged 3 mm anterior left apical noncalcified pulmonary nodule. Scattered bandlike opacity and ground-glass opacity at the lung bases, likely   atelectasis.  4 mm lingular pulmonary nodule is not significantly changed. Punctate nodule along the plane of the horizontal fissure is unchanged.  No   pneumothorax.       Upper Abdomen: Small amount of free fluid about the margin of the liver. Calcified granulomas within the liver and spleen.  1.2 cm short axis left   periaortic lymph node is similar to prior.  Calcification of aorta.       Soft Tissues/Bones: Degenerative change of the spine.           Impression   No significant interval change in mediastinal adenopathy as compared to prior   examination for which sarcoid, Castleman's disease, lymphoma, or metastatic   disease are some differential considerations.       No significant interval change in small loculated right basilar pleural   effusion as compared to prior, sterility indeterminate.       Scattered subcentimeter pulmonary nodules are not significantly changed.       Atherosclerosis, including coronary artery calcification.           Assessment:     1. Ac copd  2. chf  3. Small right pl effusion  4. Mediastinal lymphadenopathy  5. Lung nodules          Plan:     1. D/w pt  2.  He teresa bundy rachel and he scheduled him for mediastinoscopy but pt refused at last minute. He would like to have it done again. I will consult dr Sergio Valdovinos  3. Adequate o2 adm  4. Home o2 eval before dc  5. Bd rx  6. Iv steroids  7. Full pft as outpt  8. Advised to quit smoking and help offered  9.  Thanks will follow

## 2020-07-03 NOTE — CONSULTS
Department of Cardiovascular & Thoracic Surgery   Consult Note        Reason for Consult: Mediastinal lymphadenopathy  Requesting Physician:  Coby Gan MD        Date of Consult: 07/03/20    Chief Complaint: Significant shortness of breath    History Obtained From:  patient, electronic medical record    HISTORY OF PRESENT ILLNESS:    The patient is a 52 y.o. male who presents with shortness of breath. The patient is well-known to me. I saw him at his last admission. He has had longstanding mediastinal lymphadenopathy that oncology has asked to be biopsied to rule out malignancy versus sarcoid versus inflammatory lymphadenopathy. He has a history of cardiomyopathy with an ejection fraction approximately 20%. He has had multiple episodes of admission due to heart failure. At his last admission, we had scheduled him and brought him down to the preoperative area to perform the mediastinoscopy. The patient canceled because he felt like he did not want to proceed. New consultation today because the patient has decided he wants to proceed now. He is on nasal cannula oxygen but complaining of significant shortness of breath. He has no chest pains. The patient does have a longstanding smoking history.     Past Medical History:        Diagnosis Date    CAD (coronary artery disease)     CHF (congestive heart failure) (HCC)     COPD (chronic obstructive pulmonary disease) (HCC)     Depression     [facility] ([facility]     HIGH CHOLESTEROL     Hypertension     MI (myocardial infarction) ([facility] [facility] 2011    S/P coronary artery bypass graft x 4 2011    CABG x 4 Dr Nicky Jenkins     Past Surgical History:        Procedure Laterality Date    BYPASS GRAFT  04/10/2011    CABG x 4 Dr Kristel Shields  11/2010     4 stents    LEG SURGERY       Current Medications:   Current Facility-Administered Medications: albuterol (PROVENTIL) nebulizer solution 2.5 mg, 2.5 mg, Nebulization, Once  albuterol (PROVENTIL) nebulizer solution 2.5 mg, 2.5 mg, Nebulization, Q6H PRN  methylPREDNISolone sodium (SOLU-MEDROL) injection 40 mg, 40 mg, Intravenous, Q6H  methylPREDNISolone sodium (SOLU-MEDROL) injection 125 mg, 125 mg, Intravenous, Once  [START ON 7/4/2020] iron sucrose (VENOFER) 300 mg in sodium chloride 0.9 % 250 mL IVPB, 300 mg, Intravenous, Once  enoxaparin (LOVENOX) injection 90 mg, 1 mg/kg, Subcutaneous, BID  aspirin EC tablet 81 mg, 81 mg, Oral, Daily  atorvastatin (LIPITOR) tablet 40 mg, 40 mg, Oral, Daily  ipratropium-albuterol (DUONEB) nebulizer solution 3 mL, 1 vial, Inhalation, 4x Daily  metoprolol succinate (TOPROL XL) extended release tablet 25 mg, 25 mg, Oral, Daily  pantoprazole (PROTONIX) tablet 40 mg, 40 mg, Oral, BID AC  spironolactone (ALDACTONE) tablet 25 mg, 25 mg, Oral, Daily  sodium chloride flush 0.9 % injection 10 mL, 10 mL, Intravenous, 2 times per day  sodium chloride flush 0.9 % injection 10 mL, 10 mL, Intravenous, PRN  acetaminophen (TYLENOL) tablet 650 mg, 650 mg, Oral, Q6H PRN **OR** acetaminophen (TYLENOL) suppository 650 mg, 650 mg, Rectal, Q6H PRN  polyethylene glycol (GLYCOLAX) packet 17 g, 17 g, Oral, Daily PRN  promethazine (PHENERGAN) tablet 12.5 mg, 12.5 mg, Oral, Q6H PRN **OR** ondansetron (ZOFRAN) injection 4 mg, 4 mg, Intravenous, Q6H PRN  furosemide (LASIX) injection 40 mg, 40 mg, Intravenous, Q12H  ferrous sulfate (IRON 325) tablet 325 mg, 325 mg, Oral, BID WC  midodrine (PROAMATINE) tablet 5 mg, 5 mg, Oral, TID WC  Allergies:  Patient has no known allergies.     Social History:   Social History     Socioeconomic History    Marital status:      Spouse name: Not on file    Number of children: Not on file    Years of education: Not on file    Highest education level: Not on file   Occupational History    Not on file   Social Needs    Financial resource strain: Not on file    Food insecurity     Worry: Not on file     Inability: Not on file   Vidatronic needs Medical: Not on file     Non-medical: Not on file   Tobacco Use    Smoking status: Current Every Day Smoker     Packs/day: 1.00     Years: 20.00     Pack years: 20.00     Types: Cigarettes    Smokeless tobacco: Former User    Tobacco comment: Reviewed 10/9/17   Substance and Sexual Activity    Alcohol use: No     Alcohol/week: 0.0 standard drinks    Drug use: No     Comment: march 2018 states he quit    Sexual activity: Not Currently   Lifestyle    Physical activity     Days per week: Not on file     Minutes per session: Not on file    Stress: Not on file   Relationships    Social connections     Talks on phone: Not on file     Gets together: Not on file     Attends [employer] service: Not on file     Active member of club or organization: Not on file     Attends meetings of clubs or organizations: Not on file     Relationship status: Not on file    Intimate partner violence     Fear of current or ex partner: Not on file     Emotionally abused: Not on file     Physically abused: Not on file     Forced sexual activity: Not on file   Other Topics Concern    Not on file   Social History Narrative    Not on file     Family History:    Family History   Problem Relation Age of Onset    Cancer Father     Heart Failure Father     Heart Disease Father     Diabetes Mother     Heart Disease Mother        REVIEW OF SYSTEMS:   CONSTITUTIONAL:  Neg. EYES:  Neg. EARS:  Neg     NOSE:  Neg.    MOUTH/THROAT:  Neg.    RESPIRATORY:   Neg. CARDIOVASCULAR   Neg. GASTROINTESTINAL:  Neg. GENITOURINARY:  Neg.    HEMATOLOGIC/LYMPHATIC:  Neg.    MUSCULOSKELETAL:  Neg. NEUROLOGICAL:  Neg. SKIN :  Neg. PSYCHIATRIC:  Neg   ENDOCRINE:  Neg. ALL/IMM :  Neg.     PHYSICAL EXAM:  VITALS:  /86   Pulse 114   Temp 98.3 °F (36.8 °C) (Oral)   Resp 18   Ht 5' 11\" (1.803 m)   Wt 207 lb 1.6 oz (93.9 kg)   SpO2 99%   BMI 28.88 kg/m²   CONSTITUTIONAL:  awake, alert, cooperative, no apparent distress, and MD on 7/3/2020 at 1:29 PM

## 2020-07-03 NOTE — CONSULTS
Nutrition Education    Type and Reason for Visit: Consult, Patient Education    Nutrition Assessment:  Provided/reviewed Heart Healthy Eating Nutrition Therapy and Eating Right With Less Sugar (Nutrition Care Manual)    · Verbally reviewed information with Patient. · Written educational materials provided. · Contact name and number provided. · Refer to Patient Education activity for more details.     Electronically signed by Bailey Yoon RD, AVA on 7/3/20 at 11:31 AM EDT    Contact Number: [phone]

## 2020-07-03 NOTE — PROGRESS NOTES
HOSPITALIST    Labs drawn earlier - patient with low Na, High K, and acidosis    Discussed with Dr. Aliyah Bravo - will start. bicarb drip . 45 with 100 meq bicarb at 75 per hour

## 2020-07-03 NOTE — PROGRESS NOTES
Pt transferred to ICU due to respiratory distress and placed on bipap per RT. Pt tolerating ok. Ativan 2mg given IV for anxiety. Cardizim bolus and infusion started to control HR. Pt remains alert but still having labored breathing with clavicle retractions. Pt does not tolerate Bipap being removed at this time and made NPO. Dr. Giovanni Gonsalves of critical labs and not being able to take PO meds. New orders to follow.  Mary Choi MSN,RN

## 2020-07-03 NOTE — PROGRESS NOTES
HOSPITALIST    HR sustained at 160 to 170. Discussed with Dr. Rajinder Schaefer. Will start Cardizem drip following a Cardizem bolus of 20 mg IV.

## 2020-07-04 ENCOUNTER — APPOINTMENT (OUTPATIENT)
Dept: GENERAL RADIOLOGY | Age: 50
DRG: 190 | End: 2020-07-04
Payer: MEDICARE

## 2020-07-04 LAB
ALBUMIN SERPL-MCNC: 4.4 GM/DL (ref 3.4–5)
ALP BLD-CCNC: 114 IU/L (ref 40–129)
ALT SERPL-CCNC: 20 U/L (ref 10–40)
AMPHETAMINES: NEGATIVE
ANION GAP SERPL CALCULATED.3IONS-SCNC: 12 MMOL/L (ref 4–16)
AST SERPL-CCNC: 31 IU/L (ref 15–37)
BACTERIA: NEGATIVE /HPF
BARBITURATE SCREEN URINE: NEGATIVE
BASE EXCESS MIXED: 0.4 (ref 0–1.2)
BASE EXCESS: 3 (ref 0–3.3)
BENZODIAZEPINE SCREEN, URINE: NEGATIVE
BILIRUB SERPL-MCNC: 1.8 MG/DL (ref 0–1)
BILIRUBIN DIRECT: 1.2 MG/DL (ref 0–0.3)
BILIRUBIN URINE: NEGATIVE MG/DL
BILIRUBIN, INDIRECT: 0.6 MG/DL (ref 0–0.7)
BLOOD, URINE: NEGATIVE
BUN BLDV-MCNC: 40 MG/DL (ref 6–23)
CALCIUM SERPL-MCNC: 10 MG/DL (ref 8.3–10.6)
CANNABINOID SCREEN URINE: NEGATIVE
CARBON MONOXIDE, BLOOD: 2.1 % (ref 0–5)
CARBON MONOXIDE, BLOOD: 2.4 % (ref 0–5)
CHLORIDE BLD-SCNC: 92 MMOL/L (ref 99–110)
CLARITY: CLEAR
CO2 CONTENT: 20.6 MMOL/L (ref 19–24)
CO2 CONTENT: 24.1 MMOL/L (ref 19–24)
CO2: 22 MMOL/L (ref 21–32)
COCAINE METABOLITE: NEGATIVE
COLOR: YELLOW
COMMENT: ABNORMAL
COMMENT: ABNORMAL
CREAT SERPL-MCNC: 1.4 MG/DL (ref 0.9–1.3)
EKG ATRIAL RATE: 167 BPM
EKG DIAGNOSIS: NORMAL
EKG Q-T INTERVAL: 296 MS
EKG QRS DURATION: 134 MS
EKG QTC CALCULATION (BAZETT): 493 MS
EKG R AXIS: 67 DEGREES
EKG T AXIS: 144 DEGREES
EKG VENTRICULAR RATE: 167 BPM
GFR AFRICAN AMERICAN: >60 ML/MIN/1.73M2
GFR NON-AFRICAN AMERICAN: 54 ML/MIN/1.73M2
GLUCOSE BLD-MCNC: 201 MG/DL (ref 70–99)
GLUCOSE, URINE: NEGATIVE MG/DL
HCO3 ARTERIAL: 19.7 MMOL/L (ref 18–23)
HCO3 ARTERIAL: 23.1 MMOL/L (ref 18–23)
HYALINE CASTS: >20 /LPF
KETONES, URINE: NEGATIVE MG/DL
LACTATE: 1.6 MMOL/L (ref 0.4–2)
LACTATE: 2.1 MMOL/L (ref 0.4–2)
LEGIONELLA URINARY AG: NEGATIVE
LEUKOCYTE ESTERASE, URINE: NEGATIVE
Lab: 24 HRS
MAGNESIUM: 2.3 MG/DL (ref 1.8–2.4)
METHEMOGLOBIN ARTERIAL: 1.1 %
METHEMOGLOBIN ARTERIAL: 1.6 %
MUCUS: ABNORMAL HPF
NITRITE URINE, QUANTITATIVE: NEGATIVE
O2 SATURATION: 95.6 % (ref 96–97)
O2 SATURATION: 96 % (ref 96–97)
OPIATES, URINE: NEGATIVE
OXYCODONE: NEGATIVE
PCO2 ARTERIAL: 29 MMHG (ref 32–45)
PCO2 ARTERIAL: 31 MMHG (ref 32–45)
PH BLOOD: 7.44 (ref 7.34–7.45)
PH BLOOD: 7.48 (ref 7.34–7.45)
PH, URINE: 5 (ref 5–8)
PHENCYCLIDINE, URINE: NEGATIVE
PO2 ARTERIAL: 147 MMHG (ref 75–100)
PO2 ARTERIAL: 151 MMHG (ref 75–100)
POTASSIUM SERPL-SCNC: 4.9 MMOL/L (ref 3.5–5.1)
PRO-BNP: 7819 PG/ML
PROCALCITONIN: 0.16
PROTEIN UA: NEGATIVE MG/DL
RBC URINE: ABNORMAL /HPF (ref 0–3)
REASON FOR REJECTION: NORMAL
REJECTED TEST: NORMAL
SODIUM BLD-SCNC: 126 MMOL/L (ref 135–145)
SPECIFIC GRAVITY UA: 1.01 (ref 1–1.03)
STREP PNEUMONIAE ANTIGEN: NORMAL
TOTAL PROTEIN: 7.1 GM/DL (ref 6.4–8.2)
TRICHOMONAS: ABNORMAL /HPF
UROBILINOGEN, URINE: NORMAL MG/DL (ref 0.2–1)
VOLUME, (UVOL): 2610 MLS
WBC UA: <1 /HPF (ref 0–2)

## 2020-07-04 PROCEDURE — 36600 WITHDRAWAL OF ARTERIAL BLOOD: CPT

## 2020-07-04 PROCEDURE — 2700000000 HC OXYGEN THERAPY PER DAY

## 2020-07-04 PROCEDURE — 87086 URINE CULTURE/COLONY COUNT: CPT

## 2020-07-04 PROCEDURE — 2580000003 HC RX 258: Performed by: INTERNAL MEDICINE

## 2020-07-04 PROCEDURE — 93005 ELECTROCARDIOGRAM TRACING: CPT | Performed by: INTERNAL MEDICINE

## 2020-07-04 PROCEDURE — 93010 ELECTROCARDIOGRAM REPORT: CPT | Performed by: INTERNAL MEDICINE

## 2020-07-04 PROCEDURE — 81001 URINALYSIS AUTO W/SCOPE: CPT

## 2020-07-04 PROCEDURE — 87077 CULTURE AEROBIC IDENTIFY: CPT

## 2020-07-04 PROCEDURE — 83880 ASSAY OF NATRIURETIC PEPTIDE: CPT

## 2020-07-04 PROCEDURE — 80307 DRUG TEST PRSMV CHEM ANLYZR: CPT

## 2020-07-04 PROCEDURE — 6360000002 HC RX W HCPCS: Performed by: INTERNAL MEDICINE

## 2020-07-04 PROCEDURE — 87186 SC STD MICRODIL/AGAR DIL: CPT

## 2020-07-04 PROCEDURE — 94660 CPAP INITIATION&MGMT: CPT

## 2020-07-04 PROCEDURE — 83735 ASSAY OF MAGNESIUM: CPT

## 2020-07-04 PROCEDURE — 6370000000 HC RX 637 (ALT 250 FOR IP): Performed by: INTERNAL MEDICINE

## 2020-07-04 PROCEDURE — 80053 COMPREHEN METABOLIC PANEL: CPT

## 2020-07-04 PROCEDURE — 36415 COLL VENOUS BLD VENIPUNCTURE: CPT

## 2020-07-04 PROCEDURE — 94640 AIRWAY INHALATION TREATMENT: CPT

## 2020-07-04 PROCEDURE — 6370000000 HC RX 637 (ALT 250 FOR IP): Performed by: HOSPITALIST

## 2020-07-04 PROCEDURE — 2000000000 HC ICU R&B

## 2020-07-04 PROCEDURE — 87449 NOS EACH ORGANISM AG IA: CPT

## 2020-07-04 PROCEDURE — 99222 1ST HOSP IP/OBS MODERATE 55: CPT | Performed by: INTERNAL MEDICINE

## 2020-07-04 PROCEDURE — 87899 AGENT NOS ASSAY W/OPTIC: CPT

## 2020-07-04 PROCEDURE — 94761 N-INVAS EAR/PLS OXIMETRY MLT: CPT

## 2020-07-04 PROCEDURE — 84145 PROCALCITONIN (PCT): CPT

## 2020-07-04 PROCEDURE — 83605 ASSAY OF LACTIC ACID: CPT

## 2020-07-04 PROCEDURE — 82803 BLOOD GASES ANY COMBINATION: CPT

## 2020-07-04 PROCEDURE — 71045 X-RAY EXAM CHEST 1 VIEW: CPT

## 2020-07-04 PROCEDURE — 82248 BILIRUBIN DIRECT: CPT

## 2020-07-04 RX ORDER — SODIUM CHLORIDE 0.9 % (FLUSH) 0.9 %
10 SYRINGE (ML) INJECTION PRN
Status: DISCONTINUED | OUTPATIENT
Start: 2020-07-04 | End: 2020-07-08 | Stop reason: HOSPADM

## 2020-07-04 RX ORDER — HYDRALAZINE HYDROCHLORIDE 25 MG/1
25 TABLET, FILM COATED ORAL ONCE
Status: COMPLETED | OUTPATIENT
Start: 2020-07-04 | End: 2020-07-04

## 2020-07-04 RX ORDER — FUROSEMIDE 40 MG/1
80 TABLET ORAL 2 TIMES DAILY
Status: DISCONTINUED | OUTPATIENT
Start: 2020-07-04 | End: 2020-07-04

## 2020-07-04 RX ORDER — LORAZEPAM 2 MG/ML
2 INJECTION INTRAMUSCULAR EVERY 4 HOURS PRN
Status: DISCONTINUED | OUTPATIENT
Start: 2020-07-04 | End: 2020-07-04

## 2020-07-04 RX ORDER — LORAZEPAM 2 MG/ML
1 INJECTION INTRAMUSCULAR EVERY 4 HOURS PRN
Status: DISCONTINUED | OUTPATIENT
Start: 2020-07-04 | End: 2020-07-06

## 2020-07-04 RX ORDER — LIDOCAINE HYDROCHLORIDE 10 MG/ML
5 INJECTION, SOLUTION EPIDURAL; INFILTRATION; INTRACAUDAL; PERINEURAL ONCE
Status: COMPLETED | OUTPATIENT
Start: 2020-07-04 | End: 2020-07-05

## 2020-07-04 RX ORDER — ISOSORBIDE DINITRATE 20 MG/1
10 TABLET ORAL ONCE
Status: COMPLETED | OUTPATIENT
Start: 2020-07-04 | End: 2020-07-04

## 2020-07-04 RX ORDER — SODIUM CHLORIDE 0.9 % (FLUSH) 0.9 %
10 SYRINGE (ML) INJECTION EVERY 12 HOURS SCHEDULED
Status: DISCONTINUED | OUTPATIENT
Start: 2020-07-04 | End: 2020-07-08 | Stop reason: HOSPADM

## 2020-07-04 RX ADMIN — ASPIRIN 81 MG: 81 TABLET, COATED ORAL at 08:42

## 2020-07-04 RX ADMIN — ISOSORBIDE DINITRATE 10 MG: 20 TABLET ORAL at 18:32

## 2020-07-04 RX ADMIN — LORAZEPAM 2 MG: 2 INJECTION INTRAMUSCULAR; INTRAVENOUS at 12:01

## 2020-07-04 RX ADMIN — SODIUM CHLORIDE 300 MG: 9 INJECTION, SOLUTION INTRAVENOUS at 08:43

## 2020-07-04 RX ADMIN — SODIUM CHLORIDE, PRESERVATIVE FREE 10 ML: 5 INJECTION INTRAVENOUS at 08:42

## 2020-07-04 RX ADMIN — VANCOMYCIN HYDROCHLORIDE 1500 MG: 5 INJECTION, POWDER, LYOPHILIZED, FOR SOLUTION INTRAVENOUS at 16:56

## 2020-07-04 RX ADMIN — HYDRALAZINE HYDROCHLORIDE 25 MG: 25 TABLET, FILM COATED ORAL at 18:32

## 2020-07-04 RX ADMIN — METOPROLOL SUCCINATE 25 MG: 25 TABLET, EXTENDED RELEASE ORAL at 08:42

## 2020-07-04 RX ADMIN — METHYLPREDNISOLONE SODIUM SUCCINATE 40 MG: 40 INJECTION, POWDER, FOR SOLUTION INTRAMUSCULAR; INTRAVENOUS at 18:32

## 2020-07-04 RX ADMIN — PANTOPRAZOLE SODIUM 40 MG: 40 TABLET, DELAYED RELEASE ORAL at 16:55

## 2020-07-04 RX ADMIN — METHYLPREDNISOLONE SODIUM SUCCINATE 40 MG: 40 INJECTION, POWDER, FOR SOLUTION INTRAMUSCULAR; INTRAVENOUS at 08:43

## 2020-07-04 RX ADMIN — IPRATROPIUM BROMIDE AND ALBUTEROL SULFATE 3 ML: .5; 3 SOLUTION RESPIRATORY (INHALATION) at 19:35

## 2020-07-04 RX ADMIN — FERROUS SULFATE TAB 325 MG (65 MG ELEMENTAL FE) 325 MG: 325 (65 FE) TAB at 08:43

## 2020-07-04 RX ADMIN — DIGOXIN 250 MCG: 0.25 INJECTION INTRAMUSCULAR; INTRAVENOUS at 04:10

## 2020-07-04 RX ADMIN — DOBUTAMINE IN DEXTROSE 5 MCG/KG/MIN: 200 INJECTION, SOLUTION INTRAVENOUS at 14:58

## 2020-07-04 RX ADMIN — IPRATROPIUM BROMIDE AND ALBUTEROL SULFATE 3 ML: .5; 3 SOLUTION RESPIRATORY (INHALATION) at 15:36

## 2020-07-04 RX ADMIN — ATORVASTATIN CALCIUM 40 MG: 40 TABLET, FILM COATED ORAL at 08:43

## 2020-07-04 RX ADMIN — FUROSEMIDE 40 MG: 10 INJECTION, SOLUTION INTRAMUSCULAR; INTRAVENOUS at 12:01

## 2020-07-04 RX ADMIN — LORAZEPAM 1 MG: 2 INJECTION INTRAMUSCULAR; INTRAVENOUS at 18:41

## 2020-07-04 RX ADMIN — CEFEPIME HYDROCHLORIDE 2 G: 2 INJECTION, POWDER, FOR SOLUTION INTRAVENOUS at 08:43

## 2020-07-04 RX ADMIN — METHYLPREDNISOLONE SODIUM SUCCINATE 40 MG: 40 INJECTION, POWDER, FOR SOLUTION INTRAMUSCULAR; INTRAVENOUS at 16:55

## 2020-07-04 RX ADMIN — FERROUS SULFATE TAB 325 MG (65 MG ELEMENTAL FE) 325 MG: 325 (65 FE) TAB at 16:55

## 2020-07-04 RX ADMIN — SPIRONOLACTONE 25 MG: 25 TABLET ORAL at 08:43

## 2020-07-04 RX ADMIN — ENOXAPARIN SODIUM 90 MG: 100 INJECTION SUBCUTANEOUS at 08:45

## 2020-07-04 RX ADMIN — METHYLPREDNISOLONE SODIUM SUCCINATE 40 MG: 40 INJECTION, POWDER, FOR SOLUTION INTRAMUSCULAR; INTRAVENOUS at 04:10

## 2020-07-04 RX ADMIN — IPRATROPIUM BROMIDE AND ALBUTEROL SULFATE 3 ML: .5; 3 SOLUTION RESPIRATORY (INHALATION) at 08:19

## 2020-07-04 RX ADMIN — FUROSEMIDE 40 MG: 10 INJECTION, SOLUTION INTRAMUSCULAR; INTRAVENOUS at 04:09

## 2020-07-04 RX ADMIN — ENOXAPARIN SODIUM 90 MG: 100 INJECTION SUBCUTANEOUS at 21:09

## 2020-07-04 RX ADMIN — IPRATROPIUM BROMIDE AND ALBUTEROL SULFATE 3 ML: .5; 3 SOLUTION RESPIRATORY (INHALATION) at 11:27

## 2020-07-04 RX ADMIN — CEFEPIME HYDROCHLORIDE 2 G: 2 INJECTION, POWDER, FOR SOLUTION INTRAVENOUS at 21:09

## 2020-07-04 RX ADMIN — DIGOXIN 250 MCG: 0.25 INJECTION INTRAMUSCULAR; INTRAVENOUS at 16:56

## 2020-07-04 RX ADMIN — DIGOXIN 250 MCG: 0.25 INJECTION INTRAMUSCULAR; INTRAVENOUS at 08:43

## 2020-07-04 ASSESSMENT — PAIN SCALES - GENERAL
PAINLEVEL_OUTOF10: 0

## 2020-07-04 NOTE — PROGRESS NOTES
[address] [place] [address], [address] [place] [address]  Phone: [phone]  Office Hours: 8:30AM - 4:30PM  Monday - Friday     Nephrology Progress Note  7/4/2020 12:10 PM  Subjective:   Admit Date: 7/2/2020  PCP: No primary care provider on file. Interval History: moved to ICU  Tachycardic and tachypnea  Limited IV access  Diet: DIET LOW SODIUM 2 GM;      Data:   Scheduled Meds:   lidocaine PF  5 mL Intradermal Once    sodium chloride flush  10 mL Intravenous 2 times per day    furosemide  80 mg Oral BID    methylPREDNISolone  40 mg Intravenous Q6H    enoxaparin  1 mg/kg Subcutaneous BID    sodium zirconium cyclosilicate  10 g Oral Once    cefepime  2 g Intravenous Q12H    vancomycin  1,500 mg Intravenous Q18H    digoxin  250 mcg Intravenous Q6H    aspirin  81 mg Oral Daily    atorvastatin  40 mg Oral Daily    ipratropium-albuterol  1 vial Inhalation 4x Daily    metoprolol succinate  25 mg Oral Daily    pantoprazole  40 mg Oral BID AC    furosemide  40 mg Intravenous Q12H    ferrous sulfate  325 mg Oral BID WC    midodrine  5 mg Oral TID WC     Continuous Infusions:   [Held by provider] diltiazem (CARDIZEM) 100 mg in dextrose 5% 100 mL ([facility]) 5 mg/hr (07/03/20 1901)    sodium bicarbonate infusion Stopped (07/04/20 1000)    DOBUTamine 5 mcg/kg/min (07/03/20 2155)     PRN Meds:sodium chloride flush, LORazepam, albuterol, acetaminophen **OR** acetaminophen, polyethylene glycol, promethazine **OR** ondansetron  I/O last 3 completed shifts: In: 1430 [P.O.:120;  I.V.:760; IV Piggyback:550]  Out: 1525 [[id number]]  I/O this shift:  In: 360 [P.O.:360]  Out: 500 [Urine:500]    Intake/Output Summary (Last 24 hours) at 7/4/2020 1210  Last data filed at 7/4/2020 1002  Gross per 24 hour   Intake 1790 ml   Output 925 ml   Net 865 ml     CBC:   Recent Labs     07/02/20  0442 07/03/20  1623   WBC 6.8 7.4   HGB 7.9* 8.9*    367     BMP:    Recent Labs     07/02/20  0442 07/03/20  2423 07/04/20  0350   * 122* 126*   K 3.9 5.6* 4.9   CL 94* 87* 92*   CO2 22 15* 22   BUN 34* 33* 40*   CREATININE 1.7* 1.4* 1.4*   GLUCOSE 127* 211* 201*     Hepatic:   Recent Labs     07/02/20  0442 07/04/20  0350   AST 24 31   ALT 12 20   BILITOT 1.2* 1.8*   ALKPHOS 121 114     Troponin: No results for input(s): TROPONINI in the last 72 hours. BNP: No results for input(s): BNP in the last 72 hours. Lipids:   Recent Labs     07/03/20  1623   CHOL 96   HDL 23*     ABGs:   Lab Results   Component Value Date    PO2ART 147 07/04/2020    KVJ7JGB 31.0 07/04/2020     INR:   Recent Labs     07/03/20  1623   INR 1.77       Objective:   Vitals: BP (!) 147/93   Pulse 110   Temp 97.6 °F (36.4 °C) (Axillary)   Resp 27   Ht 5' 11\" (1.803 m)   Wt 206 lb 12.7 oz (93.8 kg)   SpO2 99%   BMI 28.84 kg/m²   General appearance: alert and cooperative with exam wearing Bipap  HEENT: Head: Normocephalic, no lesions, without obvious abnormality.   Neck: no adenopathy, no carotid bruit, no JVD, supple, symmetrical, trachea midline and thyroid not enlarged, symmetric, no tenderness/mass/nodules  Lungs: diminished breath sounds bibasilar  Heart: tachycardic  Abdomen: soft, non-tender; bowel sounds normal; no masses,  no organomegaly  Extremities: edema trace plus  Neurologic: Mental status: alertness: alert    Assessment and Plan:     Patient Active Problem List:     Essential hypertension     Osteoarthritis     COPD (chronic obstructive pulmonary disease) (HCC)     Paresthesia of left leg     Abdominal hernia     Left shoulder pain     Crushing injury of right hand     Rotator cuff tendinitis     Midline low back pain without sciatica     History of MI (myocardial infarction)     Coronary artery disease involving coronary bypass graft of native heart without angina pectoris     Mixed hyperlipidemia     Depression     Chronic midline low back pain without sciatica     Tobacco abuse     Gastroesophageal reflux disease without esophagitis     Opiate abuse, continuous (HCC)     Heroin dependence ([facility])     ST elevation myocardial infarction involving left circumflex coronary artery ([facility])     STEMI (ST elevation myocardial infarction) ([facility])     Acute on chronic combined systolic (congestive) and diastolic (congestive) heart failure ([facility])     Systolic and diastolic CHF w/reduced LV function, NYHA class 4 ([facility])     NSTEMI (non-ST elevated myocardial infarction) ([address]     Acute on chronic congestive heart failure ([address]     Noncompliance     Pulmonary edema, acute ([facility])     Acute kidney injury ([address]     Acute respiratory failure with hypoxia and hypercapnia ([facility])     Hypertensive emergency     Ischemic cardiomyopathy     Heart failure ([address]     Left ventricular systolic dysfunction     Acute systolic congestive heart failure ([facility])     SOB (shortness of breath)     Acute on chronic combined systolic and diastolic heart failure ([facility])     Hyponatremia     Hematemesis     Acute on chronic systolic CHF (congestive heart failure) ([address]    He is mildly volume overloaded on exam today which is not likely the tiology of his clinical deterioration  He has acidotic breathing  JAIME stable and non oliguric  On bicarb drip but on hold due to no IV access    Plan  Stop po lasix  Cont IV lasix to maintain non oliguric state  Resume bicarb drip - will help with breathing too  Will follow      Jewell Vidal MD

## 2020-07-04 NOTE — PROGRESS NOTES
Pt is refusing to have his blood drawn at this time. He states that he is tired of being poked with needles.  The hospitalist was notified

## 2020-07-04 NOTE — PROGRESS NOTES
Patient transferred to ICU overnight secondary to respiratory issues. He is currently on BiPAP. Hold off on any mediastinoscopy at this point. We will address this as an outpatient due to the patient's current illness. He would require intubation and is not medically stable for this.

## 2020-07-04 NOTE — PROGRESS NOTES
Dr. Lisa Perkins came to assess [facility]. [facility] stated that he wanted to change his code status to a full code. Dr. Lisa Perkins explained to him what the medical staff will do in the episode of an arrest, and [facility] wish to proceed as a full code.

## 2020-07-04 NOTE — PROGRESS NOTES
Hospitalist Progress Note      Name:  Myrna Mustafa /Age/Sex: 1970  (52 y.o. male)   MRN & CSN:  [id number] & [id number] Admission Date/Time: 2020  4:19 AM   Location:  -A PCP: No primary care provider on file. Hospital Day: 3    Assessment and Plan:   Myrna Mustafa is a 52 y.o.  male  who presents with:        Acute respiratory failure noted the day after admission  -Patient did not have much hypoxia, but has very labored respirations.  -Discussed with pulmonary, BiPAP started  -Hospital day 2-continue BiPAP 15/5, FiO2 28%. It appears that his labored respirations are related to significant lactic acidosis. Lactic acidosis could be possibly due to poor cardiac output sepsis is in the differential.  Other etiologies not ruled out. Being treated for COPD exacerbation, and CHF as well. Tachycardia  -Hospital day 1-heart rate was over 164 over half an hour. Transferred to ICU. Appears to have been in atrial flutter. Cardizem drip started then stopped. Now on IV digoxin. Severe cardiomyopathy, noncompliant with follow-up, with acute on chronic systolic/diastolic heart failure  -Does not appear to be the primary problem causing the shortness of breath  -Right heart cath was recently done at [facility] on  -results are available in Care Everywhere  -Continue IV Lasix. Discussed with Dr. Debra Nixon today. Metabolic acidosis, with lactic acidosis  -Discussed with nephrology, bicarb drip started in the evening on hospital day 1. Hyperkalemia noted hospital day 1  -Fide Puneet was ordered in the afternoon, but was unable to take it after being sedated with IV Ativan. Discussed with nephrology. Bicarb drip started    Acute kidney injury- appreciate nephrology consult    Popliteal DVT-diagnosed earlier this month -full dose Lovenox started hospital day 1. He was taking Xarelto prior to admission, which was recently started at [facility] a few weeks prior to admission.     COPD with possible exacerbation  -Discussed with pulmonary   -Solu-Medrol dose increased to 60 mg IV every 6 hours on hospital day 1    Anxiety-IV Ativan 2 mg given hospital day 1, and more IV Ativan    Chest lymphadenopathy-appreciate cardiothoracic consult, no biopsy while inpatient. Hyponatremia-monitor sodium level    Iron deficiency anemia  -IV iron 300 mg given hospital day 2, to receive another dose hospital day 3    Hypertension  -Hospital day 2, will give him 1 dose hydralazine and Imdur tonight     Please refer to the rest of the notes in the chart        History of Present Illness:     Was breathing fast when I saw him - RR was 26, and moderately labored while on BiPAP 15/5 set at 12 with FiO2 28%. This was about 8 am.     Saw him again in the afternoon. He had received 2 mg IV Ativan. He did wake up and said he felt fine when I asked him how he was doing. He went right back to sleep. He has been on BiPAP all day. He did take his pills today. Objective:        Intake/Output Summary (Last 24 hours) at 7/4/2020 1049  Last data filed at 7/4/2020 1002  Gross per 24 hour   Intake 1790 ml   Output 1825 ml   Net -35 ml      Vitals:   Vitals:    07/04/20 1002   BP: (!) 147/93   Pulse: 110   Resp: 27   Temp:    SpO2: 100%     Physical Exam:      General-he appears to be in moderate distress, he appears to be critically ill    Lungs-distant breath sounds, no wheezing    Heart-tachycardic and regular    Neurologic-speech clear  Medications:   Medications:    lidocaine PF  5 mL Intradermal Once    sodium chloride flush  10 mL Intravenous 2 times per day    albuterol  2.5 mg Nebulization Once    methylPREDNISolone  40 mg Intravenous Q6H    enoxaparin  1 mg/kg Subcutaneous BID    sodium zirconium cyclosilicate  10 g Oral Once    cefepime  2 g Intravenous Q12H    vancomycin  1,500 mg Intravenous Q18H    digoxin  250 mcg Intravenous Q6H    aspirin  81 mg Oral Daily    atorvastatin  40 mg Oral Daily    ipratropium-albuterol  1 vial Inhalation 4x Daily    metoprolol succinate  25 mg Oral Daily    pantoprazole  40 mg Oral BID AC    sodium chloride flush  10 mL Intravenous 2 times per day    furosemide  40 mg Intravenous Q12H    ferrous sulfate  325 mg Oral BID WC    midodrine  5 mg Oral TID WC      Infusions:    [Held by provider] diltiazem (CARDIZEM) 100 mg in dextrose 5% 100 mL (ADD-[place]) 5 mg/hr (07/03/20 1901)    sodium bicarbonate infusion Stopped (07/04/20 1000)    DOBUTamine 5 mcg/kg/min (07/03/20 2155)     PRN Meds: sodium chloride flush, 10 mL, PRN  albuterol, 2.5 mg, Q6H PRN  sodium chloride flush, 10 mL, PRN  acetaminophen, 650 mg, Q6H PRN    Or  acetaminophen, 650 mg, Q6H PRN  polyethylene glycol, 17 g, Daily PRN  promethazine, 12.5 mg, Q6H PRN    Or  ondansetron, 4 mg, Q6H PRN          Electronically signed by Rebecca Meyers MD on 7/4/2020 at 10:49 AM

## 2020-07-04 NOTE — PROGRESS NOTES
Hospitalist Progress Note      Name:  Anjelica Araya /Age/Sex: 1970  (52 y.o. male)   MRN & CSN:  [id number] & [id number] Admission Date/Time: 2020  4:19 AM   Location:  -A PCP: No primary care provider on file. Hospital Day: 2    Assessment and Plan:   Anjelica Araya is a 52 y.o.  male  who presents with:        Acute respiratory failure  -Patient did not have much hypoxia, but has very labored respirations.  -Discussed with pulmonary, BiPAP started    Tachycardia  -Heart rate went up to over 164 for half an hour this morning. IV metoprolol given with improvement. IV Cardizem subsequently stopped tolerated. -Cardiology following    Severe cardiomyopathy, noncompliant with follow-up, with suspected acute on chronic systolic heart failure, as well as acute on chronic diastolic heart failure  -Cardiology following  -And recently went to [facility] for a second opinion earlier this month and was admitted  -Right heart cath was recently done at [facility] on  -results are available in care everywhere    Metabolic acidosis, with lactic acidosis  -Discussed with nephrology, bicarb drip started    Hyperkalemia  -Yanick Pale was ordered in the afternoon, but was unable to take it after being sedated with IV Ativan. Discussed with nephrology. Bicarb drip started    Acute kidney injury- appreciate nephrology consult    Popliteal DVT-diagnosed earlier this month -resume Xarelto    COPD with possible exacerbation  -Discussed with pulmonary today  -IV steroids    Chest lymphadenopathy-appreciate cardiothoracic consult    Hyponatremia-monitor sodium level    Iron deficiency anemia-start IV iron    Please refer to the rest of the notes in the chart        History of Present Illness:     Patient was very short of breath today    Objective:        Intake/Output Summary (Last 24 hours) at 7/3/2020 2158  Last data filed at 7/3/2020 1729  Gross per 24 hour   Intake 120 ml   Output 1525 ml   Net -1405 ml

## 2020-07-04 NOTE — CONSULTS
PHARMACY VANCOMYCIN MONITORING SERVICE    Brittnee Johnson is a 52 y.o. male started on vancomycin for possible sepsis. Pharmacy consulted by Dr. Corina Mclean for monitoring and adjustment. Indication for treatment: Sepsis  Goal trough: 15 mcg/mL  Other antimicrobials: Cefepime    Ht Readings from Last 1 Encounters:   07/02/20 5' 11\" (1.803 m)     Wt Readings from Last 3 Encounters:   07/04/20 206 lb 12.7 oz (93.8 kg)   06/06/20 206 lb (93.4 kg)   05/30/20 206 lb (93.4 kg)        Pertinent Laboratory Values:   Temp Readings from Last 3 Encounters:   07/04/20 97.6 °F (36.4 °C) (Axillary)   06/06/20 98.1 °F (36.7 °C) (Oral)   05/30/20 97.7 °F (36.5 °C) (Oral)     Recent Labs     07/02/20  0442 07/03/20  1623  07/03/20  2130 07/04/20  0350 07/04/20  0759   WBC 6.8 7.4  --   --   --   --    LACTATE  --   --    < > 6.3* 2.1* 1.6    < > = values in this interval not displayed. Recent Labs     07/02/20  0442 07/03/20  1623 07/04/20  0350   BUN 34* 33* 40*   CREATININE 1.7* 1.4* 1.4*     Estimated Creatinine Clearance: 75 mL/min (A) (based on SCr of 1.4 mg/dL (H)). Intake/Output Summary (Last 24 hours) at 7/4/2020 1601  Last data filed at 7/4/2020 1002  Gross per 24 hour   Intake 1670 ml   Output 550 ml   Net 1120 ml       Pertinent Cultures:  Date    Source    Results  7/3/2020  Blood    Collected  7/3/2020  MRSA nasal screen  Ordered  7/4                               Urine                                        Pending  7/4                               Strep pneumo/legionella         Pending      Previous vancomycin levels:  TROUGH:  No results for input(s): VANCOTROUGH in the last 72 hours. RANDOM:  No results for input(s): VANCORANDOM in the last 72 hours. Assessment/Plan:  · WBC normal, pt afebrile  · SCR improved down to 1.4, output appears ok  · Patient is being started on vancomycin for sepsis. Will initially dose the vancomycin conservatively due to CKD and HF with EF of 20%.  Suspect poor renal clearnace of

## 2020-07-04 NOTE — PROGRESS NOTES
HOSPTIALIST    Events today:     Patient was anxious and short of breath this am    Patient felt to have a COPD Exac more than CHF this am.    Also has YEIMY - IV iron ordered for am.  Has been on Xarelto for LLE DVT - was at [facility] recently. States he has been taking it. Patient developed tachycardia, transferred to [facility], started on Cardizem drip after speaking with cardiology. Communicated with Dr. Jose J Dominguez, started on BiPAP due to labored resps. Patient finally agreed to have BMP drawn in afternoon - had hyponatremia, acidosis, and hyperkalemia. Started Bicarb drip after communicating with nephrology. Patient with increasing resp distress - Precedex started by Dr. Jose J Dominguez. Patient had lactic acid level drawn - level is high - will get CT abdomen. Start cefepime and Vanco for possible sepsis.

## 2020-07-04 NOTE — CONSULTS
Name:  Daniel Ervin /Age/Sex: 1970  (52 y.o. male)   MRN & CSN:  [id number] & [id number] Admission Date/Time: 2020  4:19 AM   Location:  -A PCP: No primary care provider on file. Hospital Day: 3          Referring physician:  Ne Espinoza MD         Reason for consultation:  tachycardia        Thanks for referral.    Information source: patient    CC;  SOB      HPI:   Thank you for involving me in taking  care of Daniel Ervin who  is a 52 y. o.year  Old male  Presents with  CAD, HFrEF , await ICD,HTN, hyperlipidimea , COPD, noncomplaince now with worsening moderate recurrent SOB, was admitted with HFrEF exacerbation , went into  Aflutter in [facility] now transferred to ICU, has no CP. Past medical history:    has a past medical history of CAD (coronary artery disease), CHF (congestive heart failure) ([facility] COPD (chronic obstructive pulmonary disease) ([facility] [address] Depression, Drug abuse ([facility] [address] HIGH CHOLESTEROL, Hypertension, MI (myocardial infarction) ([facility] [address] and S/P coronary artery bypass graft x 4. Past surgical history:   has a past surgical history that includes Cardiac surgery (2010); Bypass Graft (04/10/2011); and Leg Surgery. Social History:   reports that he has been smoking cigarettes. He has a 20.00 pack-year smoking history. He has quit using smokeless tobacco. He reports that he does not drink alcohol or use drugs. Family history:  family history includes Cancer in his father; Diabetes in his mother; Heart Disease in his father and mother; Heart Failure in his father.     No Known Allergies    albuterol (PROVENTIL) nebulizer solution 2.5 mg, Once  albuterol (PROVENTIL) nebulizer solution 2.5 mg, Q6H PRN  methylPREDNISolone sodium (SOLU-MEDROL) injection 40 mg, Q6H  iron sucrose (VENOFER) 300 mg in sodium chloride 0.9 % 250 mL IVPB, Once  enoxaparin (LOVENOX) injection 90 mg, BID  [Held by provider] dilTIAZem 100 mg in dextrose 5 % 100 mL infusion (ADD-[place]), Continuous  sodium zirconium cyclosilicate (LOKELMA) oral suspension 10 g, Once  sodium bicarbonate 100 mEq in sodium chloride 0.45 % 1,000 mL infusion, Continuous  cefepime (MAXIPIME) 2 g IVPB minibag, Q12H  vancomycin (VANCOCIN) 1,500 mg in dextrose 5 % 500 mL IVPB, Q18H  digoxin (LANOXIN) injection 250 mcg, Q6H  DOBUTamine (DOBUTREX) 500 mg in dextrose 5 % 250 mL infusion, Continuous  aspirin EC tablet 81 mg, Daily  atorvastatin (LIPITOR) tablet 40 mg, Daily  ipratropium-albuterol (DUONEB) nebulizer solution 3 mL, 4x Daily  metoprolol succinate (TOPROL XL) extended release tablet 25 mg, Daily  pantoprazole (PROTONIX) tablet 40 mg, BID AC  spironolactone (ALDACTONE) tablet 25 mg, Daily  sodium chloride flush 0.9 % injection 10 mL, 2 times per day  sodium chloride flush 0.9 % injection 10 mL, PRN  acetaminophen (TYLENOL) tablet 650 mg, Q6H PRN    Or  acetaminophen (TYLENOL) suppository 650 mg, Q6H PRN  polyethylene glycol (GLYCOLAX) packet 17 g, Daily PRN  promethazine (PHENERGAN) tablet 12.5 mg, Q6H PRN    Or  ondansetron (ZOFRAN) injection 4 mg, Q6H PRN  furosemide (LASIX) injection 40 mg, Q12H  ferrous sulfate (IRON 325) tablet 325 mg, BID WC  midodrine (PROAMATINE) tablet 5 mg, TID       Current Facility-Administered Medications   Medication Dose Route Frequency Provider Last Rate Last Dose    albuterol (PROVENTIL) nebulizer solution 2.5 mg  2.5 mg Nebulization Once Lennon Essex, MD        albuterol (PROVENTIL) nebulizer solution 2.5 mg  2.5 mg Nebulization Q6H PRN Lennon Essex, MD        methylPREDNISolone sodium (SOLU-MEDROL) injection 40 mg  40 mg Intravenous Q6H Lennon Essex, MD   40 mg at 07/04/20 0843    iron sucrose (VENOFER) 300 mg in sodium chloride 0.9 % 250 mL IVPB  300 mg Intravenous Once Lennon Essex, .7 mL/hr at 07/04/20 0843 300 mg at 07/04/20 0843    enoxaparin (LOVENOX) injection 90 mg  1 mg/kg Subcutaneous BID Lennon Essex, MD   90 mg at 07/04/20 0845    [Held by provider] dilTIAZem 100 mg in dextrose 5 % 100 mL infusion (ADD-[place])  5 mg/hr Intravenous Continuous Iris Odell MD 5 mL/hr at 07/03/20 1901 5 mg/hr at 07/03/20 1901    sodium zirconium cyclosilicate (LOKELMA) oral suspension 10 g  10 g Oral Once Iris Odell MD        sodium bicarbonate 100 mEq in sodium chloride 0.45 % 1,000 mL infusion   Intravenous Continuous Iris Odell MD 75 mL/hr at 07/03/20 1917      cefepime (MAXIPIME) 2 g IVPB minibag  2 g Intravenous Q12H Iris Odell  mL/hr at 07/04/20 0843 2 g at 07/04/20 0843    vancomycin (VANCOCIN) 1,500 mg in dextrose 5 % 500 mL IVPB  1,500 mg Intravenous Q18H Iris Odell MD        digoxin [facility]) injection 250 mcg  250 mcg Intravenous Q6H Wisam Dinesh Levy MD   250 mcg at 07/04/20 0843    DOBUTamine (DOBUTREX) 500 mg in dextrose 5 % 250 mL infusion  5 mcg/kg/min Intravenous Continuous Wisam Levy MD 14.1 mL/hr at 07/03/20 2155 5 mcg/kg/min at 07/03/20 2155    aspirin EC tablet 81 mg  81 mg Oral Daily Wisam Levy MD   81 mg at 07/04/20 0842    atorvastatin (LIPITOR) tablet 40 mg  40 mg Oral Daily Flori BARTLETT MD   40 mg at 07/04/20 0843    ipratropium-albuterol (DUONEB) nebulizer solution 3 mL  1 vial Inhalation 4x Daily Flori BARTLETT MD   3 mL at 07/04/20 0819    metoprolol succinate (TOPROL XL) extended release tablet 25 mg  25 mg Oral Daily Rachelle Pool MD   25 mg at 07/04/20 0842    pantoprazole (PROTONIX) tablet 40 mg  40 mg Oral BID AC Flori BARTLETT MD   40 mg at 07/03/20 1524    spironolactone (ALDACTONE) tablet 25 mg  25 mg Oral Daily Flori BARTLETT MD   25 mg at 07/04/20 0843    sodium chloride flush 0.9 % injection 10 mL  10 mL Intravenous 2 times per day Porter Rodriguez MD   10 mL at 07/04/20 0842    sodium chloride flush 0.9 % injection 10 mL  10 mL Intravenous PRN Wisam Dinesh Levy MD        acetaminophen (TYLENOL) tablet 650 mg  650 mg Oral Q6H 30.7*         Recent Labs     07/04/20  0350   *   K 4.9   CL 92*   CO2 22   BUN 40*   CREATININE 1.4*     Recent Labs     07/04/20  0350   AST 31   ALT 20   BILIDIR 1.2*   BILITOT 1.8*   ALKPHOS 114     No results for input(s): TROPONINI in the last 72 hours. Lab Results   Component Value Date     (H) 04/16/2011     (H) 04/15/2011     (H) 04/14/2011     Lab Results   Component Value Date    INR 1.77 07/03/2020    PROTIME 21.6 (H) 07/03/2020           Assessment/Recommendations:       - A flutter:  Check EKG,   - HFrEF: on meds , diurese  - CAD: on meds  - HTN: on meds  - Hyperlipidimea:   On statin  - COPD:  stable       Mike Starr MD, 7/4/2020 9:08 AM

## 2020-07-05 ENCOUNTER — APPOINTMENT (OUTPATIENT)
Dept: GENERAL RADIOLOGY | Age: 50
DRG: 190 | End: 2020-07-05
Payer: MEDICARE

## 2020-07-05 LAB
ALBUMIN SERPL-MCNC: 4.3 GM/DL (ref 3.4–5)
ALP BLD-CCNC: 99 IU/L (ref 40–129)
ALT SERPL-CCNC: 20 U/L (ref 10–40)
ANION GAP SERPL CALCULATED.3IONS-SCNC: 11 MMOL/L (ref 4–16)
AST SERPL-CCNC: 40 IU/L (ref 15–37)
BILIRUB SERPL-MCNC: 1.7 MG/DL (ref 0–1)
BILIRUBIN DIRECT: 1.1 MG/DL (ref 0–0.3)
BILIRUBIN, INDIRECT: 0.6 MG/DL (ref 0–0.7)
BUN BLDV-MCNC: 46 MG/DL (ref 6–23)
CALCIUM SERPL-MCNC: 9.6 MG/DL (ref 8.3–10.6)
CHLORIDE BLD-SCNC: 87 MMOL/L (ref 99–110)
CO2: 25 MMOL/L (ref 21–32)
CREAT SERPL-MCNC: 1.4 MG/DL (ref 0.9–1.3)
CULTURE: NORMAL
DOSE AMOUNT: ABNORMAL
DOSE TIME: ABNORMAL
EKG ATRIAL RATE: 117 BPM
EKG DIAGNOSIS: NORMAL
EKG P AXIS: 81 DEGREES
EKG P-R INTERVAL: 162 MS
EKG Q-T INTERVAL: 318 MS
EKG QRS DURATION: 126 MS
EKG QTC CALCULATION (BAZETT): 443 MS
EKG R AXIS: 95 DEGREES
EKG T AXIS: 60 DEGREES
EKG VENTRICULAR RATE: 117 BPM
GFR AFRICAN AMERICAN: >60 ML/MIN/1.73M2
GFR NON-AFRICAN AMERICAN: 54 ML/MIN/1.73M2
GLUCOSE BLD-MCNC: 192 MG/DL (ref 70–99)
HIGH SENSITIVE C-REACTIVE PROTEIN: 3.3 MG/L
Lab: NORMAL
MAGNESIUM: 2.4 MG/DL (ref 1.8–2.4)
POTASSIUM SERPL-SCNC: 4.9 MMOL/L (ref 3.5–5.1)
PRO-BNP: ABNORMAL PG/ML
PROCALCITONIN: 0.14
SODIUM BLD-SCNC: 123 MMOL/L (ref 135–145)
SPECIMEN: NORMAL
TOTAL PROTEIN: 7 GM/DL (ref 6.4–8.2)
VANCOMYCIN TROUGH: 7.4 UG/ML (ref 10–20)

## 2020-07-05 PROCEDURE — 6370000000 HC RX 637 (ALT 250 FOR IP): Performed by: HOSPITALIST

## 2020-07-05 PROCEDURE — 2000000000 HC ICU R&B

## 2020-07-05 PROCEDURE — 93010 ELECTROCARDIOGRAM REPORT: CPT | Performed by: INTERNAL MEDICINE

## 2020-07-05 PROCEDURE — 94640 AIRWAY INHALATION TREATMENT: CPT

## 2020-07-05 PROCEDURE — C1751 CATH, INF, PER/CENT/MIDLINE: HCPCS

## 2020-07-05 PROCEDURE — 6360000002 HC RX W HCPCS: Performed by: INTERNAL MEDICINE

## 2020-07-05 PROCEDURE — 80053 COMPREHEN METABOLIC PANEL: CPT

## 2020-07-05 PROCEDURE — 02HV33Z INSERTION OF INFUSION DEVICE INTO SUPERIOR VENA CAVA, PERCUTANEOUS APPROACH: ICD-10-PCS | Performed by: INTERNAL MEDICINE

## 2020-07-05 PROCEDURE — 2700000000 HC OXYGEN THERAPY PER DAY

## 2020-07-05 PROCEDURE — 84145 PROCALCITONIN (PCT): CPT

## 2020-07-05 PROCEDURE — 6370000000 HC RX 637 (ALT 250 FOR IP): Performed by: INTERNAL MEDICINE

## 2020-07-05 PROCEDURE — 2500000003 HC RX 250 WO HCPCS: Performed by: INTERNAL MEDICINE

## 2020-07-05 PROCEDURE — 36569 INSJ PICC 5 YR+ W/O IMAGING: CPT

## 2020-07-05 PROCEDURE — 94761 N-INVAS EAR/PLS OXIMETRY MLT: CPT

## 2020-07-05 PROCEDURE — 71045 X-RAY EXAM CHEST 1 VIEW: CPT

## 2020-07-05 PROCEDURE — 83880 ASSAY OF NATRIURETIC PEPTIDE: CPT

## 2020-07-05 PROCEDURE — 82248 BILIRUBIN DIRECT: CPT

## 2020-07-05 PROCEDURE — 2580000003 HC RX 258: Performed by: INTERNAL MEDICINE

## 2020-07-05 PROCEDURE — 94660 CPAP INITIATION&MGMT: CPT

## 2020-07-05 PROCEDURE — 80202 ASSAY OF VANCOMYCIN: CPT

## 2020-07-05 PROCEDURE — 83735 ASSAY OF MAGNESIUM: CPT

## 2020-07-05 PROCEDURE — 86141 C-REACTIVE PROTEIN HS: CPT

## 2020-07-05 PROCEDURE — 99232 SBSQ HOSP IP/OBS MODERATE 35: CPT | Performed by: INTERNAL MEDICINE

## 2020-07-05 PROCEDURE — 76937 US GUIDE VASCULAR ACCESS: CPT

## 2020-07-05 RX ORDER — GUAIFENESIN 600 MG/1
600 TABLET, EXTENDED RELEASE ORAL 2 TIMES DAILY
Status: DISCONTINUED | OUTPATIENT
Start: 2020-07-05 | End: 2020-07-08 | Stop reason: HOSPADM

## 2020-07-05 RX ORDER — CLONIDINE HYDROCHLORIDE 0.1 MG/1
0.1 TABLET ORAL EVERY 4 HOURS PRN
Status: DISCONTINUED | OUTPATIENT
Start: 2020-07-05 | End: 2020-07-08 | Stop reason: HOSPADM

## 2020-07-05 RX ORDER — FUROSEMIDE 10 MG/ML
80 INJECTION INTRAMUSCULAR; INTRAVENOUS EVERY 12 HOURS
Status: DISCONTINUED | OUTPATIENT
Start: 2020-07-06 | End: 2020-07-08

## 2020-07-05 RX ORDER — ISOSORBIDE MONONITRATE 60 MG/1
60 TABLET, EXTENDED RELEASE ORAL ONCE
Status: COMPLETED | OUTPATIENT
Start: 2020-07-05 | End: 2020-07-05

## 2020-07-05 RX ORDER — HYDRALAZINE HYDROCHLORIDE 25 MG/1
25 TABLET, FILM COATED ORAL ONCE
Status: DISCONTINUED | OUTPATIENT
Start: 2020-07-05 | End: 2020-07-05

## 2020-07-05 RX ORDER — METOPROLOL SUCCINATE 50 MG/1
50 TABLET, EXTENDED RELEASE ORAL DAILY
Status: DISCONTINUED | OUTPATIENT
Start: 2020-07-06 | End: 2020-07-06

## 2020-07-05 RX ADMIN — ENOXAPARIN SODIUM 90 MG: 100 INJECTION SUBCUTANEOUS at 20:04

## 2020-07-05 RX ADMIN — FUROSEMIDE 40 MG: 10 INJECTION, SOLUTION INTRAMUSCULAR; INTRAVENOUS at 11:47

## 2020-07-05 RX ADMIN — METHYLPREDNISOLONE SODIUM SUCCINATE 40 MG: 40 INJECTION, POWDER, FOR SOLUTION INTRAMUSCULAR; INTRAVENOUS at 14:23

## 2020-07-05 RX ADMIN — FUROSEMIDE 80 MG: 10 INJECTION, SOLUTION INTRAMUSCULAR; INTRAVENOUS at 23:22

## 2020-07-05 RX ADMIN — DOBUTAMINE IN DEXTROSE 5 MCG/KG/MIN: 200 INJECTION, SOLUTION INTRAVENOUS at 11:55

## 2020-07-05 RX ADMIN — SODIUM CHLORIDE, PRESERVATIVE FREE 10 ML: 5 INJECTION INTRAVENOUS at 08:46

## 2020-07-05 RX ADMIN — IPRATROPIUM BROMIDE AND ALBUTEROL SULFATE 3 ML: .5; 3 SOLUTION RESPIRATORY (INHALATION) at 15:44

## 2020-07-05 RX ADMIN — ASPIRIN 81 MG: 81 TABLET, COATED ORAL at 08:45

## 2020-07-05 RX ADMIN — IPRATROPIUM BROMIDE AND ALBUTEROL SULFATE 3 ML: .5; 3 SOLUTION RESPIRATORY (INHALATION) at 12:01

## 2020-07-05 RX ADMIN — METHYLPREDNISOLONE SODIUM SUCCINATE 40 MG: 40 INJECTION, POWDER, FOR SOLUTION INTRAMUSCULAR; INTRAVENOUS at 20:03

## 2020-07-05 RX ADMIN — FUROSEMIDE 40 MG: 10 INJECTION, SOLUTION INTRAMUSCULAR; INTRAVENOUS at 00:20

## 2020-07-05 RX ADMIN — ATORVASTATIN CALCIUM 40 MG: 40 TABLET, FILM COATED ORAL at 08:45

## 2020-07-05 RX ADMIN — LIDOCAINE HYDROCHLORIDE ANHYDROUS 5 ML: 10 INJECTION, SOLUTION INFILTRATION at 15:00

## 2020-07-05 RX ADMIN — SODIUM CHLORIDE 300 MG: 9 INJECTION, SOLUTION INTRAVENOUS at 06:19

## 2020-07-05 RX ADMIN — ALBUTEROL SULFATE 2.5 MG: 2.5 SOLUTION RESPIRATORY (INHALATION) at 04:10

## 2020-07-05 RX ADMIN — METHYLPREDNISOLONE SODIUM SUCCINATE 40 MG: 40 INJECTION, POWDER, FOR SOLUTION INTRAMUSCULAR; INTRAVENOUS at 02:23

## 2020-07-05 RX ADMIN — FERROUS SULFATE TAB 325 MG (65 MG ELEMENTAL FE) 325 MG: 325 (65 FE) TAB at 08:45

## 2020-07-05 RX ADMIN — PANTOPRAZOLE SODIUM 40 MG: 40 TABLET, DELAYED RELEASE ORAL at 06:42

## 2020-07-05 RX ADMIN — ENOXAPARIN SODIUM 90 MG: 100 INJECTION SUBCUTANEOUS at 08:45

## 2020-07-05 RX ADMIN — VANCOMYCIN HYDROCHLORIDE 1500 MG: 5 INJECTION, POWDER, LYOPHILIZED, FOR SOLUTION INTRAVENOUS at 18:54

## 2020-07-05 RX ADMIN — GUAIFENESIN 600 MG: 600 TABLET, EXTENDED RELEASE ORAL at 09:00

## 2020-07-05 RX ADMIN — ISOSORBIDE MONONITRATE 60 MG: 60 TABLET, EXTENDED RELEASE ORAL at 09:48

## 2020-07-05 RX ADMIN — METHYLPREDNISOLONE SODIUM SUCCINATE 40 MG: 40 INJECTION, POWDER, FOR SOLUTION INTRAMUSCULAR; INTRAVENOUS at 08:45

## 2020-07-05 RX ADMIN — IPRATROPIUM BROMIDE AND ALBUTEROL SULFATE 3 ML: .5; 3 SOLUTION RESPIRATORY (INHALATION) at 07:25

## 2020-07-05 RX ADMIN — LORAZEPAM 1 MG: 2 INJECTION INTRAMUSCULAR; INTRAVENOUS at 14:50

## 2020-07-05 RX ADMIN — FERROUS SULFATE TAB 325 MG (65 MG ELEMENTAL FE) 325 MG: 325 (65 FE) TAB at 16:37

## 2020-07-05 RX ADMIN — SODIUM CHLORIDE, PRESERVATIVE FREE 10 ML: 5 INJECTION INTRAVENOUS at 20:03

## 2020-07-05 RX ADMIN — METOPROLOL SUCCINATE 25 MG: 25 TABLET, EXTENDED RELEASE ORAL at 08:45

## 2020-07-05 RX ADMIN — IPRATROPIUM BROMIDE AND ALBUTEROL SULFATE 3 ML: .5; 3 SOLUTION RESPIRATORY (INHALATION) at 19:34

## 2020-07-05 RX ADMIN — SODIUM BICARBONATE: 84 INJECTION, SOLUTION INTRAVENOUS at 11:48

## 2020-07-05 RX ADMIN — CEFEPIME HYDROCHLORIDE 2 G: 2 INJECTION, POWDER, FOR SOLUTION INTRAVENOUS at 08:46

## 2020-07-05 RX ADMIN — CEFEPIME HYDROCHLORIDE 2 G: 2 INJECTION, POWDER, FOR SOLUTION INTRAVENOUS at 20:03

## 2020-07-05 RX ADMIN — PANTOPRAZOLE SODIUM 40 MG: 40 TABLET, DELAYED RELEASE ORAL at 16:37

## 2020-07-05 ASSESSMENT — PAIN SCALES - GENERAL: PAINLEVEL_OUTOF10: 0

## 2020-07-05 NOTE — PROGRESS NOTES
[address] [place] [address] [address] [place] [address]  Phone: [phone]  Office Hours: 8:30AM - 4:30PM  Monday - Friday     Nephrology Progress Note  7/5/2020 9:13 AM  Subjective:   Admit Date: 7/2/2020  PCP: No primary care provider on file. Interval History:  Missed bicarb drip due to lack of IV access  Making more urine  On bipap  Diet: DIET LOW SODIUM 2 GM;      Data:   Scheduled Meds:   isosorbide mononitrate  60 mg Oral Once    hydrALAZINE  25 mg Oral Once    lidocaine PF  5 mL Intradermal Once    sodium chloride flush  10 mL Intravenous 2 times per day    methylPREDNISolone  40 mg Intravenous Q6H    enoxaparin  1 mg/kg Subcutaneous BID    sodium zirconium cyclosilicate  10 g Oral Once    cefepime  2 g Intravenous Q12H    vancomycin  1,500 mg Intravenous Q18H    aspirin  81 mg Oral Daily    atorvastatin  40 mg Oral Daily    ipratropium-albuterol  1 vial Inhalation 4x Daily    metoprolol succinate  25 mg Oral Daily    pantoprazole  40 mg Oral BID AC    furosemide  40 mg Intravenous Q12H    ferrous sulfate  325 mg Oral BID WC     Continuous Infusions:   [Held by provider] diltiazem (CARDIZEM) 100 mg in dextrose 5% 100 mL (ADD-[place]) 5 mg/hr (07/03/20 1901)    sodium bicarbonate infusion 75 mL/hr at 07/04/20 1632    DOBUTamine 5 mcg/kg/min (07/04/20 1458)     PRN Meds:cloNIDine, sodium chloride flush, LORazepam, albuterol, acetaminophen **OR** acetaminophen, polyethylene glycol, promethazine **OR** ondansetron  I/O last 3 completed shifts: In: 2317.9 [P.O.:1670; I.V.:647.9]  Out: 4150 [Urine:4150]  No intake/output data recorded.     Intake/Output Summary (Last 24 hours) at 7/5/2020 0913  Last data filed at 7/5/2020 0300  Gross per 24 hour   Intake 2317.87 ml   Output 4150 ml   Net -1832.13 ml     CBC:   Recent Labs     07/03/20  1623   WBC 7.4   HGB 8.9*        BMP:    Recent Labs     07/03/20  1623 07/04/20  0350 07/05/20  0500   * 126* 123*   K 5.6* 4.9 4.9 CL 87* 92* 87*   CO2 15* 22 25   BUN 33* 40* 46*   CREATININE 1.4* 1.4* 1.4*   GLUCOSE 211* 201* 192*     Hepatic:   Recent Labs     07/04/20  0350 07/05/20  0500   AST 31 40*   ALT 20 20   BILITOT 1.8* 1.7*   ALKPHOS 114 99     Troponin: No results for input(s): TROPONINI in the last 72 hours. BNP: No results for input(s): BNP in the last 72 hours. Lipids:   Recent Labs     07/03/20  1623   CHOL 96   HDL 23*     ABGs:   Lab Results   Component Value Date    PO2ART 147 07/04/2020    LUO2BSV 31.0 07/04/2020     INR:   Recent Labs     07/03/20  1623   INR 1.77       Objective:   Vitals: BP (!) 166/103   Pulse 114   Temp 97.7 °F (36.5 °C) (Axillary)   Resp 23   Ht 5' 11\" (1.803 m)   Wt 206 lb 12.7 oz (93.8 kg)   SpO2 98%   BMI 28.84 kg/m²   General appearance: wearing Bipap  HEENT: Head: Normocephalic, no lesions, without obvious abnormality.   Neck: no adenopathy, no carotid bruit, no JVD, supple, symmetrical, trachea midline and thyroid not enlarged, symmetric, no tenderness/mass/nodules  Lungs: diminished breath sounds bibasilar  Heart: tachycardic  Abdomen: soft, non-tender; bowel sounds normal; no masses,  no organomegaly  Extremities: edema trace plus  Neurologic: Mental status: alertness: alert    Assessment and Plan:     Patient Active Problem List:     Essential hypertension     Osteoarthritis     COPD (chronic obstructive pulmonary disease) ([facility])     Paresthesia of left leg     Abdominal hernia     Left shoulder pain     Crushing injury of right hand     Rotator cuff tendinitis     Midline low back pain without sciatica     History of MI (myocardial infarction)     Coronary artery disease involving coronary bypass graft of native heart without angina pectoris     Mixed hyperlipidemia     Depression     Chronic midline low back pain without sciatica     Tobacco abuse     Gastroesophageal reflux disease without esophagitis     Opiate abuse, continuous (HCC)     Heroin dependence ([facility])     ST elevation myocardial infarction involving left circumflex coronary artery ([facility])     STEMI (ST elevation myocardial infarction) ([facility])     Acute on chronic combined systolic (congestive) and diastolic (congestive) heart failure ([facility])     Systolic and diastolic CHF w/reduced LV function, NYHA class 4 ([facility])     NSTEMI (non-ST elevated myocardial infarction) ([facility] [address]     Acute on chronic congestive heart failure ([facility] [address]     Noncompliance     Pulmonary edema, acute ([facility] [address]     Acute kidney injury ([facility] [address]     Acute respiratory failure with hypoxia and hypercapnia ([facility])     Hypertensive emergency     Ischemic cardiomyopathy     Heart failure ([facility] [address]     Left ventricular systolic dysfunction     Acute systolic congestive heart failure ([facility])     SOB (shortness of breath)     Acute on chronic combined systolic and diastolic heart failure ([facility])     Hyponatremia     Hematemesis     Acute on chronic systolic CHF (congestive heart failure) ([facility])    Acidosis improving  Excellent urine output  K normal  Juliet stable  Low Na - multifactorial  Plan    No hydralazine  Change bicarb drip decrease volume but increase bicarb- missed drip due to the lack of Charo MD Amy

## 2020-07-05 NOTE — PROGRESS NOTES
consult    Popliteal DVT-diagnosed earlier this month -full dose Lovenox started hospital day 1. He was taking Xarelto prior to admission, which was recently started at [facility] a few weeks prior to admission. COPD with possible exacerbation  -Discussed with pulmonary   -Solu-Medrol dose increased to 40 mg IV every 6 hours on hospital day 1    Anxiety-he had Ativan 1 mg IV at 3 pm today    Chest lymphadenopathy-appreciate cardiothoracic consult, no biopsy while inpatient. Hyponatremia-monitor sodium level    Iron deficiency anemia  -IV iron 300 mg given hospital day 2, and again on hospital day 3    Hypertension  -Hospital day 2, will give him 1 dose hydralazine and Imdur     Code status - Limited code    Please refer to the rest of the notes in the chart        History of Present Illness:     He said he was short of breath when he was eating dinner    Objective:        Intake/Output Summary (Last 24 hours) at 7/5/2020 1105  Last data filed at 7/5/2020 0950  Gross per 24 hour   Intake 1957.87 ml   Output 3950 ml   Net -1992.13 ml      Vitals:   Vitals:    07/05/20 0845   BP: (!) 166/103   Pulse: 114   Resp:    Temp:    SpO2:      Physical Exam:      Physical Exam    Resp effort nonlabored at rest    Neuro - speech clear    Skin - no cyanosis      Medications:   Medications:    lidocaine PF  5 mL Intradermal Once    sodium chloride flush  10 mL Intravenous 2 times per day    methylPREDNISolone  40 mg Intravenous Q6H    enoxaparin  1 mg/kg Subcutaneous BID    cefepime  2 g Intravenous Q12H    vancomycin  1,500 mg Intravenous Q18H    aspirin  81 mg Oral Daily    atorvastatin  40 mg Oral Daily    ipratropium-albuterol  1 vial Inhalation 4x Daily    metoprolol succinate  25 mg Oral Daily    pantoprazole  40 mg Oral BID AC    furosemide  40 mg Intravenous Q12H    ferrous sulfate  325 mg Oral BID WC      Infusions:    sodium bicarbonate infusion      [Held by provider] diltiazem (CARDIZEM) 100 mg in dextrose 5% 100 mL ([facility]) 5 mg/hr (07/03/20 8293)    DOBUTamine 5 mcg/kg/min (07/04/20 6190)     PRN Meds: cloNIDine, 0.1 mg, Q4H PRN  sodium chloride flush, 10 mL, PRN  LORazepam, 1 mg, Q4H PRN  albuterol, 2.5 mg, Q6H PRN  acetaminophen, 650 mg, Q6H PRN    Or  acetaminophen, 650 mg, Q6H PRN  polyethylene glycol, 17 g, Daily PRN  promethazine, 12.5 mg, Q6H PRN    Or  ondansetron, 4 mg, Q6H PRN          Electronically signed by Dinorah Almonte MD on 7/5/2020 at 11:05 AM

## 2020-07-05 NOTE — PROGRESS NOTES
HOSPITALIST    Notified of high BP  -on Dobutamine  -BP has been high since yesterday        On midodrine- being held - not getting it - will dc it off MAR    Lisinopril on hold due to JAIME    Will give Imdur 60 mg and hydralazine 25 mg once now. Ordered prn clonidine as well.

## 2020-07-05 NOTE — CONSULTS
Consult completed. Patient states he has had a PICC line before. Education regarding insertion of PICC reviewed with patient. Risk/benefit/and alternatives discussed. Verbalized understanding. Consent given by patient. Time out done at 1445. Patient very anxious about the procedure, asking to be put to sleep for the procedure, IV ativan given by primary RN prior to the procedure with fair effect, still anxious during the procedure, states he's not sure if he wants to do this, explained the benefits of the PICC line again and he was receptive to let me proceed with the procedure. PICC line inserted into the left upper arm, left basilic vein on first attempt with ultrasound guidance, without difficulty per protocol. Pt tolerated well. Good blood return and flushes easily. ECG tip confirmation system utilized for tip placement with P wave changes noted by RN during insertion, imaging not saved, Stat chest xray ordered to confirm PICC placement. Educational booklet left at bedside.   Jack Tobin

## 2020-07-05 NOTE — CONSULTS
PHARMACY VANCOMYCIN MONITORING SERVICE    Anjelica Araya is a 52 y.o. male started on vancomycin for possible sepsis. Pharmacy consulted by Dr. Chanelle Soriano for monitoring and adjustment. Indication for treatment: Sepsis  Goal trough: 15 mcg/mL  Other antimicrobials: Cefepime    Ht Readings from Last 1 Encounters:   07/02/20 5' 11\" (1.803 m)     Wt Readings from Last 3 Encounters:   07/04/20 206 lb 12.7 oz (93.8 kg)   06/06/20 206 lb (93.4 kg)   05/30/20 206 lb (93.4 kg)        Pertinent Laboratory Values:   Temp Readings from Last 3 Encounters:   07/05/20 97.6 °F (36.4 °C) (Axillary)   06/06/20 98.1 °F (36.7 °C) (Oral)   05/30/20 97.7 °F (36.5 °C) (Oral)     Recent Labs     07/03/20  1623  07/03/20  2130 07/04/20  0350 07/04/20  0759   WBC 7.4  --   --   --   --    LACTATE  --    < > 6.3* 2.1* 1.6    < > = values in this interval not displayed. Recent Labs     07/03/20  1623 07/04/20  0350 07/05/20  0500   BUN 33* 40* 46*   CREATININE 1.4* 1.4* 1.4*     Estimated Creatinine Clearance: 75 mL/min (A) (based on SCr of 1.4 mg/dL (H)). Intake/Output Summary (Last 24 hours) at 7/5/2020 1618  Last data filed at 7/5/2020 1426  Gross per 24 hour   Intake 3420.87 ml   Output 4925 ml   Net -1504.13 ml       Pertinent Cultures:  Date    Source    Results  7/3/2020  Blood    No growth  7/3/2020  MRSA nasal screen - MSSA present  7/4                               Urine                                        Staph Epi  7/4                               Strep pneumo/legionella         Negative      Previous vancomycin levels:  TROUGH:  No results for input(s): VANCOTROUGH in the last 72 hours. RANDOM:  No results for input(s): VANCORANDOM in the last 72 hours. Assessment/Plan:  · WBC normal, pt afebrile  · SCR now steady @ 1.4, output good  · Due to CKD and HF with EF of 20%.  Suspect poor renal clearnace of vancomycin  · Continue vancomycin 1,500mg q18h since renal labs have been stable, trough not done and dose not given

## 2020-07-05 NOTE — PROGRESS NOTES
HOSPITALIST    Notified BNP level went up to over 14,000 today. Cardiology and nephrology following. CXR with increased loculation R lung, pulmonary following.

## 2020-07-05 NOTE — PROGRESS NOTES
Patient refusing all lab draws at this time, this RN notified Dr Guerrero Wren that he is refusing his vanco trough to be drawn. Dr Guerrero Wren states its OK to wait until PICC line is inserted for us to draw labs. This RN will continue to monitor.

## 2020-07-05 NOTE — PROGRESS NOTES
pulmonary      SUBJECTIVE: remains on bipap     OBJECTIVE    VITALS:  BP (!) 166/103   Pulse 114   Temp 97.7 °F (36.5 °C) (Axillary)   Resp 23   Ht 5' 11\" (1.803 m)   Wt 206 lb 12.7 oz (93.8 kg)   SpO2 98%   BMI 28.84 kg/m²   HEAD AND FACE EXAM:  No throat injection, no active exudate,no thrush  NECK EXAM;No JVD, no masses, symmetrical  CHEST EXAM; Expansion equal and symmetrical, no masses  LUNG EXAM; Good breath sounds bilaterally. There are expiratory wheezes both lungs, there are crackles at both lung bases  CARDIOVASCULAR EXAM: Positive S1 and S2, no S3 or S4, no clicks ,no murmurs  RIGHT AND LEFT LOWER EXTRIMITY EXAM: No edema, no swelling, no inflamation  CNS EXAM: Alert and oriented X3          LABS   Lab Results   Component Value Date    WBC 7.4 07/03/2020    HGB 8.9 (L) 07/03/2020    HCT 30.7 (L) 07/03/2020    MCV 66.0 (L) 07/03/2020     07/03/2020     Lab Results   Component Value Date    CREATININE 1.4 (H) 07/05/2020    BUN 46 (H) 07/05/2020     (L) 07/05/2020    K 4.9 07/05/2020    CL 87 (L) 07/05/2020    CO2 25 07/05/2020     Lab Results   Component Value Date    INR 1.77 07/03/2020    PROTIME 21.6 (H) 07/03/2020          Lab Results   Component Value Date    PHOS 4.7 05/30/2020    PHOS 3.4 03/12/2020    PHOS 4.1 02/12/2020        Recent Labs     07/03/20 2015 07/03/20  2330 07/04/20  0330   PH 7.43 7.44 7.48*   PO2ART 168* 151* 147*   [id number] 27.0* 29.0* 31.0*   O2SAT 96.4 96.0 95.6*         Wt Readings from Last 3 Encounters:   07/04/20 206 lb 12.7 oz (93.8 kg)   06/06/20 206 lb (93.4 kg)   05/30/20 206 lb (93.4 kg)               ASSESMENT  Ac hypoxic resp failure  Ac copd  chf  Small right pl effusion        PLAN  1. cpm  2.  Cont pap rx  3. abg and cxr in am    7/5/2020  Shannan Blanchard M.D.

## 2020-07-05 NOTE — PROGRESS NOTES
obstructive pulmonary disease) ([facility] [address] Depression, Drug abuse ([facility] [address] HIGH CHOLESTEROL, Hypertension, MI (myocardial infarction) ([facility] [address] and S/P coronary artery bypass graft x 4.   has a past surgical history that includes Cardiac surgery (11/2010); Bypass Graft (04/10/2011); and Leg Surgery.   Physical Exam:  General:  Awake, alert, NAD  Head:normal  Eye:normal  Neck:  No JVD   Chest:  Clear to auscultation, respiration easy  Cardiovascular:  RRR S1S2  Abdomen:   nontender  Extremities:  +1 edema  Pulses; palpable  Neuro: grossly normal    Medications:    lidocaine PF  5 mL Intradermal Once    sodium chloride flush  10 mL Intravenous 2 times per day    methylPREDNISolone  40 mg Intravenous Q6H    enoxaparin  1 mg/kg Subcutaneous BID    cefepime  2 g Intravenous Q12H    vancomycin  1,500 mg Intravenous Q18H    aspirin  81 mg Oral Daily    atorvastatin  40 mg Oral Daily    ipratropium-albuterol  1 vial Inhalation 4x Daily    metoprolol succinate  25 mg Oral Daily    pantoprazole  40 mg Oral BID AC    furosemide  40 mg Intravenous Q12H    ferrous sulfate  325 mg Oral BID WC      sodium bicarbonate infusion 50 mL/hr at 07/05/20 1148    [Held by provider] diltiazem (CARDIZEM) 100 mg in dextrose 5% 100 mL ([facility]) 5 mg/hr (07/03/20 1901)    DOBUTamine 5 mcg/kg/min (07/05/20 1155)     cloNIDine, sodium chloride flush, LORazepam, albuterol, acetaminophen **OR** acetaminophen, polyethylene glycol, promethazine **OR** ondansetron    Lab Data:  CBC:   Recent Labs     07/03/20  1623   WBC 7.4   HGB 8.9*   HCT 30.7*   MCV 66.0*        BMP:   Recent Labs     07/03/20  1623 07/04/20  0350 07/05/20  0500   * 126* 123*   K 5.6* 4.9 4.9   CL 87* 92* 87*   CO2 15* 22 25   BUN 33* 40* 46*   CREATININE 1.4* 1.4* 1.4*     LIVER PROFILE:   Recent Labs     07/04/20  0350 07/05/20  0500   AST 31 40*   ALT 20 20   BILIDIR 1.2* 1.1*   BILITOT 1.8* 1.7*   ALKPHOS 114 99     PT/INR:   Recent Labs

## 2020-07-06 LAB
ALBUMIN SERPL-MCNC: 4.3 GM/DL (ref 3.4–5)
ALP BLD-CCNC: 92 IU/L (ref 40–129)
ALT SERPL-CCNC: 23 U/L (ref 10–40)
ANION GAP SERPL CALCULATED.3IONS-SCNC: 12 MMOL/L (ref 4–16)
AST SERPL-CCNC: 42 IU/L (ref 15–37)
BASE EXCESS MIXED: 4.9 (ref 0–1.2)
BILIRUB SERPL-MCNC: 1.6 MG/DL (ref 0–1)
BILIRUBIN DIRECT: 1 MG/DL (ref 0–0.3)
BILIRUBIN, INDIRECT: 0.6 MG/DL (ref 0–0.7)
BUN BLDV-MCNC: 48 MG/DL (ref 6–23)
CALCIUM SERPL-MCNC: 9.5 MG/DL (ref 8.3–10.6)
CARBON MONOXIDE, BLOOD: 2.4 % (ref 0–5)
CHLORIDE BLD-SCNC: 83 MMOL/L (ref 99–110)
CO2 CONTENT: 29 MMOL/L (ref 19–24)
CO2: 29 MMOL/L (ref 21–32)
COMMENT: ABNORMAL
CREAT SERPL-MCNC: 1.5 MG/DL (ref 0.9–1.3)
CULTURE: ABNORMAL
GFR AFRICAN AMERICAN: >60 ML/MIN/1.73M2
GFR NON-AFRICAN AMERICAN: 50 ML/MIN/1.73M2
GLUCOSE BLD-MCNC: 167 MG/DL (ref 70–99)
HCO3 ARTERIAL: 27.9 MMOL/L (ref 18–23)
HIGH SENSITIVE C-REACTIVE PROTEIN: 2.4 MG/L
Lab: ABNORMAL
MAGNESIUM: 2.6 MG/DL (ref 1.8–2.4)
METHEMOGLOBIN ARTERIAL: 1.3 %
O2 SATURATION: 95.1 % (ref 96–97)
PCO2 ARTERIAL: 35 MMHG (ref 32–45)
PH BLOOD: 7.51 (ref 7.34–7.45)
PO2 ARTERIAL: 97 MMHG (ref 75–100)
POTASSIUM SERPL-SCNC: 4.7 MMOL/L (ref 3.5–5.1)
PRO-BNP: ABNORMAL PG/ML
PROTEIN 24 HOUR URINE: 170 MG/24 HR
SODIUM BLD-SCNC: 124 MMOL/L (ref 135–145)
SPECIMEN: ABNORMAL
SPEP INTERPRETATION: ABNORMAL
TOTAL PROTEIN: 7 GM/DL (ref 6.4–8.2)

## 2020-07-06 PROCEDURE — 2700000000 HC OXYGEN THERAPY PER DAY

## 2020-07-06 PROCEDURE — 94660 CPAP INITIATION&MGMT: CPT

## 2020-07-06 PROCEDURE — 84145 PROCALCITONIN (PCT): CPT

## 2020-07-06 PROCEDURE — 6360000002 HC RX W HCPCS: Performed by: INTERNAL MEDICINE

## 2020-07-06 PROCEDURE — 36600 WITHDRAWAL OF ARTERIAL BLOOD: CPT

## 2020-07-06 PROCEDURE — 80053 COMPREHEN METABOLIC PANEL: CPT

## 2020-07-06 PROCEDURE — 85379 FIBRIN DEGRADATION QUANT: CPT

## 2020-07-06 PROCEDURE — 6370000000 HC RX 637 (ALT 250 FOR IP): Performed by: INTERNAL MEDICINE

## 2020-07-06 PROCEDURE — 2580000003 HC RX 258: Performed by: INTERNAL MEDICINE

## 2020-07-06 PROCEDURE — 85384 FIBRINOGEN ACTIVITY: CPT

## 2020-07-06 PROCEDURE — 83735 ASSAY OF MAGNESIUM: CPT

## 2020-07-06 PROCEDURE — 80048 BASIC METABOLIC PNL TOTAL CA: CPT

## 2020-07-06 PROCEDURE — 94640 AIRWAY INHALATION TREATMENT: CPT

## 2020-07-06 PROCEDURE — 85730 THROMBOPLASTIN TIME PARTIAL: CPT

## 2020-07-06 PROCEDURE — 94761 N-INVAS EAR/PLS OXIMETRY MLT: CPT

## 2020-07-06 PROCEDURE — 2000000000 HC ICU R&B

## 2020-07-06 PROCEDURE — 6370000000 HC RX 637 (ALT 250 FOR IP): Performed by: HOSPITALIST

## 2020-07-06 PROCEDURE — 99232 SBSQ HOSP IP/OBS MODERATE 35: CPT | Performed by: INTERNAL MEDICINE

## 2020-07-06 PROCEDURE — 82803 BLOOD GASES ANY COMBINATION: CPT

## 2020-07-06 PROCEDURE — 83880 ASSAY OF NATRIURETIC PEPTIDE: CPT

## 2020-07-06 PROCEDURE — 86141 C-REACTIVE PROTEIN HS: CPT

## 2020-07-06 PROCEDURE — 85610 PROTHROMBIN TIME: CPT

## 2020-07-06 PROCEDURE — 82248 BILIRUBIN DIRECT: CPT

## 2020-07-06 RX ORDER — METHOCARBAMOL 500 MG/1
500 TABLET, FILM COATED ORAL 4 TIMES DAILY PRN
Status: DISCONTINUED | OUTPATIENT
Start: 2020-07-06 | End: 2020-07-08 | Stop reason: HOSPADM

## 2020-07-06 RX ORDER — LISINOPRIL 5 MG/1
5 TABLET ORAL DAILY
Status: DISCONTINUED | OUTPATIENT
Start: 2020-07-06 | End: 2020-07-08

## 2020-07-06 RX ORDER — DOBUTAMINE HYDROCHLORIDE 200 MG/100ML
5 INJECTION INTRAVENOUS CONTINUOUS
Status: DISCONTINUED | OUTPATIENT
Start: 2020-07-06 | End: 2020-07-08

## 2020-07-06 RX ORDER — LORAZEPAM 2 MG/ML
0.5 INJECTION INTRAMUSCULAR EVERY 4 HOURS PRN
Status: DISCONTINUED | OUTPATIENT
Start: 2020-07-06 | End: 2020-07-08 | Stop reason: HOSPADM

## 2020-07-06 RX ORDER — METOPROLOL SUCCINATE 50 MG/1
100 TABLET, EXTENDED RELEASE ORAL DAILY
Status: DISCONTINUED | OUTPATIENT
Start: 2020-07-07 | End: 2020-07-08 | Stop reason: HOSPADM

## 2020-07-06 RX ORDER — CLONAZEPAM 0.5 MG/1
0.5 TABLET ORAL ONCE
Status: COMPLETED | OUTPATIENT
Start: 2020-07-06 | End: 2020-07-06

## 2020-07-06 RX ADMIN — FERROUS SULFATE TAB 325 MG (65 MG ELEMENTAL FE) 325 MG: 325 (65 FE) TAB at 16:54

## 2020-07-06 RX ADMIN — METHOCARBAMOL TABLETS 500 MG: 500 TABLET, COATED ORAL at 17:42

## 2020-07-06 RX ADMIN — CLONAZEPAM 0.5 MG: 0.5 TABLET ORAL at 20:34

## 2020-07-06 RX ADMIN — LORAZEPAM 0.5 MG: 2 INJECTION INTRAMUSCULAR; INTRAVENOUS at 13:39

## 2020-07-06 RX ADMIN — ATORVASTATIN CALCIUM 40 MG: 40 TABLET, FILM COATED ORAL at 08:41

## 2020-07-06 RX ADMIN — LISINOPRIL 5 MG: 5 TABLET ORAL at 11:44

## 2020-07-06 RX ADMIN — METOPROLOL SUCCINATE 50 MG: 50 TABLET, EXTENDED RELEASE ORAL at 08:40

## 2020-07-06 RX ADMIN — VANCOMYCIN HYDROCHLORIDE 1500 MG: 5 INJECTION, POWDER, LYOPHILIZED, FOR SOLUTION INTRAVENOUS at 13:08

## 2020-07-06 RX ADMIN — ALBUTEROL SULFATE 2.5 MG: 2.5 SOLUTION RESPIRATORY (INHALATION) at 01:34

## 2020-07-06 RX ADMIN — ENOXAPARIN SODIUM 90 MG: 100 INJECTION SUBCUTANEOUS at 20:35

## 2020-07-06 RX ADMIN — DOBUTAMINE IN DEXTROSE 5 MCG/KG/MIN: 200 INJECTION, SOLUTION INTRAVENOUS at 07:02

## 2020-07-06 RX ADMIN — PANTOPRAZOLE SODIUM 40 MG: 40 TABLET, DELAYED RELEASE ORAL at 15:12

## 2020-07-06 RX ADMIN — METHYLPREDNISOLONE SODIUM SUCCINATE 40 MG: 40 INJECTION, POWDER, FOR SOLUTION INTRAMUSCULAR; INTRAVENOUS at 15:12

## 2020-07-06 RX ADMIN — FERROUS SULFATE TAB 325 MG (65 MG ELEMENTAL FE) 325 MG: 325 (65 FE) TAB at 08:41

## 2020-07-06 RX ADMIN — CEFEPIME HYDROCHLORIDE 2 G: 2 INJECTION, POWDER, FOR SOLUTION INTRAVENOUS at 20:35

## 2020-07-06 RX ADMIN — GUAIFENESIN 600 MG: 600 TABLET, EXTENDED RELEASE ORAL at 20:34

## 2020-07-06 RX ADMIN — ENOXAPARIN SODIUM 90 MG: 100 INJECTION SUBCUTANEOUS at 08:41

## 2020-07-06 RX ADMIN — ASPIRIN 81 MG: 81 TABLET, COATED ORAL at 08:41

## 2020-07-06 RX ADMIN — SODIUM CHLORIDE, PRESERVATIVE FREE 10 ML: 5 INJECTION INTRAVENOUS at 08:41

## 2020-07-06 RX ADMIN — FUROSEMIDE 80 MG: 10 INJECTION, SOLUTION INTRAMUSCULAR; INTRAVENOUS at 11:44

## 2020-07-06 RX ADMIN — IPRATROPIUM BROMIDE AND ALBUTEROL SULFATE 3 ML: .5; 3 SOLUTION RESPIRATORY (INHALATION) at 11:31

## 2020-07-06 RX ADMIN — METHYLPREDNISOLONE SODIUM SUCCINATE 40 MG: 40 INJECTION, POWDER, FOR SOLUTION INTRAMUSCULAR; INTRAVENOUS at 00:33

## 2020-07-06 RX ADMIN — METHYLPREDNISOLONE SODIUM SUCCINATE 40 MG: 40 INJECTION, POWDER, FOR SOLUTION INTRAMUSCULAR; INTRAVENOUS at 20:35

## 2020-07-06 RX ADMIN — METHYLPREDNISOLONE SODIUM SUCCINATE 40 MG: 40 INJECTION, POWDER, FOR SOLUTION INTRAMUSCULAR; INTRAVENOUS at 08:41

## 2020-07-06 RX ADMIN — CEFEPIME HYDROCHLORIDE 2 G: 2 INJECTION, POWDER, FOR SOLUTION INTRAVENOUS at 08:41

## 2020-07-06 RX ADMIN — IPRATROPIUM BROMIDE AND ALBUTEROL SULFATE 3 ML: .5; 3 SOLUTION RESPIRATORY (INHALATION) at 07:32

## 2020-07-06 RX ADMIN — IPRATROPIUM BROMIDE AND ALBUTEROL SULFATE 3 ML: .5; 3 SOLUTION RESPIRATORY (INHALATION) at 18:57

## 2020-07-06 RX ADMIN — SODIUM CHLORIDE, PRESERVATIVE FREE 10 ML: 5 INJECTION INTRAVENOUS at 20:35

## 2020-07-06 RX ADMIN — GUAIFENESIN 600 MG: 600 TABLET, EXTENDED RELEASE ORAL at 08:40

## 2020-07-06 RX ADMIN — IPRATROPIUM BROMIDE AND ALBUTEROL SULFATE 3 ML: .5; 3 SOLUTION RESPIRATORY (INHALATION) at 15:42

## 2020-07-06 ASSESSMENT — PAIN SCALES - GENERAL: PAINLEVEL_OUTOF10: 0

## 2020-07-06 NOTE — PROGRESS NOTES
pulmonary      SUBJECTIVE:  On bipap     OBJECTIVE    VITALS:  BP (!) 143/91   Pulse 123   Temp 97.8 °F (36.6 °C) (Axillary)   Resp 22   Ht 5' 11\" (1.803 m)   Wt 206 lb 12.7 oz (93.8 kg)   SpO2 98%   BMI 28.84 kg/m²   HEAD AND FACE EXAM:  No throat injection, no active exudate,no thrush  NECK EXAM;No JVD, no masses, symmetrical  CHEST EXAM; Expansion equal and symmetrical, no masses  LUNG EXAM; Good breath sounds bilaterally. There are expiratory wheezes both lungs, there are crackles at both lung bases  CARDIOVASCULAR EXAM: Positive S1 and S2, no S3 or S4, no clicks ,no murmurs  RIGHT AND LEFT LOWER EXTRIMITY EXAM: No edema, no swelling, no inflamation  CNS EXAM: Alert and oriented X3          LABS   Lab Results   Component Value Date    WBC 7.4 07/03/2020    HGB 8.9 (L) 07/03/2020    HCT 30.7 (L) 07/03/2020    MCV 66.0 (L) 07/03/2020     07/03/2020     Lab Results   Component Value Date    CREATININE 1.4 (H) 07/05/2020    BUN 46 (H) 07/05/2020     (L) 07/05/2020    K 4.9 07/05/2020    CL 87 (L) 07/05/2020    CO2 25 07/05/2020     Lab Results   Component Value Date    INR 1.77 07/03/2020    PROTIME 21.6 (H) 07/03/2020          Lab Results   Component Value Date    PHOS 4.7 05/30/2020    PHOS 3.4 03/12/2020    PHOS 4.1 02/12/2020        Recent Labs     07/03/20  2330 07/04/20  0330 07/06/20  0600   PH 7.44 7.48* 7.51*   PO2ART 151* 147* 97   [id number] 29.0* 31.0* 35.0   O2SAT 96.0 95.6* 95.1*         Wt Readings from Last 3 Encounters:   07/04/20 206 lb 12.7 oz (93.8 kg)   06/06/20 206 lb (93.4 kg)   05/30/20 206 lb (93.4 kg)               ASSESMENT  Ac resp failure  Ac [d  chf        PLAN  1. cpm  2. Cont pap rx as needed  3.  Needs time to improve    7/6/2020  Dell Mace M.D.

## 2020-07-06 NOTE — PROGRESS NOTES
PHARMACY VANCOMYCIN MONITORING SERVICE     Doreen Chavez is a 52 y.o. male started on vancomycin for possible sepsis. Pharmacy consulted by Dr. Rere Covarrubias for monitoring and adjustment. Indication for treatment: Sepsis  Goal trough: 15 mcg/mL  Other antimicrobials: Cefepime     Pertinent Laboratory Values:   Temp Readings from Last 3 Encounters:   07/06/20 97.8 °F (36.6 °C) (Axillary)   06/06/20 98.1 °F (36.7 °C) (Oral)   05/30/20 97.7 °F (36.5 °C) (Oral)     Recent Labs     07/03/20  1623  07/03/20  2130 07/04/20  0350 07/04/20  0759   WBC 7.4  --   --   --   --    LACTATE  --    < > 6.3* 2.1* 1.6    < > = values in this interval not displayed. Recent Labs     07/03/20  1623 07/04/20  0350 07/05/20  0500   BUN 33* 40* 46*   CREATININE 1.4* 1.4* 1.4*     Estimated Creatinine Clearance: 75 mL/min (A) (based on SCr of 1.4 mg/dL (H)). Intake/Output Summary (Last 24 hours) at 7/6/2020 0217  Last data filed at 7/5/2020 1858  Gross per 24 hour   Intake 2288 ml   Output 2725 ml   Net -437 ml       Pertinent Cultures:  Date                             Source                                     Results  7/3/2020                      Blood                                       No growth  7/3/2020                      MRSA nasal screen    - MSSA present  7/4                               Urine                                        Staph Epi  7/4                               Strep pneumo/legionella         Negative    Vancomycin level:   TROUGH:    Recent Labs     07/05/20  1751   VANCOTROUGH 7.4*     Assessment:  WBC WNL on 07/03 (no new lab drawn), pt remains afebrile  SCR steady @ 1.4, output good  Due to CKD and HF with EF of 20%.  Suspect poor renal clearance of vancomycin  Day(s) of therapy: 4  Vancomycin level:   07/05: 7.4 (level drawn 25 hours post previous dose; 1,500 mg IV Q 18 H regimen)    Plan:  Continue with current dose (level drawn after 2 doses and drawn late due to patient refusing labs)  Will recheck Vancomycin trough level once patient in steady state (true trough expected to be closer to goal)  Pharmacy will continue to monitor patient and adjust therapy as indicated    REPEAT VANCOMYCIN TROUGH SCHEDULED FOR 07/08/2020 @ 00:30    Thank you for the consult.   Sarah Pinto  7/6/2020 2:17 AM

## 2020-07-06 NOTE — PROGRESS NOTES
Hospitalist Progress Note      Name:  Bernabe Mora /Age/Sex: 1970  (52 y.o. male)   MRN & CSN:  [id number] & [id number] Admission Date/Time: 2020  4:19 AM   Location:  -A PCP: No primary care provider on file. Hospital Day: 5    Assessment and Plan:   Bernabe Mora is a 52 y.o.  male  who presents with:        Acute respiratory failure noted the day after admission  -Patient did not have much hypoxia, but has very labored respirations.  -Discussed with pulmonary, BiPAP started  -Hospital day 2-continue BiPAP 15/5, FiO2 28%. It appears that his labored respirations are related to significant lactic acidosis. Lactic acidosis could be possibly due to poor cardiac output sepsis is in the differential.  Other etiologies not ruled out. Being treated for COPD exacerbation, and CHF as well.  -Hospital day 3 - continue BiPAP, was able to get off it to eat dinner, but still short of breath, complaining of having secretions that he is unable to cough in the evening. Added Acapella device and Mucinex  -Hospital day 4- he is better today, able to go longer periods of time without BiPAP. However, he is still short of breath. Sepsis is possible. Continue IV antibiotics. His lactate level was up to 6. Tachycardia -atrial flutter, new onset this admission  -Hospital day 1-heart rate was over 164 over half an hour. Transferred to ICU. Appears to have been in atrial flutter. Cardizem drip started then stopped. Now on IV digoxin.     Severe cardiomyopathy, noncompliant with follow-up, with acute on chronic systolic/diastolic heart failure  -Etiology of CHF is ischemic cardiomyopathy  -Does not appear to be the primary problem causing the shortness of breath  -Right heart cath was recently done at [facility] on  -results are available in  [address] day 3 -Lasix increased to 80 mg IV every 12 hours    Metabolic acidosis, with lactic acidosis  -Discussed with nephrology, bicarb drip started in the evening on hospital day 1. Hyperkalemia noted hospital day 1  -Lorrene [place] was ordered in the afternoon, but was unable to take it after being sedated with IV Ativan. Discussed with nephrology. Bicarb drip started    Acute kidney injury- appreciate nephrology consult    Popliteal DVT-diagnosed earlier this month -full dose Lovenox started hospital day 1. He was taking Xarelto prior to admission, which was recently started at [facility] a few weeks prior to admission. COPD with possible exacerbation  -Discussed with pulmonary   -Solu-Medrol dose increased to 40 mg IV every 6 hours on hospital day 1    Anxiety-he had Ativan 1 mg IV at 3 pm today    Chest lymphadenopathy-appreciate cardiothoracic consult, no biopsy while inpatient. Hyponatremia-monitor sodium level    Iron deficiency anemia  -IV iron 300 mg given hospital day 2, and again on hospital day 3    Hypertension    Code status - Limited code    Please refer to the rest of the notes in the chart        History of Present Illness:     I talked to him off BiPAP both in the morning and in the evening. In the morning he asked for \"it is my heart or my lungs? \"    In the evening he was having significant cramps in his hands and he wanted some medication for that.  1 dose clonazepam ordered. Objective: Intake/Output Summary (Last 24 hours) at 7/6/2020 1237  Last data filed at 7/6/2020 0840  Gross per 24 hour   Intake 3868 ml   Output 3375 ml   Net 493 ml      Vitals:   Vitals:    07/06/20 1132   BP: 134/88   Pulse: 114   Resp: 23   Temp:    SpO2: 98%     Physical Exam:      Physical Exam    Lungs-respiratory effort is labored at rest  Neurologic-speech clear  Skin-pale no obvious rash.       Medications:   Medications:    [START ON 7/7/2020] metoprolol succinate  100 mg Oral Daily    lisinopril  5 mg Oral Daily    furosemide  80 mg Intravenous Q12H    guaiFENesin  600 mg Oral BID    sodium chloride flush  10 mL Intravenous 2 times per day    methylPREDNISolone  40 mg Intravenous Q6H    enoxaparin  1 mg/kg Subcutaneous BID    cefepime  2 g Intravenous Q12H    vancomycin  1,500 mg Intravenous Q18H    aspirin  81 mg Oral Daily    atorvastatin  40 mg Oral Daily    ipratropium-albuterol  1 vial Inhalation 4x Daily    pantoprazole  40 mg Oral BID AC    ferrous sulfate  325 mg Oral BID WC      Infusions:    DOBUTamine 5 mcg/kg/min (07/06/20 0943)     PRN Meds: LORazepam, 0.5 mg, Q4H PRN  cloNIDine, 0.1 mg, Q4H PRN  sodium chloride flush, 10 mL, PRN  albuterol, 2.5 mg, Q6H PRN  acetaminophen, 650 mg, Q6H PRN    Or  acetaminophen, 650 mg, Q6H PRN  polyethylene glycol, 17 g, Daily PRN  promethazine, 12.5 mg, Q6H PRN    Or  ondansetron, 4 mg, Q6H PRN          Electronically signed by Vinicius Vera MD on 7/6/2020 at 12:37 PM

## 2020-07-06 NOTE — PROGRESS NOTES
Nephrology Progress Note        [address], [address] [facility], [place] [address]  Phone: [phone]  Office Hours: 8:30AM - 4:30PM  Monday - Friday       [facility]  Claudia Leyva MD  [address]  [address],  [place]  [place], [place] [address]  PHONE: [phone]  FAX: [phone]    7/6/2020 9:00 AM  Subjective:   Admit Date: 7/2/2020  PCP: No primary care provider on file. Interval History: on bipap  No labs today? Diet: DIET LOW SODIUM 2 GM;      Data:   Scheduled Meds:   [START ON 7/7/2020] metoprolol succinate  100 mg Oral Daily    lisinopril  5 mg Oral Daily    furosemide  80 mg Intravenous Q12H    guaiFENesin  600 mg Oral BID    sodium chloride flush  10 mL Intravenous 2 times per day    methylPREDNISolone  40 mg Intravenous Q6H    enoxaparin  1 mg/kg Subcutaneous BID    cefepime  2 g Intravenous Q12H    vancomycin  1,500 mg Intravenous Q18H    aspirin  81 mg Oral Daily    atorvastatin  40 mg Oral Daily    ipratropium-albuterol  1 vial Inhalation 4x Daily    pantoprazole  40 mg Oral BID AC    ferrous sulfate  325 mg Oral BID WC     Continuous Infusions:   DOBUTamine      [Held by provider] sodium bicarbonate infusion 50 mL/hr at 07/05/20 1148     PRN Meds:LORazepam, cloNIDine, sodium chloride flush, albuterol, acetaminophen **OR** acetaminophen, polyethylene glycol, promethazine **OR** ondansetron  I/O last 3 completed shifts:   In: 9177 [P.O.:465; I.V.:2843; IV Piggyback:550]  Out: 5571 [Urine:3675]  I/O this shift:  In: 10 [I.V.:10]  Out: -     Intake/Output Summary (Last 24 hours) at 7/6/2020 0900  Last data filed at 7/6/2020 0840  Gross per 24 hour   Intake 3868 ml   Output 3675 ml   Net 193 ml       CBC:   Recent Labs     07/03/20  1623   WBC 7.4   HGB 8.9*          BMP:    Recent Labs     07/03/20  1623 07/04/20  0350 07/05/20  0500   * 126* 123*   K 5.6* 4.9 4.9   CL 87* 92* 87*   CO2 15* 22 25   BUN 33* 40* 46*   CREATININE 1.4* 1.4* 1.4*   GLUCOSE 211* 201* 192*     Hepatic:   Recent Labs     07/04/20  0350 07/05/20  0500   AST 31 40*   ALT 20 20   BILITOT 1.8* 1.7*   ALKPHOS 114 99     Troponin: No results for input(s): TROPONINI in the last 72 hours. BNP: No results for input(s): BNP in the last 72 hours.   Lipids:   Recent Labs     07/03/20  1623   CHOL 96   HDL 23*     ABGs:   Lab Results   Component Value Date    PO2ART 97 07/06/2020    RIF6CYH 35.0 07/06/2020     INR:   Recent Labs     07/03/20  1623   INR 1.77       Objective:   Vitals: BP (!) 145/96   Pulse 122   Temp 97.6 °F (36.4 °C) (Oral)   Resp 17   Ht 5' 11\" (1.803 m)   Wt 206 lb 12.7 oz (93.8 kg)   SpO2 98%   BMI 28.84 kg/m²   General appearance: , in no acute distress  HEENT: normocephalic, atraumatic,   Neck: supple, trachea midline  Lungs:  breathing comfortably on bipap  Heart[de-identified] regular rate and rhythm, ,  Abdomen:distended,  Extremities: trace ble edema  Neurologic: awake     Assessment and Plan:     Patient Active Problem List:     Essential hypertension     Osteoarthritis     COPD (chronic obstructive pulmonary disease) ([facility])     Paresthesia of left leg     Abdominal hernia     Left shoulder pain     Crushing injury of right hand     Rotator cuff tendinitis     Midline low back pain without sciatica     History of MI (myocardial infarction)     Coronary artery disease involving coronary bypass graft of native heart without angina pectoris     Mixed hyperlipidemia     Depression     Chronic midline low back pain without sciatica     Tobacco abuse     Gastroesophageal reflux disease without esophagitis     Opiate abuse, continuous ([facility])     Heroin dependence ([facility])     ST elevation myocardial infarction involving left circumflex coronary artery ([facility])     STEMI (ST elevation myocardial infarction) ([facility])     Acute on chronic combined systolic (congestive) and diastolic (congestive) heart failure ([facility])     Systolic and diastolic CHF w/reduced LV

## 2020-07-06 NOTE — PROGRESS NOTES
CARDIOLOGY  NOTE        Name:  Theora [place] /Age/Sex: 1970  (52 y.o. male)   MRN & CSN:  [id number] & [id number] Admission Date/Time: 2020  4:19 AM   Location:  -A PCP: No primary care provider on file. Hospital Day: 5        PLAN FROM CARDIOLOGY FOR TODAY:   Taper dobutamine off      - cardiology consult is for:  CHF, A flutter    -  Interval history:  Still SOB,    · ASSESSMENT/ PLAN:  1. Tachy in SR, BB increased ,   2. CAD stable  3. HFrEF on dobutamine need tapered  4. Risk factor modification          Subjective: Todays complain: Patient  Has SOB    HPI:  Epi Palafox is a 52 y. o.year old who and presents with had concerns including Shortness of Breath (since yesterday, hx of CHF). Chief Complaint   Patient presents with    Shortness of Breath     since yesterday, hx of CHF           Objective: Temperature:  Current - Temp: 97.6 °F (36.4 °C);  Max - Temp  Av.8 °F (36.6 °C)  Min: 97.6 °F (36.4 °C)  Max: 98 °F (36.7 °C)    Respiratory Rate : Resp  Av.8  Min: 17  Max: 36    Pulse Range: Pulse  Av.5  Min: 110  Max: 123    Blood Presuure Range:  Systolic (18YMB), MQB:015 , Min:136 , JWW:286   ; Diastolic (91CAU), ZFZ:17, Min:88, Max:105      Pulse ox Range: SpO2  Av.8 %  Min: 94 %  Max: 100 %    24hr I & O:      Intake/Output Summary (Last 24 hours) at 2020 0841  Last data filed at 2020 0500  Gross per 24 hour   Intake 3858 ml   Output 3675 ml   Net 183 ml         BP (!) 145/96   Pulse 122   Temp 97.6 °F (36.4 °C) (Oral)   Resp 17   Ht 5' 11\" (1.803 m)   Wt 206 lb 12.7 oz (93.8 kg)   SpO2 98%   BMI 28.84 kg/m²           Review of Systems:  All 14 systems reviewed, all negative except for  sob      TELEMETRY: Sinus    has a past medical history of CAD (coronary artery disease), CHF (congestive heart failure) ([facility]), COPD (chronic obstructive pulmonary disease) ([facility] [address] Depression, Drug abuse ([facility] [address] HIGH CHOLESTEROL, Hypertension, MI (myocardial infarction) ([facility] [address] and S/P coronary artery bypass graft x 4.   has a past surgical history that includes Cardiac surgery (11/2010); Bypass Graft (04/10/2011); and Leg Surgery. Physical Exam:  General:  Awake, alert, NAD  Head:normal  Eye:normal  Neck:  No JVD   Chest:  Clear to auscultation, respiration easy  Cardiovascular:  RRR S1S2  Abdomen:   nontender  Extremities:  tr edema  Pulses; palpable  Neuro: grossly normal    Medications:    furosemide  80 mg Intravenous Q12H    metoprolol succinate  50 mg Oral Daily    guaiFENesin  600 mg Oral BID    sodium chloride flush  10 mL Intravenous 2 times per day    methylPREDNISolone  40 mg Intravenous Q6H    enoxaparin  1 mg/kg Subcutaneous BID    cefepime  2 g Intravenous Q12H    vancomycin  1,500 mg Intravenous Q18H    aspirin  81 mg Oral Daily    atorvastatin  40 mg Oral Daily    ipratropium-albuterol  1 vial Inhalation 4x Daily    pantoprazole  40 mg Oral BID AC    ferrous sulfate  325 mg Oral BID WC      [Held by provider] sodium bicarbonate infusion 50 mL/hr at 07/05/20 1148    DOBUTamine 5 mcg/kg/min (07/06/20 0702)     LORazepam, cloNIDine, sodium chloride flush, albuterol, acetaminophen **OR** acetaminophen, polyethylene glycol, promethazine **OR** ondansetron    Lab Data:  CBC:   Recent Labs     07/03/20  1623   WBC 7.4   HGB 8.9*   HCT 30.7*   MCV 66.0*        BMP:   Recent Labs     07/03/20  1623 07/04/20  0350 07/05/20  0500   * 126* 123*   K 5.6* 4.9 4.9   CL 87* 92* 87*   CO2 15* 22 25   BUN 33* 40* 46*   CREATININE 1.4* 1.4* 1.4*     LIVER PROFILE:   Recent Labs     07/04/20  0350 07/05/20  0500   AST 31 40*   ALT 20 20   BILIDIR 1.2* 1.1*   BILITOT 1.8* 1.7*   ALKPHOS 114 99     PT/INR:   Recent Labs     07/03/20  1623   PROTIME 21.6*   INR 1.77     APTT:   Recent Labs     07/03/20  1623   APTT 35.6     BNP:  No results for input(s): BNP in the last 72 hours.   TROPONIN: @TROPONINI:3@  Labs, consult, tests reviewed    Assessment/ PLAN:    As above                  Armida Messina MD 7/6/2020 8:41 AM

## 2020-07-07 LAB
ALBUMIN SERPL-MCNC: 4.4 GM/DL (ref 3.4–5)
ALP BLD-CCNC: 95 IU/L (ref 40–129)
ALT SERPL-CCNC: 26 U/L (ref 10–40)
ANION GAP SERPL CALCULATED.3IONS-SCNC: 12 MMOL/L (ref 4–16)
AST SERPL-CCNC: 39 IU/L (ref 15–37)
BILIRUB SERPL-MCNC: 1.6 MG/DL (ref 0–1)
BILIRUBIN DIRECT: 1 MG/DL (ref 0–0.3)
BILIRUBIN, INDIRECT: 0.6 MG/DL (ref 0–0.7)
BUN BLDV-MCNC: 43 MG/DL (ref 6–23)
CALCIUM SERPL-MCNC: 9.2 MG/DL (ref 8.3–10.6)
CHLORIDE BLD-SCNC: 86 MMOL/L (ref 99–110)
CO2: 28 MMOL/L (ref 21–32)
CREAT SERPL-MCNC: 1.3 MG/DL (ref 0.9–1.3)
GFR AFRICAN AMERICAN: >60 ML/MIN/1.73M2
GFR NON-AFRICAN AMERICAN: 59 ML/MIN/1.73M2
GLUCOSE BLD-MCNC: 306 MG/DL (ref 70–99)
HIGH SENSITIVE C-REACTIVE PROTEIN: 2 MG/L
MAGNESIUM: 2.7 MG/DL (ref 1.8–2.4)
POTASSIUM SERPL-SCNC: 4.1 MMOL/L (ref 3.5–5.1)
PRO-BNP: ABNORMAL PG/ML
PROCALCITONIN: 0.09
SODIUM BLD-SCNC: 126 MMOL/L (ref 135–145)
TOTAL PROTEIN: 7 GM/DL (ref 6.4–8.2)

## 2020-07-07 PROCEDURE — 6360000002 HC RX W HCPCS: Performed by: INTERNAL MEDICINE

## 2020-07-07 PROCEDURE — 6370000000 HC RX 637 (ALT 250 FOR IP): Performed by: INTERNAL MEDICINE

## 2020-07-07 PROCEDURE — 80053 COMPREHEN METABOLIC PANEL: CPT

## 2020-07-07 PROCEDURE — 6370000000 HC RX 637 (ALT 250 FOR IP): Performed by: HOSPITALIST

## 2020-07-07 PROCEDURE — 82248 BILIRUBIN DIRECT: CPT

## 2020-07-07 PROCEDURE — 86141 C-REACTIVE PROTEIN HS: CPT

## 2020-07-07 PROCEDURE — U0002 COVID-19 LAB TEST NON-CDC: HCPCS

## 2020-07-07 PROCEDURE — 2000000000 HC ICU R&B

## 2020-07-07 PROCEDURE — 2580000003 HC RX 258: Performed by: INTERNAL MEDICINE

## 2020-07-07 PROCEDURE — 94640 AIRWAY INHALATION TREATMENT: CPT

## 2020-07-07 PROCEDURE — 94660 CPAP INITIATION&MGMT: CPT

## 2020-07-07 PROCEDURE — 83880 ASSAY OF NATRIURETIC PEPTIDE: CPT

## 2020-07-07 PROCEDURE — 2700000000 HC OXYGEN THERAPY PER DAY

## 2020-07-07 PROCEDURE — 94761 N-INVAS EAR/PLS OXIMETRY MLT: CPT

## 2020-07-07 PROCEDURE — 84145 PROCALCITONIN (PCT): CPT

## 2020-07-07 PROCEDURE — 83735 ASSAY OF MAGNESIUM: CPT

## 2020-07-07 PROCEDURE — 99232 SBSQ HOSP IP/OBS MODERATE 35: CPT | Performed by: INTERNAL MEDICINE

## 2020-07-07 RX ORDER — METHYLPREDNISOLONE SODIUM SUCCINATE 40 MG/ML
40 INJECTION, POWDER, LYOPHILIZED, FOR SOLUTION INTRAMUSCULAR; INTRAVENOUS EVERY 12 HOURS
Status: DISCONTINUED | OUTPATIENT
Start: 2020-07-07 | End: 2020-07-08 | Stop reason: HOSPADM

## 2020-07-07 RX ADMIN — FUROSEMIDE 80 MG: 10 INJECTION, SOLUTION INTRAMUSCULAR; INTRAVENOUS at 11:36

## 2020-07-07 RX ADMIN — FERROUS SULFATE TAB 325 MG (65 MG ELEMENTAL FE) 325 MG: 325 (65 FE) TAB at 08:23

## 2020-07-07 RX ADMIN — VANCOMYCIN HYDROCHLORIDE 1500 MG: 5 INJECTION, POWDER, LYOPHILIZED, FOR SOLUTION INTRAVENOUS at 08:23

## 2020-07-07 RX ADMIN — DOBUTAMINE HYDROCHLORIDE 5 MCG/KG/MIN: 200 INJECTION INTRAVENOUS at 20:09

## 2020-07-07 RX ADMIN — IPRATROPIUM BROMIDE AND ALBUTEROL SULFATE 3 ML: .5; 3 SOLUTION RESPIRATORY (INHALATION) at 19:02

## 2020-07-07 RX ADMIN — DOBUTAMINE HYDROCHLORIDE 5 MCG/KG/MIN: 200 INJECTION INTRAVENOUS at 01:20

## 2020-07-07 RX ADMIN — SODIUM CHLORIDE, PRESERVATIVE FREE 10 ML: 5 INJECTION INTRAVENOUS at 20:08

## 2020-07-07 RX ADMIN — ALBUTEROL SULFATE 2.5 MG: 2.5 SOLUTION RESPIRATORY (INHALATION) at 04:37

## 2020-07-07 RX ADMIN — CEFEPIME HYDROCHLORIDE 2 G: 2 INJECTION, POWDER, FOR SOLUTION INTRAVENOUS at 20:08

## 2020-07-07 RX ADMIN — ASPIRIN 81 MG: 81 TABLET, COATED ORAL at 08:22

## 2020-07-07 RX ADMIN — PANTOPRAZOLE SODIUM 40 MG: 40 TABLET, DELAYED RELEASE ORAL at 08:23

## 2020-07-07 RX ADMIN — METHYLPREDNISOLONE SODIUM SUCCINATE 40 MG: 40 INJECTION, POWDER, FOR SOLUTION INTRAMUSCULAR; INTRAVENOUS at 01:13

## 2020-07-07 RX ADMIN — ENOXAPARIN SODIUM 90 MG: 100 INJECTION SUBCUTANEOUS at 20:07

## 2020-07-07 RX ADMIN — LISINOPRIL 5 MG: 5 TABLET ORAL at 08:23

## 2020-07-07 RX ADMIN — IPRATROPIUM BROMIDE AND ALBUTEROL SULFATE 3 ML: .5; 3 SOLUTION RESPIRATORY (INHALATION) at 10:38

## 2020-07-07 RX ADMIN — METOPROLOL SUCCINATE 100 MG: 50 TABLET, EXTENDED RELEASE ORAL at 08:22

## 2020-07-07 RX ADMIN — GUAIFENESIN 600 MG: 600 TABLET, EXTENDED RELEASE ORAL at 20:07

## 2020-07-07 RX ADMIN — PANTOPRAZOLE SODIUM 40 MG: 40 TABLET, DELAYED RELEASE ORAL at 16:01

## 2020-07-07 RX ADMIN — LORAZEPAM 0.5 MG: 2 INJECTION INTRAMUSCULAR; INTRAVENOUS at 20:21

## 2020-07-07 RX ADMIN — FERROUS SULFATE TAB 325 MG (65 MG ELEMENTAL FE) 325 MG: 325 (65 FE) TAB at 16:01

## 2020-07-07 RX ADMIN — SODIUM CHLORIDE, PRESERVATIVE FREE 10 ML: 5 INJECTION INTRAVENOUS at 11:37

## 2020-07-07 RX ADMIN — FUROSEMIDE 80 MG: 10 INJECTION, SOLUTION INTRAMUSCULAR; INTRAVENOUS at 00:30

## 2020-07-07 RX ADMIN — IPRATROPIUM BROMIDE AND ALBUTEROL SULFATE 3 ML: .5; 3 SOLUTION RESPIRATORY (INHALATION) at 07:43

## 2020-07-07 RX ADMIN — ENOXAPARIN SODIUM 90 MG: 100 INJECTION SUBCUTANEOUS at 08:24

## 2020-07-07 RX ADMIN — IPRATROPIUM BROMIDE AND ALBUTEROL SULFATE 3 ML: .5; 3 SOLUTION RESPIRATORY (INHALATION) at 15:09

## 2020-07-07 RX ADMIN — METHYLPREDNISOLONE SODIUM SUCCINATE 40 MG: 40 INJECTION, POWDER, FOR SOLUTION INTRAMUSCULAR; INTRAVENOUS at 12:01

## 2020-07-07 RX ADMIN — GUAIFENESIN 600 MG: 600 TABLET, EXTENDED RELEASE ORAL at 08:22

## 2020-07-07 RX ADMIN — CEFEPIME HYDROCHLORIDE 2 G: 2 INJECTION, POWDER, FOR SOLUTION INTRAVENOUS at 08:23

## 2020-07-07 RX ADMIN — SODIUM CHLORIDE, PRESERVATIVE FREE 10 ML: 5 INJECTION INTRAVENOUS at 08:24

## 2020-07-07 RX ADMIN — ATORVASTATIN CALCIUM 40 MG: 40 TABLET, FILM COATED ORAL at 08:23

## 2020-07-07 ASSESSMENT — PAIN SCALES - GENERAL
PAINLEVEL_OUTOF10: 0

## 2020-07-07 NOTE — PROGRESS NOTES
Nephrology Progress Note        [address], [address] [facility], [place] [address]  Phone: [phone]  Office Hours: 8:30AM - 4:30PM  Monday - Friday       [facility]  Lemuel Cat MD  [address], [address]  [address],  [place] [facility], [place] [address]  PHONE: [phone]  FAX: [phone]    7/7/2020 7:21 AM  Subjective:   Admit Date: 7/2/2020  PCP: No primary care provider on file. Interval History: good uop yesterday  BP is better    Diet: DIET LOW SODIUM 2 GM;      Data:   Scheduled Meds:   methylPREDNISolone  40 mg Intravenous Q12H    metoprolol succinate  100 mg Oral Daily    lisinopril  5 mg Oral Daily    furosemide  80 mg Intravenous Q12H    guaiFENesin  600 mg Oral BID    sodium chloride flush  10 mL Intravenous 2 times per day    enoxaparin  1 mg/kg Subcutaneous BID    cefepime  2 g Intravenous Q12H    vancomycin  1,500 mg Intravenous Q18H    aspirin  81 mg Oral Daily    atorvastatin  40 mg Oral Daily    ipratropium-albuterol  1 vial Inhalation 4x Daily    pantoprazole  40 mg Oral BID AC    ferrous sulfate  325 mg Oral BID WC     Continuous Infusions:   DOBUTamine 5 mcg/kg/min (07/07/20 0120)     PRN Meds:LORazepam, methocarbamol, cloNIDine, sodium chloride flush, albuterol, acetaminophen **OR** acetaminophen, polyethylene glycol, promethazine **OR** ondansetron  I/O last 3 completed shifts: In: 751.3 [I.V.:201.3; IV Piggyback:550]  Out: 6898 [Urine:3550]  No intake/output data recorded. Intake/Output Summary (Last 24 hours) at 7/7/2020 0721  Last data filed at 7/7/2020 0117  Gross per 24 hour   Intake 751.3 ml   Output 3550 ml   Net -2798.7 ml       CBC: No results for input(s): WBC, HGB, PLT in the last 72 hours.     BMP:    Recent Labs     07/05/20  0500 07/06/20  0906 07/07/20  0348   * 124* 126*   K 4.9 4.7 4.1   CL 87* 83* 86*   CO2 25 29 28   BUN 46* 48* 43*   CREATININE 1.4* 1.5* 1.3   GLUCOSE 192* 167* 306*     Hepatic: Recent Labs     07/05/20  0500 07/06/20  0906 07/07/20  0348   AST 40* 42* 39*   ALT 20 23 26   BILITOT 1.7* 1.6* 1.6*   ALKPHOS 99 92 95     Troponin: No results for input(s): TROPONINI in the last 72 hours. BNP: No results for input(s): BNP in the last 72 hours. Lipids: No results for input(s): CHOL, HDL in the last 72 hours. Invalid input(s): LDLCALCU  ABGs:   Lab Results   Component Value Date    PO2ART 97 07/06/2020    JJR5WJZ 35.0 07/06/2020     INR: No results for input(s): INR in the last 72 hours.     Objective:   Vitals: BP (!) 138/92   Pulse 104   Temp 98.4 °F (36.9 °C) (Oral)   Resp 18   Ht 5' 11\" (1.803 m)   Wt 206 lb 12.7 oz (93.8 kg)   SpO2 97%   BMI 28.84 kg/m²   General appearance:  in no acute distress  HEENT: normocephalic, atraumatic,   Neck: supple, trachea midline  Lungs:  breathing comfortably on bipap  Heart[de-identified] regular rate and rhythm,  Abdomen:non distended,   Extremities: ble edema is better      Assessment and Plan:     Patient Active Problem List:     Essential hypertension     Osteoarthritis     COPD (chronic obstructive pulmonary disease) ([facility])     Paresthesia of left leg     Abdominal hernia     Left shoulder pain     Crushing injury of right hand     Rotator cuff tendinitis     Midline low back pain without sciatica     History of MI (myocardial infarction)     Coronary artery disease involving coronary bypass graft of native heart without angina pectoris     Mixed hyperlipidemia     Depression     Chronic midline low back pain without sciatica     Tobacco abuse     Gastroesophageal reflux disease without esophagitis     Opiate abuse, continuous ([facility])     Heroin dependence ([facility])     ST elevation myocardial infarction involving left circumflex coronary artery ([facility])     STEMI (ST elevation myocardial infarction) ([facility])     Acute on chronic combined systolic (congestive) and diastolic (congestive) heart failure ([facility])     Systolic and diastolic CHF w/reduced LV function, NYHA class 4 ([facility])     NSTEMI (non-ST elevated myocardial infarction) ([facility] [address]     Acute on chronic congestive heart failure ([facility])     Noncompliance     Pulmonary edema, acute ([facility])     Acute kidney injury ([facility] [address]     Acute respiratory failure with hypoxia and hypercapnia ([facility])     Hypertensive emergency     Ischemic cardiomyopathy     Heart failure ([facility] [address]     Left ventricular systolic dysfunction     Acute systolic congestive heart failure ([facility])     SOB (shortness of breath)     Acute on chronic combined systolic and diastolic heart failure ([facility])     Hyponatremia     Hematemesis     Acute on chronic systolic CHF (congestive heart failure) ([facility])     JAIME  CKD3  hyponatremia  Metabolic acidosis  Acute on chronic systolic CHF  Acute hypoxic resp failure  HTN     Suggest;  Cr 1.3  Sodium level is better at 126  Continue iv diuresis  Avoid nephrotoxins  Will follow                           Electronically signed by Chavez Riley DO on 7/7/2020 at 7:21 AM    [address] Carol[address] Carmona MD  [address]  [address],  [place]  [place] [address]  PHONE: [phone]  FAX: [phone]

## 2020-07-07 NOTE — PROGRESS NOTES
CARDIOLOGY  NOTE        Name:  Jessie Bartholomew /Age/Sex: 1970  (52 y.o. male)   MRN & CSN:  [id number] & [id number] Admission Date/Time: 2020  4:19 AM   Location:  -A PCP: No primary care provider on file. Hospital Day: 6        PLAN FROM CARDIOLOGY FOR TODAY:   Off dobutamine      - cardiology consult is for:  CHF, tachy    -  Interval history:  In SR on BIPAP    · ASSESSMENT/ PLAN:  1. Tachy SR HR better  2. HFrEF  DC dobutamine  3. CAD stable  4. Risk factor modification          Subjective: Todays complain: Patient  Mild SOB    HPI:  Cara is a 52 y. o.year old who and presents with had concerns including Shortness of Breath (since yesterday, hx of CHF). Chief Complaint   Patient presents with    Shortness of Breath     since yesterday, hx of CHF           Objective: Temperature:  Current - Temp: 98.5 °F (36.9 °C);  Max - Temp  Av.1 °F (36.7 °C)  Min: 97.4 °F (36.3 °C)  Max: 98.7 °F (37.1 °C)    Respiratory Rate : Resp  Av.4  Min: 17  Max: 26    Pulse Range: Pulse  Av.4  Min: 99  Max: 118    Blood Presuure Range:  Systolic (94QSN), QNU:500 , Min:119 , UR   ; Diastolic (25XZZ), LOF:41, Min:72, Max:96      Pulse ox Range: SpO2  Av.2 %  Min: 95 %  Max: 100 %    24hr I & O:      Intake/Output Summary (Last 24 hours) at 2020 0957  Last data filed at 2020 0204  Gross per 24 hour   Intake 801.3 ml   Output 3925 ml   Net -3123.7 ml         BP (!) 134/94   Pulse 106   Temp 98.5 °F (36.9 °C) (Oral)   Resp 18   Ht 5' 11\" (1.803 m)   Wt 206 lb 12.7 oz (93.8 kg)   SpO2 98%   BMI 28.84 kg/m²           Review of Systems:  All 14 systems reviewed, all negative except for  SOB      TELEMETRY: Mirela    has a past medical history of CAD (coronary artery disease), CHF (congestive heart failure) ([facility]), COPD (chronic obstructive pulmonary disease) ([facility] [address] Depression, Drug abuse ([facility] [address] HIGH CHOLESTEROL, Hypertension, MI (myocardial infarction) ([facility] [address] and S/P coronary artery bypass graft x 4.   has a past surgical history that includes Cardiac surgery (11/2010); Bypass Graft (04/10/2011); and Leg Surgery. Physical Exam:  General:  Awake, alert, NAD  Head:normal  Eye:normal  Neck:  No JVD   Chest:  Clear to auscultation, respiration easy  Cardiovascular:  RRR S1S2  Abdomen:   nontender  Extremities:  tr edema  Pulses; palpable  Neuro: grossly normal    Medications:    methylPREDNISolone  40 mg Intravenous Q12H    metoprolol succinate  100 mg Oral Daily    lisinopril  5 mg Oral Daily    furosemide  80 mg Intravenous Q12H    guaiFENesin  600 mg Oral BID    sodium chloride flush  10 mL Intravenous 2 times per day    enoxaparin  1 mg/kg Subcutaneous BID    cefepime  2 g Intravenous Q12H    vancomycin  1,500 mg Intravenous Q18H    aspirin  81 mg Oral Daily    atorvastatin  40 mg Oral Daily    ipratropium-albuterol  1 vial Inhalation 4x Daily    pantoprazole  40 mg Oral BID AC    ferrous sulfate  325 mg Oral BID WC      DOBUTamine 5 mcg/kg/min (07/07/20 0120)     LORazepam, methocarbamol, cloNIDine, sodium chloride flush, albuterol, acetaminophen **OR** acetaminophen, polyethylene glycol, promethazine **OR** ondansetron    Lab Data:  CBC: No results for input(s): WBC, HGB, HCT, MCV, PLT in the last 72 hours. BMP:   Recent Labs     07/05/20  0500 07/06/20  0906 07/07/20  0348   * 124* 126*   K 4.9 4.7 4.1   CL 87* 83* 86*   CO2 25 29 28   BUN 46* 48* 43*   CREATININE 1.4* 1.5* 1.3     LIVER PROFILE:   Recent Labs     07/05/20  0500 07/06/20  0906 07/07/20  0348   AST 40* 42* 39*   ALT 20 23 26   BILIDIR 1.1* 1.0* 1.0*   BILITOT 1.7* 1.6* 1.6*   ALKPHOS 99 92 95     PT/INR: No results for input(s): PROTIME, INR in the last 72 hours. APTT: No results for input(s): APTT in the last 72 hours. BNP:  No results for input(s): BNP in the last 72 hours.   TROPONIN: @TROPONINI:3@  Labs, consult, tests reviewed    Assessment/ PLAN:    As above                  Jan Sanchez MD 7/7/2020 9:57 AM

## 2020-07-07 NOTE — PROGRESS NOTES
PHARMACY VANCOMYCIN MONITORING SERVICE     Tyra Emerson is a 52 y.o. male started on vancomycin for possible sepsis. Pharmacy consulted by Dr. Anastacio Elizabeth for monitoring and adjustment. Indication for treatment: Sepsis  Goal trough: 15 mcg/mL  Other antimicrobials: Cefepime     Pertinent Laboratory Values:   Temp Readings from Last 3 Encounters:   07/07/20 98.7 °F (37.1 °C) (Oral)   06/06/20 98.1 °F (36.7 °C) (Oral)   05/30/20 97.7 °F (36.5 °C) (Oral)     No results for input(s): WBC, LACTATE in the last 72 hours. Recent Labs     07/05/20  0500 07/06/20  0906 07/07/20  0348   BUN 46* 48* 43*   CREATININE 1.4* 1.5* 1.3     Estimated Creatinine Clearance: 80 mL/min (based on SCr of 1.3 mg/dL). Intake/Output Summary (Last 24 hours) at 7/7/2020 1526  Last data filed at 7/7/2020 1447  Gross per 24 hour   Intake 1861.3 ml   Output 4525 ml   Net -2663.7 ml       Pertinent Cultures:  Date                             Source                                     Results  7/3/2020                      Blood                                       No growth  7/3/2020                      MRSA nasal screen    - MSSA present  7/4                               Urine                                        Staph Epi  7/4                               Strep pneumo/legionella         Negative    Vancomycin level:   TROUGH:    Recent Labs     07/05/20  1751   VANCOTROUGH 7.4*     Assessment:   WBC WNL on 07/03 (no new lab drawn), pt remains afebrile   SCR stable; BUN slightly elevated   Due to CKD and HF with EF of 20%.  Suspect poor renal clearance of vancomycin  · Day(s) of therapy: 5  · Vancomycin level:   · 07/05: 7.4 (level drawn 25 hours post previous dose; 1,500 mg IV Q 18 H regimen)    Plan:  · Level sub-therapeutic, but drawn after 2 doses and drawn late due to patient refusing labs  · Continue vancomycin 1500 mg IVPB q24h  · Will recheck Vancomycin trough level once patient in steady state (true trough expected to be closer to goal)  · Pharmacy will continue to monitor patient and adjust therapy as indicated    REPEAT VANCOMYCIN TROUGH SCHEDULED FOR 07/08/2020 @ 00:30    Thank you for the consult,  Alva Schneider, PharmD, [facility]  7/7/2020 3:26 PM

## 2020-07-07 NOTE — PROGRESS NOTES
pulmonary      SUBJECTIVE: weak but stable     OBJECTIVE    VITALS:  BP (!) 138/92   Pulse 104   Temp 98.4 °F (36.9 °C) (Oral)   Resp 18   Ht 5' 11\" (1.803 m)   Wt 206 lb 12.7 oz (93.8 kg)   SpO2 97%   BMI 28.84 kg/m²   HEAD AND FACE EXAM:  No throat injection, no active exudate,no thrush  NECK EXAM;No JVD, no masses, symmetrical  CHEST EXAM; Expansion equal and symmetrical, no masses  LUNG EXAM; Good breath sounds bilaterally. There are expiratory wheezes both lungs, there are crackles at both lung bases  CARDIOVASCULAR EXAM: Positive S1 and S2, no S3 or S4, no clicks ,no murmurs  RIGHT AND LEFT LOWER EXTRIMITY EXAM: No edema, no swelling, no inflamation  CNS EXAM: Alert and oriented X3          LABS   Lab Results   Component Value Date    WBC 7.4 07/03/2020    HGB 8.9 (L) 07/03/2020    HCT 30.7 (L) 07/03/2020    MCV 66.0 (L) 07/03/2020     07/03/2020     Lab Results   Component Value Date    CREATININE 1.3 07/07/2020    BUN 43 (H) 07/07/2020     (L) 07/07/2020    K 4.1 07/07/2020    CL 86 (L) 07/07/2020    CO2 28 07/07/2020     Lab Results   Component Value Date    INR 1.77 07/03/2020    PROTIME 21.6 (H) 07/03/2020          Lab Results   Component Value Date    PHOS 4.7 05/30/2020    PHOS 3.4 03/12/2020    PHOS 4.1 02/12/2020        Recent Labs     07/06/20  0600   PH 7.51*   PO2ART 97   RQT2WBH 35.0   O2SAT 95.1*         Wt Readings from Last 3 Encounters:   07/04/20 206 lb 12.7 oz (93.8 kg)   06/06/20 206 lb (93.4 kg)   05/30/20 206 lb (93.4 kg)               ASSESMENT  Ac resp failure  Ac copd  chf        PLAN  1. Intake/output is minus 4 litres  2. Can dc bipap if tolerated.  Use nasal cannula instead    7/7/2020  Pete Fleming M.D.

## 2020-07-07 NOTE — PROGRESS NOTES
Hospitalist Progress Note      Name:  Pauline Weiss /Age/Sex: 1970  (52 y.o. male)   MRN & CSN:  [id number] & [id number] Admission Date/Time: 2020  4:19 AM   Location:  -A PCP: No primary care provider on file. Hospital Day: 6    Assessment and Plan:   Pauline Weiss is a 52 y.o.  male  who presents with:    Disposition-anticipate back home in a few days. He stated he was able to tolerate walking in the urena in the ICU today. Etiology of his respiratory failure may have been a combination of COPD exacerbation, CHF, and severe lactic acidosis which may have been due to poor perfusion due to severe cardiomyopathy. A large part of his critical illness may have been due to his severe cardiomyopathy. Strongly encouraged him to follow-up with cardiology and noncompliant with follow-up. Acute respiratory failure noted the day after admission  -Patient did not have much hypoxia, but has very labored respirations.  -Discussed with pulmonary, BiPAP started  -Hospital day 2-continue BiPAP 15/5, FiO2 28%. It appears that his labored respirations are related to significant lactic acidosis. Lactic acidosis could be possibly due to poor cardiac output sepsis is in the differential.  Other etiologies not ruled out. Being treated for COPD exacerbation, and CHF as well.  -Hospital day 3 - continue BiPAP, was able to get off it to eat dinner, but still short of breath, complaining of having secretions that he is unable to cough in the evening. Added Acapella device and Mucinex  -Hospital day 4- he is better today, able to go longer periods of time without BiPAP. However, he is still short of breath.  -Hospital day 5- he continues to improve today. 1 for even a longer period of time without BiPAP. Was able to sit up in chair. Dobutamine stopped today. Sepsis is possible. Continue IV antibiotics. His lactate level was up to 6. Will complete a 7-day course.       Tachycardia   [facility] day 1-heart rate was over 164 over half an hour. Transferred to ICU. Cardizem drip started then stopped.    -Hospital day 5-metoprolol dose increased    Severe cardiomyopathy, noncompliant with follow-up, with acute on chronic systolic/diastolic heart failure    -Does not appear to be the primary problem causing the shortness of breath  -Right heart cath was recently done at [facility] on June 17 -results are available in [address] day 3 -Lasix increased to 80 mg IV every 12 hours    Metabolic acidosis, with lactic acidosis  -Discussed with nephrology, bicarb drip started in the evening on hospital day 1. Is now off of it     Hyperkalemia noted hospital day 1  [facility] but was unable to take it after being sedated with IV Ativan. Discussed with nephrology. Bicarb drip started. Now off of it for several days    Acute kidney injury- appreciate nephrology consult  -Lisinopril restarted by nephrology as he is recovering    Popliteal DVT-diagnosed earlier this month -full dose Lovenox started hospital day 1. He was taking Xarelto prior to admission, which was recently started at [facility] a few weeks prior to admission. COPD with possible exacerbation  -Discussed with pulmonary   -Solu-Medrol dose increased to 40 mg IV every 6 hours on hospital day [address] day 5-now on Solu-Medrol 40 mg IV every 12 hours    Anxiety- breathing much better now, consider stopping Ativan    Chest lymphadenopathy-appreciate cardiothoracic consult, no biopsy while inpatient. Hyponatremia-monitor sodium level    Iron deficiency anemia  -IV iron 300 mg given hospital day 2, and again on hospital day 3    Hypertension  -Continue all meds, lisinopril restarted, better    Code status - Limited code    Please refer to the rest of the notes in the chart        History of Present Illness:     Is much better today, tolerated being off BiPAP for longer periods of time  Objective:        Intake/Output Summary (Last 24 hours) at 7/7/2020 1028  Last data filed at 7/7/2020 0924  Gross per 24 hour   Intake 801.3 ml   Output 3925 ml   Net -3123.7 ml      Vitals:   Vitals:    07/07/20 0822   BP: (!) 134/94   Pulse: 106   Resp:    Temp:    SpO2:      Physical Exam:      Physical Exam    Lungs-respiratory effort is much more comfortable    Neurologic-speech clear    Psychiatric-much less anxious      Medications:   Medications:    methylPREDNISolone  40 mg Intravenous Q12H    metoprolol succinate  100 mg Oral Daily    lisinopril  5 mg Oral Daily    furosemide  80 mg Intravenous Q12H    guaiFENesin  600 mg Oral BID    sodium chloride flush  10 mL Intravenous 2 times per day    enoxaparin  1 mg/kg Subcutaneous BID    cefepime  2 g Intravenous Q12H    vancomycin  1,500 mg Intravenous Q18H    aspirin  81 mg Oral Daily    atorvastatin  40 mg Oral Daily    ipratropium-albuterol  1 vial Inhalation 4x Daily    pantoprazole  40 mg Oral BID AC    ferrous sulfate  325 mg Oral BID WC      Infusions:    DOBUTamine 5 mcg/kg/min (07/07/20 0120)     PRN Meds: LORazepam, 0.5 mg, Q4H PRN  methocarbamol, 500 mg, 4x Daily PRN  cloNIDine, 0.1 mg, Q4H PRN  sodium chloride flush, 10 mL, PRN  albuterol, 2.5 mg, Q6H PRN  acetaminophen, 650 mg, Q6H PRN    Or  acetaminophen, 650 mg, Q6H PRN  polyethylene glycol, 17 g, Daily PRN  promethazine, 12.5 mg, Q6H PRN    Or  ondansetron, 4 mg, Q6H PRN          Electronically signed by Robert Timmons MD on 7/7/2020 at 10:28 AM

## 2020-07-08 VITALS
TEMPERATURE: 98 F | SYSTOLIC BLOOD PRESSURE: 124 MMHG | WEIGHT: 210.32 LBS | RESPIRATION RATE: 13 BRPM | OXYGEN SATURATION: 93 % | HEART RATE: 106 BPM | HEIGHT: 71 IN | DIASTOLIC BLOOD PRESSURE: 88 MMHG | BODY MASS INDEX: 29.44 KG/M2

## 2020-07-08 LAB
ALBUMIN SERPL-MCNC: 4.3 GM/DL (ref 3.4–5)
ALP BLD-CCNC: 90 IU/L (ref 40–129)
ALT SERPL-CCNC: 34 U/L (ref 10–40)
APTT: 36.2 SECONDS (ref 25.1–37.1)
AST SERPL-CCNC: 35 IU/L (ref 15–37)
BILIRUB SERPL-MCNC: 1.3 MG/DL (ref 0–1)
BILIRUBIN DIRECT: 0.9 MG/DL (ref 0–0.3)
BILIRUBIN, INDIRECT: 0.4 MG/DL (ref 0–0.7)
CULTURE: NORMAL
CULTURE: NORMAL
D DIMER: 226 NG/ML(DDU)
DOSE AMOUNT: NORMAL
DOSE TIME: NORMAL
FIBRINOGEN LEVEL: 160 MG/DL (ref 196.9–442.1)
HIGH SENSITIVE C-REACTIVE PROTEIN: 1.5 MG/L
INR BLD: 1.1 INDEX
Lab: NORMAL
Lab: NORMAL
PROCALCITONIN: 0.05
PROTHROMBIN TIME: 13.3 SECONDS (ref 11.7–14.5)
SARS-COV-2: NOT DETECTED
SOURCE: NORMAL
SPECIMEN: NORMAL
SPECIMEN: NORMAL
TOTAL PROTEIN: 6.8 GM/DL (ref 6.4–8.2)
VANCOMYCIN TROUGH: 16.7 UG/ML (ref 10–20)

## 2020-07-08 PROCEDURE — 85379 FIBRIN DEGRADATION QUANT: CPT

## 2020-07-08 PROCEDURE — 85730 THROMBOPLASTIN TIME PARTIAL: CPT

## 2020-07-08 PROCEDURE — 80202 ASSAY OF VANCOMYCIN: CPT

## 2020-07-08 PROCEDURE — 86141 C-REACTIVE PROTEIN HS: CPT

## 2020-07-08 PROCEDURE — 99232 SBSQ HOSP IP/OBS MODERATE 35: CPT | Performed by: INTERNAL MEDICINE

## 2020-07-08 PROCEDURE — 36592 COLLECT BLOOD FROM PICC: CPT

## 2020-07-08 PROCEDURE — 6370000000 HC RX 637 (ALT 250 FOR IP): Performed by: INTERNAL MEDICINE

## 2020-07-08 PROCEDURE — 94761 N-INVAS EAR/PLS OXIMETRY MLT: CPT

## 2020-07-08 PROCEDURE — 85610 PROTHROMBIN TIME: CPT

## 2020-07-08 PROCEDURE — 94640 AIRWAY INHALATION TREATMENT: CPT

## 2020-07-08 PROCEDURE — 6360000002 HC RX W HCPCS: Performed by: INTERNAL MEDICINE

## 2020-07-08 PROCEDURE — 6370000000 HC RX 637 (ALT 250 FOR IP): Performed by: HOSPITALIST

## 2020-07-08 PROCEDURE — 2580000003 HC RX 258: Performed by: INTERNAL MEDICINE

## 2020-07-08 PROCEDURE — 85384 FIBRINOGEN ACTIVITY: CPT

## 2020-07-08 PROCEDURE — 2700000000 HC OXYGEN THERAPY PER DAY

## 2020-07-08 PROCEDURE — 80076 HEPATIC FUNCTION PANEL: CPT

## 2020-07-08 PROCEDURE — 84145 PROCALCITONIN (PCT): CPT

## 2020-07-08 RX ORDER — BUMETANIDE 1 MG/1
2 TABLET ORAL 2 TIMES DAILY
Status: DISCONTINUED | OUTPATIENT
Start: 2020-07-08 | End: 2020-07-08 | Stop reason: HOSPADM

## 2020-07-08 RX ORDER — SPIRONOLACTONE 25 MG/1
25 TABLET ORAL DAILY
Status: DISCONTINUED | OUTPATIENT
Start: 2020-07-08 | End: 2020-07-08 | Stop reason: HOSPADM

## 2020-07-08 RX ORDER — ALBUTEROL SULFATE 90 UG/1
2 AEROSOL, METERED RESPIRATORY (INHALATION) 4 TIMES DAILY
Status: DISCONTINUED | OUTPATIENT
Start: 2020-07-08 | End: 2020-07-08 | Stop reason: HOSPADM

## 2020-07-08 RX ORDER — LISINOPRIL 2.5 MG/1
2.5 TABLET ORAL DAILY
Status: DISCONTINUED | OUTPATIENT
Start: 2020-07-08 | End: 2020-07-08 | Stop reason: HOSPADM

## 2020-07-08 RX ADMIN — VANCOMYCIN HYDROCHLORIDE 1500 MG: 5 INJECTION, POWDER, LYOPHILIZED, FOR SOLUTION INTRAVENOUS at 01:07

## 2020-07-08 RX ADMIN — IPRATROPIUM BROMIDE AND ALBUTEROL SULFATE 3 ML: .5; 3 SOLUTION RESPIRATORY (INHALATION) at 07:42

## 2020-07-08 RX ADMIN — METOPROLOL SUCCINATE 100 MG: 50 TABLET, EXTENDED RELEASE ORAL at 07:50

## 2020-07-08 RX ADMIN — FUROSEMIDE 80 MG: 10 INJECTION, SOLUTION INTRAMUSCULAR; INTRAVENOUS at 05:27

## 2020-07-08 RX ADMIN — SPIRONOLACTONE 25 MG: 25 TABLET ORAL at 07:50

## 2020-07-08 RX ADMIN — PANTOPRAZOLE SODIUM 40 MG: 40 TABLET, DELAYED RELEASE ORAL at 16:15

## 2020-07-08 RX ADMIN — METHYLPREDNISOLONE SODIUM SUCCINATE 40 MG: 40 INJECTION, POWDER, FOR SOLUTION INTRAMUSCULAR; INTRAVENOUS at 00:56

## 2020-07-08 RX ADMIN — ATORVASTATIN CALCIUM 40 MG: 40 TABLET, FILM COATED ORAL at 07:50

## 2020-07-08 RX ADMIN — GUAIFENESIN 600 MG: 600 TABLET, EXTENDED RELEASE ORAL at 07:50

## 2020-07-08 RX ADMIN — SODIUM CHLORIDE, PRESERVATIVE FREE 10 ML: 5 INJECTION INTRAVENOUS at 00:56

## 2020-07-08 RX ADMIN — FERROUS SULFATE TAB 325 MG (65 MG ELEMENTAL FE) 325 MG: 325 (65 FE) TAB at 16:15

## 2020-07-08 RX ADMIN — METHYLPREDNISOLONE SODIUM SUCCINATE 40 MG: 40 INJECTION, POWDER, FOR SOLUTION INTRAMUSCULAR; INTRAVENOUS at 12:26

## 2020-07-08 RX ADMIN — SODIUM CHLORIDE, PRESERVATIVE FREE 10 ML: 5 INJECTION INTRAVENOUS at 05:28

## 2020-07-08 RX ADMIN — ASPIRIN 81 MG: 81 TABLET, COATED ORAL at 07:50

## 2020-07-08 RX ADMIN — BUMETANIDE 2 MG: 1 TABLET ORAL at 07:51

## 2020-07-08 RX ADMIN — ENOXAPARIN SODIUM 90 MG: 100 INJECTION SUBCUTANEOUS at 08:14

## 2020-07-08 RX ADMIN — PANTOPRAZOLE SODIUM 40 MG: 40 TABLET, DELAYED RELEASE ORAL at 05:28

## 2020-07-08 RX ADMIN — FERROUS SULFATE TAB 325 MG (65 MG ELEMENTAL FE) 325 MG: 325 (65 FE) TAB at 07:50

## 2020-07-08 RX ADMIN — CEFEPIME HYDROCHLORIDE 2 G: 2 INJECTION, POWDER, FOR SOLUTION INTRAVENOUS at 07:51

## 2020-07-08 RX ADMIN — LISINOPRIL 2.5 MG: 2.5 TABLET ORAL at 07:50

## 2020-07-08 RX ADMIN — ALBUTEROL SULFATE 2.5 MG: 2.5 SOLUTION RESPIRATORY (INHALATION) at 06:06

## 2020-07-08 NOTE — PROGRESS NOTES
PHARMACY VANCOMYCIN MONITORING SERVICE     Porfirio Noble is a 52 y.o. male started on vancomycin for possible sepsis. Pharmacy consulted by Dr. Romain Larkin for monitoring and adjustment. Indication for treatment: Sepsis  Goal trough: 15 mcg/mL  Other antimicrobials: Cefepime     Pertinent Laboratory Values:   Temp Readings from Last 3 Encounters:   07/08/20 98 °F (36.7 °C)   06/06/20 98.1 °F (36.7 °C) (Oral)   05/30/20 97.7 °F (36.5 °C) (Oral)     No results for input(s): WBC, LACTATE in the last 72 hours. Recent Labs     07/06/20  0906 07/07/20  0348   BUN 48* 43*   CREATININE 1.5* 1.3     Estimated Creatinine Clearance: 81 mL/min (based on SCr of 1.3 mg/dL).     Intake/Output Summary (Last 24 hours) at 7/8/2020 1401  Last data filed at 7/8/2020 9168  Gross per 24 hour   Intake 990.25 ml   Output 3575 ml   Net -2584.75 ml       Pertinent Cultures:  Date                             Source                                     Results  7/3/2020                      Blood                                       No growth  7/3/2020                      MRSA nasal screen      - MSSA present  7/4                               Urine                                        Staph Epi  7/4                               Strep pneumo/legionella         Negative    Vancomycin level:   TROUGH:    Recent Labs     07/05/20  1751 07/08/20  0018   VANCOTROUGH 7.4* 16.7     Assessment:   WBC previously WNL, afebrile    JAIME, renal function stable, no new labs today   · Day(s) of therapy: 6  · Vancomycin level:   · 07/05: 7.4 (not a true trough)   · 07/08: 16.7, therapeutic     Plan:  · Continue vancomycin 1500 mg IVPB q18h  · Vancomycin to be completed 7/10 per Dr. Romain Larkin, will not order repeat levels at this time   · Pharmacy will continue to monitor patient and adjust therapy as indicated    Thank you for the consult,  Dangelo Sen, AdeliaD, [facility]  7/8/2020 2:01 PM

## 2020-07-08 NOTE — PROGRESS NOTES
Report given to April @ [facility] unit for possible transfer to  2009 d/t COVID R/O pending result of test done 7/7 but was cancelled since reason for test is not sure whether pt is COVID suspect or as requirement for planned mediastinoscopy per Dr Meir Tirado although held off since pt is not medically stable at this time.  Cande Beverly hospitalist & charge nurse Aminah Dash decided to cancel transfer till  reason for testing is clarified with Dr Tara Cho who ordered test

## 2020-07-08 NOTE — PROGRESS NOTES
pulmonary      SUBJECTIVE: he feels better     OBJECTIVE    VITALS:  BP (!) 134/92   Pulse 107   Temp 97.8 °F (36.6 °C) (Oral)   Resp 22   Ht 5' 11\" (1.803 m)   Wt 210 lb 5.1 oz (95.4 kg)   SpO2 97%   BMI 29.33 kg/m²   HEAD AND FACE EXAM:  No throat injection, no active exudate,no thrush  NECK EXAM;No JVD, no masses, symmetrical  CHEST EXAM; Expansion equal and symmetrical, no masses  LUNG EXAM; Good breath sounds bilaterally. There are expiratory wheezes both lungs, there are crackles at both lung bases  CARDIOVASCULAR EXAM: Positive S1 and S2, no S3 or S4, no clicks ,no murmurs  RIGHT AND LEFT LOWER EXTRIMITY EXAM: No edema, no swelling, no inflamation  CNS EXAM: Alert and oriented X3          LABS   Lab Results   Component Value Date    WBC 7.4 07/03/2020    HGB 8.9 (L) 07/03/2020    HCT 30.7 (L) 07/03/2020    MCV 66.0 (L) 07/03/2020     07/03/2020     Lab Results   Component Value Date    CREATININE 1.3 07/07/2020    BUN 43 (H) 07/07/2020     (L) 07/07/2020    K 4.1 07/07/2020    CL 86 (L) 07/07/2020    CO2 28 07/07/2020     Lab Results   Component Value Date    INR 1.77 07/03/2020    PROTIME 21.6 (H) 07/03/2020          Lab Results   Component Value Date    PHOS 4.7 05/30/2020    PHOS 3.4 03/12/2020    PHOS 4.1 02/12/2020        Recent Labs     07/06/20  0600   PH 7.51*   PO2ART 97   TPY5MRC 35.0   O2SAT 95.1*         Wt Readings from Last 3 Encounters:   07/08/20 210 lb 5.1 oz (95.4 kg)   06/06/20 206 lb (93.4 kg)   05/30/20 206 lb (93.4 kg)               ASSESMENT  Ac resp failure  Ac copd  chf        PLAN  1. cpm  2.  He can go to stepdown    7/8/2020  Sherri Benton M.D.

## 2020-07-08 NOTE — PLAN OF CARE
Care plan ongoing
Patient having respiratory distress, tachypnea, likely secondary to metabolic acidosis secondary to lactic acidosis. Patient has severe congestive heart failure, with poor forward flow, and fluid overload. Patient is on Cardizem drip, for possible flutter as reported by day team provider. We will continue management as is, patient rescinded CODE STATUS of DNR and would like to be full code at this point. Patient understands that he has all comorbidities and his prognosis is guarded, however states at this point we would like everything to be done. Patient is alert oriented x4, has good insight into his condition. CODE STATUS changed witnessed by nurse practitioner Awilda Marcus, and RN Buzz Durham. Discussed with daytime provider, possible etiology for lactic acidosis, he started broad-spectrum antibiotics, to cover possible infection, however patient denies any fevers, diarrhea, cough, skin lesions or boils. Will collect UA, and blood cultures, daytime provider also ordered CT abdomen pelvis, will follow up results. At this point will hold off on contrast as discussed with daytime provider, as patient has CKD, denies any abdominal pain, and has good perfusion to lower extremities. However with the severe poor EF, hypoperfusion throughout the body is a very likely possible and contributing to the lactic acidosis. Discussed with Dr. Miguel Frost, cardiologist who recommended starting dobutamine, discontinuing the Cardizem that was initially started for the tachyarrhythmia, and giving digoxin 250 mcg every 6 hours. Per cardiologist patient is very noncompliant, hopefully once stabilized patient to be counseled and  bivid AICD can be considered.
Problem: Infection:  Goal: Will remain free from infection  Description: Will remain free from infection  7/5/2020 0141 by Gunnar Renee RN  Outcome: Ongoing  7/4/2020 1146 by Adonis Garcia RN  Outcome: Ongoing     Problem: Safety:  Goal: Free from accidental physical injury  Description: Free from accidental physical injury  7/5/2020 0141 by Gunnar Renee RN  Outcome: Ongoing  7/4/2020 1146 by Adonis Garcia RN  Outcome: Ongoing  Goal: Free from intentional harm  Description: Free from intentional harm  7/5/2020 0141 by Gunnar Renee RN  Outcome: Ongoing  7/4/2020 1146 by Adonis Garcia RN  Outcome: Ongoing     Problem: Daily Care:  Goal: Daily care needs are met  Description: Daily care needs are met  7/5/2020 0141 by Gunnar Renee RN  Outcome: Ongoing  7/4/2020 1146 by Adonis Garcia RN  Outcome: Ongoing     Problem: Pain:  Goal: Patient's pain/discomfort is manageable  Description: Patient's pain/discomfort is manageable  7/5/2020 0141 by Gunnar Renee RN  Outcome: Ongoing  7/4/2020 1146 by Adonis Garcia RN  Outcome: Ongoing     Problem: Discharge Planning:  Goal: Patients continuum of care needs are met  Description: Patients continuum of care needs are met  7/5/2020 0141 by Gunnar Renee RN  Outcome: Ongoing  7/4/2020 1146 by Adonis Garcia RN  Outcome: Ongoing     Problem: Breathing Pattern - Ineffective:  Goal: Ability to achieve and maintain a regular respiratory rate will improve  Description: Ability to achieve and maintain a regular respiratory rate will improve  7/5/2020 0141 by Gunnar Renee RN  Outcome: Ongoing  7/4/2020 1146 by Adonis Garcia RN  Outcome: Ongoing     Problem: Falls - Risk of:  Goal: Will remain free from falls  Description: Will remain free from falls  7/5/2020 0141 by Gunnar Renee RN  Outcome: Ongoing  7/4/2020 1146 by Adonis Garcia RN  Outcome: Ongoing  Goal: Absence of physical injury  Description: Absence of physical injury  7/5/2020
Problem: Infection:  Goal: Will remain free from infection  Description: Will remain free from infection  Outcome: Ongoing     Problem: Safety:  Goal: Free from accidental physical injury  Description: Free from accidental physical injury  Outcome: Ongoing  Goal: Free from intentional harm  Description: Free from intentional harm  Outcome: Ongoing     Problem: Daily Care:  Goal: Daily care needs are met  Description: Daily care needs are met  Outcome: Ongoing     Problem: Pain:  Goal: Patient's pain/discomfort is manageable  Description: Patient's pain/discomfort is manageable  Outcome: Ongoing     Problem: Discharge Planning:  Goal: Patients continuum of care needs are met  Description: Patients continuum of care needs are met  Outcome: Ongoing     Problem: Breathing Pattern - Ineffective:  Goal: Ability to achieve and maintain a regular respiratory rate will improve  Description: Ability to achieve and maintain a regular respiratory rate will improve  Outcome: Ongoing
Problem: Infection:  Goal: Will remain free from infection  Description: Will remain free from infection  Outcome: Ongoing     Problem: Safety:  Goal: Free from accidental physical injury  Description: Free from accidental physical injury  Outcome: Ongoing  Goal: Free from intentional harm  Description: Free from intentional harm  Outcome: Ongoing     Problem: Daily Care:  Goal: Daily care needs are met  Description: Daily care needs are met  Outcome: Ongoing     Problem: Pain:  Goal: Patient's pain/discomfort is manageable  Description: Patient's pain/discomfort is manageable  Outcome: Ongoing     Problem: Discharge Planning:  Goal: Patients continuum of care needs are met  Description: Patients continuum of care needs are met  Outcome: Ongoing     Problem: Breathing Pattern - Ineffective:  Goal: Ability to achieve and maintain a regular respiratory rate will improve  Description: Ability to achieve and maintain a regular respiratory rate will improve  Outcome: Ongoing     Problem: Falls - Risk of:  Goal: Will remain free from falls  Description: Will remain free from falls  Outcome: Ongoing  Goal: Absence of physical injury  Description: Absence of physical injury  Outcome: Ongoing
RN  Outcome: Ongoing  7/7/2020 2110 by Abe Ruiz RN  Outcome: Ongoing  Goal: Absence of physical injury  Description: Absence of physical injury  7/8/2020 1003 by Aj Norris RN  Outcome: Ongoing  7/7/2020 2110 by Abe Ruiz RN  Outcome: Ongoing

## 2020-07-08 NOTE — PROGRESS NOTES
Nephrology Progress Note        [address], [address] [facility], [place] [address]  Phone: [phone]  Office Hours: 8:30AM - 4:30PM  Monday - Friday       [facility]  MD Rubia Velásquez, DO [address]  [place], [place] [address]  PHONE: [phone]  FAX: [phone]    7/8/2020 6:54 AM  Subjective:   Admit Date: 7/2/2020  PCP: No primary care provider on file. Interval History: SITTING IN BED  On NC  Good uop yesterday      Diet: DIET LOW SODIUM 2 GM;      Data:   Scheduled Meds:   bumetanide  2 mg Oral BID    methylPREDNISolone  40 mg Intravenous Q12H    metoprolol succinate  100 mg Oral Daily    lisinopril  5 mg Oral Daily    guaiFENesin  600 mg Oral BID    sodium chloride flush  10 mL Intravenous 2 times per day    enoxaparin  1 mg/kg Subcutaneous BID    cefepime  2 g Intravenous Q12H    vancomycin  1,500 mg Intravenous Q18H    aspirin  81 mg Oral Daily    atorvastatin  40 mg Oral Daily    ipratropium-albuterol  1 vial Inhalation 4x Daily    pantoprazole  40 mg Oral BID AC    ferrous sulfate  325 mg Oral BID WC     Continuous Infusions:   DOBUTamine 5 mcg/kg/min (07/07/20 2009)     PRN Meds:LORazepam, methocarbamol, cloNIDine, sodium chloride flush, albuterol, acetaminophen **OR** acetaminophen, polyethylene glycol, promethazine **OR** ondansetron  I/O last 3 completed shifts: In: 1780.3 [P.O.:950; I.V.:180.3; IV Piggyback:650]  Out: 4300 [Urine:4300]  I/O this shift:  In: 330 [P.O.:330]  Out: 1275 [Urine:1275]    Intake/Output Summary (Last 24 hours) at 7/8/2020 0654  Last data filed at 7/8/2020 0643  Gross per 24 hour   Intake 2110.25 ml   Output 4375 ml   Net -2264.75 ml       CBC: No results for input(s): WBC, HGB, PLT in the last 72 hours.     BMP:    Recent Labs     07/06/20  0906 07/07/20  0348   * 126*   K 4.7 4.1   CL 83* 86*   CO2 29 28   BUN 48* 43*   CREATININE 1.5* 1.3   GLUCOSE 167* 306*     Hepatic: Recent Labs     07/06/20  0906 07/07/20  0348   AST 42* 39*   ALT 23 26   BILITOT 1.6* 1.6*   ALKPHOS 92 95     Troponin: No results for input(s): TROPONINI in the last 72 hours. BNP: No results for input(s): BNP in the last 72 hours. Lipids: No results for input(s): CHOL, HDL in the last 72 hours. Invalid input(s): LDLCALCU  ABGs:   Lab Results   Component Value Date    PO2ART 97 07/06/2020    CDY3YVT 35.0 07/06/2020     INR: No results for input(s): INR in the last 72 hours.     Objective:   Vitals: BP (!) 134/92   Pulse 107   Temp 98 °F (36.7 °C) (Oral)   Resp 22   Ht 5' 11\" (1.803 m)   Wt 210 lb 5.1 oz (95.4 kg)   SpO2 97%   BMI 29.33 kg/m²   General appearance: alert and cooperative with exam, in no acute distress  HEENT: normocephalic, atraumatic,   Neck: supple, trachea midline  Lungs:  breathing comfortably on nc  Heart[de-identified] tachycardic  Abdomen: non distended,   Extremities: trace ble edema  Neurologic: alert, oriented, follows commands, interactive    Assessment and Plan:     Patient Active Problem List:     Essential hypertension     Osteoarthritis     COPD (chronic obstructive pulmonary disease) ([facility])     Paresthesia of left leg     Abdominal hernia     Left shoulder pain     Crushing injury of right hand     Rotator cuff tendinitis     Midline low back pain without sciatica     History of MI (myocardial infarction)     Coronary artery disease involving coronary bypass graft of native heart without angina pectoris     Mixed hyperlipidemia     Depression     Chronic midline low back pain without sciatica     Tobacco abuse     Gastroesophageal reflux disease without esophagitis     Opiate abuse, continuous ([facility])     Heroin dependence ([facility])     ST elevation myocardial infarction involving left circumflex coronary artery ([facility])     STEMI (ST elevation myocardial infarction) ([facility])     Acute on chronic combined systolic (congestive) and diastolic (congestive) heart failure ([address]     Systolic and diastolic CHF w/reduced LV function, NYHA class 4 ([facility])     NSTEMI (non-ST elevated myocardial infarction) ([facility])     Acute on chronic congestive heart failure ([facility])     Noncompliance     Pulmonary edema, acute ([facility])     Acute kidney injury ([facility] [address]     Acute respiratory failure with hypoxia and hypercapnia ([facility])     Hypertensive emergency     Ischemic cardiomyopathy     Heart failure ([facility] [address]     Left ventricular systolic dysfunction     Acute systolic congestive heart failure ([facility])     SOB (shortness of breath)     Acute on chronic combined systolic and diastolic heart failure ([facility])     Hyponatremia     Hematemesis     Acute on chronic systolic CHF (congestive heart failure) ([facility])     JAIME  CKD3  hyponatremia  Metabolic acidosis from chf exacerbation and decreased prefusion  Acute on chronic systolic CHF  Acute hypoxic resp failure  HTN     Suggest;  Labs pending today  Resume home dose of chf meds  Resume home dose of bumex 2mg po bid  Discussed avoiding nsaids that he takes daily due to pain  Avoid nephrotoxins  Will follow                           Electronically signed by Nusrat Thomas DO on 7/8/2020 at [facility] [address], [place]  [address] [place], [place] [address]  [place], [place] [address]  PHONE: [phone]  FAX: [phone]

## 2020-07-08 NOTE — PROGRESS NOTES
COVID test pending 7/6 for possible outpatient mediastinoscopy per RN. Will clarify in AM with physician due to Dr. Slcik Sheppard note on 7/4 of holding off on procedure at this time due to patients medical condition. Spoke with hospitalist, who states hold off on sending to [facility] unit until order is clarified as to why patient has been tested.

## 2020-07-08 NOTE — PROGRESS NOTES
CARDIOLOGY  NOTE        Name:  Monica Lacey /Age/Sex: 1970  (52 y.o. male)   MRN & CSN:  [id number] & [id number] Admission Date/Time: 2020  4:19 AM   Location:  -A PCP: No primary care provider on file. Hospital Day: 7        PLAN FROM CARDIOLOGY FOR TODAY:   To oral diuretics      - cardiology consult is for:  CHF/ tachy    -  Interval history:  Breathing better    · ASSESSMENT/ PLAN:  1.     HfrEF  To oral diuretics  2. A flutter in SR to NOAC  3. CAD stable  CPM  4. Risk factor modification  5. Ambulate  6. To floor          Subjective: Todays complain: Patient  Mild SOB    HPI:  Mahad Galloway is a 52 y. o.year old who and presents with had concerns including Shortness of Breath (since yesterday, hx of CHF). Chief Complaint   Patient presents with    Shortness of Breath     since yesterday, hx of CHF           Objective: Temperature:  Current - Temp: 97.8 °F (36.6 °C);  Max - Temp  Av.2 °F (36.8 °C)  Min: 97.8 °F (36.6 °C)  Max: 98.8 °F (37.1 °C)    Respiratory Rate : Resp  Av  Min: 16  Max: 22    Pulse Range: Pulse  Av.7  Min: 104  Max: 114    Blood Presuure Range:  Systolic (21UKR), PII:356 , Min:122 , QOA:809   ; Diastolic (41OLB), WBV:50, Min:77, Max:94      Pulse ox Range: SpO2  Av.3 %  Min: 97 %  Max: 100 %    24hr I & O:      Intake/Output Summary (Last 24 hours) at 2020 0815  Last data filed at 2020 6668  Gross per 24 hour   Intake 2110.25 ml   Output 4000 ml   Net -1889.75 ml         /77   Pulse 114   Temp 97.8 °F (36.6 °C)   Resp 21   Ht 5' 11\" (1.803 m)   Wt 210 lb 5.1 oz (95.4 kg)   SpO2 97%   BMI 29.33 kg/m²           Review of Systems:  All 14 systems reviewed, all negative       TELEMETRY: Sinus    has a past medical history of CAD (coronary artery disease), CHF (congestive heart failure) ([facility]), COPD (chronic obstructive pulmonary disease) ([facility] Depression, Drug abuse ([facility] [address] HIGH CHOLESTEROL, Hypertension, MI (myocardial infarction) ([facility] [address] and S/P coronary artery bypass graft x 4.   has a past surgical history that includes Cardiac surgery (11/2010); Bypass Graft (04/10/2011); and Leg Surgery. Physical Exam:  General:  Awake, alert, NAD  Head:normal  Eye:normal  Neck:  No JVD   Chest:  Clear to auscultation, respiration easy  Cardiovascular:  RRR S1S2  Abdomen:   nontender  Extremities:  no edema  Pulses; palpable  Neuro: grossly normal    Medications:    bumetanide  2 mg Oral BID    lisinopril  2.5 mg Oral Daily    spironolactone  25 mg Oral Daily    cefepime  2 g Intravenous Q12H    methylPREDNISolone  40 mg Intravenous Q12H    metoprolol succinate  100 mg Oral Daily    guaiFENesin  600 mg Oral BID    sodium chloride flush  10 mL Intravenous 2 times per day    enoxaparin  1 mg/kg Subcutaneous BID    vancomycin  1,500 mg Intravenous Q18H    aspirin  81 mg Oral Daily    atorvastatin  40 mg Oral Daily    ipratropium-albuterol  1 vial Inhalation 4x Daily    pantoprazole  40 mg Oral BID AC    ferrous sulfate  325 mg Oral BID WC      DOBUTamine 5 mcg/kg/min (07/07/20 2009)     LORazepam, methocarbamol, cloNIDine, sodium chloride flush, albuterol, acetaminophen **OR** acetaminophen, polyethylene glycol, promethazine **OR** ondansetron    Lab Data:  CBC: No results for input(s): WBC, HGB, HCT, MCV, PLT in the last 72 hours. BMP:   Recent Labs     07/06/20  0906 07/07/20  0348   * 126*   K 4.7 4.1   CL 83* 86*   CO2 29 28   BUN 48* 43*   CREATININE 1.5* 1.3     LIVER PROFILE:   Recent Labs     07/06/20  0906 07/07/20  0348 07/08/20  0415   AST 42* 39* 35   ALT 23 26 34   BILIDIR 1.0* 1.0* 0.9*   BILITOT 1.6* 1.6* 1.3*   ALKPHOS 92 95 90     PT/INR: No results for input(s): PROTIME, INR in the last 72 hours. APTT: No results for input(s): APTT in the last 72 hours. BNP:  No results for input(s): BNP in the last 72 hours.   TROPONIN: @TROPONINI:3@  Labs, consult, tests reviewed    Assessment/ PLAN:    As above                  Eunice Loyola MD 7/8/2020 8:15 AM

## 2020-07-08 NOTE — DISCHARGE SUMMARY
Waqar Bradley MD, [facility]   Internal Medicine Hospitalist  Discharge Summary  Patient bridgette Gonzalez  :  1970  MRN:  [id number]    Admit date:  2020  Discharge date:  2020    Admitting Physician:  Guillermina Tejada MD    Discharge Diagnoses:    Patient Active Problem List   Diagnosis    Essential hypertension    Osteoarthritis    COPD (chronic obstructive pulmonary disease) ([address]    Paresthesia of left leg    Abdominal hernia    Left shoulder pain    Crushing injury of right hand    Rotator cuff tendinitis    Midline low back pain without sciatica    History of MI (myocardial infarction)    Coronary artery disease involving coronary bypass graft of native heart without angina pectoris    Mixed hyperlipidemia    Depression    Chronic midline low back pain without sciatica    Tobacco abuse    Gastroesophageal reflux disease without esophagitis    Opiate abuse, continuous ([address]    Heroin dependence ([address]    ST elevation myocardial infarction involving left circumflex coronary artery ([address]    STEMI (ST elevation myocardial infarction) ([address]    Acute on chronic combined systolic (congestive) and diastolic (congestive) heart failure (HCC)    Systolic and diastolic CHF w/reduced LV function, NYHA class 4 ([address]    NSTEMI (non-ST elevated myocardial infarction) ([address]    Acute on chronic congestive heart failure ([address]    Noncompliance    Pulmonary edema, acute (HCC)    Acute kidney injury ([address]    Acute respiratory failure with hypoxia and hypercapnia ([address]    Hypertensive emergency    Ischemic cardiomyopathy    Heart failure ([address]    Left ventricular systolic dysfunction    Acute systolic congestive heart failure (HCC)    SOB (shortness of breath)    Acute on chronic combined systolic and diastolic heart failure (HCC)    Hyponatremia    Hematemesis    Acute on chronic systolic CHF (congestive heart failure) ([address]       Admission Condition:  fair    Discharged Condition:  Unknown as left ama. Hospital Course:     (copied from admission history)  The patient is a 52 y.o. male with significant past medical history of CAD, CHF, COPD, YEIMY presents as he has been feeling short of breath and feeling like he is filling up with fluid since yesterday. He couldn't check his wt as he scale was not with him. Denies any cough, dizziness, chest pain or fevers. Usually uses one pillow to sleep with but now has to sleep in semi upright position due to shorness of breath    7/8/20- I had seen the patient earlier and transferred to covid units as he had a covid test pending. He then wanted to sign out AMA. Explained risks and tried to have him stay in but he left. Hospital Course:  (copied from last soap)  Acute resp failure / COPD  Sepsis  Severe CMP / CHF  JAIME  Hyponatremia     Patient has been ordered a Covid 19 test yesterday and so he is being transferred to covid unit. He seems to be breathing much better. Pulmonary and cardiology on board. Patient seems improved overall. His BNP is still very high and continue to monitor. His last kidney functions show bun/cre 43/1.3, also being followed up with nephrology. Now on Bumex. Repeat labs today. Transfer to covid unit as covid suspect.        Consultants:  Cardiology  CT surgery  Pulmonary  Nephrology    Follow up appointment / plans: Needs to be seen within 7 days by his/her physician, patient to call for an appointment. On examination today  Heart is RRR, Lungs are CTA, abdomen is soft and non tender, CNS is grossly intact. Please see detailed consultation notes and radiology dictations as below. Vitals: Blood pressure 124/88, pulse 106, temperature 98 °F (36.7 °C), resp. rate 13, height 5' 11\" (1.803 m), weight 210 lb 5.1 oz (95.4 kg), SpO2 93 %. Significant Diagnostic Studies:   Blood work reviewed.    CBC with Differential:    Lab Results   Component Value Date    WBC 7.4 07/03/2020    RBC 4.65 07/03/2020 Past Medical History:    Past Medical History             Diagnosis Date    CAD (coronary artery disease)      [facility]      COPD (chronic obstructive pulmonary disease) [facility]      Depression      Drug [facility]      HIGH CHOLESTEROL      Hypertension      MI (myocardial infarction) ([facility] 2011    S/P coronary artery bypass graft x 4 2011     CABG x 4 Dr Earl Martinez            Past Surgical History:    Past Surgical History             Procedure Laterality Date    BYPASS GRAFT   04/10/2011     CABG x 4 Dr Ramon Brooke   11/2010      4 stents    LEG SURGERY                Medications:   Scheduled Meds:  Scheduled Medications    albuterol  2.5 mg Nebulization Once    aspirin  81 mg Oral Daily    atorvastatin  40 mg Oral Daily    ipratropium-albuterol  1 vial Inhalation 4x Daily    metoprolol succinate  25 mg Oral Daily    pantoprazole  40 mg Oral BID AC    spironolactone  25 mg Oral Daily    sodium chloride flush  10 mL Intravenous 2 times per day    enoxaparin  40 mg Subcutaneous Daily    furosemide  40 mg Intravenous Q12H    ferrous sulfate  325 mg Oral BID WC    methylPREDNISolone  40 mg Intravenous Q12H    midodrine  5 mg Oral TID WC         Continuous Infusions:  Infusions Meds        PRN Meds:. PRN Medications   albuterol, sodium chloride flush, acetaminophen **OR** acetaminophen, polyethylene glycol, promethazine **OR** ondansetron        Allergies:  Patient has no known allergies. Social History:   TOBACCO:   reports that he has been smoking cigarettes. He has a 20.00 pack-year smoking history. He has quit using smokeless tobacco.  ETOH:   reports no history of alcohol use.   OCCUPATION:       Family History:   Family History             Problem Relation Age of Onset    Cancer Father      Heart Failure Father      Heart Disease Father      Diabetes Mother      Heart Disease Mother              REVIEW OF SYSTEMS:  Negative except for dyspnea and weakness  EF was 20%     Physical Exam:    Vitals: /71   Pulse 115   Temp 98.3 °F (36.8 °C) (Oral)   Resp 18   Ht 5' 11\" (1.803 m)   Wt 207 lb 1.6 oz (93.9 kg)   SpO2 99%   BMI 28.88 kg/m²   General appearance: alert, appears stated age and cooperative  Skin: Skin color, texture, turgor normal. No rashes or lesions  HEENT: Head: Normocephalic, no lesions, without obvious abnormality. Neck: no adenopathy, no carotid bruit, no JVD, supple, symmetrical, trachea midline and thyroid not enlarged, symmetric, no tenderness/mass/nodules  Lungs: diminished breath sounds bilaterally and rhonchi bibasilar  Heart: S1, S2 normal  Abdomen: soft, non-tender; bowel sounds normal; no masses,  no organomegaly  Extremities: edema plus  Neurologic: Mental status: alertness: alert     CBC:       Recent Labs     07/02/20  0442   WBC 6.8   HGB 7.9*         BMP:        Recent Labs     07/02/20 0442   *   K 3.9   CL 94*   CO2 22   BUN 34*   CREATININE 1.7*   GLUCOSE 127*      Hepatic:       Recent Labs     07/02/20  0442   AST 24   ALT 12   BILITOT 1.2*   ALKPHOS 121      Troponin: No results for input(s): TROPONINI in the last 72 hours. BNP: No results for input(s): BNP in the last 72 hours. Lipids: No results for input(s): CHOL, HDL in the last 72 hours. Invalid input(s): LDLCALCU  ABGs:         Lab Results   Component Value Date     PO2ART 538 01/23/2020     YUT3WIH 37.0 01/23/2020      INR: No results for input(s): INR in the last 72 hours. -----------------------------------------------------------------        Assessment and Recommendations           Patient Active Problem List   Diagnosis Code    Essential hypertension I10    Osteoarthritis M19.90    [facility]) [facility] J44.9    Paresthesia of left leg R20.2    Abdominal hernia K46.9    Left shoulder pain M25.512    Crushing injury of right hand S67. 21XA    Rotator cuff tendinitis M75.80    Midline low back pain without sciatica M54.5    History of MI (myocardial infarction) I25.2    Coronary artery disease involving coronary bypass graft of native heart without angina pectoris I25.810    Mixed hyperlipidemia E78.2    Depression F32.9    Chronic midline low back pain without sciatica M54.5, G89.29    Tobacco abuse Z72.0    Gastroesophageal reflux disease without esophagitis K21.9    Opiate abuse, continuous ([facility]) F11.10    Heroin dependence ([facility]) F11.20    ST elevation myocardial infarction involving left circumflex coronary artery ([facility]) I21.21    STEMI (ST elevation myocardial infarction) ([facility]) I21.3    Acute on chronic combined systolic (congestive) and diastolic (congestive) heart failure ([facility]) K10.14    Systolic and diastolic CHF w/reduced LV function, NYHA class 4 ([facility]) I50.40    NSTEMI (non-ST elevated myocardial infarction) ([facility]) I21.4    Acute on chronic congestive heart failure ([facility]) I50.9    Noncompliance Z91.19    Pulmonary edema, acute ([facility]) J81.0    Acute kidney injury ([facility] N17.9    Acute respiratory failure with hypoxia and hypercapnia ([facility]) J96.01, J96.02    Hypertensive emergency I16.1    Ischemic cardiomyopathy I25.5    Heart failure ([facility]) I50.9    Left ventricular systolic dysfunction H52.4    Acute systolic congestive heart failure ([facility]) I50.21    SOB (shortness of breath) R06.02    Acute on chronic combined systolic and diastolic heart failure ([facility]) I50.43    Hyponatremia E87.1    Hematemesis K92.0    Acute on chronic systolic CHF (congestive heart failure) ([facility]) I50.23   IMP  JAIME on CKD3- cardiorenal syndrome  Combined systolic diastolic CHF- EF 20  Fluid overload  Hyponatremia from fluid overload  CKD3  Proteinuria  Orthostatic hypotension  Suggest  Diuresis with loop to resume  Cont BP support with midodrine  quantitative proteinuria and UPEP  Correct iron def- benefit from IV iron as he is thoroughly depleted         MD Sadiq Diamond Moises Simmons MD   Physician   Pulmonology   Consults   Signed   Date of Service:  7/3/2020 11:41 AM            Consult Orders   Inpatient consult to Pulmonology [[id number]] ordered by Aj Mathias MD at 07/02/20 1640          Signed        Expand All Collapse All     Show:Clear all  [x]Manual[x]Template[x]Copied    Added by:  [x]Warren Simmons MD    []Hover for details     Subjective:   CHIEF COMPLAINT / HPI:  52year old male admitted with sob and this is accompanied by wheeze. He has cough which he attributes to his smoking. He denies any fever or chest pain or hemoptysis.         Past Medical History:  Past Medical History        Past Medical History:   Diagnosis Date    CAD (coronary artery disease)      [facility]      [facility]ch[facility]) [facility]      Depression      Drug abuse [facility]      HIGH CHOLESTEROL      Hypertension      MI (myocardial infarction) ([facility] 2011    S/P coronary artery bypass graft x 4 2011     CABG x 4 Dr Leslye Espinoza            Past Surgical History:    Past Surgical History             Procedure Laterality Date    BYPASS GRAFT   04/10/2011     CABG x 4 Dr Geeta Gilbert   11/2010      4 stents    LEG SURGERY                Current Medications:      Current Hospital Medications   Current Facility-Administered Medications: albuterol (PROVENTIL) nebulizer solution 2.5 mg, 2.5 mg, Nebulization, Once  albuterol (PROVENTIL) nebulizer solution 2.5 mg, 2.5 mg, Nebulization, Q6H PRN  aspirin EC tablet 81 mg, 81 mg, Oral, Daily  atorvastatin (LIPITOR) tablet 40 mg, 40 mg, Oral, Daily  ipratropium-albuterol (DUONEB) nebulizer solution 3 mL, 1 vial, Inhalation, 4x Daily  metoprolol succinate (TOPROL XL) extended release tablet 25 mg, 25 mg, Oral, Daily  pantoprazole (PROTONIX) tablet 40 mg, 40 mg, Oral, BID AC  spironolactone (ALDACTONE) tablet 25 mg, 25 mg, Oral, Daily  sodium chloride flush 0.9 % injection 10 mL, 10 mL, Intravenous, 2 times per day  sodium chloride flush 0.9 % injection 10 mL, 10 mL, Intravenous, PRN  acetaminophen (TYLENOL) tablet 650 mg, 650 mg, Oral, Q6H PRN **OR** acetaminophen (TYLENOL) suppository 650 mg, 650 mg, Rectal, Q6H PRN  polyethylene glycol (GLYCOLAX) packet 17 g, 17 g, Oral, Daily PRN  promethazine (PHENERGAN) tablet 12.5 mg, 12.5 mg, Oral, Q6H PRN **OR** ondansetron (ZOFRAN) injection 4 mg, 4 mg, Intravenous, Q6H PRN  enoxaparin (LOVENOX) injection 40 mg, 40 mg, Subcutaneous, Daily  furosemide (LASIX) injection 40 mg, 40 mg, Intravenous, Q12H  ferrous sulfate (IRON 325) tablet 325 mg, 325 mg, Oral, BID WC  methylPREDNISolone sodium (SOLU-MEDROL) injection 40 mg, 40 mg, Intravenous, Q12H  midodrine (PROAMATINE) tablet 5 mg, 5 mg, Oral, TID WC        No Known Allergies     Social History:    Social History   Social History            Socioeconomic History    Marital status:        Spouse name: None    Number of children: None    Years of education: None    Highest education level: None   Occupational History    None   Social Needs    Financial resource strain: None    Food insecurity       Worry: None       Inability: None    Transportation needs       Medical: None       Non-medical: None   Tobacco Use    Smoking status: Current Every Day Smoker       Packs/day: 1.00       Years: 20.00       Pack years: 20.00       Types: Cigarettes    Smokeless tobacco: Former User    Tobacco comment: Reviewed 10/9/17   Substance and Sexual Activity    Alcohol use: No       Alcohol/week: 0.0 standard drinks    Drug use: No       Comment: march 2018 states he quit    Sexual activity: Not Currently   Lifestyle    Physical activity       Days per week: None       Minutes per session: None    Stress: None   Relationships    Social connections       Talks on phone: None       Gets together: None       Attends [employer] service: None       Active member of club or organization: None       Attends meetings of clubs or organizations: None       Relationship status: None    Intimate partner violence       Fear of current or ex partner: None       Emotionally abused: None       Physically abused: None       Forced sexual activity: None   Other Topics Concern    None   Social History Narrative    None            Family History:   Family History         Family History   Problem Relation Age of Onset    Cancer Father      Heart Failure Father      Heart Disease Father      Diabetes Mother      Heart Disease Mother              Immunization:       Immunization History   Administered Date(s) Administered    Influenza Virus Vaccine 03/11/2016    Influenza, Quadv, 6 mo and older, IM, PF (Flulaval, Fluarix) 09/15/2018    Influenza, Quadv, IM, PF (6 mo and older Fluzone, Flulaval, Fluarix, and 3 yrs and older Afluria) 12/29/2016, 12/31/2017, 02/11/2020    Pneumococcal Conjugate 13-valent ([id number]) 12/11/2018    Pneumococcal Polysaccharide ([id number]) 03/11/2016            REVIEW OF SYSTEMS:    CONSTITUTIONAL:  negative for fevers, chills, diaphoresis, activity change, appetite change, fatigue, night sweats and unexpected weight change.    EYES:  negative for blurred vision, eye discharge, visual disturbance and icterus  HEENT:  negative for hearing loss, tinnitus, ear drainage, sinus pressure, nasal congestion, epistaxis and snoring  RESPIRATORY:  See HPI  CARDIOVASCULAR:  negative for chest pain, palpitations, exertional chest pressure/discomfort, edema, syncope  GASTROINTESTINAL:  negative for nausea, vomiting, diarrhea, constipation, blood in stool and abdominal pain  GENITOURINARY:  negative for frequency, dysuria and hematuria  HEMATOLOGIC/LYMPHATIC:  negative for easy bruising, bleeding and lymphadenopathy  ALLERGIC/IMMUNOLOGIC:  negative for recurrent infections, angioedema, anaphylaxis and drug reactions  ENDOCRINE:  negative for weight changes and diabetic symptoms including polyuria, polydipsia and polyphagia     MUSCULOSKELETAL:  negative for  pain, joint swelling, decreased range of motion and muscle weakness  NEUROLOGICAL:  negative for headaches, slurred speech, unilateral weakness  PSYCHIATRIC/BEHAVIORAL: negative for hallucinations, behavioral problems, confusion and agitation. Objective:   PHYSICAL EXAM:       VITALS:    Vitals          Vitals:     07/03/20 0726 07/03/20 0832 07/03/20 1010 07/03/20 1101   BP:   116/71       Pulse:   113 115     Resp: 20 20 18   Temp:   98.3 °F (36.8 °C)       TempSrc:   Oral       SpO2: 97% 96% 99% 99%   Weight:   207 lb 1.6 oz (93.9 kg)       Height:                     CONSTITUTIONAL:  awake, alert, cooperative, no apparent distress, and appears stated age  NECK:  Supple, symmetrical, trachea midline, no adenopathy, thyroid symmetric, not enlarged and no tenderness  CHEST: Chest expansion equal and symmetrical, no intercostal retraction. LUNGS:  no increased work of breathing, has expiratory wheezes both lungs, no crackles. CARDIOVASCULAR: S1 and S2, no edema and no JVD  ABDOMEN:  normal bowel sounds, non-distended and no masses palpated, and no tenderness to palpation. No hepatospleenomegaly  LYMPHADENOPATHY:  no axillary or supraclavicular adenopathy. No cervical adnenopathy  PSYCHIATRIC: Oriented to person place and time. No obvious depression or anxiety. MUSCULOSKELETAL: No obvious misalignment or effusion of the joints. No clubbing, cyanosis of the digits. RIGHT AND LEFT LOWER EXTREMITIES: No edema, no inflammation, no tenderness. SKIN:  normal skin color, texture, turgor and no redness, warmth, or swelling. No palpable nodules     DATA:                       EXAMINATION:   CT OF THE CHEST WITHOUT CONTRAST 7/3/2020 9:39 am       TECHNIQUE:   CT of the chest was performed without the administration of intravenous   contrast. Multiplanar reformatted images are provided for review.  Dose   modulation, iterative reconstruction, and/or weight based adjustment of the   mA/kV was utilized to reduce the radiation dose to as low as reasonably   achievable. COMPARISON:   05/17/2020       HISTORY:   ORDERING SYSTEM PROVIDED HISTORY: Follow up adenopathy   TECHNOLOGIST PROVIDED HISTORY:   Reason for exam:->Follow up adenopathy   Reason for Exam: Follow up adenopathy   Acuity: Acute   Type of Exam: Initial   Additional signs and symptoms: pt c/o sob       FINDINGS:   Mediastinum: Mediastinal adenopathy is again demonstrated, grossly similar in   distribution to prior. For example, subcarinal lymph node measures   approximately 1.8 cm in short axis, precarinal lymph node is approximately   1.5 cm in short axis, and right paratracheal lymph node is approximately 3.1   x 2.5 cm. Calcification of the thoracic aorta. Coronary artery   calcification. Hilar evaluation is limited given lack of contrast.  Thyroid   is symmetric. No axillary adenopathy. Shotty axillary lymph nodes are again   seen bilaterally. Status post median sternotomy. Lungs/pleura: Loculated pleural fluid is again demonstrated within the right   major fissure inferiorly, similar to prior. Small amount of loculated fluid   is also demonstrated about the periphery of the inferior right hemithorax. Calcified left lower lobe pulmonary nodules, in keeping with granulomatous   disease. Unchanged 3 mm anterior left apical noncalcified pulmonary nodule. Scattered bandlike opacity and ground-glass opacity at the lung bases, likely   atelectasis. 4 mm lingular pulmonary nodule is not significantly changed. Punctate nodule along the plane of the horizontal fissure is unchanged. No   pneumothorax. Upper Abdomen: Small amount of free fluid about the margin of the liver. Calcified granulomas within the liver and spleen. 1.2 cm short axis left   periaortic lymph node is similar to prior. Calcification of aorta. Soft Tissues/Bones: Degenerative change of the spine. Impression   No significant interval change in mediastinal adenopathy as compared to prior   examination for which sarcoid, Castleman's disease, lymphoma, or metastatic   disease are some differential considerations. No significant interval change in small loculated right basilar pleural   effusion as compared to prior, sterility indeterminate. Scattered subcentimeter pulmonary nodules are not significantly changed. Atherosclerosis, including coronary artery calcification. Assessment:      1. Ac copd  2. chf  3. Small right pl effusion  4. Mediastinal lymphadenopathy  5. Lung nodules              Plan:      1. D/w pt  2. He sqaw dr Guerita Carrasco and he scheduled him for mediastinoscopy but pt refused at last minute. He would like to have it done again. I will consult dr Guerita Carrasco  3. Adequate o2 adm  4. Home o2 eval before dc  5. Bd rx  6. Iv steroids  7. Full pft as outpt  8. Advised to quit smoking and help offered  9. Thanks will follow                    Marlon Manuel MD   Physician   Cardiothoracic Surgery   Consults   Signed   Date of Service:  7/3/2020  1:29 PM            Consult Orders   Inpatient consult to Cardiothoracic Surgery [[id number]] ordered by Sony Campbell MD at 07/03/20 1148          Signed        Expand All Collapse All     Show:Clear all  [x]Manual[x]Template[]Copied    Added by:  Araceli Ordonez MD    [][facility] for details  Department of Cardiovascular & Thoracic Surgery   Consult Note           Reason for Consult: Mediastinal lymphadenopathy  Requesting Physician:  Brant Lancaster MD          Date of Consult: 07/03/20     Chief Complaint: Significant shortness of breath     History Obtained From:  patient, electronic medical record     HISTORY OF PRESENT ILLNESS:     The patient is a 52 y.o. male who presents with shortness of breath. The patient is well-known to me. I saw him at his last admission.   He has had longstanding mediastinal lymphadenopathy that oncology has asked to be biopsied to rule out malignancy versus sarcoid versus inflammatory lymphadenopathy. He has a history of cardiomyopathy with an ejection fraction approximately 20%. He has had multiple episodes of admission due to heart failure. At his last admission, we had scheduled him and brought him down to the preoperative area to perform the mediastinoscopy. The patient canceled because he felt like he did not want to proceed. New consultation today because the patient has decided he wants to proceed now. He is on nasal cannula oxygen but complaining of significant shortness of breath. He has no chest pains. The patient does have a longstanding smoking history.      Past Medical History:    Past Medical History             Diagnosis Date    CAD (coronary artery disease)      [facility]      COPD [facility]chronic obstructive [facility] disease) [facility]      Depression      [facility]      HIGH CHOLESTEROL      Hypertension      MI (myocardial infarction) ([facility] 2011    S/P coronary artery bypass graft x 4 2011     CABG x 4 Dr Elsie Cooper         Past Surgical History:    Past Surgical History             Procedure Laterality Date    BYPASS GRAFT   04/10/2011     CABG x 4 Dr Jerman Payne   11/2010      4 stents    LEG SURGERY             Current Medications:     Current Hospital Medications   Current Facility-Administered Medications: albuterol (PROVENTIL) nebulizer solution 2.5 mg, 2.5 mg, Nebulization, Once  albuterol (PROVENTIL) nebulizer solution 2.5 mg, 2.5 mg, Nebulization, Q6H PRN  methylPREDNISolone sodium (SOLU-MEDROL) injection 40 mg, 40 mg, Intravenous, Q6H  methylPREDNISolone sodium (SOLU-MEDROL) injection 125 mg, 125 mg, Intravenous, Once  [START ON 7/4/2020] iron sucrose (VENOFER) 300 mg in sodium chloride 0.9 % 250 mL IVPB, 300 mg, Intravenous, Once  enoxaparin (LOVENOX) injection 90 mg, 1 mg/kg, Subcutaneous, BID  aspirin EC tablet 81 mg, 81 mg, Oral, Daily  atorvastatin (LIPITOR) tablet 40 mg, 40 mg, Oral, Daily  ipratropium-albuterol (DUONEB) nebulizer solution 3 mL, 1 vial, Inhalation, 4x Daily  metoprolol succinate (TOPROL XL) extended release tablet 25 mg, 25 mg, Oral, Daily  pantoprazole (PROTONIX) tablet 40 mg, 40 mg, Oral, BID AC  spironolactone (ALDACTONE) tablet 25 mg, 25 mg, Oral, Daily  sodium chloride flush 0.9 % injection 10 mL, 10 mL, Intravenous, 2 times per day  sodium chloride flush 0.9 % injection 10 mL, 10 mL, Intravenous, PRN  acetaminophen (TYLENOL) tablet 650 mg, 650 mg, Oral, Q6H PRN **OR** acetaminophen (TYLENOL) suppository 650 mg, 650 mg, Rectal, Q6H PRN  polyethylene glycol (GLYCOLAX) packet 17 g, 17 g, Oral, Daily PRN  promethazine (PHENERGAN) tablet 12.5 mg, 12.5 mg, Oral, Q6H PRN **OR** ondansetron (ZOFRAN) injection 4 mg, 4 mg, Intravenous, Q6H PRN  furosemide (LASIX) injection 40 mg, 40 mg, Intravenous, Q12H  ferrous sulfate (IRON 325) tablet 325 mg, 325 mg, Oral, BID WC  midodrine (PROAMATINE) tablet 5 mg, 5 mg, Oral, TID WC     Allergies:  Patient has no known allergies.      Social History:   Social History               Socioeconomic History    Marital status:        Spouse name: Not on file    Number of children: Not on file    Years of education: Not on file    Highest education level: Not on file   Occupational History    Not on file   Social Needs    Financial resource strain: Not on file    Food insecurity       Worry: Not on file       Inability: Not on file    Transportation needs       Medical: Not on file       Non-medical: Not on file   Tobacco Use    Smoking status: Current Every Day Smoker       Packs/day: 1.00       Years: 20.00       Pack years: 20.00       Types: Cigarettes    Smokeless tobacco: Former User    Tobacco comment: Reviewed 10/9/17   Substance and Sexual Activity    Alcohol use: No       Alcohol/week: 0.0 standard drinks    Drug use: No       Comment: march 2018 states he quit    Sexual activity: Not Currently   Lifestyle    Physical activity       Days per week: Not on file       Minutes per session: Not on file    Stress: Not on file   Relationships    Social connections       Talks on phone: Not on file       Gets together: Not on file       Attends Orthodoxy service: Not on file       Active member of club or organization: Not on file       Attends meetings of clubs or organizations: Not on file       Relationship status: Not on file    Intimate partner violence       Fear of current or ex partner: Not on file       Emotionally abused: Not on file       Physically abused: Not on file       Forced sexual activity: Not on file   Other Topics Concern    Not on file   Social History Narrative    Not on file         Family History:    Family History         Family History   Problem Relation Age of Onset    Cancer Father      Heart Failure Father      Heart Disease Father      Diabetes Mother      Heart Disease Mother              REVIEW OF SYSTEMS:   CONSTITUTIONAL:  Neg. EYES:  Neg. EARS:  Neg     NOSE:  Neg.    MOUTH/THROAT:  Neg.    RESPIRATORY:   Neg. CARDIOVASCULAR   Neg. GASTROINTESTINAL:  Neg. GENITOURINARY:  Neg.    HEMATOLOGIC/LYMPHATIC:  Neg.    MUSCULOSKELETAL:  Neg. NEUROLOGICAL:  Neg. SKIN :  Neg. PSYCHIATRIC:  Neg   ENDOCRINE:  Neg. ALL/IMM :  Neg.      PHYSICAL EXAM:  VITALS:  /86   Pulse 114   Temp 98.3 °F (36.8 °C) (Oral)   Resp 18   Ht 5' 11\" (1.803 m)   Wt 207 lb 1.6 oz (93.9 kg)   SpO2 99%   BMI 28.88 kg/m²   CONSTITUTIONAL:  awake, alert, cooperative, no apparent distress, and appears stated age  NECK:  Supple, symmetrical, trachea midline, no adenopathy, thyroid symmetric, not enlarged and no tenderness, skin normal  HEMATOLOGIC/LYMPHATICS:  no cervical lymphadenopathy and no supraclavicular lymphadenopathy  LUNGS: Wheezes throughout, rhonchi throughout  CARDIOVASCULAR:  Normal apical impulse, regular rate and rhythm, normal S1 and S2, no S3 or S4, and no murmur noted  CHEST/BREASTS:  symmetric  ABDOMEN: soft nt nd, no pulsitile masses  MUSCULOSKELETAL:  There is no redness, warmth, or swelling of the joints. Full range of motion noted. Motor strength is 5 out of 5 all extremities bilaterally. Tone is normal.  NEUROLOGIC:  Awake, alert, oriented to name, place and time. Cranial nerves II-XII are grossly intact. Motor is 5 out of 5 bilaterally. SKIN:  no bruising or bleeding and normal skin color, texture, turgor  VASC: 1+ femoral pulses           DATA:  Repeat CT scan performed this admission shows stable mediastinal lymphadenopathy     IMPRESSION       Active Hospital Problems     Diagnosis Date Noted    SOB (shortness of breath) [R06.02] 03/12/2020         Chronic mediastinal lymphadenopathy        RECOMMENDATIONS:     As was previously discussed, this lymphadenopathy may be secondary to multiple admissions for heart failure and overall decompensated heart failure. He knows that the concern would be a malignancy or sarcoidosis. These lymph nodes have stayed relatively stable despite the time lapse. We will plan to perform a mediastinoscopy if that is felt to be needed for diagnosis. At this point, he is not able to undergo general anesthesia for mediastinoscopy. We will follow him to see when he will be ready for this and discuss further. The patient unfortunately has a history of denying procedures and tests in the past.  It may be best for us to schedule this as an outpatient so he will make a decision on if he would like to pursue this.      Electronically signed by Darrel Buenrostro MD on 7/3/2020 at 1:29 PM              Haris Barraza MD   Physician   Cardiology   Consults   Signed   Date of Service:  7/4/2020  9:08 AM               Signed        Expand All Collapse All     Show:Clear all  [x]Manual[x]Template[]Copied    Added by:  [x]Ruby Mujica MD    []Libby for details                           Name:  Kian Garner Mary Ann Montanez /Age/Sex: 1970  (52 y.o. male)   MRN & CSN:  [id number] & [id number] Admission Date/Time: 2020  4:19 AM   Location:  -A PCP: No primary care provider on file. Hospital Day: 3              Referring physician:  Pearson Cockayne, MD            Reason for consultation:  tachycardia           Thanks for referral.     Information source: patient     CC;  SOB        HPI:   Thank you for involving me in taking  care of Priscilla Domingo who  is a 52 y. o.year  Old male  Presents with  CAD, HFrEF , await ICD,HTN, hyperlipidimea , COPD, noncomplaince now with worsening moderate recurrent SOB, was admitted with HFrEF exacerbation , went into  Aflutter in [facility] now transferred to ICU, has no CP. Past medical history:    has a past medical history of CAD (coronary artery disease), CHF (congestive heart failure) ([facility] [facility] COPD (chronic obstructive pulmonary disease) ([facility] [address] Depression, Drug abuse ([facility] [address] HIGH CHOLESTEROL, Hypertension, MI (myocardial infarction) ([facility] [address] and S/P coronary artery bypass graft x 4. Past surgical history:   has a past surgical history that includes Cardiac surgery (2010); Bypass Graft (04/10/2011); and Leg Surgery. Social History:   reports that he has been smoking cigarettes. He has a 20.00 pack-year smoking history. He has quit using smokeless tobacco. He reports that he does not drink alcohol or use drugs. Family history:  family history includes Cancer in his father; Diabetes in his mother; Heart Disease in his father and mother; Heart Failure in his father.      No Known Allergies       Current Meds Link used for Sign Out Report   albuterol (PROVENTIL) nebulizer solution 2.5 mg, Once  albuterol (PROVENTIL) nebulizer solution 2.5 mg, Q6H PRN  methylPREDNISolone sodium (SOLU-MEDROL) injection 40 mg, Q6H  iron sucrose (VENOFER) 300 mg in sodium chloride 0.9 % 250 mL IVPB, Once  enoxaparin (LOVENOX) injection 90 mg, BID  [Held by provider] dilTIAZem 100 mg in dextrose 5 % 100 mL infusion (ADD[facility]), Continuous  sodium zirconium cyclosilicate (LOKELMA) oral suspension 10 g, Once  sodium bicarbonate 100 mEq in sodium chloride 0.45 % 1,000 mL infusion, Continuous  cefepime (MAXIPIME) 2 g IVPB minibag, Q12H  vancomycin (VANCOCIN) 1,500 mg in dextrose 5 % 500 mL IVPB, Q18H  digoxin (LANOXIN) injection 250 mcg, Q6H  DOBUTamine (DOBUTREX) 500 mg in dextrose 5 % 250 mL infusion, Continuous  aspirin EC tablet 81 mg, Daily  atorvastatin (LIPITOR) tablet 40 mg, Daily  ipratropium-albuterol (DUONEB) nebulizer solution 3 mL, 4x Daily  metoprolol succinate (TOPROL XL) extended release tablet 25 mg, Daily  pantoprazole (PROTONIX) tablet 40 mg, BID AC  spironolactone (ALDACTONE) tablet 25 mg, Daily  sodium chloride flush 0.9 % injection 10 mL, 2 times per day  sodium chloride flush 0.9 % injection 10 mL, PRN  acetaminophen (TYLENOL) tablet 650 mg, Q6H PRN    Or  acetaminophen (TYLENOL) suppository 650 mg, Q6H PRN  polyethylene glycol (GLYCOLAX) packet 17 g, Daily PRN  promethazine (PHENERGAN) tablet 12.5 mg, Q6H PRN    Or  ondansetron (ZOFRAN) injection 4 mg, Q6H PRN  furosemide (LASIX) injection 40 mg, Q12H  ferrous sulfate (IRON 325) tablet 325 mg, BID WC  midodrine (PROAMATINE) tablet 5 mg, TID          Current Facility-Administered Medications             Current Facility-Administered Medications   Medication Dose Route Frequency Provider Last Rate Last Dose    albuterol (PROVENTIL) nebulizer solution 2.5 mg  2.5 mg Nebulization Once Nida Cannon MD        albuterol (PROVENTIL) nebulizer solution 2.5 mg  2.5 mg Nebulization Q6H PRN iNda Cannon MD        methylPREDNISolone sodium (SOLU-MEDROL) injection 40 mg  40 mg Intravenous Q6H Nida Cannon MD   40 mg at 07/04/20 0843    iron sucrose (VENOFER) 300 mg in sodium chloride 0.9 % 250 mL IVPB  300 mg Intravenous Once Nida Cannon .7 mL/hr at 07/04/20 0843 300 mg at 07/04/20 0843    enoxaparin (LOVENOX) injection 90 mg  1 mg/kg Subcutaneous BID Remedios Gutiérrez MD   90 mg at 07/04/20 0845    [Held by provider] dilTIAZem 100 mg in dextrose 5 % 100 mL infusion (ADD[place])  5 mg/hr Intravenous Continuous Remedios Gutiérrez MD 5 mL/hr at 07/03/20 1901 5 mg/hr at 07/03/20 1901    sodium zirconium cyclosilicate (LOKELMA) oral suspension 10 g  10 g Oral Once Remedios Gutiérrez MD        sodium bicarbonate 100 mEq in sodium chloride 0.45 % 1,000 mL infusion   Intravenous Continuous Remedios Gutiérrez MD 75 mL/hr at 07/03/20 1917      cefepime (MAXIPIME) 2 g IVPB minibag  2 g Intravenous Q12H Remedios Gutiérrez  mL/hr at 07/04/20 0843 2 g at 07/04/20 0843    vancomycin (VANCOCIN) 1,500 mg in dextrose 5 % 500 mL IVPB  1,500 mg Intravenous Q18H Remedios Gutiérrez MD        digoxin [facility]) injection 250 mcg  250 mcg Intravenous Q6H Wisam Jung MD   250 mcg at 07/04/20 0843    DOBUTamine (DOBUTREX) 500 mg in dextrose 5 % 250 mL infusion  5 mcg/kg/min Intravenous Continuous Wisam Jung MD 14.1 mL/hr at 07/03/20 2155 5 mcg/kg/min at 07/03/20 2155    aspirin EC tablet 81 mg  81 mg Oral Daily Wisam Jung MD   81 mg at 07/04/20 0842    atorvastatin (LIPITOR) tablet 40 mg  40 mg Oral Daily Sukhwinder BARTLETT MD   40 mg at 07/04/20 0843    ipratropium-albuterol (DUONEB) nebulizer solution 3 mL  1 vial Inhalation 4x Daily Sukhwinder BARTLETT MD   3 mL at 07/04/20 0819    metoprolol succinate (TOPROL XL) extended release tablet 25 mg  25 mg Oral Daily Bijal Daniel MD   25 mg at 07/04/20 0842    pantoprazole (PROTONIX) tablet 40 mg  40 mg Oral BID AC Sukhwinder BARTLETT MD   40 mg at 07/03/20 1524    spironolactone (ALDACTONE) tablet 25 mg  25 mg Oral Daily Sukhwinder BARTLETT MD   25 mg at 07/04/20 0843    sodium chloride flush 0.9 % injection 10 mL  10 mL Intravenous 2 times per day Mitul Pandey MD   10 mL at 07/04/20 0842    sodium chloride flush 0.9 % injection 10 mL  10 mL Intravenous PRN Wisam Sylvester MD        acetaminophen (TYLENOL) tablet 650 mg  650 mg Oral Q6H PRN Wisam Sylvester MD         Or    acetaminophen (TYLENOL) suppository 650 mg  650 mg Rectal Q6H PRN Wisam Sylvester MD        polyethylene glycol (GLYCOLAX) packet 17 g  17 g Oral Daily PRN Wisam Sylvester MD        promethazine (PHENERGAN) tablet 12.5 mg  12.5 mg Oral Q6H PRN Wisam Sylvester MD         Or    ondansetron (ZOFRAN) injection 4 mg  4 mg Intravenous Q6H PRN Wisam Sylvester MD        furosemide (LASIX) injection 40 mg  40 mg Intravenous Q12H Shaquille Shelton MD   40 mg at 07/04/20 0409    ferrous sulfate (IRON 325) tablet 325 mg  325 mg Oral BID  Ava Carvalho MD   325 mg at 07/04/20 0843    midodrine (PROAMATINE) tablet 5 mg  5 mg Oral TID  Vandana Torres MD   Stopped at 07/03/20 1740         Review of Systems:  All 14 systems reviewed, all negative except for  SOB     Physical Examination:    BP (!) 127/96   Pulse 103   Temp 97.6 °F (36.4 °C) (Axillary)   Resp 22   Ht 5' 11\" (1.803 m)   Wt 206 lb 12.7 oz (93.8 kg)   SpO2 100%   BMI 28.84 kg/m²          Wt Readings from Last 3 Encounters:   07/04/20 206 lb 12.7 oz (93.8 kg)   06/06/20 206 lb (93.4 kg)   05/30/20 206 lb (93.4 kg)      Body mass index is 28.84 kg/m². General Appearance:  poor  Head: normocephalic     Eyes: normal, noninjected conjunctiva     ENT: normal mucosa, noninjected throat, normal      NECK: No JVP  No thyromegaly           Cardiovascular: No thrills palpated   Auscultation: Normal S1 and S2,  no murmur   carotid bruit no   Abdominal Aorta no bruit     Respiratory:    Breath sounds Diminshed bilaterally      Extremities:  1+ Edema clubbing ,   no cyanosis     SKIN: Warm and well perfused, no pallor or cyanosis     Vascular exam:  Pedal Pulses: palp  bilaterally         Abdomen:  No masses or tenderness. No organomegaly noted.      Neurological:  Oriented to time, place, and person   No focal neurological deficit noted. Psychiatric:normal mood, no anxiety     Lab Review       Recent Labs     07/03/20  1623   WBC 7.4   HGB 8.9*   HCT 30.7*             Recent Labs     07/04/20  0350   *   K 4.9   CL 92*   CO2 22   BUN 40*   CREATININE 1.4*          Recent Labs     07/04/20  0350   AST 31   ALT 20   BILIDIR 1.2*   BILITOT 1.8*   ALKPHOS 114      No results for input(s): TROPONINI in the last 72 hours. Lab Results   Component Value Date      (H) 04/16/2011      (H) 04/15/2011      (H) 04/14/2011            Lab Results   Component Value Date     INR 1.77 07/03/2020     PROTIME 21.6 (H) 07/03/2020               Assessment/Recommendations:      - A flutter:  Check EKG,   - HFrEF: on meds , diurese  - CAD: on meds  - HTN: on meds  - Hyperlipidimea: On statin  - COPD:  stable        Armida Messina MD, 7/4/2020 9:08 AM                   Disposition:   Left AMA.     Signed:  Rylan Rodriguez MD, [address]  7/8/2020, 6:02 PM

## 2020-07-08 NOTE — PROGRESS NOTES
This nurse explained AMA to patient. Dr Randolm Deacon call patient room spoke with patient. Patient states \"I'm getting out of here\". Patient signed AMA paper.

## 2020-07-08 NOTE — PROGRESS NOTES
This nurse answered patient call light. Patient requesting doctor to the bedside. Patient is unhappy about being in [facility] unit. States he has already tested negative. This nurse unable to find neg test in chart, also contacted lab to verify, lab has no record of neg test. Dr. Kenan Pearson contacted and spoke with patient.

## 2020-07-08 NOTE — PROGRESS NOTES
This nurse answered patient call light. Patient states he wants his doctor at the bedside. He is tired of being here.  Dr. Deepika Larsen made aware

## 2020-07-08 NOTE — PROGRESS NOTES
Dr. Angie Calvo rounding. Patient was tested last night for covid and he wants patient moved to covid unit.

## 2020-07-08 NOTE — PROGRESS NOTES
Slick Ferro MD, [address]                Internal Medicine Hospitalist             Daily Progress  Note   Subjective:     Chief Complaint   Patient presents with    Shortness of Breath     since yesterday, hx of CHF     Mr. Emily García Complains of nil, no SOB no chest pains. Objective:    /77   Pulse 114   Temp 97.8 °F (36.6 °C)   Resp 21   Ht 5' 11\" (1.803 m)   Wt 210 lb 5.1 oz (95.4 kg)   SpO2 97%   BMI 29.33 kg/m²      Intake/Output Summary (Last 24 hours) at 7/8/2020 0846  Last data filed at 7/8/2020 1629  Gross per 24 hour   Intake 1860.25 ml   Output 4000 ml   Net -2139.75 ml      Physical Exam:  Heart:  Unable to hear due to wearing pos pressure mask. Lungs: Unable to hear due to wearing pos pressure mask. Abdomen: Soft, non distended. Bowel sounds appreciated. No obvious liver or spleen enlargement. Non tender, no rebound noted. Extremities: Non tender, trace swelling noted, strength 5/5 both legs. CNS: Grossly intact.     Labs:  CBC with Differential:    Lab Results   Component Value Date    WBC 7.4 07/03/2020    RBC 4.65 07/03/2020    HGB 8.9 07/03/2020    HCT 30.7 07/03/2020     07/03/2020    MCV 66.0 07/03/2020    MCH 19.1 07/03/2020    MCHC 29.0 07/03/2020    RDW 21.6 07/03/2020    SEGSPCT 87.9 07/03/2020    LYMPHOPCT 7.7 07/03/2020    MONOPCT 3.1 07/03/2020    EOSPCT 3.7 04/16/2011    BASOPCT 0.1 07/03/2020    MONOSABS 0.2 07/03/2020    LYMPHSABS 0.6 07/03/2020    EOSABS 0.0 07/03/2020    BASOSABS 0.0 07/03/2020    DIFFTYPE AUTOMATED DIFFERENTIAL 07/03/2020     CMP:    Lab Results   Component Value Date     07/07/2020    K 4.1 07/07/2020    CL 86 07/07/2020    CO2 28 07/07/2020    BUN 43 07/07/2020    CREATININE 1.3 07/07/2020    GFRAA >60 07/07/2020    AGRATIO 1.6 12/01/2014    LABGLOM 59 07/07/2020    GLUCOSE 306 07/07/2020    PROT 6.8 07/08/2020    PROT 7.4 09/22/2012    LABALBU 4.3 07/08/2020    CALCIUM 9.2 07/07/2020    BILITOT 1.3 07/08/2020    ALKPHOS 90 07/08/2020    AST 35 07/08/2020    ALT 34 07/08/2020     No results for input(s): TROPONINT in the last 72 hours.   Lab Results   Component Value Date    [facility] 5.650 05/13/2020           bumetanide  2 mg Oral BID    lisinopril  2.5 mg Oral Daily    spironolactone  25 mg Oral Daily    cefepime  2 g Intravenous Q12H    apixaban  2.5 mg Oral BID    methylPREDNISolone  40 mg Intravenous Q12H    metoprolol succinate  100 mg Oral Daily    guaiFENesin  600 mg Oral BID    sodium chloride flush  10 mL Intravenous 2 times per day    vancomycin  1,500 mg Intravenous Q18H    aspirin  81 mg Oral Daily    atorvastatin  40 mg Oral Daily    ipratropium-albuterol  1 vial Inhalation 4x Daily    pantoprazole  40 mg Oral BID AC    ferrous sulfate  325 mg Oral BID WC         Assessment:       Patient Active Problem List    Diagnosis Date Noted    ST elevation myocardial infarction involving left circumflex coronary artery (HCC) 07/02/2018     Priority: High    Acute on chronic systolic CHF (congestive heart failure) ([facility] [address] 05/27/2020    Hematemesis 05/18/2020    Acute on chronic combined systolic and diastolic heart failure ([address] 05/13/2020    Hyponatremia     SOB (shortness of breath) 21/23/5196    Acute systolic congestive heart failure ([address] 02/07/2020    Left ventricular systolic dysfunction     Heart failure ([address] 01/23/2020    Ischemic cardiomyopathy 02/05/2019    Pulmonary edema, acute ([address] 12/08/2018    Acute kidney injury ([address] 12/08/2018    Acute respiratory failure with hypoxia and hypercapnia ([address] 12/08/2018    Hypertensive emergency 12/08/2018    Acute on chronic congestive heart failure ([address] 09/14/2018    Noncompliance 09/14/2018    Acute on chronic combined systolic (congestive) and diastolic (congestive) heart failure ([address] 25/42/6615    Systolic and diastolic CHF w/reduced LV function, NYHA class 4 ([address] 07/11/2018    NSTEMI (non-ST elevated myocardial infarction) ([address] 07/11/2018   

## 2020-07-09 ENCOUNTER — CARE COORDINATION (OUTPATIENT)
Dept: CASE MANAGEMENT | Age: 50
End: 2020-07-09

## 2020-07-09 NOTE — CARE COORDINATION
[facility] Transitions Initial Follow Up Call    Call within 2 business days of discharge: Yes    Patient: Joana Lundberg Patient : 1970   MRN: [id number]  Reason for Admission: Sepsis, COPD, Ac Resp Failure, JAIME, Hyponatremia  Discharge Date: 20 RARS: Readmission Risk Score: 61  Facility: [address] [place] [address]       Complaint Diagnosis Description Type Department Provider    20 Shortness of Breath Acute on chronic systolic congestive heart failure ([address] . .. ED to Hosp-Admission (Discharged) (ADMITTED) [id number] Amanda Echeverria MD; Netta Traylor. BPCI/COVID19 RISK MONITORING  COVID19 SCREEN:  PATIENT RISK FACTORS:     1st attempt to reach for initial discharge call unsuccessful; message left requesting call back. Of note:  Patient left AMA, no rx upon discharge, no PCP.     Latasha Cobb RN

## 2020-07-10 ENCOUNTER — CARE COORDINATION (OUTPATIENT)
Dept: CASE MANAGEMENT | Age: 50
End: 2020-07-10

## 2020-07-12 ENCOUNTER — HOSPITAL ENCOUNTER (EMERGENCY)
Age: 50
Discharge: HOME | End: 2020-07-12
Attending: EMERGENCY MEDICINE
Payer: MEDICARE

## 2020-07-12 ENCOUNTER — APPOINTMENT (OUTPATIENT)
Dept: CT IMAGING | Age: 50
End: 2020-07-12
Payer: MEDICARE

## 2020-07-12 VITALS
OXYGEN SATURATION: 99 % | SYSTOLIC BLOOD PRESSURE: 99 MMHG | RESPIRATION RATE: 16 BRPM | TEMPERATURE: 98.1 F | WEIGHT: 210 LBS | HEART RATE: 114 BPM | BODY MASS INDEX: 29.29 KG/M2 | DIASTOLIC BLOOD PRESSURE: 69 MMHG

## 2020-07-12 LAB
ALBUMIN SERPL-MCNC: 3.7 GM/DL (ref 3.4–5)
ALP BLD-CCNC: 127 IU/L (ref 40–129)
ALT SERPL-CCNC: 50 U/L (ref 10–40)
ANION GAP SERPL CALCULATED.3IONS-SCNC: 16 MMOL/L (ref 4–16)
AST SERPL-CCNC: 33 IU/L (ref 15–37)
BASOPHILS ABSOLUTE: 0 K/CU MM
BASOPHILS RELATIVE PERCENT: 0.1 % (ref 0–1)
BILIRUB SERPL-MCNC: 1.3 MG/DL (ref 0–1)
BUN BLDV-MCNC: 22 MG/DL (ref 6–23)
CALCIUM SERPL-MCNC: 8.6 MG/DL (ref 8.3–10.6)
CHLORIDE BLD-SCNC: 93 MMOL/L (ref 99–110)
CO2: 28 MMOL/L (ref 21–32)
CREAT SERPL-MCNC: 1.2 MG/DL (ref 0.9–1.3)
DIFFERENTIAL TYPE: ABNORMAL
EOSINOPHILS ABSOLUTE: 0.2 K/CU MM
EOSINOPHILS RELATIVE PERCENT: 2.4 % (ref 0–3)
GFR AFRICAN AMERICAN: >60 ML/MIN/1.73M2
GFR NON-AFRICAN AMERICAN: >60 ML/MIN/1.73M2
GLUCOSE BLD-MCNC: 180 MG/DL (ref 70–99)
HCT VFR BLD CALC: 30 % (ref 42–52)
HEMOGLOBIN: 8.3 GM/DL (ref 13.5–18)
IMMATURE NEUTROPHIL %: 5.2 % (ref 0–0.43)
LYMPHOCYTES ABSOLUTE: 1.3 K/CU MM
LYMPHOCYTES RELATIVE PERCENT: 13.6 % (ref 24–44)
MCH RBC QN AUTO: 19.6 PG (ref 27–31)
MCHC RBC AUTO-ENTMCNC: 27.7 % (ref 32–36)
MCV RBC AUTO: 70.9 FL (ref 78–100)
MONOCYTES ABSOLUTE: 0.8 K/CU MM
MONOCYTES RELATIVE PERCENT: 8.8 % (ref 0–4)
PDW BLD-RTO: 26.3 % (ref 11.7–14.9)
PLATELET # BLD: 205 K/CU MM (ref 140–440)
PMV BLD AUTO: 9.8 FL (ref 7.5–11.1)
POTASSIUM SERPL-SCNC: ABNORMAL MMOL/L (ref 3.5–5.1)
RBC # BLD: 4.23 M/CU MM (ref 4.6–6.2)
SEGMENTED NEUTROPHILS ABSOLUTE COUNT: 6.5 K/CU MM
SEGMENTED NEUTROPHILS RELATIVE PERCENT: 69.9 % (ref 36–66)
SODIUM BLD-SCNC: 137 MMOL/L (ref 135–145)
TOTAL IMMATURE NEUTOROPHIL: 0.48 K/CU MM
TOTAL PROTEIN: 6.8 GM/DL (ref 6.4–8.2)
WBC # BLD: 9.3 K/CU MM (ref 4–10.5)

## 2020-07-12 PROCEDURE — 99284 EMERGENCY DEPT VISIT MOD MDM: CPT

## 2020-07-12 PROCEDURE — 6360000002 HC RX W HCPCS: Performed by: EMERGENCY MEDICINE

## 2020-07-12 PROCEDURE — 96365 THER/PROPH/DIAG IV INF INIT: CPT

## 2020-07-12 PROCEDURE — 80053 COMPREHEN METABOLIC PANEL: CPT

## 2020-07-12 PROCEDURE — 73702 CT LWR EXTREMITY W/O&W/DYE: CPT

## 2020-07-12 PROCEDURE — 2580000003 HC RX 258: Performed by: EMERGENCY MEDICINE

## 2020-07-12 PROCEDURE — 96375 TX/PRO/DX INJ NEW DRUG ADDON: CPT

## 2020-07-12 PROCEDURE — 6370000000 HC RX 637 (ALT 250 FOR IP): Performed by: EMERGENCY MEDICINE

## 2020-07-12 PROCEDURE — 6360000004 HC RX CONTRAST MEDICATION: Performed by: EMERGENCY MEDICINE

## 2020-07-12 PROCEDURE — 85025 COMPLETE CBC W/AUTO DIFF WBC: CPT

## 2020-07-12 RX ORDER — ONDANSETRON 2 MG/ML
4 INJECTION INTRAMUSCULAR; INTRAVENOUS EVERY 30 MIN PRN
Status: DISCONTINUED | OUTPATIENT
Start: 2020-07-12 | End: 2020-07-12 | Stop reason: HOSPADM

## 2020-07-12 RX ORDER — METHYLPREDNISOLONE 4 MG/1
TABLET ORAL
Qty: 1 KIT | Refills: 0 | Status: ON HOLD | OUTPATIENT
Start: 2020-07-12 | End: 2020-07-27 | Stop reason: HOSPADM

## 2020-07-12 RX ORDER — OXYCODONE HYDROCHLORIDE AND ACETAMINOPHEN 5; 325 MG/1; MG/1
1 TABLET ORAL EVERY 6 HOURS PRN
Qty: 10 TABLET | Refills: 0 | Status: SHIPPED | OUTPATIENT
Start: 2020-07-12 | End: 2020-07-15

## 2020-07-12 RX ORDER — MORPHINE SULFATE 4 MG/ML
4 INJECTION, SOLUTION INTRAMUSCULAR; INTRAVENOUS EVERY 30 MIN PRN
Status: DISCONTINUED | OUTPATIENT
Start: 2020-07-12 | End: 2020-07-12 | Stop reason: HOSPADM

## 2020-07-12 RX ORDER — POTASSIUM CHLORIDE 20 MEQ/1
40 TABLET, EXTENDED RELEASE ORAL ONCE
Status: COMPLETED | OUTPATIENT
Start: 2020-07-12 | End: 2020-07-12

## 2020-07-12 RX ORDER — METHYLPREDNISOLONE SODIUM SUCCINATE 125 MG/2ML
60 INJECTION, POWDER, LYOPHILIZED, FOR SOLUTION INTRAMUSCULAR; INTRAVENOUS ONCE
Status: COMPLETED | OUTPATIENT
Start: 2020-07-12 | End: 2020-07-12

## 2020-07-12 RX ORDER — SULFAMETHOXAZOLE AND TRIMETHOPRIM 800; 160 MG/1; MG/1
1 TABLET ORAL 2 TIMES DAILY
Qty: 20 TABLET | Refills: 0 | Status: ON HOLD | OUTPATIENT
Start: 2020-07-12 | End: 2020-07-27 | Stop reason: HOSPADM

## 2020-07-12 RX ORDER — KETOROLAC TROMETHAMINE 30 MG/ML
30 INJECTION, SOLUTION INTRAMUSCULAR; INTRAVENOUS ONCE
Status: DISCONTINUED | OUTPATIENT
Start: 2020-07-12 | End: 2020-07-12

## 2020-07-12 RX ADMIN — MORPHINE SULFATE 4 MG: 4 INJECTION, SOLUTION INTRAMUSCULAR; INTRAVENOUS at 16:56

## 2020-07-12 RX ADMIN — CEFTRIAXONE SODIUM 1 G: 1 INJECTION, POWDER, FOR SOLUTION INTRAMUSCULAR; INTRAVENOUS at 19:14

## 2020-07-12 RX ADMIN — IOPAMIDOL 100 ML: 755 INJECTION, SOLUTION INTRAVENOUS at 18:33

## 2020-07-12 RX ADMIN — METHYLPREDNISOLONE SODIUM SUCCINATE 60 MG: 125 INJECTION, POWDER, FOR SOLUTION INTRAMUSCULAR; INTRAVENOUS at 16:54

## 2020-07-12 RX ADMIN — POTASSIUM CHLORIDE 40 MEQ: 20 TABLET, EXTENDED RELEASE ORAL at 18:44

## 2020-07-12 RX ADMIN — ONDANSETRON 4 MG: 2 INJECTION INTRAMUSCULAR; INTRAVENOUS at 16:48

## 2020-07-12 ASSESSMENT — PAIN DESCRIPTION - FREQUENCY: FREQUENCY: CONTINUOUS

## 2020-07-12 ASSESSMENT — PAIN DESCRIPTION - LOCATION: LOCATION: HIP

## 2020-07-12 ASSESSMENT — PAIN SCALES - GENERAL
PAINLEVEL_OUTOF10: 10
PAINLEVEL_OUTOF10: 10
PAINLEVEL_OUTOF10: 0
PAINLEVEL_OUTOF10: 0

## 2020-07-12 ASSESSMENT — PAIN DESCRIPTION - PAIN TYPE: TYPE: ACUTE PAIN

## 2020-07-12 ASSESSMENT — PAIN DESCRIPTION - ORIENTATION: ORIENTATION: LEFT

## 2020-07-12 ASSESSMENT — PAIN SCALES - WONG BAKER: WONGBAKER_NUMERICALRESPONSE: 0

## 2020-07-12 NOTE — ED PROVIDER NOTES
Emergency Department Encounter  Location: [facility] [address] [place] [address]    Patient: Ruiz Gomes  MRN: [id number]  : 1970  Date of evaluation: 2020  ED Provider: Yunier Jang DO, FACEP    Chief Complaint:    Hip Pain (left hip pt states he has not been able to walk since he left hospital on wed)    [place]:  Ruiz Gomes is a 52 y.o. male that presents to the emergency department with complaints of severe pain over his left greater trochanter. The patient was in [facility] for congestive heart failure. The patient signed out [address] last Wednesday. He states when he got home he started having pain in his left lateral hip and states the pain has gotten worse. He states he is unable to ambulate secondary to pain in that area. He has a blood clot in his left leg and is on Xarelto. He states he has been using his blood thinners. He denies any injury to his left hip. He denies falling or other trauma to the area. He has been using Aleve and BenGay to the area with no relief. He is complaining of severe pain in his left leg and inability to ambulate secondary to the pain over the left hip. ROS:  At least 4 systems reviewed and otherwise acutely negative except as in the [address]   Negative for fever or chills  Negative for chest pain  Negative for shortness of breath  Negative for nausea vomiting diarrhea or constipation    Past Medical History:   Diagnosis Date    CAD (coronary artery disease)     CHF (congestive heart failure) (HCC)     COPD (chronic obstructive pulmonary disease) ([address]     Depression     [facility]     HIGH CHOLESTEROL     Hypertension     MI (myocardial infarction) ([address]     S/P coronary artery bypass graft x 4     CABG x 4 Dr Brianna Malhotra     Past Surgical History:   Procedure Laterality Date    BYPASS GRAFT  04/10/2011    CABG x 4 Dr Prosper Evangelista  2010     4 stents    LEG SURGERY       Family History Problem Relation Age of Onset    Cancer Father     Heart Failure Father     Heart Disease Father     Diabetes Mother     Heart Disease Mother      Social History     Socioeconomic History    Marital status:      Spouse name: Not on file    Number of children: Not on file    Years of education: Not on file    Highest education level: Not on file   Occupational History    Not on file   Social Needs    Financial resource strain: Not on file    Food insecurity     Worry: Not on file     Inability: Not on file    Transportation needs     Medical: Not on file     Non-medical: Not on file   Tobacco Use    Smoking status: Current Every Day Smoker     Packs/day: 1.00     Years: 20.00     Pack years: 20.00     Types: Cigarettes    Smokeless tobacco: Former User    Tobacco comment: Reviewed 10/9/17   Substance and Sexual Activity    Alcohol use: No     Alcohol/week: 0.0 standard drinks    Drug use: No     Comment: march 2018 states he quit    Sexual activity: Not Currently   Lifestyle    Physical activity     Days per week: Not on file     Minutes per session: Not on file    Stress: Not on file   Relationships    Social connections     Talks on phone: Not on file     Gets together: Not on file     Attends Hindu service: Not on file     Active member of club or organization: Not on file     Attends meetings of clubs or organizations: Not on file     Relationship status: Not on file    Intimate partner violence     Fear of current or ex partner: Not on file     Emotionally abused: Not on file     Physically abused: Not on file     Forced sexual activity: Not on file   Other Topics Concern    Not on file   Social History Narrative    Not on file     Current Facility-Administered Medications   Medication Dose Route Frequency Provider Last Rate Last Dose    morphine sulfate (PF) injection 4 mg  4 mg Intravenous Q30 Min PRN Gil Massey DO   4 mg at 07/12/20 4103    ondansetron (Andrea Ayala) lb (95.3 kg)      Height       Head Circumference       Peak Flow       Pain Score       Pain Loc       Pain Edu? Excl. in [address]? GENERAL APPEARANCE: Awake and alert. Cooperative. No acute distress. Nontoxic in appearance  HEAD: Normocephalic. Atraumatic. EYES: Sclera anicteric. ENT: Tolerates saliva. NECK: Supple. Trachea midline. LUNGS: Respirations unlabored. EXTREMITIES: No acute deformities. There is tenderness palpation over the greater trochanter of the left hip. He has pain when the hip is flexed at the knee and when it is externally rotated or internally rotated. He states the pain is severe when these movements are made. He has good pulses in his left foot. There is no swelling over the hip. The left lower leg is generally swollen. SKIN: Warm and dry. NEUROLOGICAL: No gross facial drooping. PSYCHIATRIC: Normal mood.     Labs:  Results for orders placed or performed during the hospital encounter of 07/12/20   Comprehensive Metabolic Panel   Result Value Ref Range    Sodium 137 135 - 145 MMOL/L    Potassium  3.5 - 5.1 MMOL/L     K+ 2.8 CALLED AND RB TO ALYSSA RN ED [id number]    Chloride 93 (L) 99 - 110 mMol/L    CO2 28 21 - 32 MMOL/L    BUN 22 6 - 23 MG/DL    CREATININE 1.2 0.9 - 1.3 MG/DL    Glucose 180 (H) 70 - 99 MG/DL    Calcium 8.6 8.3 - 10.6 MG/DL    Alb 3.7 3.4 - 5.0 GM/DL    Total Protein 6.8 6.4 - 8.2 GM/DL    Total Bilirubin 1.3 (H) 0.0 - 1.0 MG/DL    ALT 50 (H) 10 - 40 U/L    AST 33 15 - 37 IU/L    Alkaline Phosphatase 127 40 - 129 IU/L    GFR Non-African American >60 >60 mL/min/1.73m2    GFR African American >60 >60 mL/min/1.73m2    Anion Gap 16 4 - 16   CBC Auto Differential   Result Value Ref Range    WBC 9.3 4.0 - 10.5 K/CU MM    RBC 4.23 (L) 4.6 - 6.2 M/CU MM    Hemoglobin 8.3 (L) 13.5 - 18.0 GM/DL    Hematocrit 30.0 (L) 42 - 52 %    MCV 70.9 (L) 78 - 100 FL    MCH 19.6 (L) 27 - 31 PG    MCHC 27.7 (L) 32.0 - 36.0 %    RDW 26.3 (H) 11.7 - 14.9 % Platelets 425 147 - 181 K/CU MM    MPV 9.8 7.5 - 11.1 FL    Differential Type AUTOMATED DIFFERENTIAL     Segs Relative 69.9 (H) 36 - 66 %    Lymphocytes % 13.6 (L) 24 - 44 %    Monocytes % 8.8 (H) 0 - 4 %    Eosinophils % 2.4 0 - 3 %    Basophils % 0.1 0 - 1 %    Segs Absolute 6.5 K/CU MM    Lymphocytes Absolute 1.3 K/CU MM    Monocytes Absolute 0.8 K/CU MM    Eosinophils Absolute 0.2 K/CU MM    Basophils Absolute 0.0 K/CU MM    Immature Neutrophil % 5.2 (H) 0 - 0.43 %    Total Immature Neutrophil 0.48 K/CU MM       Radiographs (if obtained):  [] The following radiograph was interpreted by myself in the absence of a radiologist:  [x] Radiologist's Report reviewed at time of ED visit:  CT HIP LEFT W WO CONTRAST   Preliminary Result   1. Nonspecific mild left anterolateral pelvic and hip subcutaneous soft   tissue changes which may represent bland edema versus cellulitis. No   evidence of a soft tissue abscess. 2. Borderline enlarged left iliac chain and inguinal lymph nodes which may be   reactive in nature. 3. No evidence of bursitis or pyomyositis. 4. Normal left hip alignment with mild osteoarthritis. No acute osseous   abnormality. 5. Findings suggesting left gluteal medius/minimus tendinopathy/enthesopathy. ED Course and MDM:  Patient presents to the emergency department with pain in his left hip. The CT scan showed possible edema versus cellulitis in his left hip region. There is no evidence of abscess in the hip no evidence of bursitis. He will be placed on crutches. He will be started on Bactrim and a Medrol Dosepak as well as Percocet for pain. He is referred to the family medicine clinic and to Dr. Marylee Prows for recheck and is instructed to return to the ER if his condition worsens. He is feeling better here in the ER. Final Impression:  1. Left hip pain    2.  Cellulitis of left lower extremity      DISPOSITION Discharge - Pending Orders Complete    Patient referred to:  [facility]  [address] [facility] [place] [address] [place]  [phone]  Schedule an appointment as soon as possible for a visit in 1 week  For follow up    Subha Varela DO  [address] [facility] [address] [place] [address] [place] [address] Telma Crawford  [phone]    Schedule an appointment as soon as possible for a visit in 3 days  For follow up    Discharge medications:  New Prescriptions    METHYLPREDNISOLONE (MEDROL, LUPE,) 4 MG TABLET    Take by mouth. OXYCODONE-ACETAMINOPHEN (PERCOCET) 5-325 MG PER TABLET    Take 1 tablet by mouth every 6 hours as needed for Pain for up to 3 days.     SULFAMETHOXAZOLE-TRIMETHOPRIM (BACTRIM DS) 800-160 MG PER TABLET    Take 1 tablet by mouth 2 times daily for 10 days     (Please note that portions of this note may have been completed with a voice recognition program. Efforts were made to edit the dictations but occasionally words are mis-transcribed.)    Good Chavarria DO, [facility]  Board certified in [address] [place] [address]  07/12/20 4912

## 2020-07-12 NOTE — ED NOTES
Reports (pain free) States laughingly (when I go home can I have morphine to go). Advised the Dr would probably not order it.       Valentín Mccullough RN  07/12/20 4194

## 2020-07-13 ENCOUNTER — CARE COORDINATION (OUTPATIENT)
Dept: CARE COORDINATION | Age: 50
End: 2020-07-13

## 2020-07-13 NOTE — ED NOTES
Discharge instructions and prescriptions reviewed with pt and verbalizes understanding. Crutches given per Dr granados.      Keny Kee RN  07/12/20 2050

## 2020-07-15 ENCOUNTER — CARE COORDINATION (OUTPATIENT)
Dept: CARE COORDINATION | Age: 50
End: 2020-07-15

## 2020-07-16 ENCOUNTER — CARE COORDINATION (OUTPATIENT)
Dept: CASE MANAGEMENT | Age: 50
End: 2020-07-16

## 2020-07-16 NOTE — CARE COORDINATION
[facility] Transitions Follow Up Call    2020    Patient: Nasir Chand  Patient : 1970   MRN: <[id number]>  Reason for Admission:   Discharge Date: 20 RARS: Readmission Risk Score: 59    Follow Up: Attempted to contact patient for BPCI-A follow up. Unable to reach patient. Unable to leave message. Mailbox is full. 2020 Final  for admission 20-20. Patient no longer eligible for BPCI bundle due to excluded DRG. No future appointments.     Michael Marsh RN

## 2020-07-18 ENCOUNTER — APPOINTMENT (OUTPATIENT)
Dept: GENERAL RADIOLOGY | Age: 50
DRG: 292 | End: 2020-07-18
Payer: MEDICARE

## 2020-07-18 ENCOUNTER — HOSPITAL ENCOUNTER (INPATIENT)
Age: 50
LOS: 8 days | Discharge: HOME | DRG: 292 | End: 2020-07-27
Attending: HOSPITALIST | Admitting: HOSPITALIST
Payer: MEDICARE

## 2020-07-18 ENCOUNTER — APPOINTMENT (OUTPATIENT)
Dept: ULTRASOUND IMAGING | Age: 50
DRG: 292 | End: 2020-07-18
Payer: MEDICARE

## 2020-07-18 LAB
ALBUMIN SERPL-MCNC: 4.2 GM/DL (ref 3.4–5)
ALP BLD-CCNC: 146 IU/L (ref 40–129)
ALT SERPL-CCNC: 35 U/L (ref 10–40)
ANION GAP SERPL CALCULATED.3IONS-SCNC: 15 MMOL/L (ref 4–16)
AST SERPL-CCNC: 30 IU/L (ref 15–37)
BASOPHILS ABSOLUTE: 0.1 K/CU MM
BASOPHILS RELATIVE PERCENT: 0.6 % (ref 0–1)
BILIRUB SERPL-MCNC: 2.1 MG/DL (ref 0–1)
BUN BLDV-MCNC: 31 MG/DL (ref 6–23)
CALCIUM SERPL-MCNC: 9.1 MG/DL (ref 8.3–10.6)
CHLORIDE BLD-SCNC: 90 MMOL/L (ref 99–110)
CO2: 26 MMOL/L (ref 21–32)
CREAT SERPL-MCNC: 1.8 MG/DL (ref 0.9–1.3)
DIFFERENTIAL TYPE: ABNORMAL
EOSINOPHILS ABSOLUTE: 0.1 K/CU MM
EOSINOPHILS RELATIVE PERCENT: 1.5 % (ref 0–3)
GFR AFRICAN AMERICAN: 49 ML/MIN/1.73M2
GFR NON-AFRICAN AMERICAN: 40 ML/MIN/1.73M2
GLUCOSE BLD-MCNC: 120 MG/DL (ref 70–99)
HCT VFR BLD CALC: 30.7 % (ref 42–52)
HEMOGLOBIN: 8.8 GM/DL (ref 13.5–18)
IMMATURE NEUTROPHIL %: 1.8 % (ref 0–0.43)
LYMPHOCYTES ABSOLUTE: 1.3 K/CU MM
LYMPHOCYTES RELATIVE PERCENT: 15.3 % (ref 24–44)
MCH RBC QN AUTO: 20.3 PG (ref 27–31)
MCHC RBC AUTO-ENTMCNC: 28.7 % (ref 32–36)
MCV RBC AUTO: 70.9 FL (ref 78–100)
MONOCYTES ABSOLUTE: 0.5 K/CU MM
MONOCYTES RELATIVE PERCENT: 6.2 % (ref 0–4)
NUCLEATED RBC %: 1.1 %
PDW BLD-RTO: 27.1 % (ref 11.7–14.9)
PLATELET # BLD: 141 K/CU MM (ref 140–440)
POTASSIUM SERPL-SCNC: 4.2 MMOL/L (ref 3.5–5.1)
PRO-BNP: 3918 PG/ML
RBC # BLD: 4.33 M/CU MM (ref 4.6–6.2)
SEGMENTED NEUTROPHILS ABSOLUTE COUNT: 6.3 K/CU MM
SEGMENTED NEUTROPHILS RELATIVE PERCENT: 74.6 % (ref 36–66)
SODIUM BLD-SCNC: 131 MMOL/L (ref 135–145)
TOTAL IMMATURE NEUTOROPHIL: 0.15 K/CU MM
TOTAL NUCLEATED RBC: 0.1 K/CU MM
TOTAL PROTEIN: 7.7 GM/DL (ref 6.4–8.2)
TROPONIN T: 0.05 NG/ML
WBC # BLD: 8.5 K/CU MM (ref 4–10.5)

## 2020-07-18 PROCEDURE — 6370000000 HC RX 637 (ALT 250 FOR IP): Performed by: PHYSICIAN ASSISTANT

## 2020-07-18 PROCEDURE — 85025 COMPLETE CBC W/AUTO DIFF WBC: CPT

## 2020-07-18 PROCEDURE — 80053 COMPREHEN METABOLIC PANEL: CPT

## 2020-07-18 PROCEDURE — 93005 ELECTROCARDIOGRAM TRACING: CPT | Performed by: PHYSICIAN ASSISTANT

## 2020-07-18 PROCEDURE — 83880 ASSAY OF NATRIURETIC PEPTIDE: CPT

## 2020-07-18 PROCEDURE — 6360000002 HC RX W HCPCS: Performed by: PHYSICIAN ASSISTANT

## 2020-07-18 PROCEDURE — 71045 X-RAY EXAM CHEST 1 VIEW: CPT

## 2020-07-18 PROCEDURE — 96374 THER/PROPH/DIAG INJ IV PUSH: CPT

## 2020-07-18 PROCEDURE — 93971 EXTREMITY STUDY: CPT

## 2020-07-18 PROCEDURE — 2700000000 HC OXYGEN THERAPY PER DAY

## 2020-07-18 PROCEDURE — 94761 N-INVAS EAR/PLS OXIMETRY MLT: CPT

## 2020-07-18 PROCEDURE — 99285 EMERGENCY DEPT VISIT HI MDM: CPT

## 2020-07-18 PROCEDURE — 84484 ASSAY OF TROPONIN QUANT: CPT

## 2020-07-18 RX ORDER — FUROSEMIDE 10 MG/ML
20 INJECTION INTRAMUSCULAR; INTRAVENOUS ONCE
Status: COMPLETED | OUTPATIENT
Start: 2020-07-18 | End: 2020-07-18

## 2020-07-18 RX ORDER — ALBUTEROL SULFATE 90 UG/1
4 AEROSOL, METERED RESPIRATORY (INHALATION) ONCE
Status: COMPLETED | OUTPATIENT
Start: 2020-07-18 | End: 2020-07-19

## 2020-07-18 RX ORDER — ASPIRIN 81 MG/1
324 TABLET, CHEWABLE ORAL ONCE
Status: COMPLETED | OUTPATIENT
Start: 2020-07-18 | End: 2020-07-19

## 2020-07-18 RX ADMIN — FUROSEMIDE 20 MG: 10 INJECTION, SOLUTION INTRAVENOUS at 22:54

## 2020-07-19 ENCOUNTER — APPOINTMENT (OUTPATIENT)
Dept: GENERAL RADIOLOGY | Age: 50
DRG: 292 | End: 2020-07-19
Payer: MEDICARE

## 2020-07-19 ENCOUNTER — APPOINTMENT (OUTPATIENT)
Dept: CT IMAGING | Age: 50
DRG: 292 | End: 2020-07-19
Payer: MEDICARE

## 2020-07-19 ENCOUNTER — APPOINTMENT (OUTPATIENT)
Dept: NUCLEAR MEDICINE | Age: 50
DRG: 292 | End: 2020-07-19
Payer: MEDICARE

## 2020-07-19 PROBLEM — R06.00 DYSPNEA: Status: ACTIVE | Noted: 2020-07-19

## 2020-07-19 LAB
APTT: 38 SECONDS (ref 25.1–37.1)
APTT: 56.6 SECONDS (ref 25.1–37.1)
APTT: >240 SECONDS (ref 25.1–37.1)
D DIMER: 413 NG/ML(DDU)
TROPONIN T: 0.03 NG/ML
TROPONIN T: 0.07 NG/ML

## 2020-07-19 PROCEDURE — 94640 AIRWAY INHALATION TREATMENT: CPT

## 2020-07-19 PROCEDURE — 2580000003 HC RX 258: Performed by: HOSPITALIST

## 2020-07-19 PROCEDURE — 74018 RADEX ABDOMEN 1 VIEW: CPT

## 2020-07-19 PROCEDURE — 84484 ASSAY OF TROPONIN QUANT: CPT

## 2020-07-19 PROCEDURE — 78580 LUNG PERFUSION IMAGING: CPT

## 2020-07-19 PROCEDURE — 94664 DEMO&/EVAL PT USE INHALER: CPT

## 2020-07-19 PROCEDURE — A9540 TC99M MAA: HCPCS | Performed by: PHYSICIAN ASSISTANT

## 2020-07-19 PROCEDURE — 6360000002 HC RX W HCPCS: Performed by: HOSPITALIST

## 2020-07-19 PROCEDURE — 85730 THROMBOPLASTIN TIME PARTIAL: CPT

## 2020-07-19 PROCEDURE — 94660 CPAP INITIATION&MGMT: CPT

## 2020-07-19 PROCEDURE — 6370000000 HC RX 637 (ALT 250 FOR IP): Performed by: HOSPITALIST

## 2020-07-19 PROCEDURE — 3430000000 HC RX DIAGNOSTIC RADIOPHARMACEUTICAL: Performed by: PHYSICIAN ASSISTANT

## 2020-07-19 PROCEDURE — 2140000000 HC CCU INTERMEDIATE R&B

## 2020-07-19 PROCEDURE — 36415 COLL VENOUS BLD VENIPUNCTURE: CPT

## 2020-07-19 PROCEDURE — 6370000000 HC RX 637 (ALT 250 FOR IP): Performed by: PHYSICIAN ASSISTANT

## 2020-07-19 PROCEDURE — 99221 1ST HOSP IP/OBS SF/LOW 40: CPT | Performed by: INTERNAL MEDICINE

## 2020-07-19 PROCEDURE — 85379 FIBRIN DEGRADATION QUANT: CPT

## 2020-07-19 PROCEDURE — 94761 N-INVAS EAR/PLS OXIMETRY MLT: CPT

## 2020-07-19 PROCEDURE — 93010 ELECTROCARDIOGRAM REPORT: CPT | Performed by: INTERNAL MEDICINE

## 2020-07-19 RX ORDER — ONDANSETRON 2 MG/ML
4 INJECTION INTRAMUSCULAR; INTRAVENOUS EVERY 6 HOURS PRN
Status: DISCONTINUED | OUTPATIENT
Start: 2020-07-19 | End: 2020-07-27 | Stop reason: HOSPADM

## 2020-07-19 RX ORDER — POLYETHYLENE GLYCOL 3350 17 G/17G
17 POWDER, FOR SOLUTION ORAL DAILY PRN
Status: DISCONTINUED | OUTPATIENT
Start: 2020-07-19 | End: 2020-07-27 | Stop reason: HOSPADM

## 2020-07-19 RX ORDER — HEPARIN SODIUM 1000 [USP'U]/ML
80 INJECTION, SOLUTION INTRAVENOUS; SUBCUTANEOUS PRN
Status: DISCONTINUED | OUTPATIENT
Start: 2020-07-19 | End: 2020-07-20 | Stop reason: ALTCHOICE

## 2020-07-19 RX ORDER — LORAZEPAM 0.5 MG/1
0.5 TABLET ORAL EVERY 6 HOURS PRN
Status: COMPLETED | OUTPATIENT
Start: 2020-07-19 | End: 2020-07-21

## 2020-07-19 RX ORDER — IPRATROPIUM BROMIDE AND ALBUTEROL SULFATE 2.5; .5 MG/3ML; MG/3ML
1 SOLUTION RESPIRATORY (INHALATION)
Status: DISCONTINUED | OUTPATIENT
Start: 2020-07-19 | End: 2020-07-27 | Stop reason: HOSPADM

## 2020-07-19 RX ORDER — ATORVASTATIN CALCIUM 40 MG/1
40 TABLET, FILM COATED ORAL DAILY
Status: DISCONTINUED | OUTPATIENT
Start: 2020-07-19 | End: 2020-07-27 | Stop reason: HOSPADM

## 2020-07-19 RX ORDER — PANTOPRAZOLE SODIUM 40 MG/1
40 TABLET, DELAYED RELEASE ORAL
Status: DISCONTINUED | OUTPATIENT
Start: 2020-07-19 | End: 2020-07-27 | Stop reason: HOSPADM

## 2020-07-19 RX ORDER — METHYLPREDNISOLONE SODIUM SUCCINATE 40 MG/ML
40 INJECTION, POWDER, LYOPHILIZED, FOR SOLUTION INTRAMUSCULAR; INTRAVENOUS EVERY 6 HOURS
Status: COMPLETED | OUTPATIENT
Start: 2020-07-19 | End: 2020-07-21

## 2020-07-19 RX ORDER — METOPROLOL SUCCINATE 25 MG/1
25 TABLET, EXTENDED RELEASE ORAL DAILY
Status: DISCONTINUED | OUTPATIENT
Start: 2020-07-19 | End: 2020-07-27 | Stop reason: HOSPADM

## 2020-07-19 RX ORDER — SODIUM CHLORIDE 0.9 % (FLUSH) 0.9 %
10 SYRINGE (ML) INJECTION PRN
Status: DISCONTINUED | OUTPATIENT
Start: 2020-07-19 | End: 2020-07-27 | Stop reason: HOSPADM

## 2020-07-19 RX ORDER — HEPARIN SODIUM 1000 [USP'U]/ML
40 INJECTION, SOLUTION INTRAVENOUS; SUBCUTANEOUS PRN
Status: DISCONTINUED | OUTPATIENT
Start: 2020-07-19 | End: 2020-07-20 | Stop reason: ALTCHOICE

## 2020-07-19 RX ORDER — ASPIRIN 81 MG/1
81 TABLET ORAL DAILY
Status: DISCONTINUED | OUTPATIENT
Start: 2020-07-19 | End: 2020-07-27 | Stop reason: HOSPADM

## 2020-07-19 RX ORDER — IPRATROPIUM BROMIDE AND ALBUTEROL SULFATE 2.5; .5 MG/3ML; MG/3ML
1 SOLUTION RESPIRATORY (INHALATION) 4 TIMES DAILY
Status: DISCONTINUED | OUTPATIENT
Start: 2020-07-19 | End: 2020-07-19

## 2020-07-19 RX ORDER — SPIRONOLACTONE 25 MG/1
25 TABLET ORAL DAILY
Status: DISCONTINUED | OUTPATIENT
Start: 2020-07-19 | End: 2020-07-25

## 2020-07-19 RX ORDER — SODIUM CHLORIDE 0.9 % (FLUSH) 0.9 %
10 SYRINGE (ML) INJECTION EVERY 12 HOURS SCHEDULED
Status: DISCONTINUED | OUTPATIENT
Start: 2020-07-19 | End: 2020-07-27 | Stop reason: HOSPADM

## 2020-07-19 RX ORDER — PREDNISONE 20 MG/1
40 TABLET ORAL DAILY
Status: DISCONTINUED | OUTPATIENT
Start: 2020-07-21 | End: 2020-07-27 | Stop reason: HOSPADM

## 2020-07-19 RX ORDER — ACETAMINOPHEN 325 MG/1
650 TABLET ORAL EVERY 6 HOURS PRN
Status: DISCONTINUED | OUTPATIENT
Start: 2020-07-19 | End: 2020-07-27 | Stop reason: HOSPADM

## 2020-07-19 RX ORDER — HEPARIN SODIUM 10000 [USP'U]/100ML
18 INJECTION, SOLUTION INTRAVENOUS CONTINUOUS
Status: DISCONTINUED | OUTPATIENT
Start: 2020-07-19 | End: 2020-07-20

## 2020-07-19 RX ORDER — PROMETHAZINE HYDROCHLORIDE 25 MG/1
12.5 TABLET ORAL EVERY 6 HOURS PRN
Status: DISCONTINUED | OUTPATIENT
Start: 2020-07-19 | End: 2020-07-27 | Stop reason: HOSPADM

## 2020-07-19 RX ORDER — ACETAMINOPHEN 650 MG/1
650 SUPPOSITORY RECTAL EVERY 6 HOURS PRN
Status: DISCONTINUED | OUTPATIENT
Start: 2020-07-19 | End: 2020-07-27 | Stop reason: HOSPADM

## 2020-07-19 RX ORDER — HEPARIN SODIUM 1000 [USP'U]/ML
80 INJECTION, SOLUTION INTRAVENOUS; SUBCUTANEOUS ONCE
Status: COMPLETED | OUTPATIENT
Start: 2020-07-19 | End: 2020-07-19

## 2020-07-19 RX ORDER — FUROSEMIDE 10 MG/ML
40 INJECTION INTRAMUSCULAR; INTRAVENOUS 2 TIMES DAILY
Status: DISCONTINUED | OUTPATIENT
Start: 2020-07-19 | End: 2020-07-22

## 2020-07-19 RX ADMIN — HEPARIN SODIUM 7500 UNITS: 1000 INJECTION INTRAVENOUS; SUBCUTANEOUS at 08:07

## 2020-07-19 RX ADMIN — ALBUTEROL SULFATE 4 PUFF: 90 AEROSOL, METERED RESPIRATORY (INHALATION) at 00:02

## 2020-07-19 RX ADMIN — PROMETHAZINE HYDROCHLORIDE 12.5 MG: 25 TABLET ORAL at 22:49

## 2020-07-19 RX ADMIN — METOPROLOL SUCCINATE 25 MG: 25 TABLET, EXTENDED RELEASE ORAL at 08:07

## 2020-07-19 RX ADMIN — IPRATROPIUM BROMIDE AND ALBUTEROL SULFATE 1 AMPULE: .5; 3 SOLUTION RESPIRATORY (INHALATION) at 10:48

## 2020-07-19 RX ADMIN — PANTOPRAZOLE SODIUM 40 MG: 40 TABLET, DELAYED RELEASE ORAL at 18:21

## 2020-07-19 RX ADMIN — IPRATROPIUM BROMIDE AND ALBUTEROL SULFATE 1 AMPULE: .5; 3 SOLUTION RESPIRATORY (INHALATION) at 07:04

## 2020-07-19 RX ADMIN — Medication 6 MILLICURIE: at 03:25

## 2020-07-19 RX ADMIN — HEPARIN SODIUM AND DEXTROSE 18 UNITS/KG/HR: 10000; 5 INJECTION INTRAVENOUS at 08:08

## 2020-07-19 RX ADMIN — IPRATROPIUM BROMIDE AND ALBUTEROL SULFATE 1 AMPULE: .5; 3 SOLUTION RESPIRATORY (INHALATION) at 15:06

## 2020-07-19 RX ADMIN — FUROSEMIDE 40 MG: 10 INJECTION, SOLUTION INTRAMUSCULAR; INTRAVENOUS at 08:07

## 2020-07-19 RX ADMIN — METHYLPREDNISOLONE SODIUM SUCCINATE 40 MG: 40 INJECTION, POWDER, FOR SOLUTION INTRAMUSCULAR; INTRAVENOUS at 08:15

## 2020-07-19 RX ADMIN — METHYLPREDNISOLONE SODIUM SUCCINATE 40 MG: 40 INJECTION, POWDER, FOR SOLUTION INTRAMUSCULAR; INTRAVENOUS at 13:10

## 2020-07-19 RX ADMIN — HEPARIN SODIUM AND DEXTROSE 16 UNITS/KG/HR: 10000; 5 INJECTION INTRAVENOUS at 22:35

## 2020-07-19 RX ADMIN — SODIUM CHLORIDE, PRESERVATIVE FREE 10 ML: 5 INJECTION INTRAVENOUS at 08:15

## 2020-07-19 RX ADMIN — PANTOPRAZOLE SODIUM 40 MG: 40 TABLET, DELAYED RELEASE ORAL at 08:07

## 2020-07-19 RX ADMIN — ASPIRIN 81 MG: 81 TABLET, COATED ORAL at 08:07

## 2020-07-19 RX ADMIN — ASPIRIN 81 MG CHEWABLE TABLET 324 MG: 81 TABLET CHEWABLE at 00:35

## 2020-07-19 RX ADMIN — METHYLPREDNISOLONE SODIUM SUCCINATE 40 MG: 40 INJECTION, POWDER, FOR SOLUTION INTRAMUSCULAR; INTRAVENOUS at 18:21

## 2020-07-19 RX ADMIN — SPIRONOLACTONE 25 MG: 25 TABLET ORAL at 08:07

## 2020-07-19 RX ADMIN — IPRATROPIUM BROMIDE AND ALBUTEROL SULFATE 1 AMPULE: .5; 3 SOLUTION RESPIRATORY (INHALATION) at 20:06

## 2020-07-19 RX ADMIN — FUROSEMIDE 40 MG: 10 INJECTION, SOLUTION INTRAMUSCULAR; INTRAVENOUS at 18:21

## 2020-07-19 RX ADMIN — ATORVASTATIN CALCIUM 40 MG: 40 TABLET, FILM COATED ORAL at 08:07

## 2020-07-19 RX ADMIN — Medication 4 PUFF: at 00:02

## 2020-07-19 ASSESSMENT — PAIN SCALES - GENERAL
PAINLEVEL_OUTOF10: 0

## 2020-07-19 ASSESSMENT — PAIN SCALES - WONG BAKER
WONGBAKER_NUMERICALRESPONSE: 0
WONGBAKER_NUMERICALRESPONSE: 0

## 2020-07-19 NOTE — PROGRESS NOTES
Acute dyspnea with COPD exacerbatino and acute on chronic systolic and diastolic  CHF  trend trop, duoneb, steroid, aldactone, BB IV lasix  hold ACE with worsening JAIME  2LNC  CXR+  -on 2 L nasal cannula. CXR: Persistent loculated right pleural effusion with associated atelectasis    Hyponatremia  IV lasix  -Sodium of 131. JAIME on CKD 3  hold ACE and consult nephro  -creatinine 1.8. Elevated troponin  -Likely due to JAIME. Cardiology following. EKG sinus tachycardia. Microcytic anemia  -Check anemia panel. No gross bleeding. Hx of DVT  -hold xarelto and heparin drip with renal function   venous duplex negative for DVT. VQ scan intermediate probability for PE. Check d-dimer. Patient has been on anticoagulation for over a month.

## 2020-07-19 NOTE — CONSULTS
(ALDACTONE) tablet 25 mg, Daily  sodium chloride flush 0.9 % injection 10 mL, 2 times per day  sodium chloride flush 0.9 % injection 10 mL, PRN  acetaminophen (TYLENOL) tablet 650 mg, Q6H PRN    Or  acetaminophen (TYLENOL) suppository 650 mg, Q6H PRN  polyethylene glycol (GLYCOLAX) packet 17 g, Daily PRN  promethazine (PHENERGAN) tablet 12.5 mg, Q6H PRN    Or  ondansetron (ZOFRAN) injection 4 mg, Q6H PRN  methylPREDNISolone sodium (SOLU-MEDROL) injection 40 mg, Q6H    Followed by  Aric Mcmillan ON 7/21/2020] predniSONE (DELTASONE) tablet 40 mg, Daily  furosemide (LASIX) injection 40 mg, BID  ipratropium-albuterol (DUONEB) nebulizer solution 1 ampule, Q4H WA  heparin (porcine) injection 7,500 Units, PRN  heparin (porcine) injection 3,750 Units, PRN  heparin 25,000 units in dextrose 5% 250 mL infusion, Continuous      Current Facility-Administered Medications   Medication Dose Route Frequency Provider Last Rate Last Dose    aspirin EC tablet 81 mg  81 mg Oral Daily Daily Beckwith MD   81 mg at 07/19/20 0807    atorvastatin (LIPITOR) tablet 40 mg  40 mg Oral Daily Daily Beckwith MD   40 mg at 07/19/20 0807    metoprolol succinate (TOPROL XL) extended release tablet 25 mg  25 mg Oral Daily Daily Beckwith MD   25 mg at 07/19/20 0807    pantoprazole (PROTONIX) tablet 40 mg  40 mg Oral BID AC Daily Beckwith MD   40 mg at 07/19/20 3987    spironolactone (ALDACTONE) tablet 25 mg  25 mg Oral Daily Daily Beckwith MD   25 mg at 07/19/20 8522    sodium chloride flush 0.9 % injection 10 mL  10 mL Intravenous 2 times per day Daily Beckwith MD   10 mL at 07/19/20 0815    sodium chloride flush 0.9 % injection 10 mL  10 mL Intravenous PRN Daily Beckwith MD        acetaminophen (TYLENOL) tablet 650 mg  650 mg Oral Q6H PRN Daily Beckwith MD        Or    acetaminophen (TYLENOL) suppository 650 mg  650 mg Rectal Q6H PRN Daily Beckwith MD        polyethylene glycol (GLYCOLAX) packet 17 g  17 g Oral Daily PRN Daily Beckwith MD        promethazine (PHENERGAN) tablet 12.5 mg  12.5 mg Oral Q6H PRN Garth Hair MD        Or    ondansetron (ZOFRAN) injection 4 mg  4 mg Intravenous Q6H PRN Garth Hair MD        methylPREDNISolone sodium (SOLU-MEDROL) injection 40 mg  40 mg Intravenous Q6H Garth Hair MD   40 mg at 07/19/20 1310    Followed by   Wylene Socks ON 7/21/2020] predniSONE (DELTASONE) tablet 40 mg  40 mg Oral Daily Garth Hair MD        furosemide (LASIX) injection 40 mg  40 mg Intravenous BID Garth Hair MD   40 mg at 07/19/20 0807    ipratropium-albuterol (DUONEB) nebulizer solution 1 ampule  1 ampule Inhalation Q4H WA Garth Hair MD   1 ampule at 07/19/20 1506    heparin (porcine) injection 7,500 Units  80 Units/kg Intravenous PRN Garth Hair MD        heparin (porcine) injection 3,750 Units  40 Units/kg Intravenous PRN Garth Hair MD        heparin 25,000 units in dextrose 5% 250 mL infusion  18 Units/kg/hr Intravenous Continuous Garth Hair MD   Stopped at 07/19/20 1631     Review of Systems:   · Constitutional: No Fever or Weight Loss   · Eyes: No Decreased Vision  · ENT: No Headaches, Hearing Loss or Vertigo  · Cardiovascular: As per HPI  · Respiratory: As per HPI  · Gastrointestinal: No abdominal pain, appetite loss, blood in stools, constipation, diarrhea or heartburn  · Genitourinary: No dysuria, trouble voiding, or hematuria  · Musculoskeletal:  No gait disturbance, weakness or joint complaints  · Integumentary: No rash or pruritis  · Neurological: No TIA or stroke symptoms  · Psychiatric: No anxiety or depression  · Endocrine: No malaise, fatigue or temperature intolerance  · Hematologic/Lymphatic: No bleeding problems, blood clots or swollen lymph nodes  · Allergic/Immunologic: No nasal congestion or hives  All systems negative except as marked.         Physical Examination:    Vitals:    07/19/20 0704 07/19/20 0741 07/19/20 0802 07/19/20 1208   BP:  115/78  114/76   Pulse:  108 105 105   Resp:  18  26   Temp:  97.9 °F (36.6 °C) 97.6 °F (36.4 °C)   TempSrc:  Oral  Oral   SpO2: 100% 99%  100%   Weight:       Height:           General Appearance:  No distress, conversant    Constitutional:  Well developed, Well nourished, No acute distress, Non-toxic appearance. HENT:  Normocephalic, Atraumatic, Bilateral external ears normal, Oropharynx moist, No oral exudates, Nose normal. Neck- Normal range of motion, No tenderness, Supple, No stridor,no apical-carotid delay  Lymphatics : no palpable lymph nodes  Eyes:  PERRL, EOMI, Conjunctiva normal, No discharge. Respiratory:  Normal breath sounds, No respiratory distress, No wheezing, No chest tenderness. ,no use of accessory muscles, crackles Present  Cardiovascular: (PMI) apex non displaced,no lifts no thrills, ankle swelling Absent  , 1+, s1 and s2 audible,Murmur. Present, JVD not noted    Abdomen /GI:  Bowel sounds normal, Soft, No tenderness, No masses, No gross visceromegaly   :  No costovertebral angle tenderness   Musculoskeletal:  No edema, no tenderness, no deformities.  Back- no tenderness  Integument:  Well hydrated, no rash   Lymphatic:  No lymphadenopathy noted   Neurologic:  Alert & oriented x 3, CN 2-12 normal, normal motor function, normal sensory function, no focal deficits noted           Medical decision making and Data review:    Lab Review   Recent Labs     07/18/20 2150   WBC 8.5   HGB 8.8*   HCT 30.7*         Recent Labs     07/18/20 2150   *   K 4.2   CL 90*   CO2 26   BUN 31*   CREATININE 1.8*     Recent Labs     07/18/20 2150   AST 30   ALT 35   BILITOT 2.1*   ALKPHOS 146*     Recent Labs     07/18/20  2150 07/19/20  0756 07/19/20  1404   TROPONINT 0.053* 0.068* 0.029*       Recent Labs     07/18/20 2150   PROBNP 3,918*     Lab Results   Component Value Date    INR 1.10 07/08/2020    PROTIME 13.3 07/08/2020       EKG: (reviewed by myself)    ECHO:(reviewed by myself)    Chest Xray:(reviewed by myself)  Ct Abdomen Pelvis Wo Contrast Additional Contrast? relaxation atelectasis. 3. Mild cardiomegaly. 4. Anemia is likely given that the blood pool density of the heart is lower than that of the myocardium. Xr Abdomen (kub) (single Ap View)    Result Date: 7/19/2020  EXAMINATION: ONE SUPINE XRAY VIEW(S) OF THE ABDOMEN 7/19/2020 4:03 pm COMPARISON: CT dated July 3, 2020. HISTORY: ORDERING SYSTEM PROVIDED HISTORY: distended TECHNOLOGIST PROVIDED HISTORY: Reason for exam:->distended Acuity: Acute Type of Exam: Subsequent/Follow-up FINDINGS: No lines or tubes. Gas distended loops of small bowel are identified, measuring up to 4.0 cm. No obvious free air or pneumatosis. No acute osseous abnormality. Status post sternotomy. No acute osseous abnormality. 1. Gas distended loops of small bowel identified measuring up to 4.0 cm. Findings are nonspecific and may represent ileus, partial small bowel obstruction, or enteritis. Ct Chest Wo Contrast    Result Date: 7/3/2020  EXAMINATION: CT OF THE CHEST WITHOUT CONTRAST 7/3/2020 9:39 am TECHNIQUE: CT of the chest was performed without the administration of intravenous contrast. Multiplanar reformatted images are provided for review. Dose modulation, iterative reconstruction, and/or weight based adjustment of the mA/kV was utilized to reduce the radiation dose to as low as reasonably achievable. COMPARISON: 05/17/2020 HISTORY: ORDERING SYSTEM PROVIDED HISTORY: Follow up adenopathy TECHNOLOGIST PROVIDED HISTORY: Reason for exam:->Follow up adenopathy Reason for Exam: Follow up adenopathy Acuity: Acute Type of Exam: Initial Additional signs and symptoms: pt c/o sob FINDINGS: Mediastinum: Mediastinal adenopathy is again demonstrated, grossly similar in distribution to prior. For example, subcarinal lymph node measures approximately 1.8 cm in short axis, precarinal lymph node is approximately 1.5 cm in short axis, and right paratracheal lymph node is approximately 3.1 x 2.5 cm. Calcification of the thoracic aorta.   Coronary artery calcification. Hilar evaluation is limited given lack of contrast.  Thyroid is symmetric. No axillary adenopathy. Shotty axillary lymph nodes are again seen bilaterally. Status post median sternotomy. Lungs/pleura: Loculated pleural fluid is again demonstrated within the right major fissure inferiorly, similar to prior. Small amount of loculated fluid is also demonstrated about the periphery of the inferior right hemithorax. Calcified left lower lobe pulmonary nodules, in keeping with granulomatous disease. Unchanged 3 mm anterior left apical noncalcified pulmonary nodule. Scattered bandlike opacity and ground-glass opacity at the lung bases, likely atelectasis. 4 mm lingular pulmonary nodule is not significantly changed. Punctate nodule along the plane of the horizontal fissure is unchanged. No pneumothorax. Upper Abdomen: Small amount of free fluid about the margin of the liver. Calcified granulomas within the liver and spleen. 1.2 cm short axis left periaortic lymph node is similar to prior. Calcification of aorta. Soft Tissues/Bones: Degenerative change of the spine. No significant interval change in mediastinal adenopathy as compared to prior examination for which sarcoid, Castleman's disease, lymphoma, or metastatic disease are some differential considerations. No significant interval change in small loculated right basilar pleural effusion as compared to prior, sterility indeterminate. Scattered subcentimeter pulmonary nodules are not significantly changed. Atherosclerosis, including coronary artery calcification. Ct Hip Left W Wo Contrast    Result Date: 7/12/2020  EXAMINATION: CT OF THE LEFT HIP WITH AND WITHOUT CONTRAST 7/12/2020 6:32 pm TECHNIQUE: CT of the left hip was performed with and without the administration of intravenous contrast.  Multiplanar reformatted images are provided for review.  Dose modulation, iterative reconstruction, and/or weight based adjustment of the mA/kV was utilized to reduce the radiation dose to as low as reasonably achievable. COMPARISON: CT abdomen/pelvis 07/03/2020. HISTORY ORDERING SYSTEM PROVIDED HISTORY: Pain over left greater trochanter, no known injury TECHNOLOGIST PROVIDED HISTORY: Reason for exam:->Pain over left greater trochanter, no known injury Reason for Exam: pain left hip, onset x  days Acuity: Acute Type of Exam: Initial Additional signs and symptoms: pain left hip, onset x  days Relevant Medical/Surgical History: pain left hip, onset x  days  confirmed order with Dr. Maxwell Collet, states wants with and without contrast,, nki, severe pain FINDINGS: INTRAPELVIC CONTENTS: No ascites or free air. Multiple borderline enlarged external iliac chain lymph nodes. BODY WALL/MUSCULATURE:  Multiple borderline enlarged left inguinal lymph nodes. Left anterior pelvic and lateral hip body wall amorphous subcutaneous edema. No soft tissue gas foci or loculated fluid collection. No significant bursal distention. No intramuscular loculated fluid collection or extensive deep fascial edema. OSSEOUS STRUCTURES: Normal bone mineral density. Normal pubic symphysis alignment. The left hip demonstrates normal alignment with some mild nonuniform joint space narrowing and marginal osteophytosis. Superior acetabular rim subcortical cystic changes which may relate to chronic labral pathology. The left proximal femur is intact. There is no acute fracture. No suspicious sclerotic or lytic lesions. Mild greater trochanteric gluteal enthesopathy. 1. Nonspecific mild left anterolateral pelvic and hip subcutaneous soft tissue changes which may represent bland edema versus cellulitis. No evidence of a soft tissue abscess. 2. Borderline enlarged left iliac chain and inguinal lymph nodes which may be reactive in nature. 3. No evidence of bursitis or pyomyositis. 4. Normal left hip alignment with mild osteoarthritis. No acute osseous abnormality.  5. Findings suggesting left gluteal medius/minimus tendinopathy/enthesopathy. Nm Lung Scan Perfusion Only    Result Date: 7/19/2020  EXAMINATION: LUNG PERFUSION IMAGING 7/19/2020 3:23 am TECHNIQUE: Routine lung perfusion imaging was obtained after administration of 4.5 millicuries of XI-35X MAA. Ventilation images were not obtained. COMPARISON: 01/24/2020 HISTORY: ORDERING SYSTEM PROVIDED HISTORY: pe? TECHNOLOGIST PROVIDED HISTORY: Reason for exam:->pe? Reason for Exam: sob Acuity: Acute Type of Exam: Initial Relevant Medical/Surgical History: hx of blood clots, SOB FINDINGS: Perfusion at the lung bases is somewhat heterogeneous, right greater than left, similar to the prior study. No new perfusion defect is identified. No change in the perfusion pattern since 01/24/2020. Given that there is right basilar pleuroparenchymal disease and given the lack of ventilation imaging the post test chance for pulmonary embolus is felt to be intermediate. Xr Chest Portable    Result Date: 7/18/2020  EXAMINATION: ONE XRAY VIEW OF THE CHEST 7/18/2020 8:46 pm COMPARISON: Chest x-ray July 5, 2020 HISTORY: ORDERING SYSTEM PROVIDED HISTORY: SOB TECHNOLOGIST PROVIDED HISTORY: Reason for exam:->SOB Reason for Exam: SOB Acuity: Acute Type of Exam: Initial Mechanism of Injury: SOB Relevant Medical/Surgical History: Patient states has had SOB that started this morning. Patient states has history of CHF FINDINGS: Cardiac silhouette is enlarged but stable. Postoperative changes of CABG procedure and median sternotomy. Loculated pleural effusion redemonstrated on the right with associated atelectasis. Chronic underlying interstitial changes of the lungs. The left lung appears clear. No pneumothorax. Osseous structures appear stable. 1. Persistent loculated right pleural effusion and associated atelectasis.      Xr Chest Portable    Result Date: 7/6/2020  EXAMINATION: ONE XRAY VIEW OF THE CHEST 7/4/2020 5:45 am COMPARISON: Chest radiograph medications of all above medical condition listed as is. 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 *      Assessment: 52 y. o.year old with PMH of  has a past medical history of CAD (coronary artery disease), CHF (congestive heart failure) ([address] COPD (chronic obstructive pulmonary disease) ([facility] [address] Depression, Drug abuse ([facility] [address] HIGH CHOLESTEROL, Hypertension, MI (myocardial infarction) ([facility] [address] and S/P coronary artery bypass graft x 4. Plan and Recommendations:    Acute decompensated heart failure due to noncompliance has not had ICD placed in spite of having EF 20% due to concerns with noncompliance with medical therapy  Not on ACE due to abnormal creatinine  Abnormal troponin in setting of renal failure and CHF cardiac cath in 2018  CHF: Acute Systolic/ Diastolic decompensated heart failure. Appears to be volume overloaded . Agree with diuresis. We can try IV Lasix 40 mg BID. Depending on response we can titrate diuretics accordingly. Please continue [facility] recommended medical thearpy including Coreg/ Toprol XL  , ACE or R pressure held due to renal failure strict Is and Os and Daily weights. chf nurse consult  HTN: stable, continue present medications and add  He has significant anxiety issues consider psych evaluation  DVT prophylaxis if no contraindication  6. Dyslipidemia: continue statins   He is very high risk for recurrent admissions compliance is a major issue overall prognosis is very bad          Thank you  much for consult and giving us the opportunity in contributing in the care of this patient. Please feel free to call me for any questions.        Cheryl Wilhelm MD, 7/19/2020 4:47 PM

## 2020-07-19 NOTE — ED PROVIDER NOTES
Triage Chief Complaint:   Shortness of Breath and Leg Swelling    [place]:  Today in the ED I had the pleasure of caring for Radha Valdez who is a 52 y.o. male that presents today to the emergency department complaining of shortness of breath. Patient has a history of coronary artery disease, CHF, COPD, drug abuse.,  DVT. Patient believes he has a DVT in his left leg. As he has been having bruising in his left thigh that he noticed today. He endorses shortness of breath. States that he feels like he is full of fluid. He has been compliant with his diuretics. He denies any chest pain or palpitations.   Endorses significant dyspnea on exertion    ROS:  REVIEW OF SYSTEMS    At least 10 systems reviewed      All other review of systems are negative  See HPI and nursing notes for additional information       Past Medical History:   Diagnosis Date    CAD (coronary artery disease)     CHF (congestive heart failure) (HCC)     [facility]     Depression     Drug abuse ([facility] [facility]     HIGH CHOLESTEROL     Hypertension     MI (myocardial infarction) ([facility] [facility] 2011    S/P coronary artery bypass graft x 4 2011    CABG x 4 Dr Juliann Lefort     Past Surgical History:   Procedure Laterality Date    BYPASS GRAFT  04/10/2011    CABG x 4 Dr Nancy Joseph  11/2010     4 stents    LEG SURGERY       Family History   Problem Relation Age of Onset    Cancer Father     Heart Failure Father     Heart Disease Father     Diabetes Mother     Heart Disease Mother      Social History     Socioeconomic History    Marital status:      Spouse name: Not on file    Number of children: Not on file    Years of education: Not on file    Highest education level: Not on file   Occupational History    Not on file   Social Needs    Financial resource strain: Not on file    Food insecurity     Worry: Not on file     Inability: Not on file    Transportation needs     Medical: Not on file tablet Take 1 tablet by mouth 2 times daily (before meals) 60 tablet 0    aspirin 81 MG EC tablet Take 1 tablet by mouth daily 30 tablet 0    albuterol sulfate HFA (PROVENTIL HFA) 108 (90 Base) MCG/ACT inhaler Inhale 2 puffs into the lungs every 4 hours as needed for Wheezing or Shortness of Breath With spacer (and mask if indicated). Thanks. 1 Inhaler 1    ipratropium-albuterol (DUONEB) 0.5-2.5 (3) MG/3ML SOLN nebulizer solution Inhale 3 mLs into the lungs 4 times daily 360 mL 1    atorvastatin (LIPITOR) 40 MG tablet Take 1 tablet by mouth daily 30 tablet 0    metoprolol succinate (TOPROL XL) 25 MG extended release tablet Take 1 tablet by mouth daily 30 tablet 2     No Known Allergies    Nursing Notes Reviewed    Physical Exam:  ED Triage Vitals [07/18/20 2020]   Enc Vitals Group      /81      Pulse 103      Resp 15      Temp 97.9 °F (36.6 °C)      Temp Source Oral      SpO2 100 %      Weight 200 lb (90.7 kg)      Height 5' 11\" (1.803 m)      Head Circumference       Peak Flow       Pain Score       Pain Loc       Pain Edu? Excl. in [address]? General :Patient is awake alert oriented person place and time no acute distress nontoxic appearing  HEENT: Pupils are equally round and reactive to light extraocular motors are intact conjunctivae clear sclerae white there is no injection no icterus. Nose without any rhinorrhea or epistaxis. Oral mucosa is moist no exudate buccal mucosa shows no ulcerations. Uvula is midline    Neck: Neck is supple full range of motion trachea midline thyroid nonpalpable  Cardiac: Heart regular rate rhythm no murmurs rubs clicks or gallops  Lungs: Lungs are clear to auscultation there is no wheezing rhonchi or rales. There is no use of accessory muscles no nasal flaring identified. Chest wall: There is no tenderness to palpation over the chest wall or over ribs  Abdomen: Abdomen is soft nontender nondistended.  There is no firm or pulsatile masses no rebound rigidity or guarding negative Dsouza's negative McBurney, no peritoneal signs  Suprapubic:  there is no tenderness to palpation over the external bladder   Musculoskeletal: 5 out of 5 strength in all 4 extremities full flexion extension abduction and adduction supination pronation of all extremities and all digits. No obvious muscle atrophy is noted. No focal muscle deficits are appreciated  Dermatology: Skin is warm and dry there is no obvious abscesses lacerations or lesions noted  Psych: Mentation is grossly normal cognition is grossly normal. Affect is appropriate  Neuro: Motor intact sensory intact cranial nerves II through XII are intact level of consciousness is normal cerebellar function is normal reflexes are grossly normal. No evidence of incontinence or loss of bowel or bladder no saddle anesthesia noted Lymphatic: There is no submandibular or cervical adenopathy appreciated.         I have reviewed and interpreted all of the currently available lab results from this visit (if applicable):  Results for orders placed or performed during the hospital encounter of 07/18/20   CBC auto diff   Result Value Ref Range    WBC 8.5 4.0 - 10.5 K/CU MM    RBC 4.33 (L) 4.6 - 6.2 M/CU MM    Hemoglobin 8.8 (L) 13.5 - 18.0 GM/DL    Hematocrit 30.7 (L) 42 - 52 %    MCV 70.9 (L) 78 - 100 FL    MCH 20.3 (L) 27 - 31 PG    MCHC 28.7 (L) 32.0 - 36.0 %    RDW 27.1 (H) 11.7 - 14.9 %    Platelets 544 254 - 917 K/CU MM    Differential Type AUTOMATED DIFFERENTIAL     Segs Relative 74.6 (H) 36 - 66 %    Lymphocytes % 15.3 (L) 24 - 44 %    Monocytes % 6.2 (H) 0 - 4 %    Eosinophils % 1.5 0 - 3 %    Basophils % 0.6 0 - 1 %    Segs Absolute 6.3 K/CU MM    Lymphocytes Absolute 1.3 K/CU MM    Monocytes Absolute 0.5 K/CU MM    Eosinophils Absolute 0.1 K/CU MM    Basophils Absolute 0.1 K/CU MM    Nucleated RBC % 1.1 %    Total Nucleated RBC 0.1 K/CU MM    Total Immature Neutrophil 0.15 K/CU MM    Immature Neutrophil % 1.8 (H) 0 - 0.43 %      Radiographs (if obtained):  [] The following radiograph was interpreted by myself in the absence of a radiologist:   [] Radiologist's Report Reviewed:  XR CHEST PORTABLE   Final Result   1. Persistent loculated right pleural effusion and associated atelectasis. CTA PULMONARY W CONTRAST    (Results Pending)   VL Extremity Venous Left    (Results Pending)       EKG (if obtained):   Please See Note of attending physician for EKG interpretation. Chart review shows recent radiograph(s):  Ct Abdomen Pelvis Wo Contrast Additional Contrast? None    Result Date: 7/3/2020  EXAMINATION: CT OF THE ABDOMEN AND PELVIS WITHOUT CONTRAST 7/3/2020 9:17 pm TECHNIQUE: CT of the abdomen and pelvis was performed without the administration of intravenous contrast. Multiplanar reformatted images are provided for review. Dose modulation, iterative reconstruction, and/or weight based adjustment of the mA/kV was utilized to reduce the radiation dose to as low as reasonably achievable. COMPARISON: CT abdomen and pelvis 05/18/2020 HISTORY: ORDERING SYSTEM PROVIDED HISTORY: Acidosis, possible sepsis FINDINGS: Lower Chest: Bilateral pleural effusion with bibasilar relaxation atelectasis. Mild cardiomegaly. Anemia is likely given that the blood pool density of the heart is lower than that of the myocardium. Posterior mediastinal structures appear unremarkable. Organs: Hypoattenuation throughout the liver reflects hepatic steatosis. No discrete hepatic lesion or intrahepatic bile duct dilatation is seen. The gallbladder, kidneys, spleen, adrenal glands and pancreas appear unremarkable. GI/Bowel: No diffuse or focal bowel wall thickening evident. No inflammatory changes evident. No obstruction is seen. The appendix is not clearly visualized. Pelvis: Small to moderate volume pelvic ascites. Urinary bladder appears unremarkable. Prostate gland and seminal vesicles appear unremarkable. No adenopathy or pneumoperitoneum. Peritoneum/Retroperitoneum: Calcific atherosclerotic disease aorta. No aneurysm. Inferior vena cava appears unremarkable. No adenopathy evident with exception of the mildly enlarged portacaval lymph node, 11 mm short axis axial image 44, almost certainly reactive. Small volume abdominal ascites is present. Bones/Soft Tissues: No acute superficial soft tissue or osseous structure abnormality evident. 1. Abdominal and pelvic ascites possibly related to hepatic or renal disease, nonspecific finding. 2. Bilateral pleural effusion with bibasilar relaxation atelectasis. 3. Mild cardiomegaly. 4. Anemia is likely given that the blood pool density of the heart is lower than that of the myocardium. Ct Chest Wo Contrast    Result Date: 7/3/2020  EXAMINATION: CT OF THE CHEST WITHOUT CONTRAST 7/3/2020 9:39 am TECHNIQUE: CT of the chest was performed without the administration of intravenous contrast. Multiplanar reformatted images are provided for review. Dose modulation, iterative reconstruction, and/or weight based adjustment of the mA/kV was utilized to reduce the radiation dose to as low as reasonably achievable. COMPARISON: 05/17/2020 HISTORY: ORDERING SYSTEM PROVIDED HISTORY: Follow up adenopathy TECHNOLOGIST PROVIDED HISTORY: Reason for exam:->Follow up adenopathy Reason for Exam: Follow up adenopathy Acuity: Acute Type of Exam: Initial Additional signs and symptoms: pt c/o sob FINDINGS: Mediastinum: Mediastinal adenopathy is again demonstrated, grossly similar in distribution to prior. For example, subcarinal lymph node measures approximately 1.8 cm in short axis, precarinal lymph node is approximately 1.5 cm in short axis, and right paratracheal lymph node is approximately 3.1 x 2.5 cm. Calcification of the thoracic aorta. Coronary artery calcification. Hilar evaluation is limited given lack of contrast.  Thyroid is symmetric. No axillary adenopathy.   Shotty axillary lymph nodes are again seen bilaterally. Status post median sternotomy. Lungs/pleura: Loculated pleural fluid is again demonstrated within the right major fissure inferiorly, similar to prior. Small amount of loculated fluid is also demonstrated about the periphery of the inferior right hemithorax. Calcified left lower lobe pulmonary nodules, in keeping with granulomatous disease. Unchanged 3 mm anterior left apical noncalcified pulmonary nodule. Scattered bandlike opacity and ground-glass opacity at the lung bases, likely atelectasis. 4 mm lingular pulmonary nodule is not significantly changed. Punctate nodule along the plane of the horizontal fissure is unchanged. No pneumothorax. Upper Abdomen: Small amount of free fluid about the margin of the liver. Calcified granulomas within the liver and spleen. 1.2 cm short axis left periaortic lymph node is similar to prior. Calcification of aorta. Soft Tissues/Bones: Degenerative change of the spine. No significant interval change in mediastinal adenopathy as compared to prior examination for which sarcoid, Castleman's disease, lymphoma, or metastatic disease are some differential considerations. No significant interval change in small loculated right basilar pleural effusion as compared to prior, sterility indeterminate. Scattered subcentimeter pulmonary nodules are not significantly changed. Atherosclerosis, including coronary artery calcification. Ct Hip Left W Wo Contrast    Result Date: 7/12/2020  EXAMINATION: CT OF THE LEFT HIP WITH AND WITHOUT CONTRAST 7/12/2020 6:32 pm TECHNIQUE: CT of the left hip was performed with and without the administration of intravenous contrast.  Multiplanar reformatted images are provided for review. Dose modulation, iterative reconstruction, and/or weight based adjustment of the mA/kV was utilized to reduce the radiation dose to as low as reasonably achievable. COMPARISON: CT abdomen/pelvis 07/03/2020.  HISTORY ORDERING SYSTEM PROVIDED HISTORY: Pain over left greater trochanter, no known injury TECHNOLOGIST PROVIDED HISTORY: Reason for exam:->Pain over left greater trochanter, no known injury Reason for Exam: pain left hip, onset x  days Acuity: Acute Type of Exam: Initial Additional signs and symptoms: pain left hip, onset x  days Relevant Medical/Surgical History: pain left hip, onset x  days  confirmed order with Dr. Salvatore Benitez, states wants with and without contrast,, nki, severe pain FINDINGS: INTRAPELVIC CONTENTS: No ascites or free air. Multiple borderline enlarged external iliac chain lymph nodes. BODY WALL/MUSCULATURE:  Multiple borderline enlarged left inguinal lymph nodes. Left anterior pelvic and lateral hip body wall amorphous subcutaneous edema. No soft tissue gas foci or loculated fluid collection. No significant bursal distention. No intramuscular loculated fluid collection or extensive deep fascial edema. OSSEOUS STRUCTURES: Normal bone mineral density. Normal pubic symphysis alignment. The left hip demonstrates normal alignment with some mild nonuniform joint space narrowing and marginal osteophytosis. Superior acetabular rim subcortical cystic changes which may relate to chronic labral pathology. The left proximal femur is intact. There is no acute fracture. No suspicious sclerotic or lytic lesions. Mild greater trochanteric gluteal enthesopathy. 1. Nonspecific mild left anterolateral pelvic and hip subcutaneous soft tissue changes which may represent bland edema versus cellulitis. No evidence of a soft tissue abscess. 2. Borderline enlarged left iliac chain and inguinal lymph nodes which may be reactive in nature. 3. No evidence of bursitis or pyomyositis. 4. Normal left hip alignment with mild osteoarthritis. No acute osseous abnormality. 5. Findings suggesting left gluteal medius/minimus tendinopathy/enthesopathy.      Xr Chest Portable    Result Date: 7/18/2020  EXAMINATION: ONE XRAY VIEW OF THE CHEST 7/18/2020 8:46 pm COMPARISON: Chest x-ray July 5, 2020 HISTORY: ORDERING SYSTEM PROVIDED HISTORY: SOB TECHNOLOGIST PROVIDED HISTORY: Reason for exam:->SOB Reason for Exam: SOB Acuity: Acute Type of Exam: Initial Mechanism of Injury: SOB Relevant Medical/Surgical History: Patient states has had SOB that started this morning. Patient states has history of CHF FINDINGS: Cardiac silhouette is enlarged but stable. Postoperative changes of CABG procedure and median sternotomy. Loculated pleural effusion redemonstrated on the right with associated atelectasis. Chronic underlying interstitial changes of the lungs. The left lung appears clear. No pneumothorax. Osseous structures appear stable. 1. Persistent loculated right pleural effusion and associated atelectasis. Xr Chest Portable    Result Date: 7/6/2020  EXAMINATION: ONE XRAY VIEW OF THE CHEST 7/4/2020 5:45 am COMPARISON: Chest radiograph 07/02/2020, CT 07/03/2020 HISTORY: ORDERING SYSTEM PROVIDED HISTORY: sob TECHNOLOGIST PROVIDED HISTORY: Reason for exam:->sob Reason for Exam: sob Acuity: Acute Type of Exam: Initial FINDINGS: Heart is enlarged but similar in size to the prior study. Again seen is a loculated right basilar pleural effusion. No pneumothorax is seen. No new focal lung consolidation identified. Similar appearance to a loculated right basilar pleural effusion. No new focal lung consolidation identified. Xr Chest Portable    Result Date: 7/5/2020  EXAMINATION: ONE XRAY VIEW OF THE CHEST 7/5/2020 4:20 pm COMPARISON: Chest x-ray July 5, 2025 12 a.m. HISTORY: ORDERING SYSTEM PROVIDED HISTORY: for PICC placement TECHNOLOGIST PROVIDED HISTORY: Reason for exam:->for PICC placement Reason for Exam: for PICC placement FINDINGS: Cardiac silhouette is enlarged but stable. Left-sided PICC line now present with tip projecting over the superior vena cava. Redemonstration of small amount of loculated fluid at the right lung base.   Chronic appearing interstitial changes of the lungs. No pneumothorax identified. Osseous structures appear stable. 1. Left-sided PICC line now present with tip projecting over the superior vena cava. No pneumothorax identified. 2. Persistent loculated right pleural fluid. Xr Chest Portable    Result Date: 7/5/2020  EXAMINATION: ONE XRAY VIEW OF THE CHEST 7/5/2020 4:35 am COMPARISON: July 4, 2020. HISTORY: ORDERING SYSTEM PROVIDED HISTORY: follow up resp failure TECHNOLOGIST PROVIDED HISTORY: Reason for exam:->follow up resp failure Reason for Exam: follow up resp failure Acuity: Unknown Type of Exam: Subsequent/Follow-up Additional signs and symptoms: follow up resp failure Relevant Medical/Surgical History: follow up resp failure FINDINGS: Status post median sternotomy. Cardiac and mediastinal contours enlarged but unchanged. Slightly increase of pulmonary interstitial edema. Increase size of oval pulmonary opacity within the right lower hemithorax. Slightly increased left basilar pulmonary opacity. No evidence of enlarging layering pleural effusions. No evidence of pneumothorax. No new osseous abnormalities. 1. Slightly increased pulmonary interstitial edema. 2. Increased size of oval pulmonary opacity within right lower hemithorax, which may represent loculated fluid along the fissure. Slightly increased left basilar pulmonary opacity. RECOMMENDATION: Continued close interval radiographic follow-up. Xr Chest Portable    Result Date: 7/2/2020  EXAMINATION: ONE XRAY VIEW OF THE CHEST 7/2/2020 4:29 am COMPARISON: 06/06/2020 HISTORY: ORDERING SYSTEM PROVIDED HISTORY: chest pain TECHNOLOGIST PROVIDED HISTORY: Reason for exam:->chest pain Reason for Exam: chest pain Acuity: Acute Type of Exam: Initial Mechanism of Injury: chest pain Relevant Medical/Surgical History: chest pain FINDINGS: The cardiac silhouette appears enlarged for size given portable technique. No convincing evidence of a focal consolidation.   No pneumothorax seen. Small loculated effusion again right major fissure. No acute cardiopulmonary abnormality. MDM:   Patient presents today to the ED with shortness of breath. History of CHF. Patient is noted to be a bit short of breath here in the ED however no acute respiratory distress. Patient is noted to have a significant metabolic panel for creatinine of 1.8. Sodium 131. A troponin of 0.05. As well as elevated BNP at point patient's labs are significant for a metabolic panel with a creatinine of 1.8, troponin of 0.053, elevated BNP at 3900. Due to patient's increased creatinine clearance and CT angiogram of the lungs was ordered to spare renal function. VQ scan ordered instead. To make sure patient has no pulmonary embolism as he is concern for DVT. And he is short of breath. Perfusion scan ordered at this time. Patient will be admitted and have perfusion status performed in the morning upon admission. On-call physicianWas consulted regarding patient. After thorough discussion regarding patient's history, physical exam.  laboratory values, radiographic evidence (if applicable  theymyself as well as my attending physician agreed Given the patient's presenting concerns, medical history and clinical findings, the patient will be admitted at this time to undergo further evaluation and disposition. . During patient's entire stay in the ED patient remained stable and comfortable. Analgesia is well-controlled. Patient will be admitted for all information regarding ongoing management and care of patient please see note of Admitting physician. All EKG interpretations are performed by Attending physician         I independently managed patient today in the ED. /81   Pulse 111   Temp 97.9 °F (36.6 °C) (Oral)   Resp 15   Ht 5' 11\" (1.803 m)   Wt 200 lb (90.7 kg)   SpO2 100%   BMI 27.89 kg/m²       Clinical Impression:  1. JAIME (acute kidney injury) ([address]    2.  Congestive heart failure, unspecified HF chronicity, unspecified heart failure type ([facility] [address]    3. Elevated troponin          Disposition referral (if applicable):  No follow-up provider specified. Disposition medications (if applicable):  New Prescriptions    No medications on file         Comment: Please note this report has been produced using speech recognition software and may contain errors related to that system including errors in grammar, punctuation, and spelling, as well as words and phrases that may be inappropriate. If there are any questions or concerns please feel free to contact the dictating provider for clarification.       Fredrick Myles, [address]  07/29/20 2012

## 2020-07-19 NOTE — CONSULTS
[address] [place] [address], [address] [place] [address]  Phone: [phone]  Office Hours: 8:30AM - 4:30PM  Monday - Friday     Nephrology Service Consultation    Patient:  Renetta Riley  MRN: [id number]  Consulting physician:  Licha Barnhart MD  Reason for Consult: JAIME on CKD3 hyponatremia  History Obtained From:  patient, electronic medical record  PCP: No primary care provider on file.     HISTORY OF PRESENT ILLNESS:   The patient is a 52 y.o. male who presents with increasing SOA-\"dont trust any doctor but had no choice but to come in\"  Weight up  Was here 2 weeks ago for similar issues  \"Making urine but not enough\"  Has COPD \"I am 52years old and I  know how to deal with it\"  Patient of Dr Sarah Reynoso my partner    Past Medical History:        Diagnosis Date    CAD (coronary artery disease)     [facility]     COPD [facility]chronic obstructive pulmonary disease) [facility]     Depression     Drug abuse ([facility]     HIGH CHOLESTEROL     Hypertension     MI (myocardial infarction) ([facility] 2011    S/P coronary artery bypass graft x 4 2011    CABG x 4 Dr Balbir Roca       Past Surgical History:        Procedure Laterality Date    BYPASS GRAFT  04/10/2011    CABG x 4 Dr Angelo Escoto  11/2010     4 stents    LEG SURGERY         Medications:   Scheduled Meds:   aspirin  81 mg Oral Daily    atorvastatin  40 mg Oral Daily    metoprolol succinate  25 mg Oral Daily    pantoprazole  40 mg Oral BID AC    spironolactone  25 mg Oral Daily    sodium chloride flush  10 mL Intravenous 2 times per day    methylPREDNISolone  40 mg Intravenous Q6H    Followed by   Jeni Rai ON 7/21/2020] predniSONE  40 mg Oral Daily    furosemide  40 mg Intravenous BID    ipratropium-albuterol  1 ampule Inhalation Q4H WA     Continuous Infusions:   heparin (porcine) 18 Units/kg/hr (07/19/20 0808)     PRN Meds:.sodium chloride flush, acetaminophen **OR** acetaminophen, polyethylene glycol, promethazine **OR** hours.  -----------------------------------------------------------------      Assessment and Recommendations     Patient Active Problem List   Diagnosis Code    Essential hypertension I10    Osteoarthritis M19.90    COPD (chronic obstructive pulmonary disease) ([facility] J44.9    Paresthesia of left leg R20.2    Abdominal hernia K46.9    Left shoulder pain M25.512    Crushing injury of right hand S67. 21XA    Rotator cuff tendinitis M75.80    Midline low back pain without sciatica M54.5    History of MI (myocardial infarction) I25.2    Coronary artery disease involving coronary bypass graft of native heart without angina pectoris I25.810    Mixed hyperlipidemia E78.2    Depression F32.9    Chronic midline low back pain without sciatica M54.5, G89.29    Tobacco abuse Z72.0    Gastroesophageal reflux disease without esophagitis K21.9    Opiate abuse, continuous ([facility]) F11.10    Heroin dependence ([facility]) F11.20    ST elevation myocardial infarction involving left circumflex coronary artery ([facility]) I21.21    STEMI (ST elevation myocardial infarction) ([facility]) I21.3    Acute on chronic combined systolic (congestive) and diastolic (congestive) heart failure ([facility]) R72.68    Systolic and diastolic CHF w/reduced LV function, NYHA class 4 ([facility]) I50.40    NSTEMI (non-ST elevated myocardial infarction) ([facility]) I21.4    Acute on chronic congestive heart failure ([facility]) I50.9    Noncompliance Z91.19    Pulmonary edema, acute ([facility]) J81.0    Acute kidney injury ([facility] N17.9    Acute respiratory failure with hypoxia and hypercapnia ([facility]) J96.01, J96.02    Hypertensive emergency I16.1    Ischemic cardiomyopathy I25.5    Heart failure ([facility]) I50.9    Left ventricular systolic dysfunction X44.1    Acute systolic congestive heart failure ([facility]) I50.21    SOB (shortness of breath) R06.02    Acute on chronic combined systolic and diastolic heart failure ([facility]) I50.43    Hyponatremia E87.1    Hematemesis K92.0    Acute on chronic systolic CHF (congestive heart failure) ([facility]) I50.23    Dyspnea R06.00   IMP  JAIME on CKD3 d- Cardiorenal syndrome likely  CKD3 from nephrosclerosis  Hyponatremia drom fluid overload  Suggest  Continue decongestive therapy  Repeat BMP  Avoid nephrotoxics  May need counselling or OP therapy regarding fluid intake and salt intake and how to avoid readmission- may benefit from CHF educator consult  Dr Nahed Sanchez will follow in am    Alondra Oliveros MD

## 2020-07-19 NOTE — H&P
Department of Internal Medicine  hospitalist  Attending History and Physical      CHIEF COMPLAINT:  Short of breath    Reason for Admission:  COPD/CHF exacerbation    History Obtained From:  patient    HISTORY OF PRESENT ILLNESS:      The patient is a 52 y.o. male with significant past medical history of COPD, combined CHF, HTN presents as he has been short of breath today and has noticed some increased weight gain. He did have a cough and coughed \"up fluid\" but now it is dry being in the ER. Denies any fevers or chills. No chest pain. In ER he was placed on Bipap but pt refusing this upon my examinatino    Past Medical History:        Diagnosis Date    CAD (coronary artery disease)     CHF (congestive heart failure) (H[facility])     [facility]) [facility]     Depression     Drug abuse ([facility]     HIGH CHOLESTEROL     Hypertension     MI (myocardial infarction) ([facility] 2011    S/P coronary artery bypass graft x 4 2011    CABG x 4 Dr Nicky Jenkins     Past Surgical History:        Procedure Laterality Date    BYPASS GRAFT  04/10/2011    CABG x 4 Dr Kristel Shields  11/2010     4 stents    LEG SURGERY       ImMedications Prior to Admission:    No current facility-administered medications on file prior to encounter. Current Outpatient Medications on File Prior to Encounter   Medication Sig Dispense Refill    methylPREDNISolone (MEDROL, LUPE,) 4 MG tablet Take by mouth.  1 kit 0    sulfamethoxazole-trimethoprim (BACTRIM DS) 800-160 MG per tablet Take 1 tablet by mouth 2 times daily for 10 days 20 tablet 0    rivaroxaban (XARELTO) 20 MG TABS tablet Take 20 mg by mouth      lisinopril (PRINIVIL;ZESTRIL) 2.5 MG tablet Take 1 tablet by mouth daily 30 tablet 3    bumetanide (BUMEX) 2 MG tablet Take 1 tablet by mouth 2 times daily 60 tablet 2    spironolactone (ALDACTONE) 25 MG tablet Take 1 tablet by mouth daily 30 tablet 3    pantoprazole (PROTONIX) 40 MG tablet Take 1 tablet by mouth 2 times daily (before meals) 60 tablet 0    aspirin 81 MG EC tablet Take 1 tablet by mouth daily 30 tablet 0    albuterol sulfate HFA (PROVENTIL HFA) 108 (90 Base) MCG/ACT inhaler Inhale 2 puffs into the lungs every 4 hours as needed for Wheezing or Shortness of Breath With spacer (and mask if indicated). Thanks. 1 Inhaler 1    ipratropium-albuterol (DUONEB) 0.5-2.5 (3) MG/3ML SOLN nebulizer solution Inhale 3 mLs into the lungs 4 times daily 360 mL 1    atorvastatin (LIPITOR) 40 MG tablet Take 1 tablet by mouth daily 30 tablet 0    metoprolol succinate (TOPROL XL) 25 MG extended release tablet Take 1 tablet by mouth daily 30 tablet 2         Allergies:  Patient has no known allergies.     Social History:   Social History     Socioeconomic History    Marital status:      Spouse name: Not on file    Number of children: Not on file    Years of education: Not on file    Highest education level: Not on file   Occupational History    Not on file   Social Needs    Financial resource strain: Not on file    Food insecurity     Worry: Not on file     Inability: Not on file    Transportation needs     Medical: Not on file     Non-medical: Not on file   Tobacco Use    Smoking status: Current Every Day Smoker     Packs/day: 1.00     Years: 20.00     Pack years: 20.00     Types: Cigarettes    Smokeless tobacco: Former User    Tobacco comment: Reviewed 10/9/17   Substance and Sexual Activity    Alcohol use: No     Alcohol/week: 0.0 standard drinks    Drug use: No     Comment: march 2018 states he quit    Sexual activity: Not Currently   Lifestyle    Physical activity     Days per week: Not on file     Minutes per session: Not on file    Stress: Not on file   Relationships    Social connections     Talks on phone: Not on file     Gets together: Not on file     Attends Zoroastrian service: Not on file     Active member of club or organization: Not on file     Attends meetings of clubs or [address] PLAN:    Acute dyspnea with COPD exacerbatino and acute on chronic systolic and diastolic  CHF  -trend trop, duoneb, steroid, aldactone, BB IV lasix  -hold ACE with worsening JAIME  Hyponatremia  -IV lasix  JAIME on CKD 3  -hold ACE and consult nephro  Hx of DVT  -hold xarelto and heparin drip with renal function

## 2020-07-19 NOTE — PROGRESS NOTES
Skin assessment complete with Marrianne Mcburney, NA. Patient's skin is dry and intact; no open wounds or pressure areas noted upon assessment. Will continue to assess and monitor.

## 2020-07-20 LAB
ADENOVIRUS DETECTION BY PCR: NOT DETECTED
ALBUMIN SERPL-MCNC: 4.1 GM/DL (ref 3.4–5)
ALP BLD-CCNC: 144 IU/L (ref 40–129)
ALT SERPL-CCNC: 40 U/L (ref 10–40)
ANION GAP SERPL CALCULATED.3IONS-SCNC: 19 MMOL/L (ref 4–16)
APTT: 117.6 SECONDS (ref 25.1–37.1)
APTT: 83.4 SECONDS (ref 25.1–37.1)
APTT: 87.4 SECONDS (ref 25.1–37.1)
AST SERPL-CCNC: 31 IU/L (ref 15–37)
BILIRUB SERPL-MCNC: 3.3 MG/DL (ref 0–1)
BILIRUBIN DIRECT: 2.4 MG/DL (ref 0–0.3)
BILIRUBIN, INDIRECT: 0.9 MG/DL (ref 0–0.7)
BORDETELLA PARAPERTUSSIS BY PCR: NOT DETECTED
BORDETELLA PERTUSSIS PCR: NOT DETECTED
BUN BLDV-MCNC: 38 MG/DL (ref 6–23)
CALCIUM SERPL-MCNC: 9.2 MG/DL (ref 8.3–10.6)
CHLAMYDOPHILA PNEUMONIA PCR: NOT DETECTED
CHLORIDE BLD-SCNC: 84 MMOL/L (ref 99–110)
CO2: 20 MMOL/L (ref 21–32)
CORONAVIRUS 229E PCR: NOT DETECTED
CORONAVIRUS HKU1 PCR: NOT DETECTED
CORONAVIRUS NL63 PCR: NOT DETECTED
CORONAVIRUS OC43 PCR: NOT DETECTED
CREAT SERPL-MCNC: 1.5 MG/DL (ref 0.9–1.3)
D DIMER: 284 NG/ML(DDU)
GFR AFRICAN AMERICAN: >60 ML/MIN/1.73M2
GFR NON-AFRICAN AMERICAN: 50 ML/MIN/1.73M2
GLUCOSE BLD-MCNC: 246 MG/DL (ref 70–99)
HCT VFR BLD CALC: 28.2 % (ref 42–52)
HEMOGLOBIN: 8.5 GM/DL (ref 13.5–18)
HUMAN METAPNEUMOVIRUS PCR: NOT DETECTED
INFLUENZA A BY PCR: NOT DETECTED
INFLUENZA A H1 (2009) PCR: NOT DETECTED
INFLUENZA A H1 PANDEMIC PCR: NOT DETECTED
INFLUENZA A H3 PCR: NOT DETECTED
INFLUENZA B BY PCR: NOT DETECTED
MCH RBC QN AUTO: 20.6 PG (ref 27–31)
MCHC RBC AUTO-ENTMCNC: 30.1 % (ref 32–36)
MCV RBC AUTO: 68.3 FL (ref 78–100)
MYCOPLASMA PNEUMONIAE PCR: NOT DETECTED
PARAINFLUENZA 1 PCR: NOT DETECTED
PARAINFLUENZA 2 PCR: NOT DETECTED
PARAINFLUENZA 3 PCR: NOT DETECTED
PARAINFLUENZA 4 PCR: NOT DETECTED
PDW BLD-RTO: 26.6 % (ref 11.7–14.9)
PLATELET # BLD: 211 K/CU MM (ref 140–440)
POTASSIUM SERPL-SCNC: 4.4 MMOL/L (ref 3.5–5.1)
RBC # BLD: 4.13 M/CU MM (ref 4.6–6.2)
RHINOVIRUS ENTEROVIRUS PCR: NOT DETECTED
RSV PCR: NOT DETECTED
SODIUM BLD-SCNC: 123 MMOL/L (ref 135–145)
TOTAL PROTEIN: 6.9 GM/DL (ref 6.4–8.2)
WBC # BLD: 6.7 K/CU MM (ref 4–10.5)

## 2020-07-20 PROCEDURE — 85730 THROMBOPLASTIN TIME PARTIAL: CPT

## 2020-07-20 PROCEDURE — 6360000002 HC RX W HCPCS: Performed by: HOSPITALIST

## 2020-07-20 PROCEDURE — 99232 SBSQ HOSP IP/OBS MODERATE 35: CPT | Performed by: INTERNAL MEDICINE

## 2020-07-20 PROCEDURE — 85027 COMPLETE CBC AUTOMATED: CPT

## 2020-07-20 PROCEDURE — APPSS15 APP SPLIT SHARED TIME 0-15 MINUTES: Performed by: NURSE PRACTITIONER

## 2020-07-20 PROCEDURE — 2580000003 HC RX 258: Performed by: HOSPITALIST

## 2020-07-20 PROCEDURE — 87798 DETECT AGENT NOS DNA AMP: CPT

## 2020-07-20 PROCEDURE — 99231 SBSQ HOSP IP/OBS SF/LOW 25: CPT | Performed by: SURGERY

## 2020-07-20 PROCEDURE — 94640 AIRWAY INHALATION TREATMENT: CPT

## 2020-07-20 PROCEDURE — 80053 COMPREHEN METABOLIC PANEL: CPT

## 2020-07-20 PROCEDURE — 85379 FIBRIN DEGRADATION QUANT: CPT

## 2020-07-20 PROCEDURE — 36415 COLL VENOUS BLD VENIPUNCTURE: CPT

## 2020-07-20 PROCEDURE — 87633 RESP VIRUS 12-25 TARGETS: CPT

## 2020-07-20 PROCEDURE — 87581 M.PNEUMON DNA AMP PROBE: CPT

## 2020-07-20 PROCEDURE — 94660 CPAP INITIATION&MGMT: CPT

## 2020-07-20 PROCEDURE — 87486 CHLMYD PNEUM DNA AMP PROBE: CPT

## 2020-07-20 PROCEDURE — 6370000000 HC RX 637 (ALT 250 FOR IP): Performed by: HOSPITALIST

## 2020-07-20 PROCEDURE — 2140000000 HC CCU INTERMEDIATE R&B

## 2020-07-20 PROCEDURE — 94761 N-INVAS EAR/PLS OXIMETRY MLT: CPT

## 2020-07-20 PROCEDURE — 2700000000 HC OXYGEN THERAPY PER DAY

## 2020-07-20 PROCEDURE — 82248 BILIRUBIN DIRECT: CPT

## 2020-07-20 RX ADMIN — RIVAROXABAN 20 MG: 20 TABLET, FILM COATED ORAL at 17:51

## 2020-07-20 RX ADMIN — FUROSEMIDE 40 MG: 10 INJECTION, SOLUTION INTRAMUSCULAR; INTRAVENOUS at 09:24

## 2020-07-20 RX ADMIN — IPRATROPIUM BROMIDE AND ALBUTEROL SULFATE 1 AMPULE: .5; 3 SOLUTION RESPIRATORY (INHALATION) at 07:22

## 2020-07-20 RX ADMIN — METHYLPREDNISOLONE SODIUM SUCCINATE 40 MG: 40 INJECTION, POWDER, FOR SOLUTION INTRAMUSCULAR; INTRAVENOUS at 12:15

## 2020-07-20 RX ADMIN — METOPROLOL SUCCINATE 25 MG: 25 TABLET, EXTENDED RELEASE ORAL at 09:24

## 2020-07-20 RX ADMIN — FUROSEMIDE 40 MG: 10 INJECTION, SOLUTION INTRAMUSCULAR; INTRAVENOUS at 17:47

## 2020-07-20 RX ADMIN — IPRATROPIUM BROMIDE AND ALBUTEROL SULFATE 1 AMPULE: .5; 3 SOLUTION RESPIRATORY (INHALATION) at 20:40

## 2020-07-20 RX ADMIN — ATORVASTATIN CALCIUM 40 MG: 40 TABLET, FILM COATED ORAL at 09:24

## 2020-07-20 RX ADMIN — HEPARIN SODIUM AND DEXTROSE 16 UNITS/KG/HR: 10000; 5 INJECTION INTRAVENOUS at 03:25

## 2020-07-20 RX ADMIN — SODIUM CHLORIDE, PRESERVATIVE FREE 10 ML: 5 INJECTION INTRAVENOUS at 21:37

## 2020-07-20 RX ADMIN — METHYLPREDNISOLONE SODIUM SUCCINATE 40 MG: 40 INJECTION, POWDER, FOR SOLUTION INTRAMUSCULAR; INTRAVENOUS at 06:09

## 2020-07-20 RX ADMIN — LORAZEPAM 0.5 MG: 0.5 TABLET ORAL at 17:51

## 2020-07-20 RX ADMIN — METHYLPREDNISOLONE SODIUM SUCCINATE 40 MG: 40 INJECTION, POWDER, FOR SOLUTION INTRAMUSCULAR; INTRAVENOUS at 19:05

## 2020-07-20 RX ADMIN — IPRATROPIUM BROMIDE AND ALBUTEROL SULFATE 1 AMPULE: .5; 3 SOLUTION RESPIRATORY (INHALATION) at 16:41

## 2020-07-20 RX ADMIN — LORAZEPAM 0.5 MG: 0.5 TABLET ORAL at 01:55

## 2020-07-20 RX ADMIN — IPRATROPIUM BROMIDE AND ALBUTEROL SULFATE 1 AMPULE: .5; 3 SOLUTION RESPIRATORY (INHALATION) at 11:17

## 2020-07-20 RX ADMIN — IPRATROPIUM BROMIDE AND ALBUTEROL SULFATE 1 AMPULE: .5; 3 SOLUTION RESPIRATORY (INHALATION) at 01:46

## 2020-07-20 RX ADMIN — METHYLPREDNISOLONE SODIUM SUCCINATE 40 MG: 40 INJECTION, POWDER, FOR SOLUTION INTRAMUSCULAR; INTRAVENOUS at 01:09

## 2020-07-20 RX ADMIN — SPIRONOLACTONE 25 MG: 25 TABLET ORAL at 09:24

## 2020-07-20 RX ADMIN — PANTOPRAZOLE SODIUM 40 MG: 40 TABLET, DELAYED RELEASE ORAL at 06:04

## 2020-07-20 RX ADMIN — SODIUM CHLORIDE, PRESERVATIVE FREE 10 ML: 5 INJECTION INTRAVENOUS at 09:24

## 2020-07-20 RX ADMIN — ASPIRIN 81 MG: 81 TABLET, COATED ORAL at 09:24

## 2020-07-20 RX ADMIN — PANTOPRAZOLE SODIUM 40 MG: 40 TABLET, DELAYED RELEASE ORAL at 16:26

## 2020-07-20 ASSESSMENT — PAIN SCALES - WONG BAKER

## 2020-07-20 ASSESSMENT — PAIN SCALES - GENERAL
PAINLEVEL_OUTOF10: 0
PAINLEVEL_OUTOF10: 0

## 2020-07-20 NOTE — CARE COORDINATION
LSW reviewed chart/into room to initiate discharge planning. FLORENCIOW introduced self and role of LSW. Pt is from home with kids. Pt has access to transport. Pt is not on oxygen at home. Pt has no PCP at this time. LSW to put the PCP on Pt discharge paperwork Pt has a nebulizer. Pt does not have home care. Pt reported that he can afford Rx when the dr prescribes reasonably priced Rx. Pt discharge plan is to return home no needs.      Electronically signed by CHANTAL Santizo on 7/20/2020 at 3:56 PM

## 2020-07-20 NOTE — PROGRESS NOTES
Nephrology Progress Note        [address], [address] [facility], [facility]  Phone: [phone]  Office Hours: 8:30AM - 4:30PM  Monday - Friday       [facility]  Claudia Leyva MD  [address], [address],  [place], [facility]  PHONE: [phone]  FAX: [phone]    7/20/2020 7:25 AM  Subjective:   Admit Date: 7/18/2020  PCP: No primary care provider on file. Interval History: on nc  Nonoliguric  Does not follow up with any physicians once discharged, reports trust issues    Diet: DIET CARDIAC;      Data:   Scheduled Meds:   aspirin  81 mg Oral Daily    atorvastatin  40 mg Oral Daily    metoprolol succinate  25 mg Oral Daily    pantoprazole  40 mg Oral BID AC    spironolactone  25 mg Oral Daily    sodium chloride flush  10 mL Intravenous 2 times per day    methylPREDNISolone  40 mg Intravenous Q6H    Followed by   George Davis ON 7/21/2020] predniSONE  40 mg Oral Daily    furosemide  40 mg Intravenous BID    ipratropium-albuterol  1 ampule Inhalation Q4H WA     Continuous Infusions:   heparin (porcine) 16 Units/kg/hr (07/20/20 0325)     PRN Meds:sodium chloride flush, acetaminophen **OR** acetaminophen, polyethylene glycol, promethazine **OR** ondansetron, heparin (porcine), heparin (porcine), LORazepam  I/O last 3 completed shifts: In: 480 [P.O.:480]  Out: 2600 [Urine:2600]  No intake/output data recorded.     Intake/Output Summary (Last 24 hours) at 7/20/2020 0725  Last data filed at 7/20/2020 0654  Gross per 24 hour   Intake 480 ml   Output 2600 ml   Net -2120 ml       CBC:   Recent Labs     07/18/20 2150 07/20/20  0541   WBC 8.5 6.7   HGB 8.8* 8.5*     --        BMP:    Recent Labs     07/18/20 2150   *   K 4.2   CL 90*   CO2 26   BUN 31*   CREATININE 1.8*   GLUCOSE 120*     Hepatic:   Recent Labs     07/18/20 2150   AST 30   ALT 35   BILITOT 2.1*   ALKPHOS 146*     Troponin: No results for input(s): TROPONINI in the last 72 hours. BNP: No results for input(s): BNP in the last 72 hours. Lipids: No results for input(s): CHOL, HDL in the last 72 hours. Invalid input(s): LDLCALCU  ABGs:   Lab Results   Component Value Date    PO2ART 97 07/06/2020    WUP5BMF 35.0 07/06/2020     INR: No results for input(s): INR in the last 72 hours. Objective:   Vitals: /77   Pulse 102   Temp 98.3 °F (36.8 °C) (Oral)   Resp 20   Ht 5' 11\" (1.803 m)   Wt 207 lb 10.8 oz (94.2 kg)   SpO2 99%   BMI 28.96 kg/m²   General appearance: alert and cooperative with exam, in no acute distress  HEENT: normocephalic, atraumatic,   Neck: supple, trachea midline  Lungs: , breathing comfortably on nc  Heart[de-identified] regular rate and rhythm,    Abdomen: soft, non-tender; non distended,  Extremities: 1+ ble edema  Neurologic: alert, oriented, follows commands, interactive    Assessment and Plan:     Patient Active Problem List   Diagnosis Code    Essential hypertension I10    Osteoarthritis M19.90    COPD [facility]ch[facility]) [facility] J44.9    Paresthesia of left leg R20.2    Abdominal hernia K46.9    Left shoulder pain M25.512    Crushing injury of right hand S67. 21XA    Rotator cuff tendinitis M75.80    Midline low back pain without sciatica M54.5    History of MI (myocardial infarction) I25.2    Coronary artery disease involving coronary bypass graft of native heart without angina pectoris I25.810    Mixed hyperlipidemia E78.2    Depression F32.9    Chronic midline low back pain without sciatica M54.5, G89.29    Tobacco abuse Z72.0    Gastroesophageal reflux disease without esophagitis K21.9    Opiate abuse, continuous ([facility] F11.10    Heroin dependence ([facility]) F11.20    ST elevation myocardial infarction involving left circumflex coronary artery ([facility] I21.21    STEMI (ST elevation myocardial infarction) ([facility] I21.3    Acute on chronic combined systolic (congestive) and diastolic (congestive) heart failure ([facility] V22.31    Systolic and diastolic CHF w/reduced LV function, NYHA class 4 ([facility]) I50.40    NSTEMI (non-ST elevated myocardial infarction) ([facility]) I21.4    Acute on chronic congestive heart failure ([facility]) I50.9    Noncompliance Z91.19    Pulmonary edema, acute ([facility]) J81.0    Acute kidney injury ([facility] N17.9    Acute respiratory failure with hypoxia and hypercapnia ([facility]) J96.01, J96.02    Hypertensive emergency I16.1    Ischemic cardiomyopathy I25.5    Heart failure ([facility]) I50.9    Left ventricular systolic dysfunction G12.6    Acute systolic congestive heart failure ([facility]) I50.21    SOB (shortness of breath) R06.02    Acute on chronic combined systolic and diastolic heart failure ([facility]) I50.43    Hyponatremia E87.1    Hematemesis K92.0    Acute on chronic systolic CHF (congestive heart failure) ([facility]) I50.23    Dyspnea R06.00   IMP  - JAIME on CKD3 d- Cardiorenal syndrome likely  - CKD3 from nephrosclerosis  - Hyponatremia from fluid overload  - CHF    Suggest  - Continue decongestive therapy  - BMP pending  - Avoid nephrotoxics  - May need counselling or OP therapy regarding fluid intake and salt intake and how to avoid readmission- may benefit from CHF educator consult  - Discussed the need to at least follow up with his cardiologist as outpt, even if he does not trust physicians                           Electronically signed by Pierce Vela DO on 7/20/2020 at 7:25 AM    Dieter Pradhan [address], [place] [address]  PHONE: [phone]  FAX: [phone]

## 2020-07-20 NOTE — PROGRESS NOTES
[facility] PROGRESS NOTE  Date: 7/20/2020   Name: Darshan Gunter   MRN: [id number]   YOB: 1970  Chief Complaint   Patient presents with    Shortness of Breath    Leg Swelling        Subjective/Interval Hx:     -He said he feels that he has fluid in his abdomen, he has had some dry heaves, no vomiting. No BM yet  -still feeling short of breath    ROS: negative unless mentioned above  Objective:   Physical Exam:   /83   Pulse 108   Temp 98 °F (36.7 °C) (Oral)   Resp 26   Ht 5' 11\" (1.803 m)   Wt 207 lb 10.8 oz (94.2 kg)   SpO2 99%   BMI 28.96 kg/m²   CONSTITUTIONAL:  No acute distress  HENT:  NCAT  LUNGS:  On bipap, seems more clear to auscultation  CARDIOVASCULAR:  s1s2 tachycardic  ABDOMEN:  Soft nt nd   MUSCULOSKELETAL/Extremities:  Trace edema in LE  NEUROLOGIC:  No gross deficits, aao  SKIN:  No gross lesions    Assessment/Plan:     1. Acute dyspnea with COPD exacerbatino and acute on chronic systolic and diastolic CHF  Duoneb, steroid, aldactone, BB, IV lasix  hold ACE with worsening JAIME  On 2L nasal cannula. CXR: Persistent loculated right pleural effusion with associated atelectasis   -On BiPAP. Consult pulmonology.  -2 L output. Monitor I's and O's and daily weights. Check Echo.   2. Hyponatremia  -On Aldactone and Lasix. Sodium decreased to 123. Nephrology following. 3. JAIME on CKD 3  hold ACE and consult nephro  -creatinine improved to 1.5. Nephrology following. 4. Elevated troponin  Likely due to JAIME. Cardiology following. EKG sinus tachycardia. -Troponins trending down. 5. Microcytic anemia  No gross bleeding.  -Hemoglobin 8.5.  6. Hx of DVT  Venous duplex negative for DVT. VQ scan intermediate probability for PE. Patient has been on anticoagulation for over a month. -creatinine clearance greater than 30. Stop heparin drip and restart home Xarelto. D-dimer 284. Not significantly elevated. 7. Hyperbilirubinemia  -check right upper quadrant ultrasound.   No right upper quadrant abdominal tenderness. 8. Nausea and dry heaves  -KUB: Distended loops of small bowel, ileus versus partial small bowel obstruction versus enteritis    DVT Prophylaxis: xarelto  Diet: DIET CARDIAC; Daily Fluid Restriction: 1500 ml  Code Status: Full Code      Dispo:  -Need breathing to improve. Zachary Cortez MD  7/20/2020    Meds:   Meds:    aspirin  81 mg Oral Daily    atorvastatin  40 mg Oral Daily    metoprolol succinate  25 mg Oral Daily    pantoprazole  40 mg Oral BID AC    spironolactone  25 mg Oral Daily    sodium chloride flush  10 mL Intravenous 2 times per day    methylPREDNISolone  40 mg Intravenous Q6H    Followed by   Ara Hurst ON 7/21/2020] predniSONE  40 mg Oral Daily    furosemide  40 mg Intravenous BID    ipratropium-albuterol  1 ampule Inhalation Q4H WA      Infusions:    heparin (porcine) 14 Units/kg/hr (07/20/20 0748)     PRN Meds: sodium chloride flush, 10 mL, PRN  acetaminophen, 650 mg, Q6H PRN    Or  acetaminophen, 650 mg, Q6H PRN  polyethylene glycol, 17 g, Daily PRN  promethazine, 12.5 mg, Q6H PRN    Or  ondansetron, 4 mg, Q6H PRN  heparin (porcine), 80 Units/kg, PRN  heparin (porcine), 40 Units/kg, PRN  LORazepam, 0.5 mg, Q6H PRN        Data/Labs:     Recent Labs     07/18/20 2150 07/20/20  0541   WBC 8.5 6.7   HGB 8.8* 8.5*   HCT 30.7* 28.2*    211      Recent Labs     07/18/20 2150 07/20/20  0541   * 123*   K 4.2 4.4   CL 90* 84*   CO2 26 20*   BUN 31* 38*   CREATININE 1.8* 1.5*     Recent Labs     07/18/20 2150 07/20/20  0541   AST 30 31   ALT 35 40   BILIDIR  --  2.4*   BILITOT 2.1* 3.3*   ALKPHOS 146* 144*     No results for input(s): INR in the last 72 hours. Recent Labs     07/18/20 2150 07/19/20  0756 07/19/20  1404   TROPONINT 0.053* 0.068* 0.029*     HgBA1c:   Lab Results   Component Value Date    LABA1C 7.1 05/27/2020     CALCIUM:  9.2/20 (07/20 0541)    I/O last 3 completed shifts:   In: 480 [P.O.:480]  Out: 2600 [Urine:2600]    Intake/Output Summary (Last 24 hours) at 7/20/2020 1324  Last data filed at 7/20/2020 0654  Gross per 24 hour   Intake 480 ml   Output 1950 ml   Net -1470 ml

## 2020-07-20 NOTE — PLAN OF CARE
Problem: Falls - Risk of:  Goal: Will remain free from falls  Description: Will remain free from falls  7/20/2020 1149 by Shiraz Hagen RN  Outcome: Ongoing  7/20/2020 0351 by Shanel Blanton LPN  Outcome: Ongoing  Goal: Absence of physical injury  Description: Absence of physical injury  7/20/2020 1149 by Shiraz Hagen RN  Outcome: Ongoing  7/20/2020 0351 by Shanel Blanton LPN  Outcome: Ongoing     Problem: Infection:  Goal: Will remain free from infection  Description: Will remain free from infection  Outcome: Ongoing     Problem: Safety:  Goal: Free from accidental physical injury  Description: Free from accidental physical injury  Outcome: Ongoing  Goal: Free from intentional harm  Description: Free from intentional harm  Outcome: Ongoing     Problem: Daily Care:  Goal: Daily care needs are met  Description: Daily care needs are met  Outcome: Ongoing     Problem: Pain:  Goal: Patient's pain/discomfort is manageable  Description: Patient's pain/discomfort is manageable  Outcome: Ongoing     Problem: Skin Integrity:  Goal: Skin integrity will stabilize  Description: Skin integrity will stabilize  Outcome: Ongoing     Problem: Discharge Planning:  Goal: Patients continuum of care needs are met  Description: Patients continuum of care needs are met  Outcome: Ongoing

## 2020-07-20 NOTE — PROGRESS NOTES
Cardiology Note    Admit Date:  7/18/2020    Admission diagnosis / Complaint: CHF      Subjective:  Mr. Bobo Ma resting in bed. Denies cardiac complaints      Assessment and Plan:    1. Acute decompensated heart failure due to noncompliance has not had ICD placed in spite of having EF 20% due to concerns with noncompliance with medical therapy  2. Not on ACE due to abnormal creatinine  3. Abnormal troponin in setting of renal failure and CHF cardiac cath in 2018  4. CHF: Acute Systolic/ Diastolic decompensated heart failure. Appears to be volume overloaded . Agree with diuresis. Continue IV Lasix 40 mg BID. Depending on response we can titrate diuretics accordingly. -2.1L fluid balance yesterday. please continue Gudelines recommended medical thearpy including Coreg/ Toprol XL  , ACE or ARB pressure held due to renal failure strict Is and Os and Daily weights. chf nurse consult  5. HTN: stable, continue present medications and add  6. He has significant anxiety issues consider psych evaluation  7. DVT prophylaxis if no contraindication  6. Dyslipidemia: continue statins   He is very high risk for recurrent admissions compliance is a major issue overall prognosis is very bad    Objective:   /81   Pulse 102   Temp 97.4 °F (36.3 °C) (Oral)   Resp 24   Ht 5' 11\" (1.803 m)   Wt 207 lb 10.8 oz (94.2 kg)   SpO2 100%   BMI 28.96 kg/m²       Intake/Output Summary (Last 24 hours) at 7/20/2020 1016  Last data filed at 7/20/2020 0654  Gross per 24 hour   Intake 480 ml   Output 1950 ml   Net -1470 ml        TELEMETRY: Sinus  Tachycardia     has a past medical history of CAD (coronary artery disease), CHF (congestive heart failure) ([address] COPD (chronic obstructive pulmonary disease) ([address] Depression, Drug abuse ([address] HIGH CHOLESTEROL, Hypertension, MI (myocardial infarction) ([address] and S/P coronary artery bypass graft x 4.   has a past surgical history that includes Cardiac surgery (11/2010);  Bypass Graft (04/10/2011); and Leg Surgery. Physical Exam:  General:  Awake, alert, NAD  Skin:  Warm and dry  Neck:  JVD not appreciated  Chest: diminished breath sounds, respiration easy  Cardiovascular:  RRR S1S2  Abdomen:  Soft nontender  Extremities:  ++ edema    Medications:    aspirin  81 mg Oral Daily    atorvastatin  40 mg Oral Daily    metoprolol succinate  25 mg Oral Daily    pantoprazole  40 mg Oral BID AC    spironolactone  25 mg Oral Daily    sodium chloride flush  10 mL Intravenous 2 times per day    methylPREDNISolone  40 mg Intravenous Q6H    Followed by   Alyssa Zacarias ON 7/21/2020] predniSONE  40 mg Oral Daily    furosemide  40 mg Intravenous BID    ipratropium-albuterol  1 ampule Inhalation Q4H WA      heparin (porcine) 14 Units/kg/hr (07/20/20 0748)     sodium chloride flush, acetaminophen **OR** acetaminophen, polyethylene glycol, promethazine **OR** ondansetron, heparin (porcine), heparin (porcine), LORazepam    Lab Data:  CBC:   Recent Labs     07/18/20 2150 07/20/20  0541   WBC 8.5 6.7   HGB 8.8* 8.5*   HCT 30.7* 28.2*   MCV 70.9* 68.3*    211     BMP:   Recent Labs     07/18/20 2150 07/20/20  0541   * 123*   K 4.2 4.4   CL 90* 84*   CO2 26 20*   BUN 31* 38*   CREATININE 1.8* 1.5*     LIVER PROFILE:   Recent Labs     07/18/20 2150 07/20/20  0541   AST 30 31   ALT 35 40   BILIDIR  --  2.4*   BILITOT 2.1* 3.3*   ALKPHOS 146* 144*     PT/INR: No results for input(s): PROTIME, INR in the last 72 hours. APTT:   Recent Labs     07/19/20  1928 07/20/20  0112 07/20/20  0541   APTT 56.6* 87.4* 117.6*               NANCY Espinoza-CNP 7/20/2020 10:16 AM   CARDIOLOGY ATTENDING ADDENDUM    I have seen ,spoken to  and examined this patient personally, independently of the nurse practitioner. I have reviewed the hospital care given to date and reviewed all pertinent labs and imaging. The plan was developed mutually at the time of the visit with the patient,  NP   and myself.  I have spoken with patient, nursing staff and provided written and verbal instructions . The above note has been reviewed and I agree with the assessment, diagnosis, and treatment plan with changes made by me as follows     HPI:  I have reviewed the above HPI  And agree with above   Babs Lew is a 52 y. o.year old who and presents with had concerns including Shortness of Breath and Leg Swelling. Chief Complaint   Patient presents with    Shortness of Breath    Leg Swelling     Please review addendum/changes made to note above   Interval history: No new complaints but not very happy about being here    Physical Exam:  General:  Awake, alert, NAD  Head:normal  Eye:normal  Neck:  No JVD   Chest:  Clear to auscultation, respiration easy  Cardiovascular:  S1 and S2 audible, No added heart sounds, No signs of ankle edema, or volume overload, No evidence of JVD, No crackles  Abdomen:   nontender  Extremities:  NO* edema  Pulses; palpable  Neuro: grossly normal      MEDICAL DECISION MAKING;    I agree with the above plan, which was planned by myself and discussed with NP.   Continue diuresis  Restart Xarelto  No ACE due to renal failure  Volume overload pleural effusion which is loculated may benefit from tap      Ann Marie Ordoñez MD [facility] [place] 07/20/20

## 2020-07-21 ENCOUNTER — APPOINTMENT (OUTPATIENT)
Dept: ULTRASOUND IMAGING | Age: 50
DRG: 292 | End: 2020-07-21
Payer: MEDICARE

## 2020-07-21 LAB
ANION GAP SERPL CALCULATED.3IONS-SCNC: 19 MMOL/L (ref 4–16)
APTT: 41.1 SECONDS (ref 25.1–37.1)
BUN BLDV-MCNC: 48 MG/DL (ref 6–23)
CALCIUM SERPL-MCNC: 9.6 MG/DL (ref 8.3–10.6)
CHLORIDE BLD-SCNC: 81 MMOL/L (ref 99–110)
CO2: 21 MMOL/L (ref 21–32)
CREAT SERPL-MCNC: 1.7 MG/DL (ref 0.9–1.3)
GFR AFRICAN AMERICAN: 52 ML/MIN/1.73M2
GFR NON-AFRICAN AMERICAN: 43 ML/MIN/1.73M2
GLUCOSE BLD-MCNC: 310 MG/DL (ref 70–99)
HCT VFR BLD CALC: 28 % (ref 42–52)
HEMOGLOBIN: 8.2 GM/DL (ref 13.5–18)
LV EF: 13 %
LVEF MODALITY: NORMAL
MCH RBC QN AUTO: 20.4 PG (ref 27–31)
MCHC RBC AUTO-ENTMCNC: 29.3 % (ref 32–36)
MCV RBC AUTO: 69.7 FL (ref 78–100)
PDW BLD-RTO: 26.5 % (ref 11.7–14.9)
PLATELET # BLD: ABNORMAL K/CU MM (ref 140–440)
POTASSIUM SERPL-SCNC: 4.3 MMOL/L (ref 3.5–5.1)
PROCALCITONIN: 0.27
RBC # BLD: 4.02 M/CU MM (ref 4.6–6.2)
SODIUM BLD-SCNC: 121 MMOL/L (ref 135–145)
WBC # BLD: 8.5 K/CU MM (ref 4–10.5)

## 2020-07-21 PROCEDURE — 2580000003 HC RX 258: Performed by: HOSPITALIST

## 2020-07-21 PROCEDURE — 94660 CPAP INITIATION&MGMT: CPT

## 2020-07-21 PROCEDURE — 94640 AIRWAY INHALATION TREATMENT: CPT

## 2020-07-21 PROCEDURE — 6360000002 HC RX W HCPCS: Performed by: HOSPITALIST

## 2020-07-21 PROCEDURE — APPNB45 APP NON BILLABLE 31-45 MINUTES: Performed by: NURSE PRACTITIONER

## 2020-07-21 PROCEDURE — 6370000000 HC RX 637 (ALT 250 FOR IP): Performed by: HOSPITALIST

## 2020-07-21 PROCEDURE — 99233 SBSQ HOSP IP/OBS HIGH 50: CPT | Performed by: INTERNAL MEDICINE

## 2020-07-21 PROCEDURE — 87070 CULTURE OTHR SPECIMN AEROBIC: CPT

## 2020-07-21 PROCEDURE — 84145 PROCALCITONIN (PCT): CPT

## 2020-07-21 PROCEDURE — 80048 BASIC METABOLIC PNL TOTAL CA: CPT

## 2020-07-21 PROCEDURE — 94761 N-INVAS EAR/PLS OXIMETRY MLT: CPT

## 2020-07-21 PROCEDURE — 85730 THROMBOPLASTIN TIME PARTIAL: CPT

## 2020-07-21 PROCEDURE — 6370000000 HC RX 637 (ALT 250 FOR IP): Performed by: PHYSICIAN ASSISTANT

## 2020-07-21 PROCEDURE — 76705 ECHO EXAM OF ABDOMEN: CPT

## 2020-07-21 PROCEDURE — 85027 COMPLETE CBC AUTOMATED: CPT

## 2020-07-21 PROCEDURE — 36415 COLL VENOUS BLD VENIPUNCTURE: CPT

## 2020-07-21 PROCEDURE — 93306 TTE W/DOPPLER COMPLETE: CPT

## 2020-07-21 PROCEDURE — 2140000000 HC CCU INTERMEDIATE R&B

## 2020-07-21 PROCEDURE — 2700000000 HC OXYGEN THERAPY PER DAY

## 2020-07-21 PROCEDURE — 87205 SMEAR GRAM STAIN: CPT

## 2020-07-21 RX ORDER — LORAZEPAM 0.5 MG/1
0.5 TABLET ORAL ONCE
Status: COMPLETED | OUTPATIENT
Start: 2020-07-21 | End: 2020-07-21

## 2020-07-21 RX ADMIN — LORAZEPAM 0.5 MG: 0.5 TABLET ORAL at 00:16

## 2020-07-21 RX ADMIN — ONDANSETRON 4 MG: 2 INJECTION INTRAMUSCULAR; INTRAVENOUS at 21:44

## 2020-07-21 RX ADMIN — SPIRONOLACTONE 25 MG: 25 TABLET ORAL at 10:05

## 2020-07-21 RX ADMIN — IPRATROPIUM BROMIDE AND ALBUTEROL SULFATE 1 AMPULE: .5; 3 SOLUTION RESPIRATORY (INHALATION) at 20:39

## 2020-07-21 RX ADMIN — PANTOPRAZOLE SODIUM 40 MG: 40 TABLET, DELAYED RELEASE ORAL at 17:42

## 2020-07-21 RX ADMIN — PROMETHAZINE HYDROCHLORIDE 12.5 MG: 25 TABLET ORAL at 00:16

## 2020-07-21 RX ADMIN — IPRATROPIUM BROMIDE AND ALBUTEROL SULFATE 1 AMPULE: .5; 3 SOLUTION RESPIRATORY (INHALATION) at 23:50

## 2020-07-21 RX ADMIN — LORAZEPAM 0.5 MG: 0.5 TABLET ORAL at 22:43

## 2020-07-21 RX ADMIN — ASPIRIN 81 MG: 81 TABLET, COATED ORAL at 10:05

## 2020-07-21 RX ADMIN — SODIUM CHLORIDE, PRESERVATIVE FREE 10 ML: 5 INJECTION INTRAVENOUS at 10:05

## 2020-07-21 RX ADMIN — IPRATROPIUM BROMIDE AND ALBUTEROL SULFATE 1 AMPULE: .5; 3 SOLUTION RESPIRATORY (INHALATION) at 01:27

## 2020-07-21 RX ADMIN — METOPROLOL SUCCINATE 25 MG: 25 TABLET, EXTENDED RELEASE ORAL at 10:05

## 2020-07-21 RX ADMIN — PREDNISONE 40 MG: 20 TABLET ORAL at 10:07

## 2020-07-21 RX ADMIN — IPRATROPIUM BROMIDE AND ALBUTEROL SULFATE 1 AMPULE: .5; 3 SOLUTION RESPIRATORY (INHALATION) at 12:45

## 2020-07-21 RX ADMIN — METHYLPREDNISOLONE SODIUM SUCCINATE 40 MG: 40 INJECTION, POWDER, FOR SOLUTION INTRAMUSCULAR; INTRAVENOUS at 00:16

## 2020-07-21 RX ADMIN — SODIUM CHLORIDE, PRESERVATIVE FREE 10 ML: 5 INJECTION INTRAVENOUS at 21:53

## 2020-07-21 RX ADMIN — FUROSEMIDE 40 MG: 10 INJECTION, SOLUTION INTRAMUSCULAR; INTRAVENOUS at 17:42

## 2020-07-21 RX ADMIN — IPRATROPIUM BROMIDE AND ALBUTEROL SULFATE 1 AMPULE: .5; 3 SOLUTION RESPIRATORY (INHALATION) at 16:40

## 2020-07-21 RX ADMIN — PANTOPRAZOLE SODIUM 40 MG: 40 TABLET, DELAYED RELEASE ORAL at 05:41

## 2020-07-21 RX ADMIN — RIVAROXABAN 20 MG: 20 TABLET, FILM COATED ORAL at 17:42

## 2020-07-21 RX ADMIN — IPRATROPIUM BROMIDE AND ALBUTEROL SULFATE 1 AMPULE: .5; 3 SOLUTION RESPIRATORY (INHALATION) at 08:38

## 2020-07-21 RX ADMIN — ATORVASTATIN CALCIUM 40 MG: 40 TABLET, FILM COATED ORAL at 10:05

## 2020-07-21 RX ADMIN — FUROSEMIDE 40 MG: 10 INJECTION, SOLUTION INTRAMUSCULAR; INTRAVENOUS at 10:05

## 2020-07-21 ASSESSMENT — PAIN SCALES - WONG BAKER
WONGBAKER_NUMERICALRESPONSE: 0

## 2020-07-21 ASSESSMENT — PAIN SCALES - GENERAL
PAINLEVEL_OUTOF10: 0

## 2020-07-21 NOTE — PROGRESS NOTES
Nephrology Progress Note        [address], [address] [facility], [facility] [address]  Phone: [phone]  Office Hours: 8:30AM - 4:30PM  Monday - Friday       [facility]  Kathe Hernandez MD  [id number],   [address],  [place], [facility] [address]  PHONE: [phone]  FAX: [phone]    7/21/2020 8:34 AM  Subjective:   Admit Date: 7/18/2020  PCP: No primary care provider on file. Interval History: on bipap  1550ml urine yesterday    Diet: DIET CARDIAC; Daily Fluid Restriction: 1500 ml      Data:   Scheduled Meds:   rivaroxaban  20 mg Oral Daily    aspirin  81 mg Oral Daily    atorvastatin  40 mg Oral Daily    metoprolol succinate  25 mg Oral Daily    pantoprazole  40 mg Oral BID AC    spironolactone  25 mg Oral Daily    sodium chloride flush  10 mL Intravenous 2 times per day    predniSONE  40 mg Oral Daily    furosemide  40 mg Intravenous BID    ipratropium-albuterol  1 ampule Inhalation Q4H WA     Continuous Infusions:  PRN Meds:sodium chloride flush, acetaminophen **OR** acetaminophen, polyethylene glycol, promethazine **OR** ondansetron  I/O last 3 completed shifts:  In: -   Out: 1550 [Urine:1550]  No intake/output data recorded. Intake/Output Summary (Last 24 hours) at 7/21/2020 0834  Last data filed at 7/20/2020 2228  Gross per 24 hour   Intake --   Output 1550 ml   Net -1550 ml       CBC:   Recent Labs     07/18/20 2150 07/20/20  0541 07/21/20  0525   WBC 8.5 6.7 8.5   HGB 8.8* 8.5* 8.2*    211  --        BMP:    Recent Labs     07/18/20 2150 07/20/20  0541 07/21/20  0525   * 123* 121*   K 4.2 4.4 4.3   CL 90* 84* 81*   CO2 26 20* 21   BUN 31* 38* 48*   CREATININE 1.8* 1.5* 1.7*   GLUCOSE 120* 246* 310*     Hepatic:   Recent Labs     07/18/20 2150 07/20/20  0541   AST 30 31   ALT 35 40   BILITOT 2.1* 3.3*   ALKPHOS 146* 144*     Troponin: No results for input(s): TROPONINI in the last 72 hours.   BNP: No results for input(s): BNP in the last 72 hours. Lipids: No results for input(s): CHOL, HDL in the last 72 hours. Invalid input(s): LDLCALCU  ABGs:   Lab Results   Component Value Date    PO2ART 97 07/06/2020    YUN2KWJ 35.0 07/06/2020     INR: No results for input(s): INR in the last 72 hours. Objective:   Vitals: BP (!) 135/94   Pulse 106   Temp 97.9 °F (36.6 °C) (Axillary)   Resp 21   Ht 5' 11\" (1.803 m)   Wt 207 lb 10.8 oz (94.2 kg)   SpO2 98%   BMI 28.96 kg/m²   General appearance:  in no acute distress  HEENT: normocephalic, atraumatic,   Neck: supple, trachea midline  Lungs: , breathing comfortably on bipap  Abdomen: s; non distended,  Extremities:ble edema      Assessment and Plan:     Patient Active Problem List   Diagnosis Code    Essential hypertension I10    Osteoarthritis M19.90    COPD (chronic obstructive pulmonary disease) ([facility]) J44.9    Paresthesia of left leg R20.2    Abdominal hernia K46.9    Left shoulder pain M25.512    Crushing injury of right hand S67. 21XA    Rotator cuff tendinitis M75.80    Midline low back pain without sciatica M54.5    History of MI (myocardial infarction) I25.2    Coronary artery disease involving coronary bypass graft of native heart without angina pectoris I25.810    Mixed hyperlipidemia E78.2    Depression F32.9    Chronic midline low back pain without sciatica M54.5, G89.29    Tobacco abuse Z72.0    Gastroesophageal reflux disease without esophagitis K21.9    Opiate abuse, continuous ([facility]) F11.10    Heroin dependence ([facility]) F11.20    ST elevation myocardial infarction involving left circumflex coronary artery ([facility]) I21.21    STEMI (ST elevation myocardial infarction) ([facility]) I21.3    Acute on chronic combined systolic (congestive) and diastolic (congestive) heart failure ([facility]) Z33.40    Systolic and diastolic CHF w/reduced LV function, NYHA class 4 ([facility]) I50.40    NSTEMI (non-ST elevated myocardial infarction) ([facility]) I21.4    Acute on chronic congestive heart failure (HCC) I50.9    Noncompliance Z91.19    Pulmonary edema, acute (HCC) J81.0    Acute kidney injury ([facility] N17.9    Acute respiratory failure with hypoxia and hypercapnia ([facility]) J96.01, J96.02    Hypertensive emergency I16.1    Ischemic cardiomyopathy I25.5    Heart failure ([facility]) I50.9    Left ventricular systolic dysfunction A16.5    Acute systolic congestive heart failure (HCC) I50.21    SOB (shortness of breath) R06.02    Acute on chronic combined systolic and diastolic heart failure (HCC) I50.43    Hyponatremia E87.1    Hematemesis K92.0    Acute on chronic systolic CHF (congestive heart failure) ([facility]) I50.23    Dyspnea R06.00   IMP  - JAIME on CKD3 d- Cardiorenal syndrome likely  - CKD3 from nephrosclerosis  - Hyponatremia from fluid overload  - CHF     Suggest  - corrected sodium 124, cr 1.7  - Continue decongestive therapy  - Avoid nephrotoxics  - May need counselling or OP therapy regarding fluid intake and salt intake and how to avoid readmission- may benefit from CHF educator consult                                        Electronically signed by Ron Bryant DO on 7/21/2020 at 8:34 AM    [address],  [facility]  [place], [place] [address]  PHONE: [phone]  FAX: [phone]

## 2020-07-21 NOTE — PROGRESS NOTES
Hospitalist Progress Note      Name:  Ruiz Gomes /Age/Sex: 1970  (52 y.o. male)   MRN & CSN:  [id number] & [id number] Admission Date/Time: 2020  9:34 PM   Location:  [address] [place] [address] PCP: No primary care provider on file. Ruiz Gomes is a 52 y.o.  male  who presents with Shortness of Breath and Leg Swelling      Assessment and Plan:   1. Acute dyspnea with COPD exacerbatinon and acute on chronic systolic and diastolic CHF  Duoneb, steroids changed to oral now, aldactone, BB, IV lasix  hold ACE with worsening JAIME  On 2L nasal cannula.  CXR: Persistent loculated right pleural effusion with associated atelectasis   -On BiPAP. Consult pulmonology. -3.6L output. Monitor I's and O's and daily weights. Check Echo pending  2. Hyponatremia  -On Aldactone and Lasix. Nephrology following. 3. JAIME on CKD 3  hold ACE and consult nephro  -Nephrology following. 4. Elevated troponin  Likely due to JAIME.  Cardiology following.  EKG sinus tachycardia. -Troponins trending down. 5. Microcytic anemia  No gross bleeding.  - monitor  6. Hx of DVT  Venous duplex negative for DVT.  VQ scan intermediate probability for PE. Patient has been on anticoagulation for over a month. -creatinine clearance greater than 30. Stop heparin drip and restart home Xarelto. D-dimer 284. Not significantly elevated. 7. Hyperbilirubinemia  -check right upper quadrant ultrasound:no evidence of   cholelithiasis or pericholecystic fluid. Levada Casillas is a negative sonographic Dsouza's sign     -  No right upper quadrant abdominal tenderness. 8. Nausea and dry heaves  -KUB: Distended loops of small bowel, ileus versus partial small bowel obstruction versus enteritis     - surgery consulted, no surgical intervention needed at this time     DVT Prophylaxis: xarelto  Diet: DIET CARDIAC; Daily Fluid Restriction: 1500 ml  Code Status: Full Code        Dispo:  -Need breathing to improve.             Diet DIET CARDIAC; Daily Fluid Restriction: 1500 ml   Code Status Full Code     Medications:   Medications:    rivaroxaban  20 mg Oral Daily    aspirin  81 mg Oral Daily    atorvastatin  40 mg Oral Daily    metoprolol succinate  25 mg Oral Daily    pantoprazole  40 mg Oral BID AC    spironolactone  25 mg Oral Daily    sodium chloride flush  10 mL Intravenous 2 times per day    predniSONE  40 mg Oral Daily    furosemide  40 mg Intravenous BID    ipratropium-albuterol  1 ampule Inhalation Q4H WA      Infusions:   PRN Meds: sodium chloride flush, 10 mL, PRN  acetaminophen, 650 mg, Q6H PRN    Or  acetaminophen, 650 mg, Q6H PRN  polyethylene glycol, 17 g, Daily PRN  promethazine, 12.5 mg, Q6H PRN    Or  ondansetron, 4 mg, Q6H PRN      Subjective:   Still SOB, no CP    Objective: Intake/Output Summary (Last 24 hours) at 7/21/2020 1206  Last data filed at 7/20/2020 2228  Gross per 24 hour   Intake --   Output 1550 ml   Net -1550 ml      Vitals:   Vitals:    07/21/20 1010   BP: 125/88   Pulse: 109   Resp: 26   Temp: 97.9 °F (36.6 °C)   SpO2: 98%     Physical Exam:   Gen:  awake, alert, no apparent distress  Head/Eyes:  Normocephalic atraumatic, EOMI   NECK:   symmetrical, trachea midline  LUNGS: Normal Effort   CARDIOVASCULAR:  Normal rate  ABDOMEN:  non distended  MUSCULOSKELETAL:  ROM WNL  NEUROLOGIC: Alert and Oriented,  Cranial nerves II-XII are grossly intact. SKIN:  no bruising or bleeding, normal skin color,  no redness      Data:       CBC   Recent Labs     07/18/20 2150 07/20/20  0541 07/21/20  0525   WBC 8.5 6.7 8.5   HGB 8.8* 8.5* 8.2*   HCT 30.7* 28.2* 28.0*    211 PLATELETS APPEAR NORMAL IN NUMBER, UNABLE TO GIVE PLATELET COUNT DUE TO PLATELET CLUMPING.       BMP   Recent Labs     07/18/20 2150 07/20/20  0541 07/21/20  0525   * 123* 121*   K 4.2 4.4 4.3   CL 90* 84* 81*   CO2 26 20* 21   BUN 31* 38* 48*   CREATININE 1.8* 1.5* 1.7*         Electronically signed by Daryl Hayes MD on 7/21/2020 at 12:06 PM

## 2020-07-21 NOTE — PROGRESS NOTES
07/20/20 2228   NIV Type   NIV Started/Stopped On   Equipment Type v60   Mode Bilevel   Mask Type Full face mask   Mask Size Large   Settings/Measurements   IPAP 12 cmH20   CPAP/EPAP 6 cmH2O   Resp 26   FiO2  8 %   I Time/ I Time % 1.15 s   Vt Exhaled 725 mL   Mask Leak (lpm) 97 lpm   SpO2 100   Alarm Settings   Alarms On Y   Press Low Alarm 5 cmH2O   High Pressure Alarm 25 cmH2O   Delay Alarm 20 sec(s)   Resp Rate Low Alarm 12   High Respiratory Rate 40 br/min

## 2020-07-21 NOTE — PLAN OF CARE
Problem: Falls - Risk of:  Goal: Will remain free from falls  Description: Will remain free from falls  7/21/2020 0048 by Rachel Trujillo RN  Outcome: Ongoing  7/20/2020 1149 by Doree Habermann, RN  Outcome: Ongoing  Goal: Absence of physical injury  Description: Absence of physical injury  7/21/2020 0048 by Rachel Trujillo RN  Outcome: Ongoing  7/20/2020 1149 by Doree Habermann, RN  Outcome: Ongoing     Problem: Infection:  Goal: Will remain free from infection  Description: Will remain free from infection  7/21/2020 0048 by Rachel Trujillo RN  Outcome: Ongoing  7/20/2020 1149 by Doree Habermann, RN  Outcome: Ongoing     Problem: Safety:  Goal: Free from accidental physical injury  Description: Free from accidental physical injury  7/21/2020 0048 by Rachel Trujillo RN  Outcome: Ongoing  7/20/2020 1149 by Doree Habermann, RN  Outcome: Ongoing  Goal: Free from intentional harm  Description: Free from intentional harm  7/21/2020 0048 by Rachel Trujillo RN  Outcome: Ongoing  7/20/2020 1149 by Doree Habermann, RN  Outcome: Ongoing     Problem: Daily Care:  Goal: Daily care needs are met  Description: Daily care needs are met  7/21/2020 0048 by Rachel Trujillo RN  Outcome: Ongoing  7/20/2020 1149 by Doree Habermann, RN  Outcome: Ongoing     Problem: Pain:  Goal: Patient's pain/discomfort is manageable  Description: Patient's pain/discomfort is manageable  7/21/2020 0048 by Rachel Trujillo RN  Outcome: Ongoing  7/20/2020 1149 by Doree Habermann, RN  Outcome: Ongoing     Problem: Skin Integrity:  Goal: Skin integrity will stabilize  Description: Skin integrity will stabilize  7/21/2020 0048 by Rachel Trujillo RN  Outcome: Ongoing  7/20/2020 1149 by Doree Habermann, RN  Outcome: Ongoing     Problem: Discharge Planning:  Goal: Patients continuum of care needs are met  Description: Patients continuum of care needs are met  7/21/2020 0048 by Rachel Trujillo RN  Outcome: Ongoing  7/20/2020 1149 by Doree Habermann, RN  Outcome: Ongoing

## 2020-07-21 NOTE — CONSULTS
Subjective:   CHIEF COMPLAINT / HPI:  52year old male admitted with worsening sob with off and on wheeze. He has mild cough without any sputum production. He denies any fever or hemoptysis.  He was recently discharged from hospital      Past Medical History:  Past Medical History:   Diagnosis Date    CAD (coronary artery disease)     CHF (congestive heart failure) (HCC)     COPD (chronic obstructive pulmonary disease) ([facility])     Depression     Drug abuse ([facility]     HIGH CHOLESTEROL     Hypertension     MI (myocardial infarction) ([facility] [facility] 2011    S/P coronary artery bypass graft x 4 2011    CABG x 4 Dr Dionna South       Past Surgical History:        Procedure Laterality Date    BYPASS GRAFT  04/10/2011    CABG x 4 Dr Negra Guillen  11/2010     4 stents    LEG SURGERY         Current Medications:    Current Facility-Administered Medications: rivaroxaban (XARELTO) tablet 20 mg, 20 mg, Oral, Daily  aspirin EC tablet 81 mg, 81 mg, Oral, Daily  atorvastatin (LIPITOR) tablet 40 mg, 40 mg, Oral, Daily  metoprolol succinate (TOPROL XL) extended release tablet 25 mg, 25 mg, Oral, Daily  pantoprazole (PROTONIX) tablet 40 mg, 40 mg, Oral, BID AC  spironolactone (ALDACTONE) tablet 25 mg, 25 mg, Oral, Daily  sodium chloride flush 0.9 % injection 10 mL, 10 mL, Intravenous, 2 times per day  sodium chloride flush 0.9 % injection 10 mL, 10 mL, Intravenous, PRN  acetaminophen (TYLENOL) tablet 650 mg, 650 mg, Oral, Q6H PRN **OR** acetaminophen (TYLENOL) suppository 650 mg, 650 mg, Rectal, Q6H PRN  polyethylene glycol (GLYCOLAX) packet 17 g, 17 g, Oral, Daily PRN  promethazine (PHENERGAN) tablet 12.5 mg, 12.5 mg, Oral, Q6H PRN **OR** ondansetron (ZOFRAN) injection 4 mg, 4 mg, Intravenous, Q6H PRN  [COMPLETED] methylPREDNISolone sodium (SOLU-MEDROL) injection 40 mg, 40 mg, Intravenous, Q6H **FOLLOWED BY** predniSONE (DELTASONE) tablet 40 mg, 40 mg, Oral, Daily  furosemide (LASIX) injection 40 mg, 40 mg, Intravenous, BID  ipratropium-albuterol (DUONEB) nebulizer solution 1 ampule, 1 ampule, Inhalation, Q4H WA    No Known Allergies    Social History:    Social History     Socioeconomic History    Marital status:      Spouse name: None    Number of children: None    Years of education: None    Highest education level: None   Occupational History    None   Social Needs    Financial resource strain: None    Food insecurity     Worry: None     Inability: None    Transportation needs     Medical: None     Non-medical: None   Tobacco Use    Smoking status: Current Every Day Smoker     Packs/day: 1.00     Years: 20.00     Pack years: 20.00     Types: Cigarettes    Smokeless tobacco: Former User    Tobacco comment: Reviewed 10/9/17   Substance and Sexual Activity    Alcohol use: No     Alcohol/week: 0.0 standard drinks    Drug use: No     Comment: march 2018 states he quit    Sexual activity: Not Currently   Lifestyle    Physical activity     Days per week: None     Minutes per session: None    Stress: None   Relationships    Social connections     Talks on phone: None     Gets together: None     Attends Church service: None     Active member of club or organization: None     Attends meetings of clubs or organizations: None     Relationship status: None    Intimate partner violence     Fear of current or ex partner: None     Emotionally abused: None     Physically abused: None     Forced sexual activity: None   Other Topics Concern    None   Social History Narrative    None       Family History:   Family History   Problem Relation Age of Onset    Cancer Father     Heart Failure Father     Heart Disease Father     Diabetes Mother     Heart Disease Mother        Immunization:  Immunization History   Administered Date(s) Administered    Influenza Virus Vaccine 03/11/2016    Influenza, Quadv, 6 mo and older, IM, PF (Flulaval, Fluarix) 09/15/2018    Influenza, Quadv, IM, PF (6 mo and older Fluzone, Flulaval, Fluarix, and 3 yrs and older Afluria) 12/29/2016, 12/31/2017, 02/11/2020    Pneumococcal Conjugate 13-valent ([id number]) 12/11/2018    Pneumococcal Polysaccharide ([id number]) 03/11/2016         REVIEW OF SYSTEMS:    CONSTITUTIONAL:  negative for fevers, chills, diaphoresis, activity change, appetite change, fatigue, night sweats and unexpected weight change. EYES:  negative for blurred vision, eye discharge, visual disturbance and icterus  HEENT:  negative for hearing loss, tinnitus, ear drainage, sinus pressure, nasal congestion, epistaxis and snoring  RESPIRATORY:  See HPI  CARDIOVASCULAR:  negative for chest pain, palpitations, exertional chest pressure/discomfort, edema, syncope  GASTROINTESTINAL:  negative for nausea, vomiting, diarrhea, constipation, blood in stool and abdominal pain  GENITOURINARY:  negative for frequency, dysuria and hematuria  HEMATOLOGIC/LYMPHATIC:  negative for easy bruising, bleeding and lymphadenopathy  ALLERGIC/IMMUNOLOGIC:  negative for recurrent infections, angioedema, anaphylaxis and drug reactions  ENDOCRINE:  negative for weight changes and diabetic symptoms including polyuria, polydipsia and polyphagia    MUSCULOSKELETAL:  negative for  pain, joint swelling, decreased range of motion and muscle weakness  NEUROLOGICAL:  negative for headaches, slurred speech, unilateral weakness  PSYCHIATRIC/BEHAVIORAL: negative for hallucinations, behavioral problems, confusion and agitation.      Objective:   PHYSICAL EXAM:      VITALS:    Vitals:    07/21/20 0345 07/21/20 0400 07/21/20 0409 07/21/20 0500   BP:   (!) 135/94    Pulse:  111 109 106   Resp: 20 23 24 21   Temp:   97.9 °F (36.6 °C)    TempSrc:   Axillary    SpO2:   100% 98%   Weight:   207 lb 10.8 oz (94.2 kg)    Height:             CONSTITUTIONAL:  awake, alert, cooperative, no apparent distress, and appears stated age  NECK:  Supple, symmetrical, trachea midline, no adenopathy, thyroid symmetric, not enlarged and no tenderness  CHEST: Chest expansion equal and symmetrical, no intercostal retraction. LUNGS:  no increased work of breathing, has expiratory wheezes both lungs, no crackles. CARDIOVASCULAR: S1 and S2, no edema and no JVD  ABDOMEN:  normal bowel sounds, non-distended and no masses palpated, and no tenderness to palpation. No hepatospleenomegaly  LYMPHADENOPATHY:  no axillary or supraclavicular adenopathy. No cervical adnenopathy  PSYCHIATRIC: Oriented to person place and time. No obvious depression or anxiety. MUSCULOSKELETAL: No obvious misalignment or effusion of the joints. No clubbing, cyanosis of the digits. RIGHT AND LEFT LOWER EXTREMITIES: No edema, no inflammation, no tenderness. SKIN:  normal skin color, texture, turgor and no redness, warmth, or swelling. No palpable nodules    DATA:                       EXAMINATION:    ONE XRAY VIEW OF THE CHEST         7/18/2020 8:46 pm         COMPARISON:    Chest x-ray July 5, 2020         HISTORY:    ORDERING SYSTEM PROVIDED HISTORY: SOB    TECHNOLOGIST PROVIDED HISTORY:    Reason for exam:->SOB    Reason for Exam: SOB    Acuity: Acute    Type of Exam: Initial    Mechanism of Injury: SOB    Relevant Medical/Surgical History: Patient states has had SOB that started    this morning. Patient states has history of CHF         FINDINGS:    Cardiac silhouette is enlarged but stable.  Postoperative changes of CABG    procedure and median sternotomy.  Loculated pleural effusion redemonstrated    on the right with associated atelectasis.  Chronic underlying interstitial    changes of the lungs.  The left lung appears clear.  No pneumothorax. Osseous structures appear stable.              Impression    1. Persistent loculated right pleural effusion and associated atelectasis.               Assessment:     1. Ac copd  2. Ac on ch chf  3. Loculated right pleural effusion  4. Right basilar atelectasis sec to effusion          Plan:     1. Adequate o2 adm  2.  Cont bipap  3. Bd rx  4. Treat chf  5. Recurrent hospitalization with cardiac and pulmonary issues indicate a poor prognosis.   6. Thanks will follow

## 2020-07-21 NOTE — PROGRESS NOTES
07/21/20 0345   NIV Type   Equipment Type  v60   Mode Bilevel   Mask Type Full face mask   Mask Size Large   Settings/Measurements   IPAP 12 cmH20   CPAP/EPAP 6 cmH2O   Resp 20   I Time/ I Time % 1.15 s   Vt Exhaled 581 mL   Mask Leak (lpm) 61 lpm   SpO2 100   Alarm Settings   Alarms On Y   Press Low Alarm 5 cmH2O   High Pressure Alarm 25 cmH2O   Delay Alarm 20 sec(s)   Resp Rate Low Alarm 12   High Respiratory Rate 40 br/min

## 2020-07-21 NOTE — PROGRESS NOTES
Cardiology Note    Admit Date:  7/18/2020    Admission diagnosis / Complaint: CHF      Subjective:  Mr. Parish Marion resting in bed. Denies cardiac complaints, patient remains on continuous BiPAP. Assessment and Plan:    1. CHF: Acute Systolic/ Diastolic decompensated heart failure. Appears to be volume overloaded, but there is some improvement in leg edema. Agree with diuresis. Continue IV Lasix 40 mg BID. Depending on response we can titrate diuretics accordingly. -3.2 L fluid balance yesterday. please continue Gudelines recommended medical thearpy including Coreg/ Toprol XL  , ACE or ARB, and Aldactone. Unable to use ACE or Aldactone due to renal function. Strict Is and Os and Daily weights. chf nurse consult. Acute decompensated heart failure due to noncompliance has not had ICD placed in spite of having EF 20% due to concerns with noncompliance with medical therapy  2. Abnormal troponin in setting of renal failure and CHF cardiac cath in 2018  3. HTN: stable, continue present medications  4. He has significant anxiety issues consider,psych evaluation  5. DVT prophylaxis if no contraindication, history of DVT, recent ultrasound negative for DVT. VQ scan probable for PE 1/24/2020 & 7/19/2020, continue Xarelto.   6.   Dyslipidemia: continue statins     He is very high risk for recurrent admissions compliance is a major issue overall prognosis is very bad    Objective:   BP (!) 141/94   Pulse 107   Temp 97.5 °F (36.4 °C) (Oral)   Resp 24   Ht 5' 11\" (1.803 m)   Wt 207 lb 10.8 oz (94.2 kg)   SpO2 98%   BMI 28.96 kg/m²       Intake/Output Summary (Last 24 hours) at 7/21/2020 1544  Last data filed at 7/20/2020 2228  Gross per 24 hour   Intake --   Output 500 ml   Net -500 ml        TELEMETRY: Sinus  Tachycardia     has a past medical history of CAD (coronary artery disease), CHF (congestive heart failure) ([facility] [address] COPD (chronic obstructive pulmonary disease) ([facility] [address] Depression, Drug abuse ([facility] [address] HIGH CHOLESTEROL, Hypertension, MI (myocardial infarction) ([facility] [address] and S/P coronary artery bypass graft x 4.   has a past surgical history that includes Cardiac surgery (11/2010); Bypass Graft (04/10/2011); and Leg Surgery. Physical Exam:  General:  Awake, alert, NAD  Skin:  Warm and dry  Neck:  JVD not appreciated  Chest: diminished breath sounds, respiration easy, continuous BiPAP  Cardiovascular:  RRR S1S2   Abdomen:  Soft nontender, BS x 4  Extremities:  + edema lower extremities. Medications:    rivaroxaban  20 mg Oral Daily    aspirin  81 mg Oral Daily    atorvastatin  40 mg Oral Daily    metoprolol succinate  25 mg Oral Daily    pantoprazole  40 mg Oral BID AC    spironolactone  25 mg Oral Daily    sodium chloride flush  10 mL Intravenous 2 times per day    predniSONE  40 mg Oral Daily    furosemide  40 mg Intravenous BID    ipratropium-albuterol  1 ampule Inhalation Q4H WA       sodium chloride flush, acetaminophen **OR** acetaminophen, polyethylene glycol, promethazine **OR** ondansetron    Lab Data:  CBC:   Recent Labs     07/18/20 2150 07/20/20  0541 07/21/20  0525   WBC 8.5 6.7 8.5   HGB 8.8* 8.5* 8.2*   HCT 30.7* 28.2* 28.0*   MCV 70.9* 68.3* 69.7*    211 PLATELETS APPEAR NORMAL IN NUMBER, UNABLE TO GIVE PLATELET COUNT DUE TO PLATELET CLUMPING. BMP:   Recent Labs     07/18/20 2150 07/20/20  0541 07/21/20  0525   * 123* 121*   K 4.2 4.4 4.3   CL 90* 84* 81*   CO2 26 20* 21   BUN 31* 38* 48*   CREATININE 1.8* 1.5* 1.7*     LIVER PROFILE:   Recent Labs     07/18/20 2150 07/20/20  0541   AST 30 31   ALT 35 40   BILIDIR  --  2.4*   BILITOT 2.1* 3.3*   ALKPHOS 146* 144*     PT/INR: No results for input(s): PROTIME, INR in the last 72 hours. APTT:   Recent Labs     07/20/20  0541 07/20/20  1445 07/21/20  0058   APTT 117.6* 83.4* 41.1*               Christiano R.  Debroah Coast, NANCY-CNP 7/21/2020 3:44 PM   CARDIOLOGY ATTENDING ADDENDUM    I have seen ,spoken to  and examined this patient personally, independently of the nurse practitioner. I have reviewed the hospital care given to date and reviewed all pertinent labs and imaging. The plan was developed mutually at the time of the visit with the patient,  NP   and myself. I have spoken with patient, nursing staff and provided written and verbal instructions . The above note has been reviewed and I agree with the assessment, diagnosis, and treatment plan with changes made by me as follows     HPI:  I have reviewed the above HPI  And agree with above   Yoko Kemp is a 52 y. o.year old who and presents with had concerns including Shortness of Breath and Leg Swelling. Chief Complaint   Patient presents with    Shortness of Breath    Leg Swelling     Please review addendum/changes made to note above   Interval history:  No new changes     Physical Exam:  General:  Awake, alert, NAD  Head:normal  Eye:normal  Neck:  No JVD   Chest:  Clear to auscultation, respiration easy  Cardiovascular:  S1 and S2 audible, No added heart sounds, No signs of ankle edema, or volume overload, No evidence of JVD, No crackles  Abdomen:   nontender  Extremities:  No  edema  Pulses; palpable  Neuro: grossly normal      MEDICAL DECISION MAKING;    I agree with the above plan, which was planned by myself and discussed with NP.   Continue cardiac meds  Follow-up as outpatient      Sonia Salazar MD [facility] [place] 07/21/20

## 2020-07-21 NOTE — CONSULTS
Department of General Surgery   Surgical Service Dr. Roberson Doing   Consult Note    Date of Consult: 7/20/20    Reason for Consult:  Dilated bowel loops, dry heaves  Requesting Physician:  Dr. Myrick Euclid:  Dilated bowel loops, dry heaves, admitted with CHF exacerbation    History Obtained From:  patient    HISTORY OF PRESENT ILLNESS:    The patient is a 52 y.o. male who presented with CHF exacerbation. He has an EF ~20% on cath 2 years ago. He reports having nausea and dry heaves earlier in the day. He had a BM and his symptoms have resolved. He denies abdominal pain at this time. He is short of breath with movement in bed or conversation. KUB yesterday afternoon demonstrated moderately dilated small bowel loops, no other concerning findings. His labs are significant for WBC 6.7, Hgb 8.5, Cr 1.5. Bilirubin is elevated to 3.3.     Past Medical History:    Past Medical History:   Diagnosis Date    CAD (coronary artery disease)     [facility]) ([facility]     [facility]     Depression     Drug abuse ([facility]     HIGH CHOLESTEROL     Hypertension     MI (myocardial infarction) ([facility] [facility] 2011    S/P coronary artery bypass graft x 4 2011    CABG x 4 Dr Joe Tello       Past Surgical History:    Past Surgical History:   Procedure Laterality Date    BYPASS GRAFT  04/10/2011    CABG x 4 Dr Devin Burkett  11/2010     4 stents    LEG SURGERY         Current Medications:   Current Facility-Administered Medications   Medication Dose Route Frequency Provider Last Rate Last Dose    rivaroxaban (XARELTO) tablet 20 mg  20 mg Oral Daily Ran Coffman MD   20 mg at 07/20/20 1751    aspirin EC tablet 81 mg  81 mg Oral Daily Angel Kendall MD   81 mg at 07/20/20 0924    atorvastatin (LIPITOR) tablet 40 mg  40 mg Oral Daily Angel Kendall MD   40 mg at 07/20/20 0924    metoprolol succinate (TOPROL XL) extended release tablet 25 mg  25 mg Oral Daily Non-medical: None   Tobacco Use    Smoking status: Current Every Day Smoker     Packs/day: 1.00     Years: 20.00     Pack years: 20.00     Types: Cigarettes    Smokeless tobacco: Former User    Tobacco comment: Reviewed 10/9/17   Substance and Sexual Activity    Alcohol use: No     Alcohol/week: 0.0 standard drinks    Drug use: No     Comment: march 2018 states he quit    Sexual activity: Not Currently   Lifestyle    Physical activity     Days per week: None     Minutes per session: None    Stress: None   Relationships    Social connections     Talks on phone: None     Gets together: None     Attends Congregation service: None     Active member of club or organization: None     Attends meetings of clubs or organizations: None     Relationship status: None    Intimate partner violence     Fear of current or ex partner: None     Emotionally abused: None     Physically abused: None     Forced sexual activity: None   Other Topics Concern    None   Social History Narrative    None       Family History:   Family History   Problem Relation Age of Onset    Cancer Father     Heart Failure Father     Heart Disease Father     Diabetes Mother     Heart Disease Mother        REVIEW OFSYSTEMS:    Review of Systems   Constitutional: Negative for chills. Negative for fever. HENT: Nasal canula in place. Negative for rhinorrhea. Respiratory:  Positive for shortness of breath. During our conversation he reports he \"is going to have to stop talking and put his BIPAP back on\"   Cardiovascular: Negative for chest pain. Gastrointestinal: dilated bowel loops on xray yesterday, dry heaving earlier today, fine now as per HPI  Genitourinary: Negative for difficulty urinating--undergoing diuresis  Neurological: Negative for dizziness, syncope and numbness.    Hematological: on Xarelto, recently transitioned from hep gtt      PHYSICAL EXAM:  Vitals:    07/20/20 0922 07/20/20 1233 07/20/20 1608 07/20/20 1643   BP: 117/81 132/83 (!) 144/95    Pulse:  108 109    Resp:  26 24    Temp: 97.4 °F (36.3 °C) 98 °F (36.7 °C) 96 °F (35.6 °C)    TempSrc: Oral Oral Axillary    SpO2:  99% 100% 100%   Weight:       Height:           Physical Exam  General: awake, alert, in mild distress  HEENT: mucous membranes moist  Respiratory:mildly increased respiratory effort  CV: appears well perfused, tachycardic during our conversation  Abdomen: Soft, non-tender,mildly-distended. Skin: warm and dry  Extremities: atraumatic  Neuro: no focal deficits noted  Psych: mood normal        DATA:    Lab Results   Component Value Date    WBC 6.7 07/20/2020    HGB 8.5 (L) 07/20/2020    HCT 28.2 (L) 07/20/2020    MCV 68.3 (L) 07/20/2020     07/20/2020     Lab Results   Component Value Date     07/20/2020    K 4.4 07/20/2020    CL 84 07/20/2020    CO2 20 07/20/2020    BUN 38 07/20/2020    CREATININE 1.5 07/20/2020    GLUCOSE 246 07/20/2020    CALCIUM 9.2 07/20/2020 7/19 Xray  1. Gas distended loops of small bowel identified measuring up to 4.0 cm. Findings are nonspecific and may represent ileus, partial small bowel    obstruction, or enteritis.               IMPRESSION:    52 y.o. male admitted with CHF exacerbation. He had symptoms of nausea and distended bowel on xray but symptoms have resolved after BM today. RUQ US pending given bilirubin elevation but I expect these findings may be due to passive venous congestion in the liver due to CHF rather than other reasons, if so this is a poor prognostic sign. Dilated bowel loops may be partially due to positive pressure ventilation/air swallowing. Patient would not tolerate surgery well and surgery would only be offered under emergent circumstances.     Patient Active Problem List:     Essential hypertension     Osteoarthritis     COPD (chronic obstructive pulmonary disease) ([facility]     Paresthesia of left leg     Abdominal hernia     Left shoulder pain     Crushing injury of right hand     Rotator

## 2020-07-21 NOTE — PLAN OF CARE
Problem: Falls - Risk of:  Goal: Will remain free from falls  Description: Will remain free from falls  7/21/2020 0751 by Charla Case RN  Outcome: Ongoing     Problem: Falls - Risk of:  Goal: Absence of physical injury  Description: Absence of physical injury  7/21/2020 0751 by Charla Case RN  Outcome: Ongoing     Problem: Infection:  Goal: Will remain free from infection  Description: Will remain free from infection  7/21/2020 0751 by Charla Case RN  Outcome: Ongoing     Problem: Safety:  Goal: Free from accidental physical injury  Description: Free from accidental physical injury  7/21/2020 0751 by Charla Case RN  Outcome: Ongoing     Problem: Safety:  Goal: Free from intentional harm  Description: Free from intentional harm  7/21/2020 0751 by Charla Case RN  Outcome: Ongoing     Problem: Daily Care:  Goal: Daily care needs are met  Description: Daily care needs are met  7/21/2020 0751 by Charla Case RN  Outcome: Ongoing     Problem: Pain:  Goal: Patient's pain/discomfort is manageable  Description: Patient's pain/discomfort is manageable  7/21/2020 0751 by Charla Case RN  Outcome: Ongoing     Problem: Skin Integrity:  Goal: Skin integrity will stabilize  Description: Skin integrity will stabilize  7/21/2020 0751 by Charla Case RN  Outcome: Ongoing     Problem: Discharge Planning:  Goal: Patients continuum of care needs are met  Description: Patients continuum of care needs are met  7/21/2020 0751 by Charla Case RN  Outcome: Ongoing

## 2020-07-22 LAB
ANION GAP SERPL CALCULATED.3IONS-SCNC: 18 MMOL/L (ref 4–16)
BACTERIA: NEGATIVE /HPF
BILIRUBIN URINE: NEGATIVE MG/DL
BLOOD, URINE: NEGATIVE
BUN BLDV-MCNC: 57 MG/DL (ref 6–23)
CALCIUM SERPL-MCNC: 9.4 MG/DL (ref 8.3–10.6)
CHLORIDE BLD-SCNC: 82 MMOL/L (ref 99–110)
CHLORIDE URINE RANDOM: 15 MMOL/L (ref 43–210)
CLARITY: CLEAR
CO2: 19 MMOL/L (ref 21–32)
COLOR: YELLOW
CREAT SERPL-MCNC: 1.7 MG/DL (ref 0.9–1.3)
GFR AFRICAN AMERICAN: 52 ML/MIN/1.73M2
GFR NON-AFRICAN AMERICAN: 43 ML/MIN/1.73M2
GLUCOSE BLD-MCNC: 304 MG/DL (ref 70–99)
GLUCOSE, URINE: 50 MG/DL
HCT VFR BLD CALC: 30.1 % (ref 42–52)
HEMOGLOBIN: 8.6 GM/DL (ref 13.5–18)
HYALINE CASTS: 0 /LPF
KETONES, URINE: NEGATIVE MG/DL
LEUKOCYTE ESTERASE, URINE: NEGATIVE
MCH RBC QN AUTO: 20 PG (ref 27–31)
MCHC RBC AUTO-ENTMCNC: 28.6 % (ref 32–36)
MCV RBC AUTO: 70.2 FL (ref 78–100)
NITRITE URINE, QUANTITATIVE: NEGATIVE
OSMOLALITY URINE: 377 MOS/L (ref 292–1090)
OSMOLALITY: 290 MOS/L (ref 280–300)
PDW BLD-RTO: 26.9 % (ref 11.7–14.9)
PH, URINE: 5 (ref 5–8)
PLATELET # BLD: 454 K/CU MM (ref 140–440)
POTASSIUM SERPL-SCNC: 5.4 MMOL/L (ref 3.5–5.1)
POTASSIUM, UR: 26.7 MMOL/L (ref 22–119)
PROTEIN UA: NEGATIVE MG/DL
RBC # BLD: 4.29 M/CU MM (ref 4.6–6.2)
RBC URINE: ABNORMAL /HPF (ref 0–3)
SODIUM BLD-SCNC: 119 MMOL/L (ref 135–145)
SODIUM URINE: 16 MMOL/L (ref 35–167)
SPECIFIC GRAVITY UA: 1.01 (ref 1–1.03)
TRICHOMONAS: ABNORMAL /HPF
UROBILINOGEN, URINE: 1 MG/DL (ref 0.2–1)
WBC # BLD: 11.1 K/CU MM (ref 4–10.5)
WBC UA: 1 /HPF (ref 0–2)

## 2020-07-22 PROCEDURE — 85027 COMPLETE CBC AUTOMATED: CPT

## 2020-07-22 PROCEDURE — 94640 AIRWAY INHALATION TREATMENT: CPT

## 2020-07-22 PROCEDURE — 6370000000 HC RX 637 (ALT 250 FOR IP): Performed by: FAMILY MEDICINE

## 2020-07-22 PROCEDURE — 6360000002 HC RX W HCPCS: Performed by: HOSPITALIST

## 2020-07-22 PROCEDURE — 83930 ASSAY OF BLOOD OSMOLALITY: CPT

## 2020-07-22 PROCEDURE — 82436 ASSAY OF URINE CHLORIDE: CPT

## 2020-07-22 PROCEDURE — 6370000000 HC RX 637 (ALT 250 FOR IP): Performed by: HOSPITALIST

## 2020-07-22 PROCEDURE — 84133 ASSAY OF URINE POTASSIUM: CPT

## 2020-07-22 PROCEDURE — 83935 ASSAY OF URINE OSMOLALITY: CPT

## 2020-07-22 PROCEDURE — 2580000003 HC RX 258: Performed by: HOSPITALIST

## 2020-07-22 PROCEDURE — 2700000000 HC OXYGEN THERAPY PER DAY

## 2020-07-22 PROCEDURE — 81001 URINALYSIS AUTO W/SCOPE: CPT

## 2020-07-22 PROCEDURE — 6360000002 HC RX W HCPCS: Performed by: FAMILY MEDICINE

## 2020-07-22 PROCEDURE — 2140000000 HC CCU INTERMEDIATE R&B

## 2020-07-22 PROCEDURE — 80048 BASIC METABOLIC PNL TOTAL CA: CPT

## 2020-07-22 PROCEDURE — 84300 ASSAY OF URINE SODIUM: CPT

## 2020-07-22 PROCEDURE — 94761 N-INVAS EAR/PLS OXIMETRY MLT: CPT

## 2020-07-22 PROCEDURE — 94660 CPAP INITIATION&MGMT: CPT

## 2020-07-22 PROCEDURE — 36415 COLL VENOUS BLD VENIPUNCTURE: CPT

## 2020-07-22 RX ORDER — LORAZEPAM 2 MG/ML
0.5 INJECTION INTRAMUSCULAR ONCE
Status: COMPLETED | OUTPATIENT
Start: 2020-07-22 | End: 2020-07-22

## 2020-07-22 RX ORDER — SODIUM POLYSTYRENE SULFONATE 15 G/60ML
15 SUSPENSION ORAL; RECTAL ONCE
Status: COMPLETED | OUTPATIENT
Start: 2020-07-22 | End: 2020-07-22

## 2020-07-22 RX ADMIN — RIVAROXABAN 20 MG: 20 TABLET, FILM COATED ORAL at 16:42

## 2020-07-22 RX ADMIN — IPRATROPIUM BROMIDE AND ALBUTEROL SULFATE 1 AMPULE: .5; 3 SOLUTION RESPIRATORY (INHALATION) at 20:00

## 2020-07-22 RX ADMIN — PANTOPRAZOLE SODIUM 40 MG: 40 TABLET, DELAYED RELEASE ORAL at 16:42

## 2020-07-22 RX ADMIN — IPRATROPIUM BROMIDE AND ALBUTEROL SULFATE 1 AMPULE: .5; 3 SOLUTION RESPIRATORY (INHALATION) at 11:30

## 2020-07-22 RX ADMIN — METOPROLOL SUCCINATE 25 MG: 25 TABLET, EXTENDED RELEASE ORAL at 08:36

## 2020-07-22 RX ADMIN — SODIUM CHLORIDE, PRESERVATIVE FREE 10 ML: 5 INJECTION INTRAVENOUS at 08:36

## 2020-07-22 RX ADMIN — ASPIRIN 81 MG: 81 TABLET, COATED ORAL at 08:36

## 2020-07-22 RX ADMIN — FUROSEMIDE 40 MG: 10 INJECTION, SOLUTION INTRAMUSCULAR; INTRAVENOUS at 08:36

## 2020-07-22 RX ADMIN — ONDANSETRON 4 MG: 2 INJECTION INTRAMUSCULAR; INTRAVENOUS at 15:14

## 2020-07-22 RX ADMIN — SODIUM CHLORIDE, PRESERVATIVE FREE 10 ML: 5 INJECTION INTRAVENOUS at 20:22

## 2020-07-22 RX ADMIN — IPRATROPIUM BROMIDE AND ALBUTEROL SULFATE 1 AMPULE: .5; 3 SOLUTION RESPIRATORY (INHALATION) at 16:09

## 2020-07-22 RX ADMIN — SODIUM POLYSTYRENE SULFONATE 15 G: 15 SUSPENSION ORAL; RECTAL at 11:32

## 2020-07-22 RX ADMIN — LORAZEPAM 0.5 MG: 2 INJECTION INTRAMUSCULAR; INTRAVENOUS at 20:16

## 2020-07-22 RX ADMIN — PREDNISONE 40 MG: 20 TABLET ORAL at 08:36

## 2020-07-22 RX ADMIN — IPRATROPIUM BROMIDE AND ALBUTEROL SULFATE 1 AMPULE: .5; 3 SOLUTION RESPIRATORY (INHALATION) at 07:31

## 2020-07-22 RX ADMIN — ATORVASTATIN CALCIUM 40 MG: 40 TABLET, FILM COATED ORAL at 08:36

## 2020-07-22 RX ADMIN — PANTOPRAZOLE SODIUM 40 MG: 40 TABLET, DELAYED RELEASE ORAL at 07:11

## 2020-07-22 RX ADMIN — SPIRONOLACTONE 25 MG: 25 TABLET ORAL at 08:36

## 2020-07-22 ASSESSMENT — PAIN SCALES - GENERAL
PAINLEVEL_OUTOF10: 0

## 2020-07-22 NOTE — PROGRESS NOTES
Received a call regarding this pt's sodium of 119, results were called to Dr Murrell via perfect serve.

## 2020-07-22 NOTE — PROGRESS NOTES
Code        Dispo:  -Need breathing to improve. Diet DIET CARDIAC; Daily Fluid Restriction: 1500 ml   Code Status Full Code     Medications:   Medications:    rivaroxaban  20 mg Oral Daily    aspirin  81 mg Oral Daily    atorvastatin  40 mg Oral Daily    metoprolol succinate  25 mg Oral Daily    pantoprazole  40 mg Oral BID AC    spironolactone  25 mg Oral Daily    sodium chloride flush  10 mL Intravenous 2 times per day    predniSONE  40 mg Oral Daily    furosemide  40 mg Intravenous BID    ipratropium-albuterol  1 ampule Inhalation Q4H WA      Infusions:   PRN Meds: sodium chloride flush, 10 mL, PRN  acetaminophen, 650 mg, Q6H PRN    Or  acetaminophen, 650 mg, Q6H PRN  polyethylene glycol, 17 g, Daily PRN  promethazine, 12.5 mg, Q6H PRN    Or  ondansetron, 4 mg, Q6H PRN      Subjective:   Doing ok, no distress    Objective: Intake/Output Summary (Last 24 hours) at 7/22/2020 0858  Last data filed at 7/22/2020 7078  Gross per 24 hour   Intake 130 ml   Output 1825 ml   Net -1695 ml      Vitals:   Vitals:    07/22/20 0838   BP: (!) 132/91   Pulse: 107   Resp: 25   Temp: 97.8 °F (36.6 °C)   SpO2: 100%     Physical Exam:   Gen:  awake, alert, no apparent distress  Head/Eyes:  Normocephalic atraumatic, EOMI   NECK:   symmetrical, trachea midline  LUNGS: Normal Effort   CARDIOVASCULAR:  Normal rate  ABDOMEN:  non distended  MUSCULOSKELETAL:  ROM limited  NEUROLOGIC: Alert and Oriented,  Cranial nerves II-XII are grossly intact. SKIN:  no bruising or bleeding, normal skin color,  no redness      Data:       CBC   Recent Labs     07/20/20  0541 07/21/20  0525   WBC 6.7 8.5   HGB 8.5* 8.2*   HCT 28.2* 28.0*    PLATELETS APPEAR NORMAL IN NUMBER, UNABLE TO GIVE PLATELET COUNT DUE TO PLATELET CLUMPING.       BMP   Recent Labs     07/20/20  0541 07/21/20  0525   * 121*   K 4.4 4.3   CL 84* 81*   CO2 20* 21   BUN 38* 48*   CREATININE 1.5* 1.7*         Electronically signed by Pérez Robin Loving MD on 7/22/2020 at 8:58 AM

## 2020-07-22 NOTE — PROGRESS NOTES
Nephrology Progress Note        [address], [address] [facility], [facility] [address]  Phone: [phone]  Office Hours: 8:30AM - 4:30PM  Monday - Friday       [facility]  Catherine Roy MD  [address], [address],  [place], [facility] [address]  PHONE: [phone]  FAX: [phone]    7/22/2020 9:03 AM  Subjective:   Admit Date: 7/18/2020  PCP: No primary care provider on file. Interval History: on bipap  1225ml urine yesterday? Diet: DIET CARDIAC; Daily Fluid Restriction: 1500 ml      Data:   Scheduled Meds:   rivaroxaban  20 mg Oral Daily    aspirin  81 mg Oral Daily    atorvastatin  40 mg Oral Daily    metoprolol succinate  25 mg Oral Daily    pantoprazole  40 mg Oral BID AC    spironolactone  25 mg Oral Daily    sodium chloride flush  10 mL Intravenous 2 times per day    predniSONE  40 mg Oral Daily    furosemide  40 mg Intravenous BID    ipratropium-albuterol  1 ampule Inhalation Q4H WA     Continuous Infusions:  PRN Meds:sodium chloride flush, acetaminophen **OR** acetaminophen, polyethylene glycol, promethazine **OR** ondansetron  I/O last 3 completed shifts: In: 10 [I.V.:10]  Out: 1225 [Urine:1225]  I/O this shift:  In: 120 [P.O.:120]  Out: 600 [Urine:600]    Intake/Output Summary (Last 24 hours) at 7/22/2020 0903  Last data filed at 7/22/2020 3108  Gross per 24 hour   Intake 130 ml   Output 1825 ml   Net -1695 ml       CBC:   Recent Labs     07/20/20  0541 07/21/20  0525 07/22/20  0809   WBC 6.7 8.5 11.1*   HGB 8.5* 8.2* 8.6*    PLATELETS APPEAR NORMAL IN NUMBER, UNABLE TO GIVE PLATELET COUNT DUE TO PLATELET CLUMPING.   --        BMP:    Recent Labs     07/20/20  0541 07/21/20  0525   * 121*   K 4.4 4.3   CL 84* 81*   CO2 20* 21   BUN 38* 48*   CREATININE 1.5* 1.7*   GLUCOSE 246* 310*     Hepatic:   Recent Labs     07/20/20  0541   AST 31   ALT 40   BILITOT 3.3*   ALKPHOS 144*     Troponin: No results for input(s): TROPONINI in the last 72 hours. BNP: No results for input(s): BNP in the last 72 hours. Lipids: No results for input(s): CHOL, HDL in the last 72 hours. Invalid input(s): LDLCALCU  ABGs:   Lab Results   Component Value Date    PO2ART 97 07/06/2020    ZCG2IQO 35.0 07/06/2020     INR: No results for input(s): INR in the last 72 hours. Objective:   Vitals: BP (!) 132/91   Pulse 107   Temp 97.8 °F (36.6 °C) (Oral)   Resp 25   Ht 5' 11\" (1.803 m)   Wt 207 lb 10.8 oz (94.2 kg)   SpO2 100%   BMI 28.96 kg/m²   General appearance:  in no acute distress  HEENT: normocephalic, atraumatic,   Neck: supple, trachea midline  Lungs:  breathing comfortably on bipap  Abdomen:  non distended,   Extremities: ble edema is better      Assessment and Plan:     Patient Active Problem List   Diagnosis Code    Essential hypertension I10    Osteoarthritis M19.90    COPD (chronic obstructive pulmonary disease) ([facility]) J44.9    Paresthesia of left leg R20.2    Abdominal hernia K46.9    Left shoulder pain M25.512    Crushing injury of right hand S67. 21XA    Rotator cuff tendinitis M75.80    Midline low back pain without sciatica M54.5    History of MI (myocardial infarction) I25.2    Coronary artery disease involving coronary bypass graft of native heart without angina pectoris I25.810    Mixed hyperlipidemia E78.2    Depression F32.9    Chronic midline low back pain without sciatica M54.5, G89.29    Tobacco abuse Z72.0    Gastroesophageal reflux disease without esophagitis K21.9    Opiate abuse, continuous ([facility]) F11.10    Heroin dependence ([facility]) F11.20    ST elevation myocardial infarction involving left circumflex coronary artery ([facility]) I21.21    STEMI (ST elevation myocardial infarction) ([facility]) I21.3    Acute on chronic combined systolic (congestive) and diastolic (congestive) heart failure ([facility]) S34.23    Systolic and diastolic CHF w/reduced LV function, NYHA class 4 ([facility]) I50.40    NSTEMI (non-ST elevated myocardial infarction) ([facility] I21.4    Acute on chronic congestive heart failure (HCC) I50.9    Noncompliance Z91.19    Pulmonary edema, acute ([facility]) J81.0    Acute kidney injury ([facility] N17.9    Acute respiratory failure with hypoxia and hypercapnia ([facility]) J96.01, J96.02    Hypertensive emergency I16.1    Ischemic cardiomyopathy I25.5    Heart failure ([facility]) I50.9    Left ventricular systolic dysfunction M09.2    Acute systolic congestive heart failure ([facility]) I50.21    SOB (shortness of breath) R06.02    Acute on chronic combined systolic and diastolic heart failure ([facility]) I50.43    Hyponatremia E87.1    Hematemesis K92.0    Acute on chronic systolic CHF (congestive heart failure) ([facility]) I50.23    Dyspnea R06.00   IMP  - JAIME on CKD3 d- Cardiorenal syndrome likely  - CKD3 from nephrosclerosis  - Hyponatremia from fluid overload  - CHF     Suggest  - no labs today  - Continue decongestive therapy,  likely transition to po bumex tomorrow  - Avoid nephrotoxics  - May need counselling or OP therapy regarding fluid intake and salt intake and how to avoid readmission- may benefit from CHF educator consult                                         Electronically signed by Azam Caruso DO on 7/22/2020 at 9:03 MD Leona Britton DO [address],  [address] [place] [address]  PHONE: [phone]  FAX: [phone]

## 2020-07-22 NOTE — PROGRESS NOTES
07/21/20 2345   NIV Type   NIV Started/Stopped On   Equipment Type v60   Mode Bilevel   Mask Type Full face mask   Mask Size Large   Settings/Measurements   IPAP 12 cmH20   CPAP/EPAP 6 cmH2O   Rate Ordered 1   Resp 27   FiO2  28 %   I Time/ I Time % 1.15 s   Vt Exhaled 502 mL   Mask Leak (lpm) 98 lpm   Using Accessory Muscles No   Alarm Settings   Alarms On Y   Press Low Alarm 5 cmH2O   High Pressure Alarm 25 cmH2O   Delay Alarm 20 sec(s)   Resp Rate Low Alarm 12   High Respiratory Rate 40 br/min

## 2020-07-22 NOTE — PROGRESS NOTES
pulmonary      SUBJECTIVE: on pap     OBJECTIVE    VITALS:  BP (!) 132/91   Pulse 107   Temp 97.8 °F (36.6 °C) (Oral)   Resp 25   Ht 5' 11\" (1.803 m)   Wt 207 lb 10.8 oz (94.2 kg)   SpO2 100%   BMI 28.96 kg/m²   HEAD AND FACE EXAM:  No throat injection, no active exudate,no thrush  NECK EXAM;No JVD, no masses, symmetrical  CHEST EXAM; Expansion equal and symmetrical, no masses  LUNG EXAM; Good breath sounds bilaterally. There are expiratory wheezes both lungs, there are crackles at both lung bases  CARDIOVASCULAR EXAM: Positive S1 and S2, no S3 or S4, no clicks ,no murmurs  RIGHT AND LEFT LOWER EXTRIMITY EXAM: No edema, no swelling, no inflamation            LABS   Lab Results   Component Value Date    WBC 11.1 (H) 07/22/2020    HGB 8.6 (L) 07/22/2020    HCT 30.1 (L) 07/22/2020    MCV 70.2 (L) 07/22/2020     (H) 07/22/2020     Lab Results   Component Value Date    CREATININE 1.7 (H) 07/22/2020    BUN 57 (H) 07/22/2020     (LL) 07/22/2020    K 5.4 (H) 07/22/2020    CL 82 (L) 07/22/2020    CO2 19 (L) 07/22/2020     Lab Results   Component Value Date    INR 1.10 07/08/2020    PROTIME 13.3 07/08/2020          Lab Results   Component Value Date    PHOS 4.7 05/30/2020    PHOS 3.4 03/12/2020    PHOS 4.1 02/12/2020      No results for input(s): PH, PO2ART, LHP1QMK, HCO3, BEART, O2SAT in the last 72 hours. Wt Readings from Last 3 Encounters:   07/21/20 207 lb 10.8 oz (94.2 kg)   07/12/20 210 lb (95.3 kg)   07/08/20 210 lb 5.1 oz (95.4 kg)               ASSESMENT  Ac copd  Ac on ch chf  Right basilar atx  Right loculated effusion        PLAN  1. cpm  2.  Bd rx  3. o2 adm  4. bipap as needed    7/22/2020  Natalie Chandra M.D.

## 2020-07-23 LAB
ANION GAP SERPL CALCULATED.3IONS-SCNC: 16 MMOL/L (ref 4–16)
BUN BLDV-MCNC: 58 MG/DL (ref 6–23)
CALCIUM SERPL-MCNC: 9.6 MG/DL (ref 8.3–10.6)
CHLORIDE BLD-SCNC: 82 MMOL/L (ref 99–110)
CO2: 23 MMOL/L (ref 21–32)
CREAT SERPL-MCNC: 1.7 MG/DL (ref 0.9–1.3)
CULTURE: NORMAL
GFR AFRICAN AMERICAN: 52 ML/MIN/1.73M2
GFR NON-AFRICAN AMERICAN: 43 ML/MIN/1.73M2
GLUCOSE BLD-MCNC: 260 MG/DL (ref 70–99)
GRAM SMEAR: NORMAL
HCT VFR BLD CALC: 30.5 % (ref 42–52)
HEMOGLOBIN: 8.8 GM/DL (ref 13.5–18)
Lab: NORMAL
MCH RBC QN AUTO: 20.1 PG (ref 27–31)
MCHC RBC AUTO-ENTMCNC: 28.9 % (ref 32–36)
MCV RBC AUTO: 69.6 FL (ref 78–100)
PDW BLD-RTO: 27.1 % (ref 11.7–14.9)
PLATELET # BLD: 292 K/CU MM (ref 140–440)
POTASSIUM SERPL-SCNC: 5.2 MMOL/L (ref 3.5–5.1)
RBC # BLD: 4.38 M/CU MM (ref 4.6–6.2)
SODIUM BLD-SCNC: 121 MMOL/L (ref 135–145)
SPECIMEN: NORMAL
WBC # BLD: 10.2 K/CU MM (ref 4–10.5)

## 2020-07-23 PROCEDURE — 80048 BASIC METABOLIC PNL TOTAL CA: CPT

## 2020-07-23 PROCEDURE — 94660 CPAP INITIATION&MGMT: CPT

## 2020-07-23 PROCEDURE — 2700000000 HC OXYGEN THERAPY PER DAY

## 2020-07-23 PROCEDURE — 94640 AIRWAY INHALATION TREATMENT: CPT

## 2020-07-23 PROCEDURE — 6370000000 HC RX 637 (ALT 250 FOR IP): Performed by: HOSPITALIST

## 2020-07-23 PROCEDURE — 2580000003 HC RX 258: Performed by: INTERNAL MEDICINE

## 2020-07-23 PROCEDURE — 36415 COLL VENOUS BLD VENIPUNCTURE: CPT

## 2020-07-23 PROCEDURE — 85027 COMPLETE CBC AUTOMATED: CPT

## 2020-07-23 PROCEDURE — 2140000000 HC CCU INTERMEDIATE R&B

## 2020-07-23 PROCEDURE — 94761 N-INVAS EAR/PLS OXIMETRY MLT: CPT

## 2020-07-23 RX ORDER — 0.9 % SODIUM CHLORIDE 0.9 %
500 INTRAVENOUS SOLUTION INTRAVENOUS ONCE
Status: COMPLETED | OUTPATIENT
Start: 2020-07-23 | End: 2020-07-23

## 2020-07-23 RX ADMIN — IPRATROPIUM BROMIDE AND ALBUTEROL SULFATE 1 AMPULE: .5; 3 SOLUTION RESPIRATORY (INHALATION) at 15:17

## 2020-07-23 RX ADMIN — METOPROLOL SUCCINATE 25 MG: 25 TABLET, EXTENDED RELEASE ORAL at 08:33

## 2020-07-23 RX ADMIN — SODIUM CHLORIDE 500 ML: 9 INJECTION, SOLUTION INTRAVENOUS at 06:55

## 2020-07-23 RX ADMIN — PANTOPRAZOLE SODIUM 40 MG: 40 TABLET, DELAYED RELEASE ORAL at 17:17

## 2020-07-23 RX ADMIN — RIVAROXABAN 20 MG: 20 TABLET, FILM COATED ORAL at 17:17

## 2020-07-23 RX ADMIN — IPRATROPIUM BROMIDE AND ALBUTEROL SULFATE 1 AMPULE: .5; 3 SOLUTION RESPIRATORY (INHALATION) at 11:44

## 2020-07-23 RX ADMIN — ATORVASTATIN CALCIUM 40 MG: 40 TABLET, FILM COATED ORAL at 08:33

## 2020-07-23 RX ADMIN — IPRATROPIUM BROMIDE AND ALBUTEROL SULFATE 1 AMPULE: .5; 3 SOLUTION RESPIRATORY (INHALATION) at 20:42

## 2020-07-23 RX ADMIN — IPRATROPIUM BROMIDE AND ALBUTEROL SULFATE 1 AMPULE: .5; 3 SOLUTION RESPIRATORY (INHALATION) at 01:53

## 2020-07-23 RX ADMIN — PREDNISONE 40 MG: 20 TABLET ORAL at 08:33

## 2020-07-23 RX ADMIN — PANTOPRAZOLE SODIUM 40 MG: 40 TABLET, DELAYED RELEASE ORAL at 08:33

## 2020-07-23 RX ADMIN — IPRATROPIUM BROMIDE AND ALBUTEROL SULFATE 1 AMPULE: .5; 3 SOLUTION RESPIRATORY (INHALATION) at 07:24

## 2020-07-23 RX ADMIN — ASPIRIN 81 MG: 81 TABLET, COATED ORAL at 08:33

## 2020-07-23 ASSESSMENT — PAIN SCALES - GENERAL
PAINLEVEL_OUTOF10: 0

## 2020-07-23 NOTE — PROGRESS NOTES
Hospitalist Progress Note      Name:  Priscilla Domingo /Age/Sex: 1970  (52 y.o. male)   MRN & CSN:  [id number] & [id number] Admission Date/Time: 2020  9:34 PM   Location:  [address] [place] [address] PCP: No primary care provider on file. Priscilla Domingo is a 52 y.o.  male  who presents with Shortness of Breath and Leg Swelling      Assessment and Plan:   1. Acute dyspnea with COPD exacerbatinon and acute on chronic systolic and diastolic CHF  Duoneb, steroids changed to oral now, aldactone on hold due to hyperK, BB, IV lasix  hold ACE with worsening JAIME  - neg 6.1L  On 2L nasal cannula.  CXR: Persistent loculated right pleural effusion with associated atelectasis   -On BiPAP.  Consult pulmonology.   Monitor I's and O's and daily weights.   Check Echo: ef 10-15% severe MR, severe pulm HTN  - ambulate and increase activity  2. Hyponatremia  -  Nephrology following, fluid restrict 1500cc  3. JAIME on CKD 3  hold ACE and consult nephro  -Nephrology following. 4. Elevated troponin  Likely due to JAIME.  Cardiology following.  EKG sinus tachycardia. -Troponins trending down. 5. Microcytic anemia  No gross bleeding.  - monitor  6. Hx of DVT  Venous duplex negative for DVT.  VQ scan intermediate probability for PE. Patient has been on anticoagulation for over a month. -creatinine clearance greater than 30.  Stop heparin drip and restart home Xarelto.  D-dimer 284.  Not significantly elevated. 7. Hyperbilirubinemia  -check right upper quadrant ultrasound:no evidence of   cholelithiasis or pericholecystic fluid. Azael Maldonado is a negative sonographic Dsouza's sign     -  No right upper quadrant abdominal tenderness. 8. Nausea and dry heaves  - stable  -KUB: Distended loops of small bowel, ileus versus partial small bowel obstruction versus enteritis     - surgery consulted, no surgical intervention needed at this time  9.  Mild HyperK  - holding aldactone     DVT Prophylaxis: xarelto  Diet: DIET CARDIAC; Daily Fluid Restriction: 1500 ml  Code Status: Full Code        Dispo:  -sodium             Diet DIET CARDIAC; Daily Fluid Restriction: 1500 ml   Code Status Full Code     Medications:   Medications:    sodium chloride  500 mL Intravenous Once    rivaroxaban  20 mg Oral Daily    aspirin  81 mg Oral Daily    atorvastatin  40 mg Oral Daily    metoprolol succinate  25 mg Oral Daily    pantoprazole  40 mg Oral BID AC    [Held by provider] spironolactone  25 mg Oral Daily    sodium chloride flush  10 mL Intravenous 2 times per day    predniSONE  40 mg Oral Daily    ipratropium-albuterol  1 ampule Inhalation Q4H WA      Infusions:   PRN Meds: sodium chloride flush, 10 mL, PRN  acetaminophen, 650 mg, Q6H PRN    Or  acetaminophen, 650 mg, Q6H PRN  polyethylene glycol, 17 g, Daily PRN  promethazine, 12.5 mg, Q6H PRN    Or  ondansetron, 4 mg, Q6H PRN      Subjective:     Doing better, no distress  Objective: Intake/Output Summary (Last 24 hours) at 7/23/2020 0920  Last data filed at 7/23/2020 4822  Gross per 24 hour   Intake 190 ml   Output 1000 ml   Net -810 ml      Vitals:   Vitals:    07/23/20 0832   BP:    Pulse: 106   Resp:    Temp: 97.5 °F (36.4 °C)   SpO2: 100%     Physical Exam:   Gen:  awake, alert, no apparent distress  Head/Eyes:  Normocephalic atraumatic, EOMI   NECK:   symmetrical, trachea midline  LUNGS: Normal Effort   CARDIOVASCULAR:  Normal rate  ABDOMEN:  non distended  MUSCULOSKELETAL:  ROM WNL  NEUROLOGIC: Alert and Oriented,  Cranial nerves II-XII are grossly intact.    SKIN:  no bruising or bleeding, normal skin color,  no redness      Data:       CBC   Recent Labs     07/21/20  0525 07/22/20  0809 07/23/20  0419   WBC 8.5 11.1* 10.2   HGB 8.2* 8.6* 8.8*   HCT 28.0* 30.1* 30.5*   PLT PLATELETS APPEAR NORMAL IN NUMBER, UNABLE TO GIVE PLATELET COUNT DUE TO PLATELET CLUMPING. 614* 292      BMP   Recent Labs     07/21/20  0525 07/22/20  0809 07/23/20  0419   * 119* 121*   K 4.3 5.4* 5.2*   CL 81* 82* 82*   CO2 21 19* 23   BUN 48* 57* 58*   CREATININE 1.7* 1.7* 1.7*         Electronically signed by Shellie Sales MD on 7/23/2020 at 9:20 AM

## 2020-07-23 NOTE — PROGRESS NOTES
pulmonary      SUBJECTIVE:  On pap rx     OBJECTIVE    VITALS:  /84   Pulse 106   Temp 97.5 °F (36.4 °C) (Oral)   Resp 20   Ht 5' 11\" (1.803 m)   Wt 212 lb 1.3 oz (96.2 kg)   SpO2 100%   BMI 29.58 kg/m²   HEAD AND FACE EXAM:  No throat injection, no active exudate,no thrush  NECK EXAM;No JVD, no masses, symmetrical  CHEST EXAM; Expansion equal and symmetrical, no masses  LUNG EXAM; Good breath sounds bilaterally. There are expiratory wheezes both lungs, there are crackles at both lung bases  CARDIOVASCULAR EXAM: Positive S1 and S2, no S3 or S4, no clicks ,no murmurs  RIGHT AND LEFT LOWER EXTRIMITY EXAM: No edema, no swelling, no inflamation  CNS EXAM: Alert and oriented X3          LABS   Lab Results   Component Value Date    WBC 10.2 07/23/2020    HGB 8.8 (L) 07/23/2020    HCT 30.5 (L) 07/23/2020    MCV 69.6 (L) 07/23/2020     07/23/2020     Lab Results   Component Value Date    CREATININE 1.7 (H) 07/23/2020    BUN 58 (H) 07/23/2020     (L) 07/23/2020    K 5.2 (H) 07/23/2020    CL 82 (L) 07/23/2020    CO2 23 07/23/2020     Lab Results   Component Value Date    INR 1.10 07/08/2020    PROTIME 13.3 07/08/2020          Lab Results   Component Value Date    PHOS 4.7 05/30/2020    PHOS 3.4 03/12/2020    PHOS 4.1 02/12/2020      No results for input(s): PH, PO2ART, ZKL9LSI, HCO3, BEART, O2SAT in the last 72 hours. Wt Readings from Last 3 Encounters:   07/23/20 212 lb 1.3 oz (96.2 kg)   07/12/20 210 lb (95.3 kg)   07/08/20 210 lb 5.1 oz (95.4 kg)               ASSESMENT  Ac copd  Ac chf        PLAN  1. cpm  2.  Try nc and if tolerated then dc pap    7/23/2020  Talita Moulton M.D.

## 2020-07-23 NOTE — PROGRESS NOTES
Potassium 5.2 this morning. Improved from 5.4 yesterday. Message sent to Dr. Miranda West through perfect [employer] to notify of above results. No new orders received at this time.

## 2020-07-23 NOTE — PROGRESS NOTES
Nephrology Progress Note        [address], [address] [facility], [facility] [address]84  Phone: [phone]  Office Hours: 8:30AM - 4:30PM  Monday - Friday       [facility]  Nelson Jones MD  [address], [address],  [address] [facility] [address]  PHONE: [phone]  FAX: [phone]    7/23/2020 9:51 AM  Subjective:   Admit Date: 7/18/2020  PCP: No primary care provider on file. Interval History: doing ok today  Eating breakfast  1600ml urine    Diet: DIET CARDIAC; Daily Fluid Restriction: 1500 ml      Data:   Scheduled Meds:   sodium chloride  500 mL Intravenous Once    rivaroxaban  20 mg Oral Daily    aspirin  81 mg Oral Daily    atorvastatin  40 mg Oral Daily    metoprolol succinate  25 mg Oral Daily    pantoprazole  40 mg Oral BID AC    [Held by provider] spironolactone  25 mg Oral Daily    sodium chloride flush  10 mL Intravenous 2 times per day    predniSONE  40 mg Oral Daily    ipratropium-albuterol  1 ampule Inhalation Q4H WA     Continuous Infusions:  PRN Meds:sodium chloride flush, acetaminophen **OR** acetaminophen, polyethylene glycol, promethazine **OR** ondansetron  I/O last 3 completed shifts: In: 310 [P.O.:300; I.V.:10]  Out: 1600 [Urine:1600]  No intake/output data recorded.     Intake/Output Summary (Last 24 hours) at 7/23/2020 0951  Last data filed at 7/23/2020 4640  Gross per 24 hour   Intake 190 ml   Output 1000 ml   Net -810 ml       CBC:   Recent Labs     07/21/20  0525 07/22/20  0809 07/23/20  0419   WBC 8.5 11.1* 10.2   HGB 8.2* 8.6* 8.8*   PLT PLATELETS APPEAR NORMAL IN NUMBER, UNABLE TO GIVE PLATELET COUNT DUE TO PLATELET CLUMPING. 074* 292       BMP:    Recent Labs     07/21/20  0525 07/22/20  0809 07/23/20  0419   * 119* 121*   K 4.3 5.4* 5.2*   CL 81* 82* 82*   CO2 21 19* 23   BUN 48* 57* 58*   CREATININE 1.7* 1.7* 1.7*   GLUCOSE 310* 304* 260*     Hepatic: No results for input(s): AST, ALT, ALB, BILITOT, ALKPHOS in the last 72 hours. Troponin: No results for input(s): TROPONINI in the last 72 hours. BNP: No results for input(s): BNP in the last 72 hours. Lipids: No results for input(s): CHOL, HDL in the last 72 hours. Invalid input(s): LDLCALCU  ABGs:   Lab Results   Component Value Date    PO2ART 97 07/06/2020    BHN8VDJ 35.0 07/06/2020     INR: No results for input(s): INR in the last 72 hours. Objective:   Vitals: /84   Pulse 106   Temp 97.5 °F (36.4 °C) (Oral)   Resp 20   Ht 5' 11\" (1.803 m)   Wt 212 lb 1.3 oz (96.2 kg)   SpO2 100%   BMI 29.58 kg/m²   General appearance: alert and cooperative with exam, in no acute distress  HEENT: normocephalic, atraumatic,   Neck: supple, trachea midline  Lungs:  breathing comfortably on nc  Heart[de-identified] regular rate and rhythm, S1, S2 normal,  Abdomen:; non distended  Extremities: extremities atraumatic, no cyanosis or edema  Neurologic: alert, oriented, follows commands, interactive    Assessment and Plan:     Patient Active Problem List   Diagnosis Code    Essential hypertension I10    Osteoarthritis M19.90    COPD (chronic obstructive pulmonary disease) ([facility] J44.9    Paresthesia of left leg R20.2    Abdominal hernia K46.9    Left shoulder pain M25.512    Crushing injury of right hand S67. 21XA    Rotator cuff tendinitis M75.80    Midline low back pain without sciatica M54.5    History of MI (myocardial infarction) I25.2    Coronary artery disease involving coronary bypass graft of native heart without angina pectoris I25.810    Mixed hyperlipidemia E78.2    Depression F32.9    Chronic midline low back pain without sciatica M54.5, G89.29    Tobacco abuse Z72.0    Gastroesophageal reflux disease without esophagitis K21.9    Opiate abuse, continuous (HCC) F11.10    Heroin dependence (HCC) F11.20    ST elevation myocardial infarction involving left circumflex coronary artery (HCC) I21.21    STEMI (ST elevation myocardial infarction) ([facility] I21.3    Acute on chronic combined systolic (congestive) and diastolic (congestive) heart failure ([facility]) H91.99    Systolic and diastolic CHF w/reduced LV function, NYHA class 4 ([facility]) I50.40    NSTEMI (non-ST elevated myocardial infarction) ([facility]) I21.4    Acute on chronic congestive heart failure ([facility]) I50.9    Noncompliance Z91.19    Pulmonary edema, acute ([facility]) J81.0    Acute kidney injury ([facility] N17.9    Acute respiratory failure with hypoxia and hypercapnia ([facility]) J96.01, J96.02    Hypertensive emergency I16.1    Ischemic cardiomyopathy I25.5    Heart failure ([facility]) I50.9    Left ventricular systolic dysfunction L31.4    Acute systolic congestive heart failure ([facility]) I50.21    SOB (shortness of breath) R06.02    Acute on chronic combined systolic and diastolic heart failure ([facility]) I50.43    Hyponatremia E87.1    Hematemesis K92.0    Acute on chronic systolic CHF (congestive heart failure) ([facility]) I50.23    Dyspnea R06.00   IMP  - JAIME on CKD3 d- Cardiorenal syndrome likely  - CKD3 from nephrosclerosis  - Hyponatremia : prerenal vs siadh from chf  - acute on chronic systolic CHF  - GW3     Suggest  - urine studies from yesterday suggest a prerenal etiology for the hyponatremia/he does not look volume overloaded, will give 500ml NS today and continue to monitor the sodium level  - Avoid nephrotoxics  - May need counselling or OP therapy regarding fluid intake and salt intake and how to avoid readmission- may benefit from CHF educator consult   - continue oral fluid restriction                                      Electronically signed by Denisse Strickland DO on 7/23/2020 at 9:51 Garrison Menghini, MD [address], [address],  [address] [facility], [place] [address]  PHONE: [phone]  FAX: [phone]

## 2020-07-24 LAB
ANION GAP SERPL CALCULATED.3IONS-SCNC: 14 MMOL/L (ref 4–16)
BUN BLDV-MCNC: 48 MG/DL (ref 6–23)
CALCIUM SERPL-MCNC: 9.5 MG/DL (ref 8.3–10.6)
CHLORIDE BLD-SCNC: 82 MMOL/L (ref 99–110)
CO2: 24 MMOL/L (ref 21–32)
CREAT SERPL-MCNC: 1.5 MG/DL (ref 0.9–1.3)
GFR AFRICAN AMERICAN: >60 ML/MIN/1.73M2
GFR NON-AFRICAN AMERICAN: 50 ML/MIN/1.73M2
GLUCOSE BLD-MCNC: 175 MG/DL (ref 70–99)
HCT VFR BLD CALC: 32.8 % (ref 42–52)
HEMOGLOBIN: 9.2 GM/DL (ref 13.5–18)
MCH RBC QN AUTO: 19.9 PG (ref 27–31)
MCHC RBC AUTO-ENTMCNC: 28 % (ref 32–36)
MCV RBC AUTO: 71 FL (ref 78–100)
PDW BLD-RTO: 27.7 % (ref 11.7–14.9)
PLATELET # BLD: 244 K/CU MM (ref 140–440)
PMV BLD AUTO: 10.6 FL (ref 7.5–11.1)
POTASSIUM SERPL-SCNC: 5.8 MMOL/L (ref 3.5–5.1)
RBC # BLD: 4.62 M/CU MM (ref 4.6–6.2)
SODIUM BLD-SCNC: 120 MMOL/L (ref 135–145)
WBC # BLD: 11.6 K/CU MM (ref 4–10.5)

## 2020-07-24 PROCEDURE — 2580000003 HC RX 258: Performed by: INTERNAL MEDICINE

## 2020-07-24 PROCEDURE — 6370000000 HC RX 637 (ALT 250 FOR IP): Performed by: INTERNAL MEDICINE

## 2020-07-24 PROCEDURE — 6360000002 HC RX W HCPCS: Performed by: INTERNAL MEDICINE

## 2020-07-24 PROCEDURE — 85027 COMPLETE CBC AUTOMATED: CPT

## 2020-07-24 PROCEDURE — 6370000000 HC RX 637 (ALT 250 FOR IP): Performed by: HOSPITALIST

## 2020-07-24 PROCEDURE — 2700000000 HC OXYGEN THERAPY PER DAY

## 2020-07-24 PROCEDURE — 6370000000 HC RX 637 (ALT 250 FOR IP): Performed by: FAMILY MEDICINE

## 2020-07-24 PROCEDURE — 36415 COLL VENOUS BLD VENIPUNCTURE: CPT

## 2020-07-24 PROCEDURE — 6360000002 HC RX W HCPCS: Performed by: HOSPITALIST

## 2020-07-24 PROCEDURE — 84132 ASSAY OF SERUM POTASSIUM: CPT

## 2020-07-24 PROCEDURE — 94660 CPAP INITIATION&MGMT: CPT

## 2020-07-24 PROCEDURE — 94761 N-INVAS EAR/PLS OXIMETRY MLT: CPT

## 2020-07-24 PROCEDURE — 94640 AIRWAY INHALATION TREATMENT: CPT

## 2020-07-24 PROCEDURE — 80048 BASIC METABOLIC PNL TOTAL CA: CPT

## 2020-07-24 PROCEDURE — 6370000000 HC RX 637 (ALT 250 FOR IP): Performed by: PHYSICIAN ASSISTANT

## 2020-07-24 PROCEDURE — 2580000003 HC RX 258: Performed by: HOSPITALIST

## 2020-07-24 PROCEDURE — 2140000000 HC CCU INTERMEDIATE R&B

## 2020-07-24 PROCEDURE — 2500000003 HC RX 250 WO HCPCS: Performed by: INTERNAL MEDICINE

## 2020-07-24 RX ORDER — DEXTROSE MONOHYDRATE 25 G/50ML
25 INJECTION, SOLUTION INTRAVENOUS ONCE
Status: COMPLETED | OUTPATIENT
Start: 2020-07-24 | End: 2020-07-24

## 2020-07-24 RX ORDER — BUMETANIDE 1 MG/1
2 TABLET ORAL 2 TIMES DAILY
Status: DISCONTINUED | OUTPATIENT
Start: 2020-07-25 | End: 2020-07-27 | Stop reason: HOSPADM

## 2020-07-24 RX ORDER — NICOTINE POLACRILEX 4 MG
15 LOZENGE BUCCAL PRN
Status: DISCONTINUED | OUTPATIENT
Start: 2020-07-24 | End: 2020-07-26 | Stop reason: SDUPTHER

## 2020-07-24 RX ORDER — SODIUM CHLORIDE 9 MG/ML
INJECTION, SOLUTION INTRAVENOUS CONTINUOUS
Status: DISCONTINUED | OUTPATIENT
Start: 2020-07-24 | End: 2020-07-25

## 2020-07-24 RX ORDER — SODIUM POLYSTYRENE SULFONATE 15 G/60ML
15 SUSPENSION ORAL; RECTAL ONCE
Status: COMPLETED | OUTPATIENT
Start: 2020-07-24 | End: 2020-07-24

## 2020-07-24 RX ORDER — DEXTROSE MONOHYDRATE 50 MG/ML
100 INJECTION, SOLUTION INTRAVENOUS PRN
Status: DISCONTINUED | OUTPATIENT
Start: 2020-07-24 | End: 2020-07-26 | Stop reason: SDUPTHER

## 2020-07-24 RX ORDER — HYDROXYZINE PAMOATE 25 MG/1
100 CAPSULE ORAL ONCE
Status: COMPLETED | OUTPATIENT
Start: 2020-07-24 | End: 2020-07-24

## 2020-07-24 RX ORDER — DEXTROSE MONOHYDRATE 25 G/50ML
12.5 INJECTION, SOLUTION INTRAVENOUS PRN
Status: DISCONTINUED | OUTPATIENT
Start: 2020-07-24 | End: 2020-07-26 | Stop reason: SDUPTHER

## 2020-07-24 RX ORDER — FUROSEMIDE 10 MG/ML
40 INJECTION INTRAMUSCULAR; INTRAVENOUS ONCE
Status: COMPLETED | OUTPATIENT
Start: 2020-07-24 | End: 2020-07-24

## 2020-07-24 RX ADMIN — PANTOPRAZOLE SODIUM 40 MG: 40 TABLET, DELAYED RELEASE ORAL at 05:48

## 2020-07-24 RX ADMIN — ONDANSETRON 4 MG: 2 INJECTION INTRAMUSCULAR; INTRAVENOUS at 23:24

## 2020-07-24 RX ADMIN — RIVAROXABAN 20 MG: 20 TABLET, FILM COATED ORAL at 16:37

## 2020-07-24 RX ADMIN — FUROSEMIDE 40 MG: 10 INJECTION, SOLUTION INTRAMUSCULAR; INTRAVENOUS at 20:48

## 2020-07-24 RX ADMIN — PREDNISONE 40 MG: 20 TABLET ORAL at 09:56

## 2020-07-24 RX ADMIN — SODIUM POLYSTYRENE SULFONATE 15 G: 15 SUSPENSION ORAL; RECTAL at 17:53

## 2020-07-24 RX ADMIN — SODIUM CHLORIDE, PRESERVATIVE FREE 10 ML: 5 INJECTION INTRAVENOUS at 09:57

## 2020-07-24 RX ADMIN — IPRATROPIUM BROMIDE AND ALBUTEROL SULFATE 1 AMPULE: .5; 3 SOLUTION RESPIRATORY (INHALATION) at 16:25

## 2020-07-24 RX ADMIN — INSULIN HUMAN 10 UNITS: 100 INJECTION, SOLUTION PARENTERAL at 20:49

## 2020-07-24 RX ADMIN — IPRATROPIUM BROMIDE AND ALBUTEROL SULFATE 1 AMPULE: .5; 3 SOLUTION RESPIRATORY (INHALATION) at 00:34

## 2020-07-24 RX ADMIN — HYDROXYZINE PAMOATE 100 MG: 25 CAPSULE ORAL at 23:24

## 2020-07-24 RX ADMIN — ASPIRIN 81 MG: 81 TABLET, COATED ORAL at 09:56

## 2020-07-24 RX ADMIN — SODIUM CHLORIDE: 9 INJECTION, SOLUTION INTRAVENOUS at 20:48

## 2020-07-24 RX ADMIN — PANTOPRAZOLE SODIUM 40 MG: 40 TABLET, DELAYED RELEASE ORAL at 16:37

## 2020-07-24 RX ADMIN — IPRATROPIUM BROMIDE AND ALBUTEROL SULFATE 1 AMPULE: .5; 3 SOLUTION RESPIRATORY (INHALATION) at 12:10

## 2020-07-24 RX ADMIN — METOPROLOL SUCCINATE 25 MG: 25 TABLET, EXTENDED RELEASE ORAL at 09:56

## 2020-07-24 RX ADMIN — ATORVASTATIN CALCIUM 40 MG: 40 TABLET, FILM COATED ORAL at 09:56

## 2020-07-24 RX ADMIN — DEXTROSE MONOHYDRATE 25 G: 25 INJECTION, SOLUTION INTRAVENOUS at 20:48

## 2020-07-24 RX ADMIN — IPRATROPIUM BROMIDE AND ALBUTEROL SULFATE 1 AMPULE: .5; 3 SOLUTION RESPIRATORY (INHALATION) at 05:52

## 2020-07-24 RX ADMIN — IPRATROPIUM BROMIDE AND ALBUTEROL SULFATE 1 AMPULE: .5; 3 SOLUTION RESPIRATORY (INHALATION) at 09:09

## 2020-07-24 RX ADMIN — Medication 50 MEQ: at 20:48

## 2020-07-24 RX ADMIN — HYDROXYZINE PAMOATE 100 MG: 25 CAPSULE ORAL at 01:55

## 2020-07-24 RX ADMIN — IPRATROPIUM BROMIDE AND ALBUTEROL SULFATE 1 AMPULE: .5; 3 SOLUTION RESPIRATORY (INHALATION) at 20:25

## 2020-07-24 ASSESSMENT — PAIN SCALES - GENERAL
PAINLEVEL_OUTOF10: 0
PAINLEVEL_OUTOF10: 0

## 2020-07-24 NOTE — PROGRESS NOTES
Nephrology Progress Note        [address], [address] [facility], [place] [address]  Phone: [phone]  Office Hours: 8:30AM - 4:30PM  Monday - Friday       [facility]  Alexia Sultana MD  [address], [address],  [address], [facility] [address]  PHONE: [phone]  FAX: [phone]    7/24/2020 7:56 AM  Subjective:   Admit Date: 7/18/2020  PCP: No primary care provider on file. Interval History: on bipap  1000ml urine yesterday? Diet: DIET CARDIAC; Daily Fluid Restriction: 1500 ml      Data:   Scheduled Meds:   rivaroxaban  20 mg Oral Daily    aspirin  81 mg Oral Daily    atorvastatin  40 mg Oral Daily    metoprolol succinate  25 mg Oral Daily    pantoprazole  40 mg Oral BID AC    [Held by provider] spironolactone  25 mg Oral Daily    sodium chloride flush  10 mL Intravenous 2 times per day    predniSONE  40 mg Oral Daily    ipratropium-albuterol  1 ampule Inhalation Q4H WA     Continuous Infusions:  PRN Meds:sodium chloride flush, acetaminophen **OR** acetaminophen, polyethylene glycol, promethazine **OR** ondansetron  I/O last 3 completed shifts: In: 1082 [P.O.:1082]  Out: 1000 [Urine:1000]  No intake/output data recorded. Intake/Output Summary (Last 24 hours) at 7/24/2020 0756  Last data filed at 7/23/2020 2107  Gross per 24 hour   Intake 1082 ml   Output 1000 ml   Net 82 ml       CBC:   Recent Labs     07/22/20  0809 07/23/20  0419   WBC 11.1* 10.2   HGB 8.6* 8.8*   * 292       BMP:    Recent Labs     07/22/20  0809 07/23/20  0419   * 121*   K 5.4* 5.2*   CL 82* 82*   CO2 19* 23   BUN 57* 58*   CREATININE 1.7* 1.7*   GLUCOSE 304* 260*     Hepatic: No results for input(s): AST, ALT, ALB, BILITOT, ALKPHOS in the last 72 hours. Troponin: No results for input(s): TROPONINI in the last 72 hours. BNP: No results for input(s): BNP in the last 72 hours. Lipids: No results for input(s): CHOL, HDL in the last 72 hours.     Invalid

## 2020-07-24 NOTE — PROGRESS NOTES
Nutrition Assessment     Type and Reason for Visit: Initial(los)    Nutrition Recommendations/Plan:   · Continue current diet     Nutrition Assessment:  Adequate intake on Cardiac Diet, 1500 ml fluid, on bipap. Consuming 76% to all of recent meals. No recent wt loss, some gain noted. Pt is at low risk at this time.     Malnutrition Assessment:  Malnutrition Status: No malnutrition    Current Nutrition Therapies:    DIET CARDIAC; Daily Fluid Restriction: 1500 ml    Anthropometric Measures:  · Height: 5' 11\" (180.3 cm)  · Current Body Wt: 213 lb 10 oz (96.9 kg)   · BMI: 29.8    Nutrition Diagnosis:   No nutrition diagnosis at this time     Nutrition Interventions:   Food and/or Nutrient Delivery:  Continue Current Diet  Nutrition Education/Counseling:  No recommendation at this time   Coordination of Nutrition Care:  Continued Inpatient Monitoring    Goals:  Pt will consume greater than 75% of meals       Nutrition Monitoring and Evaluation:   Food/Nutrient Intake Outcomes:  Food and Nutrient Intake  Physical Signs/Symptoms Outcomes:  Biochemical Data, Weight     Discharge Planning:    No discharge needs at this time     Electronically signed by Rose Archuleta RD, LD on 7/24/20 at 12:19 PM EDT    Contact: [phone]

## 2020-07-24 NOTE — PROGRESS NOTES
07/24/20 0552   Treatment   Treatment Type HHN   $Treatment Type $Inhaled Therapy/Meds   Medications Albuterol/Ipratropium   Pre-Tx Pulse 107   Pre-Tx Resps 20   Breath Sounds Pre-Tx KRISTI Diminished   Breath Sounds Pre-Tx LLL Diminished   Breath Sounds Pre-Tx RUL Diminished   Breath Sounds Pre-Tx RML Diminished   Breath Sounds Pre-Tx RLL Diminished   Breath Sounds Post-Tx KRISTI Diminished   Breath Sounds Post-Tx LLL Diminished   Breath Sounds Post-Tx RUL Diminished   Breath Sounds Post-Tx RML Diminished   Breath Sounds Post-Tx RLL Diminished   Post-Tx Pulse 7   Post-Tx Resps 18   Tx Tolerance Well   Duration 8   Patient does not seem to get any relief from txs & requires Bipap constant. Has a consistant expiratory moan which pursed lip breathing was taught to decrease.

## 2020-07-24 NOTE — PROGRESS NOTES
Hospitalist Progress Note      Name:  Dipak Finch /Age/Sex: 1970  (52 y.o. male)   MRN & CSN:  [id number] & [id number] Admission Date/Time: 2020  9:34 PM   Location:  [address] [place] [address] PCP: No primary care provider on file. Dipak Finch is a 52 y.o.  male  who presents with Shortness of Breath and Leg Swelling      Assessment and Plan:   1. Acute dyspnea with COPD exacerbatinon and acute on chronic systolic and diastolic CHF  Duoneb, steroids changed to oral now, aldactone on hold due to hyperK       BB       lasix stopped          hold ACE with worsening JAIME  - neg 6L  On 2L nasal cannula.  CXR: Persistent loculated right pleural effusion with associated atelectasis   -On BiPAP.  Consult pulmonology.   Monitor I's and O's and daily weights.   Check Echo: ef 10-15% severe MR, severe pulm HTN  - ambulate and increase activity  2. Hyponatremia  -  Nephrology following, fluid restrict 1500cc, labs pending today  3. JAIME on CKD 3  hold ACE and consult nephro  -Nephrology following. 4. Elevated troponin  Likely due to JAIME.  Cardiology following.  EKG sinus tachycardia. -Troponins trending down. 5. Microcytic anemia  No gross bleeding.  - monitor  6. Hx of DVT  Venous duplex negative for DVT.  VQ scan intermediate probability for PE.   - Patient has been on anticoagulation for over a month. -creatinine clearance greater than 30.  Stop heparin drip and restart home Xarelto.  D-dimer 284.  Not significantly elevated. 7. Hyperbilirubinemia  -check right upper quadrant ultrasound:no evidence of   cholelithiasis or pericholecystic fluid. [place] is a negative sonographic Dsouza's sign     -  No right upper quadrant abdominal tenderness. 8. Nausea and dry heaves  - stable  -KUB: Distended loops of small bowel, ileus versus partial small bowel obstruction versus enteritis     - surgery consulted, no surgical intervention needed at this time  9.  Mild HyperK  - holding aldactone     DVT Prophylaxis: xarelto  Diet: DIET CARDIAC; Daily Fluid Restriction: 1500 ml  Code Status: Full Code                    Diet DIET CARDIAC; Daily Fluid Restriction: 1500 ml   Code Status Full Code     Medications:   Medications:    rivaroxaban  20 mg Oral Daily    aspirin  81 mg Oral Daily    atorvastatin  40 mg Oral Daily    metoprolol succinate  25 mg Oral Daily    pantoprazole  40 mg Oral BID AC    [Held by provider] spironolactone  25 mg Oral Daily    sodium chloride flush  10 mL Intravenous 2 times per day    predniSONE  40 mg Oral Daily    ipratropium-albuterol  1 ampule Inhalation Q4H WA      Infusions:   PRN Meds: sodium chloride flush, 10 mL, PRN  acetaminophen, 650 mg, Q6H PRN    Or  acetaminophen, 650 mg, Q6H PRN  polyethylene glycol, 17 g, Daily PRN  promethazine, 12.5 mg, Q6H PRN    Or  ondansetron, 4 mg, Q6H PRN      Subjective:   Sleeping, no distress    Objective:        Intake/Output Summary (Last 24 hours) at 7/24/2020 5048  Last data filed at 7/23/2020 2107  Gross per 24 hour   Intake 1082 ml   Output 1000 ml   Net 82 ml      Vitals:   Vitals:    07/24/20 0545   BP: (!) 135/106   Pulse: 106   Resp: 19   Temp: 98 °F (36.7 °C)   SpO2:      Physical Exam:   Gen:  sleeping  Head/Eyes:  Normocephalic atraumatic, eyes closed  NECK:   symmetrical, trachea midline  LUNGS: Normal Effort   CARDIOVASCULAR:  Normal rate  ABDOMEN:  non distended  MUSCULOSKELETAL:  ROM limited  NEUROLOGIC: sleeping  SKIN:  no bruising or bleeding, normal skin color,  no redness      Data:       CBC   Recent Labs     07/22/20  0809 07/23/20  0419   WBC 11.1* 10.2   HGB 8.6* 8.8*   HCT 30.1* 30.5*   * 292      BMP   Recent Labs     07/22/20  0809 07/23/20  0419   * 121*   K 5.4* 5.2*   CL 82* 82*   CO2 19* 23   BUN 57* 58*   CREATININE 1.7* 1.7*         Electronically signed by Michelle Grajeda MD on 7/24/2020 at 7:58 AM

## 2020-07-24 NOTE — PROGRESS NOTES
pulmonary      SUBJECTIVE: very weak and on pap rx     OBJECTIVE    VITALS:  BP (!) 123/102   Pulse 105   Temp 97.5 °F (36.4 °C) (Oral)   Resp 20   Ht 5' 11\" (1.803 m)   Wt 213 lb 10 oz (96.9 kg)   SpO2 100%   BMI 29.79 kg/m²   HEAD AND FACE EXAM:  No throat injection, no active exudate,no thrush  NECK EXAM;No JVD, no masses, symmetrical  CHEST EXAM; Expansion equal and symmetrical, no masses  LUNG EXAM; Good breath sounds bilaterally. There are expiratory wheezes both lungs, there are crackles at both lung bases  CARDIOVASCULAR EXAM: Positive S1 and S2, no S3 or S4, no clicks ,no murmurs  RIGHT AND LEFT LOWER EXTRIMITY EXAM: No edema, no swelling, no inflamation            LABS   Lab Results   Component Value Date    WBC 10.2 07/23/2020    HGB 8.8 (L) 07/23/2020    HCT 30.5 (L) 07/23/2020    MCV 69.6 (L) 07/23/2020     07/23/2020     Lab Results   Component Value Date    CREATININE 1.7 (H) 07/23/2020    BUN 58 (H) 07/23/2020     (L) 07/23/2020    K 5.2 (H) 07/23/2020    CL 82 (L) 07/23/2020    CO2 23 07/23/2020     Lab Results   Component Value Date    INR 1.10 07/08/2020    PROTIME 13.3 07/08/2020          Lab Results   Component Value Date    PHOS 4.7 05/30/2020    PHOS 3.4 03/12/2020    PHOS 4.1 02/12/2020      No results for input(s): PH, PO2ART, VVE5ESK, HCO3, BEART, O2SAT in the last 72 hours. Wt Readings from Last 3 Encounters:   07/24/20 213 lb 10 oz (96.9 kg)   07/12/20 210 lb (95.3 kg)   07/08/20 210 lb 5.1 oz (95.4 kg)               ASSESMENT  Ac copd  Ac on ch chf  Loculated right pl effusion  Right basilar atx        PLAN  1. Bd rx  2. o2 adm  3.  He is on bipap for comfort and can be switched to o2 by nc    7/24/2020  Thien Edwards M.D.

## 2020-07-25 LAB
ANION GAP SERPL CALCULATED.3IONS-SCNC: 15 MMOL/L (ref 4–16)
BUN BLDV-MCNC: 44 MG/DL (ref 6–23)
CALCIUM SERPL-MCNC: 9.3 MG/DL (ref 8.3–10.6)
CHLORIDE BLD-SCNC: 82 MMOL/L (ref 99–110)
CO2: 26 MMOL/L (ref 21–32)
CREAT SERPL-MCNC: 1.4 MG/DL (ref 0.9–1.3)
GFR AFRICAN AMERICAN: >60 ML/MIN/1.73M2
GFR NON-AFRICAN AMERICAN: 54 ML/MIN/1.73M2
GLUCOSE BLD-MCNC: 194 MG/DL (ref 70–99)
HCT VFR BLD CALC: 31.4 % (ref 42–52)
HEMOGLOBIN: 9.1 GM/DL (ref 13.5–18)
MCH RBC QN AUTO: 20 PG (ref 27–31)
MCHC RBC AUTO-ENTMCNC: 29 % (ref 32–36)
MCV RBC AUTO: 69.2 FL (ref 78–100)
PDW BLD-RTO: 27.6 % (ref 11.7–14.9)
PLATELET # BLD: 230 K/CU MM (ref 140–440)
PMV BLD AUTO: 10.3 FL (ref 7.5–11.1)
POTASSIUM SERPL-SCNC: 4.5 MMOL/L (ref 3.5–5.1)
POTASSIUM SERPL-SCNC: 4.9 MMOL/L (ref 3.5–5.1)
RBC # BLD: 4.54 M/CU MM (ref 4.6–6.2)
SODIUM BLD-SCNC: 123 MMOL/L (ref 135–145)
WBC # BLD: 10.2 K/CU MM (ref 4–10.5)

## 2020-07-25 PROCEDURE — 85027 COMPLETE CBC AUTOMATED: CPT

## 2020-07-25 PROCEDURE — 6370000000 HC RX 637 (ALT 250 FOR IP): Performed by: INTERNAL MEDICINE

## 2020-07-25 PROCEDURE — 6370000000 HC RX 637 (ALT 250 FOR IP): Performed by: HOSPITALIST

## 2020-07-25 PROCEDURE — 2580000003 HC RX 258: Performed by: HOSPITALIST

## 2020-07-25 PROCEDURE — 2700000000 HC OXYGEN THERAPY PER DAY

## 2020-07-25 PROCEDURE — 94761 N-INVAS EAR/PLS OXIMETRY MLT: CPT

## 2020-07-25 PROCEDURE — 2140000000 HC CCU INTERMEDIATE R&B

## 2020-07-25 PROCEDURE — 2580000003 HC RX 258: Performed by: INTERNAL MEDICINE

## 2020-07-25 PROCEDURE — 36415 COLL VENOUS BLD VENIPUNCTURE: CPT

## 2020-07-25 PROCEDURE — 80048 BASIC METABOLIC PNL TOTAL CA: CPT

## 2020-07-25 PROCEDURE — 94640 AIRWAY INHALATION TREATMENT: CPT

## 2020-07-25 RX ORDER — SODIUM CHLORIDE 9 MG/ML
INJECTION, SOLUTION INTRAVENOUS CONTINUOUS
Status: DISCONTINUED | OUTPATIENT
Start: 2020-07-25 | End: 2020-07-25

## 2020-07-25 RX ORDER — TOLVAPTAN 15 MG/1
15 TABLET ORAL ONCE
Status: COMPLETED | OUTPATIENT
Start: 2020-07-25 | End: 2020-07-25

## 2020-07-25 RX ADMIN — IPRATROPIUM BROMIDE AND ALBUTEROL SULFATE 1 AMPULE: .5; 3 SOLUTION RESPIRATORY (INHALATION) at 05:20

## 2020-07-25 RX ADMIN — SODIUM CHLORIDE: 9 INJECTION, SOLUTION INTRAVENOUS at 11:00

## 2020-07-25 RX ADMIN — SODIUM CHLORIDE, PRESERVATIVE FREE 10 ML: 5 INJECTION INTRAVENOUS at 21:08

## 2020-07-25 RX ADMIN — IPRATROPIUM BROMIDE AND ALBUTEROL SULFATE 1 AMPULE: .5; 3 SOLUTION RESPIRATORY (INHALATION) at 20:34

## 2020-07-25 RX ADMIN — PANTOPRAZOLE SODIUM 40 MG: 40 TABLET, DELAYED RELEASE ORAL at 16:32

## 2020-07-25 RX ADMIN — IPRATROPIUM BROMIDE AND ALBUTEROL SULFATE 1 AMPULE: .5; 3 SOLUTION RESPIRATORY (INHALATION) at 12:37

## 2020-07-25 RX ADMIN — TOLVAPTAN 15 MG: 15 TABLET ORAL at 16:27

## 2020-07-25 RX ADMIN — BUMETANIDE 2 MG: 1 TABLET ORAL at 08:49

## 2020-07-25 RX ADMIN — IPRATROPIUM BROMIDE AND ALBUTEROL SULFATE 1 AMPULE: .5; 3 SOLUTION RESPIRATORY (INHALATION) at 09:50

## 2020-07-25 RX ADMIN — ACETAMINOPHEN 650 MG: 325 TABLET ORAL at 02:35

## 2020-07-25 RX ADMIN — SODIUM CHLORIDE, PRESERVATIVE FREE 10 ML: 5 INJECTION INTRAVENOUS at 08:50

## 2020-07-25 RX ADMIN — ATORVASTATIN CALCIUM 40 MG: 40 TABLET, FILM COATED ORAL at 08:49

## 2020-07-25 RX ADMIN — IPRATROPIUM BROMIDE AND ALBUTEROL SULFATE 1 AMPULE: .5; 3 SOLUTION RESPIRATORY (INHALATION) at 16:08

## 2020-07-25 RX ADMIN — ASPIRIN 81 MG: 81 TABLET, COATED ORAL at 08:49

## 2020-07-25 RX ADMIN — RIVAROXABAN 20 MG: 20 TABLET, FILM COATED ORAL at 19:29

## 2020-07-25 RX ADMIN — PANTOPRAZOLE SODIUM 40 MG: 40 TABLET, DELAYED RELEASE ORAL at 05:30

## 2020-07-25 RX ADMIN — BUMETANIDE 2 MG: 1 TABLET ORAL at 21:06

## 2020-07-25 RX ADMIN — METOPROLOL SUCCINATE 25 MG: 25 TABLET, EXTENDED RELEASE ORAL at 09:01

## 2020-07-25 RX ADMIN — PREDNISONE 40 MG: 20 TABLET ORAL at 08:49

## 2020-07-25 ASSESSMENT — PAIN SCALES - GENERAL
PAINLEVEL_OUTOF10: 2
PAINLEVEL_OUTOF10: 0

## 2020-07-25 NOTE — PROGRESS NOTES
Nephrology Progress Note        [address], [address] [facility] [address]  Phone: [phone]  Office Hours: 8:30AM - 4:30PM  Monday - Friday       [facility]  Araceli Raines MD  [address], [address],  [facility] [address]  PHONE: [phone]  FAX: [phone]    7/25/2020 8:21 AM  Subjective:   Admit Date: 7/18/2020  PCP: No primary care provider on file. Interval History: resting comfortably  No labs again this am    Diet: DIET CARDIAC; Daily Fluid Restriction: 1500 ml; Low Potassium      Data:   Scheduled Meds:   bumetanide  2 mg Oral BID    rivaroxaban  20 mg Oral Daily    aspirin  81 mg Oral Daily    atorvastatin  40 mg Oral Daily    metoprolol succinate  25 mg Oral Daily    pantoprazole  40 mg Oral BID AC    sodium chloride flush  10 mL Intravenous 2 times per day    predniSONE  40 mg Oral Daily    ipratropium-albuterol  1 ampule Inhalation Q4H WA     Continuous Infusions:   sodium chloride 75 mL/hr at 07/24/20 2048    dextrose       PRN Meds:glucose, dextrose, glucagon (rDNA), dextrose, sodium chloride flush, acetaminophen **OR** acetaminophen, polyethylene glycol, promethazine **OR** ondansetron  I/O last 3 completed shifts: In: 480 [P.O.:480]  Out: 3300 [Urine:3300]  No intake/output data recorded. Intake/Output Summary (Last 24 hours) at 7/25/2020 0821  Last data filed at 7/25/2020 0659  Gross per 24 hour   Intake 480 ml   Output 3300 ml   Net -2820 ml       CBC:   Recent Labs     07/23/20  0419 07/24/20  1615   WBC 10.2 11.6*   HGB 8.8* 9.2*    244       BMP:    Recent Labs     07/23/20  0419 07/24/20  1615 07/24/20  2316   * 120*  --    K 5.2* 5.8* 4.9   CL 82* 82*  --    CO2 23 24  --    BUN 58* 48*  --    CREATININE 1.7* 1.5*  --    GLUCOSE 260* 175*  --      Hepatic: No results for input(s): AST, ALT, ALB, BILITOT, ALKPHOS in the last 72 hours.   Troponin: No results for input(s): TROPONINI in the last 72 hours. BNP: No results for input(s): BNP in the last 72 hours. Lipids: No results for input(s): CHOL, HDL in the last 72 hours. Invalid input(s): LDLCALCU  ABGs:   Lab Results   Component Value Date    PO2ART 97 07/06/2020    LDQ4EUI 35.0 07/06/2020     INR: No results for input(s): INR in the last 72 hours. Objective:   Vitals: /87   Pulse 93   Temp 98.6 °F (37 °C) (Axillary)   Resp 19   Ht 5' 11\" (1.803 m)   Wt 213 lb 10 oz (96.9 kg)   SpO2 99%   BMI 29.79 kg/m²   General appearance:  in no acute distress  HEENT: normocephalic, atraumatic,   Neck: supple, trachea midline  Lungs: , breathing comfortably   Abdomen:  non distended,   Extremities: extremities atraumatic, no cyanosis or edema      Assessment and Plan:     Patient Active Problem List   Diagnosis Code    Essential hypertension I10    Osteoarthritis M19.90    COPD (chronic obstructive pulmonary disease) ([facility]) J44.9    Paresthesia of left leg R20.2    Abdominal hernia K46.9    Left shoulder pain M25.512    Crushing injury of right hand S67. 21XA    Rotator cuff tendinitis M75.80    Midline low back pain without sciatica M54.5    History of MI (myocardial infarction) I25.2    Coronary artery disease involving coronary bypass graft of native heart without angina pectoris I25.810    Mixed hyperlipidemia E78.2    Depression F32.9    Chronic midline low back pain without sciatica M54.5, G89.29    Tobacco abuse Z72.0    Gastroesophageal reflux disease without esophagitis K21.9    Opiate abuse, continuous ([facility]) F11.10    Heroin dependence ([facility]) F11.20    ST elevation myocardial infarction involving left circumflex coronary artery ([facility]) I21.21    STEMI (ST elevation myocardial infarction) ([facility]) I21.3    Acute on chronic combined systolic (congestive) and diastolic (congestive) heart failure ([facility]) Y64.01    Systolic and diastolic CHF w/reduced LV function, NYHA class 4 ([facility]) I50.40    NSTEMI (non-ST elevated myocardial infarction) ([facility] [address] I21.4    Acute on chronic congestive heart failure (HCC) I50.9    Noncompliance Z91.19    Pulmonary edema, acute ([facility]) J81.0    Acute kidney injury ([facility] [address] N17.9    Acute respiratory failure with hypoxia and hypercapnia ([facility]) J96.01, J96.02    Hypertensive emergency I16.1    Ischemic cardiomyopathy I25.5    Heart failure ([facility]) I50.9    Left ventricular systolic dysfunction A08.1    Acute systolic congestive heart failure ([facility]) I50.21    SOB (shortness of breath) R06.02    Acute on chronic combined systolic and diastolic heart failure ([facility]) I50.43    Hyponatremia E87.1    Hematemesis K92.0    Acute on chronic systolic CHF (congestive heart failure) ([facility]) I50.23    Dyspnea R06.00   IMP  - JAIME on CKD3 d- Cardiorenal syndrome likely  - CKD3 from nephrosclerosis  - Hyponatremia : prerenal vs siadh from chf; urine studies from yesterday suggest a prerenal etiology for the hyponatremia/  - acute on chronic systolic CHF  - IU5   - hyperkalemia     Suggest  - no labs again this am  - stop NS after current bag is empthy  - resume home dose of bumex  - Avoid nephrotoxics  - May need counselling or OP therapy regarding fluid intake and salt intake and how to avoid readmission- may benefit from CHF educator consult   - continue oral fluid restriction                                      Electronically signed by Flaquita Skaggs DO on 7/25/2020 at [place], [place]  Oumar Mariella, DO  [address], [place] [address]  PHONE: [phone]  FAX: [phone]

## 2020-07-25 NOTE — PROGRESS NOTES
Hospitalist Progress Note      Name:  Leslie Mensah /Age/Sex: 1970  (52 y.o. male)   MRN & CSN:  [id number] & [id number] Admission Date/Time: 2020  9:34 PM   Location:  [address] [place] [address] PCP: No primary care provider on file. Leslie Mensah is a 52 y.o.  male  who presents with Shortness of Breath and Leg Swelling      Assessment and Plan:   1. Acute dyspnea with COPD exacerbatinon and acute on chronic systolic and diastolic CHF  Duoneb, steroids changed to oral now, aldactone on hold due to hyperK       BB       lasix stopped          hold ACE with worsening JAIME  - neg 9.2L  On 2L nasal cannula.  CXR: Persistent loculated right pleural effusion with associated atelectasis   -On BiPAP prn.  Consult pulmonology.   Monitor I's and O's and daily weights.   - check home O2 eval and ambulate halls today  Check Echo: ef 10-15% severe MR, severe pulm HTN  2. Hyponatremia  -  Nephrology following, fluid restrict 1500cc  3. JAIME on CKD 3  hold ACE and consult nephro  -Nephrology following. 4. Elevated troponin  Likely due to JAIME.  Cardiology following.  EKG sinus tachycardia. -Troponins trending down. 5. Microcytic anemia  No gross bleeding.  - monitor  6. Hx of DVT  Venous duplex negative for DVT.  VQ scan intermediate probability for PE.   - Patient has been on anticoagulation for over a month. -creatinine clearance greater than 30.  Stop heparin drip and restart home Xarelto.  D-dimer 284.  Not significantly elevated. 7. Hyperbilirubinemia  -check right upper quadrant ultrasound:no evidence of   cholelithiasis or pericholecystic fluid. Alyssa Figueroa is a negative sonographic Dsouza's sign     -  No right upper quadrant abdominal tenderness. 8. Nausea and dry heaves  - stable  -KUB: Distended loops of small bowel, ileus versus partial small bowel obstruction versus enteritis     - surgery consulted, no surgical intervention needed at this time  9.  Mild HyperK  - holding aldactone     DVT

## 2020-07-25 NOTE — PLAN OF CARE
Problem: Falls - Risk of:  Goal: Will remain free from falls  Description: Will remain free from falls  Outcome: Ongoing  Goal: Absence of physical injury  Description: Absence of physical injury  Outcome: Ongoing     Problem: Infection:  Goal: Will remain free from infection  Description: Will remain free from infection  Outcome: Ongoing     Problem: Safety:  Goal: Free from accidental physical injury  Description: Free from accidental physical injury  Outcome: Ongoing  Goal: Free from intentional harm  Description: Free from intentional harm  Outcome: Ongoing     Problem: Daily Care:  Goal: Daily care needs are met  Description: Daily care needs are met  Outcome: Ongoing     Problem: Pain:  Goal: Patient's pain/discomfort is manageable  Description: Patient's pain/discomfort is manageable  Outcome: Ongoing     Problem: Skin Integrity:  Goal: Skin integrity will stabilize  Description: Skin integrity will stabilize  Outcome: Ongoing     Problem: Discharge Planning:  Goal: Patients continuum of care needs are met  Description: Patients continuum of care needs are met  Outcome: Ongoing     Problem: Infection:  Goal: Will remain free from infection  Description: Will remain free from infection  Outcome: Ongoing     Problem: Falls - Risk of:  Goal: Absence of physical injury  Description: Absence of physical injury  Outcome: Ongoing

## 2020-07-26 ENCOUNTER — APPOINTMENT (OUTPATIENT)
Dept: GENERAL RADIOLOGY | Age: 50
DRG: 292 | End: 2020-07-26
Payer: MEDICARE

## 2020-07-26 LAB
ANION GAP SERPL CALCULATED.3IONS-SCNC: 15 MMOL/L (ref 4–16)
BUN BLDV-MCNC: 40 MG/DL (ref 6–23)
CALCIUM SERPL-MCNC: 9 MG/DL (ref 8.3–10.6)
CHLORIDE BLD-SCNC: 82 MMOL/L (ref 99–110)
CO2: 27 MMOL/L (ref 21–32)
CREAT SERPL-MCNC: 1.4 MG/DL (ref 0.9–1.3)
ESTIMATED AVERAGE GLUCOSE: 183 MG/DL
GFR AFRICAN AMERICAN: >60 ML/MIN/1.73M2
GFR NON-AFRICAN AMERICAN: 54 ML/MIN/1.73M2
GLUCOSE BLD-MCNC: 135 MG/DL (ref 70–99)
GLUCOSE BLD-MCNC: 147 MG/DL (ref 70–99)
GLUCOSE BLD-MCNC: 371 MG/DL (ref 70–99)
GLUCOSE BLD-MCNC: 401 MG/DL (ref 70–99)
GLUCOSE BLD-MCNC: 440 MG/DL (ref 70–99)
GLUCOSE BLD-MCNC: 473 MG/DL (ref 70–99)
HBA1C MFR BLD: 8 % (ref 4.2–6.3)
HCT VFR BLD CALC: 31.4 % (ref 42–52)
HEMOGLOBIN: 8.9 GM/DL (ref 13.5–18)
MAGNESIUM: 2.4 MG/DL (ref 1.8–2.4)
MCH RBC QN AUTO: 19.6 PG (ref 27–31)
MCHC RBC AUTO-ENTMCNC: 28.3 % (ref 32–36)
MCV RBC AUTO: 69 FL (ref 78–100)
PDW BLD-RTO: 27.8 % (ref 11.7–14.9)
PHOSPHORUS: 3.2 MG/DL (ref 2.5–4.9)
PLATELET # BLD: 269 K/CU MM (ref 140–440)
POTASSIUM SERPL-SCNC: 3.9 MMOL/L (ref 3.5–5.1)
RBC # BLD: 4.55 M/CU MM (ref 4.6–6.2)
SODIUM BLD-SCNC: 124 MMOL/L (ref 135–145)
WBC # BLD: 9.3 K/CU MM (ref 4–10.5)

## 2020-07-26 PROCEDURE — 83735 ASSAY OF MAGNESIUM: CPT

## 2020-07-26 PROCEDURE — 94640 AIRWAY INHALATION TREATMENT: CPT

## 2020-07-26 PROCEDURE — 6370000000 HC RX 637 (ALT 250 FOR IP): Performed by: HOSPITALIST

## 2020-07-26 PROCEDURE — 6370000000 HC RX 637 (ALT 250 FOR IP): Performed by: INTERNAL MEDICINE

## 2020-07-26 PROCEDURE — 6370000000 HC RX 637 (ALT 250 FOR IP): Performed by: PHYSICIAN ASSISTANT

## 2020-07-26 PROCEDURE — 6370000000 HC RX 637 (ALT 250 FOR IP): Performed by: FAMILY MEDICINE

## 2020-07-26 PROCEDURE — 83036 HEMOGLOBIN GLYCOSYLATED A1C: CPT

## 2020-07-26 PROCEDURE — 94660 CPAP INITIATION&MGMT: CPT

## 2020-07-26 PROCEDURE — 2700000000 HC OXYGEN THERAPY PER DAY

## 2020-07-26 PROCEDURE — 82962 GLUCOSE BLOOD TEST: CPT

## 2020-07-26 PROCEDURE — 85027 COMPLETE CBC AUTOMATED: CPT

## 2020-07-26 PROCEDURE — 84100 ASSAY OF PHOSPHORUS: CPT

## 2020-07-26 PROCEDURE — 2580000003 HC RX 258: Performed by: HOSPITALIST

## 2020-07-26 PROCEDURE — 80048 BASIC METABOLIC PNL TOTAL CA: CPT

## 2020-07-26 PROCEDURE — 71045 X-RAY EXAM CHEST 1 VIEW: CPT

## 2020-07-26 PROCEDURE — 2140000000 HC CCU INTERMEDIATE R&B

## 2020-07-26 PROCEDURE — 94761 N-INVAS EAR/PLS OXIMETRY MLT: CPT

## 2020-07-26 RX ORDER — NICOTINE POLACRILEX 4 MG
15 LOZENGE BUCCAL PRN
Status: DISCONTINUED | OUTPATIENT
Start: 2020-07-26 | End: 2020-07-27 | Stop reason: HOSPADM

## 2020-07-26 RX ORDER — DEXTROSE MONOHYDRATE 25 G/50ML
12.5 INJECTION, SOLUTION INTRAVENOUS PRN
Status: DISCONTINUED | OUTPATIENT
Start: 2020-07-26 | End: 2020-07-27 | Stop reason: HOSPADM

## 2020-07-26 RX ORDER — CYCLOBENZAPRINE HCL 10 MG
10 TABLET ORAL ONCE
Status: COMPLETED | OUTPATIENT
Start: 2020-07-26 | End: 2020-07-26

## 2020-07-26 RX ORDER — DEXTROSE MONOHYDRATE 50 MG/ML
100 INJECTION, SOLUTION INTRAVENOUS PRN
Status: DISCONTINUED | OUTPATIENT
Start: 2020-07-26 | End: 2020-07-27 | Stop reason: HOSPADM

## 2020-07-26 RX ORDER — INSULIN GLARGINE 100 [IU]/ML
30 INJECTION, SOLUTION SUBCUTANEOUS NIGHTLY
Status: DISCONTINUED | OUTPATIENT
Start: 2020-07-26 | End: 2020-07-27

## 2020-07-26 RX ADMIN — METOPROLOL SUCCINATE 25 MG: 25 TABLET, EXTENDED RELEASE ORAL at 09:03

## 2020-07-26 RX ADMIN — ATORVASTATIN CALCIUM 40 MG: 40 TABLET, FILM COATED ORAL at 09:03

## 2020-07-26 RX ADMIN — SODIUM CHLORIDE, PRESERVATIVE FREE 10 ML: 5 INJECTION INTRAVENOUS at 22:57

## 2020-07-26 RX ADMIN — PANTOPRAZOLE SODIUM 40 MG: 40 TABLET, DELAYED RELEASE ORAL at 06:46

## 2020-07-26 RX ADMIN — ASPIRIN 81 MG: 81 TABLET, COATED ORAL at 09:03

## 2020-07-26 RX ADMIN — PANTOPRAZOLE SODIUM 40 MG: 40 TABLET, DELAYED RELEASE ORAL at 17:34

## 2020-07-26 RX ADMIN — IPRATROPIUM BROMIDE AND ALBUTEROL SULFATE 1 AMPULE: .5; 3 SOLUTION RESPIRATORY (INHALATION) at 11:35

## 2020-07-26 RX ADMIN — BUMETANIDE 2 MG: 1 TABLET ORAL at 09:03

## 2020-07-26 RX ADMIN — IPRATROPIUM BROMIDE AND ALBUTEROL SULFATE 1 AMPULE: .5; 3 SOLUTION RESPIRATORY (INHALATION) at 15:32

## 2020-07-26 RX ADMIN — CYCLOBENZAPRINE 10 MG: 10 TABLET, FILM COATED ORAL at 01:51

## 2020-07-26 RX ADMIN — IPRATROPIUM BROMIDE AND ALBUTEROL SULFATE 1 AMPULE: .5; 3 SOLUTION RESPIRATORY (INHALATION) at 08:27

## 2020-07-26 RX ADMIN — IPRATROPIUM BROMIDE AND ALBUTEROL SULFATE 1 AMPULE: .5; 3 SOLUTION RESPIRATORY (INHALATION) at 00:46

## 2020-07-26 RX ADMIN — RIVAROXABAN 20 MG: 20 TABLET, FILM COATED ORAL at 17:34

## 2020-07-26 RX ADMIN — INSULIN HUMAN 10 UNITS: 100 INJECTION, SUSPENSION SUBCUTANEOUS at 12:07

## 2020-07-26 RX ADMIN — SODIUM CHLORIDE, PRESERVATIVE FREE 10 ML: 5 INJECTION INTRAVENOUS at 09:30

## 2020-07-26 RX ADMIN — BUMETANIDE 2 MG: 1 TABLET ORAL at 22:57

## 2020-07-26 RX ADMIN — PREDNISONE 40 MG: 20 TABLET ORAL at 09:04

## 2020-07-26 RX ADMIN — INSULIN LISPRO 18 UNITS: 100 INJECTION, SOLUTION INTRAVENOUS; SUBCUTANEOUS at 09:09

## 2020-07-26 ASSESSMENT — PAIN SCALES - GENERAL
PAINLEVEL_OUTOF10: 0

## 2020-07-26 ASSESSMENT — PAIN SCALES - WONG BAKER
WONGBAKER_NUMERICALRESPONSE: 0

## 2020-07-26 ASSESSMENT — PAIN DESCRIPTION - PAIN TYPE: TYPE: ACUTE PAIN

## 2020-07-26 NOTE — PROGRESS NOTES
Nephrology Progress Note        [address], [address] [place] [address]  Phone: [phone]  Office Hours: 8:30AM - 4:30PM  Monday - Friday       [facility]  Omari Hardwick MD  [address]  [address],  [address] [facility] [address]  PHONE: [phone]  FAX: [phone]    7/26/2020 8:46 AM  Subjective:   Admit Date: 7/18/2020  PCP: No primary care provider on file. Interval History: on nc today  6L urine yesterday      Diet: DIET GENERAL; Carb Control: 4 carb choices (60 gms)/meal; No Added Salt (3-4 GM); Daily Fluid Restriction: 1500 ml; Low Potassium      Data:   Scheduled Meds:   insulin lispro  0-6 Units Subcutaneous TID WC    insulin lispro  0-3 Units Subcutaneous Nightly    bumetanide  2 mg Oral BID    rivaroxaban  20 mg Oral Daily    aspirin  81 mg Oral Daily    atorvastatin  40 mg Oral Daily    metoprolol succinate  25 mg Oral Daily    pantoprazole  40 mg Oral BID AC    sodium chloride flush  10 mL Intravenous 2 times per day    predniSONE  40 mg Oral Daily    ipratropium-albuterol  1 ampule Inhalation Q4H WA     Continuous Infusions:   dextrose       PRN Meds:glucose, dextrose, glucagon (rDNA), dextrose, sodium chloride flush, acetaminophen **OR** acetaminophen, polyethylene glycol, promethazine **OR** ondansetron  I/O last 3 completed shifts: In: 10 [I.V.:10]  Out: 6930 [Urine:6930]  No intake/output data recorded.     Intake/Output Summary (Last 24 hours) at 7/26/2020 0846  Last data filed at 7/26/2020 0648  Gross per 24 hour   Intake 10 ml   Output 6550 ml   Net -6540 ml       CBC:   Recent Labs     07/24/20  1615 07/25/20  0829 07/26/20  0449   WBC 11.6* 10.2 9.3   HGB 9.2* 9.1* 8.9*    230 269       BMP:    Recent Labs     07/24/20  1615 07/24/20  2316 07/25/20  0829 07/26/20  0449   *  --  123* 124*   K 5.8* 4.9 4.5 3.9   CL 82*  --  82* 82*   CO2 24  --  26 27   BUN 48*  --  44* 40*   CREATININE 1.5* --  1.4* 1.4*   GLUCOSE 175*  --  194* 401*     Hepatic: No results for input(s): AST, ALT, ALB, BILITOT, ALKPHOS in the last 72 hours. Troponin: No results for input(s): TROPONINI in the last 72 hours. BNP: No results for input(s): BNP in the last 72 hours. Lipids: No results for input(s): CHOL, HDL in the last 72 hours. Invalid input(s): LDLCALCU  ABGs:   Lab Results   Component Value Date    PO2ART 97 07/06/2020    UUB9JGG 35.0 07/06/2020     INR: No results for input(s): INR in the last 72 hours. Objective:   Vitals: /83   Pulse 108   Temp 97.8 °F (36.6 °C) (Oral)   Resp 19   Ht 5' 11\" (1.803 m)   Wt 213 lb 13.5 oz (97 kg)   SpO2 100%   BMI 29.83 kg/m²   General appearance: alert and cooperative with exam, in no acute distress  HEENT: normocephalic, atraumatic,   Neck: supple, trachea midline  Lungs: , breathing comfortably on nc  Heart[de-identified] regular rate and rhythm,   Abdomen: soft, non-tender; non distended,   Extremities: 1+ ble edema  Neurologic: alert, oriented, follows commands, interactive    Assessment and Plan:       Patient Active Problem List   Diagnosis Code    Essential hypertension I10    Osteoarthritis M19.90    COPD (chronic obstructive pulmonary disease) ([facility]) J44.9    Paresthesia of left leg R20.2    Abdominal hernia K46.9    Left shoulder pain M25.512    Crushing injury of right hand S67. 21XA    Rotator cuff tendinitis M75.80    Midline low back pain without sciatica M54.5    History of MI (myocardial infarction) I25.2    Coronary artery disease involving coronary bypass graft of native heart without angina pectoris I25.810    Mixed hyperlipidemia E78.2    Depression F32.9    Chronic midline low back pain without sciatica M54.5, G89.29    Tobacco abuse Z72.0    Gastroesophageal reflux disease without esophagitis K21.9    Opiate abuse, continuous ([facility]) F11.10    Heroin dependence ([facility]) F11.20    ST elevation myocardial infarction involving left circumflex

## 2020-07-26 NOTE — PROGRESS NOTES
This note also relates to the following rows which could not be included:  Resp - Cannot attach notes to unvalidated device data       07/26/20 0112   NIV Type   NIV Started/Stopped Off   Equipment Type v60 standby   Mode Bilevel   Settings/Measurements   IPAP 12 cmH20   CPAP/EPAP 6 cmH2O   FiO2  28 %

## 2020-07-26 NOTE — CONSULTS
Endocrinology   Consult Note  Dear Doctor   Noreen Cabot    Thank You for the Consult     Pt. Was Admitted for : Shortness of breath due to exacerbation of COPD/CHF    Reason for Consult: Better control of blood glucose/// his blood sugar this morning was over 400    History Obtained From:  Patient/ EMR       HISTORY OF PRESENT ILLNESS:                The patient is a 52 y.o. male with significant past medical history of CAD, CABG,x on 2 different occasions congestive heart failure, COPD, history of drug abuse, hypertension, hyperlipidemia, came in complaining of severe shortness of breath. He is being investigated and treated for exacerbation of COPD and congestive heart failure. He has been on high-dose steroid resulting in severely elevated blood glucose levels I was  consulted for better control of blood glucose. ROS:   Pt's ROS done in detail. Abnormal ROS are noted in Medical and Surgical History Section below: Other Medical History:        Diagnosis Date    CAD (coronary artery disease)     CHF (congestive heart failure) (HCC)     COPD (chronic obstructive pulmonary disease) ([address]     Depression     [facility]     HIGH CHOLESTEROL     Hypertension     MI (myocardial infarction) ([address] 2011    S/P coronary artery bypass graft x 4 2011    CABG x 4 Dr Ben Solorzano     Surgical History:        Procedure Laterality Date    BYPASS GRAFT  04/10/2011    CABG x 4 Dr Seema Helton  11/2010     4 stents    LEG SURGERY         Allergies:  Patient has no known allergies.     Family History:       Problem Relation Age of Onset    Cancer Father     Heart Failure Father     Heart Disease Father     Diabetes Mother     Heart Disease Mother      REVIEW OF SYSTEMS:  Review of System Done as noted above     PHYSICAL EXAM:      Vitals:    /83   Pulse 105   Temp 97.8 °F (36.6 °C) (Oral)   Resp 19   Ht 5' 11\" (1.803 m)   Wt 213 lb 13.5 oz (97 kg)   SpO2 100%   BMI 29.83 kg/m² CONSTITUTIONAL:  awake, alert, cooperative, appears stated age   EYES:  vision intact Fundoscopic Exam not performed   ENT:Normal  NECK:  Supple, No JVD. Thyroid Exam:Normal   LUNGS:  Has Vesicular Breath Sounds, has some wheezing and rales  CARDIOVASCULAR:  Normal apical impulse, regular rate and rhythm, normal S1 and S2, no S3 or S4, and has no  murmur   ABDOMEN:  No scars, normal bowel sounds, soft, non-distended, non-tender, no masses palpated, no hepatolienomegaly  Musculoskeletal: Normal  Extremities: Normal, peripheral pulses normal, , has  edema   NEUROLOGIC:  Awake, alert, oriented to name, place and time. Cranial nerves II-XII are grossly intact. Motor is  intact. Sensory is intact. ,  and gait is normal.    DATA:    CBC:   Recent Labs     07/24/20  1615 07/25/20  0829 07/26/20  0449   WBC 11.6* 10.2 9.3   HGB 9.2* 9.1* 8.9*    230 269    CMP:  Recent Labs     07/24/20  1615 07/24/20  2316 07/25/20  0829 07/26/20  0449   *  --  123* 124*   K 5.8* 4.9 4.5 3.9   CL 82*  --  82* 82*   CO2 24  --  26 27   BUN 48*  --  44* 40*   CREATININE 1.5*  --  1.4* 1.4*   CALCIUM 9.5  --  9.3 9.0     Lipids:   Lab Results   Component Value Date    CHOL 96 07/03/2020    CHOL 218 12/01/2014    HDL 23 07/03/2020    TRIG 63 07/03/2020     Glucose:   Recent Labs     07/26/20  0902   POCGLU 473*     Hemoglobin A1C:   Lab Results   Component Value Date    LABA1C 7.1 05/27/2020     Free T4:   Lab Results   Component Value Date    T4FREE 1.09 07/21/2016     Free T3:   Lab Results   Component Value Date    FT3 2.9 07/21/2016     TSH High Sensitivity:   Lab Results   Component Value Date    TSHHS 5.650 05/13/2020       Xr Abdomen (kub) (single Ap View)    Result Date: 7/19/2020  EXAMINATION: ONE SUPINE XRAY VIEW(S) OF THE ABDOMEN 7/19/2020 4:03 pm COMPARISON: CT dated July 3, 2020.  HISTORY: ORDERING SYSTEM PROVIDED HISTORY: distended TECHNOLOGIST PROVIDED HISTORY: Reason for exam:->distended Acuity: Acute Type of Exam: Subsequent/Follow-up FINDINGS: No lines or tubes. Gas distended loops of small bowel are identified, measuring up to 4.0 cm. No obvious free air or pneumatosis. No acute osseous abnormality. Status post sternotomy. No acute osseous abnormality. 1. Gas distended loops of small bowel identified measuring up to 4.0 cm. Findings are nonspecific and may represent ileus, partial small bowel obstruction, or enteritis. Xr Chest 1 View    Result Date: 7/26/2020  EXAMINATION: ONE XRAY VIEW OF THE CHEST 7/26/2020 4:59 am COMPARISON: 7/18/2020 HISTORY: ORDERING SYSTEM PROVIDED HISTORY: increased dyspnea     Persistent right basilar opacity suggesting atelectasis or infiltrate with small right pleural effusion. Us Abdomen Limited    Result Date: 7/21/2020  EXAMINATION: RIGHT UPPER QUADRANT ULTRASOUND 7/21/2020 4:15 am COMPARISON: None HISTORY: ORDERING SYSTEM PROVIDED HISTORY: RUQ US, elevated bilirubin        1. Gallbladder wall is thickened however there is no evidence of cholelithiasis or pericholecystic fluid. There is a negative sonographic Dsouza's sign. Findings could be related to chronic liver disease. 2. Diffuse fatty infiltration of the liver.        Scheduled Medicines   Medications:    insulin glargine  30 Units Subcutaneous Nightly    insulin lispro  15 Units Subcutaneous TID WC    insulin lispro  0-12 Units Subcutaneous TID WC    insulin lispro  0-12 Units Subcutaneous 2 times per day    insulin NPH  10 Units Subcutaneous Once    bumetanide  2 mg Oral BID    rivaroxaban  20 mg Oral Daily    aspirin  81 mg Oral Daily    atorvastatin  40 mg Oral Daily    metoprolol succinate  25 mg Oral Daily    pantoprazole  40 mg Oral BID AC    sodium chloride flush  10 mL Intravenous 2 times per day    predniSONE  40 mg Oral Daily    ipratropium-albuterol  1 ampule Inhalation Q4H WA      Infusions:    dextrose           IMPRESSION    Patient Active Problem List   Diagnosis    Essential hypertension    Osteoarthritis    [facility]    Paresthesia of left leg    Abdominal hernia    Left shoulder pain    Crushing injury of right hand    Rotator cuff tendinitis    Midline low back pain without sciatica    History of MI (myocardial infarction)    Coronary artery disease involving coronary bypass graft of native heart without angina pectoris    Mixed hyperlipidemia    Depression    Chronic midline low back pain without sciatica    Tobacco abuse    Gastroesophageal reflux disease without esophagitis    Opiate abuse, continuous ([facility])    Heroin dependence ([facility])    ST elevation myocardial infarction involving left circumflex coronary artery ([facility])    STEMI (ST elevation myocardial infarction) ([facility]    Acute on chronic combined systolic (congestive) and diastolic (congestive) heart failure ([facility])    Systolic and diastolic CHF w/reduced LV function, NYHA class 4 ([facility])    NSTEMI (non-ST elevated myocardial infarction) ([address]    Acute on chronic congestive heart failure ([facility])    Noncompliance    Pulmonary edema, acute ([facility])    [facility] kidney [facility]    Acute respiratory failure with hypoxia and hypercapnia ([facility])    Hypertensive emergency    Ischemic cardiomyopathy    Heart failure [facility]    Left ventricular systolic dysfunction    Acute systolic congestive heart failure ([facility]    SOB (shortness of breath)    Acute on chronic combined systolic and diastolic heart failure ([facility])    Hyponatremia    Hematemesis    Acute on chronic systolic CHF (congestive heart failure) ([facility])    Dyspnea         RECOMMENDATIONS:      1. Reviewed POC blood glucose . Labs and X ray results   2. Reviewed Home and Current Medicines   3. Will Start On meal/ Correction bolus Humalog/ Lantus Insulin regime    4. Monitor Blood glucose frequently   5. Modify  the dose of Insulin  as needed        Will follow with you  Again thank you for sharing pt's care with me. Truly yours,       Christi Bartholomew MD

## 2020-07-26 NOTE — PROGRESS NOTES
Patient ambulated in the urena without oxygen per Dr. Duy Starr. At first, patient requested a walker, but does not use one at home or at all during stay. Was stable throughout entire ambulation. SaO2 was monitored during the ambulation and stayed around %, only dropping to 94% once. Patient had to make frequent stops, and stated, \"that thing is lying, I can't breathe\" when SaO2 was reading high oxygen saturation. Patient grunts when walking, but I observed he was not in any visual or audible respiratory distress.

## 2020-07-26 NOTE — PROGRESS NOTES
Hospitalist Progress Note      Name:  Porfirio Noble /Age/Sex: 1970  (52 y.o. male)   MRN & CSN:  [id number] & [id number] Admission Date/Time: 2020  9:34 PM   Location:  [address] [place] [address] PCP: No primary care provider on file. Porfirio Noble is a 52 y.o.  male  who presents with Shortness of Breath and Leg Swelling      Assessment and Plan:   1. Acute dyspnea with COPD exacerbatinon and acute on chronic systolic and diastolic CHF  Duoneb, steroids changed to oral now, aldactone on hold due to hyperK       BB       on bumex          hold ACE with worsening JAIME  - neg 16.8L  On 2L nasal cannula.  CXR: Persistent loculated right pleural effusion with associated atelectasis   -On BiPAP prn.  Consult pulmonology.   Monitor I's and O's and daily weights.   - check home O2 eval and ambulate halls   Check Echo: ef 10-15% severe MR, severe pulm HTN  2. Hyponatremia  -  Nephrology following, fluid restrict 1500cc       - got 1 dose samsca       - corrected Na for glucose improved  3. JAIME on CKD 3  - stable  hold ACE and consult nephro  -Nephrology following. 4. Elevated troponin  Likely due to JAIME.  Cardiology following.  EKG sinus tachycardia. -Troponins trending down. 5. Microcytic anemia  No gross bleeding.  - monitor  6. Hx of DVT  Venous duplex negative for DVT.  VQ scan intermediate probability for PE.   - Patient has been on anticoagulation for over a month. -creatinine clearance greater than 30.  Stop heparin drip and restart home Xarelto.  D-dimer 284.  Not significantly elevated. 7. Hyperbilirubinemia  -check right upper quadrant ultrasound:no evidence of   cholelithiasis or pericholecystic fluid. Yaneth Aiken is a negative sonographic Dsouza's sign     -  No right upper quadrant abdominal tenderness.   8. Nausea and dry heaves  - stable  -KUB: Distended loops of small bowel, ileus versus partial small bowel obstruction versus enteritis     - surgery consulted, no surgical intervention needed at this time  9. Mild HyperK  - holding aldactone     DVT Prophylaxis: xarelto  Diet: DIET CARDIAC; Daily Fluid Restriction: 1500 ml  Code Status: Full Code               Diet DIET GENERAL; Carb Control: 4 carb choices (60 gms)/meal; No Added Salt (3-4 GM); Daily Fluid Restriction: 1500 ml; Low Potassium   Code Status Full Code     Medications:   Medications:    insulin lispro  0-6 Units Subcutaneous TID WC    insulin lispro  0-3 Units Subcutaneous Nightly    bumetanide  2 mg Oral BID    rivaroxaban  20 mg Oral Daily    aspirin  81 mg Oral Daily    atorvastatin  40 mg Oral Daily    metoprolol succinate  25 mg Oral Daily    pantoprazole  40 mg Oral BID AC    sodium chloride flush  10 mL Intravenous 2 times per day    predniSONE  40 mg Oral Daily    ipratropium-albuterol  1 ampule Inhalation Q4H WA      Infusions:    dextrose       PRN Meds: glucose, 15 g, PRN  dextrose, 12.5 g, PRN  glucagon (rDNA), 1 mg, PRN  dextrose, 100 mL/hr, PRN  sodium chloride flush, 10 mL, PRN  acetaminophen, 650 mg, Q6H PRN    Or  acetaminophen, 650 mg, Q6H PRN  polyethylene glycol, 17 g, Daily PRN  promethazine, 12.5 mg, Q6H PRN    Or  ondansetron, 4 mg, Q6H PRN      Subjective:     Doing ok, no distress  Objective: Intake/Output Summary (Last 24 hours) at 7/26/2020 0945  Last data filed at 7/26/2020 0900  Gross per 24 hour   Intake 10 ml   Output 7550 ml   Net -7540 ml      Vitals:   Vitals:    07/26/20 0903   BP: 134/83   Pulse: 105   Resp:    Temp:    SpO2:      Physical Exam:   Gen:  awake, alert, no apparent distress  Head/Eyes:  Normocephalic atraumatic, EOMI   NECK:   symmetrical, trachea midline  LUNGS: Normal Effort   CARDIOVASCULAR:  Normal rate  ABDOMEN:  non distended  MUSCULOSKELETAL:  ROM limited  NEUROLOGIC: Alert and Oriented,  Cranial nerves II-XII are grossly intact.    SKIN:  no bruising or bleeding, normal skin color,  no redness      Data:       CBC   Recent Labs     07/24/20  1615 07/25/20  0844

## 2020-07-27 VITALS
OXYGEN SATURATION: 97 % | TEMPERATURE: 98 F | WEIGHT: 213.85 LBS | BODY MASS INDEX: 29.94 KG/M2 | RESPIRATION RATE: 18 BRPM | HEIGHT: 71 IN | HEART RATE: 102 BPM | DIASTOLIC BLOOD PRESSURE: 79 MMHG | SYSTOLIC BLOOD PRESSURE: 117 MMHG

## 2020-07-27 LAB
ANION GAP SERPL CALCULATED.3IONS-SCNC: 14 MMOL/L (ref 4–16)
BUN BLDV-MCNC: 41 MG/DL (ref 6–23)
CALCIUM SERPL-MCNC: 9.3 MG/DL (ref 8.3–10.6)
CHLORIDE BLD-SCNC: 88 MMOL/L (ref 99–110)
CO2: 33 MMOL/L (ref 21–32)
CREAT SERPL-MCNC: 1.5 MG/DL (ref 0.9–1.3)
GFR AFRICAN AMERICAN: >60 ML/MIN/1.73M2
GFR NON-AFRICAN AMERICAN: 50 ML/MIN/1.73M2
GLUCOSE BLD-MCNC: 112 MG/DL (ref 70–99)
GLUCOSE BLD-MCNC: 118 MG/DL (ref 70–99)
GLUCOSE BLD-MCNC: 99 MG/DL (ref 70–99)
HCT VFR BLD CALC: 29.7 % (ref 42–52)
HEMOGLOBIN: 8.7 GM/DL (ref 13.5–18)
MCH RBC QN AUTO: 20.1 PG (ref 27–31)
MCHC RBC AUTO-ENTMCNC: 29.3 % (ref 32–36)
MCV RBC AUTO: 68.8 FL (ref 78–100)
PDW BLD-RTO: 28 % (ref 11.7–14.9)
PLATELET # BLD: 279 K/CU MM (ref 140–440)
PMV BLD AUTO: 10.1 FL (ref 7.5–11.1)
POTASSIUM SERPL-SCNC: 3.7 MMOL/L (ref 3.5–5.1)
RBC # BLD: 4.32 M/CU MM (ref 4.6–6.2)
SODIUM BLD-SCNC: 135 MMOL/L (ref 135–145)
WBC # BLD: 9.6 K/CU MM (ref 4–10.5)

## 2020-07-27 PROCEDURE — 80048 BASIC METABOLIC PNL TOTAL CA: CPT

## 2020-07-27 PROCEDURE — 85027 COMPLETE CBC AUTOMATED: CPT

## 2020-07-27 PROCEDURE — 6370000000 HC RX 637 (ALT 250 FOR IP): Performed by: HOSPITALIST

## 2020-07-27 PROCEDURE — 94640 AIRWAY INHALATION TREATMENT: CPT

## 2020-07-27 PROCEDURE — 94761 N-INVAS EAR/PLS OXIMETRY MLT: CPT

## 2020-07-27 PROCEDURE — 82962 GLUCOSE BLOOD TEST: CPT

## 2020-07-27 PROCEDURE — 2580000003 HC RX 258: Performed by: HOSPITALIST

## 2020-07-27 PROCEDURE — 6370000000 HC RX 637 (ALT 250 FOR IP): Performed by: INTERNAL MEDICINE

## 2020-07-27 RX ORDER — GLUCOSAMINE HCL/CHONDROITIN SU 500-400 MG
CAPSULE ORAL
Qty: 100 STRIP | Refills: 0 | Status: ON HOLD | OUTPATIENT
Start: 2020-07-27 | End: 2020-08-12 | Stop reason: HOSPADM

## 2020-07-27 RX ORDER — LANCETS 28 GAUGE
1 EACH MISCELLANEOUS DAILY
Qty: 100 EACH | Refills: 3 | Status: ON HOLD | OUTPATIENT
Start: 2020-07-27 | End: 2020-08-12 | Stop reason: HOSPADM

## 2020-07-27 RX ORDER — INSULIN GLARGINE 100 [IU]/ML
15 INJECTION, SOLUTION SUBCUTANEOUS NIGHTLY
Status: DISCONTINUED | OUTPATIENT
Start: 2020-07-27 | End: 2020-07-27 | Stop reason: HOSPADM

## 2020-07-27 RX ORDER — INSULIN LISPRO 100 [IU]/ML
10 INJECTION, SOLUTION INTRAVENOUS; SUBCUTANEOUS
Qty: 5 PEN | Refills: 3 | Status: ON HOLD | OUTPATIENT
Start: 2020-07-27 | End: 2020-08-12 | Stop reason: HOSPADM

## 2020-07-27 RX ORDER — INSULIN GLARGINE 100 [IU]/ML
15 INJECTION, SOLUTION SUBCUTANEOUS NIGHTLY
Qty: 5 PEN | Refills: 3 | Status: ON HOLD | OUTPATIENT
Start: 2020-07-27 | End: 2020-08-12 | Stop reason: HOSPADM

## 2020-07-27 RX ADMIN — ATORVASTATIN CALCIUM 40 MG: 40 TABLET, FILM COATED ORAL at 09:29

## 2020-07-27 RX ADMIN — PREDNISONE 40 MG: 20 TABLET ORAL at 09:29

## 2020-07-27 RX ADMIN — SODIUM CHLORIDE, PRESERVATIVE FREE 10 ML: 5 INJECTION INTRAVENOUS at 09:30

## 2020-07-27 RX ADMIN — ASPIRIN 81 MG: 81 TABLET, COATED ORAL at 09:29

## 2020-07-27 RX ADMIN — METOPROLOL SUCCINATE 25 MG: 25 TABLET, EXTENDED RELEASE ORAL at 09:29

## 2020-07-27 RX ADMIN — IPRATROPIUM BROMIDE AND ALBUTEROL SULFATE 1 AMPULE: .5; 3 SOLUTION RESPIRATORY (INHALATION) at 06:35

## 2020-07-27 RX ADMIN — BUMETANIDE 2 MG: 1 TABLET ORAL at 09:28

## 2020-07-27 RX ADMIN — IPRATROPIUM BROMIDE AND ALBUTEROL SULFATE 1 AMPULE: .5; 3 SOLUTION RESPIRATORY (INHALATION) at 01:01

## 2020-07-27 RX ADMIN — IPRATROPIUM BROMIDE AND ALBUTEROL SULFATE 1 AMPULE: .5; 3 SOLUTION RESPIRATORY (INHALATION) at 11:28

## 2020-07-27 ASSESSMENT — PAIN SCALES - GENERAL: PAINLEVEL_OUTOF10: 0

## 2020-07-27 ASSESSMENT — PAIN SCALES - WONG BAKER
WONGBAKER_NUMERICALRESPONSE: 0
WONGBAKER_NUMERICALRESPONSE: 0

## 2020-07-27 NOTE — PLAN OF CARE
Problem: Falls - Risk of:  Goal: Will remain free from falls  Description: Will remain free from falls  Outcome: Completed  Goal: Absence of physical injury  Description: Absence of physical injury  Outcome: Completed     Problem: Infection:  Goal: Will remain free from infection  Description: Will remain free from infection  Outcome: Completed     Problem: Safety:  Goal: Free from accidental physical injury  Description: Free from accidental physical injury  Outcome: Completed  Goal: Free from intentional harm  Description: Free from intentional harm  Outcome: Completed     Problem: Daily Care:  Goal: Daily care needs are met  Description: Daily care needs are met  Outcome: Completed     Problem: Pain:  Goal: Patient's pain/discomfort is manageable  Description: Patient's pain/discomfort is manageable  Outcome: Completed     Problem: Skin Integrity:  Goal: Skin integrity will stabilize  Description: Skin integrity will stabilize  Outcome: Completed     Problem: Discharge Planning:  Goal: Patients continuum of care needs are met  Description: Patients continuum of care needs are met  Outcome: Completed

## 2020-07-27 NOTE — PROGRESS NOTES
effusion. Us Abdomen Limited    Result Date: 7/21/2020  EXAMINATION: RIGHT UPPER QUADRANT ULTRASOUND 7/21/2020 4:15 am COMPARISON: None HISTORY: ORDERING SYSTEM PROVIDED HISTORY: RUQ US, elevated bilirubin TECHNOLOGIST PROVIDED HISTORY: Reason for exam:->RUQ US, elevated bilirubin Reason for Exam: Elevated bilirubin Acuity: Unknown Type of Exam: Ongoing FINDINGS: LIVER:  The liver demonstrates increased echogenicity without evidence of intrahepatic biliary ductal dilatation. BILIARY SYSTEM:  Gallbladder wall is significantly thickened and measures 7 mm. There is a negative sonographic Dsouza's sign. No stones or sludge are seen within the gallbladder lumen. Common bile duct is within normal limits measuring 3.5 mm. RIGHT KIDNEY: The right kidney is grossly unremarkable without evidence of hydronephrosis. PANCREAS:  Visualized portions of the pancreas are unremarkable. OTHER: No evidence of right upper quadrant ascites. 1. Gallbladder wall is thickened however there is no evidence of cholelithiasis or pericholecystic fluid. There is a negative sonographic Dsouza's sign. Findings could be related to chronic liver disease. 2. Diffuse fatty infiltration of the liver.        Scheduled Medicines   Medications:    insulin glargine  15 Units Subcutaneous Nightly    insulin lispro  10 Units Subcutaneous TID WC    insulin lispro  0-12 Units Subcutaneous TID WC    insulin lispro  0-12 Units Subcutaneous 2 times per day    bumetanide  2 mg Oral BID    rivaroxaban  20 mg Oral Daily    aspirin  81 mg Oral Daily    atorvastatin  40 mg Oral Daily    metoprolol succinate  25 mg Oral Daily    pantoprazole  40 mg Oral BID AC    sodium chloride flush  10 mL Intravenous 2 times per day    predniSONE  40 mg Oral Daily    ipratropium-albuterol  1 ampule Inhalation Q4H WA      Infusions:    dextrose           Objective:   Vitals: /85   Pulse 97   Temp 98.5 °F (36.9 °C) (Oral)   Resp 19   Ht 5' 11\" (1.803 m)   Wt 213 lb 13.5 oz (97 kg)   SpO2 100%   BMI 29.83 kg/m²   General appearance: alert and cooperative with exam  Neck: no JVD or bruit  Thyroid : Normal lobes   Lungs: Has Vesicular Breath sounds   Heart:  regular rate and rhythm  Abdomen: soft, non-tender; bowel sounds normal; no masses,  no organomegaly  Musculoskeletal: Normal  Extremities: extremities normal, , no edema  Neurologic:  Awake, alert, oriented to name, place and time. Cranial nerves II-XII are grossly intact. Motor is  intact. Sensory is intact. ,  and gait is normal.    Assessment:     Patient Active Problem List:     Essential hypertension     Osteoarthritis     COPD (chronic obstructive pulmonary disease) ([facility])     Paresthesia of left leg     Abdominal hernia     Left shoulder pain     Crushing injury of right hand     Rotator cuff tendinitis     Midline low back pain without sciatica     History of MI (myocardial infarction)     Coronary artery disease involving coronary bypass graft of native heart without angina pectoris     Mixed hyperlipidemia     Depression     Chronic midline low back pain without sciatica     Tobacco abuse     Gastroesophageal reflux disease without esophagitis     Opiate abuse, continuous ([facility])     Heroin dependence ([address]     ST elevation myocardial infarction involving left circumflex coronary artery ([facility])     STEMI (ST elevation myocardial infarction) ([facility])     Acute on chronic combined systolic (congestive) and diastolic (congestive) heart failure ([facility])     Systolic and diastolic CHF w/reduced LV function, NYHA class 4 ([facility])     NSTEMI (non-ST elevated myocardial infarction) ([address]     Acute on chronic congestive heart failure ([address]     Noncompliance     Pulmonary edema, acute ([address]     Acute kidney injury ([address]     Acute respiratory failure with hypoxia and hypercapnia ([facility])     Hypertensive emergency     Ischemic cardiomyopathy     Heart failure ([address]     Left ventricular systolic dysfunction     Acute systolic congestive heart failure (HCC)     SOB (shortness of breath)     Acute on chronic combined systolic and diastolic heart failure (HCC)     Hyponatremia     Hematemesis     Acute on chronic systolic CHF (congestive heart failure) (HCC)     Dyspnea      Plan:     1. Reviewed POC blood glucose . Labs and X ray results   2. Reviewed Current Medicines   3. On meal/ Correction bolus Humalog/ Basal Lantus Insulin regime / and Oral Hypoglycemic drugs   4. Monitor Blood glucose frequently   5. Modified  the dose of Insulin/ other medicines as needed   6. Will follow     .      Crispin Gonzales MD

## 2020-07-27 NOTE — PROGRESS NOTES
Opiate abuse, continuous ([facility]) F11.10    Heroin dependence ([facility] F11.20    ST elevation myocardial infarction involving left circumflex coronary artery ([facility]) I21.21    STEMI (ST elevation myocardial infarction) ([facility]) I21.3    Acute on chronic combined systolic (congestive) and diastolic (congestive) heart failure ([facility]) F74.06    Systolic and diastolic CHF w/reduced LV function, NYHA class 4 ([facility]) I50.40    NSTEMI (non-ST elevated myocardial infarction) ([facility]) I21.4    Acute on chronic congestive heart failure ([facility]) I50.9    Noncompliance Z91.19    Pulmonary edema, acute ([facility]) J81.0    Acute kidney injury ([facility] N17.9    Acute respiratory failure with hypoxia and hypercapnia ([facility]) J96.01, J96.02    Hypertensive emergency I16.1    Ischemic cardiomyopathy I25.5    Heart failure ([facility]) I50.9    Left ventricular systolic dysfunction A66.6    Acute systolic congestive heart failure ([facility]) I50.21    SOB (shortness of breath) R06.02    Acute on chronic combined systolic and diastolic heart failure ([facility]) I50.43    Hyponatremia E87.1    Hematemesis K92.0    Acute on chronic systolic CHF (congestive heart failure) ([facility]) I50.23    Dyspnea R06.00   IMP  - JAIME on CKD3: Cardiorenal syndrome   - CKD3 from nephrosclerosis  - Hyponatremia : prerenal vs siadh from chf; resolved, had one dose of samsca and bumex was continued  - acute on chronic systolic CHF, very low EF of 10-15%,  - DM2   - hyperkalemia     Suggest  - sodium 135 better, cr 1.5  - continue bumex  - Avoid nephrotoxins  - continue oral fluid restriction 1800ml on dc  - discussed the importance of outpt follow up to prevent readmissions  - ok to dc today                          Electronically signed by Flaquita Skaggs DO on 7/27/2020 at 7:42 AM    [facility]  MD Oumar Christianson DO [address]  [place], [place] [address]  PHONE: [phone]  FAX: [phone]

## 2020-07-27 NOTE — DISCHARGE SUMMARY
Lili Colon 1970 [id number]  PCP:  No primary care provider on file. Admit date: 7/18/2020  Admitting Physician: Bryant Dorantes MD    Discharge date: 7/27/2020 Discharge Physician: Thien Sanford MD         Hospital Course and Discharge Diagnoses Include:    1. Acute dyspnea with COPD exacerbatinon and acute on chronic systolic and diastolic CHF  Duoneb, steroids changed to oral now, aldactone on hold due to hyperK       BB       on bumex        D/c ACE with worsening JAIME  - neg 20L  On 2L nasal cannula.  CXR: Persistent loculated right pleural effusion with associated atelectasis   -On BiPAP prn.  Consult pulmonology.   Monitor I's and O's and daily weights.   - check home O2 eval and ambulate halls   Check Echo: ef 10-15% severe MR, severe pulm HTN  2. Hyponatremia  - stable now, fluid restrict 1800 cc   -  Nephrology following       - got 1 dose samsca       - corrected Na for glucose improved  3. JAIME on CKD 3  - stable  hold ACE and consult nephro  -Nephrology following. 4. Elevated troponin  Likely due to JAIME.  Cardiology following.  EKG sinus tachycardia. -Troponins trending down. 5. Microcytic anemia  No gross bleeding.  - monitor  6. Hx of DVT  Venous duplex negative for DVT.  VQ scan intermediate probability for PE.   - Patient has been on anticoagulation for over a month. -creatinine clearance greater than 30.  Stop heparin drip and restart home Xarelto.  D-dimer 284.  Not significantly elevated. 7. Hyperbilirubinemia  -check right upper quadrant ultrasound:no evidence of   cholelithiasis or pericholecystic fluid. Channie Cluster is a negative sonographic Dsouza's sign     -  No right upper quadrant abdominal tenderness. 8. Nausea and dry heaves  - stable  -KUB: Distended loops of small bowel, ileus versus partial small bowel obstruction versus enteritis     - surgery consulted, no surgical intervention needed at this time  9.  Mild HyperK  - holding aldactone  DM  - started on insulin per endo recs  - carb diet  - hba1c 8.0     DVT Prophylaxis: xarelto  Diet: DIET CARDIAC; Daily Fluid Restriction: 1800 ml  Code Status: Full Code        Physical Exam on Discharge date: 07/27/20  Gen:  awake, alert, no apparent distress  Head/Eyes:  Normocephalic atraumatic, EOMI   NECK:   symmetrical, trachea midline  LUNGS: Normal Effort   CARDIOVASCULAR:  Normal rate  ABDOMEN:  non distended  MUSCULOSKELETAL:  ROM WNL  NEUROLOGIC: Alert and Oriented,  Cranial nerves II-XII are grossly intact. SKIN:  no bruising or bleeding, normal skin color,  no redness    Procedures:  See above  Ct Abdomen Pelvis Wo Contrast Additional Contrast? None    Result Date: 7/3/2020  EXAMINATION: CT OF THE ABDOMEN AND PELVIS WITHOUT CONTRAST 7/3/2020 9:17 pm TECHNIQUE: CT of the abdomen and pelvis was performed without the administration of intravenous contrast. Multiplanar reformatted images are provided for review. Dose modulation, iterative reconstruction, and/or weight based adjustment of the mA/kV was utilized to reduce the radiation dose to as low as reasonably achievable. COMPARISON: CT abdomen and pelvis 05/18/2020 HISTORY: ORDERING SYSTEM PROVIDED HISTORY: Acidosis, possible sepsis FINDINGS: Lower Chest: Bilateral pleural effusion with bibasilar relaxation atelectasis. Mild cardiomegaly. Anemia is likely given that the blood pool density of the heart is lower than that of the myocardium. Posterior mediastinal structures appear unremarkable. Organs: Hypoattenuation throughout the liver reflects hepatic steatosis. No discrete hepatic lesion or intrahepatic bile duct dilatation is seen. The gallbladder, kidneys, spleen, adrenal glands and pancreas appear unremarkable. GI/Bowel: No diffuse or focal bowel wall thickening evident. No inflammatory changes evident. No obstruction is seen. The appendix is not clearly visualized. Pelvis: Small to moderate volume pelvic ascites. Urinary bladder appears unremarkable.   Prostate gland and seminal vesicles appear unremarkable. No adenopathy or pneumoperitoneum. Peritoneum/Retroperitoneum: Calcific atherosclerotic disease aorta. No aneurysm. Inferior vena cava appears unremarkable. No adenopathy evident with exception of the mildly enlarged portacaval lymph node, 11 mm short axis axial image 44, almost certainly reactive. Small volume abdominal ascites is present. Bones/Soft Tissues: No acute superficial soft tissue or osseous structure abnormality evident. 1. Abdominal and pelvic ascites possibly related to hepatic or renal disease, nonspecific finding. 2. Bilateral pleural effusion with bibasilar relaxation atelectasis. 3. Mild cardiomegaly. 4. Anemia is likely given that the blood pool density of the heart is lower than that of the myocardium. Xr Abdomen (kub) (single Ap View)    Result Date: 7/19/2020  EXAMINATION: ONE SUPINE XRAY VIEW(S) OF THE ABDOMEN 7/19/2020 4:03 pm COMPARISON: CT dated July 3, 2020. HISTORY: ORDERING SYSTEM PROVIDED HISTORY: distended TECHNOLOGIST PROVIDED HISTORY: Reason for exam:->distended Acuity: Acute Type of Exam: Subsequent/Follow-up FINDINGS: No lines or tubes. Gas distended loops of small bowel are identified, measuring up to 4.0 cm. No obvious free air or pneumatosis. No acute osseous abnormality. Status post sternotomy. No acute osseous abnormality. 1. Gas distended loops of small bowel identified measuring up to 4.0 cm. Findings are nonspecific and may represent ileus, partial small bowel obstruction, or enteritis. Ct Chest Wo Contrast    Result Date: 7/3/2020  EXAMINATION: CT OF THE CHEST WITHOUT CONTRAST 7/3/2020 9:39 am TECHNIQUE: CT of the chest was performed without the administration of intravenous contrast. Multiplanar reformatted images are provided for review. Dose modulation, iterative reconstruction, and/or weight based adjustment of the mA/kV was utilized to reduce the radiation dose to as low as reasonably achievable.  COMPARISON: 05/17/2020 HISTORY: ORDERING SYSTEM PROVIDED HISTORY: Follow up adenopathy TECHNOLOGIST PROVIDED HISTORY: Reason for exam:->Follow up adenopathy Reason for Exam: Follow up adenopathy Acuity: Acute Type of Exam: Initial Additional signs and symptoms: pt c/o sob FINDINGS: Mediastinum: Mediastinal adenopathy is again demonstrated, grossly similar in distribution to prior. For example, subcarinal lymph node measures approximately 1.8 cm in short axis, precarinal lymph node is approximately 1.5 cm in short axis, and right paratracheal lymph node is approximately 3.1 x 2.5 cm. Calcification of the thoracic aorta. Coronary artery calcification. Hilar evaluation is limited given lack of contrast.  Thyroid is symmetric. No axillary adenopathy. Shotty axillary lymph nodes are again seen bilaterally. Status post median sternotomy. Lungs/pleura: Loculated pleural fluid is again demonstrated within the right major fissure inferiorly, similar to prior. Small amount of loculated fluid is also demonstrated about the periphery of the inferior right hemithorax. Calcified left lower lobe pulmonary nodules, in keeping with granulomatous disease. Unchanged 3 mm anterior left apical noncalcified pulmonary nodule. Scattered bandlike opacity and ground-glass opacity at the lung bases, likely atelectasis. 4 mm lingular pulmonary nodule is not significantly changed. Punctate nodule along the plane of the horizontal fissure is unchanged. No pneumothorax. Upper Abdomen: Small amount of free fluid about the margin of the liver. Calcified granulomas within the liver and spleen. 1.2 cm short axis left periaortic lymph node is similar to prior. Calcification of aorta. Soft Tissues/Bones: Degenerative change of the spine. No significant interval change in mediastinal adenopathy as compared to prior examination for which sarcoid, Castleman's disease, lymphoma, or metastatic disease are some differential considerations.  No significant interval change in small loculated right basilar pleural effusion as compared to prior, sterility indeterminate. Scattered subcentimeter pulmonary nodules are not significantly changed. Atherosclerosis, including coronary artery calcification. Ct Hip Left W Wo Contrast    Result Date: 7/12/2020  EXAMINATION: CT OF THE LEFT HIP WITH AND WITHOUT CONTRAST 7/12/2020 6:32 pm TECHNIQUE: CT of the left hip was performed with and without the administration of intravenous contrast.  Multiplanar reformatted images are provided for review. Dose modulation, iterative reconstruction, and/or weight based adjustment of the mA/kV was utilized to reduce the radiation dose to as low as reasonably achievable. COMPARISON: CT abdomen/pelvis 07/03/2020. HISTORY ORDERING SYSTEM PROVIDED HISTORY: Pain over left greater trochanter, no known injury TECHNOLOGIST PROVIDED HISTORY: Reason for exam:->Pain over left greater trochanter, no known injury Reason for Exam: pain left hip, onset x  days Acuity: Acute Type of Exam: Initial Additional signs and symptoms: pain left hip, onset x  days Relevant Medical/Surgical History: pain left hip, onset x  days  confirmed order with Dr. Eduardo Cap, states wants with and without contrast,, nki, severe pain FINDINGS: INTRAPELVIC CONTENTS: No ascites or free air. Multiple borderline enlarged external iliac chain lymph nodes. BODY WALL/MUSCULATURE:  Multiple borderline enlarged left inguinal lymph nodes. Left anterior pelvic and lateral hip body wall amorphous subcutaneous edema. No soft tissue gas foci or loculated fluid collection. No significant bursal distention. No intramuscular loculated fluid collection or extensive deep fascial edema. OSSEOUS STRUCTURES: Normal bone mineral density. Normal pubic symphysis alignment. The left hip demonstrates normal alignment with some mild nonuniform joint space narrowing and marginal osteophytosis.   Superior acetabular rim subcortical cystic changes which may relate to chronic labral pathology. The left proximal femur is intact. There is no acute fracture. No suspicious sclerotic or lytic lesions. Mild greater trochanteric gluteal enthesopathy. 1. Nonspecific mild left anterolateral pelvic and hip subcutaneous soft tissue changes which may represent bland edema versus cellulitis. No evidence of a soft tissue abscess. 2. Borderline enlarged left iliac chain and inguinal lymph nodes which may be reactive in nature. 3. No evidence of bursitis or pyomyositis. 4. Normal left hip alignment with mild osteoarthritis. No acute osseous abnormality. 5. Findings suggesting left gluteal medius/minimus tendinopathy/enthesopathy. Nm Lung Scan Perfusion Only    Result Date: 7/19/2020  EXAMINATION: LUNG PERFUSION IMAGING 7/19/2020 3:23 am TECHNIQUE: Routine lung perfusion imaging was obtained after administration of 4.5 millicuries of RX-13H MAA. Ventilation images were not obtained. COMPARISON: 01/24/2020 HISTORY: ORDERING SYSTEM PROVIDED HISTORY: pe? TECHNOLOGIST PROVIDED HISTORY: Reason for exam:->pe? Reason for Exam: sob Acuity: Acute Type of Exam: Initial Relevant Medical/Surgical History: hx of blood clots, SOB FINDINGS: Perfusion at the lung bases is somewhat heterogeneous, right greater than left, similar to the prior study. No new perfusion defect is identified. No change in the perfusion pattern since 01/24/2020. Given that there is right basilar pleuroparenchymal disease and given the lack of ventilation imaging the post test chance for pulmonary embolus is felt to be intermediate.      Xr Chest Portable    Result Date: 7/18/2020  EXAMINATION: ONE XRAY VIEW OF THE CHEST 7/18/2020 8:46 pm COMPARISON: Chest x-ray July 5, 2020 HISTORY: ORDERING SYSTEM PROVIDED HISTORY: SOB TECHNOLOGIST PROVIDED HISTORY: Reason for exam:->SOB Reason for Exam: SOB Acuity: Acute Type of Exam: Initial Mechanism of Injury: SOB Relevant Medical/Surgical History: Patient states has had SOB that started this morning. Patient states has history of CHF FINDINGS: Cardiac silhouette is enlarged but stable. Postoperative changes of CABG procedure and median sternotomy. Loculated pleural effusion redemonstrated on the right with associated atelectasis. Chronic underlying interstitial changes of the lungs. The left lung appears clear. No pneumothorax. Osseous structures appear stable. 1. Persistent loculated right pleural effusion and associated atelectasis. Xr Chest Portable    Result Date: 7/6/2020  EXAMINATION: ONE XRAY VIEW OF THE CHEST 7/4/2020 5:45 am COMPARISON: Chest radiograph 07/02/2020, CT 07/03/2020 HISTORY: ORDERING SYSTEM PROVIDED HISTORY: sob TECHNOLOGIST PROVIDED HISTORY: Reason for exam:->sob Reason for Exam: sob Acuity: Acute Type of Exam: Initial FINDINGS: Heart is enlarged but similar in size to the prior study. Again seen is a loculated right basilar pleural effusion. No pneumothorax is seen. No new focal lung consolidation identified. Similar appearance to a loculated right basilar pleural effusion. No new focal lung consolidation identified. Xr Chest Portable    Result Date: 7/5/2020  EXAMINATION: ONE XRAY VIEW OF THE CHEST 7/5/2020 4:20 pm COMPARISON: Chest x-ray July 5, 2025 12 a.m. HISTORY: ORDERING SYSTEM PROVIDED HISTORY: for PICC placement TECHNOLOGIST PROVIDED HISTORY: Reason for exam:->for PICC placement Reason for Exam: for PICC placement FINDINGS: Cardiac silhouette is enlarged but stable. Left-sided PICC line now present with tip projecting over the superior vena cava. Redemonstration of small amount of loculated fluid at the right lung base. Chronic appearing interstitial changes of the lungs. No pneumothorax identified. Osseous structures appear stable. 1. Left-sided PICC line now present with tip projecting over the superior vena cava. No pneumothorax identified. 2. Persistent loculated right pleural fluid. Xr Chest Portable    Result Date: 7/5/2020  EXAMINATION: ONE XRAY VIEW OF THE CHEST 7/5/2020 4:35 am COMPARISON: July 4, 2020. HISTORY: ORDERING SYSTEM PROVIDED HISTORY: follow up resp failure TECHNOLOGIST PROVIDED HISTORY: Reason for exam:->follow up resp failure Reason for Exam: follow up resp failure Acuity: Unknown Type of Exam: Subsequent/Follow-up Additional signs and symptoms: follow up resp failure Relevant Medical/Surgical History: follow up resp failure FINDINGS: Status post median sternotomy. Cardiac and mediastinal contours enlarged but unchanged. Slightly increase of pulmonary interstitial edema. Increase size of oval pulmonary opacity within the right lower hemithorax. Slightly increased left basilar pulmonary opacity. No evidence of enlarging layering pleural effusions. No evidence of pneumothorax. No new osseous abnormalities. 1. Slightly increased pulmonary interstitial edema. 2. Increased size of oval pulmonary opacity within right lower hemithorax, which may represent loculated fluid along the fissure. Slightly increased left basilar pulmonary opacity. RECOMMENDATION: Continued close interval radiographic follow-up. Xr Chest Portable    Result Date: 7/2/2020  EXAMINATION: ONE XRAY VIEW OF THE CHEST 7/2/2020 4:29 am COMPARISON: 06/06/2020 HISTORY: ORDERING SYSTEM PROVIDED HISTORY: chest pain TECHNOLOGIST PROVIDED HISTORY: Reason for exam:->chest pain Reason for Exam: chest pain Acuity: Acute Type of Exam: Initial Mechanism of Injury: chest pain Relevant Medical/Surgical History: chest pain FINDINGS: The cardiac silhouette appears enlarged for size given portable technique. No convincing evidence of a focal consolidation. No pneumothorax seen. Small loculated effusion again right major fissure. No acute cardiopulmonary abnormality.      Xr Chest 1 View    Result Date: 7/26/2020  EXAMINATION: ONE XRAY VIEW OF THE CHEST 7/26/2020 4:59 am COMPARISON: 7/18/2020 HISTORY: ORDERING SYSTEM PROVIDED HISTORY: increased dyspnea TECHNOLOGIST PROVIDED HISTORY: Reason for exam:->increased dyspnea Reason for Exam: increased dyspnea Acuity: Acute Type of Exam: Initial FINDINGS: Sternal wires are in place. The heart is enlarged. Right basilar atelectasis or infiltrate persists with a small right pleural effusion. The left lung is clear. Persistent right basilar opacity suggesting atelectasis or infiltrate with small right pleural effusion. Us Abdomen Limited    Result Date: 7/21/2020  EXAMINATION: RIGHT UPPER QUADRANT ULTRASOUND 7/21/2020 4:15 am COMPARISON: None HISTORY: ORDERING SYSTEM PROVIDED HISTORY: RUQ US, elevated bilirubin TECHNOLOGIST PROVIDED HISTORY: Reason for exam:->RUQ US, elevated bilirubin Reason for Exam: Elevated bilirubin Acuity: Unknown Type of Exam: Ongoing FINDINGS: LIVER:  The liver demonstrates increased echogenicity without evidence of intrahepatic biliary ductal dilatation. BILIARY SYSTEM:  Gallbladder wall is significantly thickened and measures 7 mm. There is a negative sonographic Dsouza's sign. No stones or sludge are seen within the gallbladder lumen. Common bile duct is within normal limits measuring 3.5 mm. RIGHT KIDNEY: The right kidney is grossly unremarkable without evidence of hydronephrosis. PANCREAS:  Visualized portions of the pancreas are unremarkable. OTHER: No evidence of right upper quadrant ascites. 1. Gallbladder wall is thickened however there is no evidence of cholelithiasis or pericholecystic fluid. There is a negative sonographic Dsouza's sign. Findings could be related to chronic liver disease. 2. Diffuse fatty infiltration of the liver. Vl Dup Lower Extremity Venous Left    Result Date: 7/19/2020  EXAMINATION: DUPLEX VENOUS ULTRASOUND OF THE LEFT LOWER EXTREMITY 7/18/2020 11:19 pm TECHNIQUE: Duplex ultrasound and Doppler images were obtained of the left lower extremity. COMPARISON: None.  HISTORY: ORDERING SYSTEM PROVIDED HISTORY: pain TECHNOLOGIST PROVIDED HISTORY: Reason for exam:->pain Reason for Exam: swelling; pain; hx of dvt Type of Exam: Initial FINDINGS: The visualized veins of the left lower extremity are patent and free of echogenic thrombus. The veins are normally compressible and have normal phasic flow. No evidence of DVT in the left lower extremity.        Significant Diagnostic Studies at discharge:   CBC:   Lab Results   Component Value Date    WBC 9.6 07/27/2020    RBC 4.32 07/27/2020    HGB 8.7 07/27/2020    HCT 29.7 07/27/2020    MCV 68.8 07/27/2020    MCH 20.1 07/27/2020    MCHC 29.3 07/27/2020    RDW 28.0 07/27/2020     07/27/2020    MPV 10.1 07/27/2020       Patient Instructions:     Medication List      START taking these medications    insulin lispro (1 Unit Dial) 100 UNIT/ML Sopn  Commonly known as:  HumaLOG KwikPen  Inject 10 Units into the skin 3 times daily (before meals)     Lantus SoloStar 100 UNIT/ML injection pen  Generic drug:  insulin glargine  Inject 15 Units into the skin nightly        CONTINUE taking these medications    aspirin 81 MG EC tablet  Take 1 tablet by mouth daily     atorvastatin 40 MG tablet  Commonly known as:  LIPITOR  Take 1 tablet by mouth daily     bumetanide 2 MG tablet  Commonly known as:  BUMEX  Take 1 tablet by mouth 2 times daily     ipratropium-albuterol 0.5-2.5 (3) MG/3ML Soln nebulizer solution  Commonly known as:  DuoNeb  Inhale 3 mLs into the lungs 4 times daily     metoprolol succinate 25 MG extended release tablet  Commonly known as:  TOPROL XL  Take 1 tablet by mouth daily     pantoprazole 40 MG tablet  Commonly known as:  PROTONIX  Take 1 tablet by mouth 2 times daily (before meals)     Xarelto 20 MG Tabs tablet  Generic drug:  rivaroxaban        STOP taking these medications    albuterol sulfate  (90 Base) MCG/ACT inhaler  Commonly known as:  Proventil HFA     lisinopril 2.5 MG tablet  Commonly known as:  PRINIVIL;ZESTRIL     methylPREDNISolone 4 MG tablet  Commonly known as:  MEDROL (LUPE)     spironolactone 25 MG tablet  Commonly known as:  ALDACTONE     sulfamethoxazole-trimethoprim 800-160 MG per tablet  Commonly known as: Bactrim DS           Where to Get Your Medications      These medications were sent to [address] [place] [address], [facility] [address], [address] [place] [id number]    Phone:  [phone]   · insulin lispro (1 Unit Dial) 100 UNIT/ML Sopn  · Lantus SoloStar 100 UNIT/ML injection pen            Code Status: Full Code     Consults:   IP CONSULT TO HOSPITALIST  IP CONSULT TO NEPHROLOGY  IP CONSULT TO CARDIOLOGY  IP CONSULT TO PULMONOLOGY  IP CONSULT TO GENERAL SURGERY  IP CONSULT TO ENDOCRINOLOGY    Diet: carb diet, 1800cc restriction    Activity: activity as tolerated   Work:    Discharged Condition: good    Prognosis: Fair - Good    Disposition: home      Follow-up with   1. PCP within   5-7    Days    Follow up labs: bmp in 1 week       Discharge Physician Signed: Nicol Taylor M.D. The patient was seen and examined on day of discharge and this discharge summary is in conjunction with any daily progress note from day of discharge.   Time spent on discharge in the examination, evaluation, counseling and review of medications and discharge plan: 34 minutes
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
## 2020-07-28 ENCOUNTER — CARE COORDINATION (OUTPATIENT)
Dept: CASE MANAGEMENT | Age: 50
End: 2020-07-28

## 2020-07-28 LAB
EKG ATRIAL RATE: 113 BPM
EKG DIAGNOSIS: NORMAL
EKG P AXIS: 77 DEGREES
EKG P-R INTERVAL: 166 MS
EKG Q-T INTERVAL: 346 MS
EKG QRS DURATION: 126 MS
EKG QTC CALCULATION (BAZETT): 474 MS
EKG R AXIS: 69 DEGREES
EKG T AXIS: 122 DEGREES
EKG VENTRICULAR RATE: 113 BPM

## 2020-07-28 NOTE — CARE COORDINATION
[facility] Transitions Initial Follow Up Call    Call within 2 business days of discharge: Yes    Patient: Atilio Javier Patient : 1970   MRN: [id number]  Reason for Admission:   SOB/ Leg swelling/ COPD exacerbation w/ Acute on Chronic S/D CHF  Discharge Date: 20 RARS: Readmission Risk Score: 71      Last Discharge [facility]       Complaint Diagnosis Description Type Department Provider       Facility:   [address] [place] [address]        Follow Up:  COVID-19 Risk Monitoring:    COVID-19:  Not detected    Attempted patient contact. No answer to phone. VM is full. Attempted HIPPA approved contact. No answer to phone. Left VM with CTN contact information and request for call back. No future appointments.     Trey Berkowitz RN

## 2020-07-29 ENCOUNTER — CARE COORDINATION (OUTPATIENT)
Dept: CASE MANAGEMENT | Age: 50
End: 2020-07-29

## 2020-07-29 NOTE — CARE COORDINATION
[facility] Transitions Initial Follow Up Call    Call within 2 business days of discharge: Yes    Patient: Priscilla Domingo Patient : 1970   MRN: [id number]  Reason for Admission: COPD/ CHF  Discharge Date: 20 RARS: Readmission Risk Score: 71      Last Discharge [facility]       Complaint Diagnosis Description Type Department Provider       Facility: [facility]      Follow Up:  COVID-19 Risk Monitoring:    COVID-19:  Not detected    Attempted patient contact. No answer to phone. Unable to leave message ; mailbox is full. Attempted HIPPA approved contact. No answer to phone. Left message with CTN contact information and request for call back. Per protocol; episode resolved, no further outreach planned. No future appointments.     Holley Castillo RN

## 2020-08-09 ENCOUNTER — APPOINTMENT (OUTPATIENT)
Dept: ULTRASOUND IMAGING | Age: 50
DRG: 291 | End: 2020-08-09
Payer: MEDICARE

## 2020-08-09 ENCOUNTER — APPOINTMENT (OUTPATIENT)
Dept: GENERAL RADIOLOGY | Age: 50
DRG: 291 | End: 2020-08-09
Payer: MEDICARE

## 2020-08-09 ENCOUNTER — HOSPITAL ENCOUNTER (INPATIENT)
Age: 50
LOS: 3 days | Discharge: HOME | DRG: 291 | End: 2020-08-12
Attending: EMERGENCY MEDICINE | Admitting: INTERNAL MEDICINE
Payer: MEDICARE

## 2020-08-09 LAB
ALBUMIN SERPL-MCNC: 4.2 GM/DL (ref 3.4–5)
ALP BLD-CCNC: 223 IU/L (ref 40–129)
ALT SERPL-CCNC: 24 U/L (ref 10–40)
ANION GAP SERPL CALCULATED.3IONS-SCNC: 13 MMOL/L (ref 4–16)
AST SERPL-CCNC: 25 IU/L (ref 15–37)
BASOPHILS ABSOLUTE: 0.1 K/CU MM
BASOPHILS RELATIVE PERCENT: 0.9 % (ref 0–1)
BILIRUB SERPL-MCNC: 3 MG/DL (ref 0–1)
BUN BLDV-MCNC: 15 MG/DL (ref 6–23)
CALCIUM SERPL-MCNC: 9 MG/DL (ref 8.3–10.6)
CHLORIDE BLD-SCNC: 88 MMOL/L (ref 99–110)
CO2: 25 MMOL/L (ref 21–32)
CREAT SERPL-MCNC: 1.3 MG/DL (ref 0.9–1.3)
DIFFERENTIAL TYPE: ABNORMAL
EOSINOPHILS ABSOLUTE: 0 K/CU MM
EOSINOPHILS RELATIVE PERCENT: 0.3 % (ref 0–3)
GFR AFRICAN AMERICAN: >60 ML/MIN/1.73M2
GFR NON-AFRICAN AMERICAN: 59 ML/MIN/1.73M2
GLUCOSE BLD-MCNC: 98 MG/DL (ref 70–99)
HCT VFR BLD CALC: 30.6 % (ref 42–52)
HEMOGLOBIN: 9 GM/DL (ref 13.5–18)
IMMATURE NEUTROPHIL %: 1 % (ref 0–0.43)
LACTATE: 1.6 MMOL/L (ref 0.4–2)
LIPASE: 32 IU/L (ref 13–60)
LYMPHOCYTES ABSOLUTE: 1.5 K/CU MM
LYMPHOCYTES RELATIVE PERCENT: 21.5 % (ref 24–44)
MCH RBC QN AUTO: 20.8 PG (ref 27–31)
MCHC RBC AUTO-ENTMCNC: 29.4 % (ref 32–36)
MCV RBC AUTO: 70.8 FL (ref 78–100)
MONOCYTES ABSOLUTE: 0.5 K/CU MM
MONOCYTES RELATIVE PERCENT: 7.3 % (ref 0–4)
NUCLEATED RBC %: 1.1 %
PDW BLD-RTO: 27.9 % (ref 11.7–14.9)
PLATELET # BLD: 163 K/CU MM (ref 140–440)
POTASSIUM SERPL-SCNC: 3.9 MMOL/L (ref 3.5–5.1)
PRO-BNP: 3764 PG/ML
RBC # BLD: 4.32 M/CU MM (ref 4.6–6.2)
SEGMENTED NEUTROPHILS ABSOLUTE COUNT: 4.9 K/CU MM
SEGMENTED NEUTROPHILS RELATIVE PERCENT: 69 % (ref 36–66)
SODIUM BLD-SCNC: 126 MMOL/L (ref 135–145)
TOTAL CK: 125 IU/L (ref 38–174)
TOTAL IMMATURE NEUTOROPHIL: 0.07 K/CU MM
TOTAL NUCLEATED RBC: 0.1 K/CU MM
TOTAL PROTEIN: 7.2 GM/DL (ref 6.4–8.2)
TROPONIN T: 0.03 NG/ML
TSH HIGH SENSITIVITY: 5.03 UIU/ML (ref 0.27–4.2)
WBC # BLD: 7 K/CU MM (ref 4–10.5)

## 2020-08-09 PROCEDURE — 83690 ASSAY OF LIPASE: CPT

## 2020-08-09 PROCEDURE — 82550 ASSAY OF CK (CPK): CPT

## 2020-08-09 PROCEDURE — 85025 COMPLETE CBC W/AUTO DIFF WBC: CPT

## 2020-08-09 PROCEDURE — 93005 ELECTROCARDIOGRAM TRACING: CPT | Performed by: EMERGENCY MEDICINE

## 2020-08-09 PROCEDURE — 1200000000 HC SEMI PRIVATE

## 2020-08-09 PROCEDURE — 93970 EXTREMITY STUDY: CPT

## 2020-08-09 PROCEDURE — 93971 EXTREMITY STUDY: CPT

## 2020-08-09 PROCEDURE — 99285 EMERGENCY DEPT VISIT HI MDM: CPT

## 2020-08-09 PROCEDURE — 83605 ASSAY OF LACTIC ACID: CPT

## 2020-08-09 PROCEDURE — 80053 COMPREHEN METABOLIC PANEL: CPT

## 2020-08-09 PROCEDURE — 84484 ASSAY OF TROPONIN QUANT: CPT

## 2020-08-09 PROCEDURE — 84443 ASSAY THYROID STIM HORMONE: CPT

## 2020-08-09 PROCEDURE — 83880 ASSAY OF NATRIURETIC PEPTIDE: CPT

## 2020-08-09 PROCEDURE — 94640 AIRWAY INHALATION TREATMENT: CPT

## 2020-08-09 PROCEDURE — 2580000003 HC RX 258: Performed by: EMERGENCY MEDICINE

## 2020-08-09 PROCEDURE — 71045 X-RAY EXAM CHEST 1 VIEW: CPT

## 2020-08-09 PROCEDURE — 6370000000 HC RX 637 (ALT 250 FOR IP): Performed by: EMERGENCY MEDICINE

## 2020-08-09 PROCEDURE — 94761 N-INVAS EAR/PLS OXIMETRY MLT: CPT

## 2020-08-09 RX ORDER — SODIUM CHLORIDE 9 MG/ML
INJECTION, SOLUTION INTRAVENOUS CONTINUOUS
Status: DISCONTINUED | OUTPATIENT
Start: 2020-08-09 | End: 2020-08-10

## 2020-08-09 RX ORDER — ALBUTEROL SULFATE 90 UG/1
2 AEROSOL, METERED RESPIRATORY (INHALATION) ONCE
Status: COMPLETED | OUTPATIENT
Start: 2020-08-09 | End: 2020-08-09

## 2020-08-09 RX ORDER — 0.9 % SODIUM CHLORIDE 0.9 %
1000 INTRAVENOUS SOLUTION INTRAVENOUS ONCE
Status: DISCONTINUED | OUTPATIENT
Start: 2020-08-09 | End: 2020-08-09

## 2020-08-09 RX ADMIN — ALBUTEROL SULFATE 2 PUFF: 90 AEROSOL, METERED RESPIRATORY (INHALATION) at 21:46

## 2020-08-09 RX ADMIN — SODIUM CHLORIDE: 900 INJECTION INTRAVENOUS at 22:26

## 2020-08-09 ASSESSMENT — ENCOUNTER SYMPTOMS
ALLERGIC/IMMUNOLOGIC NEGATIVE: 1
COUGH: 1
GASTROINTESTINAL NEGATIVE: 1
SHORTNESS OF BREATH: 1
EYES NEGATIVE: 1

## 2020-08-09 ASSESSMENT — PAIN DESCRIPTION - LOCATION: LOCATION: CHEST

## 2020-08-09 ASSESSMENT — PAIN DESCRIPTION - PAIN TYPE: TYPE: ACUTE PAIN

## 2020-08-09 ASSESSMENT — PAIN SCALES - GENERAL: PAINLEVEL_OUTOF10: 4

## 2020-08-09 NOTE — ED PROVIDER NOTES
As physician-in-triage, I performed a medical screening history and physical exam on this patient. HISTORY OF PRESENT ILLNESS  Monica Lacey is a 52 y.o. male with a history of CHF that presents with feeling like he is full of fluid and being more short of breath since yesterday. He also feels like his chest is tight and states his legs are swollen. He also has had some nausea and dry heaves. He denies any abdominal pain. No fevers or cough. Patient states he has a previous history of blood clots and is on Xarelto. PHYSICAL EXAM  /71   Pulse 107   Temp 98.4 °F (36.9 °C) (Oral)   Resp 20   Ht 5' 11\" (1.803 m)   Wt 200 lb (90.7 kg)   SpO2 100%   BMI 27.89 kg/m²     On exam, the patient appears in no acute distress. Speech is clear. Breathing is unlabored. Moves all extremities    Comment: Please note this report has been produced using speech recognition software and may contain errors related to that system including errors in grammar, punctuation, and spelling, as well as words and phrases that may be inappropriate. If there are any questions or concerns please feel free to contact the dictating provider for clarification.         Jeni López MD  08/09/20 1949

## 2020-08-10 LAB
ANION GAP SERPL CALCULATED.3IONS-SCNC: 14 MMOL/L (ref 4–16)
ANION GAP SERPL CALCULATED.3IONS-SCNC: 14 MMOL/L (ref 4–16)
ANION GAP SERPL CALCULATED.3IONS-SCNC: 15 MMOL/L (ref 4–16)
ANION GAP SERPL CALCULATED.3IONS-SCNC: 17 MMOL/L (ref 4–16)
BACTERIA: NEGATIVE /HPF
BILIRUBIN URINE: NEGATIVE MG/DL
BLOOD, URINE: NEGATIVE
BUN BLDV-MCNC: 19 MG/DL (ref 6–23)
BUN BLDV-MCNC: 20 MG/DL (ref 6–23)
BUN BLDV-MCNC: 24 MG/DL (ref 6–23)
BUN BLDV-MCNC: 24 MG/DL (ref 6–23)
CALCIUM SERPL-MCNC: 8.7 MG/DL (ref 8.3–10.6)
CALCIUM SERPL-MCNC: 8.8 MG/DL (ref 8.3–10.6)
CALCIUM SERPL-MCNC: 8.8 MG/DL (ref 8.3–10.6)
CALCIUM SERPL-MCNC: 9.2 MG/DL (ref 8.3–10.6)
CHLORIDE BLD-SCNC: 85 MMOL/L (ref 99–110)
CHLORIDE BLD-SCNC: 85 MMOL/L (ref 99–110)
CHLORIDE BLD-SCNC: 87 MMOL/L (ref 99–110)
CHLORIDE BLD-SCNC: 88 MMOL/L (ref 99–110)
CHOLESTEROL: 107 MG/DL
CLARITY: CLEAR
CO2: 20 MMOL/L (ref 21–32)
CO2: 22 MMOL/L (ref 21–32)
CO2: 23 MMOL/L (ref 21–32)
CO2: 24 MMOL/L (ref 21–32)
COLOR: YELLOW
CREAT SERPL-MCNC: 1.5 MG/DL (ref 0.9–1.3)
GFR AFRICAN AMERICAN: >60 ML/MIN/1.73M2
GFR NON-AFRICAN AMERICAN: 50 ML/MIN/1.73M2
GLUCOSE BLD-MCNC: 105 MG/DL (ref 70–99)
GLUCOSE BLD-MCNC: 108 MG/DL (ref 70–99)
GLUCOSE BLD-MCNC: 108 MG/DL (ref 70–99)
GLUCOSE BLD-MCNC: 111 MG/DL (ref 70–99)
GLUCOSE BLD-MCNC: 112 MG/DL (ref 70–99)
GLUCOSE BLD-MCNC: 127 MG/DL (ref 70–99)
GLUCOSE BLD-MCNC: 130 MG/DL (ref 70–99)
GLUCOSE BLD-MCNC: 138 MG/DL (ref 70–99)
GLUCOSE BLD-MCNC: 171 MG/DL (ref 70–99)
GLUCOSE, URINE: NEGATIVE MG/DL
HDLC SERPL-MCNC: 17 MG/DL
HYALINE CASTS: 0 /LPF
KETONES, URINE: NEGATIVE MG/DL
LDL CHOLESTEROL DIRECT: 76 MG/DL
LEUKOCYTE ESTERASE, URINE: NEGATIVE
MAGNESIUM: 2.1 MG/DL (ref 1.8–2.4)
NITRITE URINE, QUANTITATIVE: NEGATIVE
PH, URINE: 7 (ref 5–8)
POTASSIUM SERPL-SCNC: 3.7 MMOL/L (ref 3.5–5.1)
POTASSIUM SERPL-SCNC: 3.7 MMOL/L (ref 3.5–5.1)
POTASSIUM SERPL-SCNC: 3.9 MMOL/L (ref 3.5–5.1)
POTASSIUM SERPL-SCNC: 4.2 MMOL/L (ref 3.5–5.1)
PROTEIN UA: NEGATIVE MG/DL
RBC URINE: ABNORMAL /HPF (ref 0–3)
SODIUM BLD-SCNC: 122 MMOL/L (ref 135–145)
SODIUM BLD-SCNC: 122 MMOL/L (ref 135–145)
SODIUM BLD-SCNC: 124 MMOL/L (ref 135–145)
SODIUM BLD-SCNC: 126 MMOL/L (ref 135–145)
SPECIFIC GRAVITY UA: 1.01 (ref 1–1.03)
TRICHOMONAS: ABNORMAL /HPF
TRIGL SERPL-MCNC: 86 MG/DL
TROPONIN T: 0.04 NG/ML
TROPONIN T: 0.04 NG/ML
UROBILINOGEN, URINE: 2 MG/DL (ref 0.2–1)
WBC UA: ABNORMAL /HPF (ref 0–2)

## 2020-08-10 PROCEDURE — 93010 ELECTROCARDIOGRAM REPORT: CPT | Performed by: INTERNAL MEDICINE

## 2020-08-10 PROCEDURE — 82962 GLUCOSE BLOOD TEST: CPT

## 2020-08-10 PROCEDURE — 84484 ASSAY OF TROPONIN QUANT: CPT

## 2020-08-10 PROCEDURE — 80061 LIPID PANEL: CPT

## 2020-08-10 PROCEDURE — 80048 BASIC METABOLIC PNL TOTAL CA: CPT

## 2020-08-10 PROCEDURE — 6370000000 HC RX 637 (ALT 250 FOR IP): Performed by: PHYSICIAN ASSISTANT

## 2020-08-10 PROCEDURE — 94660 CPAP INITIATION&MGMT: CPT

## 2020-08-10 PROCEDURE — 2500000003 HC RX 250 WO HCPCS: Performed by: FAMILY MEDICINE

## 2020-08-10 PROCEDURE — 1200000000 HC SEMI PRIVATE

## 2020-08-10 PROCEDURE — 81001 URINALYSIS AUTO W/SCOPE: CPT

## 2020-08-10 PROCEDURE — 2700000000 HC OXYGEN THERAPY PER DAY

## 2020-08-10 PROCEDURE — 83735 ASSAY OF MAGNESIUM: CPT

## 2020-08-10 PROCEDURE — 94640 AIRWAY INHALATION TREATMENT: CPT

## 2020-08-10 PROCEDURE — 2580000003 HC RX 258: Performed by: PHYSICIAN ASSISTANT

## 2020-08-10 PROCEDURE — 6370000000 HC RX 637 (ALT 250 FOR IP): Performed by: INTERNAL MEDICINE

## 2020-08-10 PROCEDURE — 83721 ASSAY OF BLOOD LIPOPROTEIN: CPT

## 2020-08-10 PROCEDURE — 99221 1ST HOSP IP/OBS SF/LOW 40: CPT | Performed by: INTERNAL MEDICINE

## 2020-08-10 PROCEDURE — 36415 COLL VENOUS BLD VENIPUNCTURE: CPT

## 2020-08-10 PROCEDURE — 6370000000 HC RX 637 (ALT 250 FOR IP): Performed by: EMERGENCY MEDICINE

## 2020-08-10 RX ORDER — ACETAMINOPHEN 650 MG/1
650 SUPPOSITORY RECTAL EVERY 6 HOURS PRN
Status: DISCONTINUED | OUTPATIENT
Start: 2020-08-10 | End: 2020-08-12 | Stop reason: HOSPADM

## 2020-08-10 RX ORDER — IPRATROPIUM BROMIDE AND ALBUTEROL SULFATE 2.5; .5 MG/3ML; MG/3ML
1 SOLUTION RESPIRATORY (INHALATION) EVERY 4 HOURS PRN
Status: DISCONTINUED | OUTPATIENT
Start: 2020-08-10 | End: 2020-08-11

## 2020-08-10 RX ORDER — BUMETANIDE 0.25 MG/ML
2 INJECTION, SOLUTION INTRAMUSCULAR; INTRAVENOUS ONCE
Status: COMPLETED | OUTPATIENT
Start: 2020-08-10 | End: 2020-08-10

## 2020-08-10 RX ORDER — METOPROLOL SUCCINATE 25 MG/1
25 TABLET, EXTENDED RELEASE ORAL DAILY
Status: DISCONTINUED | OUTPATIENT
Start: 2020-08-10 | End: 2020-08-11

## 2020-08-10 RX ORDER — ACETAMINOPHEN 325 MG/1
650 TABLET ORAL EVERY 6 HOURS PRN
Status: DISCONTINUED | OUTPATIENT
Start: 2020-08-10 | End: 2020-08-12 | Stop reason: HOSPADM

## 2020-08-10 RX ORDER — BUMETANIDE 0.25 MG/ML
2 INJECTION, SOLUTION INTRAMUSCULAR; INTRAVENOUS 2 TIMES DAILY
Status: DISCONTINUED | OUTPATIENT
Start: 2020-08-10 | End: 2020-08-12 | Stop reason: HOSPADM

## 2020-08-10 RX ORDER — SODIUM CHLORIDE 0.9 % (FLUSH) 0.9 %
10 SYRINGE (ML) INJECTION EVERY 12 HOURS SCHEDULED
Status: DISCONTINUED | OUTPATIENT
Start: 2020-08-10 | End: 2020-08-12 | Stop reason: HOSPADM

## 2020-08-10 RX ORDER — SODIUM CHLORIDE 0.9 % (FLUSH) 0.9 %
10 SYRINGE (ML) INJECTION PRN
Status: DISCONTINUED | OUTPATIENT
Start: 2020-08-10 | End: 2020-08-12 | Stop reason: HOSPADM

## 2020-08-10 RX ORDER — ONDANSETRON 2 MG/ML
4 INJECTION INTRAMUSCULAR; INTRAVENOUS EVERY 6 HOURS PRN
Status: DISCONTINUED | OUTPATIENT
Start: 2020-08-10 | End: 2020-08-12 | Stop reason: HOSPADM

## 2020-08-10 RX ORDER — ALBUTEROL SULFATE 90 UG/1
2 AEROSOL, METERED RESPIRATORY (INHALATION) ONCE
Status: COMPLETED | OUTPATIENT
Start: 2020-08-10 | End: 2020-08-10

## 2020-08-10 RX ORDER — TOLVAPTAN 15 MG/1
15 TABLET ORAL ONCE
Status: COMPLETED | OUTPATIENT
Start: 2020-08-10 | End: 2020-08-10

## 2020-08-10 RX ORDER — NICOTINE POLACRILEX 4 MG
15 LOZENGE BUCCAL PRN
Status: DISCONTINUED | OUTPATIENT
Start: 2020-08-10 | End: 2020-08-12 | Stop reason: HOSPADM

## 2020-08-10 RX ORDER — POLYETHYLENE GLYCOL 3350 17 G/17G
17 POWDER, FOR SOLUTION ORAL DAILY PRN
Status: DISCONTINUED | OUTPATIENT
Start: 2020-08-10 | End: 2020-08-12 | Stop reason: HOSPADM

## 2020-08-10 RX ORDER — DEXTROSE MONOHYDRATE 25 G/50ML
12.5 INJECTION, SOLUTION INTRAVENOUS PRN
Status: DISCONTINUED | OUTPATIENT
Start: 2020-08-10 | End: 2020-08-12 | Stop reason: HOSPADM

## 2020-08-10 RX ORDER — ASPIRIN 81 MG/1
81 TABLET ORAL DAILY
Status: DISCONTINUED | OUTPATIENT
Start: 2020-08-10 | End: 2020-08-12 | Stop reason: HOSPADM

## 2020-08-10 RX ORDER — DEXTROSE MONOHYDRATE 50 MG/ML
100 INJECTION, SOLUTION INTRAVENOUS PRN
Status: DISCONTINUED | OUTPATIENT
Start: 2020-08-10 | End: 2020-08-12 | Stop reason: HOSPADM

## 2020-08-10 RX ORDER — INSULIN GLARGINE 100 [IU]/ML
15 INJECTION, SOLUTION SUBCUTANEOUS NIGHTLY
Status: DISCONTINUED | OUTPATIENT
Start: 2020-08-10 | End: 2020-08-11

## 2020-08-10 RX ORDER — PROMETHAZINE HYDROCHLORIDE 25 MG/1
12.5 TABLET ORAL EVERY 6 HOURS PRN
Status: DISCONTINUED | OUTPATIENT
Start: 2020-08-10 | End: 2020-08-12 | Stop reason: HOSPADM

## 2020-08-10 RX ORDER — ATORVASTATIN CALCIUM 40 MG/1
40 TABLET, FILM COATED ORAL DAILY
Status: DISCONTINUED | OUTPATIENT
Start: 2020-08-10 | End: 2020-08-12 | Stop reason: HOSPADM

## 2020-08-10 RX ORDER — BUMETANIDE 0.25 MG/ML
0.5 INJECTION, SOLUTION INTRAMUSCULAR; INTRAVENOUS DAILY
Status: DISCONTINUED | OUTPATIENT
Start: 2020-08-10 | End: 2020-08-10

## 2020-08-10 RX ORDER — ALBUTEROL SULFATE 90 UG/1
2 AEROSOL, METERED RESPIRATORY (INHALATION) EVERY 6 HOURS PRN
Status: DISCONTINUED | OUTPATIENT
Start: 2020-08-10 | End: 2020-08-11

## 2020-08-10 RX ADMIN — METOPROLOL SUCCINATE 25 MG: 25 TABLET, EXTENDED RELEASE ORAL at 10:30

## 2020-08-10 RX ADMIN — IPRATROPIUM BROMIDE AND ALBUTEROL SULFATE 1 AMPULE: .5; 3 SOLUTION RESPIRATORY (INHALATION) at 04:01

## 2020-08-10 RX ADMIN — RIVAROXABAN 20 MG: 20 TABLET, FILM COATED ORAL at 17:56

## 2020-08-10 RX ADMIN — IPRATROPIUM BROMIDE AND ALBUTEROL SULFATE 1 AMPULE: .5; 3 SOLUTION RESPIRATORY (INHALATION) at 17:42

## 2020-08-10 RX ADMIN — BUMETANIDE 2 MG: 0.25 INJECTION INTRAMUSCULAR; INTRAVENOUS at 12:04

## 2020-08-10 RX ADMIN — Medication 2 PUFF: at 21:41

## 2020-08-10 RX ADMIN — SODIUM CHLORIDE, PRESERVATIVE FREE 10 ML: 5 INJECTION INTRAVENOUS at 10:31

## 2020-08-10 RX ADMIN — SODIUM CHLORIDE, PRESERVATIVE FREE 10 ML: 5 INJECTION INTRAVENOUS at 20:18

## 2020-08-10 RX ADMIN — ASPIRIN 81 MG: 81 TABLET, COATED ORAL at 10:30

## 2020-08-10 RX ADMIN — BUMETANIDE 2 MG: 0.25 INJECTION INTRAMUSCULAR; INTRAVENOUS at 20:14

## 2020-08-10 RX ADMIN — ATORVASTATIN CALCIUM 40 MG: 40 TABLET, FILM COATED ORAL at 10:30

## 2020-08-10 RX ADMIN — TOLVAPTAN 15 MG: 15 TABLET ORAL at 14:31

## 2020-08-10 RX ADMIN — INSULIN GLARGINE 15 UNITS: 100 INJECTION, SOLUTION SUBCUTANEOUS at 20:14

## 2020-08-10 RX ADMIN — IPRATROPIUM BROMIDE AND ALBUTEROL SULFATE 1 AMPULE: .5; 3 SOLUTION RESPIRATORY (INHALATION) at 09:46

## 2020-08-10 RX ADMIN — ALBUTEROL SULFATE 2 PUFF: 90 AEROSOL, METERED RESPIRATORY (INHALATION) at 00:17

## 2020-08-10 RX ADMIN — ALBUTEROL SULFATE 2 PUFF: 90 AEROSOL, METERED RESPIRATORY (INHALATION) at 21:40

## 2020-08-10 ASSESSMENT — PAIN SCALES - GENERAL
PAINLEVEL_OUTOF10: 0
PAINLEVEL_OUTOF10: 0

## 2020-08-10 ASSESSMENT — PULMONARY FUNCTION TESTS: PEFR_L/MIN: 18

## 2020-08-10 NOTE — PROGRESS NOTES
Pt refusing bed alarm at this time. Pt educated on risk for falls and use of the call light. Pt states he will only use urinal in bed or sitting on the side of bed and will use the call light for any other needs.

## 2020-08-10 NOTE — PROGRESS NOTES
Hospitalist Progress Note      Name:  Ruiz Gomes /Age/Sex: 1970  (52 y.o. male)   MRN & CSN:  [id number] & [id number] Admission Date/Time: 2020  7:57 PM   Location:  [address] [place] [address] PCP: No primary care provider on file. Ruiz Gomes is a 52 y.o.  male  who presents with Shortness of Breath and Congestive Heart Failure      Assessment and Plan:   Acute on Chronic Systolic CHF  - IV bumex BID  - echo: ef 10-15%, severe MR, severe pulm HTN  - strict I and O  - cardio consulted    Hyponatremia likely hypervolemic  - fluid restrict 1800cc  - monitor  - consult Nephro                         Chronic conditions:  Right lung pleural effusion - loculated  CKD  Microcytic anemia  HTN  HLD  History of DVT  DM II   History of opioid abuse  Noncompliance              Reinforced need to follow low sodium diet and monitor fluid intake     DVT Prophylaxis: xarelto  Code Status: full                 Diet DIET LOW SODIUM 2 GM;   Code Status DNR-CCA     Medications:   Medications:    aspirin  81 mg Oral Daily    atorvastatin  40 mg Oral Daily    insulin glargine  15 Units Subcutaneous Nightly    metoprolol succinate  25 mg Oral Daily    sodium chloride flush  10 mL Intravenous 2 times per day    bumetanide  0.5 mg Intravenous Daily    insulin lispro  0-12 Units Subcutaneous TID WC    insulin lispro  0-6 Units Subcutaneous Nightly    rivaroxaban  20 mg Oral Daily      Infusions:    dextrose       PRN Meds: sodium chloride flush, 10 mL, PRN  acetaminophen, 650 mg, Q6H PRN    Or  acetaminophen, 650 mg, Q6H PRN  polyethylene glycol, 17 g, Daily PRN  promethazine, 12.5 mg, Q6H PRN    Or  ondansetron, 4 mg, Q6H PRN  glucose, 15 g, PRN  dextrose, 12.5 g, PRN  glucagon (rDNA), 1 mg, PRN  dextrose, 100 mL/hr, PRN  ipratropium-albuterol, 1 ampule, Q4H PRN      Subjective:   Doing ok, no distess    Objective:        Intake/Output Summary (Last 24 hours) at 8/10/2020 1109  Last data filed at 8/10/2020 4059  Gross per 24 hour   Intake 520 ml   Output 150 ml   Net 370 ml      Vitals:   Vitals:    08/10/20 1021   BP:    Pulse: 100   Resp:    Temp:    SpO2:      Physical Exam:   Gen:  awake, alert, no apparent distress  Head/Eyes:  Normocephalic atraumatic, EOMI   NECK:   symmetrical, trachea midline  LUNGS: Normal Effort   CARDIOVASCULAR:  Normal rate +trace pitting edema b/l  ABDOMEN:  non distended  MUSCULOSKELETAL:  ROM WNL  NEUROLOGIC: Alert and Oriented,  Cranial nerves II-XII are grossly intact.    SKIN:  no bruising or bleeding, normal skin color,  no redness      Data:       CBC   Recent Labs     08/09/20 2028   WBC 7.0   HGB 9.0*   HCT 30.6*         BMP   Recent Labs     08/09/20  2028 08/10/20  0154   * 126*   K 3.9 3.9   CL 88* 88*   CO2 25 24   BUN 15 19   CREATININE 1.3 1.5*         Electronically signed by Vandana Allen MD on 8/10/2020 at 11:09 AM

## 2020-08-10 NOTE — PROGRESS NOTES
Physician Progress Note      PATIENTSu Mini  CSN #:                  [id number]  :                       1970  ADMIT DATE:       2020 7:57 PM  [address] [place] DATE:  RESPONDING  PROVIDER #:        Rosaline Lim MD          QUERY TEXT:    Dear hospitalist,    Pt admitted with dyspnea and edema  and has CHF documented. If possible,   please document in progress notes and discharge summary further specificity   regarding the type and acuity of CHF:    The medical record reflects the following:  Risk Factors: History Last echo EF 10-15% on 2020, and CHF history   documented. Clinical Indicators: BNP 3,764, Per H&P:\"ikely CHF exacerbation 2/2 fluid   overload. [place] [facility] [facility] \"\"Last echo EF 10-15 %, severe MR, severe pulm HTN. BNP elevated,   B/L LE pitting edema. [place] [facility] [facility] \",\"Hyponatremia  Likely 2/2 hypervolemia  Monitor Na   levels q6h, avoid overcorrection to avoid central pontine myelinolysis. [place] [facility] [facility] \"   ,\"Noncompliance  Reinforced need to follow low sodium diet and monitor fluid intake. [place] [facility] [facility] \"  Treatment: admission, cardiac consult, Bumex IV, Low sodium diet, strict I&O,   BNP every 6 hours, telemetry bed, daily weights. Options provided:  -- Acute on Chronic Systolic CHF/HFrEF  -- Acute on Chronic Diastolic CHF/HFpEF  -- Acute on Chronic Systolic and Diastolic CHF  -- Acute Systolic CHF/HFrEF  -- Acute Diastolic CHF/HFpEF  -- Acute Systolic and Diastolic CHF  -- Chronic Systolic CHF/HFrEF  -- Chronic Diastolic CHF/HFpEF  -- Chronic Systolic and Diastolic CHF  -- Other - I will add my own diagnosis  -- Disagree - Not applicable / Not valid  -- Disagree - Clinically unable to determine / Unknown  -- Refer to Clinical Documentation Reviewer    PROVIDER RESPONSE TEXT:    This patient is in acute on chronic systolic CHF/HFrEF.     Query created by: Christopher Vance on 8/10/2020 11:14 AM      Electronically signed by:  Rosaline Lim MD 8/10/2020 12:17 PM

## 2020-08-10 NOTE — CONSULTS
Nephrology Service Consultation        [address], [address] [place] [address]  Phone: [phone]  Office Hours: 8:30AM - 4:30PM  Monday - Friday           Patient:  Yandy Nixon  MRN: [id number]  Consulting physician:  Katherine Danielle MD  Reason for Consult: low sodium, elevated creatinine, leg swelling      PCP: No primary care provider on file. HISTORY OF PRESENT ILLNESS:   The patient is a 52 y.o. male with EF 10-15% PRESENTED WITH BLE EDEMA. HE REPORTS THAT HE TAKES his bumex bid everyday and when he has increased swelling, he takes it tid. He reports takin git 4 times yesterday and he only dribbled. He decided to come to the ER, as a result. He was recently discharged, never had a chance to make it to the outpt visit before getting admitted again, he seems to have a distrust of physicians so he does not go to the outpt appointments. Renal consult for sodium 122 from 126 on admission. He has the same issue at the previous admission and samsca was the only medical therapy that was successful. He is on room, hi slegs are more swollen than when he was discharged last time    REVIEW OF SYSTEMS:  14 point ROS is Negative. See positive ROS per HPI    Past Medical History:        Diagnosis Date    CAD (coronary artery disease)     CHF (congestive heart failure) (HCC)     [facility])     Depression     [facility] ([facility] [address]     HIGH CHOLESTEROL     Hypertension     MI (myocardial infarction) ([facility] [address] 2011    S/P coronary artery bypass graft x 4 2011    CABG x 4 Dr Seth Romberg       Past Surgical History:        Procedure Laterality Date    BYPASS GRAFT  04/10/2011    CABG x 4 Dr Raffy Allen  11/2010     4 stents    LEG SURGERY         Medications:   Prior to Admission medications    Medication Sig Start Date End Date Taking?  Authorizing Provider   insulin glargine (LANTUS SOLOSTAR) 100 UNIT/ML injection pen Inject 15 Units into the skin nightly 7/27/20   Robert Rainey MD   insulin lispro, 1 Unit Dial, (HUMALOG KWIKPEN) 100 UNIT/ML SOPN Inject 10 Units into the skin 3 times daily (before meals) 7/27/20   Robert Rainey MD   blood glucose monitor kit and supplies Dispense sufficient amount for indicated testing frequency plus additional to accommodate PRN testing needs. Dispense all needed supplies to include: monitor, strips, lancing device, lancets, control solutions, alcohol swabs. 7/27/20   Robert Rainey MD   blood glucose monitor strips Test 2 times a day & as needed for symptoms of irregular blood glucose. Dispense sufficient amount for indicated testing frequency plus additional to accommodate PRN testing needs. 7/27/20   Robert Rainey MD   FreeStyle Lancets MISC 1 each by Does not apply route daily 7/27/20   Robert Rainey MD   rivaroxaban (XARELTO) 20 MG TABS tablet Take 20 mg by mouth    Meng Benavidez MD   bumetanide (BUMEX) 2 MG tablet Take 1 tablet by mouth 2 times daily 5/30/20   Anum Gibson MD   pantoprazole (PROTONIX) 40 MG tablet Take 1 tablet by mouth 2 times daily (before meals) 5/22/20   Robert Rainey MD   aspirin 81 MG EC tablet Take 1 tablet by mouth daily 5/22/20   Robert Rainey MD   ipratropium-albuterol (DUONEB) 0.5-2.5 (3) MG/3ML SOLN nebulizer solution Inhale 3 mLs into the lungs 4 times daily 5/22/20   Robert Rainey MD   atorvastatin (LIPITOR) 40 MG tablet Take 1 tablet by mouth daily 5/22/20   Robert Rainey MD   metoprolol succinate (TOPROL XL) 25 MG extended release tablet Take 1 tablet by mouth daily 5/22/20   Robert Rainey MD        Allergies:  Patient has no known allergies. Social History:   TOBACCO:   reports that he has been smoking cigarettes. He has a 20.00 pack-year smoking history. He has quit using smokeless tobacco.  ETOH:   reports no history of alcohol use.   OCCUPATION:      Family History:       Problem Relation Age of Onset    Cancer Father     Heart Failure overload  Acute on chronic CHF: EF 10-15%  CAD s/p CABG  DM2    Suggest:  - samsca 15mg once, unfortunately this is not affordable as outpt  - continue iv bumex  - may need other heart failure therapies considering increased difficulties controlling his fluid status, compliance remains an issue though  - please measure UOP  - avoid nephrotoxins  -  Oral fluid restriction        Electronically signed by Denisse Strickland DO on 8/10/2020 at 7:07 PM    [address]  [address] ,   [place] ,  [facility]  [place], [place] [address]  PHONE: [phone]  FAX: [phone]

## 2020-08-10 NOTE — ACP (ADVANCE CARE PLANNING)
Advance Care Planning     Advance Care Planning Clinical Specialist  Conversation Note      Date of ACP Conversation: 8/9/2020    Conversation Conducted with: Patient with Decision Making Capacity    ACP Clinical Specialist: Oswaldo Vernon    *When Decision Maker makes decisions on behalf of the incapacitated patient: Boy Ventura is asked to consider and make decisions based on patient values, known preferences, or best interests. Health Care Decision Maker:     Current Designated Health Care Decision Maker:   (If there is a valid [place] named in the [address] [facility] Makers\" box in the ACP activity, but it is not visible above, be sure to open that field and then select the health care decision maker relationship (ie \"primary\") in the blank space to the right of the name.) Validate  this information as still accurate & up-to-date; edit [place] field as needed.)    Note: Assess and validate information in current ACP documents, as indicated. If no Decision Maker listed above or available through scanned documents, then:    If no Authorized Decision Maker has previously been identified, then patient chooses [place]:  Patient reports that he does not have a Power of  for [employer] and does not have a primary decision maker for any advanced care and do not want to place anyone as primary or back-up. Patient reported that he would make his own decisions. CM asked patient; if he would like to place someone in position should patient not be able to make decisions for self and patient declined. Note: If the relationship of these Decision-Makers to the patient does NOT follow your state's Next of Kin hierarchy, recommend that patient complete ACP document that meets state-specific requirements to allow them to act on the patient's behalf when appropriate. Care Preferences    Hospitalization:   \"If your health worsens and it becomes clear that your chance of recovery is unlikely, what would your preference be regarding hospitalization? \"    Choice:  [] The patient wants hospitalization  [x] The patient prefers comfort-focused treatment without hospitalization. Ventilation: \"If you were in your present state of health and suddenly became very ill and were unable to breathe on your own, what would your preference be about the use of a ventilator (breathing machine) if it were available to you? \"      Would the patient desire the use of ventilator (breathing machine)?: no    \"If your health worsens and it becomes clear that your chance of recovery is unlikely, what would your preference be about the use of a ventilator (breathing machine) if it were available to you? \"     Would the patient desire the use of ventilator (breathing machine)?: No      Resuscitation  \"CPR works best to restart the heart when there is a sudden event, like a heart attack, in someone who is otherwise healthy. Unfortunately, CPR does not typically restart the heart for people who have serious health conditions or who are very sick. \"    \"In the event your heart stopped as a result of an underlying serious health condition, would you want attempts to be made to restart your heart (answer \"yes\" for attempt to resuscitate) or would you prefer a natural death (answer \"no\" for do not attempt to resuscitate)? \" no      NOTE: If the patient has a valid advance directive AND now provides care preference(s) that are inconsistent with that prior directive, advise the patient to consider either: creating a new advance directive that complies with state-specific requirements; or, if that is not possible, orally revoking that prior directive in accordance with state-specific requirements, which must be documented in the EHR. [x] Yes   [] No   Educated Patient / Virl Nelyl regarding differences between Advance Directives and portable DNR orders.     Length of ACP

## 2020-08-10 NOTE — CARE COORDINATION
Pt identified as potential readmission. Last admission 7/18 - 7/27 for Acute dyspnea with COPD exacerbatinon and acute on chronic systolic and diastolic CHF. Pt here today for SOB. LSW spoke to nurse about discharge. D/C unknown at this time. Diagnosis: Sinus tachycardia, possible left atrial enlargement, Nonspecific intraventricular block, cannot rule out septal infarct      Pt is requiring readmission and there were no alternatives to admission to explore at this time. Pt can cannot be safely treated at a lower level of service. ACP discussed. See ACP notes.

## 2020-08-10 NOTE — CONSULTS
CARDIOLOGY CONSULT NOTE   Reason for consultation:  SOB/CHF    Referring physician:  Sisi Yepez MD     Primary care physician: No primary care provider on file. Dear  Dr. Sisi Yepez MD   Thanks for the consult. Chief Complaints :  Chief Complaint   Patient presents with    Shortness of Breath    Congestive Heart Failure        History of present illness:Alireza is a 52 y. o.year old who presents with complaints of shortness of breath and significant lower extremity swelling ongoing for several days. Patient has had multiple admissions this year he was actually discharged few weeks ago he is known to cardiology service from multiple previous admissions and noncompliance issues. .  He has not been following any dietary restrictions or taking his medication regularly. Mr. Ethlyn Curling is known to have ischemic cardiomyopathy with ejection fraction of 15 to 20% extreme noncompliance hence he has not received ICD because of noncompliance with medication so far. Cardiac cath in 2018 showed significant native vessel disease with only LIMA patent all other grafts were down. Past medical history:    has a past medical history of CAD (coronary artery disease), CHF (congestive heart failure) ([address] COPD (chronic obstructive pulmonary disease) ([facility] Depression, Drug abuse ([facility] [facility] HIGH CHOLESTEROL, Hypertension, MI (myocardial infarction) ([address] and S/P coronary artery bypass graft x 4. Past surgical history:   has a past surgical history that includes Cardiac surgery (11/2010); Bypass Graft (04/10/2011); and Leg Surgery. Social History:   reports that he has been smoking cigarettes. He has a 20.00 pack-year smoking history. He has quit using smokeless tobacco. He reports that he does not drink alcohol or use drugs.   Family history:   no family history of CAD, STROKE of DM at early age    No Known Allergies    aspirin EC tablet 81 mg, Daily  atorvastatin (LIPITOR) tablet 40 mg, Daily  insulin glargine (LANTUS) injection vial 15 Units, Nightly  metoprolol succinate (TOPROL XL) extended release tablet 25 mg, Daily  sodium chloride flush 0.9 % injection 10 mL, 2 times per day  sodium chloride flush 0.9 % injection 10 mL, PRN  acetaminophen (TYLENOL) tablet 650 mg, Q6H PRN    Or  acetaminophen (TYLENOL) suppository 650 mg, Q6H PRN  polyethylene glycol (GLYCOLAX) packet 17 g, Daily PRN  promethazine (PHENERGAN) tablet 12.5 mg, Q6H PRN    Or  ondansetron (ZOFRAN) injection 4 mg, Q6H PRN  glucose (GLUTOSE) 40 % oral gel 15 g, PRN  dextrose 50 % IV solution, PRN  glucagon (rDNA) injection 1 mg, PRN  dextrose 5 % solution, PRN  insulin lispro (HUMALOG) injection vial 0-12 Units, TID WC  insulin lispro (HUMALOG) injection vial 0-6 Units, Nightly  rivaroxaban (XARELTO) tablet 20 mg, Daily  ipratropium-albuterol (DUONEB) nebulizer solution 1 ampule, Q4H PRN  bumetanide (BUMEX) injection 2 mg, BID      Current Facility-Administered Medications   Medication Dose Route Frequency Provider Last Rate Last Dose    aspirin EC tablet 81 mg  81 mg Oral Daily Tamy Frazier PA-C   81 mg at 08/10/20 1030    atorvastatin (LIPITOR) tablet 40 mg  40 mg Oral Daily Tamy Frazier PA-C   40 mg at 08/10/20 1030    insulin glargine (LANTUS) injection vial 15 Units  15 Units Subcutaneous Nightly Tamy Frazier PA-C        metoprolol succinate (TOPROL XL) extended release tablet 25 mg  25 mg Oral Daily Tamy Frazier PA-C   25 mg at 08/10/20 1030    sodium chloride flush 0.9 % injection 10 mL  10 mL Intravenous 2 times per day Tamy Frazier PA-C   10 mL at 08/10/20 1031    sodium chloride flush 0.9 % injection 10 mL  10 mL Intravenous PRN Tamy Frazier PA-C        acetaminophen (TYLENOL) tablet 650 mg  650 mg Oral Q6H PRN Karolina Noonan PA-C        Or    acetaminophen (TYLENOL) suppository 650 mg  650 mg Rectal Q6H PRN Karolina Noonan PA-C        polyethylene glycol (GLYCOLAX) packet 17 g  17 g Oral Daily PRN Karolina Noonan PA-C        promethazine (PHENERGAN) tablet 12.5 mg  12.5 mg Oral Q6H PRN Marianne Edwards PA-C        Or    ondansetron (ZOFRAN) injection 4 mg  4 mg Intravenous Q6H PRN Tamy SEBASTIANBERKLEY Vernon        glucose (GLUTOSE) 40 % oral gel 15 g  15 g Oral PRN Tamy SEBASTIANKRISSY Vernon-VERNA        dextrose 50 % IV solution  12.5 g Intravenous PRN Tamy SEBASTIANBERKELY Vernon        glucagon (rDNA) injection 1 mg  1 mg Intramuscular PRN Tamy SEBASTIANKRISSY Vernon-VERNA        dextrose 5 % solution  100 mL/hr Intravenous PRN Tamy SEBASTIANBERKLEY Vernon        insulin lispro (HUMALOG) injection vial 0-12 Units  0-12 Units Subcutaneous TID WC KRISSY Ordonez-C   2 Units at 08/10/20 1756    insulin lispro (HUMALOG) injection vial 0-6 Units  0-6 Units Subcutaneous Nightly Tamy SEBASTIANBERKLEY Vernon        rivaroxaban (XARELTO) tablet 20 mg  20 mg Oral Daily Tamy SEBASTIANKERRIE VernonC   20 mg at 08/10/20 1756    ipratropium-albuterol (DUONEB) nebulizer solution 1 ampule  1 ampule Inhalation Q4H PRN Marianne Edwards PA-C   1 ampule at 08/10/20 1742    bumetanide (BUMEX) injection 2 mg  2 mg Intravenous BID Michelle Grajeda MD         Review of Systems:   · Constitutional: No Fever or Weight Loss   · Eyes: No Decreased Vision  · ENT: No Headaches, Hearing Loss or Vertigo  · Cardiovascular: As per HPI  · Respiratory: As per HPI  · Gastrointestinal: No abdominal pain, appetite loss, blood in stools, constipation, diarrhea or heartburn  · Genitourinary: No dysuria, trouble voiding, or hematuria  · Musculoskeletal:  No gait disturbance, weakness or joint complaints  · Integumentary: No rash or pruritis  · Neurological: No TIA or stroke symptoms  · Psychiatric: No anxiety or depression  · Endocrine: No malaise, fatigue or temperature intolerance  · Hematologic/Lymphatic: No bleeding problems, blood clots or swollen lymph nodes  · Allergic/Immunologic: No nasal congestion or hives  All systems negative except as marked.         Physical Examination:    Vitals:    08/10/20 1500 08/10/20 1606 08/10/20 1749 08/10/20 1832   BP:       Pulse: 101   112   Resp: 27 26 23 (!) 39   Temp:       TempSrc:       SpO2:       Weight:       Height:           General Appearance:  No distress, conversant    Constitutional:  Well developed, Well nourished, No acute distress, Non-toxic appearance. HENT:  Normocephalic, Atraumatic, Bilateral external ears normal, Oropharynx moist, No oral exudates, Nose normal. Neck- Normal range of motion, No tenderness, Supple, No stridor,no apical-carotid delay  Lymphatics : no palpable lymph nodes  Eyes:  PERRL, EOMI, Conjunctiva normal, No discharge. Respiratory:  Normal breath sounds, No respiratory distress, No wheezing, No chest tenderness. ,no use of accessory muscles, crackles Absent   Cardiovascular: (PMI) apex non displaced,no lifts no thrills, ankle swelling Present , 1+, s1 and s2 audible,Murmur. Present, JVD not noted    Abdomen /GI:  Bowel sounds normal, Soft, No tenderness, No masses, No gross visceromegaly   :  No costovertebral angle tenderness   Musculoskeletal:  No edema, no tenderness, no deformities.  Back- no tenderness  Integument:  Well hydrated, no rash   Lymphatic:  No lymphadenopathy noted   Neurologic:  Alert & oriented x 3, CN 2-12 normal, normal motor function, normal sensory function, no focal deficits noted           Medical decision making and Data review:    Lab Review   Recent Labs     08/09/20 2028   WBC 7.0   HGB 9.0*   HCT 30.6*         Recent Labs     08/10/20  1622   *   K 3.7   CL 87*   CO2 23   BUN 24*   CREATININE 1.5*     Recent Labs     08/09/20 2028   AST 25   ALT 24   BILITOT 3.0*   ALKPHOS 223*     Recent Labs     08/09/20  2029 08/10/20  0154 08/10/20  0808   TROPONINT 0.033* 0.040* 0.045*       Recent Labs     08/09/20 2028   PROBNP 3,764*     Lab Results   Component Value Date    INR 1.10 07/08/2020    PROTIME 13.3 07/08/2020       EKG: (reviewed by myself)    ECHO:(reviewed by myself)    Chest Xray:(reviewed by myself)  Xr Abdomen (kub) (single Ap View)    Result Date: 7/19/2020  EXAMINATION: ONE SUPINE XRAY VIEW(S) OF THE ABDOMEN 7/19/2020 4:03 pm COMPARISON: CT dated July 3, 2020. HISTORY: ORDERING SYSTEM PROVIDED HISTORY: distended TECHNOLOGIST PROVIDED HISTORY: Reason for exam:->distended Acuity: Acute Type of Exam: Subsequent/Follow-up FINDINGS: No lines or tubes. Gas distended loops of small bowel are identified, measuring up to 4.0 cm. No obvious free air or pneumatosis. No acute osseous abnormality. Status post sternotomy. No acute osseous abnormality. 1. Gas distended loops of small bowel identified measuring up to 4.0 cm. Findings are nonspecific and may represent ileus, partial small bowel obstruction, or enteritis. Ct Hip Left W Wo Contrast    Result Date: 7/12/2020  EXAMINATION: CT OF THE LEFT HIP WITH AND WITHOUT CONTRAST 7/12/2020 6:32 pm TECHNIQUE: CT of the left hip was performed with and without the administration of intravenous contrast.  Multiplanar reformatted images are provided for review. Dose modulation, iterative reconstruction, and/or weight based adjustment of the mA/kV was utilized to reduce the radiation dose to as low as reasonably achievable. COMPARISON: CT abdomen/pelvis 07/03/2020. HISTORY ORDERING SYSTEM PROVIDED HISTORY: Pain over left greater trochanter, no known injury TECHNOLOGIST PROVIDED HISTORY: Reason for exam:->Pain over left greater trochanter, no known injury Reason for Exam: pain left hip, onset x  days Acuity: Acute Type of Exam: Initial Additional signs and symptoms: pain left hip, onset x  days Relevant Medical/Surgical History: pain left hip, onset x  days  confirmed order with Dr. Naeem Zamora, states wants with and without contrast,, nki, severe pain FINDINGS: INTRAPELVIC CONTENTS: No ascites or free air. Multiple borderline enlarged external iliac chain lymph nodes. BODY WALL/MUSCULATURE:  Multiple borderline enlarged left inguinal lymph nodes.   Left anterior pelvic and lateral hip body wall amorphous subcutaneous edema. No soft tissue gas foci or loculated fluid collection. No significant bursal distention. No intramuscular loculated fluid collection or extensive deep fascial edema. OSSEOUS STRUCTURES: Normal bone mineral density. Normal pubic symphysis alignment. The left hip demonstrates normal alignment with some mild nonuniform joint space narrowing and marginal osteophytosis. Superior acetabular rim subcortical cystic changes which may relate to chronic labral pathology. The left proximal femur is intact. There is no acute fracture. No suspicious sclerotic or lytic lesions. Mild greater trochanteric gluteal enthesopathy. 1. Nonspecific mild left anterolateral pelvic and hip subcutaneous soft tissue changes which may represent bland edema versus cellulitis. No evidence of a soft tissue abscess. 2. Borderline enlarged left iliac chain and inguinal lymph nodes which may be reactive in nature. 3. No evidence of bursitis or pyomyositis. 4. Normal left hip alignment with mild osteoarthritis. No acute osseous abnormality. 5. Findings suggesting left gluteal medius/minimus tendinopathy/enthesopathy. Nm Lung Scan Perfusion Only    Result Date: 7/19/2020  EXAMINATION: LUNG PERFUSION IMAGING 7/19/2020 3:23 am TECHNIQUE: Routine lung perfusion imaging was obtained after administration of 4.5 millicuries of [id number] MAA. Ventilation images were not obtained. COMPARISON: 01/24/2020 HISTORY: ORDERING SYSTEM PROVIDED HISTORY: pe? TECHNOLOGIST PROVIDED HISTORY: Reason for exam:->pe? Reason for Exam: sob Acuity: Acute Type of Exam: Initial Relevant Medical/Surgical History: hx of blood clots, SOB FINDINGS: Perfusion at the lung bases is somewhat heterogeneous, right greater than left, similar to the prior study. No new perfusion defect is identified. No change in the perfusion pattern since 01/24/2020.   Given that there is right basilar pleuroparenchymal disease and given the lack of ventilation imaging the post test chance for pulmonary embolus is felt to be intermediate. Xr Chest Portable    Result Date: 8/9/2020  EXAMINATION: ONE XRAY VIEW OF THE CHEST 8/9/2020 7:48 pm COMPARISON: July 26, 2020, July 18, 2020. HISTORY: ORDERING SYSTEM PROVIDED HISTORY: shortness of breath TECHNOLOGIST PROVIDED HISTORY: Reason for exam:->shortness of breath FINDINGS: The left lung is clear. There is a masslike density within the right lung base as on prior studies. The heart is enlarged. The patient is status post median sternotomy. Persistent masslike density within the right lung base, probably reflecting pseudomass due to loculated pleural effusion. Xr Chest Portable    Result Date: 7/18/2020  EXAMINATION: ONE XRAY VIEW OF THE CHEST 7/18/2020 8:46 pm COMPARISON: Chest x-ray July 5, 2020 HISTORY: ORDERING SYSTEM PROVIDED HISTORY: SOB TECHNOLOGIST PROVIDED HISTORY: Reason for exam:->SOB Reason for Exam: SOB Acuity: Acute Type of Exam: Initial Mechanism of Injury: SOB Relevant Medical/Surgical History: Patient states has had SOB that started this morning. Patient states has history of CHF FINDINGS: Cardiac silhouette is enlarged but stable. Postoperative changes of CABG procedure and median sternotomy. Loculated pleural effusion redemonstrated on the right with associated atelectasis. Chronic underlying interstitial changes of the lungs. The left lung appears clear. No pneumothorax. Osseous structures appear stable. 1. Persistent loculated right pleural effusion and associated atelectasis. Xr Chest 1 View    Result Date: 7/26/2020  EXAMINATION: ONE XRAY VIEW OF THE CHEST 7/26/2020 4:59 am COMPARISON: 7/18/2020 HISTORY: ORDERING SYSTEM PROVIDED HISTORY: increased dyspnea TECHNOLOGIST PROVIDED HISTORY: Reason for exam:->increased dyspnea Reason for Exam: increased dyspnea Acuity: Acute Type of Exam: Initial FINDINGS: Sternal wires are in place. The heart is enlarged.   Right basilar atelectasis or infiltrate persists with a small right pleural effusion. The left lung is clear. Persistent right basilar opacity suggesting atelectasis or infiltrate with small right pleural effusion. Us Abdomen Limited    Result Date: 7/21/2020  EXAMINATION: RIGHT UPPER QUADRANT ULTRASOUND 7/21/2020 4:15 am COMPARISON: None HISTORY: ORDERING SYSTEM PROVIDED HISTORY: RUQ US, elevated bilirubin TECHNOLOGIST PROVIDED HISTORY: Reason for exam:->RUQ US, elevated bilirubin Reason for Exam: Elevated bilirubin Acuity: Unknown Type of Exam: Ongoing FINDINGS: LIVER:  The liver demonstrates increased echogenicity without evidence of intrahepatic biliary ductal dilatation. BILIARY SYSTEM:  Gallbladder wall is significantly thickened and measures 7 mm. There is a negative sonographic Dsouza's sign. No stones or sludge are seen within the gallbladder lumen. Common bile duct is within normal limits measuring 3.5 mm. RIGHT KIDNEY: The right kidney is grossly unremarkable without evidence of hydronephrosis. PANCREAS:  Visualized portions of the pancreas are unremarkable. OTHER: No evidence of right upper quadrant ascites. 1. Gallbladder wall is thickened however there is no evidence of cholelithiasis or pericholecystic fluid. There is a negative sonographic Dsouza's sign. Findings could be related to chronic liver disease. 2. Diffuse fatty infiltration of the liver. Vl Dup Lower Extremity Venous Left    Result Date: 8/9/2020  EXAMINATION: DUPLEX VENOUS ULTRASOUND OF THE LEFT LOWER EXTREMITY 8/9/2020 10:09 pm TECHNIQUE: Duplex ultrasound and Doppler images were obtained of the left lower extremity. COMPARISON: None.  HISTORY: ORDERING SYSTEM PROVIDED HISTORY: swelling/chf TECHNOLOGIST PROVIDED HISTORY: Reason for exam:->swelling/chf Reason for Exam: Severe pitting edema left leg and pain Acuity: Acute Type of Exam: Initial Additional signs and symptoms: Multiple previous / hx DVT FINDINGS: The visualized veins of the left lower extremity are patent and free of echogenic thrombus. The veins are normally compressible and have normal phasic flow. No evidence of DVT in the left lower extremity. The patient refused evaluation of the right leg. Vl Dup Lower Extremity Venous Left    Result Date: 7/19/2020  EXAMINATION: DUPLEX VENOUS ULTRASOUND OF THE LEFT LOWER EXTREMITY 7/18/2020 11:19 pm TECHNIQUE: Duplex ultrasound and Doppler images were obtained of the left lower extremity. COMPARISON: None. HISTORY: ORDERING SYSTEM PROVIDED HISTORY: pain TECHNOLOGIST PROVIDED HISTORY: Reason for exam:->pain Reason for Exam: swelling; pain; hx of dvt Type of Exam: Initial FINDINGS: The visualized veins of the left lower extremity are patent and free of echogenic thrombus. The veins are normally compressible and have normal phasic flow. No evidence of DVT in the left lower extremity. All labs, medications and tests reviewed by myself including data  from outside source , patient and available family . Continue all other medications of all above medical condition listed as is. Impression:  Active Problems:    Coronary artery disease involving coronary bypass graft of native heart without angina pectoris    Tobacco abuse    Systolic and diastolic CHF w/reduced LV function, NYHA class 4 (HCC)    Ischemic cardiomyopathy    Heart failure (HCC)    Left ventricular systolic dysfunction  Resolved Problems:    * No resolved hospital problems. *      Assessment: 52 y. o.year old with PMH of  has a past medical history of CAD (coronary artery disease), CHF (congestive heart failure) ([address] COPD (chronic obstructive pulmonary disease) ([address] Depression, Drug abuse ([address] HIGH CHOLESTEROL, Hypertension, MI (myocardial infarction) ([address] and S/P coronary artery bypass graft x 4.       Plan and Recommendations:    Acute decompensated heart failure due to noncompliance history of ICD not an ACE or arb due to renal failure  CHF: Acute Systolic/ Diastolic decompensated heart failure. Appears to be volume overloaded . Agree with diuresis. We can try IV bumex 2mg  BID. Depending on response we can titrate diuretics accordingly. HTN: stable, continue present medications  Abnormal troponin level continue medical management probably secondary to renal failure and ischemic cardiomyopathy in setting of decompensated heart failure  Restart home meds  History of DVT on Xarelto although noncompliant with it  DVT prophylaxis if no contraindication  6. Dyslipidemia: continue statins           Thank you  much for consult and giving us the opportunity in contributing in the care of this patient. Please feel free to call me for any questions.        Porfirio Shone, MD, 8/10/2020 6:40 PM

## 2020-08-10 NOTE — H&P
XL) 25 MG extended release tablet Take 1 tablet by mouth daily 5/22/20   Alden Dietrich MD       Allergies:    Patient has no known allergies. Social History:    TOBACCO:   reports that he has been smoking cigarettes. He has a 20.00 pack-year smoking history. He has quit using smokeless tobacco.  ETOH:   reports no history of alcohol use. Family History:        Problem Relation Age of Onset    Cancer Father     Heart Failure Father     Heart Disease Father     Diabetes Mother     Heart Disease Mother        Vitals:   Vitals:    08/09/20 2017 08/09/20 2132 08/09/20 2142 08/09/20 2147   BP: 112/80 (!) 83/55     Pulse: 108 102  102   Resp:       Temp:       TempSrc:       SpO2: 100% 100% 100%    Weight:       Height:           Physical Exam  GEN -Awake. NAD. Appears given age. EYES -PERRLA. No scleral erythema, discharge, or conjunctivitis. HENT -MM are moist. Oral pharynx without exudates, no evidence of thrush. NECK -Supple, no apparent thyromegaly or masses. RESP -faint expiratory wheezing present. Symmetric chest movement while on room air. C/V -S1/S2 auscultated. RRR without appreciable M/R/G. No JVD or carotid bruits. Peripheral pulses equal bilaterally and palpable. Cap refill <3 sec. 2+ pitting edema to bilateral lower extremities, normal pulses. GI -Abdomen is soft non distended and without significant tenderness to palpation. + BS. No masses or guarding.  -No CVA/ flank tenderness. Hansen catheter is not present. LYMPH-No palpable cervical lymphadenopathy and no hepatosplenomegaly. No petechiae or ecchymoses. MS -No gross joint deformities. SKIN -Normal coloration, warm, dry. No significant tenderness to palpation of calves. Veronica Faes, alert, oriented x  person, place, time, situation. Cranial nerves appear grossly intact, normal speech, no lateralizing weakness. PSYC- Appropriate affect. Imaging: Reviewed.     Xr Abdomen (kub) (single Ap View)    Result Date: 7/19/2020  EXAMINATION: ONE SUPINE XRAY VIEW(S) OF THE ABDOMEN 7/19/2020 4:03 pm COMPARISON: CT dated July 3, 2020. HISTORY: ORDERING SYSTEM PROVIDED HISTORY: distended TECHNOLOGIST PROVIDED HISTORY: Reason for exam:->distended Acuity: Acute Type of Exam: Subsequent/Follow-up FINDINGS: No lines or tubes. Gas distended loops of small bowel are identified, measuring up to 4.0 cm. No obvious free air or pneumatosis. No acute osseous abnormality. Status post sternotomy. No acute osseous abnormality. 1. Gas distended loops of small bowel identified measuring up to 4.0 cm. Findings are nonspecific and may represent ileus, partial small bowel obstruction, or enteritis. Ct Hip Left W Wo Contrast    Result Date: 7/12/2020  EXAMINATION: CT OF THE LEFT HIP WITH AND WITHOUT CONTRAST 7/12/2020 6:32 pm TECHNIQUE: CT of the left hip was performed with and without the administration of intravenous contrast.  Multiplanar reformatted images are provided for review. Dose modulation, iterative reconstruction, and/or weight based adjustment of the mA/kV was utilized to reduce the radiation dose to as low as reasonably achievable. COMPARISON: CT abdomen/pelvis 07/03/2020. HISTORY ORDERING SYSTEM PROVIDED HISTORY: Pain over left greater trochanter, no known injury TECHNOLOGIST PROVIDED HISTORY: Reason for exam:->Pain over left greater trochanter, no known injury Reason for Exam: pain left hip, onset x  days Acuity: Acute Type of Exam: Initial Additional signs and symptoms: pain left hip, onset x  days Relevant Medical/Surgical History: pain left hip, onset x  days  confirmed order with Dr. Jennifer Kilpatrick, states wants with and without contrast,, nki, severe pain FINDINGS: INTRAPELVIC CONTENTS: No ascites or free air. Multiple borderline enlarged external iliac chain lymph nodes. BODY WALL/MUSCULATURE:  Multiple borderline enlarged left inguinal lymph nodes. Left anterior pelvic and lateral hip body wall amorphous subcutaneous edema.   No for pulmonary embolus is felt to be intermediate. Xr Chest Portable    Result Date: 8/9/2020  EXAMINATION: ONE XRAY VIEW OF THE CHEST 8/9/2020 7:48 pm COMPARISON: July 26, 2020, July 18, 2020. HISTORY: ORDERING SYSTEM PROVIDED HISTORY: shortness of breath TECHNOLOGIST PROVIDED HISTORY: Reason for exam:->shortness of breath FINDINGS: The left lung is clear. There is a masslike density within the right lung base as on prior studies. The heart is enlarged. The patient is status post median sternotomy. Persistent masslike density within the right lung base, probably reflecting pseudomass due to loculated pleural effusion. Xr Chest Portable    Result Date: 7/18/2020  EXAMINATION: ONE XRAY VIEW OF THE CHEST 7/18/2020 8:46 pm COMPARISON: Chest x-ray July 5, 2020 HISTORY: ORDERING SYSTEM PROVIDED HISTORY: SOB TECHNOLOGIST PROVIDED HISTORY: Reason for exam:->SOB Reason for Exam: SOB Acuity: Acute Type of Exam: Initial Mechanism of Injury: SOB Relevant Medical/Surgical History: Patient states has had SOB that started this morning. Patient states has history of CHF FINDINGS: Cardiac silhouette is enlarged but stable. Postoperative changes of CABG procedure and median sternotomy. Loculated pleural effusion redemonstrated on the right with associated atelectasis. Chronic underlying interstitial changes of the lungs. The left lung appears clear. No pneumothorax. Osseous structures appear stable. 1. Persistent loculated right pleural effusion and associated atelectasis. Xr Chest 1 View    Result Date: 7/26/2020  EXAMINATION: ONE XRAY VIEW OF THE CHEST 7/26/2020 4:59 am COMPARISON: 7/18/2020 HISTORY: ORDERING SYSTEM PROVIDED HISTORY: increased dyspnea TECHNOLOGIST PROVIDED HISTORY: Reason for exam:->increased dyspnea Reason for Exam: increased dyspnea Acuity: Acute Type of Exam: Initial FINDINGS: Sternal wires are in place. The heart is enlarged.   Right basilar atelectasis or infiltrate persists with a small right pleural effusion. The left lung is clear. Persistent right basilar opacity suggesting atelectasis or infiltrate with small right pleural effusion. Us Abdomen Limited    Result Date: 7/21/2020  EXAMINATION: RIGHT UPPER QUADRANT ULTRASOUND 7/21/2020 4:15 am COMPARISON: None HISTORY: ORDERING SYSTEM PROVIDED HISTORY: RUQ US, elevated bilirubin TECHNOLOGIST PROVIDED HISTORY: Reason for exam:->RUQ US, elevated bilirubin Reason for Exam: Elevated bilirubin Acuity: Unknown Type of Exam: Ongoing FINDINGS: LIVER:  The liver demonstrates increased echogenicity without evidence of intrahepatic biliary ductal dilatation. BILIARY SYSTEM:  Gallbladder wall is significantly thickened and measures 7 mm. There is a negative sonographic Dsouza's sign. No stones or sludge are seen within the gallbladder lumen. Common bile duct is within normal limits measuring 3.5 mm. RIGHT KIDNEY: The right kidney is grossly unremarkable without evidence of hydronephrosis. PANCREAS:  Visualized portions of the pancreas are unremarkable. OTHER: No evidence of right upper quadrant ascites. 1. Gallbladder wall is thickened however there is no evidence of cholelithiasis or pericholecystic fluid. There is a negative sonographic Dsouza's sign. Findings could be related to chronic liver disease. 2. Diffuse fatty infiltration of the liver. Vl Dup Lower Extremity Venous Left    Result Date: 7/19/2020  EXAMINATION: DUPLEX VENOUS ULTRASOUND OF THE LEFT LOWER EXTREMITY 7/18/2020 11:19 pm TECHNIQUE: Duplex ultrasound and Doppler images were obtained of the left lower extremity. COMPARISON: None. HISTORY: ORDERING SYSTEM PROVIDED HISTORY: pain TECHNOLOGIST PROVIDED HISTORY: Reason for exam:->pain Reason for Exam: swelling; pain; hx of dvt Type of Exam: Initial FINDINGS: The visualized veins of the left lower extremity are patent and free of echogenic thrombus. The veins are normally compressible and have normal phasic flow.      No and cover with medium dose SSI   Hold PO medications while inpatient   Hypoglycemic protocol    History of opioid abuse    Noncompliance   Reinforced need to follow low sodium diet and monitor fluid intake    DVT Prophylaxis: xarelto  Diet: low sodium  Code Status: full    KRISSY Booker-VERNA  [facility]

## 2020-08-10 NOTE — PROGRESS NOTES
Pt arrived to room on 2L SpO2 via nasal cannula. Pt does not normally wear O2 and does not have any at home. He notes he also does not wear a bipap/cpap but asked his doctor for one once before and was denied. Pt SpO2 99% on 2L after ambulation from ED bed to room bed. Pt states pulse ox is wrong because he \"can't breathe\" and it isn't showing the lack of O2 that his lungs are getting. . pt insists he needs to wear O2 at this time. Will continue to monitor.

## 2020-08-10 NOTE — ED PROVIDER NOTES
[facility]      TRIAGE CHIEF COMPLAINT:   Shortness of Breath and Congestive Heart Failure      Koi:  Hilton  is a 52 y.o. male that presents with complaint of cough shortness of breath fatigue leg swelling bilaterally CHF. Is on no is on diuretics states increasing lower extremity swelling pain cough shortness of breath. Is requesting diuresis denies any chest pain abdominal pain no fevers denies any coronavirus exposure no nausea vomiting diarrhea. No other questions or concerns. REVIEW OF SYSTEMS:  At least 10 systems reviewed and otherwise acutely negative except as in the [address] Review of Systems   Constitutional: Positive for fatigue. HENT: Negative. Eyes: Negative. Respiratory: Positive for cough and shortness of breath. Cardiovascular: Positive for leg swelling. Gastrointestinal: Negative. Endocrine: Negative. Genitourinary: Negative. Musculoskeletal: Positive for arthralgias and myalgias. Skin: Negative. Allergic/Immunologic: Negative. Neurological: Negative. Hematological: Negative. Psychiatric/Behavioral: Negative. All other systems reviewed and are negative.       Past Medical History:   Diagnosis Date    CAD (coronary artery disease)     CHF (congestive heart failure) (HCC)     COPD (chronic obstructive pulmonary disease) (HCC)     Depression     Drug abuse ([facility] [address]     HIGH CHOLESTEROL     Hypertension     MI (myocardial infarction) ([facility] [address] 2011    S/P coronary artery bypass graft x 4 2011    CABG x 4 Dr Sanford Rizo     Past Surgical History:   Procedure Laterality Date    BYPASS GRAFT  04/10/2011    CABG x 4 Dr Beth Grace  11/2010     4 stents    LEG SURGERY       Family History   Problem Relation Age of Onset    Cancer Father     Heart Failure Father     Heart Disease Father     Diabetes Mother     Heart Disease Mother      Social History     Socioeconomic History    Marital status:      Spouse name: Not on file    Number of children: Not on file    Years of education: Not on file    Highest education level: Not on file   Occupational History    Not on file   Social Needs    Financial resource strain: Not on file    Food insecurity     Worry: Not on file     Inability: Not on file    Transportation needs     Medical: Not on file     Non-medical: Not on file   Tobacco Use    Smoking status: Current Every Day Smoker     Packs/day: 1.00     Years: 20.00     Pack years: 20.00     Types: Cigarettes    Smokeless tobacco: Former User    Tobacco comment: Reviewed 10/9/17   Substance and Sexual Activity    Alcohol use: No     Alcohol/week: 0.0 standard drinks    Drug use: No     Comment: march 2018 states he quit    Sexual activity: Not Currently   Lifestyle    Physical activity     Days per week: Not on file     Minutes per session: Not on file    Stress: Not on file   Relationships    Social connections     Talks on phone: Not on file     Gets together: Not on file     Attends Confucianist service: Not on file     Active member of club or organization: Not on file     Attends meetings of clubs or organizations: Not on file     Relationship status: Not on file    Intimate partner violence     Fear of current or ex partner: Not on file     Emotionally abused: Not on file     Physically abused: Not on file     Forced sexual activity: Not on file   Other Topics Concern    Not on file   Social History Narrative    Not on file     Current Facility-Administered Medications   Medication Dose Route Frequency Provider Last Rate Last Dose    0.9 % sodium chloride infusion   Intravenous Continuous Francesca John,  mL/hr at 08/09/20 2226       Current Outpatient Medications   Medication Sig Dispense Refill    insulin glargine (LANTUS SOLOSTAR) 100 UNIT/ML injection pen Inject 15 Units into the skin nightly 5 pen 3    insulin lispro, 1 Unit Dial, (HUMALOG KWIKPEN) 100 UNIT/ML SOPN Inject 10 Units into the skin 3 times daily (before meals) 5 pen 3    blood glucose monitor kit and supplies Dispense sufficient amount for indicated testing frequency plus additional to accommodate PRN testing needs. Dispense all needed supplies to include: monitor, strips, lancing device, lancets, control solutions, alcohol swabs. 1 kit 0    blood glucose monitor strips Test 2 times a day & as needed for symptoms of irregular blood glucose. Dispense sufficient amount for indicated testing frequency plus additional to accommodate PRN testing needs.  100 strip 0    FreeStyle Lancets MISC 1 each by Does not apply route daily 100 each 3    rivaroxaban (XARELTO) 20 MG TABS tablet Take 20 mg by mouth      bumetanide (BUMEX) 2 MG tablet Take 1 tablet by mouth 2 times daily 60 tablet 2    pantoprazole (PROTONIX) 40 MG tablet Take 1 tablet by mouth 2 times daily (before meals) 60 tablet 0    aspirin 81 MG EC tablet Take 1 tablet by mouth daily 30 tablet 0    ipratropium-albuterol (DUONEB) 0.5-2.5 (3) MG/3ML SOLN nebulizer solution Inhale 3 mLs into the lungs 4 times daily 360 mL 1    atorvastatin (LIPITOR) 40 MG tablet Take 1 tablet by mouth daily 30 tablet 0    metoprolol succinate (TOPROL XL) 25 MG extended release tablet Take 1 tablet by mouth daily 30 tablet 2      No Known Allergies  Current Facility-Administered Medications   Medication Dose Route Frequency Provider Last Rate Last Dose    0.9 % sodium chloride infusion   Intravenous Continuous Latisha Speed,  mL/hr at 08/09/20 2226       Current Outpatient Medications   Medication Sig Dispense Refill    insulin glargine (LANTUS SOLOSTAR) 100 UNIT/ML injection pen Inject 15 Units into the skin nightly 5 pen 3    insulin lispro, 1 Unit Dial, (HUMALOG KWIKPEN) 100 UNIT/ML SOPN Inject 10 Units into the skin 3 times daily (before meals) 5 pen 3    blood glucose monitor kit and supplies Dispense sufficient amount for indicated testing frequency plus additional to accommodate PRN testing needs. Dispense all needed supplies to include: monitor, strips, lancing device, lancets, control solutions, alcohol swabs. 1 kit 0    blood glucose monitor strips Test 2 times a day & as needed for symptoms of irregular blood glucose. Dispense sufficient amount for indicated testing frequency plus additional to accommodate PRN testing needs. 100 strip 0    FreeStyle Lancets MISC 1 each by Does not apply route daily 100 each 3    rivaroxaban (XARELTO) 20 MG TABS tablet Take 20 mg by mouth      bumetanide (BUMEX) 2 MG tablet Take 1 tablet by mouth 2 times daily 60 tablet 2    pantoprazole (PROTONIX) 40 MG tablet Take 1 tablet by mouth 2 times daily (before meals) 60 tablet 0    aspirin 81 MG EC tablet Take 1 tablet by mouth daily 30 tablet 0    ipratropium-albuterol (DUONEB) 0.5-2.5 (3) MG/3ML SOLN nebulizer solution Inhale 3 mLs into the lungs 4 times daily 360 mL 1    atorvastatin (LIPITOR) 40 MG tablet Take 1 tablet by mouth daily 30 tablet 0    metoprolol succinate (TOPROL XL) 25 MG extended release tablet Take 1 tablet by mouth daily 30 tablet 2       Nursing Notes Reviewed    VITAL SIGNS:  ED Triage Vitals [08/09/20 1943]   Enc Vitals Group      /71      Pulse 107      Resp 20      Temp 98.4 °F (36.9 °C)      Temp Source Oral      SpO2 100 %      Weight 200 lb (90.7 kg)      Height 5' 11\" (1.803 m)      Head Circumference       Peak Flow       Pain Score       Pain Loc       Pain Edu? Excl. in [address]? PHYSICAL EXAM:  Physical Exam  Vitals signs and nursing note reviewed. Constitutional:       General: He is not in acute distress. Appearance: Normal appearance. He is well-developed and well-groomed. He is not ill-appearing, toxic-appearing or diaphoretic. HENT:      Head: Normocephalic and atraumatic. Right Ear: External ear normal.      Left Ear: External ear normal.      Nose: No congestion or rhinorrhea.       Mouth/Throat:      Pharynx: No oropharyngeal exudate or posterior oropharyngeal erythema. Eyes:      General: No scleral icterus. Right eye: No discharge. Left eye: No discharge. Extraocular Movements: Extraocular movements intact. Conjunctiva/sclera: Conjunctivae normal.      Pupils: Pupils are equal, round, and reactive to light. Neck:      Musculoskeletal: Full passive range of motion without pain and normal range of motion. Normal range of motion. No edema, erythema, neck rigidity, crepitus, injury, pain with movement or torticollis. Vascular: No JVD. Trachea: Phonation normal.   Cardiovascular:      Rate and Rhythm: Regular rhythm. Tachycardia present. Pulses: Normal pulses. Heart sounds: Normal heart sounds. No murmur. No gallop. Pulmonary:      Effort: Pulmonary effort is normal. No respiratory distress. Breath sounds: No stridor. Rhonchi and rales present. No wheezing. Abdominal:      General: Bowel sounds are normal. There is no distension. Palpations: Abdomen is soft. There is no mass. Tenderness: There is no abdominal tenderness. There is no guarding or rebound. Negative signs include Dsouza's sign, Rovsing's sign and McBurney's sign. Hernia: No hernia is present. Musculoskeletal: Normal range of motion. General: Swelling and tenderness present. No deformity or signs of injury. Right lower leg: Edema present. Left lower leg: Edema present. Skin:     General: Skin is warm. Coloration: Skin is not jaundiced or pale. Findings: No bruising, erythema, lesion or rash. Neurological:      General: No focal deficit present. Mental Status: He is alert and oriented to person, place, and time. GCS: GCS eye subscore is 4. GCS verbal subscore is 5. GCS motor subscore is 6. Cranial Nerves: Cranial nerves are intact. No cranial nerve deficit, dysarthria or facial asymmetry. Sensory: Sensation is intact. No sensory deficit. Non- 59 (L) >60 mL/min/1.73m2    GFR African American >60 >60 mL/min/1.73m2    Anion Gap 13 4 - 16   Troponin   Result Value Ref Range    Troponin T 0.033 (H) <0.01 NG/ML   Brain Natriuretic Peptide   Result Value Ref Range    Pro-BNP 3,764 (H) <300 PG/ML   Lipase   Result Value Ref Range    Lipase 32 13 - 60 IU/L   Lactic Acid, Plasma   Result Value Ref Range    Lactate 1.6 0.4 - 2.0 mMOL/L   CK   Result Value Ref Range    Total  38 - 174 IU/L   TSH without Reflex   Result Value Ref Range    TSH, High Sensitivity 5.030 (H) 0.270 - 4.20 uIu/ml   EKG 12 Lead   Result Value Ref Range    Ventricular Rate 109 BPM    Atrial Rate 109 BPM    P-R Interval 152 ms    QRS Duration 128 ms    Q-T Interval 372 ms    QTc Calculation (Bazett) 500 ms    P Axis 75 degrees    R Axis 67 degrees    T Axis 99 degrees    Diagnosis       Sinus tachycardia  Possible Left atrial enlargement  Nonspecific intraventricular block  Cannot rule out Septal infarct , age undetermined  Lateral infarct , age undetermined  Abnormal ECG  When compared with ECG of 18-JUL-2020 20:36,  Minimal criteria for Septal infarct are now present  No significant change was found          Radiographs (if obtained):  [] The following radiograph was interpreted by myself in the absence of a radiologist:  [x] Radiologist's Report Reviewed:    CXR    Xr Abdomen (kub) (single Ap View)    Result Date: 7/19/2020  EXAMINATION: ONE SUPINE XRAY VIEW(S) OF THE ABDOMEN 7/19/2020 4:03 pm COMPARISON: CT dated July 3, 2020. HISTORY: ORDERING SYSTEM PROVIDED HISTORY: distended TECHNOLOGIST PROVIDED HISTORY: Reason for exam:->distended Acuity: Acute Type of Exam: Subsequent/Follow-up FINDINGS: No lines or tubes. Gas distended loops of small bowel are identified, measuring up to 4.0 cm. No obvious free air or pneumatosis. No acute osseous abnormality. Status post sternotomy. No acute osseous abnormality.      1. Gas distended loops of small bowel identified measuring up to 4.0 cm. Findings are nonspecific and may represent ileus, partial small bowel obstruction, or enteritis. Ct Hip Left W Wo Contrast    Result Date: 7/12/2020  EXAMINATION: CT OF THE LEFT HIP WITH AND WITHOUT CONTRAST 7/12/2020 6:32 pm TECHNIQUE: CT of the left hip was performed with and without the administration of intravenous contrast.  Multiplanar reformatted images are provided for review. Dose modulation, iterative reconstruction, and/or weight based adjustment of the mA/kV was utilized to reduce the radiation dose to as low as reasonably achievable. COMPARISON: CT abdomen/pelvis 07/03/2020. HISTORY ORDERING SYSTEM PROVIDED HISTORY: Pain over left greater trochanter, no known injury TECHNOLOGIST PROVIDED HISTORY: Reason for exam:->Pain over left greater trochanter, no known injury Reason for Exam: pain left hip, onset x  days Acuity: Acute Type of Exam: Initial Additional signs and symptoms: pain left hip, onset x  days Relevant Medical/Surgical History: pain left hip, onset x  days  confirmed order with Dr. Jaciel Atwood, states wants with and without contrast,, nki, severe pain FINDINGS: INTRAPELVIC CONTENTS: No ascites or free air. Multiple borderline enlarged external iliac chain lymph nodes. BODY WALL/MUSCULATURE:  Multiple borderline enlarged left inguinal lymph nodes. Left anterior pelvic and lateral hip body wall amorphous subcutaneous edema. No soft tissue gas foci or loculated fluid collection. No significant bursal distention. No intramuscular loculated fluid collection or extensive deep fascial edema. OSSEOUS STRUCTURES: Normal bone mineral density. Normal pubic symphysis alignment. The left hip demonstrates normal alignment with some mild nonuniform joint space narrowing and marginal osteophytosis. Superior acetabular rim subcortical cystic changes which may relate to chronic labral pathology. The left proximal femur is intact. There is no acute fracture.  No suspicious sclerotic or lytic lesions. Mild greater trochanteric gluteal enthesopathy. 1. Nonspecific mild left anterolateral pelvic and hip subcutaneous soft tissue changes which may represent bland edema versus cellulitis. No evidence of a soft tissue abscess. 2. Borderline enlarged left iliac chain and inguinal lymph nodes which may be reactive in nature. 3. No evidence of bursitis or pyomyositis. 4. Normal left hip alignment with mild osteoarthritis. No acute osseous abnormality. 5. Findings suggesting left gluteal medius/minimus tendinopathy/enthesopathy. Nm Lung Scan Perfusion Only    Result Date: 7/19/2020  EXAMINATION: LUNG PERFUSION IMAGING 7/19/2020 3:23 am TECHNIQUE: Routine lung perfusion imaging was obtained after administration of 4.5 millicuries of SW-48N MAA. Ventilation images were not obtained. COMPARISON: 01/24/2020 HISTORY: ORDERING SYSTEM PROVIDED HISTORY: pe? TECHNOLOGIST PROVIDED HISTORY: Reason for exam:->pe? Reason for Exam: sob Acuity: Acute Type of Exam: Initial Relevant Medical/Surgical History: hx of blood clots, SOB FINDINGS: Perfusion at the lung bases is somewhat heterogeneous, right greater than left, similar to the prior study. No new perfusion defect is identified. No change in the perfusion pattern since 01/24/2020. Given that there is right basilar pleuroparenchymal disease and given the lack of ventilation imaging the post test chance for pulmonary embolus is felt to be intermediate. Xr Chest Portable    Result Date: 7/18/2020  EXAMINATION: ONE XRAY VIEW OF THE CHEST 7/18/2020 8:46 pm COMPARISON: Chest x-ray July 5, 2020 HISTORY: ORDERING SYSTEM PROVIDED HISTORY: SOB TECHNOLOGIST PROVIDED HISTORY: Reason for exam:->SOB Reason for Exam: SOB Acuity: Acute Type of Exam: Initial Mechanism of Injury: SOB Relevant Medical/Surgical History: Patient states has had SOB that started this morning.  Patient states has history of CHF FINDINGS: Cardiac silhouette is enlarged but stable. Postoperative changes of CABG procedure and median sternotomy. Loculated pleural effusion redemonstrated on the right with associated atelectasis. Chronic underlying interstitial changes of the lungs. The left lung appears clear. No pneumothorax. Osseous structures appear stable. 1. Persistent loculated right pleural effusion and associated atelectasis. Xr Chest 1 View    Result Date: 7/26/2020  EXAMINATION: ONE XRAY VIEW OF THE CHEST 7/26/2020 4:59 am COMPARISON: 7/18/2020 HISTORY: ORDERING SYSTEM PROVIDED HISTORY: increased dyspnea TECHNOLOGIST PROVIDED HISTORY: Reason for exam:->increased dyspnea Reason for Exam: increased dyspnea Acuity: Acute Type of Exam: Initial FINDINGS: Sternal wires are in place. The heart is enlarged. Right basilar atelectasis or infiltrate persists with a small right pleural effusion. The left lung is clear. Persistent right basilar opacity suggesting atelectasis or infiltrate with small right pleural effusion. Us Abdomen Limited    Result Date: 7/21/2020  EXAMINATION: RIGHT UPPER QUADRANT ULTRASOUND 7/21/2020 4:15 am COMPARISON: None HISTORY: ORDERING SYSTEM PROVIDED HISTORY: RUQ US, elevated bilirubin TECHNOLOGIST PROVIDED HISTORY: Reason for exam:->RUQ US, elevated bilirubin Reason for Exam: Elevated bilirubin Acuity: Unknown Type of Exam: Ongoing FINDINGS: LIVER:  The liver demonstrates increased echogenicity without evidence of intrahepatic biliary ductal dilatation. BILIARY SYSTEM:  Gallbladder wall is significantly thickened and measures 7 mm. There is a negative sonographic Dsouza's sign. No stones or sludge are seen within the gallbladder lumen. Common bile duct is within normal limits measuring 3.5 mm. RIGHT KIDNEY: The right kidney is grossly unremarkable without evidence of hydronephrosis. PANCREAS:  Visualized portions of the pancreas are unremarkable. OTHER: No evidence of right upper quadrant ascites.      1. Gallbladder wall is thickened however there is no evidence of cholelithiasis or pericholecystic fluid. There is a negative sonographic Dsouza's sign. Findings could be related to chronic liver disease. 2. Diffuse fatty infiltration of the liver. Vl Dup Lower Extremity Venous Left    Result Date: 7/19/2020  EXAMINATION: DUPLEX VENOUS ULTRASOUND OF THE LEFT LOWER EXTREMITY 7/18/2020 11:19 pm TECHNIQUE: Duplex ultrasound and Doppler images were obtained of the left lower extremity. COMPARISON: None. HISTORY: ORDERING SYSTEM PROVIDED HISTORY: pain TECHNOLOGIST PROVIDED HISTORY: Reason for exam:->pain Reason for Exam: swelling; pain; hx of dvt Type of Exam: Initial FINDINGS: The visualized veins of the left lower extremity are patent and free of echogenic thrombus. The veins are normally compressible and have normal phasic flow. No evidence of DVT in the left lower extremity. EKG (if obtained): (All EKG's are interpreted by myself in the absence of a cardiologist)    12 lead EKG per my interpretation:  Sinus Tachycardia 109  Axis is   Normal  QTc is  500  There is no specific T wave changes appreciated. There is no specific ST wave changes appreciated. Prior EKG to compare with was not available       MDM:    Patient here with cough shortness of breath wheezing CHF bilateral lower extremity pain and swelling. History of CHF COPD he does have peripheral edema ongoing on arrival.  He is mildly tachycardic will get labs imaging of his legs, chest chest x-ray is chronically abnormal.  He is on Xarelto. Patient otherwise stable holding off on Lasix at this time as his sodium is 126 I do not want to make him any more hyponatremic. We will give him low-dose IV fluids will consult hospital medicine otherwise patient stable.   Troponin levels elevated BNP is elevated has CHF chronically so patient admitted to hospital medicine awaiting ultrasound x-ray has chronic abnormality holding off on antibiotics I will defer to hospital medicine

## 2020-08-11 ENCOUNTER — APPOINTMENT (OUTPATIENT)
Dept: ULTRASOUND IMAGING | Age: 50
DRG: 291 | End: 2020-08-11
Payer: MEDICARE

## 2020-08-11 LAB
ANION GAP SERPL CALCULATED.3IONS-SCNC: 13 MMOL/L (ref 4–16)
BUN BLDV-MCNC: 26 MG/DL (ref 6–23)
CALCIUM SERPL-MCNC: 9.2 MG/DL (ref 8.3–10.6)
CHLORIDE BLD-SCNC: 90 MMOL/L (ref 99–110)
CO2: 25 MMOL/L (ref 21–32)
CREAT SERPL-MCNC: 1.6 MG/DL (ref 0.9–1.3)
GFR AFRICAN AMERICAN: 56 ML/MIN/1.73M2
GFR NON-AFRICAN AMERICAN: 46 ML/MIN/1.73M2
GLUCOSE BLD-MCNC: 109 MG/DL (ref 70–99)
GLUCOSE BLD-MCNC: 111 MG/DL (ref 70–99)
GLUCOSE BLD-MCNC: 130 MG/DL (ref 70–99)
GLUCOSE BLD-MCNC: 140 MG/DL (ref 70–99)
GLUCOSE BLD-MCNC: 171 MG/DL (ref 70–99)
GLUCOSE BLD-MCNC: 96 MG/DL (ref 70–99)
MAGNESIUM: 2.4 MG/DL (ref 1.8–2.4)
POTASSIUM SERPL-SCNC: 3.7 MMOL/L (ref 3.5–5.1)
SODIUM BLD-SCNC: 128 MMOL/L (ref 135–145)

## 2020-08-11 PROCEDURE — 80048 BASIC METABOLIC PNL TOTAL CA: CPT

## 2020-08-11 PROCEDURE — 94640 AIRWAY INHALATION TREATMENT: CPT

## 2020-08-11 PROCEDURE — 6370000000 HC RX 637 (ALT 250 FOR IP): Performed by: INTERNAL MEDICINE

## 2020-08-11 PROCEDURE — 2500000003 HC RX 250 WO HCPCS: Performed by: FAMILY MEDICINE

## 2020-08-11 PROCEDURE — 76536 US EXAM OF HEAD AND NECK: CPT

## 2020-08-11 PROCEDURE — 36415 COLL VENOUS BLD VENIPUNCTURE: CPT

## 2020-08-11 PROCEDURE — 2700000000 HC OXYGEN THERAPY PER DAY

## 2020-08-11 PROCEDURE — 86376 MICROSOMAL ANTIBODY EACH: CPT

## 2020-08-11 PROCEDURE — 6370000000 HC RX 637 (ALT 250 FOR IP): Performed by: PHYSICIAN ASSISTANT

## 2020-08-11 PROCEDURE — 82962 GLUCOSE BLOOD TEST: CPT

## 2020-08-11 PROCEDURE — 94761 N-INVAS EAR/PLS OXIMETRY MLT: CPT

## 2020-08-11 PROCEDURE — 1200000000 HC SEMI PRIVATE

## 2020-08-11 PROCEDURE — 83735 ASSAY OF MAGNESIUM: CPT

## 2020-08-11 PROCEDURE — 94660 CPAP INITIATION&MGMT: CPT

## 2020-08-11 PROCEDURE — 86800 THYROGLOBULIN ANTIBODY: CPT

## 2020-08-11 PROCEDURE — 99232 SBSQ HOSP IP/OBS MODERATE 35: CPT | Performed by: NURSE PRACTITIONER

## 2020-08-11 PROCEDURE — 2580000003 HC RX 258: Performed by: PHYSICIAN ASSISTANT

## 2020-08-11 RX ORDER — METOPROLOL SUCCINATE 25 MG/1
12.5 TABLET, EXTENDED RELEASE ORAL DAILY
Status: DISCONTINUED | OUTPATIENT
Start: 2020-08-12 | End: 2020-08-12 | Stop reason: HOSPADM

## 2020-08-11 RX ORDER — INSULIN GLARGINE 100 [IU]/ML
10 INJECTION, SOLUTION SUBCUTANEOUS NIGHTLY
Status: DISCONTINUED | OUTPATIENT
Start: 2020-08-11 | End: 2020-08-12 | Stop reason: HOSPADM

## 2020-08-11 RX ORDER — ALBUTEROL SULFATE 90 UG/1
2 AEROSOL, METERED RESPIRATORY (INHALATION) 4 TIMES DAILY
Status: DISCONTINUED | OUTPATIENT
Start: 2020-08-11 | End: 2020-08-12 | Stop reason: HOSPADM

## 2020-08-11 RX ADMIN — SODIUM CHLORIDE, PRESERVATIVE FREE 10 ML: 5 INJECTION INTRAVENOUS at 08:34

## 2020-08-11 RX ADMIN — ATORVASTATIN CALCIUM 40 MG: 40 TABLET, FILM COATED ORAL at 08:32

## 2020-08-11 RX ADMIN — Medication 2 PUFF: at 14:23

## 2020-08-11 RX ADMIN — Medication 2 PUFF: at 20:17

## 2020-08-11 RX ADMIN — SODIUM CHLORIDE, PRESERVATIVE FREE 10 ML: 5 INJECTION INTRAVENOUS at 20:52

## 2020-08-11 RX ADMIN — ALBUTEROL SULFATE 2 PUFF: 90 AEROSOL, METERED RESPIRATORY (INHALATION) at 08:06

## 2020-08-11 RX ADMIN — ASPIRIN 81 MG: 81 TABLET, COATED ORAL at 08:32

## 2020-08-11 RX ADMIN — BUMETANIDE 2 MG: 0.25 INJECTION INTRAMUSCULAR; INTRAVENOUS at 08:34

## 2020-08-11 RX ADMIN — ALBUTEROL SULFATE 2 PUFF: 90 AEROSOL, METERED RESPIRATORY (INHALATION) at 20:17

## 2020-08-11 RX ADMIN — ALBUTEROL SULFATE 2 PUFF: 90 AEROSOL, METERED RESPIRATORY (INHALATION) at 01:03

## 2020-08-11 RX ADMIN — ALBUTEROL SULFATE 2 PUFF: 90 AEROSOL, METERED RESPIRATORY (INHALATION) at 14:24

## 2020-08-11 RX ADMIN — ALBUTEROL SULFATE 2 PUFF: 90 AEROSOL, METERED RESPIRATORY (INHALATION) at 04:29

## 2020-08-11 RX ADMIN — Medication 2 PUFF: at 08:05

## 2020-08-11 RX ADMIN — BUMETANIDE 2 MG: 0.25 INJECTION INTRAMUSCULAR; INTRAVENOUS at 20:51

## 2020-08-11 RX ADMIN — Medication 2 PUFF: at 04:29

## 2020-08-11 RX ADMIN — Medication 2 PUFF: at 01:03

## 2020-08-11 RX ADMIN — RIVAROXABAN 20 MG: 20 TABLET, FILM COATED ORAL at 17:09

## 2020-08-11 ASSESSMENT — PAIN SCALES - GENERAL: PAINLEVEL_OUTOF10: 0

## 2020-08-11 NOTE — PLAN OF CARE
Problem: Falls - Risk of:  Goal: Will remain free from falls  Description: Will remain free from falls  8/11/2020 1022 by Roberth Ramirez RN  Outcome: Met This Shift  8/11/2020 0555 by Imelda Matthews RN  Outcome: Ongoing  Goal: Absence of physical injury  Description: Absence of physical injury  8/11/2020 1022 by Roberth Ramirez RN  Outcome: Met This Shift  8/11/2020 0555 by Imelda Matthews RN  Outcome: Ongoing

## 2020-08-11 NOTE — CONSULTS
Endocrinology   Consult Note  Dear Doctor Kiana Yepez    Thank You for the Consult     Pt. Was Admitted for : Shortness of breath and congestive heart failure    Reason for Consult: Better control of blood blood glucose and also reviewed thyroid function test    History Obtained From:  Patient/ EMR       HISTORY OF PRESENT ILLNESS:                The patient is a 52 y.o. male with significant past medical history of congestive heart failure failure CAD, post CABG, COPD, opiate abuse, hypertensionswelling of the leg with persistent edema diabetes mellitus was admitted to hospital for shortness of breath and exacerbation of congestive heart failure. I was  consulted for better control of blood glucose. ROS:   Pt's ROS done in detail. Abnormal ROS are noted in Medical and Surgical History Section below: Other Medical History:        Diagnosis Date    CAD (coronary artery disease)     [facility]) [facility]     COPD (chronic obstructive pulmonary disease) ([address]     Depression     [facility]     HIGH CHOLESTEROL     Hypertension     MI (myocardial infarction) ([facility] 2011    S/P coronary artery bypass graft x 4 2011    CABG x 4 Dr Av Kruger     Surgical History:        Procedure Laterality Date    BYPASS GRAFT  04/10/2011    CABG x 4 Dr Milla Whiteside  11/2010     4 stents    LEG SURGERY         Allergies:  Patient has no known allergies.     Family History:       Problem Relation Age of Onset    Cancer Father     Heart Failure Father     Heart Disease Father     Diabetes Mother     Heart Disease Mother      REVIEW OF SYSTEMS:  Review of System Done as noted above     PHYSICAL EXAM:      Vitals:    /80   Pulse 106   Temp 97.6 °F (36.4 °C) (Oral)   Resp 23   Ht 5' 11\" (1.803 m)   Wt 199 lb (90.3 kg)   SpO2 99%   BMI 27.75 kg/m²     CONSTITUTIONAL:  awake, alert, cooperative, appears stated age   EYES:  vision intact Fundoscopic Exam not performed ENT:Normal  NECK:  Supple, No JVD. Thyroid Exam:Normal   LUNGS:  Has Vesicular Breath Sounds,   CARDIOVASCULAR:  Normal apical impulse, regular rate and rhythm, normal S1 and S2, no S3 or S4, and has no  murmur   ABDOMEN:  No scars, normal bowel sounds, soft, non-distended, non-tender, no masses palpated, no hepatolienomegaly  Musculoskeletal: Normal  Extremities: Normal, peripheral pulses normal, , has no edema   NEUROLOGIC:  Awake, alert, oriented to name, place and time. Cranial nerves II-XII are grossly intact. Motor is  intact. Sensory neuropathy. ,  and gait is normal.    DATA:    CBC:   Recent Labs     08/09/20 2028   WBC 7.0   HGB 9.0*       CMP:  Recent Labs     08/09/20  2028 08/10/20  0154 08/10/20  0808 08/10/20  1622   * 126* 122* 124*   K 3.9 3.9 4.2 3.7   CL 88* 88* 85* 87*   CO2 25 24 20* 23   BUN 15 19 20 24*   CREATININE 1.3 1.5* 1.5* 1.5*   CALCIUM 9.0 8.8 9.2 8.7   PROT 7.2  --   --   --    LABALBU 4.2  --   --   --    BILITOT 3.0*  --   --   --    ALKPHOS 223*  --   --   --    AST 25  --   --   --    ALT 24  --   --   --      Lipids:   Lab Results   Component Value Date    CHOL 107 08/10/2020    CHOL 218 12/01/2014    HDL 17 08/10/2020    TRIG 86 08/10/2020     Glucose:   Recent Labs     08/10/20  1128 08/10/20  1619 08/10/20  2008   POCGLU 127* 171* 138*     Hemoglobin A1C:   Lab Results   Component Value Date    LABA1C 8.0 07/26/2020     Free T4:   Lab Results   Component Value Date    T4FREE 1.09 07/21/2016     Free T3:   Lab Results   Component Value Date    FT3 2.9 07/21/2016     TSH High Sensitivity:   Lab Results   Component Value Date    TSHHS 5.030 08/09/2020       Xr Chest Portable    Result Date: 8/9/2020  EXAMINATION: ONE XRAY VIEW OF THE CHEST 8/9/2020 7:48 pm COMPARISON: July 26, 2020, July 18, 2020.  HISTORY: ORDERING SYSTEM PROVIDED HISTORY: shortness of breath TECHNOLOGIST PROVIDED HISTORY: Reason for exam:->shortness of breath FINDINGS: The left lung is sciatica    History of MI (myocardial infarction)    Coronary artery disease involving coronary bypass graft of native heart without angina pectoris    Mixed hyperlipidemia    Depression    Chronic midline low back pain without sciatica    Tobacco abuse    Gastroesophageal reflux disease without esophagitis    [facility]    Hero[facility] dependence [facility]    ST elevation myocardial infarction involving left circumflex coronary artery ([facility])    STEMI (ST elevation myocardial infarction) ([facility])    Acute on chronic combined systolic (congestive) and diastolic (congestive) heart failure ([facility])    Systolic and diastolic CHF w/reduced LV function, NYHA class 4 ([address]    NSTEMI (non-ST elevated myocardial infarction) ([facility]    Acute on chronic congestive heart failure ([facility]    Noncompliance    Pulmonary edema, acute ([facility])    Acute kidney injury ([facility]    Acute respiratory failure with hypoxia and hypercapnia ([facility])    Hypertensive emergency    Ischemic cardiomyopathy    Heart failure ([facility]    Left ventricular systolic dysfunction    Acute systolic congestive heart failure ([facility])    SOB (shortness of breath)    Acute on chronic combined systolic and diastolic heart failure ([facility])    Hyponatremia    Hematemesis    Acute on chronic systolic CHF (congestive heart failure) ([facility])    Dyspnea          My thyroid dysfunction possibly hypothyroidism      RECOMMENDATIONS:      1. Reviewed POC blood glucose . Labs and X ray results   2. Reviewed Home and Current Medicines   3. Will Start On meal/ Correction bolus Humalog/ Lantus Insulin regime   4. His thyroid function test has very borderline elevated TSH and normal serum free T4  5. Ordered antithyroid antibodies and also ultrasound of thyroid gland to decide whether if he needs thyroid replacement therapy or not  6. Modify  the dose of Insulin  as needed        Will follow with you  Again thank you for sharing pt's care with me.      Truly yours, Crispin Gonzales MD

## 2020-08-11 NOTE — PROGRESS NOTES
ALKPHOS 223*     Troponin: No results for input(s): TROPONINI in the last 72 hours. BNP: No results for input(s): BNP in the last 72 hours. Lipids:   Recent Labs     08/10/20  0154   CHOL 107   HDL 17*     ABGs:   Lab Results   Component Value Date    PO2ART 97 07/06/2020    ZXL0BOR 35.0 07/06/2020     INR: No results for input(s): INR in the last 72 hours.     Objective:   Vitals: /73   Pulse 104   Temp 97.5 °F (36.4 °C) (Oral)   Resp 27   Ht 5' 11\" (1.803 m)   Wt 203 lb 12.8 oz (92.4 kg)   SpO2 98%   BMI 28.42 kg/m²   General appearance: alert and cooperative with exam, in no acute distress  HEENT: normocephalic, atraumatic,   Neck: supple, trachea midline  Lungs: breathing comfortably on room air  Abdomen:  non distended,  Extremities: ble edema  Neurologic: alert, oriented, follows commands, interactive    Assessment and Plan:     Patient Active Problem List     Diagnosis Date Noted    ST elevation myocardial infarction involving left circumflex coronary artery ([address] 07/02/2018       Priority: High    Dyspnea 07/19/2020    Acute on chronic systolic CHF (congestive heart failure) ([address] 05/27/2020    Hematemesis 05/18/2020    Acute on chronic combined systolic and diastolic heart failure ([address] 05/13/2020    Hyponatremia      SOB (shortness of breath) 98/92/1391    Acute systolic congestive heart failure ([address] 02/07/2020    Left ventricular systolic dysfunction      Heart failure ([address] 01/23/2020    Ischemic cardiomyopathy 02/05/2019    Pulmonary edema, acute ([address] 12/08/2018    Acute kidney injury ([address] 12/08/2018    Acute respiratory failure with hypoxia and hypercapnia ([address] 12/08/2018    Hypertensive emergency 12/08/2018    Acute on chronic congestive heart failure ([address] 09/14/2018    Noncompliance 09/14/2018    Acute on chronic combined systolic (congestive) and diastolic (congestive) heart failure ([address] 71/34/2214    Systolic and diastolic CHF w/reduced LV function, NYHA class 4 ([facility] 07/11/2018    [facility]STEM[facility] (non-ST elevated [facility] infarction) [facility] 07/11/2018    STEMI (ST elevation myocardial infarction) ([facility] 07/02/2018    Heroin dependence ([facility] 03/03/2018    Opiate abuse, continuous [facility] 12/28/2017    Gastroesophageal reflux disease without esophagitis 12/29/2016    Coronary artery disease involving coronary bypass graft of native heart without angina pectoris 07/26/2016    Mixed hyperlipidemia 07/26/2016    Depression 07/26/2016    Chronic midline low back pain without sciatica 07/26/2016    Tobacco abuse 07/26/2016    Midline low back pain without sciatica 09/10/2015    History of MI (myocardial infarction) 09/10/2015    Left shoulder pain 08/19/2015    Crushing injury of right hand 08/19/2015    Rotator cuff tendinitis 08/19/2015    Abdominal hernia 06/21/2015    Paresthesia of left leg 02/20/2015    Essential hypertension 08/10/2012    Osteoarthritis 08/10/2012    COPD (chronic obstructive pulmonary disease) ([facility] 08/10/2012   Hyponatremia from CHF; 122 from 126 on admission//better  CKD3  Fluid overload  Acute on chronic CHF: EF 10-15%  CAD s/p CABG  DM2  COPD     Suggest:  - sodium level is 128 this morning; s/p samsca 15mg once on 8/10,, unfortunately this is not affordable as outpt  - continue iv bumex  - please measure UOP  - avoid nephrotoxins  - Oral fluid restriction  - compliance issues, may benefit from  setting up a transportation system for outpt doctor's visit                      Electronically signed by Krystyna Mancera DO on 8/11/2020 at 8:23 AM    [address] Mock DO [address]  [place], [place] [address]  PHONE: [phone]  FAX: [phone]

## 2020-08-11 NOTE — PROGRESS NOTES
Hospitalist Progress Note      Name:  Doreen Chavez /Age/Sex: 1970  (52 y.o. male)   MRN & CSN:  [id number] & [id number] Admission Date/Time: 2020  7:57 PM   [facility] PCP: No primary care provider on file.        Doreen Chavez is a 52 y.o.  male  who presents with Shortness of Breath and Congestive Heart Failure      Assessment and Plan:   Acute on Chronic Systolic CHF  - neg 2.9U  - IV bumex BID  - no ACE due to CKD  - metoprolol  - echo: ef 10-15%, severe MR, severe pulm HTN  - strict I and O  - cardio consulted     Hyponatremia likely hypervolemic  - improved with samsca  - fluid restrict 1800cc  - monitor  - consult Nephro                         Chronic conditions:  Right lung pleural effusion - loculated  CKD  Microcytic anemia  HTN  HLD  History of DVT  DM II   History of opioid abuse  Noncompliance              Reinforced need to follow low sodium diet and monitor fluid intake     DVT Prophylaxis: xarelto  Code Status: full               Diet DIET LOW SODIUM 2 GM; 1500 ml   Code Status DNR-CCA     Medications:   Medications:    albuterol sulfate HFA  2 puff Inhalation 4x daily    insulin glargine  10 Units Subcutaneous Nightly    aspirin  81 mg Oral Daily    atorvastatin  40 mg Oral Daily    metoprolol succinate  25 mg Oral Daily    sodium chloride flush  10 mL Intravenous 2 times per day    insulin lispro  0-12 Units Subcutaneous TID WC    rivaroxaban  20 mg Oral Daily    bumetanide  2 mg Intravenous BID    ipratropium  2 puff Inhalation 4x daily    insulin lispro  0-6 Units Subcutaneous 2 times per day      Infusions:    dextrose       PRN Meds: sodium chloride flush, 10 mL, PRN  acetaminophen, 650 mg, Q6H PRN    Or  acetaminophen, 650 mg, Q6H PRN  polyethylene glycol, 17 g, Daily PRN  promethazine, 12.5 mg, Q6H PRN    Or  ondansetron, 4 mg, Q6H PRN  glucose, 15 g, PRN  dextrose, 12.5 g, PRN  glucagon (rDNA), 1 mg, PRN  dextrose, 100 mL/hr, PRN      Subjective: Doing a bit better    Objective: Intake/Output Summary (Last 24 hours) at 8/11/2020 1023  Last data filed at 8/11/2020 0946  Gross per 24 hour   Intake 600 ml   Output 4675 ml   Net -4075 ml      Vitals:   Vitals:    08/11/20 0825   BP: 92/76   Pulse: 103   Resp: 18   Temp: 97.4 °F (36.3 °C)   SpO2: 99%     Physical Exam:   Gen:  awake, alert, no apparent distress  Head/Eyes:  Normocephalic atraumatic, EOMI   NECK:   symmetrical, trachea midline  LUNGS: Normal Effort   CARDIOVASCULAR:  Normal rate, LE edema trace b/l  ABDOMEN:  non distended  MUSCULOSKELETAL:  ROM WNL  NEUROLOGIC: Alert and Oriented,  Cranial nerves II-XII are grossly intact.    SKIN:  no bruising or bleeding, normal skin color,  no redness      Data:       CBC   Recent Labs     08/09/20 2028   WBC 7.0   HGB 9.0*   HCT 30.6*         BMP   Recent Labs     08/10/20  1622 08/10/20  2228 08/11/20  0351   * 122* 128*   K 3.7 3.7 3.7   CL 87* 85* 90*   CO2 23 22 25   BUN 24* 24* 26*   CREATININE 1.5* 1.5* 1.6*         Electronically signed by Pankaj Burnette MD on 8/11/2020 at 10:23 AM

## 2020-08-11 NOTE — PROGRESS NOTES
Admit Date:  8/9/2020    Admission diagnosis / Complaint : SOB  CHF      Subjective:  Mr. Anabel Aguirre states he is feeing better  He is off oxygen       Objective:   BP 92/76   Pulse 103   Temp 97.4 °F (36.3 °C) (Oral)   Resp 22   Ht 5' 11\" (1.803 m)   Wt 203 lb 12.8 oz (92.4 kg)   SpO2 97%   BMI 28.42 kg/m²       Intake/Output Summary (Last 24 hours) at 8/11/2020 1509  Last data filed at 8/11/2020 1243  Gross per 24 hour   Intake 960 ml   Output 6275 ml   Net -5315 ml       TELEMETRY: Sinus tachycardia    has a past medical history of CAD (coronary artery disease), CHF (congestive heart failure) ([facility] COPD (chronic obstructive pulmonary disease) ([facility] Depression, Drug abuse ([facility] HIGH CHOLESTEROL, Hypertension, MI (myocardial infarction) ([facility] and S/P coronary artery bypass graft x 4.   has a past surgical history that includes Cardiac surgery (11/2010); Bypass Graft (04/10/2011); and Leg Surgery.   Physical Exam:  General:  Awake, alert, NAD  Skin:  Warm and dry  Neck:  JVD absent   Chest:  Bi basilar crackles  Cardiovascular:  RRR S1S2  Abdomen:  Soft non tender   Extremities:  Trace  edema    Medications:    albuterol sulfate HFA  2 puff Inhalation 4x daily    insulin glargine  10 Units Subcutaneous Nightly    aspirin  81 mg Oral Daily    atorvastatin  40 mg Oral Daily    metoprolol succinate  25 mg Oral Daily    sodium chloride flush  10 mL Intravenous 2 times per day    insulin lispro  0-12 Units Subcutaneous TID WC    rivaroxaban  20 mg Oral Daily    bumetanide  2 mg Intravenous BID    ipratropium  2 puff Inhalation 4x daily    insulin lispro  0-6 Units Subcutaneous 2 times per day      dextrose       sodium chloride flush, acetaminophen **OR** acetaminophen, polyethylene glycol, promethazine **OR** ondansetron, glucose, dextrose, glucagon (rDNA), dextrose    Lab Data:  CBC:   Recent Labs     08/09/20 2028   WBC 7.0   HGB 9.0*   HCT 30.6*   MCV 70.8*        BMP:   Recent Labs 08/10/20  1622 08/10/20  2228 08/11/20  0351   * 122* 128*   K 3.7 3.7 3.7   CL 87* 85* 90*   CO2 23 22 25   BUN 24* 24* 26*   CREATININE 1.5* 1.5* 1.6*     LIVER PROFILE:   Recent Labs     08/09/20 2028   AST 25   ALT 24   LIPASE 32   BILITOT 3.0*   ALKPHOS 223*     PT/INR: No results for input(s): PROTIME, INR in the last 72 hours. APTT: No results for input(s): APTT in the last 72 hours. BNP:  No results for input(s): BNP in the last 72 hours. TROPONIN: @TROPONINI:3@      Assessment/plan:  Acute decompensated heart failure  HTN  ASCVD  abnormal troponin  H/o DVT  Dyslipidemia     decompensated heart failure  Pro BNP  Elevated / SOB /hyponatremia   Most likely secondary to non compliance   Clinically improving   Negative fluid status of 4.5 liters - continue with IV diuresis  Strict I/O daily weights fluid restriction   On BB- will decrease dose as it has been held dt low bp  No ACEi  D/t CKD         ASCVD  H/o TVD with CABG  [facility] 07/2018  1. Three vessel CAD. LAD & Ramus are occluded. Mild to moderate   disease of RCA & Circumflex noted. 2. LIMA is patent. 3. SVBG to Ramus occluded. Ramus receives right to left   Collaterals. 4. SVBG to OM is atretic, receives small collateral from RCA. 5. SVBG to Diagonal is small but patent. 6. [employer] WMA seen with severely reduced LV Systolic   function. LVEF is about 25 to 30 %. 7. Aortic Root angiography Helped in understanding the graft lay   out.     Elevated troponin- most likely demand leak secondary to CKD/CHF  Continue with ASA/ statin / BB    H/o DVT  On anticoagulation with BeerelNANCY Roa - CNP, 8/11/2020 3:09 PM

## 2020-08-11 NOTE — PROGRESS NOTES
08/11/20 0436   NIV Type   $NIV $Daily Charge   NIV Started/Stopped On   Equipment Type v60   Mode Bilevel   Mask Type Full face mask   Mask Size Medium   Bonnet size Medium   Settings/Measurements   IPAP 12 cmH20   CPAP/EPAP 5 cmH2O   Resp 27   FiO2  35 %   I Time/ I Time % 1.3 s   Vt Exhaled 1056 mL   Minute Volume 27.2 Liters   Mask Leak (lpm) 10 lpm   Comfort Level Good   Using Accessory Muscles No   SpO2 100   Patient Observation   Observations Pt resting on BIPAP   Alarm Settings   Alarms On Y   Press Low Alarm 5 cmH2O   High Pressure Alarm 25 cmH2O   Delay Alarm 20 sec(s)   Apnea (secs) 20 secs   Resp Rate Low Alarm 13   High Respiratory Rate 40 br/min

## 2020-08-12 VITALS
HEIGHT: 71 IN | DIASTOLIC BLOOD PRESSURE: 83 MMHG | TEMPERATURE: 97.3 F | OXYGEN SATURATION: 98 % | BODY MASS INDEX: 27.37 KG/M2 | SYSTOLIC BLOOD PRESSURE: 114 MMHG | WEIGHT: 195.5 LBS | HEART RATE: 107 BPM | RESPIRATION RATE: 20 BRPM

## 2020-08-12 LAB
ANION GAP SERPL CALCULATED.3IONS-SCNC: 14 MMOL/L (ref 4–16)
ANTITHYROGLOBULIN AB: <0.9 IU/ML (ref 0–4)
ANTITHYROID MICORSOMAL: <0.3 IU/ML (ref 0–9)
BUN BLDV-MCNC: 24 MG/DL (ref 6–23)
CALCIUM SERPL-MCNC: 9.1 MG/DL (ref 8.3–10.6)
CHLORIDE BLD-SCNC: 92 MMOL/L (ref 99–110)
CO2: 26 MMOL/L (ref 21–32)
CREAT SERPL-MCNC: 1.4 MG/DL (ref 0.9–1.3)
GFR AFRICAN AMERICAN: >60 ML/MIN/1.73M2
GFR NON-AFRICAN AMERICAN: 54 ML/MIN/1.73M2
GLUCOSE BLD-MCNC: 114 MG/DL (ref 70–99)
GLUCOSE BLD-MCNC: 128 MG/DL (ref 70–99)
GLUCOSE BLD-MCNC: 143 MG/DL (ref 70–99)
MAGNESIUM: 2.5 MG/DL (ref 1.8–2.4)
POTASSIUM SERPL-SCNC: 3.7 MMOL/L (ref 3.5–5.1)
PRO-BNP: 3975 PG/ML
SODIUM BLD-SCNC: 132 MMOL/L (ref 135–145)

## 2020-08-12 PROCEDURE — 82962 GLUCOSE BLOOD TEST: CPT

## 2020-08-12 PROCEDURE — 80048 BASIC METABOLIC PNL TOTAL CA: CPT

## 2020-08-12 PROCEDURE — 6370000000 HC RX 637 (ALT 250 FOR IP): Performed by: NURSE PRACTITIONER

## 2020-08-12 PROCEDURE — 94761 N-INVAS EAR/PLS OXIMETRY MLT: CPT

## 2020-08-12 PROCEDURE — 2500000003 HC RX 250 WO HCPCS: Performed by: FAMILY MEDICINE

## 2020-08-12 PROCEDURE — 6370000000 HC RX 637 (ALT 250 FOR IP): Performed by: INTERNAL MEDICINE

## 2020-08-12 PROCEDURE — 83880 ASSAY OF NATRIURETIC PEPTIDE: CPT

## 2020-08-12 PROCEDURE — 2580000003 HC RX 258: Performed by: PHYSICIAN ASSISTANT

## 2020-08-12 PROCEDURE — 83735 ASSAY OF MAGNESIUM: CPT

## 2020-08-12 PROCEDURE — 94640 AIRWAY INHALATION TREATMENT: CPT

## 2020-08-12 PROCEDURE — 94660 CPAP INITIATION&MGMT: CPT

## 2020-08-12 PROCEDURE — 6370000000 HC RX 637 (ALT 250 FOR IP): Performed by: PHYSICIAN ASSISTANT

## 2020-08-12 RX ORDER — METOPROLOL SUCCINATE 25 MG/1
12.5 TABLET, EXTENDED RELEASE ORAL DAILY
Qty: 30 TABLET | Refills: 3 | Status: ON HOLD | OUTPATIENT
Start: 2020-08-13 | End: 2020-10-07 | Stop reason: HOSPADM

## 2020-08-12 RX ORDER — INSULIN GLARGINE 100 [IU]/ML
10 INJECTION, SOLUTION SUBCUTANEOUS NIGHTLY
Qty: 1 VIAL | Refills: 3 | Status: ON HOLD | OUTPATIENT
Start: 2020-08-12 | End: 2020-10-07 | Stop reason: HOSPADM

## 2020-08-12 RX ORDER — PANTOPRAZOLE SODIUM 40 MG/1
40 TABLET, DELAYED RELEASE ORAL
Qty: 90 TABLET | Refills: 1 | Status: ON HOLD | OUTPATIENT
Start: 2020-08-12 | End: 2020-10-07 | Stop reason: SDUPTHER

## 2020-08-12 RX ORDER — LANCETS 28 GAUGE
1 EACH MISCELLANEOUS DAILY
Qty: 100 EACH | Refills: 3 | Status: ON HOLD | OUTPATIENT
Start: 2020-08-12 | End: 2020-10-02 | Stop reason: ALTCHOICE

## 2020-08-12 RX ORDER — TOLVAPTAN 15 MG/1
15 TABLET ORAL ONCE
Status: COMPLETED | OUTPATIENT
Start: 2020-08-12 | End: 2020-08-12

## 2020-08-12 RX ORDER — GLUCOSAMINE HCL/CHONDROITIN SU 500-400 MG
CAPSULE ORAL
Qty: 100 STRIP | Refills: 0 | Status: ON HOLD | OUTPATIENT
Start: 2020-08-12 | End: 2020-10-02 | Stop reason: ALTCHOICE

## 2020-08-12 RX ORDER — BUMETANIDE 2 MG/1
TABLET ORAL
Qty: 90 TABLET | Refills: 2 | Status: ON HOLD | OUTPATIENT
Start: 2020-08-12 | End: 2020-10-07 | Stop reason: SDUPTHER

## 2020-08-12 RX ADMIN — Medication 2 PUFF: at 01:16

## 2020-08-12 RX ADMIN — SODIUM CHLORIDE, PRESERVATIVE FREE 10 ML: 5 INJECTION INTRAVENOUS at 08:39

## 2020-08-12 RX ADMIN — METOPROLOL SUCCINATE 12.5 MG: 25 TABLET, EXTENDED RELEASE ORAL at 08:39

## 2020-08-12 RX ADMIN — Medication 2 PUFF: at 11:46

## 2020-08-12 RX ADMIN — ASPIRIN 81 MG: 81 TABLET, COATED ORAL at 08:39

## 2020-08-12 RX ADMIN — ATORVASTATIN CALCIUM 40 MG: 40 TABLET, FILM COATED ORAL at 08:39

## 2020-08-12 RX ADMIN — ALBUTEROL SULFATE 2 PUFF: 90 AEROSOL, METERED RESPIRATORY (INHALATION) at 08:21

## 2020-08-12 RX ADMIN — Medication 2 PUFF: at 08:19

## 2020-08-12 RX ADMIN — ALBUTEROL SULFATE 2 PUFF: 90 AEROSOL, METERED RESPIRATORY (INHALATION) at 11:48

## 2020-08-12 RX ADMIN — TOLVAPTAN 15 MG: 15 TABLET ORAL at 13:21

## 2020-08-12 RX ADMIN — ALBUTEROL SULFATE 2 PUFF: 90 AEROSOL, METERED RESPIRATORY (INHALATION) at 01:15

## 2020-08-12 RX ADMIN — BUMETANIDE 2 MG: 0.25 INJECTION INTRAMUSCULAR; INTRAVENOUS at 08:39

## 2020-08-12 ASSESSMENT — PAIN SCALES - GENERAL: PAINLEVEL_OUTOF10: 0

## 2020-08-12 NOTE — DISCHARGE SUMMARY
Shalom Solo 1970 [id number]  PCP:  No primary care provider on file. Admit date: 8/9/2020  Admitting Physician: Bozena Cruz MD    Discharge date: 8/12/2020 Discharge Physician: Drew Marquez MD         Hospital Course and Discharge Diagnoses Include:    Acute on Chronic Systolic CHF  - neg 5.8Q  - IV bumex BID, changed to oral TID on MWF and BID on TTSS, d/w Nephro  - no ACE/ARB due to CKD  - metoprolol reduced dose  - echo: ef 10-15%, severe MR, severe pulm HTN  - strict I and O  - cardio consulted     Hyponatremia likely hypervolemic  - improved with samsca  - fluid restrict 1800cc  - monitor  - consult Nephro                         Chronic conditions:  Right lung pleural effusion - loculated  CKD  Microcytic anemia  HTN  HLD  History of DVT  DM II   History of opioid abuse  Noncompliance              Reinforced need to follow low sodium diet and monitor fluid intake     DVT Prophylaxis: xarelto  Code Status: full        Physical Exam on Discharge date: 08/12/20  Gen:  awake, alert, no apparent distress  Head/Eyes:  Normocephalic atraumatic, EOMI   NECK:   symmetrical, trachea midline  LUNGS: Normal Effort   CARDIOVASCULAR:  Normal rate  ABDOMEN:  non distended  MUSCULOSKELETAL:  ROM WNL  NEUROLOGIC: Alert and Oriented,  Cranial nerves II-XII are grossly intact. SKIN:  no bruising or bleeding, normal skin color,  no redness    Procedures:  See above  Xr Abdomen (kub) (single Ap View)    Result Date: 7/19/2020  EXAMINATION: ONE SUPINE XRAY VIEW(S) OF THE ABDOMEN 7/19/2020 4:03 pm COMPARISON: CT dated July 3, 2020. HISTORY: ORDERING SYSTEM PROVIDED HISTORY: distended TECHNOLOGIST PROVIDED HISTORY: Reason for exam:->distended Acuity: Acute Type of Exam: Subsequent/Follow-up FINDINGS: No lines or tubes. Gas distended loops of small bowel are identified, measuring up to 4.0 cm. No obvious free air or pneumatosis. No acute osseous abnormality. Status post sternotomy. No acute osseous abnormality. 1. Gas distended loops of small bowel identified measuring up to 4.0 cm. Findings are nonspecific and may represent ileus, partial small bowel obstruction, or enteritis. Nm Lung Scan Perfusion Only    Result Date: 7/19/2020  EXAMINATION: LUNG PERFUSION IMAGING 7/19/2020 3:23 am TECHNIQUE: Routine lung perfusion imaging was obtained after administration of 4.5 millicuries of UE-85F MAA. Ventilation images were not obtained. COMPARISON: 01/24/2020 HISTORY: ORDERING SYSTEM PROVIDED HISTORY: pe? TECHNOLOGIST PROVIDED HISTORY: Reason for exam:->pe? Reason for Exam: sob Acuity: Acute Type of Exam: Initial Relevant Medical/Surgical History: hx of blood clots, SOB FINDINGS: Perfusion at the lung bases is somewhat heterogeneous, right greater than left, similar to the prior study. No new perfusion defect is identified. No change in the perfusion pattern since 01/24/2020. Given that there is right basilar pleuroparenchymal disease and given the lack of ventilation imaging the post test chance for pulmonary embolus is felt to be intermediate. Us Head Neck Soft Tissue Thyroid    Result Date: 8/11/2020  EXAMINATION: THYROID ULTRASOUND 8/11/2020 7:36 am COMPARISON: Chest CT 07/03/2020 HISTORY: ORDERING SYSTEM PROVIDED HISTORY: Thyroid dysfunction TECHNOLOGIST PROVIDED HISTORY: Reason for exam:->Thyroid dysfunction Reason for Exam: abnormal labs Acuity: Unknown Type of Exam: Initial FINDINGS: Right thyroid lobe:  4.3 cm x 1.3 cm x 1.6 cm Left thyroid lobe:  4.3 cm x 1.6 cm x 1.4 cm Isthmus:  0.5 cm THYROID GLAND:  Normal size, echogenicity, and vascularity. NODULES:  Not visualized. CERVICAL LYMPHADENOPATHY:  None visualized. Normal sonographic appearance of the thyroid. Xr Chest Portable    Result Date: 8/9/2020  EXAMINATION: ONE XRAY VIEW OF THE CHEST 8/9/2020 7:48 pm COMPARISON: July 26, 2020, July 18, 2020.  HISTORY: ORDERING SYSTEM PROVIDED HISTORY: shortness of breath TECHNOLOGIST PROVIDED HISTORY: Reason for exam:->shortness of breath FINDINGS: The left lung is clear. There is a masslike density within the right lung base as on prior studies. The heart is enlarged. The patient is status post median sternotomy. Persistent masslike density within the right lung base, probably reflecting pseudomass due to loculated pleural effusion. Xr Chest Portable    Result Date: 7/18/2020  EXAMINATION: ONE XRAY VIEW OF THE CHEST 7/18/2020 8:46 pm COMPARISON: Chest x-ray July 5, 2020 HISTORY: ORDERING SYSTEM PROVIDED HISTORY: SOB TECHNOLOGIST PROVIDED HISTORY: Reason for exam:->SOB Reason for Exam: SOB Acuity: Acute Type of Exam: Initial Mechanism of Injury: SOB Relevant Medical/Surgical History: Patient states has had SOB that started this morning. Patient states has history of CHF FINDINGS: Cardiac silhouette is enlarged but stable. Postoperative changes of CABG procedure and median sternotomy. Loculated pleural effusion redemonstrated on the right with associated atelectasis. Chronic underlying interstitial changes of the lungs. The left lung appears clear. No pneumothorax. Osseous structures appear stable. 1. Persistent loculated right pleural effusion and associated atelectasis. Xr Chest 1 View    Result Date: 7/26/2020  EXAMINATION: ONE XRAY VIEW OF THE CHEST 7/26/2020 4:59 am COMPARISON: 7/18/2020 HISTORY: ORDERING SYSTEM PROVIDED HISTORY: increased dyspnea TECHNOLOGIST PROVIDED HISTORY: Reason for exam:->increased dyspnea Reason for Exam: increased dyspnea Acuity: Acute Type of Exam: Initial FINDINGS: Sternal wires are in place. The heart is enlarged. Right basilar atelectasis or infiltrate persists with a small right pleural effusion. The left lung is clear. Persistent right basilar opacity suggesting atelectasis or infiltrate with small right pleural effusion.      Us Abdomen Limited    Result Date: 7/21/2020  EXAMINATION: RIGHT UPPER QUADRANT ULTRASOUND 7/21/2020 4:15 am COMPARISON: None HISTORY: ORDERING SYSTEM PROVIDED HISTORY: RUQ US, elevated bilirubin TECHNOLOGIST PROVIDED HISTORY: Reason for exam:->RUQ US, elevated bilirubin Reason for Exam: Elevated bilirubin Acuity: Unknown Type of Exam: Ongoing FINDINGS: LIVER:  The liver demonstrates increased echogenicity without evidence of intrahepatic biliary ductal dilatation. BILIARY SYSTEM:  Gallbladder wall is significantly thickened and measures 7 mm. There is a negative sonographic Dsouza's sign. No stones or sludge are seen within the gallbladder lumen. Common bile duct is within normal limits measuring 3.5 mm. RIGHT KIDNEY: The right kidney is grossly unremarkable without evidence of hydronephrosis. PANCREAS:  Visualized portions of the pancreas are unremarkable. OTHER: No evidence of right upper quadrant ascites. 1. Gallbladder wall is thickened however there is no evidence of cholelithiasis or pericholecystic fluid. There is a negative sonographic Dsouza's sign. Findings could be related to chronic liver disease. 2. Diffuse fatty infiltration of the liver.  Dup Lower Extremity Venous Left    Result Date: 8/9/2020  EXAMINATION: DUPLEX VENOUS ULTRASOUND OF THE LEFT LOWER EXTREMITY 8/9/2020 10:09 pm TECHNIQUE: Duplex ultrasound and Doppler images were obtained of the left lower extremity. COMPARISON: None. HISTORY: ORDERING SYSTEM PROVIDED HISTORY: swelling/chf TECHNOLOGIST PROVIDED HISTORY: Reason for exam:->swelling/chf Reason for Exam: Severe pitting edema left leg and pain Acuity: Acute Type of Exam: Initial Additional signs and symptoms: Multiple previous / hx DVT FINDINGS: The visualized veins of the left lower extremity are patent and free of echogenic thrombus. The veins are normally compressible and have normal phasic flow. No evidence of DVT in the left lower extremity. The patient refused evaluation of the right leg.       Dup Lower Extremity Venous Left    Result Date: 7/19/2020  EXAMINATION: DUPLEX VENOUS tablet by mouth daily     atorvastatin 40 MG tablet  Commonly known as:  LIPITOR  Take 1 tablet by mouth daily     blood glucose monitor kit and supplies  Dispense sufficient amount for indicated testing frequency plus additional to accommodate PRN testing needs. Dispense all needed supplies to include: monitor, strips, lancing device, lancets, control solutions, alcohol swabs. blood glucose test strips  Test 2 times a day & as needed for symptoms of irregular blood glucose. Dispense sufficient amount for indicated testing frequency plus additional to accommodate PRN testing needs.      FreeStyle Lancets Misc  1 each by Does not apply route daily     ipratropium-albuterol 0.5-2.5 (3) MG/3ML Soln nebulizer solution  Commonly known as:  DuoNeb  Inhale 3 mLs into the lungs 4 times daily     Xarelto 20 MG Tabs tablet  Generic drug:  rivaroxaban        STOP taking these medications    insulin lispro (1 Unit Dial) 100 UNIT/ML Sopn  Commonly known as:  HumaLOG KwikPen     Lantus SoloStar 100 UNIT/ML injection pen  Generic drug:  insulin glargine  Replaced by:  insulin glargine 100 UNIT/ML injection vial           Where to Get Your Medications      These medications were sent to [address] [place] [address]    Phone:  [phone]   · GracenoteCO INS SYR .3CC/29GX0.5\" 29G X 1/2\" 0.3 ML Misc  · bumetanide 2 MG tablet  · FreeStyle Lancets Misc  · insulin glargine 100 UNIT/ML injection vial  · metoprolol succinate 25 MG extended release tablet  · pantoprazole 40 MG tablet     You can get these medications from any pharmacy    Bring a paper prescription for each of these medications  · blood glucose monitor kit and supplies  · blood glucose test strips            Code Status: DNR-CCA     Consults:   IP CONSULT TO HOSPITALIST  IP CONSULT TO CARDIOLOGY  IP CONSULT TO NEPHROLOGY  IP CONSULT TO ENDOCRINOLOGY    Diet: diabetic diet, fluid restrict 1800cc    Activity: activity as tolerated   Work:    Discharged Condition: good    Prognosis: Fair - Good    Disposition: home      Follow-up with   1. PCP within   5-7    Days    Follow up labs: bmp in 1 week       Discharge Physician Signed: Nicol Taylor M.D. The patient was seen and examined on day of discharge and this discharge summary is in conjunction with any daily progress note from day of discharge.   Time spent on discharge in the examination, evaluation, counseling and review of medications and discharge plan: 34 minutes

## 2020-08-12 NOTE — PROGRESS NOTES
Nephrology Progress Note        [address], [address] [address]  Phone: [phone]  Office Hours: 8:30AM - 4:30PM  Monday - Friday       [facility]  Meir Arevalo MD  [address]  [address],  [facility] [address]  PHONE: [phone]  FAX: [phone]    8/12/2020 11:01 AM  Subjective:   Admit Date: 8/9/2020  PCP: No primary care provider on file. Interval History: good uop  On room air  Eager to go home      Diet: DIET LOW SODIUM 2 GM; 1500 ml      Data:   Scheduled Meds:   tolvaptan  15 mg Oral Once    albuterol sulfate HFA  2 puff Inhalation 4x daily    insulin glargine  10 Units Subcutaneous Nightly    metoprolol succinate  12.5 mg Oral Daily    aspirin  81 mg Oral Daily    atorvastatin  40 mg Oral Daily    sodium chloride flush  10 mL Intravenous 2 times per day    insulin lispro  0-12 Units Subcutaneous TID WC    rivaroxaban  20 mg Oral Daily    bumetanide  2 mg Intravenous BID    ipratropium  2 puff Inhalation 4x daily    insulin lispro  0-6 Units Subcutaneous 2 times per day     Continuous Infusions:   dextrose       PRN Meds:sodium chloride flush, acetaminophen **OR** acetaminophen, polyethylene glycol, promethazine **OR** ondansetron, glucose, dextrose, glucagon (rDNA), dextrose  I/O last 3 completed shifts:   In: 1425 [P.O.:1425]  Out: 6400 [Urine:6400]  I/O this shift:  In: 360 [P.O.:360]  Out: 350 [Urine:350]    Intake/Output Summary (Last 24 hours) at 8/12/2020 1101  Last data filed at 8/12/2020 0930  Gross per 24 hour   Intake 1065 ml   Output 5950 ml   Net -4885 ml       CBC:   Recent Labs     08/09/20 2028   WBC 7.0   HGB 9.0*          BMP:    Recent Labs     08/10/20  2228 08/11/20  0351 08/12/20  0328   * 128* 132*   K 3.7 3.7 3.7   CL 85* 90* 92*   CO2 22 25 26   BUN 24* 26* 24*   CREATININE 1.5* 1.6* 1.4*   GLUCOSE 108* 96 114*     Hepatic:   Recent Labs     08/09/20 2028   AST 25   ALT 24   BILITOT 3.0*   ALKPHOS 223*     Troponin: No results for input(s): TROPONINI in the last 72 hours. BNP: No results for input(s): BNP in the last 72 hours. Lipids:   Recent Labs     08/10/20  0154   CHOL 107   HDL 17*     ABGs:   Lab Results   Component Value Date    PO2ART 97 07/06/2020    AYZ1EBK 35.0 07/06/2020     INR: No results for input(s): INR in the last 72 hours.     Objective:   Vitals: /83   Pulse 107   Temp 97.3 °F (36.3 °C) (Oral)   Resp 14   Ht 5' 11\" (1.803 m)   Wt 195 lb 8 oz (88.7 kg)   SpO2 99%   BMI 27.27 kg/m²   General appearance: alert and cooperative with exam, in no acute distress  HEENT: normocephalic, atraumatic,   Neck: supple, trachea midline  Lungs:  breathing comfortably on room air  Heart[de-identified] tachycardic  Abdomen: soft, non-tender; non distended,   Extremities: ble edema is better  Neurologic: alert, oriented, follows commands, interactive    Assessment and Plan:       Patient Active Problem List     Diagnosis Date Noted    ST elevation myocardial infarction involving left circumflex coronary artery ([facility] 07/02/2018       Priority: High    Dyspnea 07/19/2020    Acute on chronic systolic CHF (congestive heart failure) ([address] 05/27/2020    Hematemesis 05/18/2020    Acute on chronic combined systolic and diastolic heart failure ([address] 05/13/2020    Hyponatremia      SOB (shortness of breath) 60/61/6721    Acute systolic congestive heart failure ([address] 02/07/2020    Left ventricular systolic dysfunction      Heart failure ([address] 01/23/2020    Ischemic cardiomyopathy 02/05/2019    Pulmonary edema, acute ([address] 12/08/2018    Acute kidney injury ([address] 12/08/2018    Acute respiratory failure with hypoxia and hypercapnia ([address] 12/08/2018    Hypertensive emergency 12/08/2018    Acute on chronic congestive heart failure ([address] 09/14/2018    Noncompliance 09/14/2018    Acute on chronic combined systolic (congestive) and diastolic (congestive) heart failure ([facility] 62/38/4755    Systolic and diastolic CHF w/reduced LV function, NYHA class 4 ([facility] 07/11/2018    NSTEMI (non-ST elevated myocardial infarction) ([facility] 07/11/2018    STEMI (ST elevation myocardial infarction) ([facility] 07/02/2018    Heroin dependence ([facility] 03/03/2018    Opiate abuse, continuous ([facility] 12/28/2017    Gastroesophageal reflux disease without esophagitis 12/29/2016    Coronary artery disease involving coronary bypass graft of native heart without angina pectoris 07/26/2016    Mixed hyperlipidemia 07/26/2016    Depression 07/26/2016    Chronic midline low back pain without sciatica 07/26/2016    Tobacco abuse 07/26/2016    Midline low back pain without sciatica 09/10/2015    History of MI (myocardial infarction) 09/10/2015    Left shoulder pain 08/19/2015    Crushing injury of right hand 08/19/2015    Rotator cuff tendinitis 08/19/2015    Abdominal hernia 06/21/2015    Paresthesia of left leg 02/20/2015    Essential hypertension 08/10/2012    Osteoarthritis 08/10/2012    COPD (chronic obstructive pulmonary disease) ([facility] 08/10/2012   Hyponatremia from CHF; 122 from 126 on admission//better  CKD3  Fluid overload  Acute on chronic CHF: EF 10-15%  CAD s/p CABG  DM2  COPD     Suggest:  - sodium 132 today, better  - give one more dose of samsca before he leaves [facility]  - upon dc, give bumex 2mg po tid on mwf and 2mg po bid on ttss  - Oral fluid restriction  - compliance issues, may benefit from  setting up a transportation system for outpt doctor's visit  - again discussed the improtance of outpt follow up to help him stay out of the hosp                              Electronically signed by Senait Castaneda DO on 8/12/2020 at [address], DO [address]  [place], [place] [address]  PHONE: [phone]  FAX: [phone]

## 2020-08-12 NOTE — CONSULTS
Explained Lantus as basal insulin. Discussed insulin peaks and duration. Discussed insulin pens and shown how to use pen. Patient demonstrated using insulin pen, including injection technique.   Identification of appropriate insulin injection sites by the patient indicated learning had occurred.     Patient given printed information insulin injection. Refer to education plan for teaching details  Printed information Diabetes,  contact number for Diabetes Educator and resources for ongoing education and support left at bedside. To  BG meter and supplies at [facility] on [address] [place] [address]. Ariana Albarran RN, BSN, CDE

## 2020-08-12 NOTE — PLAN OF CARE
Problem: Falls - Risk of:  Goal: Will remain free from falls  Description: Will remain free from falls  8/12/2020 1054 by Henok Bautista RN  Outcome: Met This Shift  8/12/2020 0617 by Milan Solomon RN  Outcome: Ongoing  Goal: Absence of physical injury  Description: Absence of physical injury  8/12/2020 1054 by Henok Bautista RN  Outcome: Met This Shift  8/12/2020 0617 by Milan Solomon RN  Outcome: Ongoing

## 2020-08-13 ENCOUNTER — CARE COORDINATION (OUTPATIENT)
Dept: CASE MANAGEMENT | Age: 50
End: 2020-08-13

## 2020-08-13 LAB
EKG ATRIAL RATE: 109 BPM
EKG DIAGNOSIS: NORMAL
EKG P AXIS: 75 DEGREES
EKG P-R INTERVAL: 152 MS
EKG Q-T INTERVAL: 372 MS
EKG QRS DURATION: 128 MS
EKG QTC CALCULATION (BAZETT): 500 MS
EKG R AXIS: 67 DEGREES
EKG T AXIS: 99 DEGREES
EKG VENTRICULAR RATE: 109 BPM

## 2020-08-14 ENCOUNTER — CARE COORDINATION (OUTPATIENT)
Dept: CASE MANAGEMENT | Age: 50
End: 2020-08-14

## 2020-08-15 NOTE — CARE COORDINATION
[facility] Transitions Follow Up Call    2020    Patient: Shelby Modi  Patient : 1970   MRN: [id number]  Reason for Admission:  A/C CHF; DM  Discharge Date: 20 RARS: Readmission Risk Score: 68    COVID19 RISK MONITORING  COVID19 SCREEN: No recent testing  PATIENT RISK FACTORS: CHF, DM, COPD     2nd unsuccessful  attempt to reach for initial [address] discharge; message left requesting call back. Per protocol, current episode closed, no further outreach planned.    Virginia Moran RN

## 2020-08-16 NOTE — PROGRESS NOTES
Progress Note( Dr. Iliana Weller)    Subjective:   Admit Date: 8/9/2020  PCP: No primary care provider on file. Saw the patient in August 11 but then made the note later    Admitted For : Shortness of breath and congestive heart failure    Consulted For: Better control of blood glucose and also reviewed thyroid function test    Interval History: Ms. Cheo Stauffer wanted to go home    Denies any chest pains,   Mild and improved SOB . Denies nausea or vomiting. No new bowel or bladder symptoms. No intake or output data in the 24 hours ending 08/15/20 2137    DATA    CBC: No results for input(s): WBC, HGB, PLT in the last 72 hours. CMP:No results for input(s): NA, K, CL, CO2, BUN, CREATININE, CALCIUM, PROT, LABALBU, BILITOT, ALKPHOS, AST, ALT in the last 72 hours. Invalid input(s): GLU  Lipids:   Lab Results   Component Value Date    CHOL 107 08/10/2020    CHOL 218 12/01/2014    HDL 17 08/10/2020    TRIG 86 08/10/2020     Glucose:No results for input(s): POCGLU in the last 72 hours. [id number]:  Lab Results   Component Value Date    LABA1C 8.0 07/26/2020     High Sensitivity TSH:   Lab Results   Component Value Date    TSHHS 5.030 08/09/2020     Free T3:   Lab Results   Component Value Date    FT3 2.9 07/21/2016     Free T4:  Lab Results   Component Value Date    T4FREE 1.09 07/21/2016       Us Head Neck Soft Tissue Thyroid    Result Date: 8/11/2020  EXAMINATION: THYROID ULTRASOUND 8/11/2020 7:36 am COMPARISON: Chest CT 07/03/2020 HISTORY: ORDERING SYSTEM PROVIDED HISTORY: Thyroid dysfunction TECHNOLOGIST PROVIDED HISTORY: Reason for exam:->Thyroid dysfunction Reason for Exam: abnormal labs Acuity: Unknown Type of Exam: Initial FINDINGS: Right thyroid lobe:  4.3 cm x 1.3 cm x 1.6 cm Left thyroid lobe:  4.3 cm x 1.6 cm x 1.4 cm Isthmus:  0.5 cm THYROID GLAND:  Normal size, echogenicity, and vascularity. NODULES:  Not visualized. CERVICAL LYMPHADENOPATHY:  None visualized.      Normal sonographic appearance of the thyroid. Xr Chest Portable    Result Date: 8/9/2020  EXAMINATION: ONE XRAY VIEW OF THE CHEST 8/9/2020 7:48 pm COMPARISON: July 26, 2020, July 18, 2020. HISTORY: ORDERING SYSTEM PROVIDED HISTORY: shortness of breath TECHNOLOGIST PROVIDED HISTORY: Reason for exam:->shortness of and congestive heart failure FINDINGS: The left lung is clear. There is a masslike density within the right lung base as on prior studies. The heart is enlarged. The patient is status post median sternotomy. Persistent masslike density within the right lung base, probably reflecting pseudomass due to loculated pleural effusion. Vl Dup Lower Extremity Venous Left    Result Date: 8/9/2020  EXAMINATION: DUPLEX VENOUS ULTRASOUND OF THE LEFT LOWER EXTREMITY 8/9/2020 10:09 pm TECHNIQUE: Duplex ultrasound and Doppler images were obtained of the left lower extremity. COMPARISON: None. HISTORY: ORDERING SYSTEM PROVIDED HISTORY: swelling/chf TECHNOLOGIST PROVIDED HISTORY: Reason for exam:->swelling/chf Reason for Exam: Severe pitting edema left leg and pain Acuity: Acute Type of Exam: Initial Additional signs and symptoms: Multiple previous / hx DVT FINDINGS: The visualized veins of the left lower extremity are patent and free of echogenic thrombus. The veins are normally compressible and have normal phasic flow. No evidence of DVT in the left lower extremity. The patient refused evaluation of the right leg.        Scheduled Medicines   Medications:    Infusions:       Objective:   Vitals: /83   Pulse 107   Temp 97.3 °F (36.3 °C) (Oral)   Resp 20   Ht 5' 11\" (1.803 m)   Wt 195 lb 8 oz (88.7 kg)   SpO2 98%   BMI 27.27 kg/m²   General appearance: alert and cooperative with exam  Neck: no JVD or bruit  Thyroid : Normal lobes   Lungs: Has Vesicular Breath sounds   Heart:  regular rate and rhythm  Abdomen: soft, non-tender; bowel sounds normal; no masses,  no organomegaly  Musculoskeletal: Normal  Extremities: extremities normal, , no edema  Neurologic:  Awake, alert, oriented to name, place and time. Cranial nerves II-XII are grossly intact. Motor is  intact. Sensory is intact. ,  and gait is normal.    Assessment:     Patient Active Problem List:     Essential hypertension     Osteoarthritis     COPD (chronic obstructive pulmonary disease) ([facility])     Paresthesia of left leg     Abdominal hernia     Left shoulder pain     Crushing injury of right hand     Rotator cuff tendinitis     Midline low back pain without sciatica     History of MI (myocardial infarction)     Coronary artery disease involving coronary bypass graft of native heart without angina pectoris     Mixed hyperlipidemia     Depression     Chronic midline low back pain without sciatica     Tobacco abuse     Gastroesophageal reflux disease without esophagitis     Opiate abuse, continuous ([facility])     Heroin dependence ([address]     ST elevation myocardial infarction involving left circumflex coronary artery ([facility])     STEMI (ST elevation myocardial infarction) ([facility])     Acute on chronic combined systolic (congestive) and diastolic (congestive) heart failure ([facility])     Systolic and diastolic CHF w/reduced LV function, NYHA class 4 ([facility])     NSTEMI (non-ST elevated myocardial infarction) ([address]     Acute on chronic congestive heart failure ([address]     Noncompliance     Pulmonary edema, acute ([address]     Acute kidney injury ([address]     Acute respiratory failure with hypoxia and hypercapnia ([facility])     Hypertensive emergency     Ischemic cardiomyopathy     Heart failure ([address]     Left ventricular systolic dysfunction     Acute systolic congestive heart failure ([facility])     SOB (shortness of breath)     Acute on chronic combined systolic and diastolic heart failure ([facility])     Hyponatremia     Hematemesis     Acute on chronic systolic CHF (congestive heart failure) ([facility])     Dyspnea      Plan:     1. Reviewed POC blood glucose . Labs and X ray results   2. Reviewed Current Medicines   3.  On meal/ Correction bolus Humalog/ Basal Lantus Insulin regime /  4. Monitor Blood glucose frequently   5. Modified  the dose of Insulin/ other medicines as needed   6. Will follow     .      David Mario MD

## 2020-09-23 ENCOUNTER — APPOINTMENT (OUTPATIENT)
Dept: GENERAL RADIOLOGY | Age: 50
DRG: 291 | End: 2020-09-23
Payer: MEDICARE

## 2020-09-23 ENCOUNTER — APPOINTMENT (OUTPATIENT)
Dept: CT IMAGING | Age: 50
DRG: 291 | End: 2020-09-23
Payer: MEDICARE

## 2020-09-23 ENCOUNTER — HOSPITAL ENCOUNTER (INPATIENT)
Age: 50
LOS: 8 days | Discharge: NURSING, REHAB OR LONG-TERM CARE | DRG: 291 | End: 2020-10-01
Attending: EMERGENCY MEDICINE | Admitting: INTERNAL MEDICINE
Payer: MEDICARE

## 2020-09-23 PROBLEM — R17 ELEVATED BILIRUBIN: Status: ACTIVE | Noted: 2020-09-23

## 2020-09-23 PROBLEM — R06.03 ACUTE RESPIRATORY DISTRESS: Status: ACTIVE | Noted: 2020-09-23

## 2020-09-23 PROBLEM — E87.8 ELECTROLYTE DISTURBANCE: Status: ACTIVE | Noted: 2020-09-23

## 2020-09-23 LAB
ALBUMIN SERPL-MCNC: 3.8 GM/DL (ref 3.4–5)
ALP BLD-CCNC: 192 IU/L (ref 40–129)
ALT SERPL-CCNC: 22 U/L (ref 10–40)
ANION GAP SERPL CALCULATED.3IONS-SCNC: 17 MMOL/L (ref 4–16)
AST SERPL-CCNC: 37 IU/L (ref 15–37)
BASOPHILS ABSOLUTE: 0.1 K/CU MM
BASOPHILS RELATIVE PERCENT: 0.7 % (ref 0–1)
BILIRUB SERPL-MCNC: 10.2 MG/DL (ref 0–1)
BILIRUBIN DIRECT: 8.1 MG/DL (ref 0–0.3)
BUN BLDV-MCNC: 41 MG/DL (ref 6–23)
CALCIUM SERPL-MCNC: 9.6 MG/DL (ref 8.3–10.6)
CHLORIDE BLD-SCNC: 83 MMOL/L (ref 99–110)
CO2: 30 MMOL/L (ref 21–32)
CREAT SERPL-MCNC: 1.6 MG/DL (ref 0.9–1.3)
DIFFERENTIAL TYPE: ABNORMAL
EOSINOPHILS ABSOLUTE: 0.1 K/CU MM
EOSINOPHILS RELATIVE PERCENT: 0.7 % (ref 0–3)
GFR AFRICAN AMERICAN: 56 ML/MIN/1.73M2
GFR NON-AFRICAN AMERICAN: 46 ML/MIN/1.73M2
GLUCOSE BLD-MCNC: 125 MG/DL (ref 70–99)
GLUCOSE BLD-MCNC: 178 MG/DL (ref 70–99)
GLUCOSE BLD-MCNC: 64 MG/DL (ref 70–99)
HAV IGM SER IA-ACNC: NON REACTIVE
HCT VFR BLD CALC: 30.3 % (ref 42–52)
HEMOGLOBIN: 8.8 GM/DL (ref 13.5–18)
HEPATITIS B CORE IGM ANTIBODY: NON REACTIVE
HEPATITIS B SURFACE ANTIGEN: NON REACTIVE
HEPATITIS C ANTIBODY: NON REACTIVE
IMMATURE NEUTROPHIL %: 1.5 % (ref 0–0.43)
LYMPHOCYTES ABSOLUTE: 1.4 K/CU MM
LYMPHOCYTES RELATIVE PERCENT: 16.8 % (ref 24–44)
MAGNESIUM: 2.1 MG/DL (ref 1.8–2.4)
MCH RBC QN AUTO: 19.5 PG (ref 27–31)
MCHC RBC AUTO-ENTMCNC: 29 % (ref 32–36)
MCV RBC AUTO: 67 FL (ref 78–100)
MONOCYTES ABSOLUTE: 0.8 K/CU MM
MONOCYTES RELATIVE PERCENT: 9.6 % (ref 0–4)
NUCLEATED RBC %: 3.6 %
PDW BLD-RTO: 23.3 % (ref 11.7–14.9)
PLATELET # BLD: 291 K/CU MM (ref 140–440)
PMV BLD AUTO: 10.4 FL (ref 7.5–11.1)
POTASSIUM SERPL-SCNC: 3.1 MMOL/L (ref 3.5–5.1)
PRO-BNP: ABNORMAL PG/ML
RBC # BLD: 4.52 M/CU MM (ref 4.6–6.2)
SEGMENTED NEUTROPHILS ABSOLUTE COUNT: 5.8 K/CU MM
SEGMENTED NEUTROPHILS RELATIVE PERCENT: 70.7 % (ref 36–66)
SODIUM BLD-SCNC: 130 MMOL/L (ref 135–145)
TOTAL IMMATURE NEUTOROPHIL: 0.12 K/CU MM
TOTAL NUCLEATED RBC: 0.3 K/CU MM
TOTAL PROTEIN: 6.5 GM/DL (ref 6.4–8.2)
TROPONIN T: 0.03 NG/ML
TROPONIN T: 0.04 NG/ML
TSH HIGH SENSITIVITY: 6.59 UIU/ML (ref 0.27–4.2)
WBC # BLD: 8.2 K/CU MM (ref 4–10.5)

## 2020-09-23 PROCEDURE — 71045 X-RAY EXAM CHEST 1 VIEW: CPT

## 2020-09-23 PROCEDURE — 80074 ACUTE HEPATITIS PANEL: CPT

## 2020-09-23 PROCEDURE — 84443 ASSAY THYROID STIM HORMONE: CPT

## 2020-09-23 PROCEDURE — 6370000000 HC RX 637 (ALT 250 FOR IP): Performed by: NURSE PRACTITIONER

## 2020-09-23 PROCEDURE — 6370000000 HC RX 637 (ALT 250 FOR IP): Performed by: EMERGENCY MEDICINE

## 2020-09-23 PROCEDURE — 94761 N-INVAS EAR/PLS OXIMETRY MLT: CPT

## 2020-09-23 PROCEDURE — 6360000004 HC RX CONTRAST MEDICATION: Performed by: EMERGENCY MEDICINE

## 2020-09-23 PROCEDURE — 83880 ASSAY OF NATRIURETIC PEPTIDE: CPT

## 2020-09-23 PROCEDURE — 83540 ASSAY OF IRON: CPT

## 2020-09-23 PROCEDURE — 93005 ELECTROCARDIOGRAM TRACING: CPT | Performed by: EMERGENCY MEDICINE

## 2020-09-23 PROCEDURE — 74177 CT ABD & PELVIS W/CONTRAST: CPT

## 2020-09-23 PROCEDURE — 82962 GLUCOSE BLOOD TEST: CPT

## 2020-09-23 PROCEDURE — 82248 BILIRUBIN DIRECT: CPT

## 2020-09-23 PROCEDURE — 6370000000 HC RX 637 (ALT 250 FOR IP): Performed by: HOSPITALIST

## 2020-09-23 PROCEDURE — 2580000003 HC RX 258: Performed by: NURSE PRACTITIONER

## 2020-09-23 PROCEDURE — 99285 EMERGENCY DEPT VISIT HI MDM: CPT

## 2020-09-23 PROCEDURE — 2500000003 HC RX 250 WO HCPCS: Performed by: NURSE PRACTITIONER

## 2020-09-23 PROCEDURE — 85025 COMPLETE CBC W/AUTO DIFF WBC: CPT

## 2020-09-23 PROCEDURE — 36415 COLL VENOUS BLD VENIPUNCTURE: CPT

## 2020-09-23 PROCEDURE — 84484 ASSAY OF TROPONIN QUANT: CPT

## 2020-09-23 PROCEDURE — 83735 ASSAY OF MAGNESIUM: CPT

## 2020-09-23 PROCEDURE — 83550 IRON BINDING TEST: CPT

## 2020-09-23 PROCEDURE — 2140000000 HC CCU INTERMEDIATE R&B

## 2020-09-23 PROCEDURE — 94660 CPAP INITIATION&MGMT: CPT

## 2020-09-23 PROCEDURE — 2580000003 HC RX 258: Performed by: EMERGENCY MEDICINE

## 2020-09-23 PROCEDURE — 80053 COMPREHEN METABOLIC PANEL: CPT

## 2020-09-23 PROCEDURE — 6360000002 HC RX W HCPCS: Performed by: EMERGENCY MEDICINE

## 2020-09-23 PROCEDURE — 96374 THER/PROPH/DIAG INJ IV PUSH: CPT

## 2020-09-23 PROCEDURE — 2700000000 HC OXYGEN THERAPY PER DAY

## 2020-09-23 PROCEDURE — 94640 AIRWAY INHALATION TREATMENT: CPT

## 2020-09-23 RX ORDER — PROMETHAZINE HYDROCHLORIDE 25 MG/1
12.5 TABLET ORAL EVERY 6 HOURS PRN
Status: DISCONTINUED | OUTPATIENT
Start: 2020-09-23 | End: 2020-10-01 | Stop reason: HOSPADM

## 2020-09-23 RX ORDER — SODIUM CHLORIDE 0.9 % (FLUSH) 0.9 %
10 SYRINGE (ML) INJECTION PRN
Status: DISCONTINUED | OUTPATIENT
Start: 2020-09-23 | End: 2020-10-01 | Stop reason: HOSPADM

## 2020-09-23 RX ORDER — NICOTINE 21 MG/24HR
1 PATCH, TRANSDERMAL 24 HOURS TRANSDERMAL DAILY
Status: DISCONTINUED | OUTPATIENT
Start: 2020-09-23 | End: 2020-10-01 | Stop reason: HOSPADM

## 2020-09-23 RX ORDER — ONDANSETRON 2 MG/ML
4 INJECTION INTRAMUSCULAR; INTRAVENOUS EVERY 6 HOURS PRN
Status: DISCONTINUED | OUTPATIENT
Start: 2020-09-23 | End: 2020-10-01 | Stop reason: HOSPADM

## 2020-09-23 RX ORDER — IPRATROPIUM BROMIDE AND ALBUTEROL SULFATE 2.5; .5 MG/3ML; MG/3ML
1 SOLUTION RESPIRATORY (INHALATION) 4 TIMES DAILY
Status: DISCONTINUED | OUTPATIENT
Start: 2020-09-23 | End: 2020-09-24 | Stop reason: SDUPTHER

## 2020-09-23 RX ORDER — BUMETANIDE 0.25 MG/ML
0.5 INJECTION, SOLUTION INTRAMUSCULAR; INTRAVENOUS 2 TIMES DAILY
Status: DISCONTINUED | OUTPATIENT
Start: 2020-09-23 | End: 2020-09-24

## 2020-09-23 RX ORDER — ACETAMINOPHEN 325 MG/1
650 TABLET ORAL EVERY 6 HOURS PRN
Status: DISCONTINUED | OUTPATIENT
Start: 2020-09-23 | End: 2020-10-01 | Stop reason: HOSPADM

## 2020-09-23 RX ORDER — POTASSIUM CHLORIDE 20 MEQ/1
40 TABLET, EXTENDED RELEASE ORAL PRN
Status: DISCONTINUED | OUTPATIENT
Start: 2020-09-23 | End: 2020-10-01 | Stop reason: HOSPADM

## 2020-09-23 RX ORDER — PANTOPRAZOLE SODIUM 40 MG/1
40 TABLET, DELAYED RELEASE ORAL
Status: DISCONTINUED | OUTPATIENT
Start: 2020-09-24 | End: 2020-10-01 | Stop reason: HOSPADM

## 2020-09-23 RX ORDER — METOPROLOL SUCCINATE 25 MG/1
25 TABLET, EXTENDED RELEASE ORAL 2 TIMES DAILY
Status: DISCONTINUED | OUTPATIENT
Start: 2020-09-23 | End: 2020-10-01 | Stop reason: HOSPADM

## 2020-09-23 RX ORDER — ASPIRIN 81 MG/1
81 TABLET ORAL DAILY
Status: DISCONTINUED | OUTPATIENT
Start: 2020-09-23 | End: 2020-10-01 | Stop reason: HOSPADM

## 2020-09-23 RX ORDER — FUROSEMIDE 10 MG/ML
20 INJECTION INTRAMUSCULAR; INTRAVENOUS ONCE
Status: COMPLETED | OUTPATIENT
Start: 2020-09-23 | End: 2020-09-23

## 2020-09-23 RX ORDER — ASPIRIN 81 MG/1
324 TABLET, CHEWABLE ORAL ONCE
Status: COMPLETED | OUTPATIENT
Start: 2020-09-23 | End: 2020-09-23

## 2020-09-23 RX ORDER — POLYETHYLENE GLYCOL 3350 17 G/17G
17 POWDER, FOR SOLUTION ORAL DAILY PRN
Status: DISCONTINUED | OUTPATIENT
Start: 2020-09-23 | End: 2020-10-01 | Stop reason: HOSPADM

## 2020-09-23 RX ORDER — ACETAMINOPHEN 650 MG/1
650 SUPPOSITORY RECTAL EVERY 6 HOURS PRN
Status: DISCONTINUED | OUTPATIENT
Start: 2020-09-23 | End: 2020-10-01 | Stop reason: HOSPADM

## 2020-09-23 RX ORDER — ATORVASTATIN CALCIUM 40 MG/1
40 TABLET, FILM COATED ORAL DAILY
Status: DISCONTINUED | OUTPATIENT
Start: 2020-09-23 | End: 2020-10-01 | Stop reason: HOSPADM

## 2020-09-23 RX ORDER — NITROGLYCERIN 0.4 MG/1
0.4 TABLET SUBLINGUAL EVERY 5 MIN PRN
Status: DISCONTINUED | OUTPATIENT
Start: 2020-09-23 | End: 2020-10-01 | Stop reason: HOSPADM

## 2020-09-23 RX ORDER — IPRATROPIUM BROMIDE AND ALBUTEROL SULFATE 2.5; .5 MG/3ML; MG/3ML
1 SOLUTION RESPIRATORY (INHALATION)
Status: DISCONTINUED | OUTPATIENT
Start: 2020-09-23 | End: 2020-10-01 | Stop reason: HOSPADM

## 2020-09-23 RX ORDER — SODIUM CHLORIDE 0.9 % (FLUSH) 0.9 %
10 SYRINGE (ML) INJECTION EVERY 12 HOURS SCHEDULED
Status: DISCONTINUED | OUTPATIENT
Start: 2020-09-23 | End: 2020-10-01 | Stop reason: HOSPADM

## 2020-09-23 RX ORDER — FERROUS SULFATE 325(65) MG
325 TABLET ORAL 2 TIMES DAILY WITH MEALS
Status: DISCONTINUED | OUTPATIENT
Start: 2020-09-23 | End: 2020-10-01 | Stop reason: HOSPADM

## 2020-09-23 RX ORDER — INSULIN GLARGINE 100 [IU]/ML
10 INJECTION, SOLUTION SUBCUTANEOUS NIGHTLY
Status: DISCONTINUED | OUTPATIENT
Start: 2020-09-23 | End: 2020-10-01 | Stop reason: HOSPADM

## 2020-09-23 RX ORDER — IPRATROPIUM BROMIDE AND ALBUTEROL SULFATE 2.5; .5 MG/3ML; MG/3ML
1 SOLUTION RESPIRATORY (INHALATION)
Status: DISCONTINUED | OUTPATIENT
Start: 2020-09-23 | End: 2020-09-23 | Stop reason: SDUPTHER

## 2020-09-23 RX ORDER — POTASSIUM CHLORIDE 7.45 MG/ML
10 INJECTION INTRAVENOUS PRN
Status: DISCONTINUED | OUTPATIENT
Start: 2020-09-23 | End: 2020-10-01 | Stop reason: HOSPADM

## 2020-09-23 RX ADMIN — METOPROLOL SUCCINATE 25 MG: 25 TABLET, EXTENDED RELEASE ORAL at 21:25

## 2020-09-23 RX ADMIN — SODIUM CHLORIDE, PRESERVATIVE FREE 10 ML: 5 INJECTION INTRAVENOUS at 13:34

## 2020-09-23 RX ADMIN — FERROUS SULFATE TAB 325 MG (65 MG ELEMENTAL FE) 325 MG: 325 (65 FE) TAB at 18:27

## 2020-09-23 RX ADMIN — IPRATROPIUM BROMIDE AND ALBUTEROL SULFATE 3 ML: .5; 3 SOLUTION RESPIRATORY (INHALATION) at 19:53

## 2020-09-23 RX ADMIN — POTASSIUM BICARBONATE 40 MEQ: 782 TABLET, EFFERVESCENT ORAL at 12:43

## 2020-09-23 RX ADMIN — IPRATROPIUM BROMIDE AND ALBUTEROL SULFATE 1 AMPULE: .5; 3 SOLUTION RESPIRATORY (INHALATION) at 18:23

## 2020-09-23 RX ADMIN — INSULIN GLARGINE 10 UNITS: 100 INJECTION, SOLUTION SUBCUTANEOUS at 23:36

## 2020-09-23 RX ADMIN — ASPIRIN 81 MG: 81 TABLET, COATED ORAL at 18:27

## 2020-09-23 RX ADMIN — IPRATROPIUM BROMIDE AND ALBUTEROL SULFATE 1 AMPULE: .5; 3 SOLUTION RESPIRATORY (INHALATION) at 12:14

## 2020-09-23 RX ADMIN — BUMETANIDE 0.5 MG: 0.25 INJECTION INTRAMUSCULAR; INTRAVENOUS at 21:27

## 2020-09-23 RX ADMIN — FUROSEMIDE 20 MG: 10 INJECTION, SOLUTION INTRAVENOUS at 12:45

## 2020-09-23 RX ADMIN — ATORVASTATIN CALCIUM 40 MG: 40 TABLET, FILM COATED ORAL at 21:24

## 2020-09-23 RX ADMIN — RIVAROXABAN 20 MG: 20 TABLET, FILM COATED ORAL at 18:27

## 2020-09-23 RX ADMIN — ASPIRIN 81 MG CHEWABLE TABLET 324 MG: 81 TABLET CHEWABLE at 12:44

## 2020-09-23 RX ADMIN — SODIUM CHLORIDE, PRESERVATIVE FREE 10 ML: 5 INJECTION INTRAVENOUS at 21:28

## 2020-09-23 RX ADMIN — IOPAMIDOL 80 ML: 755 INJECTION, SOLUTION INTRAVENOUS at 13:34

## 2020-09-23 ASSESSMENT — PAIN SCALES - GENERAL
PAINLEVEL_OUTOF10: 0
PAINLEVEL_OUTOF10: 0

## 2020-09-23 NOTE — ED NOTES
Report given to [facility] for room [address] [place] [address]  09/23/20 8821

## 2020-09-23 NOTE — H&P
History and Physical  Lei Armando [facility]   Internal Medicine Hospitalist      Name:  Vikki Mckinney /Age/Sex: 1970  (52 y.o. male)   MRN & CSN:  [id number] & [id number] Admission Date/Time: 2020 11:15 AM   Location:  ED20/ED-20 PCP: No primary care provider on file. Hospital Day: 1      Supervising Physician: Dr. David Sloan      Chief Complaint: Shortness of Breath     Assessment and Plan:   Vikki Mckinney is a 52 y.o. male who presents with Acute respiratory distress     Acute respiratory distress, 2/2 fluid overload r/t CHF exacerbation - requires BiPAP with improvement.  Acute on chronic combined systolic and diastolic HF         - was admitted last 2020 for similar problem         - denied cp, b/l 3+ LE edema, BNP 14,519 (BL 3,975)         - initial trop 0.028 (BL 0.045), elevated trop could be r/t CKD          - Last Echo (2020) EF 10 -15%, Global hypokinesis         - CT A/P shows Stable fluid within the right minor fissure, Cardiomegaly         - consult Cardiology, Dr. Radha New         - trend trop         - Bumex BID, no ACEI d/t CKD         - daily weights, monitor I/O         - monitor BNP         - pending TSH     CKD - presenting Crea level within baseline.    Electrolyte disturbance - hyponatremia (130), hypokalemia (3.1), Cl (83), A/G 17         - replete and re-check          - avoid nephrotoxic agents         - check lab works in AM         - consider Nephrology consult if no improvement     Elevated bilirubin - Bilirubin 10.2, direct bilirubin 8.1         - likely related to above condition         - pt appears jaundiced but denied abdominal pain         - GI, Dr Guanaco Tadeo resulted in ED, will see patient       DM type 2 - episode of hypoglycemia in ED, serum glucose 64         - follow hypoglycemic protocol         - cont home dose Lantus + low Sliding scale         - monitor accucheck         - diabetic diet     COPD - not in exacerbation, no wheezing, increased SOB likely 2/2 CHF . - continue home breathing treatment         - pulse ox monitoring     Tobacco abuse - on Nicotine patch, tobacco cessation education.  Chronic Illnesses:           - CAD, s/p CABG x4 -continue on aspirin, Toprol-XL, Lipitor.         - opiate/heroin abuse         - hyperlipidemia         - hypertension         - osteoarthritis      Current diagnosis and plan of management discussed with the patient at the time of admission in lay language who agree to the above plan and disposition of admission for further care. All concerns and questions addressed. Patient assessment and plan in conjunction with supervising physician - Dr. Jacquelyn Han; Carb Control: 4 carb choices (60 gms)/meal    DVT Prophylaxis [] Lovenox, []  Heparin, [] SCDs, [] Ambulation  [] Long term AC  Patient is on Xarelto. GI Prophylaxis [x] PPI,  [] H2 Blocker,  [] Carafate,  [] Diet,  [] No GI prophylaxis, N/A: patient is not under significant medical stress, non-ICU or is receiving a diet/tube feeds   Code Status DNR-CCA   Disposition Patient requires continued admission due to Acute respiratory distress. Discharge Plan: Patient plans to return home upon discharge. MDM [] Low, [x] Moderate,[]  High  Patient's risk as above due to:      [x] One or more chronic illnesses with mild exacerbation progression      [] Two or more stable chronic illnesses      [] Undiagnosed new problem with uncertain prognosis      [] Elective major surgery      []Prescription drug management     History of Present Illness:     Principal Problem: Acute respiratory distress  Libia Umanzor is a 52 y.o. male with past medical history of chronic combined systolic CHF, CAD, s/p CABG x4, COPD, opiate/heroin abuse, CKD, DM type 2, hyperlipidemia, hypertension, osteoarthritis, tobacco abuse presented to the ED from home with complaints of worsening shortness of breath.   Chart review, patient was admitted for similar complaints last 8/9/2020. Patient reports that he has been taking his home medications but does not follow up after his last discharge with his doctors due to trouble finding a ride, and he lives by himself. Patient is supposed to follow-up with cardiologist, Dr. Sayra white. Reports worsening of his bilateral lower extremity swelling, causing difficulty to walk and ambulate. He denied oxygen use at home. Patient denies chest pain, palpitation, fever, chills or diaphoresis, cough, and SOB or difficulty breathing. Denies any other symptoms including headache, paresthesias, abdominal pain, nausea, vomiting, changes in bowels, dysuria, hematuria, frequency or urgency, and B/L weakness. Upon interview, the patient provided the history as above. ED Course: Discussed case with ED physician prior to admission. ROS: Ten point ROS reviewed and negative, unless as noted above per HPI. Objective:   No intake or output data in the 24 hours ending 09/23/20 1550     Vitals: Temp/Oral 97.9, RR 22  Vitals:    09/23/20 1345 09/23/20 1400 09/23/20 1415 09/23/20 1445   BP: 107/86 113/82 108/80 107/86   Pulse: 107 112 107 105   Resp:       Temp:       SpO2: 100% 98% 97% 97%     Physical Exam: 09/23/20    GEN  -Awake, alert, appearing anxious, agitated male, sitting upright in bed, cooperative, able to give adequate history. Appears given age. EYES -Pupils are equally round. No vision changes. No scleral erythema, discharge, or conjunctivitis. HENT -MM are moist. Oral pharynx without exudates, no evidence of thrush. NECK -Supple, no apparent thyromegaly or masses. RESP -tachypnea, LS diminished bilateral with scattered coarse crackles, no wheezes, no rhonchi. Symmetric chest movement. Acute respiratory distress noted. C/V  -S1/S2 auscultated. Tachycardia without appreciable M/R/G. No JVD or carotid bruits. Peripheral pulses equal bilaterally and palpable. Peripheral pulses equal bilaterally and palpable.  B/L 3+ LE edema. No reproducible chest wall tenderness. GI  -Abdomen is soft, round, non-distended, no significant tenderness. No masses or guarding. + BS in all quadrants. Rectal exam deferred.   -Hansen catheter is not present. LYMPH  -No palpable cervical lymphadenopathy and no hepatosplenomegaly. No petechiae or ecchymoses. MS  -B/L extremities strong muscles strength. Full movements. No gross joint deformities. B/L LE swelling, intact sensation symmetrical.   SKIN  - mild Jaudince coloration, warm, dry. No open wounds or ulcers. NEURO  -CN 2-12 appear grossly intact, normal speech, no lateralizing weakness. PSYC  -Awake, alert, oriented x 4. Anxious, agitated. Past Medical History:      Past Medical History:   Diagnosis Date    CAD (coronary artery disease)     [facility]     COPD [facility]chronic obstructive [facility] disease) [facility]     Depression     Drug abuse ([facility]     HIGH CHOLESTEROL     Hypertension     MI (myocardial infarction) ([facility] 2011    S/P coronary artery bypass graft x 4 2011    CABG x 4 Dr Steffi Xiong     Past Surgery History:  Patient  has a past surgical history that includes Cardiac surgery (11/2010); Bypass Graft (04/10/2011); and Leg Surgery. Social History:    FAM HX: Assessed: family history includes Cancer in his father; Diabetes in his mother; Heart Disease in his father and mother; Heart Failure in his father.   Soc HX:   Social History     Socioeconomic History    Marital status:      Spouse name: None    Number of children: None    Years of education: None    Highest education level: None   Occupational History    None   Social Needs    Financial resource strain: None    Food insecurity     Worry: None     Inability: None    Transportation needs     Medical: None     Non-medical: None   Tobacco Use    Smoking status: Current Every Day Smoker     Packs/day: 1.00     Years: 20.00     Pack years: 20.00     Types: Cigarettes    Smokeless tobacco: Former User    Tobacco comment: Reviewed 10/9/17   Substance and Sexual Activity    Alcohol use: No     Alcohol/week: 0.0 standard drinks    Drug use: No     Comment: march 2018 states he quit    Sexual activity: Not Currently   Lifestyle    Physical activity     Days per week: None     Minutes per session: None    Stress: None   Relationships    Social connections     Talks on phone: None     Gets together: None     Attends Synagogue service: None     Active member of club or organization: None     Attends meetings of clubs or organizations: None     Relationship status: None    Intimate partner violence     Fear of current or ex partner: None     Emotionally abused: None     Physically abused: None     Forced sexual activity: None   Other Topics Concern    None   Social History Narrative    None     TOBACCO:   reports that he has been smoking cigarettes. He has a 20.00 pack-year smoking history. He has quit using smokeless tobacco.  ETOH:   reports no history of alcohol use. Drugs:  reports no history of drug use. Allergies: No Known Allergies  Medications:   Medications:    ipratropium-albuterol  1 ampule Inhalation Q4H WA      Infusions:   PRN Meds: sodium chloride flush, 10 mL, PRN      Prior to Admission Meds:  Prior to Admission medications    Medication Sig Start Date End Date Taking?  Authorizing Provider   metoprolol succinate (TOPROL XL) 25 MG extended release tablet Take 0.5 tablets by mouth daily  Patient taking differently: Take 25 mg by mouth 2 times daily  8/13/20  Yes Korin Hopkins MD   bumetanide (BUMEX) 2 MG tablet Take orally 3 times daily on MWF and take twice daily on TTSS  Patient taking differently: Take 2 mg by mouth 2 times daily 09/23/2020 States he took 4 mg this am 8/12/20  Yes Korin Hopkins MD   insulin glargine (LANTUS) 100 UNIT/ML injection vial Inject 10 Units into the skin nightly 8/12/20   Korin Hopkins MD   pantoprazole (PROTONIX) 40 MG tablet Take PELVIS WITH CONTRAST 9/23/2020 1:32 pm TECHNIQUE: CT of the abdomen and pelvis was performed with the administration of intravenous contrast. Multiplanar reformatted images are provided for review. Dose modulation, iterative reconstruction, and/or weight based adjustment of the mA/kV was utilized to reduce the radiation dose to as low as reasonably achievable. COMPARISON: CT abdomen pelvis without contrast 07/03/2020, 05/18/2020 HISTORY: ORDERING SYSTEM PROVIDED HISTORY: hyperbilirubinemia TECHNOLOGIST PROVIDED HISTORY: Reason for exam:->hyperbilirubinemia Additional Contrast?->None Reason for Exam: hyperbilirubinemia Acuity: Acute Type of Exam: Initial FINDINGS: Lower Chest: Stable fluid within the right minor fissure. Cardiomegaly. Right basilar airspace opacities may represent atelectasis or pneumonia. Organs: Diffusely heterogenous appearance of the liver may be related to hepatic congestion. Liver is enlarged measuring 20 cm craniocaudal dimension. Spleen, pancreas, adrenal glands, and gallbladder within normal limits. Multifocal scarring involving the right kidney. Left kidney is within normal limits. GI/Bowel: No bowel obstruction. Normal appendix. Pelvis: Urinary bladder is not well distended and difficult to evaluate. Unremarkable prostate. Peritoneum/Retroperitoneum: Moderate amount of abdominal ascites. No free air. No lymphadenopathy by size criteria. Vascular: Diffuse atherosclerotic calcification of the abdominal aorta and branching vessels with redemonstration of high-grade stenosis involving the right common iliac artery. Bones/Soft Tissues: No acute osseous abnormality. Diffuse heterogeneity of the enlarged liver may be related to hepatic congestion. Moderate amount of abdominal ascites. Diffuse atherosclerotic calcification of the abdominal aorta and branching vessels with high-grade stenosis involving the right common iliac artery. Stable fluid within the right minor fissure. Cardiomegaly. Right basilar airspace opacities may represent atelectasis or pneumonia. Xr Chest Portable    Result Date: 9/23/2020  EXAMINATION: ONE XRAY VIEW OF THE CHEST 9/23/2020 12:08 pm COMPARISON: 08/09/2020 HISTORY: ORDERING SYSTEM PROVIDED HISTORY: SOB TECHNOLOGIST PROVIDED HISTORY: Reason for exam:->SOB Reason for Exam: sob Acuity: Unknown Type of Exam: Unknown Relevant Medical/Surgical History: cad   copd   chf FINDINGS: Status post median sternotomy. Stable focal opacity in the right lung base correlating to loculated small right pleural effusion. Left lung is clear. No pneumothorax. No new focal consolidation. No overt pulmonary edema. The osseous structures are stable. Stable exam with small partially loculated right pleural effusion. No new acute cardiopulmonary findings. EKG this visit:   EKG: No available ECG to review during assessment/interview. Please see ED notes.     Current Treatment Team:  Treatment Team: Attending Provider: Ileana Devlin MD; Consulting Physician: Viv Moreno MD; Registered Nurse: Margo Rous, RN Simmie Sever, APRN-BC   [facility]  9/23/2020 3:50 PM      Electronically signed by Simmie Sever, APRN - CNP on 9/23/2020 at 3:50 PM

## 2020-09-23 NOTE — ED TRIAGE NOTES
Pt presents to ED for SOB that has been ongoing for 3 days, pt states he knows he is full of fluid due to his CHF.  Pt states he doubled his lasix this morning

## 2020-09-23 NOTE — ED NOTES
Pt repeatedly removes BIPAP machine, states it is uncomfortable and not working [employer] it normally does\". Respiratory notified of pt's discomfort and states they will be down to assess equipment.        Jailyn Mcnair, MARIO  09/23/20 400 [facility], RN  09/23/20 [id number]

## 2020-09-23 NOTE — ED PROVIDER NOTES
Triage Chief Complaint:   Shortness of Breath    [place]:  Vikki Mckinney is a 52 y.o. male that presents with increased work of breathing and shortness of breath. Patient reports \"I am full of fluid\". Patient reports history with severe congestive heart failure and that his symptoms have been progressive for the last several days. Patient reports that he even doubled his Lasix this morning with limited urine output. Patient reports \"I am only getting dribbles out\". No chest pain. Some shortness of breath. No fevers. No coughing. Patient does not have any home oxygen requirement. Patient does have underlying COPD in addition to his severe CHF. Patient is on Xarelto and aspirin for anticoagulation.     ROS:  General:  No fevers, no chills, no weakness  Eyes:  No recent vison changes, no discharge  ENT:  No sore throat, no nasal congestion, no hearing changes  Cardiovascular:  No chest pain, no palpitations  Respiratory:  + shortness of breath, no cough, no wheezing  Gastrointestinal:  No pain, no nausea, no vomiting, no diarrhea, + bloating  Musculoskeletal:  No muscle pain, no joint pain  Skin:  No rash, no pruritis, no easy bruising  Neurologic:  No speech problems, no headache, no extremity numbness, no extremity tingling, no extremity weakness  Psychiatric:  No anxiety  Genitourinary:  No dysuria, no hematuria  Endocrine:  No unexpected weight gain, no unexpected weight loss  Extremities:  + edema, no pain    Past Medical History:   Diagnosis Date    CAD (coronary artery disease)     [facility]     COPD [facility]     Depression     Drug abuse ([facility] [address]     HIGH CHOLESTEROL     Hypertension     MI (myocardial infarction) ([facility] [address] 2011    S/P coronary artery bypass graft x 4 2011    CABG x 4 Dr Jayy Montalvo     Past Surgical History:   Procedure Laterality Date    BYPASS GRAFT  04/10/2011    CABG x 4 Dr Suhas Vasquez  11/2010     4 MD Prem   1 ampule at 09/23/20 1214    sodium chloride flush 0.9 % injection 10 mL  10 mL Intravenous PRN Yao Augustine MD   10 mL at 09/23/20 1334     Current Outpatient Medications   Medication Sig Dispense Refill    metoprolol succinate (TOPROL XL) 25 MG extended release tablet Take 0.5 tablets by mouth daily (Patient taking differently: Take 25 mg by mouth 2 times daily ) 30 tablet 3    bumetanide (BUMEX) 2 MG tablet Take orally 3 times daily on MWF and take twice daily on TTSS (Patient taking differently: Take 2 mg by mouth 2 times daily 09/23/2020 States he took 4 mg this am) 90 tablet 2    insulin glargine (LANTUS) 100 UNIT/ML injection vial Inject 10 Units into the skin nightly 1 vial 3    pantoprazole (PROTONIX) 40 MG tablet Take 1 tablet by mouth every morning (before breakfast) 90 tablet 1    blood glucose monitor kit and supplies Dispense sufficient amount for indicated testing frequency plus additional to accommodate PRN testing needs. Dispense all needed supplies to include: monitor, strips, lancing device, lancets, control solutions, alcohol swabs. 1 kit 0    blood glucose monitor strips Test 2 times a day & as needed for symptoms of irregular blood glucose. Dispense sufficient amount for indicated testing frequency plus additional to accommodate PRN testing needs.  100 strip 0    FreeStyle Lancets MISC 1 each by Does not apply route daily 100 each 3    Insulin Syringe-Needle U-100 (AIMSCO INS SYR .3CC/29GX0.5\") 29G X 1/2\" 0.3 ML MISC 1 each by Does not apply route daily 100 each 3    rivaroxaban (XARELTO) 20 MG TABS tablet Take 20 mg by mouth      aspirin 81 MG EC tablet Take 1 tablet by mouth daily 30 tablet 0    ipratropium-albuterol (DUONEB) 0.5-2.5 (3) MG/3ML SOLN nebulizer solution Inhale 3 mLs into the lungs 4 times daily 360 mL 1    atorvastatin (LIPITOR) 40 MG tablet Take 1 tablet by mouth daily 30 tablet 0     No Known Allergies    Nursing Notes Reviewed    Physical Exam:  ED Triage Vitals   Enc Vitals Group      BP 09/23/20 1130 109/82      Pulse 09/23/20 1130 104      Resp 09/23/20 1137 22      Temp 09/23/20 1130 97.9 °F (36.6 °C)      Temp src --       SpO2 09/23/20 1130 98 %      Weight --       Height --       Head Circumference --       Peak Flow --       Pain Score --       Pain Loc --       Pain Edu? --       Excl. in [address]? --        My pulse ox interpretation is - normal    General appearance: Appears chronically ill and older than stated age. Skin:  Warm. Dry. Jaundiced. Eye:  Extraocular movements intact. Scleral icterus present bilaterally. Ears, nose, mouth and throat:  Oral mucosa moist   Neck:  Trachea midline. Extremity: 2+ and symmetric pitting edema to bilateral legs with slightly weeping/taut skin. Normal ROM     Heart: Tachycardic but regular, normal S1 & S2, systolic ejection murmur noted. Perfusion:  Intact. Peripheral pulses are intact. Respiratory: Tachypnea present. Increased work of breathing present. Globally diminished breath sounds. Speaking clearly in short sentences. Some expiratory wheezing. Abdominal:  Normal bowel sounds. Soft. Nontender. Slightly distended but overall nontender. Back:  No CVA tenderness to palpation     Neurological:  Alert and oriented times 3. No focal neuro deficits.  Sensation intact to light touch to distal upper/lower extremities; 5/5 and symmetric  and dorsi/plantar flexion            Psychiatric:  Appropriate    I have reviewed and interpreted all of the currently available lab results from this visit (if applicable):  Results for orders placed or performed during the hospital encounter of 09/23/20   Brain Natriuretic Peptide   Result Value Ref Range    Pro-BNP 14,519 (H) <300 PG/ML   Troponin   Result Value Ref Range    Troponin T 0.028 (H) <0.01 NG/ML   CMP   Result Value Ref Range    Sodium 130 (L) 135 - 145 MMOL/L    Potassium 3.1 (L) 3.5 - 5.1 MMOL/L    Chloride 83 (L) 99 - 110 mMol/L    CO2 30 21 - 32 MMOL/L    BUN 41 (H) 6 - 23 MG/DL    CREATININE 1.6 (H) 0.9 - 1.3 MG/DL    Glucose 64 (L) 70 - 99 MG/DL    Calcium 9.6 8.3 - 10.6 MG/DL    Alb 3.8 3.4 - 5.0 GM/DL    Total Protein 6.5 6.4 - 8.2 GM/DL    Total Bilirubin 10.2 (H) 0.0 - 1.0 MG/DL    ALT 22 10 - 40 U/L    AST 37 15 - 37 IU/L    Alkaline Phosphatase 192 (H) 40 - 129 IU/L    GFR Non- 46 (L) >60 mL/min/1.73m2    GFR  56 (L) >60 mL/min/1.73m2    Anion Gap 17 (H) 4 - 16   CBC auto diff   Result Value Ref Range    WBC 8.2 4.0 - 10.5 K/CU MM    RBC 4.52 (L) 4.6 - 6.2 M/CU MM    Hemoglobin 8.8 (L) 13.5 - 18.0 GM/DL    Hematocrit 30.3 (L) 42 - 52 %    MCV 67.0 (L) 78 - 100 FL    MCH 19.5 (L) 27 - 31 PG    MCHC 29.0 (L) 32.0 - 36.0 %    RDW 23.3 (H) 11.7 - 14.9 %    Platelets 418 673 - 385 K/CU MM    MPV 10.4 7.5 - 11.1 FL    Differential Type AUTOMATED DIFFERENTIAL     Segs Relative 70.7 (H) 36 - 66 %    Lymphocytes % 16.8 (L) 24 - 44 %    Monocytes % 9.6 (H) 0 - 4 %    Eosinophils % 0.7 0 - 3 %    Basophils % 0.7 0 - 1 %    Segs Absolute 5.8 K/CU MM    Lymphocytes Absolute 1.4 K/CU MM    Monocytes Absolute 0.8 K/CU MM    Eosinophils Absolute 0.1 K/CU MM    Basophils Absolute 0.1 K/CU MM    Nucleated RBC % 3.6 %    Total Nucleated RBC 0.3 K/CU MM    Total Immature Neutrophil 0.12 K/CU MM    Immature Neutrophil % 1.5 (H) 0 - 0.43 %   Magnesium   Result Value Ref Range    Magnesium 2.1 1.8 - 2.4 mg/dl   Hepatitis Panel, Acute   Result Value Ref Range    Hepatitis B Surface Ag NON REACTIVE NON REACTIVE    Hep A IgM NON REACTIVE NON REACTIVE    Hep B Core Ab, IgM NON REACTIVE NON REACTIVE    Hepatitis C Ab NON REACTIVE NON REACTIVE   Bilirubin, Direct   Result Value Ref Range    Bilirubin, Direct 8.1 (H) 0.0 - 0.3 MG/DL      Radiographs (if obtained):  [] The following radiograph was interpreted by myself in the absence of a radiologist:   [x] Radiologist's Report Reviewed:  CT ABDOMEN PELVIS W IV CONTRAST Additional Contrast? None Final Result   Diffuse heterogeneity of the enlarged liver may be related to hepatic   congestion. Moderate amount of abdominal ascites. Diffuse atherosclerotic calcification of the abdominal aorta and branching   vessels with high-grade stenosis involving the right common iliac artery. Stable fluid within the right minor fissure. Cardiomegaly. Right basilar   airspace opacities may represent atelectasis or pneumonia. XR CHEST PORTABLE   Final Result   Stable exam with small partially loculated right pleural effusion. No new   acute cardiopulmonary findings. EKG (if obtained): (All EKG's are interpreted by myself in the absence of a cardiologist)  12 lead EKG per my interpretation:  Sinus Tachycardia at 107  Axis is   Normal  QTc is  slightly prolonged at 480  There is no specific T wave changes appreciated. There is no specific ST wave changes appreciated. Non-specific intraventricular block  No STEMI    Prior EKG to compare with was available and no clinically significant change over morphology when compared to prior. Chart review shows recent radiographs:  No results found. MDM:  Pt presents as above. Emergent conditions considered. Presentation prompted initial labs, EKG and imaging. EKG with a sinus tachycardia with PVCs as above. IV is established. Respiratory therapy was paged for breathing treatment and BiPAP for work of breathing. CBC with chronic anemia. No leukocytosis. CMP with mild hyponatremia, mild hypokalemia which is repleted, chronic kidney disease and marked hyperbilirubinemia. Direct bilirubin is markedly elevated and this is a direct hyperbilirubinemia. Hepatitis panel is negative. Magnesium within normal limits. BNP is elevated at 14,500 and suggestive of volume overload. Troponin is abnormal in the setting of recurrently abnormal troponins and chronic kidney disease and reassuring EKG doubt ACS at this time.     I did initiate some diuresis in the emergency department with 20 mg of IV Lasix. Gentle diuresis was initiated given patient's initial hypokalemia with concern of dropping this further. Patient tolerated BiPAP very well with marked improvement of work of breathing and was then able to tolerate oral food and drink. I did consult gastroenterologist, Dr. Jaylon Marques, and case was discussed at length. At this time, he feels that patient's hyperbilirubinemia is likely secondary to his severe congestive heart failure. I did perform CT imaging of patient's abdomen/pelvis that demonstrates hepatic congestion and moderate ascites but otherwise no acute process in the abdomen. Atelectasis versus pneumonia seen on the lower chest on CT but patient is without fever cough and I will hold antibiotics. I did discuss the case with hospitalist team and patient is admitted to medicine for further diuresis and evaluation/treatment. Questions sought and answered with the patient. They voice understanding and agree with plan. CRITICAL CARE NOTE:  There was a high probability of clinically significant life-threatening deterioration of the patient's condition requiring my urgent intervention due to respiratory failure. IV diuresis, noninvasive positive pressure ventilation, subspecialty consultation, telemetry monitoring and frequent reassessments throughout prolonged ED course as well as review of most recent hospitalization was performed to address this. Total critical care time is 45 minutes. This includes vital sign monitoring, pulse oximetry monitoring, telemetry monitoring, clinical response to the IV medications, reviewing the nursing notes, consultation time, dictation/documentation time, and interpretation of the lab work. This time excludes time spent performing procedures and separately billable procedures and family discussion time. Clinical Impression:  1. Hypervolemia, unspecified hypervolemia type    2.  Acute on chronic respiratory failure with hypoxia ([facility] [place] [address]      Disposition referral (if applicable):  No follow-up provider specified. Disposition medications (if applicable):  New Prescriptions    No medications on file       Comment: Please note this report has been produced using speech recognition software and may contain errors related to that system including errors in grammar, punctuation, and spelling, as well as words and phrases that may be inappropriate. If there are any questions or concerns please feel free to contact the dictating provider for clarification.        Serina Benito MD  09/23/20 3905

## 2020-09-23 NOTE — PROGRESS NOTES
Medication History  [facility]    Patient Name: Esther Johnson 1970     Medication history has been completed by: Tiffany Campos CPhT    Source(s) of information: patient brought meds from home and insurance claims     Primary Care Physician: No primary care provider on file. Pharmacy: [facility]    Allergies as of 09/23/2020    (No Known Allergies)        Prior to Admission medications    Medication Sig Start Date End Date Taking? Authorizing Provider   metoprolol succinate (TOPROL XL) 25 MG extended release tablet Take 0.5 tablets by mouth daily  Patient taking differently: Take 25 mg by mouth 2 times daily  8/13/20  Yes Cameron Cavazos MD   bumetanide (BUMEX) 2 MG tablet Take orally 3 times daily on MWF and take twice daily on TTSS  Patient taking differently: Take 2 mg by mouth 2 times daily 09/23/2020 States he took 4 mg this am 8/12/20  Yes Cameron Cavazos MD   insulin glargine (LANTUS) 100 UNIT/ML injection vial Inject 10 Units into the skin nightly 8/12/20   Cameron Cavazos MD   pantoprazole (PROTONIX) 40 MG tablet Take 1 tablet by mouth every morning (before breakfast) 8/12/20   Cameron Cavazos MD   blood glucose monitor kit and supplies Dispense sufficient amount for indicated testing frequency plus additional to accommodate PRN testing needs. Dispense all needed supplies to include: monitor, strips, lancing device, lancets, control solutions, alcohol swabs. 8/12/20   Cameron Cavazos MD   blood glucose monitor strips Test 2 times a day & as needed for symptoms of irregular blood glucose. Dispense sufficient amount for indicated testing frequency plus additional to accommodate PRN testing needs.  8/12/20   Cameron Cavazos MD   FreeStyle Lancets MISC 1 each by Does not apply route daily 8/12/20   Cameron Cavazos MD   Insulin Syringe-Needle U-100 (AIMSCO INS SYR .3CC/29GX0.5\") 29G X 1/2\" 0.3 ML MISC 1 each by Does not apply route daily 8/12/20   Cameron Cavazos MD rivaroxaban (XARELTO) 20 MG TABS tablet Take 20 mg by mouth    Historical Provider, MD   aspirin 81 MG EC tablet Take 1 tablet by mouth daily 5/22/20   Dereck Oviedo MD   ipratropium-albuterol (DUONEB) 0.5-2.5 (3) MG/3ML SOLN nebulizer solution Inhale 3 mLs into the lungs 4 times daily 5/22/20   Dereck Oviedo MD   atorvastatin (LIPITOR) 40 MG tablet Take 1 tablet by mouth daily 5/22/20   Dereck Oviedo MD     Medications added or changed (ex. new medication, dosage change, interval change, formulation change):  Bumex clarified dosage per prescription ordered    Comments:  Medication list reviewed with patient according to bottles he had available. According to claims last fill dates for medications were in July for 30 day dosing, feel patient is non compliant. Discussed  insulin with patient, he states takes 10 units TID,  plus what ever else is needed according to his BS. Patient could not inform interviewer of the kind of insulin he is using. [facility]     To my knowledge the above medication history is accurate as of 9/23/2020 3:01 PM.   Jeanine Griggs, [place]   9/23/2020 3:01 PM

## 2020-09-23 NOTE — ED NOTES
Pt states he has not been able to eat for 2 days due to the SOB. Pt also states he has been unable to shower or clean himself for a week due to inability to tolerate activity.       Kaleigh Marley RN  09/23/20 0045

## 2020-09-23 NOTE — CONSULTS
sclerae to be icteric. NECK:  Supple. CHEST:  Shows diminished breath sounds. HEART:  S1 and S2 are normal.  ABDOMEN:  Soft, nondistended, nontender. Liver edge is palpable, but  there is no guarding or rigidity. Ascites is present. RECTAL:  Deferred. CNS:  Shows the patient to be awake, alert, and oriented. There are no  focal sensorimotor signs. MUSCULOSKELETAL:  Shows yellow discoloration of the skin. LABORATORY DATA:  As above mentioned. IMPRESSION:  1. A 57-year-old white gentleman with multiple comorbidities including  severe congestive heart failure/cardiomyopathy, presents with  decompensation of his congestive heart failure and is noted to have  abnormal LFTs. 2.  Markedly elevated total bilirubin disproportionate to alkaline  phosphatase with no evidence of extrahepatic obstruction or focal liver  lesion. The elevated total bilirubin is most likely due to underlying  congestive hepatopathy and there might be an element of cardiac  cirrhosis as well. RECOMMENDATIONS:  1. Agree with present management. 2.  We will monitor the patient's LFTs and monitor for any signs of  acute liver decompensation. 3.  We will check PT/PTT, INR, and blood ammonia level in a.m. as well. 4.  The patient has been strongly instructed to quit smoking cigarettes. 5.  The case and plan have been discussed in detail with the patient. Pati Miner MD    D: 09/23/2020 15:54:45       T: 09/23/2020 17:00:33     [email or web address]  Job#: [id number]     Doc#: [id number]    CC:   Lg Gates MD

## 2020-09-23 NOTE — ED NOTES
Pt in bed comfortably, provided crackers and ice chips. Pt states no needs at this time.  Call light within reach      Tawanda Bell RN  09/23/20 6914

## 2020-09-24 ENCOUNTER — APPOINTMENT (OUTPATIENT)
Dept: ULTRASOUND IMAGING | Age: 50
DRG: 291 | End: 2020-09-24
Payer: MEDICARE

## 2020-09-24 LAB
ALBUMIN SERPL-MCNC: 3.9 GM/DL (ref 3.4–5)
ALP BLD-CCNC: 233 IU/L (ref 40–128)
ALT SERPL-CCNC: 26 U/L (ref 10–40)
AMMONIA: 18 UMOL/L (ref 16–60)
AMYLASE: 73 U/L (ref 25–115)
ANION GAP SERPL CALCULATED.3IONS-SCNC: 26 MMOL/L (ref 4–16)
APTT: 49.4 SECONDS (ref 25.1–37.1)
AST SERPL-CCNC: 44 IU/L (ref 15–37)
BASOPHILS ABSOLUTE: 0.1 K/CU MM
BASOPHILS RELATIVE PERCENT: 0.6 % (ref 0–1)
BILIRUB SERPL-MCNC: 11.6 MG/DL (ref 0–1)
BUN BLDV-MCNC: 48 MG/DL (ref 6–23)
CALCIUM SERPL-MCNC: 9.7 MG/DL (ref 8.3–10.6)
CHLORIDE BLD-SCNC: 77 MMOL/L (ref 99–110)
CHOLESTEROL: 91 MG/DL
CO2: 24 MMOL/L (ref 21–32)
CREAT SERPL-MCNC: 1.9 MG/DL (ref 0.9–1.3)
DIFFERENTIAL TYPE: ABNORMAL
EOSINOPHILS ABSOLUTE: 0.1 K/CU MM
EOSINOPHILS RELATIVE PERCENT: 0.8 % (ref 0–3)
GFR AFRICAN AMERICAN: 46 ML/MIN/1.73M2
GFR NON-AFRICAN AMERICAN: 38 ML/MIN/1.73M2
GLUCOSE BLD-MCNC: 115 MG/DL (ref 70–99)
GLUCOSE BLD-MCNC: 120 MG/DL (ref 70–99)
GLUCOSE BLD-MCNC: 134 MG/DL (ref 70–99)
GLUCOSE BLD-MCNC: 152 MG/DL (ref 70–99)
GLUCOSE BLD-MCNC: 158 MG/DL (ref 70–99)
GLUCOSE BLD-MCNC: 186 MG/DL (ref 70–99)
HCT VFR BLD CALC: 30.9 % (ref 42–52)
HDLC SERPL-MCNC: 9 MG/DL
HEMOGLOBIN: 8.8 GM/DL (ref 13.5–18)
IMMATURE NEUTROPHIL %: 1.3 % (ref 0–0.43)
INR BLD: 9.72 INDEX
IRON: 48 UG/DL (ref 59–158)
LDL CHOLESTEROL DIRECT: 35 MG/DL
LIPASE: 71 IU/L (ref 13–60)
LYMPHOCYTES ABSOLUTE: 1.2 K/CU MM
LYMPHOCYTES RELATIVE PERCENT: 13.5 % (ref 24–44)
MAGNESIUM: 2.3 MG/DL (ref 1.8–2.4)
MCH RBC QN AUTO: 19.6 PG (ref 27–31)
MCHC RBC AUTO-ENTMCNC: 28.5 % (ref 32–36)
MCV RBC AUTO: 68.8 FL (ref 78–100)
MONOCYTES ABSOLUTE: 0.6 K/CU MM
MONOCYTES RELATIVE PERCENT: 6.5 % (ref 0–4)
NUCLEATED RBC %: 4.9 %
PCT TRANSFERRIN: 14 % (ref 10–44)
PDW BLD-RTO: 23.4 % (ref 11.7–14.9)
PLATELET # BLD: 289 K/CU MM (ref 140–440)
PMV BLD AUTO: 10.5 FL (ref 7.5–11.1)
POTASSIUM SERPL-SCNC: 3.7 MMOL/L (ref 3.5–5.1)
PROTHROMBIN TIME: >120 SECONDS (ref 11.7–14.5)
RBC # BLD: 4.49 M/CU MM (ref 4.6–6.2)
SEGMENTED NEUTROPHILS ABSOLUTE COUNT: 6.6 K/CU MM
SEGMENTED NEUTROPHILS RELATIVE PERCENT: 77.3 % (ref 36–66)
SODIUM BLD-SCNC: 127 MMOL/L (ref 135–145)
TOTAL IMMATURE NEUTOROPHIL: 0.11 K/CU MM
TOTAL IRON BINDING CAPACITY: 334 UG/DL (ref 250–450)
TOTAL NUCLEATED RBC: 0.4 K/CU MM
TOTAL PROTEIN: 6.5 GM/DL (ref 6.4–8.2)
TOTAL RETICULOCYTE COUNT: 0.15 K/CU MM
TRIGL SERPL-MCNC: 133 MG/DL
UNSATURATED IRON BINDING CAPACITY: 286 UG/DL (ref 110–370)
WBC # BLD: 8.6 K/CU MM (ref 4–10.5)

## 2020-09-24 PROCEDURE — 82140 ASSAY OF AMMONIA: CPT

## 2020-09-24 PROCEDURE — 6360000002 HC RX W HCPCS: Performed by: NURSE PRACTITIONER

## 2020-09-24 PROCEDURE — 6360000002 HC RX W HCPCS: Performed by: INTERNAL MEDICINE

## 2020-09-24 PROCEDURE — 94761 N-INVAS EAR/PLS OXIMETRY MLT: CPT

## 2020-09-24 PROCEDURE — 82150 ASSAY OF AMYLASE: CPT

## 2020-09-24 PROCEDURE — 6370000000 HC RX 637 (ALT 250 FOR IP): Performed by: INTERNAL MEDICINE

## 2020-09-24 PROCEDURE — 83735 ASSAY OF MAGNESIUM: CPT

## 2020-09-24 PROCEDURE — 2700000000 HC OXYGEN THERAPY PER DAY

## 2020-09-24 PROCEDURE — 2500000003 HC RX 250 WO HCPCS: Performed by: NURSE PRACTITIONER

## 2020-09-24 PROCEDURE — 80061 LIPID PANEL: CPT

## 2020-09-24 PROCEDURE — 6370000000 HC RX 637 (ALT 250 FOR IP): Performed by: NURSE PRACTITIONER

## 2020-09-24 PROCEDURE — 6370000000 HC RX 637 (ALT 250 FOR IP): Performed by: HOSPITALIST

## 2020-09-24 PROCEDURE — 80048 BASIC METABOLIC PNL TOTAL CA: CPT

## 2020-09-24 PROCEDURE — 93970 EXTREMITY STUDY: CPT

## 2020-09-24 PROCEDURE — 94640 AIRWAY INHALATION TREATMENT: CPT

## 2020-09-24 PROCEDURE — 82962 GLUCOSE BLOOD TEST: CPT

## 2020-09-24 PROCEDURE — 94660 CPAP INITIATION&MGMT: CPT

## 2020-09-24 PROCEDURE — 80053 COMPREHEN METABOLIC PANEL: CPT

## 2020-09-24 PROCEDURE — 2140000000 HC CCU INTERMEDIATE R&B

## 2020-09-24 PROCEDURE — 99221 1ST HOSP IP/OBS SF/LOW 40: CPT | Performed by: INTERNAL MEDICINE

## 2020-09-24 PROCEDURE — 2580000003 HC RX 258: Performed by: NURSE PRACTITIONER

## 2020-09-24 PROCEDURE — 83721 ASSAY OF BLOOD LIPOPROTEIN: CPT

## 2020-09-24 PROCEDURE — 85610 PROTHROMBIN TIME: CPT

## 2020-09-24 PROCEDURE — 94664 DEMO&/EVAL PT USE INHALER: CPT

## 2020-09-24 PROCEDURE — 83690 ASSAY OF LIPASE: CPT

## 2020-09-24 PROCEDURE — 2580000003 HC RX 258: Performed by: EMERGENCY MEDICINE

## 2020-09-24 PROCEDURE — 85025 COMPLETE CBC W/AUTO DIFF WBC: CPT

## 2020-09-24 PROCEDURE — 85730 THROMBOPLASTIN TIME PARTIAL: CPT

## 2020-09-24 PROCEDURE — 36415 COLL VENOUS BLD VENIPUNCTURE: CPT

## 2020-09-24 RX ORDER — DEXTROSE MONOHYDRATE 50 MG/ML
100 INJECTION, SOLUTION INTRAVENOUS PRN
Status: DISCONTINUED | OUTPATIENT
Start: 2020-09-24 | End: 2020-10-01 | Stop reason: HOSPADM

## 2020-09-24 RX ORDER — TRAZODONE HYDROCHLORIDE 50 MG/1
50 TABLET ORAL NIGHTLY
Status: DISCONTINUED | OUTPATIENT
Start: 2020-09-24 | End: 2020-10-01 | Stop reason: HOSPADM

## 2020-09-24 RX ORDER — CALCIUM CARBONATE 200(500)MG
500 TABLET,CHEWABLE ORAL 3 TIMES DAILY PRN
Status: DISCONTINUED | OUTPATIENT
Start: 2020-09-24 | End: 2020-10-01 | Stop reason: HOSPADM

## 2020-09-24 RX ORDER — NICOTINE POLACRILEX 4 MG
15 LOZENGE BUCCAL PRN
Status: DISCONTINUED | OUTPATIENT
Start: 2020-09-24 | End: 2020-10-01 | Stop reason: HOSPADM

## 2020-09-24 RX ORDER — FUROSEMIDE 10 MG/ML
40 INJECTION INTRAMUSCULAR; INTRAVENOUS 3 TIMES DAILY
Status: DISCONTINUED | OUTPATIENT
Start: 2020-09-24 | End: 2020-09-24

## 2020-09-24 RX ORDER — TOLVAPTAN 15 MG/1
15 TABLET ORAL DAILY
Status: DISCONTINUED | OUTPATIENT
Start: 2020-09-24 | End: 2020-09-26

## 2020-09-24 RX ORDER — TRAMADOL HYDROCHLORIDE 50 MG/1
50 TABLET ORAL EVERY 6 HOURS PRN
Status: DISCONTINUED | OUTPATIENT
Start: 2020-09-24 | End: 2020-10-01 | Stop reason: HOSPADM

## 2020-09-24 RX ORDER — PHYTONADIONE 5 MG/1
10 TABLET ORAL ONCE
Status: COMPLETED | OUTPATIENT
Start: 2020-09-24 | End: 2020-09-24

## 2020-09-24 RX ORDER — DEXTROSE MONOHYDRATE 25 G/50ML
12.5 INJECTION, SOLUTION INTRAVENOUS PRN
Status: DISCONTINUED | OUTPATIENT
Start: 2020-09-24 | End: 2020-10-01 | Stop reason: HOSPADM

## 2020-09-24 RX ADMIN — METOPROLOL SUCCINATE 25 MG: 25 TABLET, EXTENDED RELEASE ORAL at 21:42

## 2020-09-24 RX ADMIN — INSULIN GLARGINE 10 UNITS: 100 INJECTION, SOLUTION SUBCUTANEOUS at 21:42

## 2020-09-24 RX ADMIN — PHYTONADIONE 10 MG: 5 TABLET ORAL at 10:12

## 2020-09-24 RX ADMIN — TRAZODONE HYDROCHLORIDE 50 MG: 50 TABLET ORAL at 21:42

## 2020-09-24 RX ADMIN — INSULIN LISPRO 2 UNITS: 100 INJECTION, SOLUTION INTRAVENOUS; SUBCUTANEOUS at 11:52

## 2020-09-24 RX ADMIN — SODIUM CHLORIDE, PRESERVATIVE FREE 10 ML: 5 INJECTION INTRAVENOUS at 10:11

## 2020-09-24 RX ADMIN — ATORVASTATIN CALCIUM 40 MG: 40 TABLET, FILM COATED ORAL at 21:42

## 2020-09-24 RX ADMIN — CALCIUM CARBONATE 500 MG: 500 TABLET, CHEWABLE ORAL at 17:54

## 2020-09-24 RX ADMIN — PANTOPRAZOLE SODIUM 40 MG: 40 TABLET, DELAYED RELEASE ORAL at 06:55

## 2020-09-24 RX ADMIN — ONDANSETRON 4 MG: 2 INJECTION INTRAMUSCULAR; INTRAVENOUS at 12:56

## 2020-09-24 RX ADMIN — FERROUS SULFATE TAB 325 MG (65 MG ELEMENTAL FE) 325 MG: 325 (65 FE) TAB at 10:06

## 2020-09-24 RX ADMIN — TOLVAPTAN 15 MG: 15 TABLET ORAL at 16:52

## 2020-09-24 RX ADMIN — ASPIRIN 81 MG: 81 TABLET, COATED ORAL at 10:05

## 2020-09-24 RX ADMIN — CALCIUM CARBONATE 500 MG: 500 TABLET, CHEWABLE ORAL at 22:36

## 2020-09-24 RX ADMIN — METOPROLOL SUCCINATE 25 MG: 25 TABLET, EXTENDED RELEASE ORAL at 10:04

## 2020-09-24 RX ADMIN — RIVAROXABAN 20 MG: 20 TABLET, FILM COATED ORAL at 17:49

## 2020-09-24 RX ADMIN — SODIUM CHLORIDE, PRESERVATIVE FREE 10 ML: 5 INJECTION INTRAVENOUS at 06:55

## 2020-09-24 RX ADMIN — IPRATROPIUM BROMIDE AND ALBUTEROL SULFATE 1 AMPULE: .5; 3 SOLUTION RESPIRATORY (INHALATION) at 12:36

## 2020-09-24 RX ADMIN — SODIUM CHLORIDE, PRESERVATIVE FREE 10 ML: 5 INJECTION INTRAVENOUS at 21:42

## 2020-09-24 RX ADMIN — IPRATROPIUM BROMIDE AND ALBUTEROL SULFATE 1 AMPULE: .5; 3 SOLUTION RESPIRATORY (INHALATION) at 21:01

## 2020-09-24 RX ADMIN — TRAMADOL HYDROCHLORIDE 50 MG: 50 TABLET, FILM COATED ORAL at 19:16

## 2020-09-24 RX ADMIN — CALCIUM CARBONATE 500 MG: 500 TABLET, CHEWABLE ORAL at 13:24

## 2020-09-24 RX ADMIN — BUMETANIDE 0.5 MG: 0.25 INJECTION INTRAMUSCULAR; INTRAVENOUS at 06:53

## 2020-09-24 RX ADMIN — IPRATROPIUM BROMIDE AND ALBUTEROL SULFATE 1 AMPULE: .5; 3 SOLUTION RESPIRATORY (INHALATION) at 08:59

## 2020-09-24 RX ADMIN — FUROSEMIDE 40 MG: 10 INJECTION, SOLUTION INTRAMUSCULAR; INTRAVENOUS at 10:07

## 2020-09-24 RX ADMIN — IPRATROPIUM BROMIDE AND ALBUTEROL SULFATE 1 AMPULE: .5; 3 SOLUTION RESPIRATORY (INHALATION) at 03:16

## 2020-09-24 RX ADMIN — IPRATROPIUM BROMIDE AND ALBUTEROL SULFATE 1 AMPULE: .5; 3 SOLUTION RESPIRATORY (INHALATION) at 15:36

## 2020-09-24 RX ADMIN — FERROUS SULFATE TAB 325 MG (65 MG ELEMENTAL FE) 325 MG: 325 (65 FE) TAB at 16:52

## 2020-09-24 ASSESSMENT — PAIN DESCRIPTION - LOCATION
LOCATION: LEG
LOCATION: LEG

## 2020-09-24 ASSESSMENT — PAIN SCALES - GENERAL
PAINLEVEL_OUTOF10: 0
PAINLEVEL_OUTOF10: 3
PAINLEVEL_OUTOF10: 0
PAINLEVEL_OUTOF10: 10

## 2020-09-24 ASSESSMENT — PAIN DESCRIPTION - ORIENTATION
ORIENTATION: LEFT
ORIENTATION: LEFT

## 2020-09-24 NOTE — PROGRESS NOTES
Nutrition Note    Admit referral for diet ed for heart failure guidelines. Currently on carb controlled, cardiac diet. Patient reports good appetite, trying to follow diet at home and fluid restriction. Has received diet ed on previous admits for heart healthy, low sodium and carb controlled. No diet ed wanted on room visit at this time. Encouraged to consume consistent meals. Will continue to follow up during stay.     Electronically signed by Carina Renteria RD, AVA on 9/24/20 at 11:40 AM EDT    Contact: [phone]

## 2020-09-24 NOTE — PROGRESS NOTES
Hospitalist Progress Note      Name:  Jin Avery /Age/Sex: 1970  (52 y.o. male)   MRN & CSN:  [id number] & [id number] Admission Date/Time: 2020 11:15 AM   Location:  [address] [place] [address] PCP: No primary care provider on file. Hospital Day: 2    Assessment and Plan:   Jin Avery is a 52 y.o.  male  who presents with Acute respiratory distress    1) Acute on chronic systolic HF  -BNP elevated  -Last Echo (2020) EF 10 -15%, Global hypokinesis. Severe mitral regurg, moderate tricuspid regurg. Severe PHTN with an RVSP of 73 mmHg  -Started on diuresis  -Cardiology on board     2) Possible Cardiac Cirrhosis  -Elevated bilirubin - Bilirubin 10.2, direct bilirubin 8.1  -Will give Vit k  -Continue supportive management  -GI on board    3)JAIME on CKD 3  -Likely due to CRS  -On IV diuresis  -Avoid nephrotoxic    4) Elevated Troponin  -Likely due to demand ischemia  -EKG with sinus tachy and PVC  -Cardiology on board          Chronic Illnesses, medication resumed unless contraindicated           - CAD, s/p CABG x4          - opiate/heroin abuse         - hyperlipidemia         - hypertension         - osteoarthritis          - DM type 2         - COPD         -Nicotine use disorder    Diet DIET CARDIAC; Carb Control: 4 carb choices (60 gms)/meal   DVT Prophylaxis [] Lovenox, []  Heparin, [] SCDs, [] Ambulation   GI Prophylaxis [] PPI,  [] H2 Blocker,  [] Carafate,  [] Diet/Tube Feeds   Code Status DNR-CCA   Disposition TBD   MDM      History of Present Illness:     Patient was seen and examined  Denied any chest pain, dizziness  No worsening SOB, palpitations  No dizziness, fever, chills       Ten point ROS reviewed negative, unless as noted above    Objective:        Intake/Output Summary (Last 24 hours) at 2020 1109  Last data filed at 2020 0442  Gross per 24 hour   Intake 100 ml   Output 200 ml   Net -100 ml      Vitals:   Vitals:    20 0905   BP:    Pulse:    Resp:    Temp:    SpO2: 97%     Physical Exam:   GEN Awake male, sitting upright in bed in no apparent distress. Appears given age. EYES Pupils are equally round. No scleral erythema, discharge, or conjunctivitis. HENT Mucous membranes are moist. Oral pharynx without exudates, no evidence of thrush. NECK Supple, no apparent thyromegaly or masses. RESP Clear to auscultation, no wheezes, rales or rhonchi. Symmetric chest movement while on 2 L of O2.  CARDIO/VASC S1/S2 auscultated. Regular rate without appreciable murmurs, rubs, or gallops. No JVD or carotid bruits. Peripheral pulses equal bilaterally and palpable. No peripheral edema. GI Abdomen is soft without significant tenderness, masses, or guarding. Bowel sounds are normoactive. Rectal exam deferred.  No costovertebral angle tenderness. Normal appearing external genitalia. Hansen catheter is not present. HEME/LYMPH No palpable cervical lymphadenopathy and no hepatosplenomegaly. No petechiae or ecchymoses. MSK No gross joint deformities. SKIN Normal coloration, warm, dry. NEURO Cranial nerves appear grossly intact, normal speech, no lateralizing weakness. PSYCH Awake, alert, oriented x 4. Affect appropriate.     Medications:   Medications:    furosemide  40 mg Intravenous TID    insulin lispro  0-12 Units Subcutaneous TID WC    insulin lispro  0-6 Units Subcutaneous Nightly    aspirin  81 mg Oral Daily    atorvastatin  40 mg Oral Daily    insulin glargine  10 Units Subcutaneous Nightly    metoprolol succinate  25 mg Oral BID    pantoprazole  40 mg Oral QAM AC    [Held by provider] rivaroxaban  20 mg Oral Daily    sodium chloride flush  10 mL Intravenous 2 times per day    nicotine  1 patch Transdermal Daily    ipratropium-albuterol  1 ampule Inhalation Q4H WA    ferrous sulfate  325 mg Oral BID WC      Infusions:    dextrose       PRN Meds: glucose, 15 g, PRN  dextrose, 12.5 g, PRN  glucagon (rDNA), 1 mg, PRN  dextrose, 100 mL/hr, PRN  sodium chloride flush, 10 mL, PRN  sodium chloride flush, 10 mL, PRN  acetaminophen, 650 mg, Q6H PRN    Or  acetaminophen, 650 mg, Q6H PRN  polyethylene glycol, 17 g, Daily PRN  promethazine, 12.5 mg, Q6H PRN    Or  ondansetron, 4 mg, Q6H PRN  nitroGLYCERIN, 0.4 mg, Q5 Min PRN  potassium chloride, 40 mEq, PRN    Or  potassium alternative oral replacement, 40 mEq, PRN    Or  potassium chloride, 10 mEq, PRN          Electronically signed by Ashish Mccray MD on 9/24/2020 at 11:09 AM

## 2020-09-24 NOTE — PROGRESS NOTES
C/o heartburn. Attempting to vomit, bringing only a small amt of liquid, after eating a full tray. Medicated with zofran and Dr Leoncio peters.

## 2020-09-24 NOTE — CARE COORDINATION
attempt to speak with patient regarding discharge planning and patient declined stating that he did not feel up to it at this time. Per medical record review patient is from home and has Medicare primary and Medicaid secondary to assist with RX coverage. No PCP listed so PCP list added to discharge instructions. Case Management to follow for any other potential discharge needs.

## 2020-09-24 NOTE — PROGRESS NOTES
Physician Progress Note      Maliha De La Garza  CSN #:                  [id number]  :                       1970  ADMIT DATE:       2020 11:15 AM  [address] [place] DATE:  RESPONDING  PROVIDER #:        Antwan Hirsch MD          QUERY TEXT:    Dear Hospitalist    Pt admitted with Acute on Chronic combined systolic and diastolic CHF . Pt   noted to also have CAD . If possible, please document in progress notes and   discharge summary the etiology of CHF, if able to be determined. The medical record reflects the following:  Risk Factors: Acute on Chronic combined systolic and diastolic CHF, CAD,   CKD,HTN ,Cardiomyopathy  Clinical Indicators: history of chronic combined systolic CHF, CAD, s/p CABG   x4, COPD, opiate/heroin abuse, CKD, DM type 2, hyperlipidemia, hypertension,   Last Echo (2020) EF 10 -15%, Global hypokinesis. Severe mitral regurg,   moderate tricuspid regurg. Severe PHTN with an RVSP of 73 mmHg  Treatment: consult Cardiology,  trend trop, Bumex BID, no ACEI d/t  CDK, daily   weights, monitor I/O  monitor BNP ,[facility]    Thank you , Rhianna Romero RN,CDS  Options provided:  -- CHF due to Hypertensive Heart Disease  -- CHF due to Hypertensive Heart Disease and CAD  -- CHF not due to Hypertension but due to ICMP  -- CHF due to Hypertensive Heart Disease and Valvular Heart Disease  -- CHF not due to Hypertension but due to valvular heart disease  -- Other - I will add my own diagnosis  -- Disagree - Not applicable / Not valid  -- Disagree - Clinically unable to determine / Unknown  -- Refer to Clinical Documentation Reviewer    PROVIDER RESPONSE TEXT:    This patient?s CHF is due to hypertensive heart disease and CAD.     Query created by: Anderson Babinski on 2020 3:17 PM      Electronically signed by:  Antwan Hirsch MD 2020 3:58 PM

## 2020-09-24 NOTE — PROGRESS NOTES
Inr of 9.72. No visible bleeding per nurse. Likely due to liver dx. VSS. Hemoglobin stable. Xarelto held. Monitor. GI on board. If with bleeding or above 10, consider ffp.

## 2020-09-24 NOTE — PLAN OF CARE
Problem: OXYGENATION/RESPIRATORY FUNCTION  Goal: Patient will maintain patent airway  Outcome: Ongoing  Goal: Patient will achieve/maintain normal respiratory rate/effort  Description: Respiratory rate and effort will be within normal limits for the patient  Outcome: Ongoing     Problem: HEMODYNAMIC STATUS  Goal: Patient has stable vital signs and fluid balance  Outcome: Ongoing     Problem: FLUID AND ELECTROLYTE IMBALANCE  Goal: Fluid and electrolyte balance are achieved/maintained  Outcome: Ongoing     Problem: ACTIVITY INTOLERANCE/IMPAIRED MOBILITY  Goal: Mobility/activity is maintained at optimum level for patient  Outcome: Ongoing

## 2020-09-24 NOTE — CONSULTS
CARDIOLOGY CONSULT NOTE   Reason for consultation:  CHF    Referring physician:  Main Ramey MD     Primary care physician: None    Dear Alicia Fine. Ivet  Thanks for the consult. History of present illness:Alireza is a 52 y. o.year old who  presents with for shortness of breath which is moderate for few weeks, intermittent, self limiting, not associated with cough or fever, gets worse with activity and better with rest,    Chief Complaint   Patient presents with    Shortness of Breath     Blood pressure, cholesterol, blood glucose and weight are well controlled. Past medical history:    has a past medical history of CAD (coronary artery disease), CHF (congestive heart failure) ([facility] [address] COPD (chronic obstructive pulmonary disease) ([facility] [address] Depression, Drug abuse ([facility] [address] HIGH CHOLESTEROL, Hypertension, MI (myocardial infarction) ([facility] [address] and S/P coronary artery bypass graft x 4. Past surgical history:   has a past surgical history that includes Cardiac surgery (11/2010); Bypass Graft (04/10/2011); and Leg Surgery. Social History:   reports that he has been smoking cigarettes. He has a 20.00 pack-year smoking history. He has quit using smokeless tobacco. He reports that he does not drink alcohol or use drugs.   Family history:   no family history of CAD, STROKE of DM    No Known Allergies    furosemide (LASIX) injection 40 mg, TID  insulin lispro (HUMALOG) injection vial 0-12 Units, TID WC  insulin lispro (HUMALOG) injection vial 0-6 Units, Nightly  glucose (GLUTOSE) 40 % oral gel 15 g, PRN  dextrose 50 % IV solution, PRN  glucagon (rDNA) injection 1 mg, PRN  dextrose 5 % solution, PRN  calcium carbonate (TUMS) chewable tablet 500 mg, TID PRN  sodium chloride flush 0.9 % injection 10 mL, PRN  aspirin EC tablet 81 mg, Daily  atorvastatin (LIPITOR) tablet 40 mg, Daily  insulin glargine (LANTUS) injection vial 10 Units, Nightly  metoprolol succinate (TOPROL XL) extended release tablet 25 mg, BID  pantoprazole (PROTONIX) tablet 40 mg, QAM AC  [Held by provider] rivaroxaban (XARELTO) tablet 20 mg, Daily  sodium chloride flush 0.9 % injection 10 mL, 2 times per day  sodium chloride flush 0.9 % injection 10 mL, PRN  acetaminophen (TYLENOL) tablet 650 mg, Q6H PRN    Or  acetaminophen (TYLENOL) suppository 650 mg, Q6H PRN  polyethylene glycol (GLYCOLAX) packet 17 g, Daily PRN  promethazine (PHENERGAN) tablet 12.5 mg, Q6H PRN    Or  ondansetron (ZOFRAN) injection 4 mg, Q6H PRN  nicotine (NICODERM CQ) 21 MG/24HR 1 patch, Daily  nitroGLYCERIN (NITROSTAT) SL tablet 0.4 mg, Q5 Min PRN  ipratropium-albuterol (DUONEB) nebulizer solution 1 ampule, Q4H WA  potassium chloride (KLOR-CON M) extended release tablet 40 mEq, PRN    Or  potassium bicarb-citric acid (EFFER-K) effervescent tablet 40 mEq, PRN    Or  potassium chloride 10 mEq/100 mL IVPB (Peripheral Line), PRN  ferrous sulfate (IRON 325) tablet 325 mg, BID       Current Facility-Administered Medications   Medication Dose Route Frequency Provider Last Rate Last Dose    furosemide (LASIX) injection 40 mg  40 mg Intravenous TID Joanne BARTLETT MD   40 mg at 09/24/20 1007    insulin lispro (HUMALOG) injection vial 0-12 Units  0-12 Units Subcutaneous TID  Joanne BARTLETT MD   2 Units at 09/24/20 1152    insulin lispro (HUMALOG) injection vial 0-6 Units  0-6 Units Subcutaneous Nightly Lisbeth Leal MD        glucose (GLUTOSE) 40 % oral gel 15 g  15 g Oral PRN Lisbeth Leal MD        dextrose 50 % IV solution  12.5 g Intravenous PRN Lisbeth Leal MD        glucagon (rDNA) injection 1 mg  1 mg Intramuscular PRN Lisbeth Leal MD        dextrose 5 % solution  100 mL/hr Intravenous PRN Lisbeth Leal MD        calcium carbonate (TUMS) chewable tablet 500 mg  500 mg Oral TID PRN Joanne BARTLETT MD   500 mg at 09/24/20 1324    sodium chloride flush 0.9 % injection 10 mL  10 mL Intravenous PRN Lazarus Avena, MD   10 mL at 09/24/20 0655    aspirin EC tablet 81 mg  81 mg Oral Daily Pjis Michelle, APRN - CNP   81 mg at 09/24/20 1005    atorvastatin (LIPITOR) tablet 40 mg  40 mg Oral Daily Pjis Michelle, APRN - CNP   40 mg at 09/23/20 2124    insulin glargine (LANTUS) injection vial 10 Units  10 Units Subcutaneous Nightly Mtiali Michelle, APRN - CNP   10 Units at 09/23/20 2336    metoprolol succinate (TOPROL XL) extended release tablet 25 mg  25 mg Oral BID Pjis Michelle, APRN - CNP   25 mg at 09/24/20 1004    pantoprazole (PROTONIX) tablet 40 mg  40 mg Oral QAM AC Pjis Michelle, APRN - CNP   40 mg at 09/24/20 0655    [Held by provider] rivaroxaban (XARELTO) tablet 20 mg  20 mg Oral Daily Pjis Michelle, APRN - CNP   20 mg at 09/23/20 1827    sodium chloride flush 0.9 % injection 10 mL  10 mL Intravenous 2 times per day Mitali Tysonat, APRN - CNP   10 mL at 09/24/20 1011    sodium chloride flush 0.9 % injection 10 mL  10 mL Intravenous PRN Pjis Michelle, APRN - CNP        acetaminophen (TYLENOL) tablet 650 mg  650 mg Oral Q6H PRN Pjis Michelle, APRN - CNP        Or    acetaminophen (TYLENOL) suppository 650 mg  650 mg Rectal Q6H PRN Philis Michelle, APRN - CNP        polyethylene glycol (GLYCOLAX) packet 17 g  17 g Oral Daily PRN Mitali Michelle, APRN - CNP        promethazine (PHENERGAN) tablet 12.5 mg  12.5 mg Oral Q6H PRN Mitali Michelle, APRN - CNP        Or    ondansetron (ZOFRAN) injection 4 mg  4 mg Intravenous Q6H PRN NANCY Brownlee - CNP   4 mg at 09/24/20 1256    nicotine (NICODERM CQ) 21 MG/24HR 1 patch  1 patch Transdermal Daily NANCY Brownlee - CNP        nitroGLYCERIN (NITROSTAT) SL tablet 0.4 mg  0.4 mg Sublingual Q5 Min PRN Ina Perry APRN - CNP        ipratropium-albuterol (DUONEB) nebulizer solution 1 ampule  1 ampule Inhalation Q4H Luis Manuel Peoples MD   1 ampule at 09/24/20 1236    potassium chloride (KLOR-CON M) extended release tablet 40 mEq  40 mEq Oral PRN Nori Mathis MD        Or    potassium bicarb-citric acid (EFFER-K) distress, No wheezing, No chest tenderness. ,no use of accessory muscles, diaphragm movement is normal  Cardiovascular: (PMI) apex non displaced,no lifts no thrills, no s3,no s4, Normal heart rate, Normal rhythm, No murmurs, No rubs, No gallops. Carotid arteries pulse and amplitude are normal no bruit, no abdominal bruit noted ( normal abdominal aorta ausculation), femoral arteries pulse and amplitude are normal no bruit, pedal pulses are normal  GI:  Bowel sounds normal, Soft, No tenderness, No masses, No pulsatile masses, no hepatosplenomegally, no bruits  : External genitalia appear normal, No masses or lesions. No discharge. No CVA tenderness. Musculoskeletal:  Intact distal pulses, + edema, No tenderness, No cyanosis, No clubbing. Good range of motion in all major joints. No tenderness to palpation or major deformities noted. Back- No tenderness. Integument:  Warm, Dry, No erythema, No rash. Skin: no rash, no ulcers  Lymphatic:  No lymphadenopathy noted. Neurologic:  Alert & oriented x 3, Normal motor function, Normal sensory function, No focal deficits noted. Psychiatric:  Affect normal, Judgment normal, Mood normal.   Lab Review   Recent Labs     09/24/20  0437   WBC 8.6   HGB 8.8*   HCT 30.9*         Recent Labs     09/24/20  0437   *   K 3.7   CL 77*   CO2 24   BUN 48*   CREATININE 1.9*     Recent Labs     09/23/20  1130 09/24/20 0437   AST 37 44*   ALT 22 26   BILIDIR 8.1*  --    BILITOT 10.2* 11.6*   ALKPHOS 192* 233*     No results for input(s): TROPONINI in the last 72 hours. Lab Results   Component Value Date     (H) 04/16/2011     (H) 04/15/2011     (H) 04/14/2011     Lab Results   Component Value Date    INR 9.72 (HH) 09/24/2020    PROTIME >120.0 (H) 09/24/2020         EKG:    Chest Xray:    ECHO:lvef 10-15%  Labs, echo, meds reviewed  Assessment: 52 y. o.year old with PMH of  has a past medical history of CAD (coronary artery disease), CHF (congestive

## 2020-09-24 NOTE — PLAN OF CARE
[facility] - attempted to send for at [phone], pt very nauseated and heaving when transporter arrived - nurse giving meds and said to check back in probably an hour or more/mdw [phone]

## 2020-09-25 LAB
ALBUMIN SERPL-MCNC: 3.6 GM/DL (ref 3.4–5)
ALP BLD-CCNC: 204 IU/L (ref 40–128)
ALT SERPL-CCNC: 23 U/L (ref 10–40)
ANION GAP SERPL CALCULATED.3IONS-SCNC: 18 MMOL/L (ref 4–16)
APTT: 56.6 SECONDS (ref 25.1–37.1)
AST SERPL-CCNC: 44 IU/L (ref 15–37)
BILIRUB SERPL-MCNC: 12.1 MG/DL (ref 0–1)
BUN BLDV-MCNC: 55 MG/DL (ref 6–23)
CALCIUM SERPL-MCNC: 9.5 MG/DL (ref 8.3–10.6)
CHLORIDE BLD-SCNC: 80 MMOL/L (ref 99–110)
CO2: 28 MMOL/L (ref 21–32)
CREAT SERPL-MCNC: 1.8 MG/DL (ref 0.9–1.3)
GFR AFRICAN AMERICAN: 49 ML/MIN/1.73M2
GFR NON-AFRICAN AMERICAN: 40 ML/MIN/1.73M2
GLUCOSE BLD-MCNC: 122 MG/DL (ref 70–99)
GLUCOSE BLD-MCNC: 127 MG/DL (ref 70–99)
GLUCOSE BLD-MCNC: 140 MG/DL (ref 70–99)
GLUCOSE BLD-MCNC: 190 MG/DL (ref 70–99)
GLUCOSE BLD-MCNC: 80 MG/DL (ref 70–99)
GLUCOSE BLD-MCNC: 83 MG/DL (ref 70–99)
GLUCOSE BLD-MCNC: 96 MG/DL (ref 70–99)
HCT VFR BLD CALC: 26.5 % (ref 42–52)
HEMOGLOBIN: 7.9 GM/DL (ref 13.5–18)
INR BLD: 8.64 INDEX
MAGNESIUM: 2.4 MG/DL (ref 1.8–2.4)
MCH RBC QN AUTO: 19.9 PG (ref 27–31)
MCHC RBC AUTO-ENTMCNC: 29.8 % (ref 32–36)
MCV RBC AUTO: 66.9 FL (ref 78–100)
PDW BLD-RTO: 23.1 % (ref 11.7–14.9)
PLATELET # BLD: 258 K/CU MM (ref 140–440)
PMV BLD AUTO: 10.6 FL (ref 7.5–11.1)
POTASSIUM SERPL-SCNC: 3.9 MMOL/L (ref 3.5–5.1)
POTASSIUM SERPL-SCNC: 4.6 MMOL/L (ref 3.5–5.1)
PROTHROMBIN TIME: 106.8 SECONDS (ref 11.7–14.5)
RBC # BLD: 3.96 M/CU MM (ref 4.6–6.2)
SODIUM BLD-SCNC: 126 MMOL/L (ref 135–145)
TOTAL PROTEIN: 5.8 GM/DL (ref 6.4–8.2)
WBC # BLD: 7.7 K/CU MM (ref 4–10.5)

## 2020-09-25 PROCEDURE — 6370000000 HC RX 637 (ALT 250 FOR IP): Performed by: INTERNAL MEDICINE

## 2020-09-25 PROCEDURE — 2580000003 HC RX 258: Performed by: INTERNAL MEDICINE

## 2020-09-25 PROCEDURE — 84132 ASSAY OF SERUM POTASSIUM: CPT

## 2020-09-25 PROCEDURE — 6370000000 HC RX 637 (ALT 250 FOR IP): Performed by: HOSPITALIST

## 2020-09-25 PROCEDURE — 36415 COLL VENOUS BLD VENIPUNCTURE: CPT

## 2020-09-25 PROCEDURE — 94640 AIRWAY INHALATION TREATMENT: CPT

## 2020-09-25 PROCEDURE — 6370000000 HC RX 637 (ALT 250 FOR IP): Performed by: NURSE PRACTITIONER

## 2020-09-25 PROCEDURE — 80053 COMPREHEN METABOLIC PANEL: CPT

## 2020-09-25 PROCEDURE — 80048 BASIC METABOLIC PNL TOTAL CA: CPT

## 2020-09-25 PROCEDURE — 94761 N-INVAS EAR/PLS OXIMETRY MLT: CPT

## 2020-09-25 PROCEDURE — 2580000003 HC RX 258: Performed by: NURSE PRACTITIONER

## 2020-09-25 PROCEDURE — 2700000000 HC OXYGEN THERAPY PER DAY

## 2020-09-25 PROCEDURE — 2500000003 HC RX 250 WO HCPCS: Performed by: INTERNAL MEDICINE

## 2020-09-25 PROCEDURE — 6360000002 HC RX W HCPCS: Performed by: INTERNAL MEDICINE

## 2020-09-25 PROCEDURE — 99232 SBSQ HOSP IP/OBS MODERATE 35: CPT | Performed by: INTERNAL MEDICINE

## 2020-09-25 PROCEDURE — 82962 GLUCOSE BLOOD TEST: CPT

## 2020-09-25 PROCEDURE — 85730 THROMBOPLASTIN TIME PARTIAL: CPT

## 2020-09-25 PROCEDURE — 85610 PROTHROMBIN TIME: CPT

## 2020-09-25 PROCEDURE — 94660 CPAP INITIATION&MGMT: CPT

## 2020-09-25 PROCEDURE — 85027 COMPLETE CBC AUTOMATED: CPT

## 2020-09-25 PROCEDURE — 83735 ASSAY OF MAGNESIUM: CPT

## 2020-09-25 PROCEDURE — 2140000000 HC CCU INTERMEDIATE R&B

## 2020-09-25 RX ORDER — PHYTONADIONE 10 MG/ML
10 INJECTION, EMULSION INTRAMUSCULAR; INTRAVENOUS; SUBCUTANEOUS ONCE
Status: DISCONTINUED | OUTPATIENT
Start: 2020-09-25 | End: 2020-09-25

## 2020-09-25 RX ORDER — BUMETANIDE 0.25 MG/ML
2 INJECTION, SOLUTION INTRAMUSCULAR; INTRAVENOUS ONCE
Status: COMPLETED | OUTPATIENT
Start: 2020-09-25 | End: 2020-09-25

## 2020-09-25 RX ORDER — ALBUTEROL SULFATE 90 UG/1
2 AEROSOL, METERED RESPIRATORY (INHALATION) EVERY 6 HOURS PRN
Status: DISCONTINUED | OUTPATIENT
Start: 2020-09-25 | End: 2020-10-01 | Stop reason: HOSPADM

## 2020-09-25 RX ADMIN — METOPROLOL SUCCINATE 25 MG: 25 TABLET, EXTENDED RELEASE ORAL at 08:35

## 2020-09-25 RX ADMIN — IPRATROPIUM BROMIDE AND ALBUTEROL SULFATE 1 AMPULE: .5; 3 SOLUTION RESPIRATORY (INHALATION) at 07:27

## 2020-09-25 RX ADMIN — BUMETANIDE 2 MG/HR: 0.25 INJECTION INTRAMUSCULAR; INTRAVENOUS at 11:34

## 2020-09-25 RX ADMIN — TRAZODONE HYDROCHLORIDE 50 MG: 50 TABLET ORAL at 21:37

## 2020-09-25 RX ADMIN — PANTOPRAZOLE SODIUM 40 MG: 40 TABLET, DELAYED RELEASE ORAL at 05:43

## 2020-09-25 RX ADMIN — ASPIRIN 81 MG: 81 TABLET, COATED ORAL at 08:35

## 2020-09-25 RX ADMIN — BUMETANIDE 2 MG: 0.25 INJECTION INTRAMUSCULAR; INTRAVENOUS at 11:34

## 2020-09-25 RX ADMIN — SODIUM CHLORIDE, PRESERVATIVE FREE 10 ML: 5 INJECTION INTRAVENOUS at 21:38

## 2020-09-25 RX ADMIN — PHYTONADIONE 10 MG: 10 INJECTION, EMULSION INTRAMUSCULAR; INTRAVENOUS; SUBCUTANEOUS at 09:27

## 2020-09-25 RX ADMIN — BUMETANIDE 2 MG/HR: 0.25 INJECTION INTRAMUSCULAR; INTRAVENOUS at 21:56

## 2020-09-25 RX ADMIN — IPRATROPIUM BROMIDE AND ALBUTEROL SULFATE 1 AMPULE: .5; 3 SOLUTION RESPIRATORY (INHALATION) at 15:44

## 2020-09-25 RX ADMIN — FERROUS SULFATE TAB 325 MG (65 MG ELEMENTAL FE) 325 MG: 325 (65 FE) TAB at 08:35

## 2020-09-25 RX ADMIN — METOPROLOL SUCCINATE 25 MG: 25 TABLET, EXTENDED RELEASE ORAL at 21:37

## 2020-09-25 RX ADMIN — TOLVAPTAN 15 MG: 15 TABLET ORAL at 09:27

## 2020-09-25 RX ADMIN — FERROUS SULFATE TAB 325 MG (65 MG ELEMENTAL FE) 325 MG: 325 (65 FE) TAB at 17:06

## 2020-09-25 RX ADMIN — ATORVASTATIN CALCIUM 40 MG: 40 TABLET, FILM COATED ORAL at 21:37

## 2020-09-25 RX ADMIN — CALCIUM CARBONATE 500 MG: 500 TABLET, CHEWABLE ORAL at 17:34

## 2020-09-25 RX ADMIN — INSULIN GLARGINE 10 UNITS: 100 INJECTION, SOLUTION SUBCUTANEOUS at 21:39

## 2020-09-25 RX ADMIN — SODIUM CHLORIDE, PRESERVATIVE FREE 10 ML: 5 INJECTION INTRAVENOUS at 08:35

## 2020-09-25 RX ADMIN — RIVAROXABAN 20 MG: 20 TABLET, FILM COATED ORAL at 17:06

## 2020-09-25 RX ADMIN — IPRATROPIUM BROMIDE AND ALBUTEROL SULFATE 1 AMPULE: .5; 3 SOLUTION RESPIRATORY (INHALATION) at 11:28

## 2020-09-25 RX ADMIN — IPRATROPIUM BROMIDE AND ALBUTEROL SULFATE 1 AMPULE: .5; 3 SOLUTION RESPIRATORY (INHALATION) at 20:17

## 2020-09-25 RX ADMIN — IPRATROPIUM BROMIDE AND ALBUTEROL SULFATE 1 AMPULE: .5; 3 SOLUTION RESPIRATORY (INHALATION) at 23:54

## 2020-09-25 ASSESSMENT — PAIN SCALES - GENERAL: PAINLEVEL_OUTOF10: 0

## 2020-09-25 NOTE — PROGRESS NOTES
negative, unless as noted above    Objective: Intake/Output Summary (Last 24 hours) at 9/25/2020 0950  Last data filed at 9/24/2020 2227  Gross per 24 hour   Intake 720 ml   Output 830 ml   Net -110 ml      Vitals:   Vitals:    09/25/20 0835   BP: 111/83   Pulse:    Resp:    Temp:    SpO2:      Physical Exam:   GEN Awake male, sitting upright in bed in no apparent distress. Appears given age. EYES Pupils are equally round. No scleral erythema, discharge, or conjunctivitis. HENT Mucous membranes are moist. Oral pharynx without exudates, no evidence of thrush. NECK Supple, no apparent thyromegaly or masses. RESP Clear to auscultation, no wheezes, rales or rhonchi. Symmetric chest movement while on 3L of O2.  CARDIO/VASC S1/S2 auscultated. Regular rate without appreciable murmurs, rubs, or gallops. No JVD or carotid bruits. Peripheral pulses equal bilaterally and palpable. Bilateral pitting edema. GI Abdomen is soft without significant tenderness, masses, or guarding. Bowel sounds are normoactive. Rectal exam deferred.  No costovertebral angle tenderness. Normal appearing external genitalia. Hansen catheter is not present. HEME/LYMPH No palpable cervical lymphadenopathy and no hepatosplenomegaly. No petechiae or ecchymoses. MSK No gross joint deformities. SKIN Normal coloration, warm, dry. NEURO Cranial nerves appear grossly intact, normal speech, no lateralizing weakness. PSYCH Awake, alert, oriented x 4. Affect appropriate.     Medications:   Medications:    phytonadione (VITAMIN K)  IVPB  10 mg Intravenous Once    insulin lispro  0-12 Units Subcutaneous TID WC    insulin lispro  0-6 Units Subcutaneous Nightly    tolvaptan  15 mg Oral Daily    traZODone  50 mg Oral Nightly    aspirin  81 mg Oral Daily    atorvastatin  40 mg Oral Daily    insulin glargine  10 Units Subcutaneous Nightly    metoprolol succinate  25 mg Oral BID    pantoprazole  40 mg Oral QAM AC    rivaroxaban  20 mg Oral Daily    sodium chloride flush  10 mL Intravenous 2 times per day    nicotine  1 patch Transdermal Daily    ipratropium-albuterol  1 ampule Inhalation Q4H WA    ferrous sulfate  325 mg Oral BID WC      Infusions:    dextrose       PRN Meds: glucose, 15 g, PRN  dextrose, 12.5 g, PRN  glucagon (rDNA), 1 mg, PRN  dextrose, 100 mL/hr, PRN  calcium carbonate, 500 mg, TID PRN  traMADol, 50 mg, Q6H PRN  sodium chloride flush, 10 mL, PRN  sodium chloride flush, 10 mL, PRN  acetaminophen, 650 mg, Q6H PRN    Or  acetaminophen, 650 mg, Q6H PRN  polyethylene glycol, 17 g, Daily PRN  promethazine, 12.5 mg, Q6H PRN    Or  ondansetron, 4 mg, Q6H PRN  nitroGLYCERIN, 0.4 mg, Q5 Min PRN  potassium chloride, 40 mEq, PRN    Or  potassium alternative oral replacement, 40 mEq, PRN    Or  potassium chloride, 10 mEq, PRN          Electronically signed by Sean Bahena MD on 9/25/2020 at 9:50 AM

## 2020-09-25 NOTE — PROGRESS NOTES
09/24/20 2340   NIV Type   NIV Started/Stopped On   Equipment Type v60   Mode Bilevel   Mask Type Full face mask   Mask Size Medium   Settings/Measurements   IPAP 12 cmH20   CPAP/EPAP 5 cmH2O   FiO2  30 %   I Time/ I Time % 1 s   Vt Exhaled 442 mL   Mask Leak (lpm) 77 lpm   Comfort Level Fair   Using Accessory Muscles No   Alarm Settings   Alarms On Y   Press Low Alarm 3 cmH2O   High Pressure Alarm 25 cmH2O   Delay Alarm 20 sec(s)   Apnea (secs) 20 secs   Resp Rate Low Alarm 12   High Respiratory Rate 40 br/min

## 2020-09-25 NOTE — PROGRESS NOTES
- start a bumex gtt for 24hrs for the volume overload  - ok to give samsca one time dose for now, he already had it yesterday  - Full note to follow  - accurate ins and outs will be helpful  Thank you

## 2020-09-25 NOTE — PROGRESS NOTES
[facility] nephrology note appreciated, patient will eventually need ICD      Admit Date:  9/23/2020    Subjective:OK      Chief complaints on admission  Chief Complaint   Patient presents with    Shortness of Breath         History of present illness:Alireza is a 52 y. o.year old who  presents with had concerns including Shortness of Breath. Past medical history:    has a past medical history of CAD (coronary artery disease), CHF (congestive heart failure) ([address] COPD (chronic obstructive pulmonary disease) ([facility] [address] Depression, Drug abuse ([facility] [address] HIGH CHOLESTEROL, Hypertension, MI (myocardial infarction) ([facility] [address] and S/P coronary artery bypass graft x 4. Past surgical history:   has a past surgical history that includes Cardiac surgery (11/2010); Bypass Graft (04/10/2011); and Leg Surgery. Social History:   reports that he has been smoking cigarettes. He has a 20.00 pack-year smoking history. He has quit using smokeless tobacco. He reports that he does not drink alcohol or use drugs. Family history:  family history includes Cancer in his father; Diabetes in his mother; Heart Disease in his father and mother; Heart Failure in his father. No Known Allergies      Objective:   BP 91/67   Pulse 96   Temp 98 °F (36.7 °C) (Oral)   Resp 27   Ht 6' (1.829 m)   Wt 193 lb 12.8 oz (87.9 kg)   SpO2 99%   BMI 26.28 kg/m²       Intake/Output Summary (Last 24 hours) at 9/25/2020 1455  Last data filed at 9/24/2020 2227  Gross per 24 hour   Intake 360 ml   Output 500 ml   Net -140 ml         Physical Exam:  Constitutional:  Well developed, Well nourished, No acute distress, Non-toxic appearance. HENT:  Normocephalic, Atraumatic, Bilateral external ears normal, Oropharynx moist, No oral exudates, Nose normal. Neck- Normal range of motion, No tenderness, Supple, No stridor. Eyes:  PERRL, EOMI, Conjunctiva normal, No discharge.    Respiratory:  Normal breath sounds, No respiratory distress, No wheezing, No chest tenderness. ,no use of accessory muscles, diaphragm movement is normal  Cardiovascular: (PMI) apex non displaced,no lifts no thrills, no s3,no s4, Normal heart rate, Normal rhythm, No murmurs, No rubs, No gallops. Carotid arteries pulse and amplitude are normal no bruit, no abdominal bruit noted ( normal abdominal aorta ausculation), femoral arteries pulse and amplitude are normal no bruit, pedal pulses are normal  GI:  Bowel sounds normal, Soft, No tenderness, No masses, No pulsatile masses. : External genitalia appear normal, No masses or lesions. No discharge. No CVA tenderness. Musculoskeletal:  Intact distal pulses, No edema, No tenderness, No cyanosis, No clubbing. Good range of motion in all major joints. No tenderness to palpation or major deformities noted. Back- No tenderness. Integument:  Warm, Dry, No erythema, No rash. Lymphatic:  No lymphadenopathy noted. Neurologic:  Alert & oriented x 3, Normal motor function, Normal sensory function, No focal deficits noted.    Psychiatric:  Affect normal, Judgment normal, Mood normal.     Medications:    insulin lispro  0-12 Units Subcutaneous TID     insulin lispro  0-6 Units Subcutaneous Nightly    [Held by provider] tolvaptan  15 mg Oral Daily    traZODone  50 mg Oral Nightly    aspirin  81 mg Oral Daily    atorvastatin  40 mg Oral Daily    insulin glargine  10 Units Subcutaneous Nightly    metoprolol succinate  25 mg Oral BID    pantoprazole  40 mg Oral QAM AC    rivaroxaban  20 mg Oral Daily    sodium chloride flush  10 mL Intravenous 2 times per day    nicotine  1 patch Transdermal Daily    ipratropium-albuterol  1 ampule Inhalation Q4H WA    ferrous sulfate  325 mg Oral BID       bumetanide 0.1 mg/mL infusion 2 mg/hr (09/25/20 1134)    dextrose       glucose, dextrose, glucagon (rDNA), dextrose, calcium carbonate, traMADol, sodium chloride flush, sodium chloride flush, acetaminophen **OR** acetaminophen, polyethylene glycol, promethazine **OR** ondansetron, nitroGLYCERIN, potassium chloride **OR** potassium alternative oral replacement **OR** potassium chloride    Lab Data:  CBC:   Recent Labs     09/23/20 1130 09/24/20 0437 09/25/20  0407   WBC 8.2 8.6 7.7   HGB 8.8* 8.8* 7.9*   HCT 30.3* 30.9* 26.5*   MCV 67.0* 68.8* 66.9*    289 258     BMP:   Recent Labs     09/23/20 1130 09/24/20 0437 09/25/20  0407   * 127* 126*   K 3.1* 3.7 3.9   CL 83* 77* 80*   CO2 30 24 28   BUN 41* 48* 55*   CREATININE 1.6* 1.9* 1.8*     LIVER PROFILE:   Recent Labs     09/23/20 1130 09/24/20 0437 09/25/20  0407   AST 37 44* 44*   ALT 22 26 23   LIPASE  --  71*  --    BILIDIR 8.1*  --   --    BILITOT 10.2* 11.6* 12.1*   ALKPHOS 192* 233* 204*     PT/INR:   Recent Labs     09/24/20 0437 09/25/20  0407   PROTIME >120.0* 106.8*   INR 9.72* 8.64*     APTT:   Recent Labs     09/24/20 0437 09/25/20  0407   APTT 49.4* 56.6*     BNP:  No results for input(s): BNP in the last 72 hours. TROPONIN: @TROPONINI:3@      Assessment:  52 y. o.year old who is admitted for          Plan:  1. Shortness of breath: combination of COPD, CHF and anemia, will continue samsca as his Na is 127, hold lasix  1. Ischemic cardiomyopathy: patient will need ICD  2. Anemia\" recommend anemia work up. 3. HTN; continue medications  4. X drug uses  5. CAD: h/o CABG, stable  All labs, medications and tests reviewed, continue all other medications of all above medical condition listed as is.       Taylor Streeter MD 9/25/2020 2:55 PM

## 2020-09-26 LAB
ALBUMIN SERPL-MCNC: 3.6 GM/DL (ref 3.4–5)
ALP BLD-CCNC: 192 IU/L (ref 40–128)
ALT SERPL-CCNC: 25 U/L (ref 10–40)
ANION GAP SERPL CALCULATED.3IONS-SCNC: 21 MMOL/L (ref 4–16)
APTT: 56.5 SECONDS (ref 25.1–37.1)
AST SERPL-CCNC: 56 IU/L (ref 15–37)
BILIRUB SERPL-MCNC: 12.8 MG/DL (ref 0–1)
BUN BLDV-MCNC: 66 MG/DL (ref 6–23)
CALCIUM SERPL-MCNC: 8.9 MG/DL (ref 8.3–10.6)
CHLORIDE BLD-SCNC: 78 MMOL/L (ref 99–110)
CO2: 24 MMOL/L (ref 21–32)
CREAT SERPL-MCNC: 2 MG/DL (ref 0.9–1.3)
GFR AFRICAN AMERICAN: 43 ML/MIN/1.73M2
GFR NON-AFRICAN AMERICAN: 36 ML/MIN/1.73M2
GLUCOSE BLD-MCNC: 129 MG/DL (ref 70–99)
GLUCOSE BLD-MCNC: 153 MG/DL (ref 70–99)
GLUCOSE BLD-MCNC: 166 MG/DL (ref 70–99)
GLUCOSE BLD-MCNC: 190 MG/DL (ref 70–99)
GLUCOSE BLD-MCNC: 81 MG/DL (ref 70–99)
GLUCOSE BLD-MCNC: 91 MG/DL (ref 70–99)
HCT VFR BLD CALC: 30.3 % (ref 42–52)
HEMOGLOBIN: 8.4 GM/DL (ref 13.5–18)
INR BLD: 8.14 INDEX
MAGNESIUM: 2.6 MG/DL (ref 1.8–2.4)
MCH RBC QN AUTO: 19 PG (ref 27–31)
MCHC RBC AUTO-ENTMCNC: 27.7 % (ref 32–36)
MCV RBC AUTO: 68.6 FL (ref 78–100)
PDW BLD-RTO: 23.9 % (ref 11.7–14.9)
PLATELET # BLD: 294 K/CU MM (ref 140–440)
PMV BLD AUTO: 10.2 FL (ref 7.5–11.1)
POTASSIUM SERPL-SCNC: 4 MMOL/L (ref 3.5–5.1)
POTASSIUM SERPL-SCNC: 4.2 MMOL/L (ref 3.5–5.1)
POTASSIUM SERPL-SCNC: 4.3 MMOL/L (ref 3.5–5.1)
POTASSIUM SERPL-SCNC: 4.5 MMOL/L (ref 3.5–5.1)
PRO-BNP: ABNORMAL PG/ML
PROTHROMBIN TIME: 100.6 SECONDS (ref 11.7–14.5)
RBC # BLD: 4.42 M/CU MM (ref 4.6–6.2)
SODIUM BLD-SCNC: 123 MMOL/L (ref 135–145)
TOTAL PROTEIN: 5.8 GM/DL (ref 6.4–8.2)
WBC # BLD: 7.9 K/CU MM (ref 4–10.5)

## 2020-09-26 PROCEDURE — APPSS60 APP SPLIT SHARED TIME 46-60 MINUTES: Performed by: NURSE PRACTITIONER

## 2020-09-26 PROCEDURE — 85610 PROTHROMBIN TIME: CPT

## 2020-09-26 PROCEDURE — 80053 COMPREHEN METABOLIC PANEL: CPT

## 2020-09-26 PROCEDURE — 94660 CPAP INITIATION&MGMT: CPT

## 2020-09-26 PROCEDURE — 6370000000 HC RX 637 (ALT 250 FOR IP): Performed by: INTERNAL MEDICINE

## 2020-09-26 PROCEDURE — 36415 COLL VENOUS BLD VENIPUNCTURE: CPT

## 2020-09-26 PROCEDURE — 80048 BASIC METABOLIC PNL TOTAL CA: CPT

## 2020-09-26 PROCEDURE — 6370000000 HC RX 637 (ALT 250 FOR IP): Performed by: HOSPITALIST

## 2020-09-26 PROCEDURE — 2140000000 HC CCU INTERMEDIATE R&B

## 2020-09-26 PROCEDURE — 93010 ELECTROCARDIOGRAM REPORT: CPT | Performed by: INTERNAL MEDICINE

## 2020-09-26 PROCEDURE — 99222 1ST HOSP IP/OBS MODERATE 55: CPT | Performed by: INTERNAL MEDICINE

## 2020-09-26 PROCEDURE — 2580000003 HC RX 258: Performed by: INTERNAL MEDICINE

## 2020-09-26 PROCEDURE — 2700000000 HC OXYGEN THERAPY PER DAY

## 2020-09-26 PROCEDURE — 2500000003 HC RX 250 WO HCPCS: Performed by: INTERNAL MEDICINE

## 2020-09-26 PROCEDURE — 85027 COMPLETE CBC AUTOMATED: CPT

## 2020-09-26 PROCEDURE — 84132 ASSAY OF SERUM POTASSIUM: CPT

## 2020-09-26 PROCEDURE — 6360000002 HC RX W HCPCS: Performed by: INTERNAL MEDICINE

## 2020-09-26 PROCEDURE — 94761 N-INVAS EAR/PLS OXIMETRY MLT: CPT

## 2020-09-26 PROCEDURE — 94640 AIRWAY INHALATION TREATMENT: CPT

## 2020-09-26 PROCEDURE — 83880 ASSAY OF NATRIURETIC PEPTIDE: CPT

## 2020-09-26 PROCEDURE — 99232 SBSQ HOSP IP/OBS MODERATE 35: CPT | Performed by: INTERNAL MEDICINE

## 2020-09-26 PROCEDURE — 83735 ASSAY OF MAGNESIUM: CPT

## 2020-09-26 PROCEDURE — 82962 GLUCOSE BLOOD TEST: CPT

## 2020-09-26 PROCEDURE — 2580000003 HC RX 258: Performed by: NURSE PRACTITIONER

## 2020-09-26 PROCEDURE — 6370000000 HC RX 637 (ALT 250 FOR IP): Performed by: NURSE PRACTITIONER

## 2020-09-26 PROCEDURE — 85730 THROMBOPLASTIN TIME PARTIAL: CPT

## 2020-09-26 RX ORDER — BUMETANIDE 0.25 MG/ML
1 INJECTION, SOLUTION INTRAMUSCULAR; INTRAVENOUS 2 TIMES DAILY
Status: DISCONTINUED | OUTPATIENT
Start: 2020-09-26 | End: 2020-09-30

## 2020-09-26 RX ADMIN — FERROUS SULFATE TAB 325 MG (65 MG ELEMENTAL FE) 325 MG: 325 (65 FE) TAB at 10:18

## 2020-09-26 RX ADMIN — FERROUS SULFATE TAB 325 MG (65 MG ELEMENTAL FE) 325 MG: 325 (65 FE) TAB at 17:52

## 2020-09-26 RX ADMIN — BUMETANIDE 1 MG: 0.25 INJECTION INTRAMUSCULAR; INTRAVENOUS at 17:59

## 2020-09-26 RX ADMIN — METOPROLOL SUCCINATE 25 MG: 25 TABLET, EXTENDED RELEASE ORAL at 10:17

## 2020-09-26 RX ADMIN — PHYTONADIONE 10 MG: 10 INJECTION, EMULSION INTRAMUSCULAR; INTRAVENOUS; SUBCUTANEOUS at 10:21

## 2020-09-26 RX ADMIN — SODIUM CHLORIDE, PRESERVATIVE FREE 10 ML: 5 INJECTION INTRAVENOUS at 10:21

## 2020-09-26 RX ADMIN — TRAZODONE HYDROCHLORIDE 50 MG: 50 TABLET ORAL at 21:39

## 2020-09-26 RX ADMIN — BUMETANIDE 2 MG/HR: 0.25 INJECTION INTRAMUSCULAR; INTRAVENOUS at 03:40

## 2020-09-26 RX ADMIN — IPRATROPIUM BROMIDE AND ALBUTEROL SULFATE 1 AMPULE: .5; 3 SOLUTION RESPIRATORY (INHALATION) at 20:03

## 2020-09-26 RX ADMIN — IPRATROPIUM BROMIDE AND ALBUTEROL SULFATE 1 AMPULE: .5; 3 SOLUTION RESPIRATORY (INHALATION) at 15:52

## 2020-09-26 RX ADMIN — ACETAMINOPHEN 650 MG: 325 TABLET ORAL at 11:04

## 2020-09-26 RX ADMIN — IPRATROPIUM BROMIDE AND ALBUTEROL SULFATE 1 AMPULE: .5; 3 SOLUTION RESPIRATORY (INHALATION) at 08:30

## 2020-09-26 RX ADMIN — INSULIN LISPRO 2 UNITS: 100 INJECTION, SOLUTION INTRAVENOUS; SUBCUTANEOUS at 12:37

## 2020-09-26 RX ADMIN — INSULIN LISPRO 2 UNITS: 100 INJECTION, SOLUTION INTRAVENOUS; SUBCUTANEOUS at 10:25

## 2020-09-26 RX ADMIN — IPRATROPIUM BROMIDE AND ALBUTEROL SULFATE 1 AMPULE: .5; 3 SOLUTION RESPIRATORY (INHALATION) at 11:39

## 2020-09-26 RX ADMIN — ASPIRIN 81 MG: 81 TABLET, COATED ORAL at 10:16

## 2020-09-26 RX ADMIN — ATORVASTATIN CALCIUM 40 MG: 40 TABLET, FILM COATED ORAL at 21:39

## 2020-09-26 RX ADMIN — SODIUM CHLORIDE, PRESERVATIVE FREE 10 ML: 5 INJECTION INTRAVENOUS at 21:40

## 2020-09-26 RX ADMIN — PANTOPRAZOLE SODIUM 40 MG: 40 TABLET, DELAYED RELEASE ORAL at 06:40

## 2020-09-26 ASSESSMENT — PAIN DESCRIPTION - PROGRESSION: CLINICAL_PROGRESSION: NOT CHANGED

## 2020-09-26 ASSESSMENT — PAIN - FUNCTIONAL ASSESSMENT: PAIN_FUNCTIONAL_ASSESSMENT: PREVENTS OR INTERFERES SOME ACTIVE ACTIVITIES AND ADLS

## 2020-09-26 ASSESSMENT — PAIN SCALES - GENERAL
PAINLEVEL_OUTOF10: 0
PAINLEVEL_OUTOF10: 8
PAINLEVEL_OUTOF10: 8
PAINLEVEL_OUTOF10: 3

## 2020-09-26 ASSESSMENT — PAIN DESCRIPTION - DESCRIPTORS
DESCRIPTORS: ACHING
DESCRIPTORS: SHARP

## 2020-09-26 ASSESSMENT — PAIN DESCRIPTION - LOCATION
LOCATION: LEG
LOCATION: GENERALIZED

## 2020-09-26 ASSESSMENT — PAIN DESCRIPTION - ORIENTATION: ORIENTATION: LEFT

## 2020-09-26 ASSESSMENT — PAIN DESCRIPTION - ONSET: ONSET: ON-GOING

## 2020-09-26 ASSESSMENT — PAIN DESCRIPTION - FREQUENCY: FREQUENCY: CONTINUOUS

## 2020-09-26 ASSESSMENT — PAIN DESCRIPTION - PAIN TYPE: TYPE: ACUTE PAIN;CHRONIC PAIN

## 2020-09-26 NOTE — PROGRESS NOTES
Today's plan: Continue current medications. Admit Date:  9/23/2020    Subjective: Patient is sitting up at the side of the bed today. States that he is feeling better. Patient has concerns with people telling him the truth. Patient states that everyone has been lying to him. Patient does not keep track of his fluid intake sodium intake or if he is taking his medications. Patient states that he ran out of his medications. Chief complaints on admission  Chief Complaint   Patient presents with    Shortness of Breath         History of present illness:Alireza is a 52 y. o.year old who  presents with had concerns including Shortness of Breath. Past medical history:    has a past medical history of CAD (coronary artery disease), CHF (congestive heart failure) ([facility] [address] COPD (chronic obstructive pulmonary disease) ([facility] [address] Depression, Drug abuse ([facility] [address] HIGH CHOLESTEROL, Hypertension, MI (myocardial infarction) ([facility] [address] and S/P coronary artery bypass graft x 4. Past surgical history:   has a past surgical history that includes Cardiac surgery (11/2010); Bypass Graft (04/10/2011); and Leg Surgery. Social History:   reports that he has been smoking cigarettes. He has a 20.00 pack-year smoking history. He has quit using smokeless tobacco. He reports that he does not drink alcohol or use drugs. Family history:  family history includes Cancer in his father; Diabetes in his mother; Heart Disease in his father and mother; Heart Failure in his father. No Known Allergies      Objective:   /79   Pulse 96   Temp 97.1 °F (36.2 °C) (Axillary)   Resp 28   Ht 6' (1.829 m)   Wt 205 lb 4 oz (93.1 kg)   SpO2 99%   BMI 27.84 kg/m²       Intake/Output Summary (Last 24 hours) at 9/26/2020 0847  Last data filed at 9/26/2020 0739  Gross per 24 hour   Intake 760 ml   Output 2275 ml   Net -1515 ml         Physical Exam  Vitals signs reviewed. Constitutional:       General: He is not in acute distress. Appearance: Normal appearance. He is not ill-appearing. HENT:      Head: Normocephalic and atraumatic. Eyes:      Conjunctiva/sclera: Conjunctivae normal.      Pupils: Pupils are equal, round, and reactive to light. Neck:      Musculoskeletal: Neck supple. No muscular tenderness. Vascular: No carotid bruit. Cardiovascular:      Rate and Rhythm: Normal rate and regular rhythm. Pulses: Normal pulses. Heart sounds: Normal heart sounds. No murmur. Pulmonary:      Effort: Pulmonary effort is normal. No respiratory distress. Breath sounds: Examination of the right-lower field reveals rales. Wheezing and rales present. Musculoskeletal:         General: No swelling or deformity. Right lower le+ Pitting Edema present. Left lower le+ Pitting Edema present. Skin:     General: Skin is warm and dry. Capillary Refill: Capillary refill takes less than 2 seconds. Neurological:      Mental Status: He is alert and oriented to person, place, and time. Psychiatric:         Attention and Perception: He is inattentive. Mood and Affect: Mood is anxious. Affect is angry. Speech: Speech normal.         Behavior: Behavior is agitated. Behavior is cooperative. Thought Content: Thought content normal.         Judgment: Judgment is impulsive.            Medications:    phytonadione (VITAMIN K)  IVPB  10 mg Intravenous Once    insulin lispro  0-12 Units Subcutaneous TID WC    insulin lispro  0-6 Units Subcutaneous Nightly    traZODone  50 mg Oral Nightly    aspirin  81 mg Oral Daily    atorvastatin  40 mg Oral Daily    insulin glargine  10 Units Subcutaneous Nightly    metoprolol succinate  25 mg Oral BID    pantoprazole  40 mg Oral QAM AC    rivaroxaban  20 mg Oral Daily    sodium chloride flush  10 mL Intravenous 2 times per day    nicotine  1 patch Transdermal Daily    ipratropium-albuterol  1 ampule Inhalation Q4H WA    ferrous sulfate  325 mg Oral BID WC      bumetanide 0.1 mg/mL infusion 2 mg/hr (09/26/20 0340)    dextrose       albuterol sulfate HFA, glucose, dextrose, glucagon (rDNA), dextrose, calcium carbonate, traMADol, sodium chloride flush, sodium chloride flush, acetaminophen **OR** acetaminophen, polyethylene glycol, promethazine **OR** ondansetron, nitroGLYCERIN, potassium chloride **OR** potassium alternative oral replacement **OR** potassium chloride    Lab Data:  CBC:   Recent Labs     09/24/20 0437 09/25/20 0407 09/26/20  0328   WBC 8.6 7.7 7.9   HGB 8.8* 7.9* 8.4*   HCT 30.9* 26.5* 30.3*   MCV 68.8* 66.9* 68.6*    258 294     BMP:   Recent Labs     09/24/20  0437 09/25/20 0407 09/25/20 2221 09/26/20  0328   * 126*  --  123*   K 3.7 3.9 4.6 4.5  4.3   CL 77* 80*  --  78*   CO2 24 28  --  24   BUN 48* 55*  --  66*   CREATININE 1.9* 1.8*  --  2.0*     LIVER PROFILE:   Recent Labs     09/23/20  1130 09/24/20 0437 09/25/20 0407 09/26/20  0328   AST 37 44* 44* 56*   ALT 22 26 23 25   LIPASE  --  71*  --   --    BILIDIR 8.1*  --   --   --    BILITOT 10.2* 11.6* 12.1* 12.8*   ALKPHOS 192* 233* 204* 192*     PT/INR:   Recent Labs     09/24/20 0437 09/25/20 0407 09/26/20  0328   PROTIME >120.0* 106.8* 100.6*   INR 9.72* 8.64* 8.14*     APTT:   Recent Labs     09/24/20 0437 09/25/20 0407 09/26/20 0328   APTT 49.4* 56.6* 56.5*     BNP:  No results for input(s): BNP in the last 72 hours. TROPONIN: @TROPONINI:3@      Assessment:  52 y. o.year old who is admitted for      Plan:  1. Shortness of breath: combination of COPD, CHF and anemia  2. Ischemic cardiomyopathy: patient will need ICD if he is able to stay out of acute exacerbation of CHF. Ejection fraction per 7/21/2020 echo was 10 to 15%. Patient unfortunately has not been able to adhere to medication or dietary restrictions for optimization of heart failure. Recommend continuing Toprol 25 mg daily.   Afterload reduction and MRA to be addressed once patient's electrolytes and fluid status have stabilized. Diuresis is being managed by nephrology. Continue to monitor intake output Daily weights. Recommend 2 g sodium diet and 1500 mL fluid restriction  3. Concern for adherence to medical recommendations. Patient states he ran out of his medications. He has had frequent readmissions over the last 6 months for congestive heart failure. Patient has not follow-up as an outpatient with our office. Patient states that he does not trust what the doctors are telling him. 4. Anemia: Per GI. Patient INR is 8.14 and hemoglobin is 8.4 this morning  5. HTN: Stable continue Toprol-XL 25 mg daily  6. CAD: h/o CABG, stable  7. DVT prophylaxis if not contraindicated while in the hospital.       Electronically signed by NANCY Casiano CNP on 9/26/2020 at 9:00 AM       I have seen ,spoken to  and examined this patient personally, independently of the nurse practitioner. I have reviewed the hospital care given to date and reviewed all pertinent labs and imaging. The plan was developed mutually at the time of the visit with the patient,  NP  and myself. I have spoken with patient, nursing staff and provided written and verbal instructions . The above note has been reviewed and I agree with the assessment, diagnosis, and treatment plan with changes made by me as follows     CARDIOLOGY ATTENDING ADDENDUM    HPI:  I have reviewed the above HPI  And agree with above   Elen Hauser is a 52 y. o.year old who and presents with had concerns including Shortness of Breath.   Chief Complaint   Patient presents with    Shortness of Breath     Interval history:  Is SOB    Physical Exam:  General:  Awake, alert, NAD  Head:normal  Eye:normal  Neck:  No JVD   Chest:  Clear to auscultation, respiration easy  Cardiovascular:  RRR S1S2  Abdomen:   nontender  Extremities:  tr edema  Pulses; palpable  Neuro: grossly normal      MEDICAL DECISION MAKING;    I agree with the above plan, which was planned by

## 2020-09-26 NOTE — PROGRESS NOTES
Reading through Pt chart. Pt has orders for continuous Bumex placed by Dr. Shahid Tan at 1030 fr 24 hr continuous Bumex. First bag of Bumex started at 1134. Upon assessing Pt after reading chart there is no Bumex running. This RN contacted Dr. Mohinder Romano to make sure he had not stopped Bumex. Dr. Mohinder Romano noted to follow nephrology recommendation. This RN sent pharmacy note to send Bumex. This RN will run continuous Bumex as order is stated. This RN will notify Dr. Shahid Tan.

## 2020-09-26 NOTE — PROGRESS NOTES
Hospitalist Progress Note      Name:  Kelle Gaines /Age/Sex: 1970  (52 y.o. male)   MRN & CSN:  [id number] & [id number] Admission Date/Time: 2020 11:15 AM   Location:  [address] [place] [address] PCP: No primary care provider on file. Hospital Day: 4    Assessment and Plan:   Kelle Gaines is a 52 y.o.  male  who      1) Acute on chronic systolic HF  -BNP elevated  -Last Echo (2020) EF 10 -15%, Global hypokinesis.  Severe mitral regurg, moderate tricuspid regurg. Severe PHTN with an RVSP of 73 mmHg  -Continue diuresis per nephro  -Cardiology on board     2) Possible Cardiac Cirrhosis  -Elevated bilirubin - Bilirubin 10.2, direct bilirubin 8.1  -Continue IV Vit k  -Continue supportive management  -GI on board     3)JAIME with hyponatremia on CKD 3  -Likely due to CRS  -Avoid nephrotoxic  -Nephrology on board for diuresis  -Will limit use of tolvaptan due to hepatotoxicity     4) Elevated Troponin  -Likely due to demand ischemia  -EKG with sinus tachy and PVC  -Cardiology on board          Chronic Illnesses, medication resumed unless contraindicated           - CAD, s/p CABG x4          - opiate/heroin abuse         - hyperlipidemia         - hypertension         - osteoarthritis          - DM type 2         - COPD         -Nicotine use disorder    Diet DIET CARDIAC; Carb Control: 4 carb choices (60 gms)/meal; Daily Fluid Restriction: 1500 ml   DVT Prophylaxis [] Lovenox, []  Heparin, [] SCDs, [] Ambulation   GI Prophylaxis [] PPI,  [] H2 Blocker,  [] Carafate,  [] Diet/Tube Feeds   Code Status DNR-CCA   Disposition TBD   MDM      History of Present Illness:     Patient was seen and examined  Denied any any chest pain or worsening SOB  No dizziness, palpitations  No fever, chills, N/V/D    Ten point ROS reviewed negative, unless as noted above    Objective:        Intake/Output Summary (Last 24 hours) at 2020 0850  Last data filed at 2020 0739  Gross per 24 hour   Intake 760 ml   Output 2275 ml Net -1515 ml      Vitals:   Vitals:    09/26/20 0828   BP:    Pulse:    Resp:    Temp:    SpO2: 99%     Physical Exam:   GEN Awake male, sitting upright in bed in no apparent distress. Appears given age. EYES Pupils are equally round. No scleral erythema, discharge, or conjunctivitis. HENT Mucous membranes are moist. Oral pharynx without exudates, no evidence of thrush. NECK Supple, no apparent thyromegaly or masses. RESP Clear to auscultation, no wheezes, rales or rhonchi. Symmetric chest movement while on 3 L of O2.  CARDIO/VASC S1/S2 auscultated. Regular rate without appreciable murmurs, rubs, or gallops. No JVD or carotid bruits. Peripheral pulses equal bilaterally and palpable. Bilateral pitting edema. GI Abdomen is soft without significant tenderness, masses, or guarding. Bowel sounds are normoactive. Rectal exam deferred.  No costovertebral angle tenderness. Normal appearing external genitalia. Hansen catheter is not present. HEME/LYMPH No palpable cervical lymphadenopathy and no hepatosplenomegaly. No petechiae or ecchymoses. MSK No gross joint deformities. SKIN Normal coloration, warm, dry. NEURO Cranial nerves appear grossly intact, normal speech, no lateralizing weakness. PSYCH Awake, alert, oriented x 4. Affect appropriate.     Medications:   Medications:    phytonadione (VITAMIN K)  IVPB  10 mg Intravenous Once    insulin lispro  0-12 Units Subcutaneous TID WC    insulin lispro  0-6 Units Subcutaneous Nightly    traZODone  50 mg Oral Nightly    aspirin  81 mg Oral Daily    atorvastatin  40 mg Oral Daily    insulin glargine  10 Units Subcutaneous Nightly    metoprolol succinate  25 mg Oral BID    pantoprazole  40 mg Oral QAM AC    rivaroxaban  20 mg Oral Daily    sodium chloride flush  10 mL Intravenous 2 times per day    nicotine  1 patch Transdermal Daily    ipratropium-albuterol  1 ampule Inhalation Q4H WA    ferrous sulfate  325 mg Oral BID WC      Infusions:    bumetanide 0.1 mg/mL infusion 2 mg/hr (09/26/20 0340)    dextrose       PRN Meds: albuterol sulfate HFA, 2 puff, Q6H PRN  glucose, 15 g, PRN  dextrose, 12.5 g, PRN  glucagon (rDNA), 1 mg, PRN  dextrose, 100 mL/hr, PRN  calcium carbonate, 500 mg, TID PRN  traMADol, 50 mg, Q6H PRN  sodium chloride flush, 10 mL, PRN  sodium chloride flush, 10 mL, PRN  acetaminophen, 650 mg, Q6H PRN    Or  acetaminophen, 650 mg, Q6H PRN  polyethylene glycol, 17 g, Daily PRN  promethazine, 12.5 mg, Q6H PRN    Or  ondansetron, 4 mg, Q6H PRN  nitroGLYCERIN, 0.4 mg, Q5 Min PRN  potassium chloride, 40 mEq, PRN    Or  potassium alternative oral replacement, 40 mEq, PRN    Or  potassium chloride, 10 mEq, PRN          Electronically signed by Tl Daniel MD on 9/26/2020 at 8:50 AM

## 2020-09-26 NOTE — CONSULTS
[address] [place] [address] [address] [place] [address]  Phone: [phone]  Office Hours: 8:30AM - 4:30PM  Monday - Friday     Nephrology Service Consultation    Patient:  Arpan Soto  MRN: [id number]  Consulting physician:  Diego Cobos MD  Reason for Consult: JAIME on CKD4 hyponatremia  History Obtained From:  patient, electronic medical record  PCP: No primary care provider on file.     HISTORY OF PRESENT ILLNESS:   The patient is a 52 y.o. male who presents with soa, for worsening CHF and failure of OP therapy  He has CKD4 and has seen my partner Dr An Browning, but fails to follow up as OP  He has an EF of 10-15 and has severe systolic CHF  He is yellow  Making some urine  Past Medical History:        Diagnosis Date    CAD (coronary artery disease)     [facility]congestive heart failure) ([facility] [address]     COPD (chronic obstructive pulmonary disease) ([facility] [address]     Depression     Drug abuse ([facility] [address]     HIGH CHOLESTEROL     Hypertension     MI (myocardial infarction) ([facility] [address] 2011    S/P coronary artery bypass graft x 4 2011    CABG x 4 Dr Nura Miller       Past Surgical History:        Procedure Laterality Date    BYPASS GRAFT  04/10/2011    CABG x 4 Dr Pardo Duty  11/2010     4 stents    LEG SURGERY         Medications:   Scheduled Meds:   bumetanide  1 mg Intravenous BID    insulin lispro  0-12 Units Subcutaneous TID WC    insulin lispro  0-6 Units Subcutaneous Nightly    traZODone  50 mg Oral Nightly    aspirin  81 mg Oral Daily    atorvastatin  40 mg Oral Daily    insulin glargine  10 Units Subcutaneous Nightly    metoprolol succinate  25 mg Oral BID    pantoprazole  40 mg Oral QAM AC    rivaroxaban  20 mg Oral Daily    sodium chloride flush  10 mL Intravenous 2 times per day    nicotine  1 patch Transdermal Daily    ipratropium-albuterol  1 ampule Inhalation Q4H WA    ferrous sulfate  325 mg Oral BID WC     Continuous Infusions:   dextrose       PRN Meds:.albuterol sulfate HFA, glucose, dextrose, glucagon (rDNA), dextrose, calcium carbonate, traMADol, sodium chloride flush, sodium chloride flush, acetaminophen **OR** acetaminophen, polyethylene glycol, promethazine **OR** ondansetron, nitroGLYCERIN, potassium chloride **OR** potassium alternative oral replacement **OR** potassium chloride    Allergies:  Patient has no known allergies. Social History:   TOBACCO:   reports that he has been smoking cigarettes. He has a 20.00 pack-year smoking history. He has quit using smokeless tobacco.  ETOH:   reports no history of alcohol use. OCCUPATION:      Family History:       Problem Relation Age of Onset    Cancer Father     Heart Failure Father     Heart Disease Father     Diabetes Mother     Heart Disease Mother        REVIEW OF SYSTEMS:  Negative except for jaundice and soa    Physical Exam:    Vitals: /80   Pulse 91   Temp 96.7 °F (35.9 °C) (Axillary) Comment (Src): unable to obtain oral due to patient eating ice  Resp 17   Ht 6' (1.829 m)   Wt 205 lb 4 oz (93.1 kg)   SpO2 96%   BMI 27.84 kg/m²   General appearance: alert, appears stated age and cooperative JAUNDICED  Skin: Skin color, texture, turgor normal. No rashes or lesions  HEENT: Head: Normocephalic, no lesions, without obvious abnormality.   Neck: no adenopathy, no carotid bruit, no JVD, supple, symmetrical, trachea midline and thyroid not enlarged, symmetric, no tenderness/mass/nodules  Lungs: diminished breath sounds bilaterally  Heart: regularly irregular rhythm  Abdomen: ascites  Extremities: edema 2plus  Neurologic: Mental status: alertness: alert    CBC:   Recent Labs     09/24/20  0437 09/25/20  0407 09/26/20  0328   WBC 8.6 7.7 7.9   HGB 8.8* 7.9* 8.4*    258 294     BMP:    Recent Labs     09/24/20  0437 09/25/20  0407 09/25/20  2221 09/26/20  0328   * 126*  --  123*   K 3.7 3.9 4.6 4.5  4.3   CL 77* 80*  --  78*   CO2 24 28  --  24   BUN 48* 55*  --  66*   CREATININE 1.9* 1.8*  --  2.0* GLUCOSE 120* 122*  --  129*     Troponin: No results for input(s): TROPONINI in the last 72 hours. BNP: No results for input(s): BNP in the last 72 hours. Lipids:   Recent Labs     09/24/20  0437   CHOL 91   HDL 9*     ABGs:   Lab Results   Component Value Date    PO2ART 97 07/06/2020    AON7GEE 35.0 07/06/2020     -----------------------------------------------------------------      Assessment and Recommendations     Patient Active Problem List   Diagnosis Code    Essential hypertension I10    Osteoarthritis M19.90    COPD (chronic obstructive pulmonary disease) ([facility] [address] J44.9    Paresthesia of left leg R20.2    Abdominal hernia K46.9    Left shoulder pain M25.512    Crushing injury of right hand S67. 21XA    Rotator cuff tendinitis M75.80    Midline low back pain without sciatica M54.5    History of MI (myocardial infarction) I25.2    Coronary artery disease involving coronary bypass graft of native heart without angina pectoris I25.810    Mixed hyperlipidemia E78.2    Depression F32.9    Chronic midline low back pain without sciatica M54.5, G89.29    Tobacco abuse Z72.0    Gastroesophageal reflux disease without esophagitis K21.9    Opiate abuse, continuous ([facility]) F11.10    Heroin dependence ([facility]) F11.20    ST elevation myocardial infarction involving left circumflex coronary artery ([facility]) I21.21    STEMI (ST elevation myocardial infarction) ([facility]) I21.3    Acute on chronic combined systolic (congestive) and diastolic (congestive) heart failure ([facility]) J77.71    Systolic and diastolic CHF w/reduced LV function, NYHA class 4 ([facility]) I50.40    NSTEMI (non-ST elevated myocardial infarction) ([facility]) I21.4    Acute on chronic congestive heart failure ([facility]) I50.9    Noncompliance Z91.19    Pulmonary edema, acute ([facility]) J81.0    Acute kidney injury ([facility]) N17.9    Acute respiratory failure with hypoxia and hypercapnia ([facility]) J96.01, J96.02    Hypertensive emergency I16.1    Ischemic cardiomyopathy I25.5    Heart failure (HCC) I50.9    Left ventricular systolic dysfunction K36.2    Acute systolic congestive heart failure (HCC) I50.21    SOB (shortness of breath) R06.02    Acute on chronic combined systolic and diastolic heart failure (HCC) I50.43    Hyponatremia E87.1    Hematemesis K92.0    Acute on chronic systolic CHF (congestive heart failure) (HCC) I50.23    Dyspnea R06.00    Acute respiratory distress R06.03    Electrolyte disturbance E87.8    Elevated bilirubin R17    Hypervolemia E87.70   IMP  JAIME on CKD4- cardiorenal syndrome Type 2  Systolic CHF EF less than 15  Hyponatremia due to the above 2   Liver failure with Jaundice  Suggest  24 hrs of bumex iV  transition to IV push bumex and PO  Use VAPTAN  as needed  Prognosis is grave    Martha Banegas MD

## 2020-09-26 NOTE — PROGRESS NOTES
DOING FAIR NO GROSS GIT BLEEDING AWAKE AND ALERT  VITALS STABLE   LABS NOTED  T,BILI 12.8 INR 8.14  WILL CPM F/U LFTS

## 2020-09-27 LAB
ALBUMIN SERPL-MCNC: 3.9 GM/DL (ref 3.4–5)
ALP BLD-CCNC: 195 IU/L (ref 40–128)
ALT SERPL-CCNC: 26 U/L (ref 10–40)
ANION GAP SERPL CALCULATED.3IONS-SCNC: 16 MMOL/L (ref 4–16)
APTT: 47.3 SECONDS (ref 25.1–37.1)
AST SERPL-CCNC: 59 IU/L (ref 15–37)
BILIRUB SERPL-MCNC: 13.2 MG/DL (ref 0–1)
BUN BLDV-MCNC: 71 MG/DL (ref 6–23)
CALCIUM SERPL-MCNC: 9.1 MG/DL (ref 8.3–10.6)
CHLORIDE BLD-SCNC: 81 MMOL/L (ref 99–110)
CO2: 28 MMOL/L (ref 21–32)
CREAT SERPL-MCNC: 1.9 MG/DL (ref 0.9–1.3)
GFR AFRICAN AMERICAN: 46 ML/MIN/1.73M2
GFR NON-AFRICAN AMERICAN: 38 ML/MIN/1.73M2
GLUCOSE BLD-MCNC: 106 MG/DL (ref 70–99)
GLUCOSE BLD-MCNC: 125 MG/DL (ref 70–99)
GLUCOSE BLD-MCNC: 131 MG/DL (ref 70–99)
GLUCOSE BLD-MCNC: 151 MG/DL (ref 70–99)
HCT VFR BLD CALC: 30.5 % (ref 42–52)
HEMOGLOBIN: 8.8 GM/DL (ref 13.5–18)
INR BLD: 4.85 INDEX
MAGNESIUM: 2.6 MG/DL (ref 1.8–2.4)
MCH RBC QN AUTO: 19.7 PG (ref 27–31)
MCHC RBC AUTO-ENTMCNC: 28.9 % (ref 32–36)
MCV RBC AUTO: 68.2 FL (ref 78–100)
PDW BLD-RTO: 24.4 % (ref 11.7–14.9)
PLATELET # BLD: 276 K/CU MM (ref 140–440)
PMV BLD AUTO: 10.6 FL (ref 7.5–11.1)
POTASSIUM SERPL-SCNC: 4.1 MMOL/L (ref 3.5–5.1)
POTASSIUM SERPL-SCNC: 4.2 MMOL/L (ref 3.5–5.1)
POTASSIUM SERPL-SCNC: 4.3 MMOL/L (ref 3.5–5.1)
PROTHROMBIN TIME: 59.6 SECONDS (ref 11.7–14.5)
RBC # BLD: 4.47 M/CU MM (ref 4.6–6.2)
REASON FOR REJECTION: NORMAL
REJECTED TEST: NORMAL
SODIUM BLD-SCNC: 125 MMOL/L (ref 135–145)
TOTAL PROTEIN: 6.2 GM/DL (ref 6.4–8.2)
WBC # BLD: 7.8 K/CU MM (ref 4–10.5)

## 2020-09-27 PROCEDURE — 80053 COMPREHEN METABOLIC PANEL: CPT

## 2020-09-27 PROCEDURE — 85730 THROMBOPLASTIN TIME PARTIAL: CPT

## 2020-09-27 PROCEDURE — 94660 CPAP INITIATION&MGMT: CPT

## 2020-09-27 PROCEDURE — 2580000003 HC RX 258: Performed by: NURSE PRACTITIONER

## 2020-09-27 PROCEDURE — 99232 SBSQ HOSP IP/OBS MODERATE 35: CPT | Performed by: INTERNAL MEDICINE

## 2020-09-27 PROCEDURE — 85027 COMPLETE CBC AUTOMATED: CPT

## 2020-09-27 PROCEDURE — 85610 PROTHROMBIN TIME: CPT

## 2020-09-27 PROCEDURE — 80048 BASIC METABOLIC PNL TOTAL CA: CPT

## 2020-09-27 PROCEDURE — APPSS45 APP SPLIT SHARED TIME 31-45 MINUTES: Performed by: NURSE PRACTITIONER

## 2020-09-27 PROCEDURE — 83735 ASSAY OF MAGNESIUM: CPT

## 2020-09-27 PROCEDURE — 6370000000 HC RX 637 (ALT 250 FOR IP): Performed by: HOSPITALIST

## 2020-09-27 PROCEDURE — 36415 COLL VENOUS BLD VENIPUNCTURE: CPT

## 2020-09-27 PROCEDURE — 6370000000 HC RX 637 (ALT 250 FOR IP): Performed by: INTERNAL MEDICINE

## 2020-09-27 PROCEDURE — 6370000000 HC RX 637 (ALT 250 FOR IP): Performed by: NURSE PRACTITIONER

## 2020-09-27 PROCEDURE — 2140000000 HC CCU INTERMEDIATE R&B

## 2020-09-27 PROCEDURE — 84132 ASSAY OF SERUM POTASSIUM: CPT

## 2020-09-27 PROCEDURE — 94640 AIRWAY INHALATION TREATMENT: CPT

## 2020-09-27 PROCEDURE — 2580000003 HC RX 258: Performed by: EMERGENCY MEDICINE

## 2020-09-27 PROCEDURE — 6360000002 HC RX W HCPCS: Performed by: INTERNAL MEDICINE

## 2020-09-27 PROCEDURE — 2500000003 HC RX 250 WO HCPCS: Performed by: INTERNAL MEDICINE

## 2020-09-27 PROCEDURE — 94761 N-INVAS EAR/PLS OXIMETRY MLT: CPT

## 2020-09-27 PROCEDURE — 82962 GLUCOSE BLOOD TEST: CPT

## 2020-09-27 PROCEDURE — 2580000003 HC RX 258: Performed by: INTERNAL MEDICINE

## 2020-09-27 PROCEDURE — 2700000000 HC OXYGEN THERAPY PER DAY

## 2020-09-27 RX ORDER — XYLITOL/YERBA SANTA
AEROSOL, SPRAY WITH PUMP (ML) MUCOUS MEMBRANE PRN
Status: DISCONTINUED | OUTPATIENT
Start: 2020-09-27 | End: 2020-10-01 | Stop reason: HOSPADM

## 2020-09-27 RX ADMIN — ASPIRIN 81 MG: 81 TABLET, COATED ORAL at 10:03

## 2020-09-27 RX ADMIN — Medication: at 20:58

## 2020-09-27 RX ADMIN — IPRATROPIUM BROMIDE AND ALBUTEROL SULFATE 1 AMPULE: .5; 3 SOLUTION RESPIRATORY (INHALATION) at 16:38

## 2020-09-27 RX ADMIN — IPRATROPIUM BROMIDE AND ALBUTEROL SULFATE 1 AMPULE: .5; 3 SOLUTION RESPIRATORY (INHALATION) at 20:31

## 2020-09-27 RX ADMIN — FERROUS SULFATE TAB 325 MG (65 MG ELEMENTAL FE) 325 MG: 325 (65 FE) TAB at 17:18

## 2020-09-27 RX ADMIN — TRAMADOL HYDROCHLORIDE 50 MG: 50 TABLET, FILM COATED ORAL at 21:01

## 2020-09-27 RX ADMIN — ATORVASTATIN CALCIUM 40 MG: 40 TABLET, FILM COATED ORAL at 20:59

## 2020-09-27 RX ADMIN — BUMETANIDE 1 MG: 0.25 INJECTION INTRAMUSCULAR; INTRAVENOUS at 22:28

## 2020-09-27 RX ADMIN — INSULIN LISPRO 2 UNITS: 100 INJECTION, SOLUTION INTRAVENOUS; SUBCUTANEOUS at 10:04

## 2020-09-27 RX ADMIN — BUMETANIDE 1 MG: 0.25 INJECTION INTRAMUSCULAR; INTRAVENOUS at 10:07

## 2020-09-27 RX ADMIN — PANTOPRAZOLE SODIUM 40 MG: 40 TABLET, DELAYED RELEASE ORAL at 06:42

## 2020-09-27 RX ADMIN — TRAMADOL HYDROCHLORIDE 50 MG: 50 TABLET, FILM COATED ORAL at 02:52

## 2020-09-27 RX ADMIN — SODIUM CHLORIDE, PRESERVATIVE FREE 10 ML: 5 INJECTION INTRAVENOUS at 10:03

## 2020-09-27 RX ADMIN — PHYTONADIONE 10 MG: 10 INJECTION, EMULSION INTRAMUSCULAR; INTRAVENOUS; SUBCUTANEOUS at 10:02

## 2020-09-27 RX ADMIN — FERROUS SULFATE TAB 325 MG (65 MG ELEMENTAL FE) 325 MG: 325 (65 FE) TAB at 10:03

## 2020-09-27 RX ADMIN — SODIUM CHLORIDE, PRESERVATIVE FREE 10 ML: 5 INJECTION INTRAVENOUS at 22:29

## 2020-09-27 RX ADMIN — TRAZODONE HYDROCHLORIDE 50 MG: 50 TABLET ORAL at 20:59

## 2020-09-27 RX ADMIN — IPRATROPIUM BROMIDE AND ALBUTEROL SULFATE 1 AMPULE: .5; 3 SOLUTION RESPIRATORY (INHALATION) at 07:25

## 2020-09-27 RX ADMIN — SODIUM CHLORIDE, PRESERVATIVE FREE 10 ML: 5 INJECTION INTRAVENOUS at 20:58

## 2020-09-27 RX ADMIN — IPRATROPIUM BROMIDE AND ALBUTEROL SULFATE 1 AMPULE: .5; 3 SOLUTION RESPIRATORY (INHALATION) at 11:39

## 2020-09-27 RX ADMIN — ALBUTEROL SULFATE 2 PUFF: 90 AEROSOL, METERED RESPIRATORY (INHALATION) at 02:40

## 2020-09-27 ASSESSMENT — PAIN DESCRIPTION - PROGRESSION: CLINICAL_PROGRESSION: NOT CHANGED

## 2020-09-27 ASSESSMENT — PAIN SCALES - GENERAL
PAINLEVEL_OUTOF10: 8
PAINLEVEL_OUTOF10: 6
PAINLEVEL_OUTOF10: 8
PAINLEVEL_OUTOF10: 7
PAINLEVEL_OUTOF10: 0

## 2020-09-27 ASSESSMENT — PAIN DESCRIPTION - ONSET
ONSET: ON-GOING
ONSET: ON-GOING

## 2020-09-27 ASSESSMENT — PAIN DESCRIPTION - PAIN TYPE
TYPE: ACUTE PAIN
TYPE: ACUTE PAIN

## 2020-09-27 ASSESSMENT — PAIN DESCRIPTION - DESCRIPTORS
DESCRIPTORS: ACHING
DESCRIPTORS: ACHING

## 2020-09-27 ASSESSMENT — PAIN DESCRIPTION - LOCATION
LOCATION: GENERALIZED
LOCATION: GENERALIZED

## 2020-09-27 ASSESSMENT — PAIN DESCRIPTION - FREQUENCY
FREQUENCY: INTERMITTENT
FREQUENCY: INTERMITTENT

## 2020-09-27 ASSESSMENT — PAIN - FUNCTIONAL ASSESSMENT: PAIN_FUNCTIONAL_ASSESSMENT: PREVENTS OR INTERFERES SOME ACTIVE ACTIVITIES AND ADLS

## 2020-09-27 NOTE — PROGRESS NOTES
Hospitalist Progress Note      Name:  Dejuan Hamm /Age/Sex: 1970  (52 y.o. male)   MRN & CSN:  [id number] & [id number] Admission Date/Time: 2020 11:15 AM   Location:  [address] [place] [address] PCP: No primary care provider on file. Hospital Day: 5    Assessment and Plan:   Dejuan Hamm is a 52 y.o.  male  who presents with Acute respiratory distress     1) Acute on chronic systolic HF  -BNP elevated  -Last Echo (2020) EF 10 -15%, Global hypokinesis.  Severe mitral regurg, moderate tricuspid regurg. Severe PHTN with an RVSP of 73 mmHg  -Continue diuresis per nephro and fluid restriction  -Cardiology on board     2) Possible Cardiac Cirrhosis  -Elevated bilirubin - Bilirubin 10.2, direct bilirubin 8.1  -Continue IV Vit k  -Continue supportive management  -GI on board     3)JAIME with hyponatremia on CKD 3  -Likely due to CRS  -Avoid nephrotoxic  -Nephrology on board for diuresis  -Will limit use of tolvaptan due to hepatotoxicity     4) Elevated Troponin  -Likely due to demand ischemia  -EKG with sinus tachy and PVC  -Cardiology on board          Chronic Illnesses, medication resumed unless contraindicated           - CAD, s/p CABG x4          - opiate/heroin abuse         - hyperlipidemia         - hypertension         - osteoarthritis          - DM type 2         - COPD         -Nicotine use disorder    Diet DIET CARDIAC; Carb Control: 4 carb choices (60 gms)/meal; Low Sodium (2 GM); Daily Fluid Restriction: 1500 ml   DVT Prophylaxis [] Lovenox, []  Heparin, [] SCDs, [] Ambulation   GI Prophylaxis [] PPI,  [] H2 Blocker,  [] Carafate,  [] Diet/Tube Feeds   Code Status DNR-CCA   Disposition TBD   MDM      History of Present Illness:     Patient was seen and examined  Reported he's feeling better today  No SOB, CP, dizziness  No palpitations, fever, chills    Ten point ROS reviewed negative, unless as noted above    Objective:        Intake/Output Summary (Last 24 hours) at 2020 5992  Last data filed at 9/27/2020 0645  Gross per 24 hour   Intake 1549 ml   Output 3650 ml   Net -2101 ml      Vitals:   Vitals:    09/27/20 0727   BP:    Pulse:    Resp: 20   Temp:    SpO2:      Physical Exam:   GEN Awake male, sitting upright in bed in no apparent distress. Appears given age. EYES Pupils are equally round. No scleral erythema, discharge, or conjunctivitis. HENT Mucous membranes are moist. Oral pharynx without exudates, no evidence of thrush. NECK Supple, no apparent thyromegaly or masses. RESP Clear to auscultation, no wheezes, rales or rhonchi. Symmetric chest movement while on room air. CARDIO/VASC S1/S2 auscultated. Regular rate without appreciable murmurs, rubs, or gallops. No JVD or carotid bruits. Peripheral pulses equal bilaterally and palpable. Peripheral edema regressing  GI Abdomen is soft without significant tenderness, masses, or guarding. Bowel sounds are normoactive. Rectal exam deferred.  No costovertebral angle tenderness. Normal appearing external genitalia. Hansen catheter is not present. HEME/LYMPH No palpable cervical lymphadenopathy and no hepatosplenomegaly. No petechiae or ecchymoses. MSK No gross joint deformities. SKIN Normal coloration, warm, dry. NEURO Cranial nerves appear grossly intact, normal speech, no lateralizing weakness. PSYCH Awake, alert, oriented x 4. Affect appropriate.     Medications:   Medications:    phytonadione (VITAMIN K)  IVPB  10 mg Intravenous Once    bumetanide  1 mg Intravenous BID    insulin lispro  0-12 Units Subcutaneous TID WC    insulin lispro  0-6 Units Subcutaneous Nightly    traZODone  50 mg Oral Nightly    aspirin  81 mg Oral Daily    atorvastatin  40 mg Oral Daily    insulin glargine  10 Units Subcutaneous Nightly    metoprolol succinate  25 mg Oral BID    pantoprazole  40 mg Oral QAM AC    sodium chloride flush  10 mL Intravenous 2 times per day    nicotine  1 patch Transdermal Daily    ipratropium-albuterol  1 ampule Inhalation Q4H WA    ferrous sulfate  325 mg Oral BID WC      Infusions:    dextrose       PRN Meds: albuterol sulfate HFA, 2 puff, Q6H PRN  glucose, 15 g, PRN  dextrose, 12.5 g, PRN  glucagon (rDNA), 1 mg, PRN  dextrose, 100 mL/hr, PRN  calcium carbonate, 500 mg, TID PRN  traMADol, 50 mg, Q6H PRN  sodium chloride flush, 10 mL, PRN  sodium chloride flush, 10 mL, PRN  acetaminophen, 650 mg, Q6H PRN    Or  acetaminophen, 650 mg, Q6H PRN  polyethylene glycol, 17 g, Daily PRN  promethazine, 12.5 mg, Q6H PRN    Or  ondansetron, 4 mg, Q6H PRN  nitroGLYCERIN, 0.4 mg, Q5 Min PRN  potassium chloride, 40 mEq, PRN    Or  potassium alternative oral replacement, 40 mEq, PRN    Or  potassium chloride, 10 mEq, PRN          Electronically signed by Conner Porter MD on 9/27/2020 at 9:26 AM

## 2020-09-27 NOTE — PROGRESS NOTES
[facility] [address], [address] [place] [address]  Phone: [phone]  Office Hours: 8:30AM - 4:30PM  Monday - Friday     Nephrology Progress Note  9/27/2020 9:29 AM  Subjective:   Admit Date: 9/23/2020  PCP: No primary care provider on file. Interval History: some what better  Making urine  Less soa  Diet: DIET CARDIAC; Carb Control: 4 carb choices (60 gms)/meal; Low Sodium (2 GM); Daily Fluid Restriction: 1500 ml      Data:   Scheduled Meds:   phytonadione (VITAMIN K)  IVPB  10 mg Intravenous Once    bumetanide  1 mg Intravenous BID    insulin lispro  0-12 Units Subcutaneous TID WC    insulin lispro  0-6 Units Subcutaneous Nightly    traZODone  50 mg Oral Nightly    aspirin  81 mg Oral Daily    atorvastatin  40 mg Oral Daily    insulin glargine  10 Units Subcutaneous Nightly    metoprolol succinate  25 mg Oral BID    pantoprazole  40 mg Oral QAM AC    sodium chloride flush  10 mL Intravenous 2 times per day    nicotine  1 patch Transdermal Daily    ipratropium-albuterol  1 ampule Inhalation Q4H WA    ferrous sulfate  325 mg Oral BID WC     Continuous Infusions:   dextrose       PRN Meds:albuterol sulfate HFA, glucose, dextrose, glucagon (rDNA), dextrose, calcium carbonate, traMADol, sodium chloride flush, sodium chloride flush, acetaminophen **OR** acetaminophen, polyethylene glycol, promethazine **OR** ondansetron, nitroGLYCERIN, potassium chloride **OR** potassium alternative oral replacement **OR** potassium chloride  I/O last 3 completed shifts: In: 0230 [P.O.:1451; IV Piggyback:98]  Out: 7501 [Urine:3950]  No intake/output data recorded.     Intake/Output Summary (Last 24 hours) at 9/27/2020 0929  Last data filed at 9/27/2020 0645  Gross per 24 hour   Intake 1549 ml   Output 3650 ml   Net -2101 ml     CBC:   Recent Labs     09/25/20  0407 09/26/20  0328 09/27/20  0423   WBC 7.7 7.9 7.8   HGB 7.9* 8.4* 8.8*    294 276     BMP:    Recent Labs     09/25/20 0407 09/26/20  0328 09/26/20  1219 09/26/20  2135 09/27/20  0423   *  --  123*  --   --  125*   K 3.9   < > 4.5  4.3 4.2 4.0 4.1   CL 80*  --  78*  --   --  81*   CO2 28  --  24  --   --  28   BUN 55*  --  66*  --   --  71*   CREATININE 1.8*  --  2.0*  --   --  1.9*   GLUCOSE 122*  --  129*  --   --  106*    < > = values in this interval not displayed. Troponin: No results for input(s): TROPONINI in the last 72 hours. BNP: No results for input(s): BNP in the last 72 hours. Lipids: No results for input(s): CHOL, HDL in the last 72 hours. Invalid input(s): LDLCALCU  ABGs:   Lab Results   Component Value Date    PO2ART 97 07/06/2020    PBF1AON 35.0 07/06/2020       Objective:   Vitals: /82   Pulse 85   Temp 97.8 °F (36.6 °C) (Oral)   Resp 20   Ht 6' (1.829 m)   Wt 205 lb 4 oz (93.1 kg)   SpO2 98%   BMI 27.84 kg/m²   General appearance: alert and cooperative with exam  HEENT: Head: Normal, normocephalic, atraumatic.   Neck: no adenopathy, no carotid bruit, no JVD, supple, symmetrical, trachea midline and thyroid not enlarged, symmetric, no tenderness/mass/nodules  Lungs: diminished breath sounds bilaterally  Heart: regularly irregular rhythm  Abdomen: bs plus  Extremities: edema plus  Neurologic: Mental status: alertness: alert    Assessment and Plan:     Patient Active Problem List:     Essential hypertension     Osteoarthritis     COPD (chronic obstructive pulmonary disease) (HCC)     Paresthesia of left leg     Abdominal hernia     Left shoulder pain     Crushing injury of right hand     Rotator cuff tendinitis     Midline low back pain without sciatica     History of MI (myocardial infarction)     Coronary artery disease involving coronary bypass graft of native heart without angina pectoris     Mixed hyperlipidemia     Depression     Chronic midline low back pain without sciatica     Tobacco abuse     Gastroesophageal reflux disease without esophagitis     Opiate abuse, continuous ([address] Heroin dependence ([facility]     ST elevation myocardial infarction involving left circumflex coronary artery ([facility])     STEMI (ST elevation myocardial infarction) ([facility])     Acute on chronic combined systolic (congestive) and diastolic (congestive) heart failure (HCC)     Systolic and diastolic CHF w/reduced LV function, NYHA class 4 ([facility])     NSTEMI (non-ST elevated myocardial infarction) ([address]     Acute on chronic congestive heart failure ([address]     Noncompliance     Pulmonary edema, acute ([address]     Acute kidney injury ([address]     Acute respiratory failure with hypoxia and hypercapnia ([facility])     Hypertensive emergency     Ischemic cardiomyopathy     Heart failure ([address]     Left ventricular systolic dysfunction     Acute systolic congestive heart failure ([facility])     SOB (shortness of breath)     Acute on chronic combined systolic and diastolic heart failure (HCC)     Hyponatremia     Hematemesis     Acute on chronic systolic CHF (congestive heart failure) ([facility])     Dyspnea     Acute respiratory distress     Electrolyte disturbance     Elevated bilirubin     Hypervolemia      Plan   Continue bumex BID  Na 125 K normal  Creat 1.9  Dr Debi Senior will follow in am    Chloe Walters MD

## 2020-09-27 NOTE — PLAN OF CARE
Problem: OXYGENATION/RESPIRATORY FUNCTION  Goal: Patient will maintain patent airway  Outcome: Ongoing  Goal: Patient will achieve/maintain normal respiratory rate/effort  Description: Respiratory rate and effort will be within normal limits for the patient  Outcome: Ongoing     Problem: HEMODYNAMIC STATUS  Goal: Patient has stable vital signs and fluid balance  Outcome: Ongoing     Problem: FLUID AND ELECTROLYTE IMBALANCE  Goal: Fluid and electrolyte balance are achieved/maintained  Outcome: Ongoing     Problem: ACTIVITY INTOLERANCE/IMPAIRED MOBILITY  Goal: Mobility/activity is maintained at optimum level for patient  Outcome: Ongoing     Problem: Pain:  Goal: Pain level will decrease  Description: Pain level will decrease  Outcome: Ongoing  Goal: Control of acute pain  Description: Control of acute pain  Outcome: Ongoing  Goal: Control of chronic pain  Description: Control of chronic pain  Outcome: Ongoing     Problem: Fluid Volume - Imbalance:  Goal: Absence of imbalanced fluid volume signs and symptoms  Description: Absence of imbalanced fluid volume signs and symptoms  Outcome: Ongoing

## 2020-09-27 NOTE — PROGRESS NOTES
Cardiology Progress Note     Today's Plan  CPM     Admit Date:  9/23/2020    Consult reason/ Seen today for: chf exacerbation     Subjective and  Overnight Events:  Sitting on side of bed-states he is feeling better. Denies shortness of breath. Telemetry SR    Assessment / Plan / Recommendation:     1. Shortness of breath: combination of COPD, CHF and anemia  2. Ischemic cardiomyopathy: EF 10-15 % - Recommend continuing Toprol 25 mg daily. 3. Acute HFrEF- decompensated with hepatic congestion- concern for non adherence to medical regimen which may be contributing to heart failure. Diuresis is being managed by nephrology. On bumex- Continue with BB- No ACE/ARB d/t JAIME/CKD-  He is in a negative fluid status-  Continue to monitor intake output Daily weights. Recommend 2 g sodium diet and 1500 mL fluid restriction-  prognosis is poor   4. Concern for adherence to medical recommendations. Patient states he ran out of his medications. He has had frequent readmissions over the last 6 months for congestive heart failure. Patient has not follow-up as an outpatient with our office. Patient express concern with \"trust issues - states  He does not trust what the doctors are telling him\" . 5. Anemia: Per GI. H/H stable   6. Supra-theraputic INR- INR on admission 9.72- today INR 4.85  7. HTN: bp has been soft- continue with Toprol-XL 25 mg daily  8. CAD: h/o CABG, stable- denies any chest pain- has chronic elevated troponin-        History of Presenting Illness:    Chief complain on admission : 52 y. o.year old who is admitted for  Chief Complaint   Patient presents with    Shortness of Breath        Past medical history:    has a past medical history of CAD (coronary artery disease), CHF (congestive heart failure) ([facility] COPD (chronic obstructive pulmonary disease) ([facility] [address] [facility] Depression, Drug abuse ([facility] [address] HIGH CHOLESTEROL, Hypertension, MI (myocardial infarction) ([facility] [address] and S/P coronary artery bypass graft x 4. Past surgical history:   has a past surgical history that includes Cardiac surgery (11/2010); Bypass Graft (04/10/2011); and Leg Surgery. Social History:   reports that he has been smoking cigarettes. He has a 20.00 pack-year smoking history. He has quit using smokeless tobacco. He reports that he does not drink alcohol or use drugs. Family history:  family history includes Cancer in his father; Diabetes in his mother; Heart Disease in his father and mother; Heart Failure in his father. No Known Allergies    Review of Systems:   All 14 systems were reviewed and are negative  Except for the positive findings which are documented     /80   Pulse 92   Temp 98 °F (36.7 °C) (Axillary)   Resp 18   Ht 6' (1.829 m)   Wt 205 lb 4 oz (93.1 kg)   SpO2 96%   BMI 27.84 kg/m²       Intake/Output Summary (Last 24 hours) at 9/27/2020 1019  Last data filed at 9/27/2020 0645  Gross per 24 hour   Intake 1549 ml   Output 3650 ml   Net -2101 ml       Physical Exam:  Constitutional:  No acute distress  HENT:  Normocephalic, Atraumatic, Bilateral external ears normal,  Nose normal.   Neck- trachea is midline  Eyes:  PEERL, Conjunctiva juandiced  Respiratory:  Decreased  breath sounds, No respiratory distress, No wheezing, No chest tenderness. Cardiovascular:  Normal heart rate, Normal rhythm, no murmurs appreciated, No rubs appreciated, No gallops appreciated, JVP not elevated  Abdomen/GI:  Round- No tenderness  Musculoskeletal:  Intact distal pulses, ++ edema to lower legs,  No tenderness, No cyanosis, No clubbing. Integument:  Warm, Dry - jaundiced   Lymphatic:  No lymphadenopathy noted.    Neurologic:  Alert & oriented  Psychiatric:  Affect and Mood :pleasant     Medications:    phytonadione (VITAMIN K)  IVPB  10 mg Intravenous Once    bumetanide  1 mg Intravenous BID    insulin lispro  0-12 Units Subcutaneous TID     insulin lispro medications and tests reviewed by myself, continue all other medications of all above medical condition listed as is except for changes mentioned above. Thank you   Please call with questions. Electronically signed by NANCY Rutherford CNP on 9/27/2020 at 10:19 AM  I have seen ,spoken to  and examined this patient personally, independently of the nurse practitioner. I have reviewed the hospital care given to date and reviewed all pertinent labs and imaging. The plan was developed mutually at the time of the visit with the patient,  NP  and myself. I have spoken with patient, nursing staff and provided written and verbal instructions . The above note has been reviewed and I agree with the assessment, diagnosis, and treatment plan with changes made by me as follows     CARDIOLOGY ATTENDING ADDENDUM    HPI:  I have reviewed the above HPI  And agree with above   Jean Carlos Pederson is a 52 y. o.year old who and presents with had concerns including Shortness of Breath. Chief Complaint   Patient presents with    Shortness of Breath     Interval history:  Feel better    Physical Exam:  General:  Awake, alert, NAD  Head:normal  Eye:normal  Neck:  No JVD   Chest:  Clear to auscultation, respiration easy  Cardiovascular:  RRR S1S2  Abdomen:   nontender  Extremities:  tr edema  Pulses; palpable  Neuro: grossly normal      MEDICAL DECISION MAKING;    I agree with the above plan, which was planned by myself and discussed with NP.   Noncompliance has been an issue  Will need ICD once better        Galina Moulton MD [facility]

## 2020-09-28 LAB
ALBUMIN SERPL-MCNC: 3.6 GM/DL (ref 3.4–5)
ALP BLD-CCNC: 178 IU/L (ref 40–128)
ALT SERPL-CCNC: 25 U/L (ref 10–40)
ANION GAP SERPL CALCULATED.3IONS-SCNC: 18 MMOL/L (ref 4–16)
APTT: 42.6 SECONDS (ref 25.1–37.1)
AST SERPL-CCNC: 59 IU/L (ref 15–37)
BILIRUB SERPL-MCNC: 12.1 MG/DL (ref 0–1)
BUN BLDV-MCNC: 67 MG/DL (ref 6–23)
CALCIUM SERPL-MCNC: 8.6 MG/DL (ref 8.3–10.6)
CHLORIDE BLD-SCNC: 77 MMOL/L (ref 99–110)
CO2: 25 MMOL/L (ref 21–32)
CREAT SERPL-MCNC: 1.7 MG/DL (ref 0.9–1.3)
GFR AFRICAN AMERICAN: 52 ML/MIN/1.73M2
GFR NON-AFRICAN AMERICAN: 43 ML/MIN/1.73M2
GLUCOSE BLD-MCNC: 116 MG/DL (ref 70–99)
GLUCOSE BLD-MCNC: 140 MG/DL (ref 70–99)
GLUCOSE BLD-MCNC: 188 MG/DL (ref 70–99)
GLUCOSE BLD-MCNC: 212 MG/DL (ref 70–99)
GLUCOSE BLD-MCNC: 217 MG/DL (ref 70–99)
HCT VFR BLD CALC: 28.2 % (ref 42–52)
HEMOGLOBIN: 8.3 GM/DL (ref 13.5–18)
INR BLD: 2.85 INDEX
MAGNESIUM: 2.7 MG/DL (ref 1.8–2.4)
MCH RBC QN AUTO: 19.8 PG (ref 27–31)
MCHC RBC AUTO-ENTMCNC: 29.4 % (ref 32–36)
MCV RBC AUTO: 67.1 FL (ref 78–100)
PDW BLD-RTO: 24.7 % (ref 11.7–14.9)
PLATELET # BLD: 248 K/CU MM (ref 140–440)
PMV BLD AUTO: 10.5 FL (ref 7.5–11.1)
POTASSIUM SERPL-SCNC: 4.5 MMOL/L (ref 3.5–5.1)
POTASSIUM SERPL-SCNC: 4.5 MMOL/L (ref 3.5–5.1)
PROTHROMBIN TIME: 34.8 SECONDS (ref 11.7–14.5)
RBC # BLD: 4.2 M/CU MM (ref 4.6–6.2)
SODIUM BLD-SCNC: 120 MMOL/L (ref 135–145)
TOTAL PROTEIN: 5.9 GM/DL (ref 6.4–8.2)
WBC # BLD: 6.3 K/CU MM (ref 4–10.5)

## 2020-09-28 PROCEDURE — 6370000000 HC RX 637 (ALT 250 FOR IP): Performed by: INTERNAL MEDICINE

## 2020-09-28 PROCEDURE — 36415 COLL VENOUS BLD VENIPUNCTURE: CPT

## 2020-09-28 PROCEDURE — 2140000000 HC CCU INTERMEDIATE R&B

## 2020-09-28 PROCEDURE — 2700000000 HC OXYGEN THERAPY PER DAY

## 2020-09-28 PROCEDURE — U0002 COVID-19 LAB TEST NON-CDC: HCPCS

## 2020-09-28 PROCEDURE — 94640 AIRWAY INHALATION TREATMENT: CPT

## 2020-09-28 PROCEDURE — 85610 PROTHROMBIN TIME: CPT

## 2020-09-28 PROCEDURE — 83735 ASSAY OF MAGNESIUM: CPT

## 2020-09-28 PROCEDURE — 99232 SBSQ HOSP IP/OBS MODERATE 35: CPT | Performed by: INTERNAL MEDICINE

## 2020-09-28 PROCEDURE — 2500000003 HC RX 250 WO HCPCS: Performed by: INTERNAL MEDICINE

## 2020-09-28 PROCEDURE — 82962 GLUCOSE BLOOD TEST: CPT

## 2020-09-28 PROCEDURE — 97530 THERAPEUTIC ACTIVITIES: CPT

## 2020-09-28 PROCEDURE — 94761 N-INVAS EAR/PLS OXIMETRY MLT: CPT

## 2020-09-28 PROCEDURE — 85027 COMPLETE CBC AUTOMATED: CPT

## 2020-09-28 PROCEDURE — 85730 THROMBOPLASTIN TIME PARTIAL: CPT

## 2020-09-28 PROCEDURE — 80048 BASIC METABOLIC PNL TOTAL CA: CPT

## 2020-09-28 PROCEDURE — 6370000000 HC RX 637 (ALT 250 FOR IP): Performed by: HOSPITALIST

## 2020-09-28 PROCEDURE — 97167 OT EVAL HIGH COMPLEX 60 MIN: CPT

## 2020-09-28 PROCEDURE — 80053 COMPREHEN METABOLIC PANEL: CPT

## 2020-09-28 PROCEDURE — 94660 CPAP INITIATION&MGMT: CPT

## 2020-09-28 PROCEDURE — 2580000003 HC RX 258: Performed by: NURSE PRACTITIONER

## 2020-09-28 PROCEDURE — 2580000003 HC RX 258: Performed by: INTERNAL MEDICINE

## 2020-09-28 PROCEDURE — 6360000002 HC RX W HCPCS: Performed by: INTERNAL MEDICINE

## 2020-09-28 PROCEDURE — APPSS15 APP SPLIT SHARED TIME 0-15 MINUTES: Performed by: NURSE PRACTITIONER

## 2020-09-28 PROCEDURE — 84132 ASSAY OF SERUM POTASSIUM: CPT

## 2020-09-28 PROCEDURE — 6370000000 HC RX 637 (ALT 250 FOR IP): Performed by: NURSE PRACTITIONER

## 2020-09-28 RX ADMIN — SODIUM CHLORIDE, PRESERVATIVE FREE 10 ML: 5 INJECTION INTRAVENOUS at 09:22

## 2020-09-28 RX ADMIN — FERROUS SULFATE TAB 325 MG (65 MG ELEMENTAL FE) 325 MG: 325 (65 FE) TAB at 08:28

## 2020-09-28 RX ADMIN — TRAZODONE HYDROCHLORIDE 50 MG: 50 TABLET ORAL at 21:07

## 2020-09-28 RX ADMIN — INSULIN GLARGINE 10 UNITS: 100 INJECTION, SOLUTION SUBCUTANEOUS at 21:07

## 2020-09-28 RX ADMIN — INSULIN LISPRO 4 UNITS: 100 INJECTION, SOLUTION INTRAVENOUS; SUBCUTANEOUS at 12:04

## 2020-09-28 RX ADMIN — FERROUS SULFATE TAB 325 MG (65 MG ELEMENTAL FE) 325 MG: 325 (65 FE) TAB at 18:55

## 2020-09-28 RX ADMIN — PHYTONADIONE 10 MG: 10 INJECTION, EMULSION INTRAMUSCULAR; INTRAVENOUS; SUBCUTANEOUS at 09:21

## 2020-09-28 RX ADMIN — BUMETANIDE 1 MG: 0.25 INJECTION INTRAMUSCULAR; INTRAVENOUS at 09:21

## 2020-09-28 RX ADMIN — INSULIN LISPRO 2 UNITS: 100 INJECTION, SOLUTION INTRAVENOUS; SUBCUTANEOUS at 07:38

## 2020-09-28 RX ADMIN — IPRATROPIUM BROMIDE AND ALBUTEROL SULFATE 1 AMPULE: .5; 3 SOLUTION RESPIRATORY (INHALATION) at 07:35

## 2020-09-28 RX ADMIN — SODIUM CHLORIDE, PRESERVATIVE FREE 10 ML: 5 INJECTION INTRAVENOUS at 21:11

## 2020-09-28 RX ADMIN — ATORVASTATIN CALCIUM 40 MG: 40 TABLET, FILM COATED ORAL at 21:07

## 2020-09-28 RX ADMIN — PANTOPRAZOLE SODIUM 40 MG: 40 TABLET, DELAYED RELEASE ORAL at 05:05

## 2020-09-28 RX ADMIN — BUMETANIDE 1 MG: 0.25 INJECTION INTRAMUSCULAR; INTRAVENOUS at 21:07

## 2020-09-28 RX ADMIN — IPRATROPIUM BROMIDE AND ALBUTEROL SULFATE 1 AMPULE: .5; 3 SOLUTION RESPIRATORY (INHALATION) at 19:59

## 2020-09-28 RX ADMIN — METOPROLOL SUCCINATE 25 MG: 25 TABLET, EXTENDED RELEASE ORAL at 21:06

## 2020-09-28 RX ADMIN — IPRATROPIUM BROMIDE AND ALBUTEROL SULFATE 1 AMPULE: .5; 3 SOLUTION RESPIRATORY (INHALATION) at 01:49

## 2020-09-28 RX ADMIN — ASPIRIN 81 MG: 81 TABLET, COATED ORAL at 08:29

## 2020-09-28 RX ADMIN — IPRATROPIUM BROMIDE AND ALBUTEROL SULFATE 1 AMPULE: .5; 3 SOLUTION RESPIRATORY (INHALATION) at 11:58

## 2020-09-28 ASSESSMENT — PAIN SCALES - GENERAL
PAINLEVEL_OUTOF10: 0

## 2020-09-28 NOTE — DISCHARGE INSTR - COC
Continuity of Care Form    Patient Name: Ashly Maynard   :  1970  MRN:  [id number]    Admit date:  2020  Discharge date:  ***    Code Status Order: DNR-CCA   Advance Directives:   [facility] Documentation     Date/Time Healthcare Directive Type of Healthcare Directive Copy in [facility] [address] Agent's Name Healthcare Agent's Phone Number    20 1742  No, patient does not have an advance directive for healthcare treatment -- -- -- -- --          Admitting Physician:  Elizabeth Olsen MD  PCP: No primary care provider on file. Discharging Nurse: [facility] Unit/Room#: 9730/9237-U  Discharging Unit Phone Number: ***    Emergency Contact:   Extended Emergency Contact Information  Primary Emergency Contact: [address] Phone: [phone]  Mobile Phone: [phone]  Relation: Aunt/Uncle  Secondary Emergency Contact: kathryn, [address] Phone: [phone]  Relation: Other    Past Surgical History:  Past Surgical History:   Procedure Laterality Date    BYPASS GRAFT  04/10/2011    CABG x 4 Dr Dillon Henry  2010     4 stents    LEG SURGERY         Immunization History:   Immunization History   Administered Date(s) Administered    Influenza Virus Vaccine 2016    Influenza, [place], 6 mo and older, IM, PF (Flulaval, Fluarix) 09/15/2018    Influenza, Quadv, IM, PF (6 mo and older Fluzone, Flulaval, Fluarix, and 3 yrs and older Afluria) 2016, 2017, 2020    Pneumococcal Conjugate 13-valent ([place] ) 2018    Pneumococcal Polysaccharide ([id number]) 2016       Active Problems:  Patient Active Problem List   Diagnosis Code    Essential hypertension I10    Osteoarthritis M19.90    COPD (chronic obstructive pulmonary disease) ([facility] J44.9    Paresthesia of left leg R20.2    Abdominal hernia K46.9    Left shoulder pain M25.512    Crushing injury of right hand S67. 21XA    Rotator cuff 20 COVID-19 (Ordered) 10/05/20 10/12/20      Resolved    COVID-19 Rule Out 20 Covid-19 Ambulatory (Ordered)   20 Rule-Out Test Resulted          Nurse Assessment:  Last Vital Signs: /74   Pulse 97   Temp 98.4 °F (36.9 °C) (Oral)   Resp 27   Ht 6' (1.829 m)   Wt 209 lb 4.8 oz (94.9 kg)   SpO2 100%   BMI 28.39 kg/m²     Last documented pain score (0-10 scale): Pain Level: 0  Last Weight:   Wt Readings from Last 1 Encounters:   20 209 lb 4.8 oz (94.9 kg)     Mental Status:  {IP PT MENTAL STATUS:}    IV Access:  { EMMY IV ACCESS:768276607}    Nursing Mobility/ADLs:  Walking   {CHP DME XHY}  Transfer  {P DME ZOQS:981732092}  Bathing  {P DME YPJS:863370628}  Dressing  {CHP DME JKGF:167467766}  Toileting  {CHP DME WDXS:211578611}  Feeding  {P DME TYMP:402617622}  Med Admin  {P DME AVQN:926320787}  Med Delivery   { EMMY MED Delivery:629639458}    Wound Care Documentation and Therapy:        Elimination:  Continence:   · Bowel: {YES / HJ:40352}  · Bladder: {YES / EJ:17451}  Urinary Catheter: {Urinary Catheter:861299202}   Colostomy/Ileostomy/Ileal Conduit: {YES / FD:86056}       Date of Last BM: ***    Intake/Output Summary (Last 24 hours) at 2020 1417  Last data filed at 2020 1234  Gross per 24 hour   Intake 2154 ml   Output 2450 ml   Net -296 ml     I/O last 3 completed shifts:   In: 7899 [P.O.:1587; I.V.:5]  Out:  [Urine:]    Safety Concerns:     508 CodeEval Safety Concerns:970216753}    Impairments/Disabilities:      508 CodeEval Impairments/Disabilities:854149745}      Patient's personal belongings (please select all that are sent with patient):  {MARIZOL DME Belongings:855641458}    RN SIGNATURE:  {Esignature:239578112}    CASE MANAGEMENT/SOCIAL WORK SECTION    Inpatient Status Date: ***    Readmission Risk Assessment Score:  Readmission Risk              Risk of Unplanned Readmission:        70           Discharging to Facility/ Agency   · Name:   · Address:  · Phone:  · Fax:    Dialysis Facility (if applicable)   · Name:  · Address:  · Dialysis Schedule:  · Phone:  · Fax:      PHYSICIAN SECTION    Nutrition Therapy:  Current Nutrition Therapy:   [address] EMMY Diet List:887650796}    Routes of Feeding: {CHP DME Other Feedings:645040050}  Liquids: {Slp liquid thickness:31496}  Daily Fluid Restriction: {CHP DME Yes amt example:736002559}  Last Modified Barium Swallow with Video (Video Swallowing Test): {Done Not Done QSHA:990286079}    Treatments at the Time of Hospital Discharge:   Respiratory Treatments: ***  Oxygen Therapy:  {Therapy; copd oxygen:53804}  Ventilator:    {[facility] Vent YVWN:668393168}    Rehab Therapies: {THERAPEUTIC INTERVENTION:2975935284}  Weight Bearing Status/Restrictions: {[facility] Weight Bearin}  Other Medical Equipment (for information only, NOT a DME order):  {EQUIPMENT:764949632}  Other Treatments: ***    Prognosis: {Prognosis:0764870446}    Condition at Discharge: [address] Patient Condition:683401832}    Rehab Potential (if transferring to Rehab): {Prognosis:8129080355}    Recommended Labs or Other Treatments After Discharge: ***    Physician Certification: I certify the above information and transfer of Veto Brooms  is necessary for the continuing treatment of the diagnosis listed and that he requires {Admit to Appropriate Level of Care:44379} for {GREATER/LESS:453499392} 30 days.      Update Admission H&P: [facility]ME [facility]776[facility]}    PHYSICIAN SIGNATURE:  {Esignature:384705328}

## 2020-09-28 NOTE — CARE COORDINATION
[facility] has informed CM that they will not be able to accept pt d/t his age. They only accept pt's that are over age 54. Discussed other options with pt and he is agreeable to go to a [facility]. Referral made to [facility] via . Will await decision from [facility].   TE

## 2020-09-28 NOTE — PROGRESS NOTES
Pt c/o inadequate sleep. Pt has had all of his daily liquids. Pt is asleep in bed with BiPAP on, call light within reach.

## 2020-09-28 NOTE — PROGRESS NOTES
DOING FAIR NO ABD PAIN N/ VOMITING OR GROSS GIT BLEEDING AWAKE ALERT AND ORIENTED X3  VITALS STABLE   LABS NOTED T. BILI 12.1 AND INR 2.85  WILL CPM F/U LFTS ABNORMAL DUE TO CONGESTIVE HEPATOPATHY

## 2020-09-28 NOTE — PROGRESS NOTES
Nephrology Progress Note        [address], [address] [address]  Phone: [phone]  Office Hours: 8:30AM - 4:30PM  Monday - Friday 9/28/2020 8:14 AM  Subjective:   Admit Date: 9/23/2020  PCP: No primary care provider on file. Interval History: asking for more milk  Reports improved ble edema    Diet: DIET CARDIAC; Carb Control: 4 carb choices (60 gms)/meal; Low Sodium (2 GM); Daily Fluid Restriction: 1800 ml      Data:   Scheduled Meds:   phytonadione (VITAMIN K)  IVPB  10 mg Intravenous Once    bumetanide  1 mg Intravenous BID    insulin lispro  0-12 Units Subcutaneous TID WC    insulin lispro  0-6 Units Subcutaneous Nightly    traZODone  50 mg Oral Nightly    aspirin  81 mg Oral Daily    atorvastatin  40 mg Oral Daily    insulin glargine  10 Units Subcutaneous Nightly    metoprolol succinate  25 mg Oral BID    pantoprazole  40 mg Oral QAM AC    sodium chloride flush  10 mL Intravenous 2 times per day    nicotine  1 patch Transdermal Daily    ipratropium-albuterol  1 ampule Inhalation Q4H WA    ferrous sulfate  325 mg Oral BID WC     Continuous Infusions:   dextrose       PRN Meds:mouth kote, albuterol sulfate HFA, glucose, dextrose, glucagon (rDNA), dextrose, calcium carbonate, traMADol, sodium chloride flush, sodium chloride flush, acetaminophen **OR** acetaminophen, polyethylene glycol, promethazine **OR** ondansetron, nitroGLYCERIN, potassium chloride **OR** potassium alternative oral replacement **OR** potassium chloride  I/O last 3 completed shifts:   In: 1195 [P.O.:1587; I.V.:5]  Out: 2000 [Urine:2000]  I/O this shift:  In: -   Out: 275 [Urine:275]    Intake/Output Summary (Last 24 hours) at 9/28/2020 0814  Last data filed at 9/28/2020 0729  Gross per 24 hour   Intake 1592 ml   Output 2275 ml   Net -683 ml       CBC:   Recent Labs     09/26/20  0328 09/27/20  0423 09/28/20  0436   WBC 7.9 7.8 6.3   HGB 8.4* 8.8* 8.3*    276 248       BMP:    Recent Labs     09/26/20 0328 09/27/20 0423 09/27/20  0839 09/27/20  1529 09/28/20  0436   *  --  125*  --   --  120*   K 4.5  4.3   < > 4.1 4.3 4.2 4.5   CL 78*  --  81*  --   --  77*   CO2 24  --  28  --   --  25   BUN 66*  --  71*  --   --  67*   CREATININE 2.0*  --  1.9*  --   --  1.7*   GLUCOSE 129*  --  106*  --   --  212*    < > = values in this interval not displayed. Hepatic:   Recent Labs     09/26/20 0328 09/27/20 0423 09/28/20 0436   AST 56* 59* 59*   ALT 25 26 25   BILITOT 12.8* 13.2* 12.1*   ALKPHOS 192* 195* 178*     Troponin: No results for input(s): TROPONINI in the last 72 hours. BNP: No results for input(s): BNP in the last 72 hours. Lipids: No results for input(s): CHOL, HDL in the last 72 hours.     Invalid input(s): LDLCALCU  ABGs:   Lab Results   Component Value Date    PO2ART 97 07/06/2020    SHE9MAW 35.0 07/06/2020     INR:   Recent Labs     09/26/20 0328 09/27/20 0423 09/28/20 0436   INR 8.14* 4.85 2.85       Objective:   Vitals: /78   Pulse 97   Temp 97.4 °F (36.3 °C) (Axillary)   Resp 23   Ht 6' (1.829 m)   Wt 209 lb 4.8 oz (94.9 kg)   SpO2 100%   BMI 28.39 kg/m²   General appearance: alert and cooperative with exam, in no acute distress  HEENT: normocephalic, atraumatic,   Neck: supple, trachea midline  Lungs:  breathing comfortably on nc  Abdomen:  non distended,  Extremities: 1+ pitting ble edema  Neurologic: alert, oriented, follows commands, interactive    Assessment and Plan:     Patient Active Problem List:     Essential hypertension     Osteoarthritis     COPD (chronic obstructive pulmonary disease) ([address]     Paresthesia of left leg     Abdominal hernia     Left shoulder pain     Crushing injury of right hand     Rotator cuff tendinitis     Midline low back pain without sciatica     History of MI (myocardial infarction)     Coronary artery disease involving coronary bypass graft of native heart without angina pectoris     Mixed hyperlipidemia

## 2020-09-28 NOTE — PROGRESS NOTES
Occupational Therapy    [facility] ACUTE CARE OCCUPATIONAL THERAPY EVALUATION  Kirk Castro, 1970, 3114/3114-A, 9/28/2020    History  [place]:  The primary encounter diagnosis was Hypervolemia, unspecified hypervolemia type. A diagnosis of Acute on chronic respiratory failure with hypoxia (HCC) was also pertinent to this visit. Patient  has a past medical history of CAD (coronary artery disease), CHF (congestive heart failure) ([address] COPD (chronic obstructive pulmonary disease) ([address] Depression, Drug abuse ([facility] HIGH CHOLESTEROL, Hypertension, MI (myocardial infarction) ([address] and S/P coronary artery bypass graft x 4. Patient  has a past surgical history that includes Cardiac surgery (11/2010); Bypass Graft (04/10/2011); and Leg Surgery. Subjective:  Patient states: \"My legs hurt so much I can't walk\". Pain:  8/10 painin bilateral legs, aching, constant.   Pain Intervention: Increased movement, repositioned, RN notified    Communication with other providers:  Handoff to RN  Restrictions: General Precautions, Fall Risk    Home Setup/Prior level of function  Social/Functional History  Lives With: Alone  Type of Home: House  Home Layout: Two level  Home Access: Stairs to enter without rails  Entrance Stairs - Number of Steps: 3  Bathroom Shower/Tub: Tub/Shower unit  Bathroom Toilet: Standard  Bathroom Accessibility: Accessible  ADL Assistance: Independent  Homemaking Assistance: Independent  Homemaking Responsibilities: Yes  Ambulation Assistance: Independent  Transfer Assistance: Independent  Active : Yes  Mode of Transportation: Car  Additional Comments: Pt reports no recent falls    Examination of body systems (includes body structures/functions, activity/participation limitations):  · Observation:  Sitting EOB feeding, nursing in room, bilateral legs w/ edema, red, shiny skin  · Vision:  [employer] [facility] [place]  · Hearing:  [employer] [facility] [place]  · Cardiopulmonary:  4L of 02      Body Systems and functions:  · ROM R/L: WFL.    · Strength R/L:  4/5,   · Sensation: WFL  · Tone: Normal  · Coordination: WFL  · Perception: WNL    Activities of Daily Living (ADLs):  · Feeding: Independent  · Grooming: SBA (washing face w/ warm washcloth sitting EOB)  · UB bathing: SBA  · LB bathing: CGA  · UB dressing: SBA  · LB dressing: CGA  · Toileting: CGA    Cognitive and Psychosocial Functioning:  · Overall cognitive status: WFL  · Affect: Agitated       Mobility:  · Supine to sit:  DNT  · Transfers: CGA form EOB up to RW  · Sitting balance:  SBA. · Standing balance:  CGA w/ RW ~ 2 minutes before pain in bilateral legs was too great  · Toilet/Shower Transfers: DNT  · Sit to supine: SBA w/ greatly increased time to complete due to BLE pain             AM-PAC Daily Activity Inpatient   How much help for putting on and taking off regular lower body clothing?: A Little  How much help for Bathing?: A Little  How much help for Toileting?: A Little  How much help for putting on and taking off regular upper body clothing?: None  How much help for taking care of personal grooming?: A Little  How much help for eating meals?: None  AM-[facility] Inpatient Daily Activity Raw Score: 20  AM-PAC Inpatient ADL T-Scale Score : 42.03  ADL Inpatient CMS 0-100% Score: 38.32  ADL Inpatient CMS G-Code Modifier : CJ    Treatment:  Therapeutic Activity Training:   Therapeutic activity training was instructed today. Cues were given for safety, sequence, UE/LE placement, awareness, and balance. Activities performed today included bed mobility training, sup-sit, sit-stand, stand to sit, sit to supine    Safety: patient left in chair with chair alarm, call light within reach, RN notified, gait belt used. Assessment:  Pt is a 53 yo male admitted from home for acute respiratory distress. Pt at baseline is Independent for ADLs Independent for high level IADLs and Independent for functional transfers/mobility.  Pt currently presents w/ deficits in ADL and high level IADL independence, functional activity tolerance, dynamic sitting and standing balance and tolerance and functional transfers and BUE strength and is most limited by pain in BLE. Pt would benefit from continued acute care OT services w/ discharge to SNF  Complexity: High  Prognosis: Good, no significant barriers to participation at this time.    Plan  Times per week: 2x+  Times per day: Daily  Current Treatment Recommendations: Strengthening, Endurance Training, Patient/Caregiver Education & Training, Equipment Evaluation, Education, & procurement, Self-Care / ADL, Balance Training, Pain Management, Home Management Training, Functional Mobility Training, Safety Education & Training, Positioning     Equipment: defer    Goals:  Pt goal: go home  Time Frame for STGs: discharge  Goal 1: Pt will perform UE ADLs Independent  Goal 2: Pt will perform LE ADLs Independent  Goal 3: Pt will perform toileting Independent  Goal 4: Pt will perform functional transfer w/ AD Amira  Goal 5: Pt will perform functional mobility w/ AD Amira  Goal 6: Pt will perform therex/theract in order to increase functional activity tolerance and dynamic standing balance    Treatment plan:  Pt will perform functional task in stand reaching in all 3 planes in order to increase dynamic standing balance and functional activity tolerance    Recommendations for NURSING activity: Up to chair for all 3 meals and up to [facility] for all toileting needs    Time:   Time in: 1233  Time out: 1249  Timed treatment minutes: 8 minutes  Total time: 16 minutes    Electronically signed by:    Rosetta HADLEY/L [id number]  12:58 PM,9/28/2020

## 2020-09-28 NOTE — PLAN OF CARE
Problem: OXYGENATION/RESPIRATORY FUNCTION  Goal: Patient will maintain patent airway  Outcome: Ongoing  Goal: Patient will achieve/maintain normal respiratory rate/effort  Description: Respiratory rate and effort will be within normal limits for the patient  Outcome: Ongoing     Problem: HEMODYNAMIC STATUS  Goal: Patient has stable vital signs and fluid balance  Outcome: Ongoing     Problem: FLUID AND ELECTROLYTE IMBALANCE  Goal: Fluid and electrolyte balance are achieved/maintained  Outcome: Ongoing     Problem: ACTIVITY INTOLERANCE/IMPAIRED MOBILITY  Goal: Mobility/activity is maintained at optimum level for patient  Outcome: Ongoing     Problem: Pain:  Goal: Pain level will decrease  Description: Pain level will decrease  Outcome: Ongoing  Goal: Control of acute pain  Description: Control of acute pain  Outcome: Ongoing  Goal: Control of chronic pain  Description: Control of chronic pain  Outcome: Ongoing     Problem: Fluid Volume - Imbalance:  Goal: Absence of imbalanced fluid volume signs and symptoms  Description: Absence of imbalanced fluid volume signs and symptoms  Outcome: Ongoing     Problem: Skin Integrity:  Goal: Will show no infection signs and symptoms  Description: Will show no infection signs and symptoms  Outcome: Ongoing  Goal: Absence of new skin breakdown  Description: Absence of new skin breakdown  Outcome: Ongoing

## 2020-09-28 NOTE — PROGRESS NOTES
Cardiology Note    Admit Date:  9/23/2020    Admission diagnosis / Complaint: CHF exacerbation      Subjective:  Mr. Scottie Bauer is resting in bed. Denies cardiac complaints. States that his breathing is the same as yesterday. Assessment and Plan:    1. Shortness of breath: Combination of COPD, CHF and anemia  2. Ischemic cardiomyopathy: EF 10 to 15% recommend continue Toprol-XL 25 mg daily  3. Acute HFrEF: Decompensated with hepatic congestion-concern for nonadherence to medical regimen which may be contributing to heart failure. Diuresis managed by nephrology. On Bumex-continue beta-blocker. No ACE/arm due to JAIME/CKD. Negative 4 liter fluid balance since admission. Continue to monitor intake and output and daily weights. Recommend 2 g sodium diet and 1500 mL fluid restriction. Prognosis poor  4. Noncompliance of medication: concern for adherence to medical recommendations. 5.  Anemia: Down to 8.3 today. Per primary team.  6.  Supratherapeutic INR: INR 2.85  7. HTN: stable-although soft continue Toprol-XL 25 mg daily  8. CAD: History of CABG, stable -has chronic elevated troponin denies chest pain. Objective:   /73   Pulse 98   Temp 97.4 °F (36.3 °C) (Axillary)   Resp 13   Ht 6' (1.829 m)   Wt 209 lb 4.8 oz (94.9 kg)   SpO2 100%   BMI 28.39 kg/m²       Intake/Output Summary (Last 24 hours) at 9/28/2020 1112  Last data filed at 9/28/2020 0818  Gross per 24 hour   Intake 2068 ml   Output 2275 ml   Net -207 ml       TELEMETRY: Sinus    has a past medical history of CAD (coronary artery disease), CHF (congestive heart failure) ([address] COPD (chronic obstructive pulmonary disease) ([address] Depression, Drug abuse ([address] HIGH CHOLESTEROL, Hypertension, MI (myocardial infarction) ([address] and S/P coronary artery bypass graft x 4.   has a past surgical history that includes Cardiac surgery (11/2010); Bypass Graft (04/10/2011); and Leg Surgery. Physical Exam:  General:  Awake, alert, NAD.  On bipap  Skin:  Warm and dry  Neck:  JVD not appreciated  Chest:  Clear to auscultation, respiration easy  Cardiovascular:  RRR S1S2  Abdomen:  Rounded. No tenderness appreciated. Extremities:  ++ lower leg edema    Medications:    bumetanide  1 mg Intravenous BID    insulin lispro  0-12 Units Subcutaneous TID     insulin lispro  0-6 Units Subcutaneous Nightly    traZODone  50 mg Oral Nightly    aspirin  81 mg Oral Daily    atorvastatin  40 mg Oral Daily    insulin glargine  10 Units Subcutaneous Nightly    metoprolol succinate  25 mg Oral BID    pantoprazole  40 mg Oral QAM AC    sodium chloride flush  10 mL Intravenous 2 times per day    nicotine  1 patch Transdermal Daily    ipratropium-albuterol  1 ampule Inhalation Q4H WA    ferrous sulfate  325 mg Oral BID WC      dextrose       mouth kote, albuterol sulfate HFA, glucose, dextrose, glucagon (rDNA), dextrose, calcium carbonate, traMADol, sodium chloride flush, sodium chloride flush, acetaminophen **OR** acetaminophen, polyethylene glycol, promethazine **OR** ondansetron, nitroGLYCERIN, potassium chloride **OR** potassium alternative oral replacement **OR** potassium chloride    Lab Data:  CBC:   Recent Labs     09/26/20 0328 09/27/20 0423 09/28/20 0436   WBC 7.9 7.8 6.3   HGB 8.4* 8.8* 8.3*   HCT 30.3* 30.5* 28.2*   MCV 68.6* 68.2* 67.1*    276 248     BMP:   Recent Labs     09/26/20 0328 09/27/20 0423 09/27/20  1529 09/28/20 0436 09/28/20  0834   *  --  125*  --   --  120*  --    K 4.5  4.3   < > 4.1   < > 4.2 4.5 4.5   CL 78*  --  81*  --   --  77*  --    CO2 24  --  28  --   --  25  --    BUN 66*  --  71*  --   --  67*  --    CREATININE 2.0*  --  1.9*  --   --  1.7*  --     < > = values in this interval not displayed.      LIVER PROFILE:   Recent Labs     09/26/20 0328 09/27/20 0423 09/28/20 0436   AST 56* 59* 59*   ALT 25 26 25   BILITOT 12.8* 13.2* 12.1*   ALKPHOS 192* 195* 178*     PT/INR:   Recent Labs 09/26/20 0328 09/27/20 0423 09/28/20 0436   PROTIME 100.6* 59.6* 34.8*   INR 8.14* 4.85 2.85     APTT:   Recent Labs     09/26/20 0328 09/27/20 0423 09/28/20 0436   APTT 56.5* 47.3* 42.6*     BNP:   TROPONIN:           Margot Leticia, APRN-CNP 9/28/2020 11:12 AM     I have seen ,spoken to  and examined this patient personally, independently of the nurse practitioner. I have reviewed the hospital care given to date and reviewed all pertinent labs and imaging. The plan was developed mutually at the time of the visit with the patient,  NP  and myself. I have spoken with patient, nursing staff and provided written and verbal instructions . The above note has been reviewed and I agree with the assessment, diagnosis, and treatment plan with changes made by me as follows     CARDIOLOGY ATTENDING ADDENDUM    HPI:  I have reviewed the above HPI  And agree with above   Fara Ye is a 52 y. o.year old who and presents with had concerns including Shortness of Breath.   Chief Complaint   Patient presents with    Shortness of Breath     Interval history:  Mild SOB    Physical Exam:  General:  Awake, alert, NAD  Head:normal  Eye:normal  Neck:  No JVD   Chest:  Clear to auscultation, respiration easy  Cardiovascular:  RRR S1S2  Abdomen:   nontender  Extremities:  no edema  Pulses; palpable  Neuro: grossly normal      MEDICAL DECISION MAKING;    I agree with the above plan, which was planned by myself and discussed with NP.  CPM with diuresis  Will FU        Claus Camargo MD [facility]

## 2020-09-28 NOTE — PROGRESS NOTES
Patient is refusing the bed alarm today. He wants more fluids. He's drank all the allotted fluids for the day. Patient still continues to ask anyone who walks by or answers the call light. Patient states \" this is bullshit\" . MD was notified.

## 2020-09-28 NOTE — PROGRESS NOTES
Hospitalist Progress Note      Name:  Kelle Gaines /Age/Sex: 1970  (52 y.o. male)   MRN & CSN:  [id number] & [id number] Admission Date/Time: 2020 11:15 AM   Location:  [address] [place] [address] PCP: No primary care provider on file. Hospital Day: 6    Assessment and Plan:   Kelle Gaines is a 52 y.o.  male  who presents with Acute respiratory distress     1) Acute on chronic systolic HF  -BNP elevated  -Last Echo (2020) EF 10 -15%, Global hypokinesis.  Severe mitral regurg, moderate tricuspid regurg. Severe PHTN with an RVSP of 73 mmHg  -Continue diuresis per nephro and fluid restriction  -Cardiology on board     2) Possible Cardiac Cirrhosis  -Elevated bilirubin - Bilirubin 10.2, direct bilirubin 8.1  -Continue IV Vit k  -Continue supportive management  -GI on board     3)JAIME with hyponatremia on CKD 3  -Likely due to CRS  -Avoid nephrotoxic  -Nephrology on board for diuresis  -Will limit use of tolvaptan due to hepatotoxicity     4) Elevated Troponin  -Likely due to demand ischemia  -EKG with sinus tachy and PVC  -Cardiology on board          Chronic Illnesses, medication resumed unless contraindicated           - CAD, s/p CABG x4          - opiate/heroin abuse         - hyperlipidemia         - hypertension         - osteoarthritis          - DM type 2         - COPD         -Nicotine use disorder    Diet DIET CARDIAC; Carb Control: 4 carb choices (60 gms)/meal; Low Sodium (2 GM); Daily Fluid Restriction: 1800 ml   DVT Prophylaxis [] Lovenox, []  Heparin, [] SCDs, [] Ambulation   GI Prophylaxis [] PPI,  [] H2 Blocker,  [] Carafate,  [] Diet/Tube Feeds   Code Status DNR-CCA   Disposition Home   MDM      History of Present Illness:     Patient was seen and examined  Denied any chest pain or worsening SOB  No palpitations, dizziness  No fever, chills, N/V/D       Ten point ROS reviewed negative, unless as noted above    Objective:        Intake/Output Summary (Last 24 hours) at 2020 0856  Last data filed at 9/28/2020 0818  Gross per 24 hour   Intake 2068 ml   Output 2275 ml   Net -207 ml      Vitals:   Vitals:    09/28/20 0843   BP:    Pulse:    Resp: 13   Temp:    SpO2: 100%     Physical Exam:   GEN Awake male, sitting upright in bed in no apparent distress. Appears given age. EYES Pupils are equally round. No scleral erythema, discharge, or conjunctivitis. HENT Mucous membranes are moist. Oral pharynx without exudates, no evidence of thrush. NECK Supple, no apparent thyromegaly or masses. RESP Clear to auscultation, no wheezes, rales or rhonchi. Symmetric chest movement while on 3 L of O2.  CARDIO/VASC S1/S2 auscultated. Regular rate without appreciable murmurs, rubs, or gallops. No JVD or carotid bruits. Peripheral pulses equal bilaterally and palpable. No peripheral edema. GI Abdomen is soft without significant tenderness, masses, or guarding. Bowel sounds are normoactive. Rectal exam deferred.  No costovertebral angle tenderness. Normal appearing external genitalia. Hansen catheter is not present. HEME/LYMPH No palpable cervical lymphadenopathy and no hepatosplenomegaly. No petechiae or ecchymoses. MSK No gross joint deformities. SKIN Normal coloration, warm, dry. NEURO Cranial nerves appear grossly intact, normal speech, no lateralizing weakness. PSYCH Awake, alert, oriented x 4. Affect appropriate.     Medications:   Medications:    phytonadione (VITAMIN K)  IVPB  10 mg Intravenous Once    bumetanide  1 mg Intravenous BID    insulin lispro  0-12 Units Subcutaneous TID WC    insulin lispro  0-6 Units Subcutaneous Nightly    traZODone  50 mg Oral Nightly    aspirin  81 mg Oral Daily    atorvastatin  40 mg Oral Daily    insulin glargine  10 Units Subcutaneous Nightly    metoprolol succinate  25 mg Oral BID    pantoprazole  40 mg Oral QAM AC    sodium chloride flush  10 mL Intravenous 2 times per day    nicotine  1 patch Transdermal Daily    ipratropium-albuterol  1 ampule Inhalation Q4H WA    ferrous sulfate  325 mg Oral BID WC      Infusions:    dextrose       PRN Meds: mouth kote, , PRN  albuterol sulfate HFA, 2 puff, Q6H PRN  glucose, 15 g, PRN  dextrose, 12.5 g, PRN  glucagon (rDNA), 1 mg, PRN  dextrose, 100 mL/hr, PRN  calcium carbonate, 500 mg, TID PRN  traMADol, 50 mg, Q6H PRN  sodium chloride flush, 10 mL, PRN  sodium chloride flush, 10 mL, PRN  acetaminophen, 650 mg, Q6H PRN    Or  acetaminophen, 650 mg, Q6H PRN  polyethylene glycol, 17 g, Daily PRN  promethazine, 12.5 mg, Q6H PRN    Or  ondansetron, 4 mg, Q6H PRN  nitroGLYCERIN, 0.4 mg, Q5 Min PRN  potassium chloride, 40 mEq, PRN    Or  potassium alternative oral replacement, 40 mEq, PRN    Or  potassium chloride, 10 mEq, PRN          Electronically signed by Andrew Reyes MD on 9/28/2020 at 8:56 AM

## 2020-09-28 NOTE — CARE COORDINATION
Chart reviewed. OT has recommended SNF. CM met with pt to discuss the OT recommendation. Pt is agreeable. SNF list and Medicare rating list provided. Pt has requested [facility]. Referral made to [facility] via . Required COVID ORDERED. Notified charge nurse. Pt has Medicare and will not require a pre-cert. Will await decision from [facility].   TE

## 2020-09-29 LAB
ALBUMIN SERPL-MCNC: 3.4 GM/DL (ref 3.4–5)
ALP BLD-CCNC: 180 IU/L (ref 40–129)
ALT SERPL-CCNC: 24 U/L (ref 10–40)
ANION GAP SERPL CALCULATED.3IONS-SCNC: 16 MMOL/L (ref 4–16)
APTT: 40 SECONDS (ref 25.1–37.1)
AST SERPL-CCNC: 64 IU/L (ref 15–37)
BILIRUB SERPL-MCNC: 11.7 MG/DL (ref 0–1)
BUN BLDV-MCNC: 61 MG/DL (ref 6–23)
CALCIUM SERPL-MCNC: 8.7 MG/DL (ref 8.3–10.6)
CHLORIDE BLD-SCNC: 78 MMOL/L (ref 99–110)
CO2: 27 MMOL/L (ref 21–32)
CREAT SERPL-MCNC: 1.6 MG/DL (ref 0.9–1.3)
GFR AFRICAN AMERICAN: 56 ML/MIN/1.73M2
GFR NON-AFRICAN AMERICAN: 46 ML/MIN/1.73M2
GLUCOSE BLD-MCNC: 124 MG/DL (ref 70–99)
GLUCOSE BLD-MCNC: 147 MG/DL (ref 70–99)
GLUCOSE BLD-MCNC: 172 MG/DL (ref 70–99)
GLUCOSE BLD-MCNC: 93 MG/DL (ref 70–99)
HCT VFR BLD CALC: 28.7 % (ref 42–52)
HEMOGLOBIN: 8.2 GM/DL (ref 13.5–18)
INR BLD: 1.93 INDEX
MCH RBC QN AUTO: 19.2 PG (ref 27–31)
MCHC RBC AUTO-ENTMCNC: 28.6 % (ref 32–36)
MCV RBC AUTO: 67.2 FL (ref 78–100)
PDW BLD-RTO: 25.1 % (ref 11.7–14.9)
PLATELET # BLD: 229 K/CU MM (ref 140–440)
PMV BLD AUTO: 10.3 FL (ref 7.5–11.1)
POTASSIUM SERPL-SCNC: 4.1 MMOL/L (ref 3.5–5.1)
PRO-BNP: 7467 PG/ML
PROTHROMBIN TIME: 23.5 SECONDS (ref 11.7–14.5)
RBC # BLD: 4.27 M/CU MM (ref 4.6–6.2)
SARS-COV-2: NOT DETECTED
SODIUM BLD-SCNC: 121 MMOL/L (ref 135–145)
SOURCE: NORMAL
TOTAL PROTEIN: 5.5 GM/DL (ref 6.4–8.2)
WBC # BLD: 6.7 K/CU MM (ref 4–10.5)

## 2020-09-29 PROCEDURE — 97162 PT EVAL MOD COMPLEX 30 MIN: CPT

## 2020-09-29 PROCEDURE — 85027 COMPLETE CBC AUTOMATED: CPT

## 2020-09-29 PROCEDURE — 6370000000 HC RX 637 (ALT 250 FOR IP): Performed by: NURSE PRACTITIONER

## 2020-09-29 PROCEDURE — 6370000000 HC RX 637 (ALT 250 FOR IP): Performed by: INTERNAL MEDICINE

## 2020-09-29 PROCEDURE — 76937 US GUIDE VASCULAR ACCESS: CPT

## 2020-09-29 PROCEDURE — 99232 SBSQ HOSP IP/OBS MODERATE 35: CPT | Performed by: INTERNAL MEDICINE

## 2020-09-29 PROCEDURE — 80053 COMPREHEN METABOLIC PANEL: CPT

## 2020-09-29 PROCEDURE — 2140000000 HC CCU INTERMEDIATE R&B

## 2020-09-29 PROCEDURE — 83880 ASSAY OF NATRIURETIC PEPTIDE: CPT

## 2020-09-29 PROCEDURE — 94640 AIRWAY INHALATION TREATMENT: CPT

## 2020-09-29 PROCEDURE — 6370000000 HC RX 637 (ALT 250 FOR IP): Performed by: HOSPITALIST

## 2020-09-29 PROCEDURE — 94660 CPAP INITIATION&MGMT: CPT

## 2020-09-29 PROCEDURE — APPSS15 APP SPLIT SHARED TIME 0-15 MINUTES: Performed by: NURSE PRACTITIONER

## 2020-09-29 PROCEDURE — 97116 GAIT TRAINING THERAPY: CPT

## 2020-09-29 PROCEDURE — 85610 PROTHROMBIN TIME: CPT

## 2020-09-29 PROCEDURE — 6360000002 HC RX W HCPCS: Performed by: INTERNAL MEDICINE

## 2020-09-29 PROCEDURE — 2500000003 HC RX 250 WO HCPCS: Performed by: INTERNAL MEDICINE

## 2020-09-29 PROCEDURE — 97535 SELF CARE MNGMENT TRAINING: CPT

## 2020-09-29 PROCEDURE — 82962 GLUCOSE BLOOD TEST: CPT

## 2020-09-29 PROCEDURE — 94761 N-INVAS EAR/PLS OXIMETRY MLT: CPT

## 2020-09-29 PROCEDURE — C9488 CONIVAPTAN HCL: HCPCS | Performed by: INTERNAL MEDICINE

## 2020-09-29 PROCEDURE — 97530 THERAPEUTIC ACTIVITIES: CPT

## 2020-09-29 PROCEDURE — 85730 THROMBOPLASTIN TIME PARTIAL: CPT

## 2020-09-29 PROCEDURE — 2700000000 HC OXYGEN THERAPY PER DAY

## 2020-09-29 PROCEDURE — 2580000003 HC RX 258: Performed by: NURSE PRACTITIONER

## 2020-09-29 PROCEDURE — 36415 COLL VENOUS BLD VENIPUNCTURE: CPT

## 2020-09-29 RX ADMIN — PANTOPRAZOLE SODIUM 40 MG: 40 TABLET, DELAYED RELEASE ORAL at 05:58

## 2020-09-29 RX ADMIN — IPRATROPIUM BROMIDE AND ALBUTEROL SULFATE 1 AMPULE: .5; 3 SOLUTION RESPIRATORY (INHALATION) at 12:10

## 2020-09-29 RX ADMIN — TRAZODONE HYDROCHLORIDE 50 MG: 50 TABLET ORAL at 21:13

## 2020-09-29 RX ADMIN — SODIUM CHLORIDE, PRESERVATIVE FREE 10 ML: 5 INJECTION INTRAVENOUS at 09:30

## 2020-09-29 RX ADMIN — BUMETANIDE 1 MG: 0.25 INJECTION INTRAMUSCULAR; INTRAVENOUS at 21:16

## 2020-09-29 RX ADMIN — TRAMADOL HYDROCHLORIDE 50 MG: 50 TABLET, FILM COATED ORAL at 09:30

## 2020-09-29 RX ADMIN — FERROUS SULFATE TAB 325 MG (65 MG ELEMENTAL FE) 325 MG: 325 (65 FE) TAB at 17:09

## 2020-09-29 RX ADMIN — CONIVAPTAN HYDROCHLORIDE 20 MG: 20 INJECTION, SOLUTION INTRAVENOUS at 21:17

## 2020-09-29 RX ADMIN — CONIVAPTAN HYDROCHLORIDE 20 MG/DAY: 20 INJECTION, SOLUTION INTRAVENOUS at 22:10

## 2020-09-29 RX ADMIN — ASPIRIN 81 MG: 81 TABLET, COATED ORAL at 09:30

## 2020-09-29 RX ADMIN — ATORVASTATIN CALCIUM 40 MG: 40 TABLET, FILM COATED ORAL at 21:13

## 2020-09-29 RX ADMIN — INSULIN GLARGINE 10 UNITS: 100 INJECTION, SOLUTION SUBCUTANEOUS at 21:27

## 2020-09-29 RX ADMIN — FERROUS SULFATE TAB 325 MG (65 MG ELEMENTAL FE) 325 MG: 325 (65 FE) TAB at 09:30

## 2020-09-29 RX ADMIN — SODIUM CHLORIDE, PRESERVATIVE FREE 10 ML: 5 INJECTION INTRAVENOUS at 21:16

## 2020-09-29 RX ADMIN — IPRATROPIUM BROMIDE AND ALBUTEROL SULFATE 1 AMPULE: .5; 3 SOLUTION RESPIRATORY (INHALATION) at 19:32

## 2020-09-29 RX ADMIN — IPRATROPIUM BROMIDE AND ALBUTEROL SULFATE 1 AMPULE: .5; 3 SOLUTION RESPIRATORY (INHALATION) at 08:11

## 2020-09-29 RX ADMIN — METOPROLOL SUCCINATE 25 MG: 25 TABLET, EXTENDED RELEASE ORAL at 21:12

## 2020-09-29 RX ADMIN — IPRATROPIUM BROMIDE AND ALBUTEROL SULFATE 1 AMPULE: .5; 3 SOLUTION RESPIRATORY (INHALATION) at 16:02

## 2020-09-29 RX ADMIN — TRAMADOL HYDROCHLORIDE 50 MG: 50 TABLET, FILM COATED ORAL at 21:13

## 2020-09-29 ASSESSMENT — PAIN DESCRIPTION - LOCATION
LOCATION: LEG
LOCATION: GENERALIZED

## 2020-09-29 ASSESSMENT — PAIN DESCRIPTION - FREQUENCY: FREQUENCY: INTERMITTENT

## 2020-09-29 ASSESSMENT — PAIN SCALES - GENERAL
PAINLEVEL_OUTOF10: 10
PAINLEVEL_OUTOF10: 7

## 2020-09-29 ASSESSMENT — PAIN DESCRIPTION - PAIN TYPE
TYPE: CHRONIC PAIN
TYPE: CHRONIC PAIN

## 2020-09-29 ASSESSMENT — PAIN DESCRIPTION - ORIENTATION: ORIENTATION: LEFT

## 2020-09-29 ASSESSMENT — PAIN DESCRIPTION - DESCRIPTORS: DESCRIPTORS: ACHING

## 2020-09-29 NOTE — PROGRESS NOTES
Occupational Therapy      Attempted at 1137 hrs c pt declining at this time, requesting re-attempt later this date, identifying fatigue and need to rest first. Will re-attempt as schedule allows. Occupational Therapy Treatment Note  Name: Lita Oglesby MRN: [id number] :   1970   Date:  2020   Admission Date: 2020 Room:  [address] [place] [address]   Restrictions/Precautions:    General precautions; Fall risk    Communication with other providers:  Per chart review and Nurse Barry Farrar, patient is appropriate for therapeutic intervention. Co-Tx c PT Ryan Fish. Subjective:  Patient states:  Pt agreeable to Tx session. Pt reports LLE as the most difficult to move. Pain:   Location, Type, Intensity (0/10 to 10/10):  7/10, BLE    Objective:    Observation:  Pt received in semi-fowlers in bed. Pt exhibits s/s of pain behaviors LLE>RLE, requires increased time to perform functional mobility. Objective Measures:  O2 2L NC. Check of O2 sat was 86% after patient identified decreased activity tolerance during functional mobility. Treatment, including education:  Therapeutic Activity Training:   Therapeutic activity training was instructed today. Cues were given for safety, sequence, UE/LE placement, awareness, and balance. Pt educated for rationale for therapeutic intervention. Activities performed today included bed mobility training, sup-sit, sit-stand, SPT. Supine to sit: Sup c bed features  Scooting: Sup + cue to remain seated until cued for sit to stand. Sit to stand: CGA c RW c pt noted to use increased effort / momentum to stand  Stand to sit: Min A + CGA c RW for controlled descent c patient identifying fatigue as limiting factor. Sitting balance / tolerance: Sup seated EOB during functional reaching tasks, tolerated >15 minutes. Self Care Training:   Cues were given for safety, sequence, UE/LE placement, visual cues, and balance.     Activities performed today included grooming tasks completed c emphasis on dynamic balance / sitting tolerance. Grooming: Sup seated EOB to wash face and perform hair care. All therapeutic intervention performed c emphasis on dynamic balance / sitting and standing tolerance to inc strength, endurance and act tolerance for inc Indep c ADL tasks, func transfers / mobility. Safety  Patient safely seated EOB c tray table positioned in front of patient at end of session, with call light/phone in reach, and nursing aware. Gait belt was used for func transfers / mobility. Nurse reports patient has been sitting EOB regularly. Pt educated to not attempt to stand from bed w/o assist and verbalized understanding. Assessment / Impression:        Patient's tolerance of treatment:  Well   Adverse Reaction: Decreased O2 sat c functional mobility, managed c return to sitting for rest break  Significant change in status and impact:  None  Barriers to improvement:  Decreased O2 sats c activity, decreased strength / balance / endurance. Plan for Next Session:    Continue per OT POC c plan to address functional transfers / mobility during ADL tasks, especially for toileting.     Time in:  1340  Time out:  1404  Timed treatment minutes:  24  Total treatment time:  24    Electronically signed by:    Merline Craven, COTA/L  9/29/2020, 11:36 AM    Previously filed values:          Goals:  Pt goal: go home  Time Frame for STGs: discharge  Goal 1: Pt will perform UE ADLs Independent  Goal 2: Pt will perform LE ADLs Independent  Goal 3: Pt will perform toileting Independent  Goal 4: Pt will perform functional transfer w/ AD Amira  Goal 5: Pt will perform functional mobility w/ AD Amira  Goal 6: Pt will perform therex/theract in order to increase functional activity tolerance and dynamic standing balance

## 2020-09-29 NOTE — PROGRESS NOTES
Nephrology Progress Note        [address], [address] [address]  Phone: [phone]  Office Hours: 8:30AM - 4:30PM  Monday - Friday 9/29/2020 8:16 AM  Subjective:   Admit Date: 9/23/2020  PCP: No primary care provider on file. Interval History: On nc  Nonoliguric    Diet: DIET CARDIAC; Carb Control: 4 carb choices (60 gms)/meal; Low Sodium (2 GM); Daily Fluid Restriction: 1500 ml      Data:   Scheduled Meds:   conivaptan  20 mg Intravenous Once    bumetanide  1 mg Intravenous BID    insulin lispro  0-12 Units Subcutaneous TID WC    insulin lispro  0-6 Units Subcutaneous Nightly    traZODone  50 mg Oral Nightly    aspirin  81 mg Oral Daily    atorvastatin  40 mg Oral Daily    insulin glargine  10 Units Subcutaneous Nightly    metoprolol succinate  25 mg Oral BID    pantoprazole  40 mg Oral QAM AC    sodium chloride flush  10 mL Intravenous 2 times per day    nicotine  1 patch Transdermal Daily    ipratropium-albuterol  1 ampule Inhalation Q4H WA    ferrous sulfate  325 mg Oral BID WC     Continuous Infusions:   conivaptan      dextrose       PRN Meds:mouth kote, albuterol sulfate HFA, glucose, dextrose, glucagon (rDNA), dextrose, calcium carbonate, traMADol, sodium chloride flush, sodium chloride flush, acetaminophen **OR** acetaminophen, polyethylene glycol, promethazine **OR** ondansetron, nitroGLYCERIN, potassium chloride **OR** potassium alternative oral replacement **OR** potassium chloride  I/O last 3 completed shifts: In: 8142 [P.O.:1452]  Out: 8337 [Urine:3425]  No intake/output data recorded.     Intake/Output Summary (Last 24 hours) at 9/29/2020 0816  Last data filed at 9/29/2020 0600  Gross per 24 hour   Intake 1452 ml   Output 3150 ml   Net -1698 ml       CBC:   Recent Labs     09/27/20 0423 09/28/20 0436 09/29/20  0403   WBC 7.8 6.3 6.7   HGB 8.8* 8.3* 8.2*    248 229       BMP:    Recent Labs     09/27/20 0423 09/28/20 0436 09/28/20  0834 09/29/20  0403   *  --  120*  --  121*   K 4.1   < > 4.5 4.5 4.1   CL 81*  --  77*  --  78*   CO2 28  --  25  --  27   BUN 71*  --  67*  --  61*   CREATININE 1.9*  --  1.7*  --  1.6*   GLUCOSE 106*  --  212*  --  93    < > = values in this interval not displayed. Hepatic:   Recent Labs     09/27/20 0423 09/28/20 0436 09/29/20 0403   AST 59* 59* 64*   ALT 26 25 24   BILITOT 13.2* 12.1* 11.7*   ALKPHOS 195* 178* 180*     Troponin: No results for input(s): TROPONINI in the last 72 hours. BNP: No results for input(s): BNP in the last 72 hours. Lipids: No results for input(s): CHOL, HDL in the last 72 hours.     Invalid input(s): LDLCALCU  ABGs:   Lab Results   Component Value Date    PO2ART 97 07/06/2020    DXM1FGF 35.0 07/06/2020     INR:   Recent Labs     09/27/20 0423 09/28/20 0436 09/29/20  0403   INR 4.85 2.85 1.93       Objective:   Vitals: /81   Pulse 87   Temp 98.1 °F (36.7 °C) (Oral)   Resp 19   Ht 6' (1.829 m)   Wt 209 lb 4.8 oz (94.9 kg)   SpO2 100%   BMI 28.39 kg/m²   General appearance: alert and cooperative with exam, in no acute distress  HEENT: normocephalic, atraumatic,   Neck: supple, trachea midline  Lungs:  breathing comfortably on nc  Heart[de-identified] regular rate and rhythm,   Abdomen: soft, non-tender; non distended,   Extremities: extremities atraumatic, no cyanosis; 1+ pitting ble edema  Neurologic: alert, oriented, follows commands, interactive    Assessment and Plan:       Patient Active Problem List:     Essential hypertension     Osteoarthritis     COPD (chronic obstructive pulmonary disease) ([address]     Paresthesia of left leg     Abdominal hernia     Left shoulder pain     Crushing injury of right hand     Rotator cuff tendinitis     Midline low back pain without sciatica     History of MI (myocardial infarction)     Coronary artery disease involving coronary bypass graft of native heart without angina pectoris     Mixed

## 2020-09-29 NOTE — PROGRESS NOTES
09/28/20 2256   NIV Type   NIV Started/Stopped On   Equipment Type v60   Mode Bilevel   Mask Type Full face mask   Settings/Measurements   IPAP 12 cmH20   CPAP/EPAP 5 cmH2O   Resp 22   FiO2  30 %   I Time/ I Time % 1.3 s   Vt Exhaled 628 mL   Mask Leak (lpm) 23 lpm   Alarm Settings   Alarms On Y   Press Low Alarm 5 cmH2O   High Pressure Alarm 25 cmH2O   Apnea (secs) 20 secs   Resp Rate Low Alarm 12   High Respiratory Rate 40 br/min

## 2020-09-29 NOTE — PROGRESS NOTES
alert, NAD. jaundice  Skin:  Warm and dry  Neck:  JVD not appreciated  Chest:  Clear to auscultation, respiration easy  Cardiovascular:  RRR S1S2  Abdomen:  Rounded. No tenderness appreciated. Extremities:  + lower leg edema    Medications:    conivaptan  20 mg Intravenous Once    bumetanide  1 mg Intravenous BID    insulin lispro  0-12 Units Subcutaneous TID WC    insulin lispro  0-6 Units Subcutaneous Nightly    traZODone  50 mg Oral Nightly    aspirin  81 mg Oral Daily    atorvastatin  40 mg Oral Daily    insulin glargine  10 Units Subcutaneous Nightly    metoprolol succinate  25 mg Oral BID    pantoprazole  40 mg Oral QAM AC    sodium chloride flush  10 mL Intravenous 2 times per day    nicotine  1 patch Transdermal Daily    ipratropium-albuterol  1 ampule Inhalation Q4H WA    ferrous sulfate  325 mg Oral BID WC      conivaptan      dextrose       mouth kote, albuterol sulfate HFA, glucose, dextrose, glucagon (rDNA), dextrose, calcium carbonate, traMADol, sodium chloride flush, sodium chloride flush, acetaminophen **OR** acetaminophen, polyethylene glycol, promethazine **OR** ondansetron, nitroGLYCERIN, potassium chloride **OR** potassium alternative oral replacement **OR** potassium chloride    Lab Data:  CBC:   Recent Labs     09/27/20 0423 09/28/20 0436 09/29/20  0403   WBC 7.8 6.3 6.7   HGB 8.8* 8.3* 8.2*   HCT 30.5* 28.2* 28.7*   MCV 68.2* 67.1* 67.2*    248 229     BMP:   Recent Labs     09/27/20 0423 09/28/20 0436 09/28/20  0834 09/29/20  0403   *  --  120*  --  121*   K 4.1   < > 4.5 4.5 4.1   CL 81*  --  77*  --  78*   CO2 28  --  25  --  27   BUN 71*  --  67*  --  61*   CREATININE 1.9*  --  1.7*  --  1.6*    < > = values in this interval not displayed.      LIVER PROFILE:   Recent Labs     09/27/20 0423 09/28/20 0436 09/29/20  0403   AST 59* 59* 64*   ALT 26 25 24   BILITOT 13.2* 12.1* 11.7*   ALKPHOS 195* 178* 180*     PT/INR:   Recent Labs     09/27/20  0423 09/28/20  0436 09/29/20  0403   PROTIME 59.6* 34.8* 23.5*   INR 4.85 2.85 1.93     APTT:   Recent Labs     09/27/20  0423 09/28/20  0436 09/29/20  0403   APTT 47.3* 42.6* 40.0*     BNP:   TROPONIN:       Mahsa Nuñez, APRN-CNP 9/29/2020 1:25 PM     I have seen ,spoken to  and examined this patient personally, independently of the nurse practitioner. I have reviewed the hospital care given to date and reviewed all pertinent labs and imaging. The plan was developed mutually at the time of the visit with the patient,  NP  and myself. I have spoken with patient, nursing staff and provided written and verbal instructions . The above note has been reviewed and I agree with the assessment, diagnosis, and treatment plan with changes made by me as follows     CARDIOLOGY ATTENDING ADDENDUM    HPI:  I have reviewed the above HPI  And agree with above   Caro Dominique is a 52 y. o.year old who and presents with had concerns including Shortness of Breath. Chief Complaint   Patient presents with    Shortness of Breath     Interval history:  Mild SOB    Physical Exam:  General:  Awake, alert, NAD  Head:normal  Eye:normal  Neck:  No JVD   Chest:  Clear to auscultation, respiration easy  Cardiovascular:  RRR S1S2  Abdomen:   nontender  Extremities:  =1 edema  Pulses; palpable  Neuro: grossly normal      MEDICAL DECISION MAKING;    I agree with the above plan, which was planned by myself and discussed with NP.   Continue current therapy        Pop Mishra MD [address] [facility]

## 2020-09-29 NOTE — CARE COORDINATION
Radha/[facility] has informed CM that they should be able to accept pt when he is medically ready. Will need to notify [facility] when pt is ready to verify that still ok for pt to come. No pre-cert required. Notified Dr Mary Alice Rayo. D/c plan is Swing Bed only when he is medically ready. DO NOT SEND PT TO THE SWING BED UNTIL YOU VERIFY OK WITH [facility] COORDINATOR [phone]. Will need discharge-readmit orders. CALL REPORT TO [phone] WHEN PT IS DISCHARGED.   TE

## 2020-09-29 NOTE — PROGRESS NOTES
Physical Therapy    Facility/Department: [facility]  Initial Assessment    NAME: Thad Bridges  : 1970  MRN: [id number]    Date of Service: 2020    Discharge Recommendations:  Subacute/Skilled Nursing Facility   PT Equipment Recommendations  Equipment Needed: (If pt refuses and goes home, pt would benefit from RW)    Assessment   Body structures, Functions, Activity limitations: Decreased functional mobility ; Decreased high-level IADLs;Decreased ADL status; Decreased endurance;Decreased balance;Decreased strength;Decreased safe awareness  Assessment: Pt is a 52 y.o. male admitted to the hospital for acute respiratory distress. Pt is currently ambulating 25' with RW and 2L O2 with SpO2 dropping to 85%. Pt also requires min A for transfers this session. Pt would benefit from continued acute care PT as well as SNF placement after discharge to continue to address impairments. Decision Making: Medium Complexity  PT Education: Goals;PT Role;Plan of Care;General Safety;Gait Training;Functional Mobility Training;Transfer Training  REQUIRES PT FOLLOW UP: Yes  Activity Tolerance  Activity Tolerance: Patient limited by fatigue;Patient limited by endurance       Patient Diagnosis(es): The primary encounter diagnosis was Hypervolemia, unspecified hypervolemia type. A diagnosis of Acute on chronic respiratory failure with hypoxia (HCC) was also pertinent to this visit. has a past medical history of CAD (coronary artery disease), CHF (congestive heart failure) ([address] COPD (chronic obstructive pulmonary disease) ([address] Depression, Drug abuse ([address] HIGH CHOLESTEROL, Hypertension, MI (myocardial infarction) ([address] and S/P coronary artery bypass graft x 4.   has a past surgical history that includes Cardiac surgery (2010); Bypass Graft (04/10/2011); and Leg Surgery.     Restrictions   general precautions, O2    Vision/Hearing  Vision: Within Functional Limits  Hearing: Within functional limits       Subjective  \"I'm week: 3+  Current Treatment Recommendations: Strengthening, Transfer Training, Endurance Training, Patient/Caregiver Education & Training, ROM, Balance Training, Gait Training, Home Exercise Program, Functional Mobility Training, Stair training, Safety Education & Training  Safety Devices  Type of devices:  All fall risk precautions in place, Call light within reach, Gait belt, Nurse notified, Left in bed    AM-PAC Score  AM-PAC Inpatient Mobility Raw Score : 19 (09/29/20 1420)  AM-PAC Inpatient T-Scale Score : 45.44 (09/29/20 1420)  Mobility Inpatient CMS 0-100% Score: 41.77 (09/29/20 1420)  Mobility Inpatient CMS G-Code Modifier : CK (09/29/20 1420)    Goals  Short term goals  Time Frame for Short term goals: 1 week  Short term goal 1: Pt to complete all STS transfers to/from bed, commode, and chair mod I  Short term goal 2: Pt to ambulate 48' LRAD with supervision  Short term goal 3: Pt to perform dynamic standing balance activities CGA       Therapy Time   Individual Concurrent Group Co-treatment   Time In 1355         Time Out 1415         Minutes 20         Timed Code Treatment Minutes: [address], PT

## 2020-09-29 NOTE — CONSULTS
Iv Consult complete. Introcan 20g 1.75\"xtra Long angiocath inserted in left FA x1 attempt using sterile ultrasound guided technique, brisk blood return, flushes easy.

## 2020-09-29 NOTE — CARE COORDINATION
Called Kaylee Cartwright/Swing Bed Coordinator and left her a VM to call this CM with their decision to accept pt or not. Will await her return call. COVID SENT 09/28.  ADITYA

## 2020-09-30 LAB
ALBUMIN SERPL-MCNC: 3.6 GM/DL (ref 3.4–5)
ALP BLD-CCNC: 230 IU/L (ref 40–129)
ALT SERPL-CCNC: 30 U/L (ref 10–40)
ANION GAP SERPL CALCULATED.3IONS-SCNC: 15 MMOL/L (ref 4–16)
APTT: 35.3 SECONDS (ref 25.1–37.1)
AST SERPL-CCNC: 93 IU/L (ref 15–37)
BILIRUB SERPL-MCNC: 14.1 MG/DL (ref 0–1)
BUN BLDV-MCNC: 55 MG/DL (ref 6–23)
CALCIUM SERPL-MCNC: 9 MG/DL (ref 8.3–10.6)
CHLORIDE BLD-SCNC: 82 MMOL/L (ref 99–110)
CO2: 30 MMOL/L (ref 21–32)
CREAT SERPL-MCNC: 1.5 MG/DL (ref 0.9–1.3)
GFR AFRICAN AMERICAN: >60 ML/MIN/1.73M2
GFR NON-AFRICAN AMERICAN: 50 ML/MIN/1.73M2
GLUCOSE BLD-MCNC: 115 MG/DL (ref 70–99)
GLUCOSE BLD-MCNC: 118 MG/DL (ref 70–99)
GLUCOSE BLD-MCNC: 159 MG/DL (ref 70–99)
GLUCOSE BLD-MCNC: 83 MG/DL (ref 70–99)
GLUCOSE BLD-MCNC: 92 MG/DL (ref 70–99)
HCT VFR BLD CALC: 29.9 % (ref 42–52)
HEMOGLOBIN: 8.5 GM/DL (ref 13.5–18)
INR BLD: 1.54 INDEX
MCH RBC QN AUTO: 19.4 PG (ref 27–31)
MCHC RBC AUTO-ENTMCNC: 28.4 % (ref 32–36)
MCV RBC AUTO: 68.1 FL (ref 78–100)
PDW BLD-RTO: 25.4 % (ref 11.7–14.9)
PLATELET # BLD: 196 K/CU MM (ref 140–440)
PMV BLD AUTO: 9.8 FL (ref 7.5–11.1)
POTASSIUM SERPL-SCNC: 3.8 MMOL/L (ref 3.5–5.1)
PROTHROMBIN TIME: 18.7 SECONDS (ref 11.7–14.5)
RBC # BLD: 4.39 M/CU MM (ref 4.6–6.2)
SODIUM BLD-SCNC: 127 MMOL/L (ref 135–145)
TOTAL PROTEIN: 5.6 GM/DL (ref 6.4–8.2)
WBC # BLD: 6.3 K/CU MM (ref 4–10.5)

## 2020-09-30 PROCEDURE — 6370000000 HC RX 637 (ALT 250 FOR IP): Performed by: INTERNAL MEDICINE

## 2020-09-30 PROCEDURE — 85027 COMPLETE CBC AUTOMATED: CPT

## 2020-09-30 PROCEDURE — 36415 COLL VENOUS BLD VENIPUNCTURE: CPT

## 2020-09-30 PROCEDURE — 94660 CPAP INITIATION&MGMT: CPT

## 2020-09-30 PROCEDURE — 6370000000 HC RX 637 (ALT 250 FOR IP): Performed by: NURSE PRACTITIONER

## 2020-09-30 PROCEDURE — 6370000000 HC RX 637 (ALT 250 FOR IP): Performed by: HOSPITALIST

## 2020-09-30 PROCEDURE — 94761 N-INVAS EAR/PLS OXIMETRY MLT: CPT

## 2020-09-30 PROCEDURE — 80053 COMPREHEN METABOLIC PANEL: CPT

## 2020-09-30 PROCEDURE — 82962 GLUCOSE BLOOD TEST: CPT

## 2020-09-30 PROCEDURE — APPNB15 APP NON BILLABLE TIME 0-15 MINS: Performed by: NURSE PRACTITIONER

## 2020-09-30 PROCEDURE — 6360000002 HC RX W HCPCS: Performed by: INTERNAL MEDICINE

## 2020-09-30 PROCEDURE — 94640 AIRWAY INHALATION TREATMENT: CPT

## 2020-09-30 PROCEDURE — 85610 PROTHROMBIN TIME: CPT

## 2020-09-30 PROCEDURE — 2140000000 HC CCU INTERMEDIATE R&B

## 2020-09-30 PROCEDURE — 99232 SBSQ HOSP IP/OBS MODERATE 35: CPT | Performed by: INTERNAL MEDICINE

## 2020-09-30 PROCEDURE — 2700000000 HC OXYGEN THERAPY PER DAY

## 2020-09-30 PROCEDURE — G0008 ADMIN INFLUENZA VIRUS VAC: HCPCS | Performed by: INTERNAL MEDICINE

## 2020-09-30 PROCEDURE — 85730 THROMBOPLASTIN TIME PARTIAL: CPT

## 2020-09-30 PROCEDURE — 90686 IIV4 VACC NO PRSV 0.5 ML IM: CPT | Performed by: INTERNAL MEDICINE

## 2020-09-30 RX ORDER — BUMETANIDE 1 MG/1
2 TABLET ORAL 2 TIMES DAILY
Status: DISCONTINUED | OUTPATIENT
Start: 2020-09-30 | End: 2020-10-01 | Stop reason: HOSPADM

## 2020-09-30 RX ADMIN — IPRATROPIUM BROMIDE AND ALBUTEROL SULFATE 1 AMPULE: .5; 3 SOLUTION RESPIRATORY (INHALATION) at 15:47

## 2020-09-30 RX ADMIN — INSULIN LISPRO 2 UNITS: 100 INJECTION, SOLUTION INTRAVENOUS; SUBCUTANEOUS at 12:26

## 2020-09-30 RX ADMIN — ASPIRIN 81 MG: 81 TABLET, COATED ORAL at 09:11

## 2020-09-30 RX ADMIN — INFLUENZA A VIRUS A/VICTORIA/2454/2019 IVR-207 (H1N1) ANTIGEN (PROPIOLACTONE INACTIVATED), INFLUENZA A VIRUS A/HONG KONG/2671/2019 IVR-208 (H3N2) ANTIGEN (PROPIOLACTONE INACTIVATED), INFLUENZA B VIRUS B/VICTORIA/705/2018 BVR-11 ANTIGEN (PROPIOLACTONE INACTIVATED), INFLUENZA B VIRUS B/PHUKET/3073/2013 BVR-1B ANTIGEN (PROPIOLACTONE INACTIVATED) 0.5 ML: 15; 15; 15; 15 INJECTION, SUSPENSION INTRAMUSCULAR at 09:12

## 2020-09-30 RX ADMIN — ATORVASTATIN CALCIUM 40 MG: 40 TABLET, FILM COATED ORAL at 20:47

## 2020-09-30 RX ADMIN — BUMETANIDE 2 MG: 1 TABLET ORAL at 09:11

## 2020-09-30 RX ADMIN — TRAZODONE HYDROCHLORIDE 50 MG: 50 TABLET ORAL at 20:48

## 2020-09-30 RX ADMIN — FERROUS SULFATE TAB 325 MG (65 MG ELEMENTAL FE) 325 MG: 325 (65 FE) TAB at 10:19

## 2020-09-30 RX ADMIN — IPRATROPIUM BROMIDE AND ALBUTEROL SULFATE 1 AMPULE: .5; 3 SOLUTION RESPIRATORY (INHALATION) at 07:13

## 2020-09-30 RX ADMIN — METOPROLOL SUCCINATE 25 MG: 25 TABLET, EXTENDED RELEASE ORAL at 09:11

## 2020-09-30 RX ADMIN — FERROUS SULFATE TAB 325 MG (65 MG ELEMENTAL FE) 325 MG: 325 (65 FE) TAB at 16:52

## 2020-09-30 RX ADMIN — PANTOPRAZOLE SODIUM 40 MG: 40 TABLET, DELAYED RELEASE ORAL at 06:57

## 2020-09-30 RX ADMIN — BUMETANIDE 2 MG: 1 TABLET ORAL at 20:48

## 2020-09-30 RX ADMIN — IPRATROPIUM BROMIDE AND ALBUTEROL SULFATE 1 AMPULE: .5; 3 SOLUTION RESPIRATORY (INHALATION) at 20:33

## 2020-09-30 RX ADMIN — IPRATROPIUM BROMIDE AND ALBUTEROL SULFATE 1 AMPULE: .5; 3 SOLUTION RESPIRATORY (INHALATION) at 11:29

## 2020-09-30 ASSESSMENT — PAIN SCALES - GENERAL: PAINLEVEL_OUTOF10: 0

## 2020-09-30 NOTE — PROGRESS NOTES
Reviewed fluid restriction at length with patient. Noncompliant with fluid restriction- patient asks all staff members for additional fluids.  Will continue to reinforce and educate

## 2020-09-30 NOTE — PROGRESS NOTES
09/29/20 8692   NIV Type   NIV Started/Stopped On   Equipment Type v60   Mode Bilevel   Mask Type Full face mask   Mask Size Medium   Settings/Measurements   IPAP 12 cmH20   CPAP/EPAP 5 cmH2O   Rate Ordered 12   Resp 21   FiO2  30 %   I Time/ I Time % 1.3 s   Vt Exhaled 645 mL   Mask Leak (lpm) 0 lpm   Using Accessory Muscles No   Alarm Settings   Alarms On Y   Press Low Alarm 5 cmH2O   High Pressure Alarm 25 cmH2O   Delay Alarm 20 sec(s)   Resp Rate Low Alarm 12   High Respiratory Rate 40 br/min

## 2020-09-30 NOTE — PROGRESS NOTES
Nephrology Progress Note        [address], [address] [address]  Phone: [phone]  Office Hours: 8:30AM - 4:30PM  Monday - Friday 9/30/2020 7:39 AM  Subjective:   Admit Date: 9/23/2020  PCP: No primary care provider on file. Interval History: complaining about fluid restriction  Wanting to go home today  Nonoliguric      Diet: DIET CARDIAC; Carb Control: 4 carb choices (60 gms)/meal; Low Sodium (2 GM); Daily Fluid Restriction: 1500 ml      Data:   Scheduled Meds:   bumetanide  2 mg Oral BID    influenza virus vaccine  0.5 mL Intramuscular Once    insulin lispro  0-12 Units Subcutaneous TID WC    insulin lispro  0-6 Units Subcutaneous Nightly    traZODone  50 mg Oral Nightly    aspirin  81 mg Oral Daily    atorvastatin  40 mg Oral Daily    insulin glargine  10 Units Subcutaneous Nightly    metoprolol succinate  25 mg Oral BID    pantoprazole  40 mg Oral QAM AC    sodium chloride flush  10 mL Intravenous 2 times per day    nicotine  1 patch Transdermal Daily    ipratropium-albuterol  1 ampule Inhalation Q4H WA    ferrous sulfate  325 mg Oral BID WC     Continuous Infusions:   conivaptan 20 mg/day (09/29/20 2210)    dextrose       PRN Meds:mouth kote, albuterol sulfate HFA, glucose, dextrose, glucagon (rDNA), dextrose, calcium carbonate, traMADol, sodium chloride flush, sodium chloride flush, acetaminophen **OR** acetaminophen, polyethylene glycol, promethazine **OR** ondansetron, nitroGLYCERIN, potassium chloride **OR** potassium alternative oral replacement **OR** potassium chloride  I/O last 3 completed shifts: In: 5 [P.O.:420]  Out: 4550 [Urine:2750; Emesis/NG output:1800]  No intake/output data recorded.     Intake/Output Summary (Last 24 hours) at 9/30/2020 0739  Last data filed at 9/30/2020 0601  Gross per 24 hour   Intake 420 ml   Output 4550 ml   Net -4130 ml       CBC:   Recent Labs     09/28/20  0436 09/29/20  0403 09/30/20  0403   WBC 6.3 6.7 6.3 HGB 8.3* 8.2* 8.5*    229 196       BMP:    Recent Labs     09/28/20  0436 09/28/20  0834 09/29/20  0403 09/30/20  0403   *  --  121* 127*   K 4.5 4.5 4.1 3.8   CL 77*  --  78* 82*   CO2 25  --  27 30   BUN 67*  --  61* 55*   CREATININE 1.7*  --  1.6* 1.5*   GLUCOSE 212*  --  93 92     Hepatic:   Recent Labs     09/28/20  0436 09/29/20  0403 09/30/20  0403   AST 59* 64* 93*   ALT 25 24 30   BILITOT 12.1* 11.7* 14.1*   ALKPHOS 178* 180* 230*     Troponin: No results for input(s): TROPONINI in the last 72 hours. BNP: No results for input(s): BNP in the last 72 hours. Lipids: No results for input(s): CHOL, HDL in the last 72 hours.     Invalid input(s): LDLCALCU  ABGs:   Lab Results   Component Value Date    PO2ART 97 07/06/2020    RRI4KKT 35.0 07/06/2020     INR:   Recent Labs     09/28/20  0436 09/29/20  0403 09/30/20  0403   INR 2.85 1.93 1.54       Objective:   Vitals: /76   Pulse 87   Temp 97.9 °F (36.6 °C) (Axillary)   Resp 20   Ht 6' (1.829 m)   Wt 206 lb 3.2 oz (93.5 kg)   SpO2 100%   BMI 27.97 kg/m²   General appearance: alert and cooperative with exam, in no acute distress  HEENT: normocephalic, atraumatic,   Neck: supple, trachea midline  Lungs:  breathing comfortably   Abdomen: ; non distended,   Extremities: ble edema is gone  Neurologic: alert, oriented, follows commands, interactive    Assessment and Plan:     Patient Active Problem List:     Essential hypertension     Osteoarthritis     COPD (chronic obstructive pulmonary disease) (HCC)     Paresthesia of left leg     Abdominal hernia     Left shoulder pain     Crushing injury of right hand     Rotator cuff tendinitis     Midline low back pain without sciatica     History of MI (myocardial infarction)     Coronary artery disease involving coronary bypass graft of native heart without angina pectoris     Mixed hyperlipidemia     Depression     Chronic midline low back pain without sciatica     Tobacco abuse     Gastroesophageal reflux disease without esophagitis     Opiate abuse, continuous ([facility]     Heroin dependence ([facility])     ST elevation myocardial infarction involving left circumflex coronary artery ([facility])     STEMI (ST elevation myocardial infarction) ([facility])     Acute on chronic combined systolic (congestive) and diastolic (congestive) heart failure ([facility])     Systolic and diastolic CHF w/reduced LV function, NYHA class 4 ([facility])     NSTEMI (non-ST elevated myocardial infarction) ([facility])     Acute on chronic congestive heart failure ([facility])     Noncompliance     Pulmonary edema, acute ([facility])     Acute kidney injury ([facility]     Acute respiratory failure with hypoxia and hypercapnia ([facility])     Hypertensive emergency     Ischemic cardiomyopathy     Heart failure ([facility]     Left ventricular systolic dysfunction     Acute systolic congestive heart failure ([facility])     SOB (shortness of breath)     Acute on chronic combined systolic and diastolic heart failure ([facility])     Hyponatremia     Hematemesis     Acute on chronic systolic CHF (congestive heart failure) ([facility])     Dyspnea     Acute respiratory distress     Electrolyte disturbance     Elevated bilirubin     Hypervolemia        Plan     sodium better on conivaptan, stop conivaptan after 24hrs thus in few hrs  Transition to oral bumex  continue fluid restriction on dc  Very non adherent, unfortunately  Cr down to 1.5                           Electronically signed by Abram Kelly DO on 9/30/2020 at 7:39 AM    [facility]  MD Joya Gardner DO [address]  [place], [place] [address]  PHONE: [phone]  FAX: [phone]

## 2020-09-30 NOTE — PROGRESS NOTES
09/30/20 0225   NIV Type   NIV Started/Stopped On   Equipment Type v60   Mode Bilevel   Mask Type Full face mask   Settings/Measurements   IPAP 12 cmH20   CPAP/EPAP 5 cmH2O   Rate Ordered 12   FiO2  30 %   I Time/ I Time % 1.3 s   Vt Exhaled 463 mL   Mask Leak (lpm) 18 lpm   Alarm Settings   Alarms On Y   Press Low Alarm 5 cmH2O   High Pressure Alarm 25 cmH2O   Apnea (secs) 20 secs   Resp Rate Low Alarm 12   High Respiratory Rate 40 br/min

## 2020-09-30 NOTE — PROGRESS NOTES
Physical Therapy  Attempted to see pt this AM, pt refusing due to not being able to sleep last night. Will re-attempt as schedule allows.

## 2020-09-30 NOTE — PROGRESS NOTES
Hospitalist Progress Note      Name:  Valerie Lorenzo /Age/Sex: 1970  (52 y.o. male)   MRN & CSN:  [id number] & [id number] Admission Date/Time: 2020 11:15 AM   Location:  [address] [place] [address] PCP: No primary care provider on file. Hospital Day: 8    Assessment and Plan:   Valerie Lorenzo is a 52 y.o.  male  who presents with Acute respiratory distress     1) Acute on chronic systolic HF  -BNP elevated  -Last Echo (2020) EF 10 -15%, Global hypokinesis.  Severe mitral regurg, moderate tricuspid regurg. Severe PHTN with an RVSP of 73 mmHg  -Continue diuresis per nephro and fluid restriction  -Cardiology on board; continue BB     2) Possible Cardiac Cirrhosis  -Elevated bilirubin - Bilirubin 10.2, direct bilirubin 8.1  -INR has improved  -Continue supportive management  -GI on board     3)JAIME with chronic hyponatremia on CKD 3  -Likely due to CRS  -Avoid nephrotoxic  -Nephrology on board for diuresis  -Started on conivaptan for hyponatremia     4) Elevated Troponin  -Likely due to demand ischemia  -EKG with sinus tachy and PVC  -Cardiology on board          Chronic Illnesses, medication resumed unless contraindicated           - CAD, s/p CABG x4          - opiate/heroin abuse         - hyperlipidemia         - hypertension         - osteoarthritis          - DM type 2         - COPD         -Nicotine use disorder     Plan is possible dc to Swing bed    Diet DIET CARDIAC; Carb Control: 4 carb choices (60 gms)/meal; Low Sodium (2 GM); Daily Fluid Restriction: 1500 ml   DVT Prophylaxis [] Lovenox, []  Heparin, [] SCDs, [] Ambulation   GI Prophylaxis [] PPI,  [] H2 Blocker,  [] Carafate,  [] Diet/Tube Feeds   Code Status DNR-CCA   Disposition Swing bed   MDM      History of Present Illness:     Patient was seen and examined  Denied any worsening SOB  No palpitations, dizziness  No chest pain, fever, chills       Ten point ROS reviewed negative, unless as noted above    Objective:        Intake/Output Summary (Last 24 hours) at 9/30/2020 1142  Last data filed at 9/30/2020 0800  Gross per 24 hour   Intake 780 ml   Output 4050 ml   Net -3270 ml      Vitals:   Vitals:    09/30/20 1129   BP:    Pulse:    Resp: 19   Temp:    SpO2:      Physical Exam:   GEN Awake male, sitting upright in bed in no apparent distress. Appears given age. EYES Pupils are equally round. No scleral erythema, discharge, or conjunctivitis. HENT Mucous membranes are moist. Oral pharynx without exudates, no evidence of thrush. NECK Supple, no apparent thyromegaly or masses. RESP Clear to auscultation, no wheezes, rales or rhonchi. Symmetric chest movement while on 3 L of O2.  CARDIO/VASC S1/S2 auscultated. Regular rate without appreciable murmurs, rubs, or gallops. No JVD or carotid bruits. Peripheral pulses equal bilaterally and palpable. No peripheral edema. GI Abdomen is soft without significant tenderness, masses, or guarding. Bowel sounds are normoactive. Rectal exam deferred.  No costovertebral angle tenderness. Normal appearing external genitalia. Hansen catheter is not present. HEME/LYMPH No palpable cervical lymphadenopathy and no hepatosplenomegaly. No petechiae or ecchymoses. MSK No gross joint deformities. SKIN Normal coloration, warm, dry. NEURO Cranial nerves appear grossly intact, normal speech, no lateralizing weakness. PSYCH Awake, alert, oriented x 4. Affect appropriate.     Medications:   Medications:    bumetanide  2 mg Oral BID    insulin lispro  0-12 Units Subcutaneous TID WC    insulin lispro  0-6 Units Subcutaneous Nightly    traZODone  50 mg Oral Nightly    aspirin  81 mg Oral Daily    atorvastatin  40 mg Oral Daily    insulin glargine  10 Units Subcutaneous Nightly    metoprolol succinate  25 mg Oral BID    pantoprazole  40 mg Oral QAM AC    sodium chloride flush  10 mL Intravenous 2 times per day    nicotine  1 patch Transdermal Daily    ipratropium-albuterol  1 ampule Inhalation Q4H WA    ferrous sulfate  325 mg Oral BID WC      Infusions:    conivaptan 20 mg/day (09/29/20 2210)    dextrose       PRN Meds: mouth kote, , PRN  albuterol sulfate HFA, 2 puff, Q6H PRN  glucose, 15 g, PRN  dextrose, 12.5 g, PRN  glucagon (rDNA), 1 mg, PRN  dextrose, 100 mL/hr, PRN  calcium carbonate, 500 mg, TID PRN  traMADol, 50 mg, Q6H PRN  sodium chloride flush, 10 mL, PRN  sodium chloride flush, 10 mL, PRN  acetaminophen, 650 mg, Q6H PRN    Or  acetaminophen, 650 mg, Q6H PRN  polyethylene glycol, 17 g, Daily PRN  promethazine, 12.5 mg, Q6H PRN    Or  ondansetron, 4 mg, Q6H PRN  nitroGLYCERIN, 0.4 mg, Q5 Min PRN  potassium chloride, 40 mEq, PRN    Or  potassium alternative oral replacement, 40 mEq, PRN    Or  potassium chloride, 10 mEq, PRN          Electronically signed by Lilian Litten, MD on 9/30/2020 at 11:42 AM

## 2020-10-01 ENCOUNTER — HOSPITAL ENCOUNTER (INPATIENT)
Age: 50
LOS: 6 days | Discharge: HOSPICE | DRG: 291 | End: 2020-10-07
Attending: INTERNAL MEDICINE | Admitting: INTERNAL MEDICINE
Payer: MEDICARE

## 2020-10-01 VITALS
OXYGEN SATURATION: 100 % | DIASTOLIC BLOOD PRESSURE: 69 MMHG | RESPIRATION RATE: 17 BRPM | BODY MASS INDEX: 28.15 KG/M2 | HEART RATE: 97 BPM | TEMPERATURE: 97.6 F | WEIGHT: 207.8 LBS | SYSTOLIC BLOOD PRESSURE: 98 MMHG | HEIGHT: 72 IN

## 2020-10-01 LAB
ALBUMIN SERPL-MCNC: 3.3 GM/DL (ref 3.4–5)
ALP BLD-CCNC: 267 IU/L (ref 40–129)
ALT SERPL-CCNC: 39 U/L (ref 10–40)
ANION GAP SERPL CALCULATED.3IONS-SCNC: 16 MMOL/L (ref 4–16)
APTT: 33.7 SECONDS (ref 25.1–37.1)
AST SERPL-CCNC: 123 IU/L (ref 15–37)
BILIRUB SERPL-MCNC: 14.8 MG/DL (ref 0–1)
BUN BLDV-MCNC: 48 MG/DL (ref 6–23)
CALCIUM SERPL-MCNC: 8.4 MG/DL (ref 8.3–10.6)
CHLORIDE BLD-SCNC: 82 MMOL/L (ref 99–110)
CO2: 27 MMOL/L (ref 21–32)
CREAT SERPL-MCNC: 1.4 MG/DL (ref 0.9–1.3)
GFR AFRICAN AMERICAN: >60 ML/MIN/1.73M2
GFR NON-AFRICAN AMERICAN: 54 ML/MIN/1.73M2
GLUCOSE BLD-MCNC: 119 MG/DL (ref 70–99)
GLUCOSE BLD-MCNC: 122 MG/DL (ref 70–99)
GLUCOSE BLD-MCNC: 124 MG/DL (ref 70–99)
GLUCOSE BLD-MCNC: 146 MG/DL (ref 70–99)
GLUCOSE BLD-MCNC: 154 MG/DL (ref 70–99)
HCT VFR BLD CALC: 28 % (ref 42–52)
HEMOGLOBIN: 8.3 GM/DL (ref 13.5–18)
INR BLD: 1.34 INDEX
MCH RBC QN AUTO: 20 PG (ref 27–31)
MCHC RBC AUTO-ENTMCNC: 29.6 % (ref 32–36)
MCV RBC AUTO: 67.5 FL (ref 78–100)
PDW BLD-RTO: 25.8 % (ref 11.7–14.9)
PLATELET # BLD: 181 K/CU MM (ref 140–440)
POTASSIUM SERPL-SCNC: 3.5 MMOL/L (ref 3.5–5.1)
PROTHROMBIN TIME: 16.3 SECONDS (ref 11.7–14.5)
RBC # BLD: 4.15 M/CU MM (ref 4.6–6.2)
SODIUM BLD-SCNC: 125 MMOL/L (ref 135–145)
TOTAL PROTEIN: 5.4 GM/DL (ref 6.4–8.2)
WBC # BLD: 5.7 K/CU MM (ref 4–10.5)

## 2020-10-01 PROCEDURE — 80053 COMPREHEN METABOLIC PANEL: CPT

## 2020-10-01 PROCEDURE — 94761 N-INVAS EAR/PLS OXIMETRY MLT: CPT

## 2020-10-01 PROCEDURE — 1200000002 HC SEMI PRIVATE SWING BED

## 2020-10-01 PROCEDURE — 6370000000 HC RX 637 (ALT 250 FOR IP): Performed by: HOSPITALIST

## 2020-10-01 PROCEDURE — APPSS15 APP SPLIT SHARED TIME 0-15 MINUTES: Performed by: NURSE PRACTITIONER

## 2020-10-01 PROCEDURE — 2700000000 HC OXYGEN THERAPY PER DAY

## 2020-10-01 PROCEDURE — 82962 GLUCOSE BLOOD TEST: CPT

## 2020-10-01 PROCEDURE — 6370000000 HC RX 637 (ALT 250 FOR IP): Performed by: INTERNAL MEDICINE

## 2020-10-01 PROCEDURE — 6370000000 HC RX 637 (ALT 250 FOR IP): Performed by: NURSE PRACTITIONER

## 2020-10-01 PROCEDURE — 85610 PROTHROMBIN TIME: CPT

## 2020-10-01 PROCEDURE — 99231 SBSQ HOSP IP/OBS SF/LOW 25: CPT | Performed by: INTERNAL MEDICINE

## 2020-10-01 PROCEDURE — 85027 COMPLETE CBC AUTOMATED: CPT

## 2020-10-01 PROCEDURE — 94660 CPAP INITIATION&MGMT: CPT

## 2020-10-01 PROCEDURE — 2580000003 HC RX 258: Performed by: NURSE PRACTITIONER

## 2020-10-01 PROCEDURE — 99232 SBSQ HOSP IP/OBS MODERATE 35: CPT | Performed by: INTERNAL MEDICINE

## 2020-10-01 PROCEDURE — 36415 COLL VENOUS BLD VENIPUNCTURE: CPT

## 2020-10-01 PROCEDURE — 94640 AIRWAY INHALATION TREATMENT: CPT

## 2020-10-01 PROCEDURE — 85730 THROMBOPLASTIN TIME PARTIAL: CPT

## 2020-10-01 RX ORDER — ACETAMINOPHEN 650 MG/1
650 SUPPOSITORY RECTAL EVERY 6 HOURS PRN
Status: DISCONTINUED | OUTPATIENT
Start: 2020-10-01 | End: 2020-10-07 | Stop reason: HOSPADM

## 2020-10-01 RX ORDER — ACETAMINOPHEN 325 MG/1
650 TABLET ORAL EVERY 6 HOURS PRN
Status: CANCELLED | OUTPATIENT
Start: 2020-10-01

## 2020-10-01 RX ORDER — METOPROLOL SUCCINATE 25 MG/1
25 TABLET, EXTENDED RELEASE ORAL 2 TIMES DAILY
Status: CANCELLED | OUTPATIENT
Start: 2020-10-01

## 2020-10-01 RX ORDER — TRAMADOL HYDROCHLORIDE 50 MG/1
50 TABLET ORAL EVERY 6 HOURS PRN
Status: CANCELLED | OUTPATIENT
Start: 2020-10-01

## 2020-10-01 RX ORDER — FERROUS SULFATE 325(65) MG
325 TABLET ORAL 2 TIMES DAILY WITH MEALS
Status: DISCONTINUED | OUTPATIENT
Start: 2020-10-02 | End: 2020-10-07 | Stop reason: HOSPADM

## 2020-10-01 RX ORDER — ONDANSETRON 2 MG/ML
4 INJECTION INTRAMUSCULAR; INTRAVENOUS EVERY 6 HOURS PRN
Status: CANCELLED | OUTPATIENT
Start: 2020-10-01

## 2020-10-01 RX ORDER — ATORVASTATIN CALCIUM 40 MG/1
40 TABLET, FILM COATED ORAL DAILY
Status: DISCONTINUED | OUTPATIENT
Start: 2020-10-02 | End: 2020-10-02

## 2020-10-01 RX ORDER — ATORVASTATIN CALCIUM 40 MG/1
40 TABLET, FILM COATED ORAL DAILY
Status: CANCELLED | OUTPATIENT
Start: 2020-10-01

## 2020-10-01 RX ORDER — TRAZODONE HYDROCHLORIDE 50 MG/1
50 TABLET ORAL NIGHTLY
Status: DISCONTINUED | OUTPATIENT
Start: 2020-10-02 | End: 2020-10-07 | Stop reason: HOSPADM

## 2020-10-01 RX ORDER — INSULIN GLARGINE 100 [IU]/ML
10 INJECTION, SOLUTION SUBCUTANEOUS NIGHTLY
Status: CANCELLED | OUTPATIENT
Start: 2020-10-01

## 2020-10-01 RX ORDER — XYLITOL/YERBA SANTA
AEROSOL, SPRAY WITH PUMP (ML) MUCOUS MEMBRANE PRN
Status: DISCONTINUED | OUTPATIENT
Start: 2020-10-01 | End: 2020-10-07 | Stop reason: HOSPADM

## 2020-10-01 RX ORDER — NICOTINE POLACRILEX 4 MG
15 LOZENGE BUCCAL PRN
Status: DISCONTINUED | OUTPATIENT
Start: 2020-10-01 | End: 2020-10-07 | Stop reason: HOSPADM

## 2020-10-01 RX ORDER — IPRATROPIUM BROMIDE AND ALBUTEROL SULFATE 2.5; .5 MG/3ML; MG/3ML
1 SOLUTION RESPIRATORY (INHALATION) EVERY 4 HOURS PRN
Status: CANCELLED | OUTPATIENT
Start: 2020-10-01

## 2020-10-01 RX ORDER — NICOTINE 21 MG/24HR
1 PATCH, TRANSDERMAL 24 HOURS TRANSDERMAL DAILY
Status: CANCELLED | OUTPATIENT
Start: 2020-10-02

## 2020-10-01 RX ORDER — DEXTROSE MONOHYDRATE 50 MG/ML
100 INJECTION, SOLUTION INTRAVENOUS PRN
Status: DISCONTINUED | OUTPATIENT
Start: 2020-10-01 | End: 2020-10-07 | Stop reason: HOSPADM

## 2020-10-01 RX ORDER — NICOTINE POLACRILEX 4 MG
15 LOZENGE BUCCAL PRN
Status: CANCELLED | OUTPATIENT
Start: 2020-10-01

## 2020-10-01 RX ORDER — TRAZODONE HYDROCHLORIDE 50 MG/1
50 TABLET ORAL NIGHTLY
Status: CANCELLED | OUTPATIENT
Start: 2020-10-01

## 2020-10-01 RX ORDER — NICOTINE 21 MG/24HR
1 PATCH, TRANSDERMAL 24 HOURS TRANSDERMAL DAILY
Status: DISCONTINUED | OUTPATIENT
Start: 2020-10-02 | End: 2020-10-07 | Stop reason: HOSPADM

## 2020-10-01 RX ORDER — ACETAMINOPHEN 650 MG/1
650 SUPPOSITORY RECTAL EVERY 6 HOURS PRN
Status: CANCELLED | OUTPATIENT
Start: 2020-10-01

## 2020-10-01 RX ORDER — BUMETANIDE 1 MG/1
2 TABLET ORAL 2 TIMES DAILY
Status: DISCONTINUED | OUTPATIENT
Start: 2020-10-02 | End: 2020-10-07 | Stop reason: HOSPADM

## 2020-10-01 RX ORDER — CALCIUM CARBONATE 200(500)MG
500 TABLET,CHEWABLE ORAL 3 TIMES DAILY PRN
Status: DISCONTINUED | OUTPATIENT
Start: 2020-10-01 | End: 2020-10-07 | Stop reason: HOSPADM

## 2020-10-01 RX ORDER — ONDANSETRON 2 MG/ML
4 INJECTION INTRAMUSCULAR; INTRAVENOUS EVERY 6 HOURS PRN
Status: DISCONTINUED | OUTPATIENT
Start: 2020-10-01 | End: 2020-10-06

## 2020-10-01 RX ORDER — POLYETHYLENE GLYCOL 3350 17 G/17G
17 POWDER, FOR SOLUTION ORAL DAILY PRN
Status: CANCELLED | OUTPATIENT
Start: 2020-10-01

## 2020-10-01 RX ORDER — IPRATROPIUM BROMIDE AND ALBUTEROL SULFATE 2.5; .5 MG/3ML; MG/3ML
1 SOLUTION RESPIRATORY (INHALATION) EVERY 4 HOURS PRN
Status: DISCONTINUED | OUTPATIENT
Start: 2020-10-01 | End: 2020-10-07 | Stop reason: HOSPADM

## 2020-10-01 RX ORDER — FERROUS SULFATE 325(65) MG
325 TABLET ORAL 2 TIMES DAILY WITH MEALS
Status: CANCELLED | OUTPATIENT
Start: 2020-10-01

## 2020-10-01 RX ORDER — ACETAMINOPHEN 325 MG/1
650 TABLET ORAL EVERY 6 HOURS PRN
Status: DISCONTINUED | OUTPATIENT
Start: 2020-10-01 | End: 2020-10-07 | Stop reason: HOSPADM

## 2020-10-01 RX ORDER — BUMETANIDE 1 MG/1
2 TABLET ORAL 2 TIMES DAILY
Status: CANCELLED | OUTPATIENT
Start: 2020-10-02

## 2020-10-01 RX ORDER — POTASSIUM CHLORIDE 7.45 MG/ML
10 INJECTION INTRAVENOUS PRN
Status: DISCONTINUED | OUTPATIENT
Start: 2020-10-01 | End: 2020-10-07 | Stop reason: HOSPADM

## 2020-10-01 RX ORDER — PROMETHAZINE HYDROCHLORIDE 25 MG/1
12.5 TABLET ORAL EVERY 6 HOURS PRN
Status: CANCELLED | OUTPATIENT
Start: 2020-10-01

## 2020-10-01 RX ORDER — NITROGLYCERIN 0.4 MG/1
0.4 TABLET SUBLINGUAL EVERY 5 MIN PRN
Status: CANCELLED | OUTPATIENT
Start: 2020-10-01

## 2020-10-01 RX ORDER — XYLITOL/YERBA SANTA
AEROSOL, SPRAY WITH PUMP (ML) MUCOUS MEMBRANE PRN
Status: CANCELLED | OUTPATIENT
Start: 2020-10-01

## 2020-10-01 RX ORDER — POTASSIUM CHLORIDE 20 MEQ/1
40 TABLET, EXTENDED RELEASE ORAL PRN
Status: DISCONTINUED | OUTPATIENT
Start: 2020-10-01 | End: 2020-10-07 | Stop reason: HOSPADM

## 2020-10-01 RX ORDER — PANTOPRAZOLE SODIUM 40 MG/1
40 TABLET, DELAYED RELEASE ORAL
Status: CANCELLED | OUTPATIENT
Start: 2020-10-02

## 2020-10-01 RX ORDER — POTASSIUM CHLORIDE 20 MEQ/1
40 TABLET, EXTENDED RELEASE ORAL PRN
Status: CANCELLED | OUTPATIENT
Start: 2020-10-01

## 2020-10-01 RX ORDER — DEXTROSE MONOHYDRATE 25 G/50ML
12.5 INJECTION, SOLUTION INTRAVENOUS PRN
Status: DISCONTINUED | OUTPATIENT
Start: 2020-10-01 | End: 2020-10-07 | Stop reason: HOSPADM

## 2020-10-01 RX ORDER — POLYETHYLENE GLYCOL 3350 17 G/17G
17 POWDER, FOR SOLUTION ORAL DAILY PRN
Status: DISCONTINUED | OUTPATIENT
Start: 2020-10-01 | End: 2020-10-07 | Stop reason: HOSPADM

## 2020-10-01 RX ORDER — CALCIUM CARBONATE 200(500)MG
500 TABLET,CHEWABLE ORAL 3 TIMES DAILY PRN
Status: CANCELLED | OUTPATIENT
Start: 2020-10-01

## 2020-10-01 RX ORDER — METOPROLOL SUCCINATE 25 MG/1
25 TABLET, EXTENDED RELEASE ORAL 2 TIMES DAILY
Status: DISCONTINUED | OUTPATIENT
Start: 2020-10-02 | End: 2020-10-07 | Stop reason: HOSPADM

## 2020-10-01 RX ORDER — ASPIRIN 81 MG/1
81 TABLET ORAL DAILY
Status: CANCELLED | OUTPATIENT
Start: 2020-10-02

## 2020-10-01 RX ORDER — POTASSIUM CHLORIDE 7.45 MG/ML
10 INJECTION INTRAVENOUS PRN
Status: CANCELLED | OUTPATIENT
Start: 2020-10-01

## 2020-10-01 RX ORDER — TRAMADOL HYDROCHLORIDE 50 MG/1
50 TABLET ORAL EVERY 6 HOURS PRN
Status: DISCONTINUED | OUTPATIENT
Start: 2020-10-01 | End: 2020-10-07 | Stop reason: HOSPADM

## 2020-10-01 RX ORDER — DEXTROSE MONOHYDRATE 50 MG/ML
100 INJECTION, SOLUTION INTRAVENOUS PRN
Status: CANCELLED | OUTPATIENT
Start: 2020-10-01

## 2020-10-01 RX ORDER — DEXTROSE MONOHYDRATE 25 G/50ML
12.5 INJECTION, SOLUTION INTRAVENOUS PRN
Status: CANCELLED | OUTPATIENT
Start: 2020-10-01

## 2020-10-01 RX ORDER — PANTOPRAZOLE SODIUM 40 MG/1
40 TABLET, DELAYED RELEASE ORAL
Status: DISCONTINUED | OUTPATIENT
Start: 2020-10-02 | End: 2020-10-07 | Stop reason: HOSPADM

## 2020-10-01 RX ORDER — ASPIRIN 81 MG/1
81 TABLET ORAL DAILY
Status: DISCONTINUED | OUTPATIENT
Start: 2020-10-02 | End: 2020-10-07 | Stop reason: HOSPADM

## 2020-10-01 RX ORDER — PROMETHAZINE HYDROCHLORIDE 12.5 MG/1
12.5 TABLET ORAL EVERY 6 HOURS PRN
Status: DISCONTINUED | OUTPATIENT
Start: 2020-10-01 | End: 2020-10-07 | Stop reason: HOSPADM

## 2020-10-01 RX ORDER — NITROGLYCERIN 0.4 MG/1
0.4 TABLET SUBLINGUAL EVERY 5 MIN PRN
Status: DISCONTINUED | OUTPATIENT
Start: 2020-10-01 | End: 2020-10-07 | Stop reason: HOSPADM

## 2020-10-01 RX ADMIN — ASPIRIN 81 MG: 81 TABLET, COATED ORAL at 08:32

## 2020-10-01 RX ADMIN — SODIUM CHLORIDE, PRESERVATIVE FREE 10 ML: 5 INJECTION INTRAVENOUS at 09:00

## 2020-10-01 RX ADMIN — FERROUS SULFATE TAB 325 MG (65 MG ELEMENTAL FE) 325 MG: 325 (65 FE) TAB at 18:15

## 2020-10-01 RX ADMIN — PROMETHAZINE HYDROCHLORIDE 12.5 MG: 12.5 TABLET ORAL at 23:03

## 2020-10-01 RX ADMIN — CALCIUM CARBONATE 500 MG: 500 TABLET, CHEWABLE ORAL at 23:03

## 2020-10-01 RX ADMIN — FERROUS SULFATE TAB 325 MG (65 MG ELEMENTAL FE) 325 MG: 325 (65 FE) TAB at 08:12

## 2020-10-01 RX ADMIN — PANTOPRAZOLE SODIUM 40 MG: 40 TABLET, DELAYED RELEASE ORAL at 05:00

## 2020-10-01 RX ADMIN — IPRATROPIUM BROMIDE AND ALBUTEROL SULFATE 1 AMPULE: .5; 3 SOLUTION RESPIRATORY (INHALATION) at 08:19

## 2020-10-01 RX ADMIN — IPRATROPIUM BROMIDE AND ALBUTEROL SULFATE 1 AMPULE: .5; 3 SOLUTION RESPIRATORY (INHALATION) at 16:20

## 2020-10-01 RX ADMIN — IPRATROPIUM BROMIDE AND ALBUTEROL SULFATE 1 AMPULE: .5; 3 SOLUTION RESPIRATORY (INHALATION) at 12:05

## 2020-10-01 RX ADMIN — TRAMADOL HYDROCHLORIDE 50 MG: 50 TABLET, FILM COATED ORAL at 23:03

## 2020-10-01 RX ADMIN — INSULIN LISPRO 2 UNITS: 100 INJECTION, SOLUTION INTRAVENOUS; SUBCUTANEOUS at 08:04

## 2020-10-01 ASSESSMENT — PAIN DESCRIPTION - PAIN TYPE: TYPE: CHRONIC PAIN

## 2020-10-01 ASSESSMENT — PAIN DESCRIPTION - LOCATION: LOCATION: ABDOMEN

## 2020-10-01 ASSESSMENT — PAIN SCALES - GENERAL: PAINLEVEL_OUTOF10: 10

## 2020-10-01 NOTE — PROGRESS NOTES
Comprehensive Nutrition Assessment    Type and Reason for Visit:  Reassess    Nutrition Recommendations/Plan:   · Continue current diet  · Start low calorie, high protein supplements    Nutrition Assessment:  Pt is refusing to consume some meals and refusing others. Will order supplements to help pt meet his estimated needs and continue to follow. Malnutrition Assessment:  Malnutrition Status:  Mild malnutrition    Context:  Acute Illness     Findings of the 6 clinical characteristics of malnutrition:  Energy Intake:  Mild decrease in energy intake (Comment)  Weight Loss:  No significant weight loss     Body Fat Loss:  Unable to assess Orbital   Muscle Mass Loss:  Unable to assess Clavicles (pectoralis & deltoids)  Fluid Accumulation:  1 - Mild Generalized   Strength:  Not Performed    Estimated Daily Nutrient Needs:  Energy (kcal):  3575-3516; Weight Used for Energy Requirements:  Current     Protein (g):  97; Weight Used for Protein Requirements:  Ideal        Fluid (ml/day):  6777-4483; Weight Used for Fluid Requirements:  Current      Nutrition Related Findings:  None      Wounds:  None       Current Nutrition Therapies:    DIET CARDIAC; Carb Control: 4 carb choices (60 gms)/meal; Low Sodium (2 GM); Daily Fluid Restriction: 1500 ml    Anthropometric Measures:  · Height: 6' (182.9 cm)  · Current Body Weight: 207 lb (93.9 kg)   · Admission Body Weight: 193 lb (87.5 kg)    · Usual Body Weight: 206 lb (93.4 kg)     · Ideal Body Weight: 178 lbs; % Ideal Body Weight 116.3 %   · BMI: 28.1  · BMI Categories: Overweight (BMI 25.0-29. 9)       Nutrition Diagnosis:   · Inadequate oral intake related to acute injury/trauma as evidenced by intake 26-50%      Nutrition Interventions:   Food and/or Nutrient Delivery:  Continue Current Diet, Start Oral Nutrition Supplement  Nutrition Education/Counseling:  No recommendation at this time   Coordination of Nutrition Care:  Continued Inpatient Monitoring    Goals:  pt will consume greater than 75% of his meals and supplements       Nutrition Monitoring and Evaluation:   Food/Nutrient Intake Outcomes:  Food and Nutrient Intake, Supplement Intake  Physical Signs/Symptoms Outcomes:  Biochemical Data, Skin, Weight, Meal Time Behavior     Discharge Planning:     Too soon to determine     Electronically signed by Silvia Tenorio RD, LD on 48/2/21 at 10:59 AM EDT    Contact: [phone]

## 2020-10-01 NOTE — PROGRESS NOTES
- sodium 125  - not following the oral fluid restriction  - allow fluid reequilibration today by holding the bumex today, ok to resume it tomorrow  - cr better at 1.5    Thank you

## 2020-10-01 NOTE — PLAN OF CARE
Problem: OXYGENATION/RESPIRATORY FUNCTION  Goal: Patient will maintain patent airway  9/30/2020 2052 by Sherri Sanchez RN  Outcome: Ongoing  9/30/2020 2016 by Doreen Chua RN  Outcome: Ongoing  Goal: Patient will achieve/maintain normal respiratory rate/effort  Description: Respiratory rate and effort will be within normal limits for the patient  9/30/2020 2052 by Sherri Sanchez RN  Outcome: Ongoing  9/30/2020 2016 by Doreen Chua RN  Outcome: Ongoing     Problem: HEMODYNAMIC STATUS  Goal: Patient has stable vital signs and fluid balance  9/30/2020 2052 by Sherri Sanchez RN  Outcome: Ongoing  9/30/2020 2016 by Doreen Chua RN  Outcome: Ongoing     Problem: FLUID AND ELECTROLYTE IMBALANCE  Goal: Fluid and electrolyte balance are achieved/maintained  9/30/2020 2052 by Sherri Sanchez RN  Outcome: Ongoing  9/30/2020 2016 by Doreen Chua RN  Outcome: Ongoing     Problem: ACTIVITY INTOLERANCE/IMPAIRED MOBILITY  Goal: Mobility/activity is maintained at optimum level for patient  9/30/2020 2052 by Sherri Sanchez RN  Outcome: Ongoing  9/30/2020 2016 by Doreen Chua RN  Outcome: Ongoing     Problem: Pain:  Goal: Pain level will decrease  Description: Pain level will decrease  9/30/2020 2052 by Sherri Sanchez RN  Outcome: Ongoing  9/30/2020 2016 by Doreen Chua RN  Outcome: Ongoing  9/30/2020 1531 by Elizabeth Malhotra  Outcome: Ongoing  Note: Patient will report a pain of 2/10 by the end of the shift. Currently reports a pain of 4/10.    Goal: Control of acute pain  Description: Control of acute pain  9/30/2020 2052 by Sherri Sanchez RN  Outcome: Ongoing  9/30/2020 2016 by Doreen Chua RN  Outcome: Ongoing  9/30/2020 1531 by Elizabeth Malhotra  Outcome: Ongoing  Goal: Control of chronic pain  Description: Control of chronic pain  9/30/2020 2052 by Sherri Sanchez RN  Outcome: Ongoing  9/30/2020 2016 by Doreen Chua RN  Outcome: Ongoing     Problem: Pain:  Goal: Pain level will decrease  Description: Pain level will decrease  9/30/2020 2052 by Darius Moore RN  Outcome: Ongoing  9/30/2020 2016 by Kenia Feng RN  Outcome: Ongoing  9/30/2020 1531 by Lisa Jeffrey  Outcome: Ongoing  Note: Patient will report a pain of 2/10 by the end of the shift. Currently reports a pain of 4/10.    Goal: Control of acute pain  Description: Control of acute pain  9/30/2020 2052 by Darius Moore RN  Outcome: Ongoing  9/30/2020 2016 by Kenia Feng RN  Outcome: Ongoing  9/30/2020 1531 by Lisa Jeffrey  Outcome: Ongoing  Goal: Control of chronic pain  Description: Control of chronic pain  9/30/2020 2052 by Darius Moore RN  Outcome: Ongoing  9/30/2020 2016 by Kenia Feng RN  Outcome: Ongoing     Problem: Fluid Volume - Imbalance:  Goal: Absence of imbalanced fluid volume signs and symptoms  Description: Absence of imbalanced fluid volume signs and symptoms  9/30/2020 2052 by Darius Moore RN  Outcome: Ongoing  9/30/2020 2016 by Kenia Feng RN  Outcome: Ongoing     Problem: Skin Integrity:  Goal: Will show no infection signs and symptoms  Description: Will show no infection signs and symptoms  9/30/2020 2052 by Darius Moore RN  Outcome: Ongoing  9/30/2020 2016 by Kenia Feng RN  Outcome: Ongoing  Goal: Absence of new skin breakdown  Description: Absence of new skin breakdown  9/30/2020 2052 by Darius Moore RN  Outcome: Ongoing  9/30/2020 2016 by Kenia Feng RN  Outcome: Ongoing

## 2020-10-01 NOTE — PROGRESS NOTES
history that includes Cardiac surgery (11/2010); Bypass Graft (04/10/2011); and Leg Surgery. Physical Exam:  General:  Awake, alert, NAD. jaundice  Skin:  Warm and dry  Neck:  JVD not appreciated  Chest:  Clear to auscultation, respiration easy  Cardiovascular:  RRR S1S2  Abdomen:  Rounded. No tenderness appreciated.    Extremities:  trace lower leg edema (improved)    Medications:    [Held by provider] bumetanide  2 mg Oral BID    insulin lispro  0-12 Units Subcutaneous TID WC    insulin lispro  0-6 Units Subcutaneous Nightly    traZODone  50 mg Oral Nightly    aspirin  81 mg Oral Daily    atorvastatin  40 mg Oral Daily    insulin glargine  10 Units Subcutaneous Nightly    metoprolol succinate  25 mg Oral BID    pantoprazole  40 mg Oral QAM AC    sodium chloride flush  10 mL Intravenous 2 times per day    nicotine  1 patch Transdermal Daily    ipratropium-albuterol  1 ampule Inhalation Q4H WA    ferrous sulfate  325 mg Oral BID WC      dextrose       mouth kote, albuterol sulfate HFA, glucose, dextrose, glucagon (rDNA), dextrose, calcium carbonate, traMADol, sodium chloride flush, sodium chloride flush, acetaminophen **OR** acetaminophen, polyethylene glycol, promethazine **OR** ondansetron, nitroGLYCERIN, potassium chloride **OR** potassium alternative oral replacement **OR** potassium chloride    Lab Data:  CBC:   Recent Labs     09/29/20  0403 09/30/20  0403 10/01/20  0357   WBC 6.7 6.3 5.7   HGB 8.2* 8.5* 8.3*   HCT 28.7* 29.9* 28.0*   MCV 67.2* 68.1* 67.5*    196 181     BMP:   Recent Labs     09/29/20 0403 09/30/20 0403 10/01/20  0357   * 127* 125*   K 4.1 3.8 3.5   CL 78* 82* 82*   CO2 27 30 27   BUN 61* 55* 48*   CREATININE 1.6* 1.5* 1.4*     LIVER PROFILE:   Recent Labs     09/29/20  0403 09/30/20  0403 10/01/20  0357   AST 64* 93* 123*   ALT 24 30 39   BILITOT 11.7* 14.1* 14.8*   ALKPHOS 180* 230* 267*     PT/INR:   Recent Labs     09/29/20  0403 09/30/20  0403 10/01/20  0357 PROTIME 23.5* 18.7* 16.3*   INR 1.93 1.54 1.34     APTT:   Recent Labs     09/29/20  0403 09/30/20  0403 10/01/20  0357   APTT 40.0* 35.3 33.7     BNP:   TROPONIN:       Blayne Araujo APRASHLI-CNP 10/1/2020 10:35 AM     I have seen ,spoken to  and examined this patient personally, independently of the nurse practitioner. I have reviewed the hospital care given to date and reviewed all pertinent labs and imaging. The plan was developed mutually at the time of the visit with the patient,  NP  and myself. I have spoken with patient, nursing staff and provided written and verbal instructions . The above note has been reviewed and I agree with the assessment, diagnosis, and treatment plan with changes made by me as follows     CARDIOLOGY ATTENDING ADDENDUM    HPI:  I have reviewed the above HPI  And agree with above   Gerber Riggs is a 52 y. o.year old who and presents with had concerns including Shortness of Breath. Chief Complaint   Patient presents with    Shortness of Breath     Interval history:  No sob    Physical Exam:  General:  Awake, alert, NAD  Head:normal  Eye:normal  Neck:  No JVD   Chest:  Clear to auscultation, respiration easy  Cardiovascular:  RRR S1S2  Abdomen:   nontender  Extremities:  tr edema  Pulses; palpable  Neuro: grossly normal      MEDICAL DECISION MAKING;    I agree with the above plan, which was planned by myself and discussed with NP.   Improved cardiac status  Ambulate  Fu as OP in office for ICD discussion        Armen Moeller MD [facility]

## 2020-10-01 NOTE — CARE COORDINATION
Dr informed CM that pt had changed his mind and was wanting to go home instead of going to a Swing Bed. Pt has changed his mind again and now wants to go to a Swing Bed instead of home per Dr Nasima Groves. Dr Nasima Groves has informed CM that he plans to discharge pt today. CM notified Kaylee/Swing Bed coordinator to verify that they are able to accept pt today. She informed CM that she will have to check with the Dr.  She will notify CM if ok for pt to come today or not and if the Dr needs to talk with Dr Nasima Groves or not. CM will await her answer.   TE

## 2020-10-01 NOTE — PROGRESS NOTES
10/1/2020 8:29 AM  Patient Room #: 5369/0157-V  Patient Name: Jeffrey Anderson    (Step 1 Done by RN if possible otherwise call Pulmonary Diagnostics)  1. Place patient on room air at rest for at least 30 minutes. If patient falls below 88% before 30 minutes then you can record the level and stop. Record room air saturation level 95 %. If patient is at 88% or below, they will qualify for home oxygen and you can stop. If level does not fall below 88%, fill in level above. If indicated continue to Step 2. Signature:_Radha Larson, LALA Date:10/01/2020  (Step 2&3 Done by [facility])  2. Ambulate patient on room air until saturation falls below 89%. Record level of room air saturation with [id number] %. Next, place patient back on ___lpm oxygen and ambulate, record level __%. (Note:  this level must show improvement from room air level done with ambulation.)  If patients saturation on room air with ambulation is 88% or below AND patient shows improvement with oxygen during ambulation, they will qualify for home oxygen and you can stop. If patient does not drop below 89%, then patient should have an overnight oximetry trending on room air to see if level falls below 88%. Complete level in Step 3 below. 3. Room air overnight oximetry level 88 % for___  cumulative minutes. If patients room air oxygen level is < 89% for at least 5 cumulative minutes, patient will qualify for home oxygen and you can stop. (Attach Night Trending Report)    Complete order below: Diagnosis:COPD, CAD  Home oxygen at:  Length of Need: ? Lifetime ?  3 Months     ___lpm or __%   via  [] nasal cannula  []mask  [] other:___         []continuous []  with activity  []  Nocturnal   [] Portable Tanks []  Concentrator        Therapist Signature:Radha Larson RRT Date:10/01/2020  Physician Signature:  __Electronically Signed in EMR_    Date: ____    Physician Printed Name:Ordering User: Griselda Maxon, MD  Provider ID: [id number]  NPI:  [id number]    Patient Qualifies      [] Patient Does NOT qualify

## 2020-10-01 NOTE — DISCHARGE SUMMARY
Discharge Summary    Name:  Maco Teague /Age/Sex: 1970  (52 y.o. male)   MRN & CSN:  [id number] & [id number] Admission Date/Time: 2020 11:15 AM   Attending:  Brian Mckenna MD Discharging Physician: Urszula Armstrong MD     Hospital Course:   Maco Teague is a 52 y.o.  male  who presents with Acute respiratory distress    Patient was seen and examined  Denied any worsening SOB  No chest pain, dizziness, palpitations  No fever, chills, N/V/D    Assessment and Plan     1) Acute on chronic systolic HF  -BNP elevated  -Last Echo (2020) EF 10 -15%, Global hypokinesis.  Severe mitral regurg, moderate tricuspid regurg. Severe PHTN with an RVSP of 73 mmHg  -Continue diuresis per nephro and fluid restriction  -Cardiology on board; continue BB  -OP follow up recommended; patient encouraged to follow up     2) Possible Cardiac Cirrhosis  -Elevated bilirubin - Bilirubin 10.2, direct bilirubin 8.1  -INR has improved  -Continue supportive management  -GI on board     3)JAIME with chronic hyponatremia on CKD 3  -Likely due to CRS  -Avoid nephrotoxic  -Nephrology on board; resume bumex tomorrow  -Received conivaptan for hyponatremia  -Continue fluid restriction to 1.5 L (Patient is not adherent)     4) Elevated Troponin  -Likely due to demand ischemia  -EKG with sinus tachy and PVC  -Cardiology on board          Chronic Illnesses, medication resumed unless contraindicated           - CAD, s/p CABG x4          - opiate/heroin abuse         - hyperlipidemia         - hypertension         - osteoarthritis          - DM type 2         - COPD         -Nicotine use disorder      The patient expressed appropriate understanding of and agreement with the discharge recommendations, medications, and plan.      Consults this admission:  IP CONSULT TO GI  IP CONSULT TO HOSPITALIST  IP CONSULT TO HEART FAILURE NURSE/COORDINATOR  IP CONSULT TO DIETITIAN  IP CONSULT TO CARDIOLOGY  IP CONSULT TO NEPHROLOGY  IP CONSULT TO IV Objective Findings at Discharge:   BP 93/73   Pulse 100   Temp 97.4 °F (36.3 °C) (Axillary)   Resp 18   Ht 6' (1.829 m)   Wt 207 lb 12.8 oz (94.3 kg)   SpO2 100%   BMI 28.18 kg/m²            PHYSICAL EXAM   GEN Awake male, sitting upright in bed in no apparent distress. Appears given age. EYES Pupils are equally round. No scleral erythema, discharge, or conjunctivitis. HENT Mucous membranes are moist. Oral pharynx without exudates, no evidence of thrush. NECK Supple, no apparent thyromegaly or masses. RESP Clear to auscultation, no wheezes, rales or rhonchi. Symmetric chest movement while on room air. CARDIO/VASC S1/S2 auscultated. Regular rate without appreciable murmurs, rubs, or gallops. No JVD or carotid bruits. Peripheral pulses equal bilaterally and palpable. No peripheral edema. GI Abdomen is soft without significant tenderness, masses, or guarding. Bowel sounds are normoactive. Rectal exam deferred.  No costovertebral angle tenderness. Normal appearing external genitalia. Hansen catheter is not present. HEME/LYMPH No palpable cervical lymphadenopathy and no hepatosplenomegaly. No petechiae or ecchymoses. MSK No gross joint deformities. SKIN Normal coloration, warm, dry. NEURO Cranial nerves appear grossly intact, normal speech, no lateralizing weakness. PSYCH Awake, alert, oriented x 4. Affect appropriate.     BMP/CBC  Recent Labs     09/29/20  0403 09/30/20  0403 10/01/20  0357   * 127* 125*   K 4.1 3.8 3.5   CL 78* 82* 82*   CO2 27 30 27   BUN 61* 55* 48*   CREATININE 1.6* 1.5* 1.4*   WBC 6.7 6.3 5.7   HCT 28.7* 29.9* 28.0*    196 181       IMAGING:  As above    Discharge Time of 35 minutes    Electronically signed by Bam Ram MD on 10/1/2020 at 1:44 PM

## 2020-10-02 PROBLEM — R53.81 DEBILITY: Status: ACTIVE | Noted: 2020-10-02

## 2020-10-02 LAB
ALBUMIN SERPL-MCNC: 3 GM/DL (ref 3.4–5)
ALP BLD-CCNC: 271 IU/L (ref 40–129)
ALT SERPL-CCNC: 51 U/L (ref 10–40)
ANION GAP SERPL CALCULATED.3IONS-SCNC: 14 MMOL/L (ref 4–16)
AST SERPL-CCNC: 146 IU/L (ref 15–37)
BILIRUB SERPL-MCNC: 14.9 MG/DL (ref 0–1)
BUN BLDV-MCNC: 38 MG/DL (ref 6–23)
CALCIUM SERPL-MCNC: 8.7 MG/DL (ref 8.3–10.6)
CHLORIDE BLD-SCNC: 84 MMOL/L (ref 99–110)
CO2: 28 MMOL/L (ref 21–32)
CREAT SERPL-MCNC: 1.2 MG/DL (ref 0.9–1.3)
GFR AFRICAN AMERICAN: >60 ML/MIN/1.73M2
GFR NON-AFRICAN AMERICAN: >60 ML/MIN/1.73M2
GLUCOSE BLD-MCNC: 127 MG/DL (ref 70–99)
GLUCOSE BLD-MCNC: 129 MG/DL (ref 70–99)
GLUCOSE BLD-MCNC: 138 MG/DL (ref 70–99)
GLUCOSE BLD-MCNC: 160 MG/DL (ref 70–99)
GLUCOSE BLD-MCNC: 165 MG/DL (ref 70–99)
MAGNESIUM: 2.6 MG/DL (ref 1.8–2.4)
POTASSIUM SERPL-SCNC: 3.1 MMOL/L (ref 3.5–5.1)
SODIUM BLD-SCNC: 126 MMOL/L (ref 135–145)
TOTAL PROTEIN: 5.7 GM/DL (ref 6.4–8.2)

## 2020-10-02 PROCEDURE — 94640 AIRWAY INHALATION TREATMENT: CPT

## 2020-10-02 PROCEDURE — 2700000000 HC OXYGEN THERAPY PER DAY

## 2020-10-02 PROCEDURE — 94664 DEMO&/EVAL PT USE INHALER: CPT

## 2020-10-02 PROCEDURE — 80053 COMPREHEN METABOLIC PANEL: CPT

## 2020-10-02 PROCEDURE — 82962 GLUCOSE BLOOD TEST: CPT

## 2020-10-02 PROCEDURE — 83735 ASSAY OF MAGNESIUM: CPT

## 2020-10-02 PROCEDURE — 94761 N-INVAS EAR/PLS OXIMETRY MLT: CPT

## 2020-10-02 PROCEDURE — 6370000000 HC RX 637 (ALT 250 FOR IP): Performed by: INTERNAL MEDICINE

## 2020-10-02 PROCEDURE — 1200000002 HC SEMI PRIVATE SWING BED

## 2020-10-02 RX ADMIN — TRAZODONE HYDROCHLORIDE 50 MG: 50 TABLET ORAL at 21:08

## 2020-10-02 RX ADMIN — METOPROLOL SUCCINATE 25 MG: 25 TABLET, EXTENDED RELEASE ORAL at 21:09

## 2020-10-02 RX ADMIN — METOPROLOL SUCCINATE 25 MG: 25 TABLET, EXTENDED RELEASE ORAL at 08:52

## 2020-10-02 RX ADMIN — INSULIN GLARGINE 10 UNITS: 100 INJECTION, SOLUTION SUBCUTANEOUS at 21:05

## 2020-10-02 RX ADMIN — INSULIN LISPRO 2 UNITS: 100 INJECTION, SOLUTION INTRAVENOUS; SUBCUTANEOUS at 12:10

## 2020-10-02 RX ADMIN — CALCIUM CARBONATE 500 MG: 500 TABLET, CHEWABLE ORAL at 21:08

## 2020-10-02 RX ADMIN — ASPIRIN 81 MG: 81 TABLET, COATED ORAL at 08:52

## 2020-10-02 RX ADMIN — FERROUS SULFATE TAB 325 MG (65 MG ELEMENTAL FE) 325 MG: 325 (65 FE) TAB at 17:49

## 2020-10-02 RX ADMIN — PANTOPRAZOLE SODIUM 40 MG: 40 TABLET, DELAYED RELEASE ORAL at 06:32

## 2020-10-02 RX ADMIN — BUMETANIDE 2 MG: 1 TABLET ORAL at 08:52

## 2020-10-02 RX ADMIN — FERROUS SULFATE TAB 325 MG (65 MG ELEMENTAL FE) 325 MG: 325 (65 FE) TAB at 08:52

## 2020-10-02 RX ADMIN — IPRATROPIUM BROMIDE AND ALBUTEROL SULFATE 1 AMPULE: .5; 3 SOLUTION RESPIRATORY (INHALATION) at 14:50

## 2020-10-02 RX ADMIN — IPRATROPIUM BROMIDE AND ALBUTEROL SULFATE 1 AMPULE: .5; 3 SOLUTION RESPIRATORY (INHALATION) at 23:20

## 2020-10-02 RX ADMIN — BUMETANIDE 2 MG: 1 TABLET ORAL at 21:08

## 2020-10-02 ASSESSMENT — PAIN SCALES - GENERAL
PAINLEVEL_OUTOF10: 0

## 2020-10-02 ASSESSMENT — PAIN - FUNCTIONAL ASSESSMENT: PAIN_FUNCTIONAL_ASSESSMENT: ACTIVITIES ARE NOT PREVENTED

## 2020-10-02 ASSESSMENT — PAIN DESCRIPTION - FREQUENCY: FREQUENCY: INTERMITTENT

## 2020-10-02 NOTE — PROGRESS NOTES
Attempted to visit with patient earlier to day, pt resting and unable to get awake. Spoke with his nurse regarding his condition. Will monitor outcomes of meeting with hospice and overall goals.

## 2020-10-02 NOTE — PROGRESS NOTES
Occupational Therapy    Per nursing and case management, pt is on hold at this time. He is meeting with Hospice.

## 2020-10-02 NOTE — CARE COORDINATION
CM spoke with Cheri Lyon at [facility] regarding consult, requested information provided. [facility] will follow-up with this CM when a scheduled visit has been arranged. CM will follow. 10:39 AM  CM spoke with Dr. Karyle Clever with [facility] to notify of hospice consult. 12:53 PM  CM notified by [facility] that a representative will arrive between 3:30-4 PM today to discuss hospice care with the patient. 1:03 PM  CM notified the patient that a representative from [facility] would be here to speak with him between 3:30-4 PM today, patient voiced understanding. 1:06 PM  CM notified Alexa MARTIN of [place] [phone] visit from [facility] representative       2:20 PM  CM notified by Efrain delarosa RN that the patient's visitor was requesting to speak with case management. CM met with the patient and his visitor Trish Quiroz. [facility] explained that she is the patient's neighbor and has beeing taking care of him in his home. [facility] stated that she would like assistance completing paperwork so she can be the patient's HCPOA, patient voiced agreement. CM advised [facility] that a representative from [facility] would be visiting today between 3:30-4 PM and if he enrolls with hospice they can assist with all of those arrangements, [facility] voiced understanding and stated that she would stay in order to participate in the hospice discussion. [facility] also stated that the patient's home is not in suitable condition for his return home at this time because the patient's son Yolanda Bridges" his home and is now \"in nursing home\". [facility] stated that she wanted the hospital to know that Teresa Strickland is not able to return home until his home is made suitable for his return\".

## 2020-10-02 NOTE — PROGRESS NOTES
Physical Therapy  Per nursing and , p considering hospice. P currently on hold.   Electronically signed by Joe Barnard PT on 10/2/2020 at 3:08 PM

## 2020-10-02 NOTE — CARE COORDINATION
[facility]  Swing Bed Evaluation for Certification for Ramon S Jovanny Rivera meets skilled criteria due to the need for   [x ] Therapy; physical and occupational; for decreased strength, balance and self     care activities. [ ] Tpn   [ ] Oval Ar care  [ ] IV therapy  [ ] Wound care   Azul Mena lives [x] Alone   [ ] With Spouse  [ ] Other:   and plans on returning there at discharge. Azul Mena prefers this facially for skilled care. Azul Mena will require skilled care on a daily basis beginning 10/1/2020, if medically stable.         Estimated length of stay [x ] 1-2 weeks   [ ] 2-3 weeks  [ ] 3-4 weeks       Val Sarabia LSW  Date: 10/2/2020             Cosigned by:    Revision History

## 2020-10-02 NOTE — CARE COORDINATION
SWING BED PROGRAM PRE-ADMISSION ASSESSMENT  (TRADITIONAL MEDICARE PATIENTS)  Patient Name: Josh Goss      : 1970  (52 y.o.) Gender: male   Inpatient Admission Dater:  10/1/2020Room: 016016-01 MRN: [id number]    Home Phone #: [phone]  Emergency Contact and Phone Number:  Ayad Singh  [phone]    Inpatient Admission Date: 10/1/2020 Date & Time of Referral: 2020     Referred By: Norma Vilchis MD Referred from:  [] [place]   [x] Other:     # of Skilled Care Days Used in Last 60 days: 0 Insurance: [x]  Medicare                      []  Secondary - Type:     Present Condition/Diagnosis:    Debility [R53.81]  Debility [R53.81]      Previous Medical History:   Past Medical History:   Diagnosis Date    CAD (coronary artery disease)     [facility]     COPD [facility] obstructive [facility] disease) [facility]     Depression     Drug abuse [facility]     HIGH CHOLESTEROL     Hypertension     MI (myocardial infarction) ([facility] [address] [facility] 2011    S/P coronary artery bypass graft x 4 2011    CABG x 4 Dr Karol Hudson in cognitive status in last 90 days:  ( )no change  ( ) improved  ( ) deteriorated  Behavioral changes in last seven days:  ( ) wandering  ( ) verbally abusive  ( )phys.  Abusive                                                                         ( ) socially inappropriate  ( ) resists care  ADL Performance Last Seven (7) Days (Please Score)   Patients performance over all shifts during last seven (7) days   0 = Independent - No help or oversight  1 = Supervision - Oversight, encouragement or cueing provided  2 = Limited Assist - Receives physical help in guided maneuvering of limbs or other non-weight bearing assistance  3 = Extensive Assist - Patient performs part of the activity, weight bearing support was provided  4 = Dependence = Full staff performance of activity *NOTE: USE THE FOLLOWING SCORING FOR EATING ONLY:  1 = Supervision or Independent  2 = Limited Assist  3 = Dependent or Extensive Assist     SCORE   BED MOBILITY - How client moves to and from lying position, turns side to side, and positions body while in bed 3   TRANSFER - How resident moves between surfaces - to/from: bed, chair, wheelchair, standing position (EXCLUDE to/from bath/toilet) 3   TOILET USE - How client uses the toilet room (or commode, bedpan, urinal);transfers on/off toilet, cleanses, changes pad, manages ostomy or catheter, adjusts clothes 4   EATING (SCORE 1-3 ONLY*) - How client eats and drinks (regardless of skill). Includes intake of nourishment by other means (e.g., tube feeding, total parenteral nutrition) 3   Estimated Pre-Admission ADL Value: 13     PATIENT WILL RECEIVE THE FOLLOWING SKILLED SERVICES AS A SWING BED PATIENT:       REHABILITATION (PT/OT/SP)    [] ULTRA HIGH 720 or more minutes minimum per week of at least two (2) disciplines - 1st for at least five (5) days and 2nd for at least three (3) days   [] VERY HIGH 500 or more minutes minimum per week of at least one (1) discipline for at least five (5) days   [x] HIGH 325 or more minutes minimum per week of at least one (1) discipline for at least five (5) days   [] MEDIUM 150 or more minutes minimum per week at least five (5) days of any combination with three (3) therapies   [] LOW Restorative nursing at least six (6) days, two (2) activities, or therapies for at least three (3) days at least forty-five (45) minute per week minimum services.         EXTENSIVE SERVICES   [] Tracheostomy Care   [] Ventilator/Respirator   [] Infection Isolation   SPECIAL CARE HIGH   [] MS with ADL greater than or equal to 10  [] Quadriplegic with ADL greater than or equal to 5  [] emphysema/COPD and shortness of breath when lying flat  [] Fever w/at least one (1) of the following: [] dehydration [] pneumonia [] vomiting [] weight loss  [] feeding tube  [] Septicemia  [] Coma (not awake & completely dependent in ADL)  [] Diabetes and injections seven (7) days and DrLayton order change two (2) or more days.   [] Parenteral/IV feeding  [] respiratory therapy for seven (7) days   SPECIAL CARE LOW:   [] Respiratory Therapy  [] Ulcers (2+sites all stages) w/treatment  [] Multiple Sclerosis  [] Cerebral Palsy  [] Parkinsons Disease  [] Oxygen Therapy  [] Extensive care services w/ADL less than 6  [] Fever w/at least one (1) of the following: [] dehydration [] pneumonia [] vomiting [] weight loss  [] Foot Infection or open lesions on the foot with treatment  [] tube-feeding - calories greater than or equal to 51% or tube feeding with total calories greater than or equal to 26% and fluid parental or enteral intake of greater than or equal to 50 ml per day   CLINICALLY COMPLEX   CURRENTLY:  [] Pneumonia  [] Hemiplegics with ADL with ADL Sum greater than or equal to 10  [] IV medications delivered post admission in the SNF  [] Surgical wounds or open lesions w/treatment      EVALUATORS SIGNATURE:Kyalee Cartwright DATE: 10/2/2020       [] ACCEPTED FOR ADMISSION ON:  10/1/2020                              ELOS:  [x] 1-2wks  [] 2-3wks  [] 3-4wks   [] ADMISSION DENIED BASED ON:    [] [address] [place] [address] Micha COORDINATOR

## 2020-10-02 NOTE — H&P
History and Physical      Name:  Orion Marsh /Age/Sex: 1970  (52 y.o. male)   MRN & CSN:  [id number] & [id number] Admission Date/Time: 10/1/2020 10:41 PM   Location:  016016-01 PCP: Mónica Montanez MD       Hospital Day: 2    Assessment and Plan:     1. Acute on chronic CHFrEF: Recent echo  with an EF of 10 to 15% with global hypokinesis. Severe mitral regurg with moderate tricuspid regurg. Severe PHTN with RVSP of 73 mmHg. Continue Bumex oral diuresis and fluid restriction 1.5 L. Cardiology was on board at transferring facility. Notes read patient unable to receive guideline directed medical therapy with ACE due to JAIME as well as Aldactone. Currently soft BP noted. We will continue to monitor. 2.  Cardiac cirrhosis: Patient markedly jaundice bilirubin 14.8 on day of transfer. This a.m. 14.9 worsened. Alk phos 271 worsening.  and ALT 51 worsening total protein 5.7 stable. INR 1.34 from 2.85. Icteric sclera noted jaundice skin throughout. Protuberant abdomen with some ascites noted    3. JAIME on CKD: Stable 1.2 from 1.4. BUN 38 from 48. GFR greater than 60 estimated. Electrolyte abnormalities sodium 126 from 125. Potassium 3.1 from 3.5. Magnesium 2.6 from 2.7. Elevated. Also with anemia 8.3 stable. Platelets stable 782 but lower than presentation 248. Patient with catheter in place monitor output. 4.  CAD: Status post CABG x4 circa 2018. Patient's med rec states he is currently with metoprolol succinate 25 mg twice daily we can continue. Atorvastatin 40 mg at bedtime. However, worsening LFTs will discontinue same. Also discontinuing Tylenol as well. No chest pain per patient. 5.  Hypertension: Currently /67. Continue to monitor. 6.  Hyperlipidemia: Again, previously atorvastatin will discontinue at this time in the setting of elevated LFTs. 7.  Hyperbilirubinemia: Bilirubin 14.9. Patient markedly icterus and jaundice throughout.   Believe patient to be end-stage at this time. 8.  Insulin-dependent diabetes: POC glucose controlled. Will continue basal insulin 10 units at bedtime medium dose corrective. Continue to monitor. Hypoglycemic measures in place    9. Disposition: Patient really unable to participate in any kind of physical therapy at this time. Very somnolent able to get answer questions minimally. However, given that the patient has end-stage heart disease with global hypokinesis causing cardiac renal syndrome and cardiac cirrhosis believe he would be better off with hospice. Discussed same with patient who was amenable to same if it was covered. Hospice consulted at this time. Appreciate recs. Diet DIET CARDIAC; Carb Control: 4 carb choices (60 gms)/meal; Low Sodium (2 GM); Daily Fluid Restriction: 1500 ml  Dietary Nutrition Supplements: Low Calorie High Protein Supplement   DVT Prophylaxis [] Lovenox, []  Heparin, [] SCDs, [x] No VTE prophylaxis, patient ambulating   GI Prophylaxis [] PPI, [] H2 Blocker, [] No GI prophylaxis, patient is receiving diet/Tube Feeds   Code Status DNR-CCA   Disposition Patient requires continued admission due to swing bed   MDM [] Low, [x] Moderate,[x]  High  Patient's risk as above due to patient more end-of-life with endorgan failure as above. Somnolent unable to participate effectively in history. History of Present Illness:     Chief Complaint: Debility and weakness in the setting of end-stage heart failure  Phillip Borjas is a 52 y.o.  male  who presents to swing bed with acute on chronic CHFrEF, suspected cardiac cirrhosis, JAIME on CKD persistent hyponatremia, insulin-dependent diabetes, hyperlipidemia, COPD and CAD status post CABG x4. Patient very jaundiced somnolent closes eyes and rest when just having discussion. Denies any nausea or vomiting. Denies any headache blurred vision dizziness. Denies fever chills.   However, patient due to clinical presentation unable to provide much insight. 10-14 point ROS reviewed negative, unless as noted above    Objective: Intake/Output Summary (Last 24 hours) at 10/2/2020 1224  Last data filed at 10/2/2020 0916  Gross per 24 hour   Intake 360 ml   Output 250 ml   Net 110 ml      Vitals:   Vitals:    10/02/20 0803   BP: 111/67   Pulse: 92   Resp: 16   Temp: 96.8 °F (36 °C)   SpO2: 97%     Physical Exam:    GEN Awake to verbal stimuli male, lying bed in no apparent distress. Appears given age. EYES Pupils are equally round. Markedly scleral icterus noted without erythema, discharge, or conjunctivitis. HENT Mucous membranes are dry. NECK No apparent thyromegaly or masses. RESP crackles in the bases diminished breath sounds throughout, no wheezes, rales or rhonchi. Symmetric chest movement while on room air. CARDIO/VASC S1/S2 auscultated. Regular rate with appreciable mitral murmur, without rubs, or gallops. Peripheral pulses equal bilaterally and palpable thready. Positive peripheral edema. GI Abdomen protuberant is soft without significant tenderness, masses, or guarding. Bowel sounds are normoactive. Rectal exam deferred.  Hansen catheter is present. HEME/LYMPH No petechiae or ecchymoses. MSK No gross joint deformities. SKIN market jaundice coloration, cool extremities, dry. NEURO Cranial nerves appear grossly intact, normal speech, no lateralizing weakness. PSYCH Awake to verbal stimuli, oriented to self and place. Flat appropriate. Past Medical History:      Past Medical History:   Diagnosis Date    CAD (coronary artery disease)     [facility] [address]     COPD (chronic obstructive pulmonary disease) ([facility])     Depression     Drug abuse ([facility] [address] [facility]     HIGH CHOLESTEROL     Hypertension     MI (myocardial infarction) ([facility] [address] 2011    S/P coronary artery bypass graft x 4 2011    CABG x 4 Dr Humberto Hercules:  has a past surgical history that includes Cardiac surgery (11/2010);  Bypass Graft (04/10/2011); and

## 2020-10-02 NOTE — PROGRESS NOTES
Skin assessment done with this nurse and Rickey Mooney. Pt appears to be jaundiced. BLE are dry, flaky, and reddened. Bruising on BUE. Scattered scabbed areas on BUE and BLE. Deformity of the right hand from past crush injury. No needs expressed at this time. Call light within reach.

## 2020-10-02 NOTE — CONSULTS
[address] [place] [address] Consult Note    Date: 10/2/2020  Name: Ailyn Posey  MRN: [id number]  YOB: 1970   Patient's PCP: Vinny Hammer MD   Consultants during acute care: Cardiology, Nephrology, Gastroenterology  Referring Physician: Dr. Rogelio Simms. Barney Bobby  Admission date: 10/1/2020 to [facility] swing bed in transfer from [facility]   Acute care admission: 9/23 to 10/1/2020 (4th admission since 7/2/20 and 10th local admission in 2020)    Informant: Chart reviewed, discussed with the patient's nurse, the hospice nurse, and case management. I met with the patient at the bedside, and his friend/neighbor, Anitha Daniel remained at the patient's request.    CC: heart failure, jaundice    [place]: This is a 52 y.o. male with recurrent admissions due to decompensated heart failure with severe LV dysfunction, LVEF 10-15% per echo 7/2020, coronary artery disease with prior CABG x 4 in April 2011, abnormal liver function tests felt to be due to congestive hepatopathy and cardiac cirrhosis with total bilirubin > 14, CKD-4, chronic hyponatremia, anemia, Type 2 diabetes mellitus on Insulin, COPD, tobacco use, noncompliance with meds and follow up, and history of heroin abuse by snorting (reportedly last was 3 years ago). The patient was admitted to a swing bed at [facility] on 10/1/2020 for rehab in transfer from [facility]. The patient had been at [facility] and had diuresis by Nephrology, and received Conivaptan on 9/29/20. The patient was on a fluid restriction, but was non compliant. His liver function tests were abnormal, and he was seen by Dr Evie Gagnon. Hepatitis A,B,C were negative, and the patient was felt to have congestive hepatopathy and cardiac cirrhosis, ammonia was 18, INR was 9.72 on admission, declining to 1.34. Pro-BNP was 14,519 on 9/23 declining to 7,467.         Per the [facility] notes, the patient has not been compliant with meds and follow up with Cardiology and Nephrology. The patient reports that he will take the meds from Rx's given to him until they run out and then he is back in trouble with decompensated heart failure. He does not have a PCP, and told the  that he didn't want one. He lives alone, and a neighbor assists. He has children that are not involved. The patient tells me that he wanted out of [facility] and that is why he is at LT[facility], [facility]. He does want to return to his home, but he has to be stronger. They also report that the house was [place]" by his son, who is now in group home. The patient does volunteer that he \"wants to die in his own bed\" with his dogs around. He reports that the doctors \"lied to him\" in [facility], but as I reviewed the history and findings, he agrees that they talked to him about that. He hates the fluid restriction, but does understand about the low sodium. He mentions that a physician in [facility] talked to him about a heart transplant or LVAD, but I discussed that he would have to be more compliant and follow up. The patient is on obstructive sleep apnea. His abdomen is distended. He does not know if he has been gaining weight. He denies current pain. He continues to smoke against advice. He got up to the bathroom with a walker today and stand by assist. He is eating. Hospice philosophy was discussed regarding care and comfort at the end of life. The hospice nurse liaison arrived at the end of my visit. Questions were answered, and emotional support was provided. They are told that Hospice does not provide for the patients 24 hour care giving needs. The patient is DNR-arrest status. I agree that the patient is eligible for home (or Nursing Home) hospice but he does not meet criteria for [address] as there is no symptom to manage. His home needs to be cleaned up per there report.  He needs more help at home, and they are told that hospice will not be there 24 hours daily. After this long discussion, the patient seems agreeable to participate in therapy with the swing bed program. They will keep hospice in mind for the future, and please call if the situation changes. The hospice office will follow up remotely.      Past Medical History:   Diagnosis Date    CAD (coronary artery disease)     CHF (congestive heart failure) (HCC)     COPD (chronic obstructive pulmonary disease) (HCC)     Depression     Drug abuse ([facility]     HIGH CHOLESTEROL     Hypertension     MI (myocardial infarction) ([facility] 2011    S/P coronary artery bypass graft x 4 2011    CABG x 4 Dr Uziel Begum       Past Surgical History:   Procedure Laterality Date    BYPASS GRAFT  04/10/2011    CABG x 4 Dr Debbie Fuller  11/2010     4 stents    LEG SURGERY         Social History     Socioeconomic History    Marital status:      Spouse name: Not on file    Number of children: Not on file    Years of education: Not on file    Highest education level: Not on file   Occupational History    Not on file   Social Needs    Financial resource strain: Not on file    Food insecurity     Worry: Not on file     Inability: Not on file    Transportation needs     Medical: Not on file     Non-medical: Not on file   Tobacco Use    Smoking status: Current Every Day Smoker     Packs/day: 1.00     Years: 20.00     Pack years: 20.00     Types: Cigarettes    Smokeless tobacco: Former User    Tobacco comment: Reviewed 10/9/17   Substance and Sexual Activity    Alcohol use: No     Alcohol/week: 0.0 standard drinks    Drug use: No     Comment: march 2018 states he quit    Sexual activity: Not Currently   Lifestyle    Physical activity     Days per week: Not on file     Minutes per session: Not on file    Stress: Not on file   Relationships    Social connections     Talks on phone: Not on file     Gets together: Not on file     Attends [employer] service: Not on file     Active member of club or organization: Not on file     Attends meetings of clubs or organizations: Not on file     Relationship status: Not on file    Intimate partner violence     Fear of current or ex partner: Not on file     Emotionally abused: Not on file     Physically abused: Not on file     Forced sexual activity: Not on file   Other Topics Concern    Not on file   Social History Narrative    Not on file       Family History   Problem Relation Age of Onset    Cancer Father     Heart Failure Father     Heart Disease Father     Diabetes Mother     Heart Disease Mother        No Known Allergies    Medication list reviewed  Prior to Admission medications    Medication Sig Start Date End Date Taking?  Authorizing Provider   insulin glargine (LANTUS) 100 UNIT/ML injection vial Inject 10 Units into the skin nightly 8/12/20   Nicci Olguin MD   metoprolol succinate (TOPROL XL) 25 MG extended release tablet Take 0.5 tablets by mouth daily  Patient taking differently: Take 25 mg by mouth 2 times daily  8/13/20   Nicci Olguin MD   bumetanide (BUMEX) 2 MG tablet Take orally 3 times daily on MWF and take twice daily on TTSS  Patient taking differently: Take 2 mg by mouth 2 times daily 09/23/2020 States he took 4 mg this am 8/12/20   Nicci Olguin MD   pantoprazole (PROTONIX) 40 MG tablet Take 1 tablet by mouth every morning (before breakfast) 8/12/20   Nicci Olguin MD   rivaroxaban (XARELTO) 20 MG TABS tablet Take 20 mg by mouth    Historical Provider, MD   aspirin 81 MG EC tablet Take 1 tablet by mouth daily 5/22/20   Nicci Olguin MD   ipratropium-albuterol (DUONEB) 0.5-2.5 (3) MG/3ML SOLN nebulizer solution Inhale 3 mLs into the lungs 4 times daily 5/22/20   Nicci Olguin MD   atorvastatin (LIPITOR) 40 MG tablet Take 1 tablet by mouth daily 5/22/20   Nicci Olguin MD       ROS: As noted in [address], all other systems are reviewed with the patient, staff and family, and are negative or as follows:  Constitutional: generally weak, no fever, chills, + weight gain due to fluid, + itching  HEENT:  No acute visual changes, nasal drainage,   CV:  No chest pain, palpitations  PULM:  No cough, + shortness of breath with exertion, no hemoptysis  GI:  Occasional nausea, no vomiting, diarrhea, hematemesis, melena, hematochezia  :  No dysuria, hematuria  Musculoskeletal:  + edema  Neuro: No seizures, syncope,   Skin:  jaundiced    Weight:    Wt Readings from Last 3 Encounters:   10/01/20 203 lb 6.4 oz (92.3 kg)   10/01/20 207 lb 12.8 oz (94.3 kg)   08/12/20 195 lb 8 oz (88.7 kg)       Data reviewed 10/2/2020:  Chest X-ray  9/23/20:  Stable exam with small partially loculated right pleural effusion. No new acute cardiopulmonary findings. Cardiac catheterization 7/2/2018   1. Three vessel CAD. LAD & Ramus are occluded. Mild to moderate   disease of RCA & Circumflex noted. 2. LIMA is patent. 3. SVBG to Ramus occluded. Ramus receives right to left   Collaterals. 4. SVBG to OM is atretic, receives small collateral from RCA. 5. SVBG to Diagonal is small but patent. 6. Regional [employer] Global WMA seen with severely reduced LV Systolic   function. LVEF is about 25 to 30 %. 7. Aortic Root angiography Helped in understanding the graft lay   out. Transthoracic Echocardiogram 7/21/2020   Left ventricular function is severely abnormal , EF is estimated at 10-15%. Global hypokinesis noted. Moderate left ventricular hypertrophy. Left ventricular size is mildly increased . Bi atrial enlargement. Mildly enlarged right ventricle cavity. Severe mitral regurgitation   Moderate tricuspid regurgitation , moderate pulmonic , and trace aortic   regurgitation. Severe pulmonary hypertension with an RVSP of 73 mmHg. No evidence of pericardial effusion. CT abdomen and pelvis 9/23/20:  Diffuse heterogeneity of the enlarged liver may be related to hepatic congestion.   Moderate amount of abdominal ascites. Diffuse atherosclerotic calcification of the abdominal aorta and branching vessels with high-grade stenosis involving the right common iliac artery. Stable fluid within the right minor fissure. Cardiomegaly. Right basilar airspace opacities may represent atelectasis or pneumonia.      Pro-BNP was 14,519 on 9/23 declining 7,467   Ammonia: 18  TSH 9/23/20: 6.59    Hemoglobin A1C 8.0% on 7/20/20  Accuchecks:   Recent Labs     10/01/20  1605 10/02/20  0745 10/02/20  1141   POCGLU 122* 127* 160*     PT/INR:   Recent Labs     09/30/20  0403 10/01/20  0357   PROTIME 18.7* 16.3*   INR 1.54 1.34     CBC:   Recent Labs     09/30/20  0403 10/01/20  0357   WBC 6.3 5.7   HGB 8.5* 8.3*   HCT 29.9* 28.0*   MCV 68.1* 67.5*    181     BMP:   Recent Labs     09/30/20  0403 10/01/20  0357 10/02/20  0900   * 125* 126*   K 3.8 3.5 3.1*   CL 82* 82* 84*   CO2 30 27 28   BUN 55* 48* 38*   CREATININE 1.5* 1.4* 1.2   GLUCOSE 92 119* 129*     LIVER PROFILE:   Recent Labs     09/30/20  0403 10/01/20  0357 10/02/20  0900   AST 93* 123* 146*   ALT 30 39 51*   BILITOT 14.1* 14.8* 14.9*   ALKPHOS 230* 267* 271*       Physical Exam:   /67   Pulse 92   Temp 96.8 °F (36 °C)   Resp 16   Ht 5' 11\" (1.803 m)   Wt 203 lb 6.4 oz (92.3 kg)   SpO2 97%   BMI 28.37 kg/m²   General: Comfortable, jaundiced, alert, not much insight, in no distress, chronically ill appearing  HEENT: Mucous membranes are dry, sclerae are icteric, mild conjunctival pallor   Neck: carotid upstrokes are diminished without bruit, no thyromegaly   Chest: healed sternotomy scar  Heart: distant tones, mildly tachycardic RRR, S1S2, II/VI apical SHANE  Lungs:  Equal breath sounds bilaterally, diminished at the bases, with bibasilar rales, scattered rhonchi   Abdomen: soft, bowel sounds present, no guarding, + distension with fluid wave  : deferred  Extremities:  No mottling, ++ bilateral lower extremity edema, right hand deformity from old crush injury  Neurologic: alert, generally weak, without focal cranial nerve or motor weakness, gait was not assesed    Assessment/Plan:  1. Cardiomyopathy with acute and chronic severe systolic heart failure with LVEF 10-15% with congestive hepatopathy and cardiac cirrhosis. The patient is home or Nursing Home hospice eligible, see discussion above. 2. Coronary artery disease with prior CABG x 4 April 2011  3. Congestive hepatopathy and cardiac cirrhosis with hyperbilirubinemia and abnormal liver function tests secondary to the above  4. Severe pulmonary hypertension with RVSP 73 mmHg and severe mitral regurgitation, moderate tricuspid regurgitation  5. JAIME and CKD-4 with component of cardiorenal syndrome  6. Type 2 diabetes mellitus on insulin  7. Chronic hyponatremia secondary to volume overload  8. COPD, tobacco use  9. Chronic anemia/anemia of chronic disease  10. Noncompliance with medical follow up  11. Generalized weakness secondary to the above  12. Past history of opioid abuse      Patient Active Problem List   Diagnosis Code    Essential hypertension I10    Osteoarthritis M19.90    COPD (chronic obstructive pulmonary disease) ([facility] [address] J44.9    Paresthesia of left leg R20.2    Abdominal hernia K46.9    Left shoulder pain M25.512    Crushing injury of right hand S67. 21XA    Rotator cuff tendinitis M75.80    Midline low back pain without sciatica M54.5    History of MI (myocardial infarction) I25.2    Coronary artery disease involving coronary bypass graft of native heart without angina pectoris I25.810    Mixed hyperlipidemia E78.2    Depression F32.9    Chronic midline low back pain without sciatica M54.5, G89.29    Tobacco abuse Z72.0    Gastroesophageal reflux disease without esophagitis K21.9    Opiate abuse, continuous (HCC) F11.10    Heroin dependence (HCC) F11.20    ST elevation myocardial infarction involving left circumflex coronary artery (HCC) I21.21    STEMI (ST

## 2020-10-03 LAB
GLUCOSE BLD-MCNC: 103 MG/DL (ref 70–99)
GLUCOSE BLD-MCNC: 122 MG/DL (ref 70–99)
GLUCOSE BLD-MCNC: 132 MG/DL (ref 70–99)
GLUCOSE BLD-MCNC: 152 MG/DL (ref 70–99)

## 2020-10-03 PROCEDURE — 6370000000 HC RX 637 (ALT 250 FOR IP): Performed by: INTERNAL MEDICINE

## 2020-10-03 PROCEDURE — 94761 N-INVAS EAR/PLS OXIMETRY MLT: CPT

## 2020-10-03 PROCEDURE — 97162 PT EVAL MOD COMPLEX 30 MIN: CPT

## 2020-10-03 PROCEDURE — 97530 THERAPEUTIC ACTIVITIES: CPT

## 2020-10-03 PROCEDURE — 94640 AIRWAY INHALATION TREATMENT: CPT

## 2020-10-03 PROCEDURE — 82962 GLUCOSE BLOOD TEST: CPT

## 2020-10-03 PROCEDURE — 2700000000 HC OXYGEN THERAPY PER DAY

## 2020-10-03 PROCEDURE — 1200000002 HC SEMI PRIVATE SWING BED

## 2020-10-03 RX ADMIN — IPRATROPIUM BROMIDE AND ALBUTEROL SULFATE 1 AMPULE: .5; 3 SOLUTION RESPIRATORY (INHALATION) at 15:13

## 2020-10-03 RX ADMIN — BUMETANIDE 2 MG: 1 TABLET ORAL at 21:47

## 2020-10-03 RX ADMIN — FERROUS SULFATE TAB 325 MG (65 MG ELEMENTAL FE) 325 MG: 325 (65 FE) TAB at 17:47

## 2020-10-03 RX ADMIN — ASPIRIN 81 MG: 81 TABLET, COATED ORAL at 08:47

## 2020-10-03 RX ADMIN — INSULIN LISPRO 2 UNITS: 100 INJECTION, SOLUTION INTRAVENOUS; SUBCUTANEOUS at 16:47

## 2020-10-03 RX ADMIN — BUMETANIDE 2 MG: 1 TABLET ORAL at 08:46

## 2020-10-03 RX ADMIN — CALCIUM CARBONATE 500 MG: 500 TABLET, CHEWABLE ORAL at 16:47

## 2020-10-03 RX ADMIN — FERROUS SULFATE TAB 325 MG (65 MG ELEMENTAL FE) 325 MG: 325 (65 FE) TAB at 08:47

## 2020-10-03 RX ADMIN — IPRATROPIUM BROMIDE AND ALBUTEROL SULFATE 1 AMPULE: .5; 3 SOLUTION RESPIRATORY (INHALATION) at 06:46

## 2020-10-03 RX ADMIN — IPRATROPIUM BROMIDE AND ALBUTEROL SULFATE 1 AMPULE: .5; 3 SOLUTION RESPIRATORY (INHALATION) at 11:18

## 2020-10-03 RX ADMIN — IPRATROPIUM BROMIDE AND ALBUTEROL SULFATE 1 AMPULE: .5; 3 SOLUTION RESPIRATORY (INHALATION) at 19:07

## 2020-10-03 RX ADMIN — INSULIN GLARGINE 10 UNITS: 100 INJECTION, SOLUTION SUBCUTANEOUS at 21:47

## 2020-10-03 RX ADMIN — TRAZODONE HYDROCHLORIDE 50 MG: 50 TABLET ORAL at 21:46

## 2020-10-03 RX ADMIN — METOPROLOL SUCCINATE 25 MG: 25 TABLET, EXTENDED RELEASE ORAL at 21:47

## 2020-10-03 RX ADMIN — PANTOPRAZOLE SODIUM 40 MG: 40 TABLET, DELAYED RELEASE ORAL at 05:48

## 2020-10-03 ASSESSMENT — PAIN SCALES - GENERAL
PAINLEVEL_OUTOF10: 0
PAINLEVEL_OUTOF10: 0

## 2020-10-03 NOTE — PROGRESS NOTES
Pt continually asking for more fluids to drink. Stating that his mouth is dry. Pt has already had 3 cups of water as of 0423. This nurse explained that he still had the rest of the day to go, and only a 1500mL fluid restriction. The pt became very agitated. He is not willing to comply with the fluid restriction. This nurse offered the Mouth Kote solution as an alternative and he decline that as well.

## 2020-10-03 NOTE — PROGRESS NOTES
Physical Therapy    Facility/Department: [facility] UNIT  Initial Assessment    NAME: Shannon Perry  : 1970  MRN: [id number]    Date of Service: 10/3/2020    Discharge Recommendations:  Continue to assess pending progress, Subacute/Skilled Nursing Facility, 24 hour supervision or assist, Home with assist PRN   PT Equipment Recommendations  Equipment Needed: Yes  Mobility Devices: Tessie Mele; Wheelchair;ADL AssistiveDevices  Walker: Rolling  ADL Assistive Devices: Transfer Tub Bench    Assessment   Body structures, Functions, Activity limitations: Decreased functional mobility ; Decreased endurance;Decreased coordination;Decreased ADL status; Decreased ROM; Decreased balance;Decreased strength;Decreased posture;Decreased safe awareness;Decreased high-level IADLs  Assessment: Pt is a 53 yo male who would benefit from skilled PT to address decreased ROM, strength, balance, endurancem and functional mobility. Prognosis: Good  Decision Making: Medium Complexity  History: see below  Clinical Presentation: evolving  PT Education: General Safety;Goals;PT Role;Plan of Care; Functional Mobility Training;Transfer Training; Injury Prevention; Energy Conservation; Adaptive Device Training  REQUIRES PT FOLLOW UP: Yes  Activity Tolerance  Activity Tolerance: Patient limited by fatigue;Treatment limited secondary to medical complications (free text); Patient limited by endurance  Activity Tolerance: Pt went to the commode with no O2 and was so tired he could not get up. Nurse resumed O2 when he was back in the chair. Patient Diagnosis(es): There were no encounter diagnoses.      has a past medical history of CAD (coronary artery disease), CHF (congestive heart failure) ([facility] [address] COPD (chronic obstructive pulmonary disease) ([facility] [address] Depression, Drug abuse ([facility] [address] HIGH CHOLESTEROL, Hypertension, MI (myocardial infarction) ([facility] [address] and S/P coronary artery bypass graft x 4.   has a past surgical history that includes Cardiac surgery (11/2010); Bypass Graft (04/10/2011); and Leg Surgery. Restrictions     Vision/Hearing  Vision: Impaired  Vision Exceptions: Wears glasses for reading  Hearing: Within functional limits     Subjective  General  Chart Reviewed: Yes  Patient assessed for rehabilitation services?: Yes  Family / Caregiver Present: No  General Comment  Comments: pt presented in the restroom with nurse present. Subjective  Subjective: Pt reports he is very tired and it seems like all he has wanted to do is sleep. He reports he is frustrated with his care in [place]. Pain Screening  Patient Currently in Pain: Denies  Pain Assessment  Pain Assessment: 0-10  Pain Level: 0  Vital Signs  Patient Currently in Pain: Denies       Orientation  Orientation  Overall Orientation Status: Impaired(pt unable to state current date)  Social/Functional History  Social/Functional History  Lives With: Alone  Type of Home: House  Home Layout: One level  Home Access: Stairs to enter without rails  Entrance Stairs - Number of Steps: 2 steps to porch and 1 to house  Bathroom Shower/Tub: Tub/Shower unit  [employer]: Standard(pt reports he pushes up off a sink and tub)  Bathroom Equipment: Hand-held shower  Home Equipment: (pt reports he uses a computer chair to roll around his house. He reports he has no AD. 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   IADL Comments: pt reports he was I.  Cognition        Objective Observation/Palpation  Posture: Poor  Observation: pt presented on commode  Body Mechanics: pt demonstrated flexed posture and FHP with lack of cervical extension. AROM RLE (degrees)  RLE AROM: Exceptions  RLE General AROM: decreased hip flexion  AROM LLE (degrees)  LLE AROM : Exceptions  LLE General AROM: decreased hip flexion  Strength RLE  Strength RLE: Exception  R Hip Flexion: 2/5  R Knee Flexion: 3/5  R Knee Extension: 3/5  Strength LLE  Strength LLE: Exception  L Hip Flexion: 2/5  L Knee Flexion: 3/5  L Knee Extension: 3/5     Sensation  Overall Sensation Status: WNL(per pt report)     Transfers  Sit to Stand: Maximum Assistance;2 Person Assistance(pt unable to stand from commode. He required 3 attempts)  Stand to sit: Contact guard assistance;Minimal Assistance(pt plopped back into chair.)  Bed to Chair: Contact guard assistance  Ambulation  Ambulation?: Yes  Ambulation 1  Surface: level tile  Device: Standard Walker  Assistance: Contact guard assistance  Quality of Gait: Pt demonstrated decreased step length and speed. Pt demonstrated lack of balance. Pt demonstrated forward flexed head with lack of cervical extension and trunk flexion.   Distance: 4'  Stairs/Curb  Stairs?: No     Balance  Posture: Fair  Sitting - Static: Fair  Sitting - Dynamic: Fair  Standing - Static: Fair  Standing - Dynamic: Poor        Plan   Plan  Times per week: Mon-Sat 4+/week  Current Treatment Recommendations: Strengthening, Transfer Training, Endurance Training, Patient/Caregiver Education & Training, ROM, Balance Training, Gait Training, Home Exercise Program, Functional Mobility Training, Stair training, Safety Education & Training, IADL Training, Neuromuscular Re-education, Wheelchair Mobility Training, Equipment Evaluation, Education, & procurement, ADL/Self-care Training, Positioning(ther ex, ther act, bed mobility)  Safety Devices  Type of devices: Left in chair, Call light within reach, Chair alarm in place, Nurse notified    AM-PAC Score    Basic Mobility Six Clicks Form [facility] AM-PAC Score Conversion Table   How much difficulty does the patient currently have Unable   (pt is unable to do activity) A Lot   (activity is a struggle, requires great effort/time) A Little   (pt can manage, but takes more effort/time than should) None   (pt has no difficulty) Raw Score Standardized Score CMS -100% Score CMS Modifier        6 23.55 100% CN   Turning over in bed (including adjusting bedclothes, sheets, and blankets)? []1 []2  [x]3  []4  7 26.42 92.36% CM        8 28.58 86.62% CM   Sitting down on and standing up from a chair with arms (e.g. wheelchair, bedside commode, etc.)? []1 [x]2 []3   []4   9 30.55 81.38% CM        10 32.29 76.75% CL   Moving from lying on back to sitting on the side of the bed? []1 [x]2  []3   []4   11 33.86 72.57% CL        12 35.33 68.66% CL   How much help from another person does the patient currently need Total   (Total/Dependent Assist) A Lot   (Max/Mod Assist) A Little   (Min/CGA/Supervision) None   (No human assistance) 13 36.74 64.91% CL        14 38.1 61.29% CL   Moving to and from a bed to a chair (including a wheelchair)? []1  []2   [x]3  []4   15 39.45 57.70% CK        16 40.78 54.16% CK   To walk in a hospital room? []1 []2   [x]3    []4  17 42.13 50.57% CK        18 43.63 46.58% CK   Climbing 3-5 steps with a railing? []1  [x]2   []3    []4  19 45.44 41.77% CK        20 47.67 35.83% CJ   Raw Score 15  21 50.25 28.97% CJ   Standardized Score 39.45  22 53.28 20.91% CJ   CMS 0-100% Score  57.70% 23 56.93 11.20% CI   CMS Modifier CK 24 61.14 0.00% CH     CH = 0% impaired  CI = 1-20% impaired  CJ = 20-40% impaired  CK = 40-60% impaired  CL = 60-80% impaired  CM = % impaired  CN = 100% impaired    Goals  Short term goals  Time Frame for Short term goals: 1 week or until discharge:  Short term goal 1: Pt will demonstrate Mod I with bed mobility with HOB flat and no HR.   Short term goal 2: Pt will demonstrate the ability to safely transfer sit to stand and stand to sit from 3 different height surfaces with proper hand placement and supervision with  no more than 2 attempts required. Short term goal 3: Pt will demonstrate the ability to safely ambulate 25 feet with a RW and supervision. Short term goal 4: Pt will demonstrate the ability to safely ambulate up and down 2 steps with CGA. Short term goal 5: Pt will demonstrate the ability to safely stand x5 consecutive minutes while performing dynamic balance activities.   Patient Goals   Patient goals : to get stronger and walk better so he can return home ASAP       Therapy Time   Individual Concurrent Group Co-treatment   Time In 1318         Time Out 1354         Minutes 36         Timed Code Treatment Minutes: 23[address] DPT [id number]

## 2020-10-04 LAB
GLUCOSE BLD-MCNC: 135 MG/DL (ref 70–99)
GLUCOSE BLD-MCNC: 140 MG/DL (ref 70–99)
GLUCOSE BLD-MCNC: 144 MG/DL (ref 70–99)
GLUCOSE BLD-MCNC: 185 MG/DL (ref 70–99)

## 2020-10-04 PROCEDURE — 94640 AIRWAY INHALATION TREATMENT: CPT

## 2020-10-04 PROCEDURE — 82962 GLUCOSE BLOOD TEST: CPT

## 2020-10-04 PROCEDURE — 1200000002 HC SEMI PRIVATE SWING BED

## 2020-10-04 PROCEDURE — 6370000000 HC RX 637 (ALT 250 FOR IP): Performed by: INTERNAL MEDICINE

## 2020-10-04 PROCEDURE — 2700000000 HC OXYGEN THERAPY PER DAY

## 2020-10-04 PROCEDURE — 94660 CPAP INITIATION&MGMT: CPT

## 2020-10-04 RX ADMIN — BUMETANIDE 2 MG: 1 TABLET ORAL at 21:11

## 2020-10-04 RX ADMIN — CALCIUM CARBONATE 500 MG: 500 TABLET, CHEWABLE ORAL at 09:55

## 2020-10-04 RX ADMIN — IPRATROPIUM BROMIDE AND ALBUTEROL SULFATE 1 AMPULE: .5; 3 SOLUTION RESPIRATORY (INHALATION) at 22:44

## 2020-10-04 RX ADMIN — IPRATROPIUM BROMIDE AND ALBUTEROL SULFATE 1 AMPULE: .5; 3 SOLUTION RESPIRATORY (INHALATION) at 16:48

## 2020-10-04 RX ADMIN — FERROUS SULFATE TAB 325 MG (65 MG ELEMENTAL FE) 325 MG: 325 (65 FE) TAB at 09:19

## 2020-10-04 RX ADMIN — INSULIN LISPRO 2 UNITS: 100 INJECTION, SOLUTION INTRAVENOUS; SUBCUTANEOUS at 16:58

## 2020-10-04 RX ADMIN — TRAZODONE HYDROCHLORIDE 50 MG: 50 TABLET ORAL at 21:12

## 2020-10-04 RX ADMIN — BUMETANIDE 2 MG: 1 TABLET ORAL at 09:18

## 2020-10-04 RX ADMIN — METOPROLOL SUCCINATE 25 MG: 25 TABLET, EXTENDED RELEASE ORAL at 21:11

## 2020-10-04 RX ADMIN — IPRATROPIUM BROMIDE AND ALBUTEROL SULFATE 1 AMPULE: .5; 3 SOLUTION RESPIRATORY (INHALATION) at 19:28

## 2020-10-04 RX ADMIN — PROMETHAZINE HYDROCHLORIDE 12.5 MG: 12.5 TABLET ORAL at 09:55

## 2020-10-04 RX ADMIN — IPRATROPIUM BROMIDE AND ALBUTEROL SULFATE 1 AMPULE: .5; 3 SOLUTION RESPIRATORY (INHALATION) at 08:57

## 2020-10-04 RX ADMIN — TRAMADOL HYDROCHLORIDE 50 MG: 50 TABLET, FILM COATED ORAL at 21:12

## 2020-10-04 RX ADMIN — ASPIRIN 81 MG: 81 TABLET, COATED ORAL at 09:19

## 2020-10-04 RX ADMIN — INSULIN GLARGINE 10 UNITS: 100 INJECTION, SOLUTION SUBCUTANEOUS at 21:12

## 2020-10-04 RX ADMIN — FERROUS SULFATE TAB 325 MG (65 MG ELEMENTAL FE) 325 MG: 325 (65 FE) TAB at 18:42

## 2020-10-04 RX ADMIN — TRAMADOL HYDROCHLORIDE 50 MG: 50 TABLET, FILM COATED ORAL at 09:56

## 2020-10-04 RX ADMIN — IPRATROPIUM BROMIDE AND ALBUTEROL SULFATE 1 AMPULE: .5; 3 SOLUTION RESPIRATORY (INHALATION) at 00:58

## 2020-10-04 RX ADMIN — IPRATROPIUM BROMIDE AND ALBUTEROL SULFATE 1 AMPULE: .5; 3 SOLUTION RESPIRATORY (INHALATION) at 05:08

## 2020-10-04 RX ADMIN — PANTOPRAZOLE SODIUM 40 MG: 40 TABLET, DELAYED RELEASE ORAL at 06:34

## 2020-10-04 RX ADMIN — INSULIN LISPRO 2 UNITS: 100 INJECTION, SOLUTION INTRAVENOUS; SUBCUTANEOUS at 12:36

## 2020-10-04 ASSESSMENT — PAIN DESCRIPTION - PAIN TYPE
TYPE: CHRONIC PAIN
TYPE: CHRONIC PAIN

## 2020-10-04 ASSESSMENT — PAIN SCALES - GENERAL
PAINLEVEL_OUTOF10: 7
PAINLEVEL_OUTOF10: 7
PAINLEVEL_OUTOF10: 3

## 2020-10-04 ASSESSMENT — PAIN DESCRIPTION - LOCATION
LOCATION: GENERALIZED
LOCATION: GENERALIZED

## 2020-10-04 NOTE — PLAN OF CARE
Problem: Falls - Risk of:  Goal: Will remain free from falls  10/4/2020 1158 by Dianelys Wang RN  Outcome: Ongoing  10/3/2020 2232 by Shelby Harrington RN  Outcome: Ongoing  Goal: Absence of physical injury  10/4/2020 1158 by Dianelys Wang RN  Outcome: Ongoing  10/3/2020 2232 by Shelby Harrington RN  Outcome: Ongoing

## 2020-10-04 NOTE — PROGRESS NOTES
Pt refusing Bipap tonight. He states it hurts his face and makes his mouth very dry. I have tried to accommodate both of these concerns with no success.

## 2020-10-05 LAB
EKG ATRIAL RATE: 107 BPM
EKG DIAGNOSIS: NORMAL
EKG P AXIS: 71 DEGREES
EKG P-R INTERVAL: 160 MS
EKG Q-T INTERVAL: 360 MS
EKG QRS DURATION: 126 MS
EKG QTC CALCULATION (BAZETT): 480 MS
EKG R AXIS: 69 DEGREES
EKG T AXIS: 113 DEGREES
EKG VENTRICULAR RATE: 107 BPM
GLUCOSE BLD-MCNC: 136 MG/DL (ref 70–99)
GLUCOSE BLD-MCNC: 157 MG/DL (ref 70–99)
GLUCOSE BLD-MCNC: 95 MG/DL (ref 70–99)
GLUCOSE BLD-MCNC: 96 MG/DL (ref 70–99)

## 2020-10-05 PROCEDURE — 6370000000 HC RX 637 (ALT 250 FOR IP): Performed by: INTERNAL MEDICINE

## 2020-10-05 PROCEDURE — 97166 OT EVAL MOD COMPLEX 45 MIN: CPT

## 2020-10-05 PROCEDURE — 94640 AIRWAY INHALATION TREATMENT: CPT

## 2020-10-05 PROCEDURE — 82962 GLUCOSE BLOOD TEST: CPT

## 2020-10-05 PROCEDURE — 6370000000 HC RX 637 (ALT 250 FOR IP): Performed by: NURSE PRACTITIONER

## 2020-10-05 PROCEDURE — 94761 N-INVAS EAR/PLS OXIMETRY MLT: CPT

## 2020-10-05 PROCEDURE — 1200000002 HC SEMI PRIVATE SWING BED

## 2020-10-05 PROCEDURE — 2700000000 HC OXYGEN THERAPY PER DAY

## 2020-10-05 PROCEDURE — 97110 THERAPEUTIC EXERCISES: CPT

## 2020-10-05 RX ORDER — FUROSEMIDE 10 MG/ML
40 INJECTION INTRAMUSCULAR; INTRAVENOUS ONCE
Status: DISCONTINUED | OUTPATIENT
Start: 2020-10-05 | End: 2020-10-05

## 2020-10-05 RX ORDER — FUROSEMIDE 40 MG/1
40 TABLET ORAL ONCE
Status: COMPLETED | OUTPATIENT
Start: 2020-10-05 | End: 2020-10-05

## 2020-10-05 RX ORDER — FUROSEMIDE 40 MG/1
40 TABLET ORAL DAILY
Status: DISCONTINUED | OUTPATIENT
Start: 2020-10-06 | End: 2020-10-05

## 2020-10-05 RX ADMIN — METOPROLOL SUCCINATE 25 MG: 25 TABLET, EXTENDED RELEASE ORAL at 20:37

## 2020-10-05 RX ADMIN — TRAMADOL HYDROCHLORIDE 50 MG: 50 TABLET, FILM COATED ORAL at 20:37

## 2020-10-05 RX ADMIN — BUMETANIDE 2 MG: 1 TABLET ORAL at 09:51

## 2020-10-05 RX ADMIN — TRAZODONE HYDROCHLORIDE 50 MG: 50 TABLET ORAL at 20:37

## 2020-10-05 RX ADMIN — FERROUS SULFATE TAB 325 MG (65 MG ELEMENTAL FE) 325 MG: 325 (65 FE) TAB at 17:00

## 2020-10-05 RX ADMIN — PANTOPRAZOLE SODIUM 40 MG: 40 TABLET, DELAYED RELEASE ORAL at 05:53

## 2020-10-05 RX ADMIN — IPRATROPIUM BROMIDE AND ALBUTEROL SULFATE 1 AMPULE: .5; 3 SOLUTION RESPIRATORY (INHALATION) at 07:18

## 2020-10-05 RX ADMIN — IPRATROPIUM BROMIDE AND ALBUTEROL SULFATE 1 AMPULE: .5; 3 SOLUTION RESPIRATORY (INHALATION) at 18:02

## 2020-10-05 RX ADMIN — FUROSEMIDE 40 MG: 40 TABLET ORAL at 21:43

## 2020-10-05 RX ADMIN — IPRATROPIUM BROMIDE AND ALBUTEROL SULFATE 1 AMPULE: .5; 3 SOLUTION RESPIRATORY (INHALATION) at 10:33

## 2020-10-05 RX ADMIN — FERROUS SULFATE TAB 325 MG (65 MG ELEMENTAL FE) 325 MG: 325 (65 FE) TAB at 09:52

## 2020-10-05 RX ADMIN — INSULIN GLARGINE 10 UNITS: 100 INJECTION, SOLUTION SUBCUTANEOUS at 20:38

## 2020-10-05 RX ADMIN — IPRATROPIUM BROMIDE AND ALBUTEROL SULFATE 1 AMPULE: .5; 3 SOLUTION RESPIRATORY (INHALATION) at 02:38

## 2020-10-05 RX ADMIN — BUMETANIDE 2 MG: 1 TABLET ORAL at 20:37

## 2020-10-05 RX ADMIN — METOPROLOL SUCCINATE 25 MG: 25 TABLET, EXTENDED RELEASE ORAL at 09:52

## 2020-10-05 RX ADMIN — IPRATROPIUM BROMIDE AND ALBUTEROL SULFATE 1 AMPULE: .5; 3 SOLUTION RESPIRATORY (INHALATION) at 22:46

## 2020-10-05 RX ADMIN — PROMETHAZINE HYDROCHLORIDE 12.5 MG: 12.5 TABLET ORAL at 17:47

## 2020-10-05 RX ADMIN — ASPIRIN 81 MG: 81 TABLET, COATED ORAL at 09:52

## 2020-10-05 RX ADMIN — IPRATROPIUM BROMIDE AND ALBUTEROL SULFATE 1 AMPULE: .5; 3 SOLUTION RESPIRATORY (INHALATION) at 14:11

## 2020-10-05 ASSESSMENT — PAIN SCALES - GENERAL
PAINLEVEL_OUTOF10: 0
PAINLEVEL_OUTOF10: 2
PAINLEVEL_OUTOF10: 0
PAINLEVEL_OUTOF10: 0
PAINLEVEL_OUTOF10: 3
PAINLEVEL_OUTOF10: 0
PAINLEVEL_OUTOF10: 0
PAINLEVEL_OUTOF10: 7
PAINLEVEL_OUTOF10: 0

## 2020-10-05 ASSESSMENT — PAIN DESCRIPTION - LOCATION: LOCATION: BUTTOCKS

## 2020-10-05 ASSESSMENT — PAIN DESCRIPTION - PAIN TYPE: TYPE: ACUTE PAIN

## 2020-10-05 NOTE — PROGRESS NOTES
Occupational Therapy   Occupational Therapy Initial Assessment  Date: 10/5/2020   Patient Name: Jacquelyn Rosa  MRN: [id number]     : 1970    Date of Service: 10/5/2020    Discharge Recommendations:  [address], Home with Home health OT, Home with nursing aide, Continue to assess pending progress       Assessment   Performance deficits / Impairments: Decreased functional mobility ; Decreased ADL status; Decreased strength;Decreased safe awareness;Decreased cognition;Decreased balance;Decreased endurance  Assessment: 53 yo male admitted to the hospital with CHF, CAD who presents with decreased I adls, transfers and very limited endurance. OT to see pt to maximize I with adls,transfers and toeducate pt about energy conservation techniques  Prognosis: Fair  Decision Making: Medium Complexity  History: see above  Exam: see above  OT Education: OT Role;Plan of Care;Transfer Training  REQUIRES OT FOLLOW UP: Yes  Activity Tolerance  Activity Tolerance: limited by SOB  Safety Devices  Safety Devices in place: Yes  Type of devices: Left in bed;Nurse notified;Gait belt;Call light within reach; Bed alarm in place           Patient Diagnosis(es): There were no encounter diagnoses. has a past medical history of CAD (coronary artery disease), CHF (congestive heart failure) ([facility] COPD (chronic obstructive pulmonary disease) ([facility] Depression, Drug abuse ([facility] HIGH CHOLESTEROL, Hypertension, MI (myocardial infarction) ([facility] and S/P coronary artery bypass graft x 4.   has a past surgical history that includes Cardiac surgery (2010); Bypass Graft (04/10/2011); and Leg Surgery.            Restrictions  Restrictions/Precautions  Restrictions/Precautions: Fall Risk    Subjective   General  Chart Reviewed: Yes  Patient assessed for rehabilitation services?: Yes  Family / Caregiver Present: No     Social/Functional History  Social/Functional History  Lives With: Alone  Type of Home: House  Home Layout: +  Times per day: Daily  Current Treatment Recommendations: Strengthening, Endurance Training, Patient/Caregiver Education & Training, Equipment Evaluation, Education, & procurement, Self-Care / ADL, Balance Training, Pain Management, Home Management Training, Functional Mobility Training, Safety Education & Training, Positioning    G-Code     OutComes Score                                                  AM-PAC Score       AM-PAC 6 click short form for inpatient daily activity:   How much help from another person does the patient currently need. .. Unable  Dep A Lot  Max A A Lot   Mod A A Little  Min A A Little   CGA  SBA None   Mod I  Indep  Sup   1. Putting on and taking off regular lower body clothing? [x] 1    [] 2   [] 2   [] 3   [] 3   [] 4      2. Bathing (including washing, rinsing, drying)? [] 1   [] 2   [x] 2 [] 3 [] 3 [] 4   3. Toileting, which includes using toilet, bedpan, or urinal? [] 1    [] 2   [x] 2   [] 3   [] 3   [] 4     4. Putting on and taking off regular upper body clothing? [] 1   [] 2   [] 2   [x] 3   [] 3    [] 4      5. Taking care of personal grooming such as brushing teeth? [] 1   [] 2    [] 2 [] 3    [x] 3   [] 4      6. Eating meals? [] 1   [] 2   [] 2   [] 3   [] 3   [x] 4      Raw Score:  15/24 40-59% impaired    [24=0% impaired(CH), 23=1-19%(CI), 20-22=20-39%(CJ), 15-19=40-59%(CK), 10-14=60-79%(CL), 7-9=80-99%(CM), 6=100%(CN)]         Goals  Short term goals  Time Frame for Short term goals: until discharge  Short term goal 1: Pt will be MI for functional transfers to chair, toilet, bed w/o LOB or falls using good energy conservation techniques  Short term goal 2: Pt will be I/MI for UB/LB adls, pacing himself and using necessary a.e. Short term goal 3: Pt will be I/MI for toileting and grooming  Patient Goals   Patient goals : Pt wants to be able to get up from bed independently and walk;        Therapy Time   Individual Concurrent Group Co-treatment   Time In 1006 Time Out [address] [facility] [place]

## 2020-10-05 NOTE — PROGRESS NOTES
Patient agitated stating he is tired of all the fluid on his stomach. Stated something needs to be done with this tonight. Stated if you cannot drain it tonight just send me home so I can take a knife and cut my belly to drain it. Dr Bianka Terry notified of patients agitation. Dr. Bianka Terry here to speak with patient. Patient remains agitated after Dr. Bianka Terry left the room. Patient up to side of bed to eat supper.

## 2020-10-05 NOTE — PLAN OF CARE
Problem: Falls - Risk of:  Goal: Will remain free from falls  Description: Will remain free from falls  10/4/2020 2325 by Reji Jeronimo RN  Outcome: Ongoing  10/4/2020 1158 by Sami Sadler RN  Outcome: Ongoing  Goal: Absence of physical injury  Description: Absence of physical injury  10/4/2020 2325 by Reji Jeronimo RN  Outcome: Ongoing  10/4/2020 1158 by Sami Sadler RN  Outcome: Ongoing     Problem: Pain:  Goal: Pain level will decrease  Description: Pain level will decrease  Outcome: Ongoing  Goal: Control of acute pain  Description: Control of acute pain  Outcome: Ongoing  Goal: Control of chronic pain  Description: Control of chronic pain  Outcome: Ongoing     Problem: Discharge Planning:  Goal: Discharged to appropriate level of care  Description: Discharged to appropriate level of care  Outcome: Ongoing     Problem:  Activity:  Goal: Ability to tolerate increased activity will improve  Description: Ability to tolerate increased activity will improve  Outcome: Ongoing

## 2020-10-05 NOTE — PROGRESS NOTES
Physical Therapy    Physical Therapy Treatment Note  Name: Kalli Ortega MRN: [id number] :   1970   Date:  10/5/2020   Admission Date: 10/1/2020 Room:  [address] [place] [address]   Restrictions/Precautions:  Restrictions/Precautions  Restrictions/Precautions: Fall Risk       Communication with other providers:  Spoke to nursing regarding p's statements and request for water  Subjective:  Patient states:  \"I can't do anything\"  Pain:   Location, Type, Intensity (0/10 to 10/10):  denies  Objective:    Observation:  P supine in bed on 3L oxygen via nasal cannula. P reports significant fatigue, agreeable to supine bed exercises, cannot get up. P reports he is \"depressed\", \"sick of being handicapped\", \"all I need is a gun, bullet and a little privacy\", \"it doesn't matter, I am gong to die anyway\". Nursing made aware of statements. Treatment, including education/measures: There. Ex: Supine p able to perform 5 reps at a time with rest break to follow: heel slides, SAQ over pillow, 15 reps ankle pumps  Scooting up in bed with max A x 2. P able to assist with pushing through LEs  Educated on exercises to perform in bed in order to build strength  Assessment / Impression:    P with significantly impaired activity tolerance and significant weakness.  Recommend continued skilled PT to address deficits and maximize functional potential.   Patient's tolerance of treatment:  poor   Adverse Reaction: fatigue  Significant change in status and impact:  No change  Barriers to improvement:  Medical condition  Plan for Next Session:    Work on strength, mobility  Time in: 1553  Time out:  1613  Timed treatment minutes:  20  Total treatment time:  20    Previously filed items:  Social/Functional History  Lives With: Alone  Type of Home: House  Home Layout: One level  Home Access: Stairs to enter without rails  Entrance Stairs - Number of Steps: 2 steps to porch and 1 to house  Bathroom Shower/Tub: Tub/Shower unit  Bathroom Toilet: Standard  Bathroom Equipment: Hand-held shower  Bathroom Accessibility: Accessible  Home Equipment: (uses a computer chair)  ADL Assistance: Independent  Homemaking Assistance: Independent  Homemaking Responsibilities: Yes  Meal Prep Responsibility: Secondary(ate out a lot)  Laundry Responsibility: Primary  Cleaning Responsibility: Primary  Shopping Responsibility: Primary  Ambulation Assistance: Independent  Transfer Assistance: Independent  Active : Yes  Mode of Transportation: Car  Type of occupation: 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 IADL Comments: pt reports he was I. Short term goals  Time Frame for Short term goals: 1 week or until discharge:  Short term goal 1: Pt will demonstrate Mod I with bed mobility with HOB flat and no HR. Short term goal 2: Pt will demonstrate the ability to safely transfer sit to stand and stand to sit from 3 different height surfaces with proper hand placement and supervision with  no more than 2 attempts required. Short term goal 3: Pt will demonstrate the ability to safely ambulate 25 feet with a RW and supervision. Short term goal 4: Pt will demonstrate the ability to safely ambulate up and down 2 steps with CGA. Short term goal 5: Pt will demonstrate the ability to safely stand x5 consecutive minutes while performing dynamic balance activities.        Electronically signed by:    Forrest Pacheco PT  10/5/2020, 4:41 PM

## 2020-10-05 NOTE — PROGRESS NOTES
Comprehensive Nutrition Assessment    Type and Reason for Visit:  Initial(Swing Bed)    Nutrition Recommendations/Plan: Discontinue oral supplement    Nutrition Assessment:  Pt refusing oral supplements at this time and requested they be removed as he will just throw them away. Intakes are variable 0-100%. Explained the selective menu process and even he could call down to the kitchen for meal orders. Staff attempt to visit daily, but on multiple attempts he as been resting. Estimated Daily Nutrient Needs:  Energy (kcal):  3494-5979; Weight Used for Energy Requirements:  Ideal     Protein (g):  78-94; Weight Used for Protein Requirements:  Ideal(1-1.2)        Fluid (ml/day):  1392-5993; Weight Used for Fluid Requirements:  Ideal(1ml/kcal)      Nutrition Related Findings:  Yellow skin pigmentation, chronic severe heart failure, cirrhosis, and COPD. Hospice met with pt and he is hospice appropriate but wishes to wait until he is at home to review. Pt with distented abdomen on visit. Wounds:  None       Current Nutrition Therapies:    DIET CARDIAC; Carb Control: 4 carb choices (60 gms)/meal; Low Sodium (2 GM); Daily Fluid Restriction: 1500 ml  Dietary Nutrition Supplements: Low Calorie High Protein Supplement    Anthropometric Measures:  · Height: 5' 11\" (180.3 cm)  · Current Body Weight: 203 lb (92.1 kg)   · Admission Body Weight: 203 lb (92.1 kg)    · Usual Body Weight: 207 lb (93.9 kg)(prior encounters)     · Ideal Body Weight: 172 lbs; % Ideal Body Weight 118 %   · BMI: 28.3  · Adjusted Body Weight:  ; No Adjustment   · BMI Categories: Overweight (BMI 25.0-29. 9)       Nutrition Diagnosis:   · Inadequate energy intake related to endocrine dysfuntion, impaired respiratory function, psychological cause or life stress as evidenced by intake 26-50%      Nutrition Interventions:   Food and/or Nutrient Delivery:  Continue Current Diet, Discontinue Oral Nutrition Supplement  Nutrition Education/Counseling:  Education initiated   Coordination of Nutrition Care:  Continued Inpatient Monitoring    Goals:  Oral intakes will improve to at least 75% of most meals during his stay       Nutrition Monitoring and Evaluation:   Behavioral-Environmental Outcomes: Other (Comment)   Food/Nutrient Intake Outcomes:  Food and Nutrient Intake  Physical Signs/Symptoms Outcomes:  Biochemical Data, Weight, Meal Time Behavior     Discharge Planning:     Too soon to determine     Electronically signed by Gilmer San RD, LD on 10/5/20 at 4:06 PM EDT    Contact: [phone]

## 2020-10-06 LAB
GLUCOSE BLD-MCNC: 103 MG/DL (ref 70–99)
GLUCOSE BLD-MCNC: 140 MG/DL (ref 70–99)
GLUCOSE BLD-MCNC: 163 MG/DL (ref 70–99)

## 2020-10-06 PROCEDURE — 94640 AIRWAY INHALATION TREATMENT: CPT

## 2020-10-06 PROCEDURE — 1200000002 HC SEMI PRIVATE SWING BED

## 2020-10-06 PROCEDURE — 2700000000 HC OXYGEN THERAPY PER DAY

## 2020-10-06 PROCEDURE — 94761 N-INVAS EAR/PLS OXIMETRY MLT: CPT

## 2020-10-06 PROCEDURE — 82962 GLUCOSE BLOOD TEST: CPT

## 2020-10-06 PROCEDURE — 6370000000 HC RX 637 (ALT 250 FOR IP): Performed by: INTERNAL MEDICINE

## 2020-10-06 RX ORDER — SODIUM CHLORIDE 0.9 % (FLUSH) 0.9 %
10 SYRINGE (ML) INJECTION EVERY 12 HOURS SCHEDULED
Status: CANCELLED | OUTPATIENT
Start: 2020-10-06

## 2020-10-06 RX ORDER — ONDANSETRON 4 MG/1
4 TABLET, ORALLY DISINTEGRATING ORAL EVERY 6 HOURS PRN
Status: DISCONTINUED | OUTPATIENT
Start: 2020-10-06 | End: 2020-10-07 | Stop reason: HOSPADM

## 2020-10-06 RX ORDER — SODIUM CHLORIDE 0.9 % (FLUSH) 0.9 %
10 SYRINGE (ML) INJECTION PRN
Status: CANCELLED | OUTPATIENT
Start: 2020-10-06

## 2020-10-06 RX ADMIN — INSULIN LISPRO 2 UNITS: 100 INJECTION, SOLUTION INTRAVENOUS; SUBCUTANEOUS at 17:00

## 2020-10-06 RX ADMIN — IPRATROPIUM BROMIDE AND ALBUTEROL SULFATE 1 AMPULE: .5; 3 SOLUTION RESPIRATORY (INHALATION) at 22:19

## 2020-10-06 RX ADMIN — BUMETANIDE 2 MG: 1 TABLET ORAL at 21:47

## 2020-10-06 RX ADMIN — IPRATROPIUM BROMIDE AND ALBUTEROL SULFATE 1 AMPULE: .5; 3 SOLUTION RESPIRATORY (INHALATION) at 14:15

## 2020-10-06 RX ADMIN — IPRATROPIUM BROMIDE AND ALBUTEROL SULFATE 1 AMPULE: .5; 3 SOLUTION RESPIRATORY (INHALATION) at 07:29

## 2020-10-06 RX ADMIN — IPRATROPIUM BROMIDE AND ALBUTEROL SULFATE 1 AMPULE: .5; 3 SOLUTION RESPIRATORY (INHALATION) at 10:23

## 2020-10-06 RX ADMIN — IPRATROPIUM BROMIDE AND ALBUTEROL SULFATE 1 AMPULE: .5; 3 SOLUTION RESPIRATORY (INHALATION) at 17:51

## 2020-10-06 RX ADMIN — PROMETHAZINE HYDROCHLORIDE 12.5 MG: 12.5 TABLET ORAL at 18:01

## 2020-10-06 RX ADMIN — INSULIN GLARGINE 10 UNITS: 100 INJECTION, SOLUTION SUBCUTANEOUS at 21:46

## 2020-10-06 RX ADMIN — TRAZODONE HYDROCHLORIDE 50 MG: 50 TABLET ORAL at 21:47

## 2020-10-06 RX ADMIN — FERROUS SULFATE TAB 325 MG (65 MG ELEMENTAL FE) 325 MG: 325 (65 FE) TAB at 11:11

## 2020-10-06 RX ADMIN — BUMETANIDE 2 MG: 1 TABLET ORAL at 11:13

## 2020-10-06 RX ADMIN — IPRATROPIUM BROMIDE AND ALBUTEROL SULFATE 1 AMPULE: .5; 3 SOLUTION RESPIRATORY (INHALATION) at 03:59

## 2020-10-06 RX ADMIN — PANTOPRAZOLE SODIUM 40 MG: 40 TABLET, DELAYED RELEASE ORAL at 06:19

## 2020-10-06 RX ADMIN — FERROUS SULFATE TAB 325 MG (65 MG ELEMENTAL FE) 325 MG: 325 (65 FE) TAB at 17:01

## 2020-10-06 RX ADMIN — ASPIRIN 81 MG: 81 TABLET, COATED ORAL at 11:11

## 2020-10-06 ASSESSMENT — PAIN SCALES - GENERAL
PAINLEVEL_OUTOF10: 0

## 2020-10-06 NOTE — PROGRESS NOTES
Phone call to [facility], informed Deangelo Chaudhry has not arrived. Spoke to Marty Musa with [facility], states the nurse had a family emergency and they need to reschedule, Stephanie Hopper RN will see the patient and family tomorrow at 5 am, confirmed with patient and family and they agree to new appointment time.

## 2020-10-06 NOTE — CARE COORDINATION
CM stopped by the patient's friend/neighbor Tisha Quarles in the hallway who stated she has the patient's home cleaned and would like to get the patient enrolled with [facility] and get him home today. John Kee stated that she will be at the patient's bedside as long as needed in order to be present when the hospice representative arrives. CM spoke with the intake department at [facility] who took the referral and will contact their scheduling department in order to schedule a visit and have a hospice nurse dispatched to the hospital to enroll the patient. CM will be notified by [facility] once the hospice nurse's arrival time has been scheduled. CM will follow. 10:11 AM  CM received a return call from Socorro Alvarado with [facility] stating that Tremaine Henderson with [facility] will arrive at 4 PM to meet with the patient to enroll in hospice. 10:21 AM  CM notified the patient and his friend John Kee that Tremaine Henderson with [facility] will arrive at 4 PM to speak with them and enroll the patient in hospice.

## 2020-10-07 VITALS
RESPIRATION RATE: 16 BRPM | OXYGEN SATURATION: 100 % | SYSTOLIC BLOOD PRESSURE: 104 MMHG | HEART RATE: 97 BPM | DIASTOLIC BLOOD PRESSURE: 73 MMHG | WEIGHT: 203.4 LBS | HEIGHT: 71 IN | TEMPERATURE: 97.8 F | BODY MASS INDEX: 28.48 KG/M2

## 2020-10-07 LAB
ALBUMIN SERPL-MCNC: 3.2 GM/DL (ref 3.4–5)
ALP BLD-CCNC: 280 IU/L (ref 40–129)
ALT SERPL-CCNC: 41 U/L (ref 10–40)
ANION GAP SERPL CALCULATED.3IONS-SCNC: 23 MMOL/L (ref 4–16)
AST SERPL-CCNC: 70 IU/L (ref 15–37)
BASOPHILS ABSOLUTE: 0.1 K/CU MM
BASOPHILS RELATIVE PERCENT: 0.7 % (ref 0–1)
BILIRUB SERPL-MCNC: 15 MG/DL (ref 0–1)
BUN BLDV-MCNC: 42 MG/DL (ref 6–23)
CALCIUM SERPL-MCNC: 8.8 MG/DL (ref 8.3–10.6)
CHLORIDE BLD-SCNC: 82 MMOL/L (ref 99–110)
CO2: 21 MMOL/L (ref 21–32)
CREAT SERPL-MCNC: 1.4 MG/DL (ref 0.9–1.3)
DIFFERENTIAL TYPE: ABNORMAL
EOSINOPHILS ABSOLUTE: 0 K/CU MM
EOSINOPHILS RELATIVE PERCENT: 0.3 % (ref 0–3)
GFR AFRICAN AMERICAN: >60 ML/MIN/1.73M2
GFR NON-AFRICAN AMERICAN: 54 ML/MIN/1.73M2
GLUCOSE BLD-MCNC: 125 MG/DL (ref 70–99)
GLUCOSE BLD-MCNC: 130 MG/DL (ref 70–99)
GLUCOSE BLD-MCNC: 140 MG/DL (ref 70–99)
HCT VFR BLD CALC: 30.9 % (ref 42–52)
HEMOGLOBIN: 8.9 GM/DL (ref 13.5–18)
IMMATURE NEUTROPHIL %: 1.9 % (ref 0–0.43)
LYMPHOCYTES ABSOLUTE: 1.2 K/CU MM
LYMPHOCYTES RELATIVE PERCENT: 16.2 % (ref 24–44)
MAGNESIUM: 2.3 MG/DL (ref 1.8–2.4)
MCH RBC QN AUTO: 20.1 PG (ref 27–31)
MCHC RBC AUTO-ENTMCNC: 28.8 % (ref 32–36)
MCV RBC AUTO: 69.9 FL (ref 78–100)
MONOCYTES ABSOLUTE: 0.6 K/CU MM
MONOCYTES RELATIVE PERCENT: 8.5 % (ref 0–4)
PDW BLD-RTO: 26.3 % (ref 11.7–14.9)
PLATELET # BLD: 209 K/CU MM (ref 140–440)
PMV BLD AUTO: 10 FL (ref 7.5–11.1)
POTASSIUM SERPL-SCNC: 3.1 MMOL/L (ref 3.5–5.1)
RBC # BLD: 4.42 M/CU MM (ref 4.6–6.2)
SEGMENTED NEUTROPHILS ABSOLUTE COUNT: 5.5 K/CU MM
SEGMENTED NEUTROPHILS RELATIVE PERCENT: 72.4 % (ref 36–66)
SODIUM BLD-SCNC: 126 MMOL/L (ref 135–145)
TOTAL IMMATURE NEUTOROPHIL: 0.14 K/CU MM
TOTAL PROTEIN: 6 GM/DL (ref 6.4–8.2)
WBC # BLD: 7.6 K/CU MM (ref 4–10.5)

## 2020-10-07 PROCEDURE — 2700000000 HC OXYGEN THERAPY PER DAY

## 2020-10-07 PROCEDURE — 6370000000 HC RX 637 (ALT 250 FOR IP): Performed by: INTERNAL MEDICINE

## 2020-10-07 PROCEDURE — 80053 COMPREHEN METABOLIC PANEL: CPT

## 2020-10-07 PROCEDURE — 36415 COLL VENOUS BLD VENIPUNCTURE: CPT

## 2020-10-07 PROCEDURE — 83735 ASSAY OF MAGNESIUM: CPT

## 2020-10-07 PROCEDURE — 85025 COMPLETE CBC W/AUTO DIFF WBC: CPT

## 2020-10-07 PROCEDURE — 82962 GLUCOSE BLOOD TEST: CPT

## 2020-10-07 PROCEDURE — 94640 AIRWAY INHALATION TREATMENT: CPT

## 2020-10-07 PROCEDURE — 94761 N-INVAS EAR/PLS OXIMETRY MLT: CPT

## 2020-10-07 RX ORDER — BUMETANIDE 2 MG/1
2 TABLET ORAL 2 TIMES DAILY
Qty: 60 TABLET | Refills: 0 | Status: SHIPPED | OUTPATIENT
Start: 2020-10-07

## 2020-10-07 RX ORDER — LORAZEPAM 0.5 MG/1
0.5 TABLET ORAL EVERY 4 HOURS PRN
Qty: 12 TABLET | Refills: 0 | Status: SHIPPED | OUTPATIENT
Start: 2020-10-07 | End: 2020-11-06

## 2020-10-07 RX ORDER — PANTOPRAZOLE SODIUM 40 MG/1
40 TABLET, DELAYED RELEASE ORAL
Qty: 90 TABLET | Refills: 1 | Status: SHIPPED | OUTPATIENT
Start: 2020-10-07

## 2020-10-07 RX ORDER — IPRATROPIUM BROMIDE AND ALBUTEROL SULFATE 2.5; .5 MG/3ML; MG/3ML
1 SOLUTION RESPIRATORY (INHALATION) 4 TIMES DAILY
Qty: 360 ML | Refills: 1 | Status: SHIPPED | OUTPATIENT
Start: 2020-10-07

## 2020-10-07 RX ORDER — GLIMEPIRIDE 1 MG/1
1 TABLET ORAL
Qty: 90 TABLET | Refills: 1 | Status: SHIPPED | OUTPATIENT
Start: 2020-10-07

## 2020-10-07 RX ADMIN — IPRATROPIUM BROMIDE AND ALBUTEROL SULFATE 1 AMPULE: .5; 3 SOLUTION RESPIRATORY (INHALATION) at 13:29

## 2020-10-07 RX ADMIN — FERROUS SULFATE TAB 325 MG (65 MG ELEMENTAL FE) 325 MG: 325 (65 FE) TAB at 12:55

## 2020-10-07 RX ADMIN — IPRATROPIUM BROMIDE AND ALBUTEROL SULFATE 1 AMPULE: .5; 3 SOLUTION RESPIRATORY (INHALATION) at 05:37

## 2020-10-07 RX ADMIN — ASPIRIN 81 MG: 81 TABLET, COATED ORAL at 09:07

## 2020-10-07 RX ADMIN — PANTOPRAZOLE SODIUM 40 MG: 40 TABLET, DELAYED RELEASE ORAL at 05:33

## 2020-10-07 RX ADMIN — IPRATROPIUM BROMIDE AND ALBUTEROL SULFATE 1 AMPULE: .5; 3 SOLUTION RESPIRATORY (INHALATION) at 01:28

## 2020-10-07 RX ADMIN — IPRATROPIUM BROMIDE AND ALBUTEROL SULFATE 1 AMPULE: .5; 3 SOLUTION RESPIRATORY (INHALATION) at 09:12

## 2020-10-07 RX ADMIN — IPRATROPIUM BROMIDE AND ALBUTEROL SULFATE 1 AMPULE: .5; 3 SOLUTION RESPIRATORY (INHALATION) at 17:31

## 2020-10-07 RX ADMIN — BUMETANIDE 2 MG: 1 TABLET ORAL at 09:07

## 2020-10-07 ASSESSMENT — PAIN SCALES - GENERAL
PAINLEVEL_OUTOF10: 0

## 2020-10-07 NOTE — DISCHARGE SUMMARY
Arpan Cargo 1970 [id number]  PCP:  Francois Swain MD    Admit date: 10/1/2020  Admitting Physician: Abhishek Rasmussen MD    Discharge date: 10/7/2020 Discharge Physician: Manny Hollis MD      Reason for admission: No chief complaint on file. Present on Admission:  [address]       Discharge Diagnoses Include:  1) Acute on chronic systolic HF       2) Possible Cardiac Cirrhosis    3)JAIME with chronic hyponatremia on CKD 3       4) Elevated Troponin    5) weakness and debility    Hospital Course[de-identified]   Patient presented for rehab and is made hospice and will dc to hospice. Hospice did not discharge patient nor did they leave an EMR note. He will need to follow up with them for any further needs. Pt was personally examined by me on the day of discharge with the following findings:no new issues  Vitals:    10/07/20 1331   BP:    Pulse:    Resp:    Temp:    SpO2: 100%     Gen: ao nad  Heent: ncat  Lungs: wheezing  Car: nl s1s2  Abd: distended soft with fluid wave  Ext: rom wnl  Skin: warm dry jaundiced  Neuro: cn 2-12 grossly intact       Patient Instructions:   Suzanne Moore   Home Medication Instructions MVD:[id number]    Printed on:10/07/20 1720   Medication Information                      bumetanide (BUMEX) 2 MG tablet  Take 1 tablet by mouth 2 times daily 09/23/2020 States he took 4 mg this am             glimepiride (AMARYL) 1 MG tablet  Take 1 tablet by mouth every morning (before breakfast)             insulin glargine (LANTUS) 100 UNIT/ML injection vial  Inject 10 Units into the skin nightly             ipratropium-albuterol (DUONEB) 0.5-2.5 (3) MG/3ML SOLN nebulizer solution  Inhale 3 mLs into the lungs 4 times daily             LORazepam (ATIVAN) 0.5 MG tablet  Take 1 tablet by mouth every 4 hours as needed for Anxiety (sob) for up to 30 days.              pantoprazole (PROTONIX) 40 MG tablet  Take 1 tablet by mouth every morning (before breakfast)                  Code Status: DNR-CCA     Consults:   IP CONSULT TO HOSPICE    Diet: cardiac diet    Activity: activity as tolerated   Work:    Discharged Condition: poor    Prognosis: Poor - Guarded    Disposition: home  [facility] hospice    Follow-up with   1. PCP if needed but will contact hospice first         Discharge Physician Signed: Jasrpeet Mathias M.D. The patient was seen and examined on day of discharge and this discharge summary is in conjunction with any daily progress note from day of discharge.   Time spent on discharge in the examination, evaluation, counseling and review of medications and discharge plan: 22 minutes

## 2020-10-07 NOTE — CARE COORDINATION
SCOTT spoke with Esa Smart with [facility] regarding two appointments (10/6 at 4 PM and 10/7 at 9 AM) that have now been missed by [facility] representatives. Elena Hahn stated she will look into the situation to see if she can assist in any way and then follow-up with this . CM will follow. 9:58 AM  CM received a call from [facility] with [facility] stating that a hospice representative will be here within 1 1/2 hours (approximatley 11:30 AM) and apologized for the two missed appointments.

## 2020-10-07 NOTE — PROGRESS NOTES
Pt has REFUSED to wear the Bipap all night. I have offered to help pt put on BIPAP several times and he has refused.

## 2020-10-19 ENCOUNTER — HOSPITAL ENCOUNTER (OUTPATIENT)
Dept: INTERVENTIONAL RADIOLOGY/VASCULAR | Age: 50
Discharge: HOME | End: 2020-10-19
Payer: MEDICARE

## 2020-10-19 VITALS
DIASTOLIC BLOOD PRESSURE: 62 MMHG | HEIGHT: 71 IN | TEMPERATURE: 97 F | HEART RATE: 100 BPM | BODY MASS INDEX: 28.42 KG/M2 | OXYGEN SATURATION: 100 % | WEIGHT: 203 LBS | SYSTOLIC BLOOD PRESSURE: 99 MMHG | RESPIRATION RATE: 18 BRPM

## 2020-10-19 PROCEDURE — C1729 CATH, DRAINAGE: HCPCS

## 2020-10-19 PROCEDURE — 7100000010 HC PHASE II RECOVERY - FIRST 15 MIN

## 2020-10-19 PROCEDURE — 2709999900 HC NON-CHARGEABLE SUPPLY

## 2020-10-19 PROCEDURE — 49083 ABD PARACENTESIS W/IMAGING: CPT

## 2020-10-19 PROCEDURE — 7100000011 HC PHASE II RECOVERY - ADDTL 15 MIN

## 2020-10-19 ASSESSMENT — PAIN SCALES - GENERAL
PAINLEVEL_OUTOF10: 5
PAINLEVEL_OUTOF10: 5
PAINLEVEL_OUTOF10: 0

## 2020-10-19 ASSESSMENT — PAIN DESCRIPTION - DESCRIPTORS
DESCRIPTORS: BURNING

## 2020-10-19 ASSESSMENT — PAIN DESCRIPTION - LOCATION
LOCATION: BUTTOCKS
LOCATION: BUTTOCKS

## 2020-10-19 ASSESSMENT — PAIN DESCRIPTION - PAIN TYPE
TYPE: CHRONIC PAIN
TYPE: CHRONIC PAIN

## 2020-10-19 ASSESSMENT — PAIN - FUNCTIONAL ASSESSMENT: PAIN_FUNCTIONAL_ASSESSMENT: 0-10

## 2020-10-19 NOTE — PROGRESS NOTES
Pt arrived to IR for a paracentesis. Pt A&O, verbalizes understanding of procedure. Informed consent placed in soft chart. Pt prepped and draped to right abdomen. Dr Uziel Lan at bedside. US images taken. 1400 Time Out  1401 Procedure started  Paracentesis: 2100 ml dark yellow ascitic fluid removed.  Pt tolerated procedure well  1417 Procedure complete  Report given to RN SDS  Pt transported back to [facility] rm #19

## 2020-10-19 NOTE — PROGRESS NOTES
1425 more warm blankets provided, repositioned for comfort. 56 assisted patient with tray. 1450 denies needs  1505 pt moaning, repositioned for comfort. Call light in reach  [phone] denies needs  [phone] repositioned patient to a sitting position. More warm blankets provided. Call light in reach.   [phone] denies needs, waiting on ride  1628 pt discharged via quality care transport

## 2020-10-19 NOTE — PROGRESS NOTES
Pt back from IR per cart. Report received from [facility]. Pt is awake and alert--denies any pain or nausea. Only c/o being very cold. Pt covered with warm blankets. Meal tray here for patient to eat. Has gauze and tegaderm drsg to Rt abd puncture site that dry and intact. VS rechecked and stable.

## 2021-01-26 NOTE — ED NOTES
Pt presents to the ED for feeling \"full of fluid\" due to his CHF and difficulty breathing with activity.  Pt also states he has had increased swelling in legs bilaterally     Danita Lopez RN  06/06/20 4982 Suturegard Body: The suture ends were repeatedly re-tightened and re-clamped to achieve the desired tissue expansion.

## 2023-01-18 NOTE — PROGRESS NOTES
Progress Note( Dr. Pina Robb)  8/15/2020  Subjective:   Admit Date: 8/9/2020  PCP: No primary care provider on file. Admitted For : Shortness of breath and congestive heart failure    Consulted For: Better control of blood glucose and also reviewed thyroid function test    Interval History: Ms. John Thomas wanted to go home    Denies any chest pains,   Mild and improved SOB . Denies nausea or vomiting. No new bowel or bladder symptoms. No intake or output data in the 24 hours ending 08/15/20 2135    DATA    CBC: No results for input(s): WBC, HGB, PLT in the last 72 hours. CMP:No results for input(s): NA, K, CL, CO2, BUN, CREATININE, CALCIUM, PROT, LABALBU, BILITOT, ALKPHOS, AST, ALT in the last 72 hours. Invalid input(s): GLU  Lipids:   Lab Results   Component Value Date    CHOL 107 08/10/2020    CHOL 218 12/01/2014    HDL 17 08/10/2020    TRIG 86 08/10/2020     Glucose:No results for input(s): POCGLU in the last 72 hours. [id number]:  Lab Results   Component Value Date    LABA1C 8.0 07/26/2020     High Sensitivity TSH:   Lab Results   Component Value Date    TSHHS 5.030 08/09/2020     Free T3:   Lab Results   Component Value Date    FT3 2.9 07/21/2016     Free T4:  Lab Results   Component Value Date    T4FREE 1.09 07/21/2016       Us Head Neck Soft Tissue Thyroid    Result Date: 8/11/2020  EXAMINATION: THYROID ULTRASOUND 8/11/2020 7:36 am COMPARISON: Chest CT 07/03/2020 HISTORY: ORDERING SYSTEM PROVIDED HISTORY: Thyroid dysfunction TECHNOLOGIST PROVIDED HISTORY: Reason for exam:->Thyroid dysfunction Reason for Exam: abnormal labs Acuity: Unknown Type of Exam: Initial FINDINGS: Right thyroid lobe:  4.3 cm x 1.3 cm x 1.6 cm Left thyroid lobe:  4.3 cm x 1.6 cm x 1.4 cm Isthmus:  0.5 cm THYROID GLAND:  Normal size, echogenicity, and vascularity. NODULES:  Not visualized. CERVICAL LYMPHADENOPATHY:  None visualized. Normal sonographic appearance of the thyroid.      Xr Chest Portable    Result Date: 8/9/2020  EXAMINATION: ONE XRAY VIEW OF THE CHEST 8/9/2020 7:48 pm COMPARISON: July 26, 2020, July 18, 2020. HISTORY: ORDERING SYSTEM PROVIDED HISTORY: shortness of breath TECHNOLOGIST PROVIDED HISTORY: Reason for exam:->shortness of and congestive heart failure FINDINGS: The left lung is clear. There is a masslike density within the right lung base as on prior studies. The heart is enlarged. The patient is status post median sternotomy. Persistent masslike density within the right lung base, probably reflecting pseudomass due to loculated pleural effusion. Vl Dup Lower Extremity Venous Left    Result Date: 8/9/2020  EXAMINATION: DUPLEX VENOUS ULTRASOUND OF THE LEFT LOWER EXTREMITY 8/9/2020 10:09 pm TECHNIQUE: Duplex ultrasound and Doppler images were obtained of the left lower extremity. COMPARISON: None. HISTORY: ORDERING SYSTEM PROVIDED HISTORY: swelling/chf TECHNOLOGIST PROVIDED HISTORY: Reason for exam:->swelling/chf Reason for Exam: Severe pitting edema left leg and pain Acuity: Acute Type of Exam: Initial Additional signs and symptoms: Multiple previous / hx DVT FINDINGS: The visualized veins of the left lower extremity are patent and free of echogenic thrombus. The veins are normally compressible and have normal phasic flow. No evidence of DVT in the left lower extremity. The patient refused evaluation of the right leg.        Scheduled Medicines   Medications:    Infusions:       Objective:   Vitals: /83   Pulse 107   Temp 97.3 °F (36.3 °C) (Oral)   Resp 20   Ht 5' 11\" (1.803 m)   Wt 195 lb 8 oz (88.7 kg)   SpO2 98%   BMI 27.27 kg/m²   General appearance: alert and cooperative with exam  Neck: no JVD or bruit  Thyroid : Normal lobes   Lungs: Has Vesicular Breath sounds   Heart:  regular rate and rhythm  Abdomen: soft, non-tender; bowel sounds normal; no masses,  no organomegaly  Musculoskeletal: Normal  Extremities: extremities normal, , no edema  Neurologic:  Awake, alert, oriented to name, place and time. Cranial nerves II-XII are grossly intact. Motor is  intact. Sensory is intact. ,  and gait is normal.    Assessment:     Patient Active Problem List:     Essential hypertension     Osteoarthritis     COPD (chronic obstructive pulmonary disease) ([facility])     Paresthesia of left leg     Abdominal hernia     Left shoulder pain     Crushing injury of right hand     Rotator cuff tendinitis     Midline low back pain without sciatica     History of MI (myocardial infarction)     Coronary artery disease involving coronary bypass graft of native heart without angina pectoris     Mixed hyperlipidemia     Depression     Chronic midline low back pain without sciatica     Tobacco abuse     Gastroesophageal reflux disease without esophagitis     Opiate abuse, continuous ([facility])     Heroin dependence ([facility] [address]     ST elevation myocardial infarction involving left circumflex coronary artery ([facility])     STEMI (ST elevation myocardial infarction) ([facility])     Acute on chronic combined systolic (congestive) and diastolic (congestive) heart failure ([facility])     Systolic and diastolic CHF w/reduced LV function, NYHA class 4 ([facility])     NSTEMI (non-ST elevated myocardial infarction) ([address]     Acute on chronic congestive heart failure ([address]     Noncompliance     Pulmonary edema, acute ([address]     Acute kidney injury ([address]     Acute respiratory failure with hypoxia and hypercapnia ([facility])     Hypertensive emergency     Ischemic cardiomyopathy     Heart failure ([address]     Left ventricular systolic dysfunction     Acute systolic congestive heart failure ([facility])     SOB (shortness of breath)     Acute on chronic combined systolic and diastolic heart failure ([facility])     Hyponatremia     Hematemesis     Acute on chronic systolic CHF (congestive heart failure) ([facility])     Dyspnea      Plan:     1. Reviewed POC blood glucose . Labs and X ray results   2. Reviewed Current Medicines   3.  On meal/ Correction bolus Humalog/ Basal Lantus Insulin regime /  4. Monitor Blood glucose frequently   5. Modified  the dose of Insulin/ other medicines as needed   6. Will follow     .      Paulina Ware MD 67

## 2023-12-05 NOTE — PROGRESS NOTES
Admit Date:  5/13/2020    Admission diagnosis / Complaint : Shortness of breath      Subjective:  Mr. Rashad Felix feels better    Objective:   BP 88/71   Pulse 101   Temp 98 °F (36.7 °C) (Oral)   Resp 18   Ht 5' 11\" (1.803 m)   Wt 193 lb 9 oz (87.8 kg)   SpO2 97%   BMI 27.00 kg/m²       Intake/Output Summary (Last 24 hours) at 5/16/2020 0902  Last data filed at 5/16/2020 0154  Gross per 24 hour   Intake 1248.41 ml   Output 1350 ml   Net -101.59 ml       TELEMETRY: Sinus    has a past medical history of CAD (coronary artery disease), CHF (congestive heart failure) ([facility] [address] COPD (chronic obstructive pulmonary disease) ([facility] [address] Depression, [facility] abuse [facility] HIGH CHOLESTEROL, Hypertension, MI (myocardial infarction) ([facility] [address] and S/P coronary artery bypass graft x 4.   has a past surgical history that includes Cardiac surgery (11/2010); Bypass Graft (04/10/2011); and Leg Surgery.   Physical Exam:  General:  Awake, alert, NAD  Skin:  Warm and dry  Neck:  JVD mild elevated  Chest:  rales  Cardiovascular:  RRR S1S2  Abdomen:  Soft non tender  Extremities:  edema    Medications:    potassium chloride  20 mEq Oral Daily with breakfast    furosemide  40 mg Oral BID    lisinopril  10 mg Oral Daily    metoprolol succinate  25 mg Oral Daily    aspirin  81 mg Oral Daily    spironolactone  12.5 mg Oral Daily    rivaroxaban  20 mg Oral Daily with breakfast    nicotine  1 patch Transdermal Daily    guaiFENesin  600 mg Oral BID    sodium chloride flush  10 mL Intravenous 2 times per day       albuterol sulfate HFA, nitroGLYCERIN, sodium chloride flush, polyethylene glycol, promethazine **OR** ondansetron    Lab Data:  CBC:   Recent Labs     05/13/20  1152   WBC 7.7   HGB 10.3*   HCT 36.4*   MCV 70.5*        BMP:   Recent Labs     05/13/20  1724 05/15/20  0504 05/16/20  0205   * 133* 127*   K 4.2 3.8 4.5   CL 92* 93* 88*   CO2 21 26 25   BUN 23 23 25*   CREATININE 1.3 1.3 1.4*     LIVER PROFILE:   Recent Labs 05/13/20  1724 05/15/20  0504 05/16/20  0205   * 179* 132*   * 415* 336*   BILITOT 2.4* 1.6* 1.5*   ALKPHOS 121 108 112     PT/INR: No results for input(s): PROTIME, INR in the last 72 hours. APTT: No results for input(s): APTT in the last 72 hours. BNP:  No results for input(s): BNP in the last 72 hours. TROPONIN: @TROPONINI:3@      Assessment:    Acute on chronic combined systolic and diastolic heart failure  Ischemic cardiomyopathy  Nonsustained VT  Severe MR  Pulmonary hypertension   hyponatremia  Hypertension  Hyperlipidemia  Anemia  Mediastinal lymphadenopathy    Patient changed to oral lasix  Patient more compensated now  On metoprolol and lisinopril  Off dobutamine drip    Target Mean BP above 65mm. Please do not hold ACEI or Bblocker if Mean BP above 65    Some atelectasis on CXR recommend incentive spirometry    Possible discharge soon    David Salazar MD, 5/16/2020 9:02 AM     Please note this report has been partially produced using speech recognition software and may contain errors related to that system including errors in grammar, punctuation, and spelling, as well as words and phrases that may be inappropriate. If there are any questions or concerns please feel free to contact the dictating provider for clarification. 7 Hour(s) 35 Minute(s)

## (undated) DEVICE — GAUZE,SPONGE,4"X4",16PLY,XRAY,STRL,LF: Brand: MEDLINE

## (undated) DEVICE — AMD ANTIMICROBIAL NON-ADHERENT PAD,0.2% POLYHEXAMETHYLENE BIGUANIDE HCI (PHMB): Brand: TELFA

## (undated) DEVICE — 3M™ IOBAN™ 2 ANTIMICROBIAL INCISE DRAPE 6650EZ: Brand: IOBAN™ 2

## (undated) DEVICE — SUTURE MCRYL SZ 4-0 L18IN ABSRB UD L19MM PS-2 3/8 CIR PRIM Y496G

## (undated) DEVICE — PROTECTOR EYE PT SELF ADH NS OPT GRD LF

## (undated) DEVICE — YANKAUER,FLEXIBLE HANDLE,REGLR CAPACITY: Brand: MEDLINE INDUSTRIES, INC.

## (undated) DEVICE — SYRINGE MED 20ML STD CLR PLAS LUERLOCK TIP N CTRL DISP

## (undated) DEVICE — MARKER SURG SKIN UTIL REGULAR/FINE 2 TIP W/ RUL AND 9 LBL

## (undated) DEVICE — TUBING, SUCTION, 3/16" X 10', STRAIGHT: Brand: MEDLINE

## (undated) DEVICE — TOWEL,OR,DSP,ST,BLUE,STD,6/PK,12PK/CS: Brand: MEDLINE

## (undated) DEVICE — COUNTER NDL 30 COUNT FOAM STRP SGL MAG

## (undated) DEVICE — AGENT HEMSTAT W2XL14IN OXIDIZED REGENERATED CELOS ABSRB FOR

## (undated) DEVICE — DRAPE,UTILITY,XL,4/PK,STERILE: Brand: MEDLINE

## (undated) DEVICE — DRESSING TRNSPAR W2XL2.75IN FLM SHT SEMIPERMEABLE WIND

## (undated) DEVICE — ELECTRODE ES L2.75IN S STL INSUL BLDE W/ SL EDGE

## (undated) DEVICE — INTENDED FOR TISSUE SEPARATION, AND OTHER PROCEDURES THAT REQUIRE A SHARP SURGICAL BLADE TO PUNCTURE OR CUT.: Brand: BARD-PARKER ® STAINLESS STEEL BLADES

## (undated) DEVICE — PENCIL ES CRD L10FT HND SWCHING ROCK SWCH W/ EDGE COAT BLDE

## (undated) DEVICE — GLOVE SURG SZ 7 L12IN FNGR THK94MIL TRNSLUC YEL LTX HYDRGEL

## (undated) DEVICE — TUBING, SUCTION, 9/32" X 10', STRAIGHT: Brand: MEDLINE

## (undated) DEVICE — GOWN,ECLIPSE,POLYRNF,BRTHSLV,L,30/CS: Brand: MEDLINE

## (undated) DEVICE — APPLICATOR MEDICATED 26 CC SOLUTION HI LT ORNG CHLORAPREP

## (undated) DEVICE — ELECTRODE ES AD CRDLSS PT RET REM POLYHESIVE

## (undated) DEVICE — CONTAINER,SPECIMEN,OR STERILE,4OZ: Brand: MEDLINE

## (undated) DEVICE — GLOVE ORANGE PI 7   MSG9070

## (undated) DEVICE — ADHESIVE SKIN CLSR 0.7ML TOP DERMBND ADV

## (undated) DEVICE — CORD,CAUTERY,BIPOLAR,STERILE: Brand: MEDLINE